# Patient Record
Sex: FEMALE | Race: BLACK OR AFRICAN AMERICAN | NOT HISPANIC OR LATINO | Employment: UNEMPLOYED | URBAN - METROPOLITAN AREA
[De-identification: names, ages, dates, MRNs, and addresses within clinical notes are randomized per-mention and may not be internally consistent; named-entity substitution may affect disease eponyms.]

---

## 2017-03-08 ENCOUNTER — CLINICAL SUPPORT (OUTPATIENT)
Dept: OBSTETRICS AND GYNECOLOGY | Facility: CLINIC | Age: 42
End: 2017-03-08
Payer: MEDICAID

## 2017-03-08 ENCOUNTER — OFFICE VISIT (OUTPATIENT)
Dept: OBSTETRICS AND GYNECOLOGY | Facility: CLINIC | Age: 42
End: 2017-03-08
Payer: MEDICAID

## 2017-03-08 VITALS
BODY MASS INDEX: 31.57 KG/M2 | WEIGHT: 178.19 LBS | RESPIRATION RATE: 13 BRPM | HEART RATE: 76 BPM | SYSTOLIC BLOOD PRESSURE: 124 MMHG | DIASTOLIC BLOOD PRESSURE: 82 MMHG | HEIGHT: 63 IN

## 2017-03-08 DIAGNOSIS — Z30.013 ENCOUNTER FOR INITIAL PRESCRIPTION OF INJECTABLE CONTRACEPTIVE: Primary | ICD-10-CM

## 2017-03-08 DIAGNOSIS — Z01.419 WELL WOMAN EXAM WITH ROUTINE GYNECOLOGICAL EXAM: Primary | ICD-10-CM

## 2017-03-08 DIAGNOSIS — Z12.31 ENCOUNTER FOR SCREENING MAMMOGRAM FOR BREAST CANCER: ICD-10-CM

## 2017-03-08 DIAGNOSIS — N92.1 MENORRHAGIA WITH IRREGULAR CYCLE: ICD-10-CM

## 2017-03-08 DIAGNOSIS — Z12.4 CERVICAL CANCER SCREENING: ICD-10-CM

## 2017-03-08 LAB
B-HCG UR QL: NEGATIVE
CTP QC/QA: YES

## 2017-03-08 PROCEDURE — 99386 PREV VISIT NEW AGE 40-64: CPT | Mod: S$PBB,,, | Performed by: OBSTETRICS & GYNECOLOGY

## 2017-03-08 PROCEDURE — 88175 CYTOPATH C/V AUTO FLUID REDO: CPT

## 2017-03-08 PROCEDURE — 87624 HPV HI-RISK TYP POOLED RSLT: CPT

## 2017-03-08 PROCEDURE — 99999 PR PBB SHADOW E&M-NEW PATIENT-LVL III: CPT | Mod: PBBFAC,,, | Performed by: OBSTETRICS & GYNECOLOGY

## 2017-03-08 PROCEDURE — 99203 OFFICE O/P NEW LOW 30 MIN: CPT | Mod: PBBFAC | Performed by: OBSTETRICS & GYNECOLOGY

## 2017-03-08 RX ORDER — MEDROXYPROGESTERONE ACETATE 150 MG/ML
150 INJECTION, SUSPENSION INTRAMUSCULAR
Status: COMPLETED | OUTPATIENT
Start: 2017-03-08 | End: 2017-03-08

## 2017-03-08 RX ORDER — MEDROXYPROGESTERONE ACETATE 150 MG/ML
150 INJECTION, SUSPENSION INTRAMUSCULAR
Qty: 1 ML | Refills: 3 | Status: SHIPPED | OUTPATIENT
Start: 2017-03-08 | End: 2017-07-12

## 2017-03-08 RX ADMIN — MEDROXYPROGESTERONE ACETATE 150 MG: 150 INJECTION, SUSPENSION INTRAMUSCULAR at 10:03

## 2017-03-08 NOTE — LETTER
March 8, 2017        Jorge A Stack MD  [id number] [address] [address]             [facility]  [address] [place] [id number]  Phone: [phone]   Patient: Kristin Dowell   MR Number: [id number]   YOB: 1975   Date of Visit: 3/8/2017       Dear Dr. Stack:    Thank you for referring Kristin Dowell to me for evaluation. Attached you will find relevant portions of my assessment and plan of care.    If you have questions, please do not hesitate to call me. I look forward to following Kristin Dowell along with you.    Sincerely,      Charity Robertson MD            CC  No Recipients    Enclosure

## 2017-03-08 NOTE — PROGRESS NOTES
Subjective:    Patient ID: Kristin Dowell is a 41 y.o. y.o. female.     Chief Complaint: Annual Well Woman Exam     History of Present Illness:  Kristin presents today for Annual Well Woman exam. She describes her menses as irregular for about 2 years since the death of her son; her most recent period was in January and lasted most of the month.She denies pelvic pain.  She denies breast tenderness, masses, nipple discharge. She denies difficulty with urination or bowel movements. She denies menopausal symptoms such as hotflashes, vaginal dryness, and night sweats. She denies bloating, early satiety, or weight changes. She is sexually active. Contraception is by bilateral tubal ligation.      Menstrual History:   Patient's last menstrual period was 2017..     OB History      Para Term  AB TAB SAB Ectopic Multiple Living    5 5 3 2                The following portions of the patient's history were reviewed and updated as appropriate: allergies, current medications, past family history, past medical history, past social history, past surgical history and problem list.    ROS:   CONSTITUTIONAL: Negative for fever, chills, diaphoresis, weakness, fatigue, weight loss, weight gain  ENT: negative for sore throat, nasal congestion, nasal discharge, epistaxis, tinnitus, hearing loss  EYES: negative for blurry vision, decreased vision, loss of vision, eye pain, diplopia, photophobia, discharge  SKIN: Negative for rash, itching, hives  RESPIRATORY: negative for cough, hemoptysis, shortness of breath, pleuritic chest pain, wheezing  CARDIOVASCULAR: negative for chest pain, dyspnea on exertion, orthopnea, paroxysmal nocturnal dyspnea, edema, palpitations  BREAST: negative for breast  tenderness, breast mass, nipple discharge, or skin changes  GASTROINTESTINAL: negative for abdominal pain, flank pain, nausea, vomiting, diarrhea, constipation, black stool, blood in stool  GENITOURINARY: irregular menses, Denies:  dysuria, frequency/urgency, hematuria, genital discharge, vaginal bleeding, heavy menses, pelvic pain  HEMATOLOGIC/LYMPHATIC: negative for swollen lymph nodes, bleeding, bruising  MUSCULOSKELETAL: negative for back pain, joint pain, joint stiffness, joint swelling, muscle pain, muscle weakness  NEUROLOGICAL: negative for dizzy/vertigo, headache, focal weakness, numbness/tingling, speech problems, loss of consciousness, confusion, memory loss  BEHAVORIAL/PSYCH: negative for anxiety, depression, psychosis  ENDOCRINE: negative for polydipsia/polyuria, palpitations, skin changes, temperature intolerance, unexpected weight changes  ALLERGIC/IMMUNOLOGIC: negative for urticaria, hay fever, angioedema      Objective:    Vital Signs:  Vitals:    03/08/17 1012   BP: 124/82   Pulse: 76   Resp: 13       Physical Exam:  General:  alert, cooperative, appears stated age, no distress   Skin:  Skin color, texture, turgor normal. No rashes or lesions   HEENT:  extra ocular movement intact, sclera clear, anicteric   Neck: supple, trachea midline, no adenopathy or thyromegally   Respiratory:  Normal effort   Breasts:  no discharge, erythema, or tenderness, symmetric fibrous changes in both upper outer quadrants, no skin dimpling or peau d'orange   Abdomen:  soft, nontender, no palpable masses   Pelvis: External genitalia: normal general appearance  Urinary system: urethral meatus normal, bladder nontender  Vaginal: normal mucosa without prolapse or lesions  Cervix: normal appearance  Uterus: normal size, shape, position  Adnexa: normal size, nontender bilaterally   Extremities: Normal ROM; no edema, no cyanosis   Neurologial: Normal strength and tone. No focal numbness or weakness   Psychiatric: normal mood, speech, dress, and thought processes         Assessment:       Healthy female exam.     1. Well woman exam with routine gynecological exam    2. Cervical cancer screening    3. Menorrhagia with irregular cycle    4. Encounter for  screening mammogram for breast cancer          Plan:      1. Well woman exam with routine gynecological exam    2. Cervical cancer screening  - Liquid-based pap smear, screening  - HPV DNA probe, amplified    3. Menorrhagia with irregular cycle  - medroxyPROGESTERone (DEPO-PROVERA) 150 mg/mL injection; Inject 1 mL (150 mg total) into the muscle every 3 (three) months.  Dispense: 1 mL; Refill: 3  Patient is occasional smoker.  Discussed progesterone only medicine options.  Patient would like trial of medical management prior to proceeding with surgery.    4. Encounter for screening mammogram for breast cancer  - Mammo Digital Screening Bilat with CAD; Future      COUNSELING:  Kristin was counseled on  A.C.O.G. Pap guidelines and recommendations for yearly pelvic exams in addition to recommendations for monthly self breast exams; to see her PCP for other health maintenance.

## 2017-03-08 NOTE — PROGRESS NOTES
Initial Depo Provera, UPT negative. Depo Provera 150 mg administered IM to RUOG as ordered. Patient waited x 20 minutes with no reaction noted. Patient supplied medication from local pharmacy. Patient instructed to  prescription the day of injection administration. Depo Provera medication must be stored at room temperature between 68-77 Degrees Fahrenheit in upright position. Keep away from moisture and heat. Do not tamper with vial. Do not self administer medication. Pt verbalized understanding.

## 2017-03-17 LAB — HUMAN PAPILLOMAVIRUS (HPV): NOT DETECTED

## 2017-03-24 ENCOUNTER — PATIENT MESSAGE (OUTPATIENT)
Dept: OBSTETRICS AND GYNECOLOGY | Facility: CLINIC | Age: 42
End: 2017-03-24

## 2017-05-24 ENCOUNTER — OFFICE VISIT (OUTPATIENT)
Dept: OBSTETRICS AND GYNECOLOGY | Facility: CLINIC | Age: 42
End: 2017-05-24
Payer: MEDICAID

## 2017-05-24 VITALS
RESPIRATION RATE: 12 BRPM | BODY MASS INDEX: 31.04 KG/M2 | HEIGHT: 63 IN | WEIGHT: 175.19 LBS | HEART RATE: 72 BPM | DIASTOLIC BLOOD PRESSURE: 86 MMHG | SYSTOLIC BLOOD PRESSURE: 138 MMHG

## 2017-05-24 DIAGNOSIS — N92.1 BREAKTHROUGH BLEEDING ON DEPO PROVERA: Primary | ICD-10-CM

## 2017-05-24 DIAGNOSIS — R10.31 RLQ ABDOMINAL PAIN: ICD-10-CM

## 2017-05-24 DIAGNOSIS — N93.8 DUB (DYSFUNCTIONAL UTERINE BLEEDING): ICD-10-CM

## 2017-05-24 PROCEDURE — 99213 OFFICE O/P EST LOW 20 MIN: CPT | Mod: 25,S$PBB,, | Performed by: OBSTETRICS & GYNECOLOGY

## 2017-05-24 PROCEDURE — 58100 BIOPSY OF UTERUS LINING: CPT | Mod: PBBFAC | Performed by: OBSTETRICS & GYNECOLOGY

## 2017-05-24 PROCEDURE — 58100 BIOPSY OF UTERUS LINING: CPT | Mod: S$PBB,,, | Performed by: OBSTETRICS & GYNECOLOGY

## 2017-05-24 PROCEDURE — 99214 OFFICE O/P EST MOD 30 MIN: CPT | Mod: PBBFAC | Performed by: OBSTETRICS & GYNECOLOGY

## 2017-05-24 PROCEDURE — 99999 PR PBB SHADOW E&M-EST. PATIENT-LVL IV: CPT | Mod: PBBFAC,,, | Performed by: OBSTETRICS & GYNECOLOGY

## 2017-05-24 PROCEDURE — 88305 TISSUE EXAM BY PATHOLOGIST: CPT | Performed by: PATHOLOGY

## 2017-05-24 RX ORDER — NORGESTIMATE AND ETHINYL ESTRADIOL 0.25-0.035
1 KIT ORAL DAILY
Qty: 28 TABLET | Refills: 1 | Status: ON HOLD | OUTPATIENT
Start: 2017-05-24 | End: 2017-07-19 | Stop reason: HOSPADM

## 2017-05-24 NOTE — PROGRESS NOTES
Subjective:    Patient ID: Kristin Dowell is a 41 y.o. y.o. female.     Chief Complaint:   Chief Complaint   Patient presents with    Pelvic Pain     right side, x 1mth    Vaginal Bleeding     x1mth       History of Present Illness   Kristin presents today complaining of abnormal vaginal bleeding. She states that her irregular bleeding began about 2 years ago. She was seen in March and started on DepoProvera; this medication worked for 1 month, but she began bleeding again in May and has been bleeding almost daily for 3 weeks.   She states that the amount of bleeding has been varied, ranging from light to heavy with passage of clots. She reports pain with bleeding. When occurring, pain is felt to be mostly RLQ.  She has a history of a BTL for contraception.    ROS:   Review of Systems       Objective:    Vital Signs:  Vitals:    05/24/17 1041   BP: 138/86   Pulse: 72   Resp: 12       Physical Exam:  General:  alert, cooperative, no distress   Skin:  Skin color, texture, turgor normal. No rashes or lesions   Abdomen:  soft, nontender   Pelvis: External genitalia: normal general appearance  Urinary system: urethral meatus normal, bladder nontender  Vaginal: normal mucosa without prolapse or lesions  Cervix: normal appearance  Uterus: normal size, shape, position  Adnexa: normal size, nontender bilaterally     Endometrial Biopsy:      The risks of uterine hyperplasia, neoplasia, and cancer were explained to the patient, as well as the likelihood of abnormal or anovulatory bleeding. She does not have a medical condition, history of anticoagulation, or other evident reason for bleeding at this time.  The rationale for evaluation with endometrial biopsy and pelvic ultrasound was discussed, and she verbalized understanding. She was informed of the risk of bleeding, infection, uterine perforation and pain and that the test will rule out endometrial cancer with accuracy greater than 95%.  She was counseled on the alternatives  to endometrial biopsy and agrees to proceed.    TIMEOUT PERFORMED.  The cervix was visualized with a speculum.  The cervix was prepped with Betadine.    The cervix was not stenotic. A single-tooth tenaculum was not used to stabilize the cervix. Cervical dilation was not required. A Pipelle was used to obtain the endometrial biopsy. The uterus was sounded to 6 cm and then firm resistance noted (possible fibroid at fundus?). Endocervical curettage was not performed. Multiple passes were taken with the Pipelle with retrieval of Fair tissue sample and dark blood clot.    The specimen was placed in formalin and sent to pathology for histology evaluation.      She tolerated the procedure well      Assessment:      1. Breakthrough bleeding on depo provera    2. DUB (dysfunctional uterine bleeding)    3. RLQ abdominal pain          Plan:      Breakthrough bleeding on depo provera  -     Endometrial biopsy; Future  -     Tissue Specimen To Pathology, Obstetrics/Gynecology  -     US OB/GYN Procedure ([facility]) - Extended List; Future  -     Case Request Operating Room: HYSTERECTOMY-TOTAL LAPAROSCOPIC (TLH)  BILATERAL SALPINGECTOMY  -     norgestimate-ethinyl estradiol (ORTHO-CYCLEN) 0.25-35 mg-mcg per tablet; Take 1 tablet by mouth once daily.  Dispense: 28 tablet; Refill: 1    DUB (dysfunctional uterine bleeding)  -     Endometrial biopsy; Future  -     Tissue Specimen To Pathology, Obstetrics/Gynecology  -     US OB/GYN Procedure ([facility]) - Extended List; Future  -     Case Request Operating Room: HYSTERECTOMY-TOTAL LAPAROSCOPIC (TLH)  BILATERAL SALPINGECTOMY  -     norgestimate-ethinyl estradiol (ORTHO-CYCLEN) 0.25-35 mg-mcg per tablet; Take 1 tablet by mouth once daily.  Dispense: 28 tablet; Refill: 1    RLQ abdominal pain  -     US OB/GYN Procedure ([facility]) - Extended List; Future  -     Case Request Operating Room: HYSTERECTOMY-TOTAL LAPAROSCOPIC (TLH)  BILATERAL SALPINGECTOMY

## 2017-05-24 NOTE — PATIENT INSTRUCTIONS
TLH with bilateral salpingectomy scheduled for 7/19/17.  Pt notified and verbalizes understanding.

## 2017-06-01 ENCOUNTER — PATIENT MESSAGE (OUTPATIENT)
Dept: OBSTETRICS AND GYNECOLOGY | Facility: CLINIC | Age: 42
End: 2017-06-01

## 2017-07-12 ENCOUNTER — HOSPITAL ENCOUNTER (OUTPATIENT)
Dept: PREADMISSION TESTING | Facility: HOSPITAL | Age: 42
Discharge: HOME | End: 2017-07-12
Attending: OBSTETRICS & GYNECOLOGY
Payer: MEDICAID

## 2017-07-12 ENCOUNTER — OFFICE VISIT (OUTPATIENT)
Dept: OBSTETRICS AND GYNECOLOGY | Facility: CLINIC | Age: 42
End: 2017-07-12
Payer: MEDICAID

## 2017-07-12 ENCOUNTER — ANESTHESIA EVENT (OUTPATIENT)
Dept: SURGERY | Facility: HOSPITAL | Age: 42
End: 2017-07-12
Payer: MEDICAID

## 2017-07-12 ENCOUNTER — PROCEDURE VISIT (OUTPATIENT)
Dept: OBSTETRICS AND GYNECOLOGY | Facility: CLINIC | Age: 42
End: 2017-07-12
Payer: MEDICAID

## 2017-07-12 ENCOUNTER — HOSPITAL ENCOUNTER (OUTPATIENT)
Dept: PULMONOLOGY | Facility: HOSPITAL | Age: 42
Discharge: HOME | End: 2017-07-12
Attending: OBSTETRICS & GYNECOLOGY
Payer: MEDICAID

## 2017-07-12 VITALS
RESPIRATION RATE: 13 BRPM | BODY MASS INDEX: 30.77 KG/M2 | DIASTOLIC BLOOD PRESSURE: 84 MMHG | HEART RATE: 73 BPM | WEIGHT: 173.63 LBS | SYSTOLIC BLOOD PRESSURE: 138 MMHG | HEIGHT: 63 IN

## 2017-07-12 VITALS — WEIGHT: 174 LBS | HEIGHT: 68 IN | BODY MASS INDEX: 26.37 KG/M2

## 2017-07-12 DIAGNOSIS — R10.2 CHRONIC PELVIC PAIN IN FEMALE: ICD-10-CM

## 2017-07-12 DIAGNOSIS — G89.29 CHRONIC PELVIC PAIN IN FEMALE: ICD-10-CM

## 2017-07-12 DIAGNOSIS — R10.31 RLQ ABDOMINAL PAIN: ICD-10-CM

## 2017-07-12 DIAGNOSIS — N93.8 DUB (DYSFUNCTIONAL UTERINE BLEEDING): Primary | ICD-10-CM

## 2017-07-12 DIAGNOSIS — N93.8 DUB (DYSFUNCTIONAL UTERINE BLEEDING): ICD-10-CM

## 2017-07-12 DIAGNOSIS — Z01.818 PRE-OP TESTING: ICD-10-CM

## 2017-07-12 DIAGNOSIS — N92.1 BREAKTHROUGH BLEEDING ON DEPO PROVERA: ICD-10-CM

## 2017-07-12 PROCEDURE — 76830 TRANSVAGINAL US NON-OB: CPT | Mod: PBBFAC | Performed by: OBSTETRICS & GYNECOLOGY

## 2017-07-12 PROCEDURE — 76830 TRANSVAGINAL US NON-OB: CPT | Mod: 26,S$PBB,, | Performed by: OBSTETRICS & GYNECOLOGY

## 2017-07-12 PROCEDURE — 99499 UNLISTED E&M SERVICE: CPT | Mod: S$PBB,,, | Performed by: OBSTETRICS & GYNECOLOGY

## 2017-07-12 PROCEDURE — 99999 PR PBB SHADOW E&M-EST. PATIENT-LVL III: CPT | Mod: PBBFAC,,, | Performed by: OBSTETRICS & GYNECOLOGY

## 2017-07-12 PROCEDURE — 93010 ELECTROCARDIOGRAM REPORT: CPT | Mod: ,,, | Performed by: INTERNAL MEDICINE

## 2017-07-12 RX ORDER — SODIUM CHLORIDE, SODIUM LACTATE, POTASSIUM CHLORIDE, CALCIUM CHLORIDE 600; 310; 30; 20 MG/100ML; MG/100ML; MG/100ML; MG/100ML
INJECTION, SOLUTION INTRAVENOUS CONTINUOUS
Status: CANCELLED | OUTPATIENT
Start: 2017-07-12

## 2017-07-12 RX ORDER — ONDANSETRON 4 MG/1
8 TABLET, ORALLY DISINTEGRATING ORAL EVERY 8 HOURS PRN
Status: CANCELLED | OUTPATIENT
Start: 2017-07-12

## 2017-07-12 RX ORDER — IBUPROFEN 200 MG
400-600 TABLET ORAL DAILY PRN
Status: ON HOLD | COMMUNITY
End: 2017-07-19 | Stop reason: HOSPADM

## 2017-07-12 RX ORDER — UBIDECARENONE 75 MG
500 CAPSULE ORAL DAILY
Status: ON HOLD | COMMUNITY
End: 2021-11-19 | Stop reason: HOSPADM

## 2017-07-12 NOTE — H&P
Pre-Operative History and Physical   Obstetrics and Gynecology    Kristin Dowell is a 41 y.o. female  for preop examination.  She is scheduled for a TLH/BS on 17.  She desires definitive surgical management of her AUB.    She states that her irregular bleeding began about 2 years ago. She was seen in March and started on DepoProvera; this medication worked for 1 month, but she began bleeding again in May and had a cycle that lasted almost daily for 3 weeks. Her bleeding is varied, ranging from light to heavy with passage of clots. She reports pain with bleeding. When occurring, pain is felt to be mostly RLQ.  She has a history of a BTL for contraception.    ROS:  GENERAL: Denies weight gain or weight loss. Feeling well overall.   SKIN: Denies rash or lesions.   HEAD: Denies head injury or headache.   NODES: Denies enlarged lymph nodes.   CHEST: Denies chest pain or shortness of breath.   CARDIOVASCULAR: Denies palpitations or left sided chest pain.   ABDOMEN: No abdominal pain, constipation, diarrhea, nausea, vomiting or rectal bleeding.   URINARY: No frequency, dysuria, hematuria, or burning on urination.  REPRODUCTIVE: See HPI.   BREASTS: The patient performs breast self-examination and denies pain, lumps, or nipple discharge.   HEMATOLOGIC: No easy bruisability or excessive bleeding with the exception of menstrual cycles.  MUSCULOSKELETAL: Denies joint pain or swelling.   NEUROLOGIC: Denies syncope or weakness.   PSYCHIATRIC: Denies depression, anxiety or mood swings.    No past medical history on file.  Past Surgical History:   Procedure Laterality Date    APPENDECTOMY       SECTION      TUBAL LIGATION       Family History   Problem Relation Age of Onset    Cancer Father     Breast cancer Neg Hx     Colon cancer Neg Hx     Ovarian cancer Neg Hx      Review of patient's allergies indicates:  No Known Allergies    Current Outpatient Prescriptions:     medroxyPROGESTERone (DEPO-PROVERA) 150  mg/mL injection, Inject 1 mL (150 mg total) into the muscle every 3 (three) months., Disp: 1 mL, Rfl: 3    norgestimate-ethinyl estradiol (ORTHO-CYCLEN) 0.25-35 mg-mcg per tablet, Take 1 tablet by mouth once daily., Disp: 28 tablet, Rfl: 1  No outpatient prescriptions have been marked as taking for the 7/12/17 encounter (Appointment) with Charity Robertson MD.     Social History   Substance Use Topics    Smoking status: Light Tobacco Smoker     Types: Cigars    Smokeless tobacco: Never Used      Comment: social smoker    Alcohol use Yes      Comment: socially         Last pap 3/2017 NEM, HPV negative  Last pathology 5/2017 proliferative  Last ultrasound fibroid uterus, normal adnexa    There were no vitals filed for this visit.  General Appearance: Alert, appropriate appearance for age. No acute distress, Chest/Respiratory Exam: Normal.   Cardiovascular Exam: regular rate and rhythm   Gastrointestinal Exam: soft, non-tender; bowel sounds normal; no masses,  no organomegaly  Pelvic Exam Female: Exam deferred.   Psychiatric Exam: Alert and oriented, appropriate affect.    Assessment: DUB, CPP    Plan: TLH/BS    I have discussed the risks, benefits, indications, and alternatives of the procedure in detail.  The patient verbalizes her understanding.  All questions answered.  Consents signed.  The patient agrees to proceed to proceed as planned.

## 2017-07-12 NOTE — ANESTHESIA PREPROCEDURE EVALUATION
07/12/2017  Kristin Dowell is a 41 y.o., female.    Anesthesia Evaluation    I have reviewed the Patient Summary Reports.    I have reviewed the Nursing Notes.   I have reviewed the Medications.     Review of Systems  Anesthesia Hx:  No problems with previous Anesthesia Denies Hx of Anesthetic complications  History of prior surgery of interest to airway management or planning: Previous anesthesia: Spinal Denies Family Hx of Anesthesia complications.   Denies Personal Hx of Anesthesia complications.   Social:  Social Alcohol Use, Non-Smoker    Hematology/Oncology:  Hematology Normal   Oncology Normal     EENT/Dental:EENT/Dental Normal   Cardiovascular:  Cardiovascular Normal Exercise tolerance: good     Pulmonary:  Pulmonary Normal    Renal/:  Renal/ Normal     Hepatic/GI:  Hepatic/GI Normal    Musculoskeletal:  Musculoskeletal Normal    Neurological:  Neurology Normal    Endocrine:  Endocrine Normal    Dermatological:  Skin Normal    Psych:  Psychiatric Normal           Physical Exam  General:  Well nourished    Airway/Jaw/Neck:  Airway Findings: Mouth Opening: Normal Tongue: Normal  General Airway Assessment: Adult  Mallampati: III  TM Distance: Normal, at least 6 cm  Jaw/Neck Findings:     Neck ROM: Normal ROM      Dental:  Dental Findings: In tact        Mental Status:  Mental Status Findings:  Cooperative, Alert and Oriented         Anesthesia Plan  Type of Anesthesia, risks & benefits discussed:  Anesthesia Type:  general  Patient's Preference:   Intra-op Monitoring Plan: standard ASA monitors  Intra-op Monitoring Plan Comments:   Post Op Pain Control Plan: multimodal analgesia  Post Op Pain Control Plan Comments:   Induction:   IV  Beta Blocker:  Patient is not currently on a Beta-Blocker (No further documentation required).       Informed Consent: Patient understands risks and agrees with Anesthesia  plan.  Questions answered. Anesthesia consent signed with patient.  ASA Score: 1     Day of Surgery Review of History & Physical: I have interviewed and examined the patient. I have reviewed the patient's H&P dated: 7/19/17. There are no significant changes.  H&P update referred to the surgeon.         Ready For Surgery From Anesthesia Perspective.

## 2017-07-12 NOTE — DISCHARGE INSTRUCTIONS
[facility]  Pre Admit Instructions    Day and Date of Procedure: Wednesday 7-      · Call your doctor if you become ill before your surgery  · Someone will call you between 1 p.m. And 5 p.m.the workday before the procedure to give you an arrival time       - Before 7 a.m. Enter through Emergency Room       - 7 a.m. To 5 p.m. Enter through Patient Registration Main Lobby  · You must have a responsible  to bring you home    Do NOT eat or drink anything   past midnight before your procedure day    Please    · Do not wear makeup, jewelry, nail polish or body piercings  · Bring containers/solution for contacts, dentures, bridges - these and hearing aids will be removed before your procedure  · Do not bring cash, jewelry or valuables the day of your procedure   · No smoking at least 24 hours before your procedure  · Wear clothing that is comfortable and easy to take off and put on  · Do NOT shave for at least 5 days before your surgery    Review skin preparation handout before using.                 Information about your stay (Please Review)    Before Surgery  1. Cafeteria Meals: 7am to 10am; 11am to 1:30 pm; Dinner/Supper must may be ordered between 11:00 am and 4 pm from the [facility] [employer] Menu. Food will be available to  between 5 pm and 6 pm. The kitchen phone extension is [phone].  2. Your doctor may order and review labs, x-rays, ECG or other tests as a pre-surgery workup and will call you if there is need for follow up.  3. No smoking inside or outside the hospital on hospital grounds.  4. Wear clothing that is easy to take off and put on.  The hospital will provide you with a gown.  5. You may bring robe, slippers, nightwear, and toiletries (toothbrush, toothpaste, makeup).  6. If your doctor orders a Fleets Enema or other prep, follow package and/or doctors orders.  7. Brush your teeth and rinse your mouth the morning of surgery, but dont swallow the water.  8. The nurse  will ask questions and check your condition.  The doctor may indy your surgical site.  9. Compression boots may be put on your calves to reduce the risk of blood clots.  10. The doctor may order medicine to help you relax before surgery.  After Surgery  1. The nurse will check your temperature, breathing, blood pressure, heart rate, IV site, and surgery site.  2. A diet will be ordered-most start with ice chips and then advance slowly to other foods.  3. If you have IV fluids the IV pump will beep to let the nurse know that she needs to check it.  4. You may have a urinary catheter and staff may measure your oral intake and urine output.  5. Pain medication may be ordered by the doctor after surgery.  If you have a pain management device tell your caretakers not to press the button because of OVERDOSE RISK.  6. When the nurse or doctor tells you it is okay to get out of bed, ask for help until you are stable.  7. The nurse may ask you to turn, cough, and deep breathe to prevent lung problems.  You can use a pillow to hold your incision when you deep breathe or cough to reduce pain.  8. The nurse will give you discharge instructions--incision care, symptoms to report to your doctor, and your follow-up appointment when you are discharged.  You cannot drive yourself home.  Goal for Discharge from One Day Surgery  · Control pain with an oral medication  · Walk without feeling dizzy or weak  · Tolerate liquids well  · Urinate without difficulty    Things you can do to  Reduce the Risk of Infections or Complications  Wash Hands and use Waterless Hand Sanitizers  · Wash hands frequently with soap and warm water for at least 15 seconds.   · Use hand sanitizers (alcohol based) often at home and in public if hands are not visibly soiled  Take Antibiotic Exactly as Prescribed  · Do not stop antibiotics too soon; you risk developing infection resistant to antibiotics  · Take your antibiotic even if you are feeling better and  even if they upset your stomach  · Call the doctor if you cant tolerate the antibiotic or you have an allergic reaction  Stay Healthy  · Take medicines as prescribed by your doctor  · Keep your diabetes under control - diet and medication  · Get enough rest, exercise and eat a healthy diet  Keep the Wound Clean and Dry  · Wash hands before and after taking care of the incision (cut)  · Wash hands when you remove a dressing, before you touch/apply a new dressing  · Shower and clean incision with antibacterial soap and rinse well if the doctor approves  · Allow the cut to dry completely before putting on a clean dressing  · Do not touch the part of the bandage that will cover the incision  · Do not use ointments unless your doctor tells you to-can promote bacterial growth  · If ordered, put ointment directly on the dressing-do not touch the end of the tube  · Do not scrub, remove scabs, or leave a damp dressing on the incision  · Do not use peroxide or alcohol to clean the incision unless the doctor tells you to   · Do not let children, pets or anyone else contaminate the incision  Stop Smoking To Prevent Infection  · Stop smoking-Centers for Disease Control recommends 30 days before surgery  · Smokers get more infections after surgery-studies have shown 6 times the risk  · Smokers have more scarring and heal slower-open wounds get infected easier  Prevent Respiratory complications  · Stop smoking  · Turn, cough, and deep breathe even if you have some pain when you do so.  · Splint your incision with a pillow when you cough/deep breath, to help control pain.  · Do not lie in one position for long periods of time.   Prevent Blood Clots  · When you wake move your legs, flex your feet, rotate your ankles, wiggle your toes  · Get up when the doctor says its ok.  Dangle your feet from the side of the bed  · Report symptoms-leg pain, redness/swelling, warm to touch; fever; shortness of breath, chest pain, severe upper  back pain.

## 2017-07-14 DIAGNOSIS — N93.8 DUB (DYSFUNCTIONAL UTERINE BLEEDING): ICD-10-CM

## 2017-07-14 DIAGNOSIS — N92.1 BREAKTHROUGH BLEEDING ON DEPO PROVERA: ICD-10-CM

## 2017-07-14 RX ORDER — NORGESTIMATE AND ETHINYL ESTRADIOL 0.25-0.035
KIT ORAL
Qty: 28 TABLET | Refills: 1 | OUTPATIENT
Start: 2017-07-14

## 2017-07-19 ENCOUNTER — ANESTHESIA (OUTPATIENT)
Dept: SURGERY | Facility: HOSPITAL | Age: 42
End: 2017-07-19
Payer: MEDICAID

## 2017-07-19 ENCOUNTER — HOSPITAL ENCOUNTER (OUTPATIENT)
Facility: HOSPITAL | Age: 42
Discharge: HOME | End: 2017-07-19
Attending: OBSTETRICS & GYNECOLOGY | Admitting: OBSTETRICS & GYNECOLOGY
Payer: MEDICAID

## 2017-07-19 ENCOUNTER — SURGERY (OUTPATIENT)
Age: 42
End: 2017-07-19

## 2017-07-19 VITALS
BODY MASS INDEX: 27.3 KG/M2 | DIASTOLIC BLOOD PRESSURE: 95 MMHG | RESPIRATION RATE: 18 BRPM | SYSTOLIC BLOOD PRESSURE: 160 MMHG | TEMPERATURE: 98 F | WEIGHT: 173.94 LBS | HEIGHT: 67 IN | HEART RATE: 70 BPM | OXYGEN SATURATION: 98 %

## 2017-07-19 DIAGNOSIS — R10.2 CHRONIC PELVIC PAIN IN FEMALE: ICD-10-CM

## 2017-07-19 DIAGNOSIS — G89.29 CHRONIC PELVIC PAIN IN FEMALE: ICD-10-CM

## 2017-07-19 DIAGNOSIS — N93.8 DUB (DYSFUNCTIONAL UTERINE BLEEDING): ICD-10-CM

## 2017-07-19 DIAGNOSIS — N92.1 BREAKTHROUGH BLEEDING ON DEPO PROVERA: ICD-10-CM

## 2017-07-19 DIAGNOSIS — R10.31 RLQ ABDOMINAL PAIN: Primary | ICD-10-CM

## 2017-07-19 LAB
B-HCG UR QL: NEGATIVE
POCT GLUCOSE: 98 MG/DL (ref 70–110)

## 2017-07-19 PROCEDURE — 27000496 *HC LARYNGEAL BLADE (STAH ONLY): Performed by: NURSE ANESTHETIST, CERTIFIED REGISTERED

## 2017-07-19 PROCEDURE — 37000008 HC ANESTHESIA 1ST 15 MINUTES: Performed by: OBSTETRICS & GYNECOLOGY

## 2017-07-19 PROCEDURE — 27200120 HC KIT IV START (RUSH ONLY): Performed by: NURSE ANESTHETIST, CERTIFIED REGISTERED

## 2017-07-19 PROCEDURE — 99900035 HC TECH TIME PER 15 MIN (STAT)

## 2017-07-19 PROCEDURE — 27000221 HC OXYGEN, UP TO 24 HOURS

## 2017-07-19 PROCEDURE — 63600175 PHARM REV CODE 636 W HCPCS: Performed by: NURSE ANESTHETIST, CERTIFIED REGISTERED

## 2017-07-19 PROCEDURE — 37000009 HC ANESTHESIA EA ADD 15 MINS: Performed by: OBSTETRICS & GYNECOLOGY

## 2017-07-19 PROCEDURE — 27000494 *HC OXYGEN SET UP (STAH ONLY): Performed by: NURSE ANESTHETIST, CERTIFIED REGISTERED

## 2017-07-19 PROCEDURE — 63600175 PHARM REV CODE 636 W HCPCS: Performed by: OBSTETRICS & GYNECOLOGY

## 2017-07-19 PROCEDURE — 00840 ANES IPER PX LOWER ABD NOS: CPT | Mod: P1,QZ | Performed by: NURSE ANESTHETIST, CERTIFIED REGISTERED

## 2017-07-19 PROCEDURE — 71000033 HC RECOVERY, INTIAL HOUR: Performed by: OBSTETRICS & GYNECOLOGY

## 2017-07-19 PROCEDURE — 25000003 PHARM REV CODE 250: Performed by: OBSTETRICS & GYNECOLOGY

## 2017-07-19 PROCEDURE — 27000495 *HC ANESTHESIA START UP SUPPLY KIT (STAH ONLY): Performed by: NURSE ANESTHETIST, CERTIFIED REGISTERED

## 2017-07-19 PROCEDURE — 88307 TISSUE EXAM BY PATHOLOGIST: CPT | Performed by: PATHOLOGY

## 2017-07-19 PROCEDURE — 58571 TLH W/T/O 250 G OR LESS: CPT | Mod: ,,, | Performed by: OBSTETRICS & GYNECOLOGY

## 2017-07-19 PROCEDURE — 81025 URINE PREGNANCY TEST: CPT

## 2017-07-19 PROCEDURE — 36000710: Performed by: OBSTETRICS & GYNECOLOGY

## 2017-07-19 PROCEDURE — 36000711: Performed by: OBSTETRICS & GYNECOLOGY

## 2017-07-19 PROCEDURE — 58571 TLH W/T/O 250 G OR LESS: CPT | Mod: 80,,, | Performed by: OBSTETRICS & GYNECOLOGY

## 2017-07-19 PROCEDURE — 25000003 PHARM REV CODE 250: Performed by: NURSE ANESTHETIST, CERTIFIED REGISTERED

## 2017-07-19 PROCEDURE — 27201423 OPTIME MED/SURG SUP & DEVICES STERILE SUPPLY: Performed by: OBSTETRICS & GYNECOLOGY

## 2017-07-19 RX ORDER — IBUPROFEN 800 MG/1
800 TABLET ORAL EVERY 8 HOURS
Qty: 30 TABLET | Refills: 0 | Status: SHIPPED | OUTPATIENT
Start: 2017-07-20 | End: 2018-04-03 | Stop reason: ALTCHOICE

## 2017-07-19 RX ORDER — SODIUM CHLORIDE, SODIUM LACTATE, POTASSIUM CHLORIDE, CALCIUM CHLORIDE 600; 310; 30; 20 MG/100ML; MG/100ML; MG/100ML; MG/100ML
INJECTION, SOLUTION INTRAVENOUS CONTINUOUS PRN
Status: DISCONTINUED | OUTPATIENT
Start: 2017-07-19 | End: 2017-07-19

## 2017-07-19 RX ORDER — LIDOCAINE HYDROCHLORIDE 20 MG/ML
INJECTION, SOLUTION EPIDURAL; INFILTRATION; INTRACAUDAL; PERINEURAL
Status: DISCONTINUED | OUTPATIENT
Start: 2017-07-19 | End: 2017-07-19

## 2017-07-19 RX ORDER — HYDROCODONE BITARTRATE AND ACETAMINOPHEN 5; 325 MG/1; MG/1
1 TABLET ORAL EVERY 4 HOURS PRN
Status: DISCONTINUED | OUTPATIENT
Start: 2017-07-19 | End: 2017-07-20 | Stop reason: HOSPADM

## 2017-07-19 RX ORDER — HYDROCODONE BITARTRATE AND ACETAMINOPHEN 10; 325 MG/1; MG/1
1 TABLET ORAL EVERY 4 HOURS PRN
Status: DISCONTINUED | OUTPATIENT
Start: 2017-07-19 | End: 2017-07-20 | Stop reason: HOSPADM

## 2017-07-19 RX ORDER — CEFAZOLIN SODIUM 2 G/50ML
2 SOLUTION INTRAVENOUS
Status: COMPLETED | OUTPATIENT
Start: 2017-07-19 | End: 2017-07-19

## 2017-07-19 RX ORDER — SIMETHICONE 80 MG
80 TABLET,CHEWABLE ORAL EVERY 4 HOURS PRN
Status: DISCONTINUED | OUTPATIENT
Start: 2017-07-19 | End: 2017-07-20 | Stop reason: HOSPADM

## 2017-07-19 RX ORDER — ONDANSETRON HYDROCHLORIDE 2 MG/ML
INJECTION, SOLUTION INTRAMUSCULAR; INTRAVENOUS
Status: DISCONTINUED | OUTPATIENT
Start: 2017-07-19 | End: 2017-07-19

## 2017-07-19 RX ORDER — FENTANYL CITRATE 50 UG/ML
INJECTION, SOLUTION INTRAMUSCULAR; INTRAVENOUS
Status: DISCONTINUED | OUTPATIENT
Start: 2017-07-19 | End: 2017-07-19

## 2017-07-19 RX ORDER — ACETAMINOPHEN 325 MG/1
650 TABLET ORAL EVERY 4 HOURS PRN
Status: DISCONTINUED | OUTPATIENT
Start: 2017-07-19 | End: 2017-07-20 | Stop reason: HOSPADM

## 2017-07-19 RX ORDER — MIDAZOLAM HYDROCHLORIDE 1 MG/ML
INJECTION INTRAMUSCULAR; INTRAVENOUS
Status: DISCONTINUED | OUTPATIENT
Start: 2017-07-19 | End: 2017-07-19

## 2017-07-19 RX ORDER — ONDANSETRON 2 MG/ML
4 INJECTION INTRAMUSCULAR; INTRAVENOUS EVERY 6 HOURS PRN
Status: DISCONTINUED | OUTPATIENT
Start: 2017-07-19 | End: 2017-07-20 | Stop reason: HOSPADM

## 2017-07-19 RX ORDER — PROMETHAZINE HYDROCHLORIDE 25 MG/1
25 TABLET ORAL EVERY 6 HOURS PRN
Status: DISCONTINUED | OUTPATIENT
Start: 2017-07-19 | End: 2017-07-20 | Stop reason: HOSPADM

## 2017-07-19 RX ORDER — NEOSTIGMINE METHYLSULFATE 1 MG/ML
INJECTION, SOLUTION INTRAVENOUS
Status: DISCONTINUED | OUTPATIENT
Start: 2017-07-19 | End: 2017-07-19

## 2017-07-19 RX ORDER — IBUPROFEN 800 MG/1
800 TABLET ORAL EVERY 8 HOURS
Status: DISCONTINUED | OUTPATIENT
Start: 2017-07-20 | End: 2017-07-20 | Stop reason: HOSPADM

## 2017-07-19 RX ORDER — HYDROCODONE BITARTRATE AND ACETAMINOPHEN 5; 325 MG/1; MG/1
1 TABLET ORAL EVERY 6 HOURS PRN
Qty: 25 TABLET | Refills: 0 | Status: SHIPPED | OUTPATIENT
Start: 2017-07-19 | End: 2017-08-28

## 2017-07-19 RX ORDER — ROCURONIUM BROMIDE 10 MG/ML
INJECTION, SOLUTION INTRAVENOUS
Status: DISCONTINUED | OUTPATIENT
Start: 2017-07-19 | End: 2017-07-19

## 2017-07-19 RX ORDER — ONDANSETRON 8 MG/1
8 TABLET, ORALLY DISINTEGRATING ORAL EVERY 8 HOURS PRN
Status: DISCONTINUED | OUTPATIENT
Start: 2017-07-19 | End: 2017-07-19

## 2017-07-19 RX ORDER — SODIUM CHLORIDE, SODIUM LACTATE, POTASSIUM CHLORIDE, CALCIUM CHLORIDE 600; 310; 30; 20 MG/100ML; MG/100ML; MG/100ML; MG/100ML
INJECTION, SOLUTION INTRAVENOUS CONTINUOUS
Status: ACTIVE | OUTPATIENT
Start: 2017-07-19 | End: 2017-07-19

## 2017-07-19 RX ORDER — ACETAMINOPHEN 10 MG/ML
INJECTION, SOLUTION INTRAVENOUS
Status: DISCONTINUED | OUTPATIENT
Start: 2017-07-19 | End: 2017-07-19

## 2017-07-19 RX ORDER — GLYCOPYRROLATE 0.2 MG/ML
INJECTION INTRAMUSCULAR; INTRAVENOUS
Status: DISCONTINUED | OUTPATIENT
Start: 2017-07-19 | End: 2017-07-19

## 2017-07-19 RX ORDER — HYDROMORPHONE HYDROCHLORIDE 2 MG/ML
INJECTION, SOLUTION INTRAMUSCULAR; INTRAVENOUS; SUBCUTANEOUS
Status: DISCONTINUED | OUTPATIENT
Start: 2017-07-19 | End: 2017-07-19

## 2017-07-19 RX ORDER — SODIUM CHLORIDE, SODIUM LACTATE, POTASSIUM CHLORIDE, CALCIUM CHLORIDE 600; 310; 30; 20 MG/100ML; MG/100ML; MG/100ML; MG/100ML
INJECTION, SOLUTION INTRAVENOUS CONTINUOUS
Status: DISCONTINUED | OUTPATIENT
Start: 2017-07-19 | End: 2017-07-19

## 2017-07-19 RX ORDER — HYDROMORPHONE HYDROCHLORIDE 1 MG/ML
1 INJECTION, SOLUTION INTRAMUSCULAR; INTRAVENOUS; SUBCUTANEOUS ONCE
Status: COMPLETED | OUTPATIENT
Start: 2017-07-19 | End: 2017-07-19

## 2017-07-19 RX ORDER — DEXAMETHASONE SODIUM PHOSPHATE 4 MG/ML
INJECTION, SOLUTION INTRA-ARTICULAR; INTRALESIONAL; INTRAMUSCULAR; INTRAVENOUS; SOFT TISSUE
Status: DISCONTINUED | OUTPATIENT
Start: 2017-07-19 | End: 2017-07-19

## 2017-07-19 RX ORDER — DIPHENHYDRAMINE HCL 25 MG
25 CAPSULE ORAL EVERY 4 HOURS PRN
Status: DISCONTINUED | OUTPATIENT
Start: 2017-07-19 | End: 2017-07-20 | Stop reason: HOSPADM

## 2017-07-19 RX ORDER — KETOROLAC TROMETHAMINE 30 MG/ML
30 INJECTION, SOLUTION INTRAMUSCULAR; INTRAVENOUS EVERY 6 HOURS
Status: DISCONTINUED | OUTPATIENT
Start: 2017-07-19 | End: 2017-07-20 | Stop reason: HOSPADM

## 2017-07-19 RX ORDER — PROPOFOL 10 MG/ML
VIAL (ML) INTRAVENOUS
Status: DISCONTINUED | OUTPATIENT
Start: 2017-07-19 | End: 2017-07-19

## 2017-07-19 RX ORDER — KETOROLAC TROMETHAMINE 30 MG/ML
INJECTION, SOLUTION INTRAMUSCULAR; INTRAVENOUS
Status: DISCONTINUED | OUTPATIENT
Start: 2017-07-19 | End: 2017-07-19

## 2017-07-19 RX ADMIN — ACETAMINOPHEN 1000 MG: 10 INJECTION, SOLUTION INTRAVENOUS at 08:07

## 2017-07-19 RX ADMIN — KETOROLAC TROMETHAMINE 30 MG: 30 INJECTION, SOLUTION INTRAMUSCULAR; INTRAVENOUS at 08:07

## 2017-07-19 RX ADMIN — LIDOCAINE HYDROCHLORIDE 60 MG: 20 INJECTION, SOLUTION EPIDURAL; INFILTRATION; INTRACAUDAL; PERINEURAL at 07:07

## 2017-07-19 RX ADMIN — SODIUM CHLORIDE, SODIUM LACTATE, POTASSIUM CHLORIDE, AND CALCIUM CHLORIDE: .6; .31; .03; .02 INJECTION, SOLUTION INTRAVENOUS at 08:07

## 2017-07-19 RX ADMIN — ROCURONIUM BROMIDE 10 MG: 10 INJECTION, SOLUTION INTRAVENOUS at 08:07

## 2017-07-19 RX ADMIN — KETOROLAC TROMETHAMINE 30 MG: 30 INJECTION, SOLUTION INTRAMUSCULAR at 02:07

## 2017-07-19 RX ADMIN — FENTANYL CITRATE 50 MCG: 50 INJECTION, SOLUTION INTRAMUSCULAR; INTRAVENOUS at 07:07

## 2017-07-19 RX ADMIN — FENTANYL CITRATE 100 MCG: 50 INJECTION, SOLUTION INTRAMUSCULAR; INTRAVENOUS at 07:07

## 2017-07-19 RX ADMIN — ROCURONIUM BROMIDE 40 MG: 10 INJECTION, SOLUTION INTRAVENOUS at 07:07

## 2017-07-19 RX ADMIN — GLYCOPYRROLATE 0.4 MG: 0.2 INJECTION INTRAMUSCULAR; INTRAVENOUS at 09:07

## 2017-07-19 RX ADMIN — NEOSTIGMINE METHYLSULFATE 3 MG: 1 INJECTION INTRAVENOUS at 09:07

## 2017-07-19 RX ADMIN — DEXAMETHASONE SODIUM PHOSPHATE 8 MG: 4 INJECTION, SOLUTION INTRAMUSCULAR; INTRAVENOUS at 07:07

## 2017-07-19 RX ADMIN — HYDROMORPHONE HYDROCHLORIDE 1 MG: 2 INJECTION, SOLUTION INTRAMUSCULAR; INTRAVENOUS; SUBCUTANEOUS at 08:07

## 2017-07-19 RX ADMIN — MIDAZOLAM HYDROCHLORIDE 2 MG: 1 INJECTION, SOLUTION INTRAMUSCULAR; INTRAVENOUS at 07:07

## 2017-07-19 RX ADMIN — HYDROCODONE BITARTRATE AND ACETAMINOPHEN 1 TABLET: 5; 325 TABLET ORAL at 03:07

## 2017-07-19 RX ADMIN — HYDROMORPHONE HYDROCHLORIDE 1 MG: 1 INJECTION, SOLUTION INTRAMUSCULAR; INTRAVENOUS; SUBCUTANEOUS at 04:07

## 2017-07-19 RX ADMIN — PROPOFOL 150 MG: 10 INJECTION, EMULSION INTRAVENOUS at 07:07

## 2017-07-19 RX ADMIN — HYDROMORPHONE HYDROCHLORIDE 1 MG: 2 INJECTION, SOLUTION INTRAMUSCULAR; INTRAVENOUS; SUBCUTANEOUS at 09:07

## 2017-07-19 RX ADMIN — SODIUM CHLORIDE, SODIUM LACTATE, POTASSIUM CHLORIDE, AND CALCIUM CHLORIDE: .6; .31; .03; .02 INJECTION, SOLUTION INTRAVENOUS at 07:07

## 2017-07-19 RX ADMIN — FENTANYL CITRATE 50 MCG: 50 INJECTION, SOLUTION INTRAMUSCULAR; INTRAVENOUS at 08:07

## 2017-07-19 RX ADMIN — ONDANSETRON 4 MG: 2 INJECTION, SOLUTION INTRAMUSCULAR; INTRAVENOUS at 08:07

## 2017-07-19 RX ADMIN — PROMETHAZINE HYDROCHLORIDE 25 MG: 25 TABLET ORAL at 11:07

## 2017-07-19 RX ADMIN — HYDROMORPHONE HYDROCHLORIDE 2 MG: 2 INJECTION, SOLUTION INTRAMUSCULAR; INTRAVENOUS; SUBCUTANEOUS at 09:07

## 2017-07-19 RX ADMIN — FENTANYL CITRATE 100 MCG: 50 INJECTION, SOLUTION INTRAMUSCULAR; INTRAVENOUS at 08:07

## 2017-07-19 RX ADMIN — CEFAZOLIN SODIUM 2 G: 2 SOLUTION INTRAVENOUS at 07:07

## 2017-07-19 NOTE — ANESTHESIA POSTPROCEDURE EVALUATION
"Anesthesia Post Evaluation    Patient: Kristin Dowell    Procedure(s) Performed: Procedure(s) (LRB):  HYSTERECTOMY-TOTAL LAPAROSCOPIC (TLH) (N/A)  SALPINGECTOMY-LAPAROSCOPIC (Bilateral)  OOPHORECTOMY-LAPAROSCOPIC (Right)    Final Anesthesia Type: general  Patient location during evaluation: PACU  Patient participation: Yes- Able to Participate  Level of consciousness: awake and alert, oriented and awake  Post-procedure vital signs: reviewed and stable  Pain management: adequate  Airway patency: patent  PONV status at discharge: No PONV  Anesthetic complications: no      Cardiovascular status: blood pressure returned to baseline  Respiratory status: unassisted, spontaneous ventilation and room air  Hydration status: euvolemic  Follow-up not needed.  Comments: [facility]        Visit Vitals  BP (!) 161/92 (BP Location: Right arm, Patient Position: Sitting, BP Method: Automatic)   Pulse 77   Temp 35.7 °C (96.3 °F) (Oral)   Resp 17   Ht 5' 7" (1.702 m)   Wt 78.9 kg (173 lb 15.1 oz)   LMP 06/19/2017 (Approximate)   SpO2 99%   Breastfeeding? No   BMI 27.24 kg/m²       Pain/Meagan Score: Pain Assessment Performed: Yes (7/19/2017 10:12 AM)  Presence of Pain: complains of pain/discomfort (7/19/2017 10:12 AM)  Meagan Score: 10 (7/19/2017 10:00 AM)      "

## 2017-07-19 NOTE — PROGRESS NOTES
07/19/17 0547   Vital Signs   Temp 98.5 °F (36.9 °C)   Temp src Oral   Pulse 75   Heart Rate Source Monitor   Resp 16   SpO2 97 %   O2 Device (Oxygen Therapy) room air   BP (!) 170/104   BP Location Right arm   BP Method Manual   Patient Position Sitting   Assessments (Pre/Post)   Level of Consciousness (AVPU) alert       Notified Dr. Robertson of pt BP. She states to let the pt get settled and calm down and then retake, then call her back with the results. Will continue to monitor.

## 2017-07-19 NOTE — OP NOTE
Surgery Date: 2017     Surgeon(s) and Role:     * Charity Robertson MD - Primary     * Hetal King MD - Assisting    Pre-op Diagnosis:    DUB (dysfunctional uterine bleeding) [N93.8]  RLQ abdominal pain [R10.31]  Breakthrough bleeding on depo provera [N92.1]    Post-op Diagnosis:    same    Procedure(s):  Procedure(s):  HYSTERECTOMY-TOTAL LAPAROSCOPIC (TLH)  RIGHT SALPINGECTOMY-LAPAROSCOPIC  LEFT SALPINGO OPHORECTOMY-LAPAROSCOPIC    Anesthesia: General    Specimen: Uterus, cervix, bilateral fallopian tubes, left ovary    EBL: 50 mL    Complications: None    Procedure in Detail:  The patient was placed on the operating table in the dorsal supine position and given satisfactory general endotracheal anesthesia.  She was then placed in the lithotomy position. The abdomen was prepped with Chloraprep and the vagina was prepped with Hibiclens surgical prep. A Hankins catheter was placed into the bladder for drainage.  She was then draped in the usual manner for laparoscopic procedure.     After assuring adequate anesthesia, a 1 cm infraumbilical skin incision was then made. The abdomen was elevated with an towel clamps, and a Veress needle was introduced into the abdomen without difficulty. The abdomen was inflated with CO2 to a pressure of 15mm Hg.  The Veress needle was removed and a 10 mm Optiview port was then placed in the umbilicus without difficulty using the laparoscope as guidance for entering the abdomen. Following this a 10mm port was also placed in the right and left lower quadrants under direct visualization.    The pelvis was inspected. The uterus appeared irregular contour: with fundal as well as anterior fibroid noted. The previous  section bladder flap scar was evident. The ovaries appeared normal right, abnormal left with significant amount of scaring and mild torsion present. The pelvic peritoneum appeared Abnormal - omental adhesion in the RLQ.     At this time, with the assistant  elevating the uterus using an atraumatic grasper, the distal end of the right fallopian tube was excised along the mesosalpinx with the Sonicision.  The round ligament was then grasped with the Sonicision scalpel and transected without difficulty.  Next, the utero-ovarian ligament was transected with the Sonicision scalpel with good hemostasis.  The right side of the bladder flap was then created using the  Sonicision scalpel to incise the anterior peritoneum of the broad ligament in Mile-Johnson fashion.  The uterine vessels were skeletonized on the right side, coagulated with bipolar current using a Gyrus Prince forceps,  and then transected using the Sonicision scalpel without difficulty. At this time the assistant performed a similar procedure on the patient's left side with decicision made to remove the left ovary secondary to significant scaring with mild torsion present.  My assistant coagulated the IP ligament with the Gyrus Prince and then excised the left ovary and fallopian tube.  She then divided the round ligament and utero-ovarian ligament and completed the bladder flap. The uterine vessels were then skeletonized, coagulated with bipolar current using the Gyrus Prince forceps and transected with the  Sonicision scalpel by my assistant.     At this time with both uterine vessels secured and hemostatsis being adequate the procedure was continued. A sponge stick was placed in the vagina to aid in identifying the cervico-vaginal juncion of the anterior vaginal wall.  The bladder was identified and was then mobilized free of the lower uterine segment by blunt and sharp dissection using the  scalpel while the assistant provided traction on the uterus. Once the bladder was dissected free of the vagina and lower uterine segment, the vagina was then incised with the Sonicision scalpel in a transverse fashion overlying the sponge stick in the anterior fornix.  This was done transversely for approximately a 2 cm  length.  Following this, the uterus was anteflexed by my assistant and the posterior cul-de-sac was identified using  a sponge stick within the vagina.  Again, the  Sonicision scalpel was used to incise the posterior vagina transversely between the utero-sacral ligaments for approximately a 2 cm distance.     With both incisions made in the vagina, a small amount of air began to leak out.  The vagina was packed with a wet towel to secure pneumoperitoneum and the procedure was continued.  The  Sonicision scalpel was used to transect the cardinal ligaments and then the utero-sacral ligaments, first on the right side by me and then the left side by my assistant  The remaining portions of the vaginal attachments were then cut with the  Sonicision scalpel and the cervix was removed from the vagina using a single tooth tenaculum to grasp the cervix after being positioned at the vaginal opening by the assistant.    The Vaginal pack was replaced to secure pneumoperitoneum and the procedure was continued laparoscopically.    The vaginal cuff was the sutured using a interrupted 2-0 Vicryl suture placed across the anterior and posterior vaginal walls using the AutoSuture Endo-Stitch device. This was accomplished with the assistant elevating the vaginal cuff with an endoscopic 5mm forceps. The sutures were tied extracorporeally and secured with a knot pusher.     At this time, with all pedicles hemostatic, the abdomen was deflated of CO2  and again inspected for bleeding. With no bleeding noted the abdomen was reinflated with CO2. At this point, the pelvis was irrigated with saline.  Bilateral ureters were identified with peristalsis noted.  The abdomen was again deflated of CO2. The ports were removed and the skin incisions were closed with 3-0 Dexon subcuticular stitch.       The patient tolerated the procedure well and left the operating room awake, alert, and with vital signs stable.      Estimated blood loss was  approximately 50 cc.

## 2017-07-19 NOTE — PROGRESS NOTES
"   07/19/17 0612   Vital Signs   BP (!) 150/90   BP Location Right arm   BP Method Manual   Patient Position Sitting   Height and Weight   Height 5' 7" (1.702 m)   Weight 78.9 kg (173 lb 15.1 oz)   Weight Method Bed Scale   BSA (Calculated - sq m) 1.93 sq meters   BMI (Calculated) 27.3   Weight in (lb) to have BMI = 25 159.3   Assessments (Pre/Post)   Level of Consciousness (AVPU) alert       Notified Dr. Robertson of  who states "I am happier with that one, she is ok to go to surgery."   "

## 2017-07-19 NOTE — HOSPITAL COURSE
Pt presented for scheduled surgery, see op note.  She recovered in PACU then was transferred to the floor. She was evaluated in the afternoon and was requesting discharge home. OK to discharge home once meeting all discharge criteria.

## 2017-07-19 NOTE — TRANSFER OF CARE
"Anesthesia Transfer of Care Note    Patient: Kristin Dowell    Procedure(s) Performed: Procedure(s) (LRB):  HYSTERECTOMY-TOTAL LAPAROSCOPIC (TLH) (N/A)  SALPINGECTOMY-LAPAROSCOPIC (Bilateral)  OOPHORECTOMY-LAPAROSCOPIC (Right)    Patient location: PACU    Anesthesia Type: general    Transport from OR: Transported from OR on room air with adequate spontaneous ventilation    Post pain: adequate analgesia    Post assessment: no apparent anesthetic complications and tolerated procedure well    Post vital signs: stable    Level of consciousness: sedated    Nausea/Vomiting: no nausea/vomiting    Complications: none    Transfer of care protocol was followed      Last vitals:   Visit Vitals  BP (!) 150/90 (BP Location: Right arm, Patient Position: Sitting, BP Method: Manual)   Pulse 75   Temp 36.9 °C (98.5 °F) (Oral)   Resp 16   Ht 5' 7" (1.702 m)   Wt 78.9 kg (173 lb 15.1 oz)   LMP 06/19/2017 (Approximate)   SpO2 97%   Breastfeeding? No   BMI 27.24 kg/m²     "

## 2017-07-19 NOTE — NURSING
Pt transferred from OR via bed. Bedside report received from Isaac VELEZ. Abdominal incisions x3; intact. Hankins catheter in place. SCDs to BLE. Pt placed on dinamap for surgery vitals. Call bell within reach.

## 2017-07-19 NOTE — DISCHARGE SUMMARY
[facility]  Obstetrics & Gynecology  Discharge Summary    Patient Name: Kristin Dowell  MRN: [id number]  Admission Date: 2017  Hospital Length of Stay: 0 days  Discharge Date and Time:  2017 6:05 PM  Attending Physician: Charity Robertson MD   Discharging Provider: Charity Robertson MD  Primary Care Provider: Jorge A Stack MD    HPI:  Kristin Dowell is a 41 y.o. female  for preop examination.  She is scheduled for a TLH/BS on 17.  She desires definitive surgical management of her AUB.    She states that her irregular bleeding began about 2 years ago. She was seen in March and started on DepoProvera; this medication worked for 1 month, but she began bleeding again in May and had a cycle that lasted almost daily for 3 weeks. Her bleeding is varied, ranging from light to heavy with passage of clots. She reports pain with bleeding. When occurring, pain is felt to be mostly RLQ.  She has a history of a BTL for contraception.    Hospital Course:  Pt presented for scheduled surgery, see op note.  She recovered in PACU then was transferred to the floor. She was evaluated in the afternoon and was requesting discharge home. OK to discharge home once meeting all discharge criteria.     Procedure(s) (LRB):  HYSTERECTOMY-TOTAL LAPAROSCOPIC (TLH) (N/A)  SALPINGECTOMY-LAPAROSCOPIC (Bilateral)  OOPHORECTOMY-LAPAROSCOPIC (Right)         Pending Diagnostic Studies:     None        Final Active Diagnoses:    Diagnosis Date Noted POA    PRINCIPAL PROBLEM:  Breakthrough bleeding on depo provera [N92.1] 2017 Yes    RLQ abdominal pain [R10.31] 2017 Yes    DUB (dysfunctional uterine bleeding) [N93.8]  Yes      Problems Resolved During this Admission:    Diagnosis Date Noted Date Resolved POA        Discharged Condition: good    Disposition: Home or Self Care    Follow Up:  Follow-up Information     Charity Robertson MD In 2 weeks.    Specialty:  Obstetrics and Gynecology  Why:  post  op  Contact information:  [address] [place] [id number]  [phone]                 Patient Instructions:     Diet general     Weight bearing restrictions (specify)     Other restrictions (specify):   Order Comments: Pelvic rest     Call MD for:  temperature >100.4     Call MD for:  persistent nausea and vomiting or diarrhea     Call MD for:  severe uncontrolled pain     Call MD for:  redness, tenderness, or signs of infection (pain, swelling, redness, odor or green/yellow discharge around incision site)     Call MD for:  difficulty breathing or increased cough     Call MD for:  severe persistent headache     Call MD for:  worsening rash     Call MD for:  persistent dizziness, light-headedness, or visual disturbances     Call MD for:  increased confusion or weakness     Call MD for:   Order Comments: Obstetric patients: Call for decreased fetal movement, regular uterine contractions, leakage of fluid, vaginal bleeding or any other concerns  Gynecology patients: Call for incisional drainage, redness, or tenderness, abnormal vaginal bleeding or discharge or any other concerns      No dressing needed       Medications:  Reconciled Home Medications:   Current Discharge Medication List      START taking these medications    Details   hydrocodone-acetaminophen 5-325mg (NORCO) 5-325 mg per tablet Take 1 tablet by mouth every 6 (six) hours as needed.  Qty: 25 tablet, Refills: 0         CONTINUE these medications which have CHANGED    Details   ibuprofen (ADVIL,MOTRIN) 800 MG tablet Take 1 tablet (800 mg total) by mouth every 8 (eight) hours.  Qty: 30 tablet, Refills: 0         CONTINUE these medications which have NOT CHANGED    Details   cyanocobalamin (VITAMIN B-12) 500 MCG tablet Take 500 mcg by mouth once daily.         STOP taking these medications       norgestimate-ethinyl estradiol (ORTHO-CYCLEN) 0.25-35 mg-mcg per tablet Comments:   Reason for Stopping:               Charity Robertson MD  Obstetrics &  Gynecology  [facility]

## 2017-07-19 NOTE — HPI
Kristin Dowell is a 41 y.o. female  for preop examination.  She is scheduled for a TLH/BS on 17.  She desires definitive surgical management of her AUB.    She states that her irregular bleeding began about 2 years ago. She was seen in March and started on DepoProvera; this medication worked for 1 month, but she began bleeding again in May and had a cycle that lasted almost daily for 3 weeks. Her bleeding is varied, ranging from light to heavy with passage of clots. She reports pain with bleeding. When occurring, pain is felt to be mostly RLQ.  She has a history of a BTL for contraception.

## 2017-07-20 NOTE — DISCHARGE INSTRUCTIONS
[facility]    Discharge Instructions for Laparoscopic Hysterectomy  You had a procedure called laparoscopic hysterectomy. A surgeon removed your uterus using instruments inserted through small incisions in your abdomen. These incisions may be tender or sore. You may also have pain in your upper back or shoulders. This is from the gas used to enlarge your abdomen to allow your doctor to see inside your pelvis and perform the procedure. This pain usually goes away in a day or two. It usually takes from 1 to 4 weeks to recover from laparoscopic hysterectomy. Remember, though, that recovery time varies from woman to woman. Here's what you can do to speed your recovery following surgery.  Home care   · Continue the coughing and deep breathing exercises that you learned in the hospital.  · Take your medications exactly as directed by your doctor.  · Avoid constipation.  ¨ Eat fruits, vegetables, and whole grains.  ¨ Drink 6 to 8 glasses of water a day, unless told to do otherwise.  ¨ Use a laxative or a mild stool softener if your doctor says it's OK.  · Shower as usual. Wash your incisions with mild soap and water. Pat dry.  · Don't use oils, powders, or lotions on your incisions.  · Don't put anything in your vagina until your doctor says it's safe to do so. Don't use tampons or douches. Don't have sex.  · If you had both ovaries removed, report hot flashes, mood swings, and irritability to your doctor. There may be medications that can help you.  Activity  · Ask your doctor when you can start driving again. It's usually okay to drive as soon as you are free of pain and able to move comfortably from side to side. Don't drive while you are still taking opioid pain medications.  · Ask others to help with chores and errands while you recover.  · Dont lift anything heavier than 10 pounds for 6 weeks.  · Dont vacuum or do other strenuous activities until the doctor says it's OK.  · Walk as often as you  feel able.  · Don't drive for a few days after the surgery. You may drive as soon as you are able to move comfortably from side to side and when you are no longer taking narcotics.  · Climb stairs slowly and pause after every few steps.  Follow-up care  Make a follow-up appointment as directed by our staff.     When to call your doctor  Call your doctor right away if you have any of the following:  · Fever above 100.4°F (38°C) or chills  · Bright red vaginal bleeding or vaginal bleeding that soaks more than one sanitary pad per hour  · A foul smelling discharge from the vagina  · Trouble urinating or burning when you urinate  · Severe pain or bloating in your abdomen  · Redness, swelling, or drainage at your incision sites  · Shortness of breath or chest pain  · Nausea and vomiting   Date Last Reviewed: 5/19/2015  © 7052-3614 [email or web address]. [address] [place] [address], [place] [address]. All rights reserved. This information is not intended as a substitute for professional medical care. Always follow your healthcare professional's instructions.

## 2017-07-20 NOTE — PLAN OF CARE
Problem: Patient Care Overview  Goal: Plan of Care Review  Outcome: Ongoing (interventions implemented as appropriate)  Patient given discharge instructions verbalizes understanding. Patient will follow up with Dr. Robertson.

## 2017-07-27 ENCOUNTER — OFFICE VISIT (OUTPATIENT)
Dept: OBSTETRICS AND GYNECOLOGY | Facility: CLINIC | Age: 42
End: 2017-07-27
Payer: MEDICAID

## 2017-07-27 VITALS
SYSTOLIC BLOOD PRESSURE: 122 MMHG | HEIGHT: 67 IN | DIASTOLIC BLOOD PRESSURE: 80 MMHG | WEIGHT: 170.63 LBS | BODY MASS INDEX: 26.78 KG/M2 | RESPIRATION RATE: 12 BRPM | HEART RATE: 73 BPM

## 2017-07-27 DIAGNOSIS — Z09 POSTOPERATIVE EXAMINATION: Primary | ICD-10-CM

## 2017-07-27 DIAGNOSIS — Z90.710 S/P LAPAROSCOPIC HYSTERECTOMY: ICD-10-CM

## 2017-07-27 PROCEDURE — 99213 OFFICE O/P EST LOW 20 MIN: CPT | Mod: PBBFAC | Performed by: OBSTETRICS & GYNECOLOGY

## 2017-07-27 PROCEDURE — 99999 PR PBB SHADOW E&M-EST. PATIENT-LVL III: CPT | Mod: PBBFAC,,, | Performed by: OBSTETRICS & GYNECOLOGY

## 2017-07-27 PROCEDURE — 99024 POSTOP FOLLOW-UP VISIT: CPT | Mod: ,,, | Performed by: OBSTETRICS & GYNECOLOGY

## 2017-07-27 NOTE — PROGRESS NOTES
"Obstetrics and Gynecology  Post-operative Progress Note    Chief Complaint   Patient presents with    Post-op Evaluation     TLH, pt states she is having abdominal pain       Kristin Dowell is a 41 y.o. female  post-op from a TLH/LSO/RS on 17.  Patient is Doing well postoperatively.  Voiding and having normal BMs.  She is reporting still some moderate abdominal pain (R>L).     The pathology revealed:  (Uterus & cervix, 113 g, bilateral fallopian tubes & left ovary, TLH-LSO):  -Leiomyomas ×3. (largest 1.5 cm).  -Superficial adenomyosis.  -Serosal adhesions.  -Basal endometrium.  -Left ovary, no histopathologic abnormality.  -Bilateral fallopian tubes, prior ligation status.  -Healed  site.    History reviewed. No pertinent past medical history.  Past Surgical History:   Procedure Laterality Date    APPENDECTOMY       SECTION      Transverse cut    HYSTERECTOMY  2017    TLH- bleeding    TUBAL LIGATION      VAGINAL DELIVERY  ; ; ;      Family History   Problem Relation Age of Onset    Cancer Father     Breast cancer Neg Hx     Colon cancer Neg Hx     Ovarian cancer Neg Hx      Social History   Substance Use Topics    Smoking status: Former Smoker     Years: 5.00     Types: Cigars, Cigarettes     Start date: 2009     Quit date: 2014    Smokeless tobacco: Never Used      Comment: social smoker-light    Alcohol use Yes      Comment: socially- 2 or 3 times a week-2 glasses of wine     OB History    Para Term  AB Living   5 5 3 2       SAB TAB Ectopic Multiple Live Births                  # Outcome Date GA Lbr Robert/2nd Weight Sex Delivery Anes PTL Lv   5             4             3 Term            2 Term            1 Term                   /80   Pulse 73   Resp 12   Ht 5' 7" (1.702 m)   Wt 77.4 kg (170 lb 9.6 oz)   LMP 2017 (Approximate)   BMI 26.72 kg/m²     ROS:  GENERAL: No fever, chills, " fatigability or weight loss.  VULVAR: No pain, no lesions and no itching.  VAGINAL: No relaxation, no itching, no discharge, no abnormal bleeding and no lesions.  ABDOMEN: No abdominal pain. Denies nausea. Denies vomiting. No diarrhea. No constipation  BREAST: Denies pain. No lumps. No discharge.  URINARY: No incontinence, no nocturia, no frequency and no dysuria.  CARDIOVASCULAR: No chest pain. No shortness of breath. No leg cramps.  NEUROLOGICAL: No headaches. No vision changes.    PE:   General appearance: alert, appears stated age, cooperative and no distress  Abdomen: soft, appropriately TTP right side, no rebound or guarding  Pelvic: deferred  Incisions: healing well x 3, RLQ incision with surrounding bruising that is TTP      ASSESSMENT:    1. Postoperative examination     2. S/P laparoscopic hysterectomy          PLAN:  Pathology reviewed.  Wound care discussed.  Precautions discussed.   Follow up in 5 weeks or prn.

## 2017-08-02 ENCOUNTER — TELEPHONE (OUTPATIENT)
Dept: OBSTETRICS AND GYNECOLOGY | Facility: CLINIC | Age: 42
End: 2017-08-02

## 2017-08-02 NOTE — TELEPHONE ENCOUNTER
Mamie with pre-certification called stating cpt codes [id number] (for the TLH- prior to surgery Julissa Jd performed pre certification an was instructed no pre cert was required on 7/5/17).has been denied and require a peer to peer.  REF: [id number].  Contacted Nadine HEATH at [phone] to peer scheduled for today at 2:15p.  Op note, path report from 7/19/17, u/s report from 5/24/17 and clinic notes in reference to DUB faxed to [phone].  Confirmation received.

## 2017-08-28 ENCOUNTER — OFFICE VISIT (OUTPATIENT)
Dept: OBSTETRICS AND GYNECOLOGY | Facility: CLINIC | Age: 42
End: 2017-08-28
Payer: MEDICAID

## 2017-08-28 VITALS
BODY MASS INDEX: 27.78 KG/M2 | WEIGHT: 177 LBS | HEART RATE: 73 BPM | HEIGHT: 67 IN | SYSTOLIC BLOOD PRESSURE: 134 MMHG | RESPIRATION RATE: 12 BRPM | DIASTOLIC BLOOD PRESSURE: 86 MMHG

## 2017-08-28 DIAGNOSIS — Z09 POSTOPERATIVE EXAMINATION: ICD-10-CM

## 2017-08-28 DIAGNOSIS — Z90.710 S/P LAPAROSCOPIC HYSTERECTOMY: Primary | ICD-10-CM

## 2017-08-28 PROCEDURE — 99213 OFFICE O/P EST LOW 20 MIN: CPT | Mod: PBBFAC | Performed by: OBSTETRICS & GYNECOLOGY

## 2017-08-28 PROCEDURE — 99024 POSTOP FOLLOW-UP VISIT: CPT | Mod: ,,, | Performed by: OBSTETRICS & GYNECOLOGY

## 2017-08-28 PROCEDURE — 99999 PR PBB SHADOW E&M-EST. PATIENT-LVL III: CPT | Mod: PBBFAC,,, | Performed by: OBSTETRICS & GYNECOLOGY

## 2017-08-28 NOTE — PROGRESS NOTES
"Obstetrics and Gynecology  Post-operative Progress Note    Chief Complaint   Patient presents with    Post-op Evaluation     [facility] 17       Kristin Dowell is a 41 y.o. female  post-op from a TLH/LSO/R salpingectomy on 17.  Patient is Doing well postoperatively.      The pathology revealed:  (Uterus & cervix, 113 g, bilateral fallopian tubes & left ovary, TLH-LSO):  -Leiomyomas ×3. (largest 1.5 cm).  -Superficial adenomyosis.  -Serosal adhesions.  -Basal endometrium.  -Left ovary, no histopathologic abnormality.  -Bilateral fallopian tubes, prior ligation status.  -Healed  site.    History reviewed. No pertinent past medical history.  Past Surgical History:   Procedure Laterality Date    APPENDECTOMY       SECTION      Transverse cut    HYSTERECTOMY  2017    TL- bleeding    TUBAL LIGATION      VAGINAL DELIVERY  ; ; ;      Family History   Problem Relation Age of Onset    Cancer Father     Breast cancer Neg Hx     Colon cancer Neg Hx     Ovarian cancer Neg Hx      Social History   Substance Use Topics    Smoking status: Former Smoker     Years: 5.00     Types: Cigars, Cigarettes     Start date: 2009     Quit date: 2014    Smokeless tobacco: Never Used      Comment: social smoker-light    Alcohol use Yes      Comment: socially- 2 or 3 times a week-2 glasses of wine     OB History    Para Term  AB Living   5 5 3 2       SAB TAB Ectopic Multiple Live Births                  # Outcome Date GA Lbr Robert/2nd Weight Sex Delivery Anes PTL Lv   5             4             3 Term            2 Term            1 Term                   /86   Pulse 73   Resp 12   Ht 5' 7" (1.702 m)   Wt 80.3 kg (177 lb)   LMP 2017 (Approximate)   BMI 27.72 kg/m²     ROS:  GENERAL: No fever, chills, fatigability or weight loss.  VULVAR: No pain, no lesions and no itching.  VAGINAL: No relaxation, no itching, no discharge, " no abnormal bleeding and no lesions.  ABDOMEN: No abdominal pain. Denies nausea. Denies vomiting. No diarrhea. No constipation  BREAST: Denies pain. No lumps. No discharge.  URINARY: No incontinence, no nocturia, no frequency and no dysuria.  CARDIOVASCULAR: No chest pain. No shortness of breath. No leg cramps.  NEUROLOGICAL: No headaches. No vision changes.    PE:   General appearance: alert, appears stated age, cooperative and no distress  Head: Normocephalic, without obvious abnormality, atraumatic  Abdomen: soft, nontender, incisions well healed x3  Pelvic: external genitalia normal, no adnexal masses or tenderness, rectovaginal septum normal, uterus surgically absent, vagina normal without discharge and cuff well healed, NT  Extremities: extremities normal, atraumatic, no cyanosis or edema  Skin: Skin color, texture, turgor normal. No rashes or lesions  Neurologic: Grossly normal      ASSESSMENT:    1. S/P laparoscopic hysterectomy     2. Postoperative examination          PLAN:  Pathology reviewed.   No restrictions excpet pelvic rest until 6 weeks post op  Follow up for yearly health maintenance exam or prn.

## 2019-04-12 ENCOUNTER — OFFICE VISIT (OUTPATIENT)
Dept: OBSTETRICS AND GYNECOLOGY | Facility: CLINIC | Age: 44
End: 2019-04-12
Payer: MEDICAID

## 2019-04-12 VITALS
RESPIRATION RATE: 13 BRPM | SYSTOLIC BLOOD PRESSURE: 122 MMHG | BODY MASS INDEX: 31.77 KG/M2 | HEIGHT: 62 IN | DIASTOLIC BLOOD PRESSURE: 84 MMHG | WEIGHT: 172.63 LBS | HEART RATE: 76 BPM

## 2019-04-12 DIAGNOSIS — R35.0 URINARY FREQUENCY: ICD-10-CM

## 2019-04-12 DIAGNOSIS — N89.8 VAGINAL DISCHARGE: ICD-10-CM

## 2019-04-12 DIAGNOSIS — Z12.31 SCREENING MAMMOGRAM, ENCOUNTER FOR: ICD-10-CM

## 2019-04-12 DIAGNOSIS — Z01.419 ENCOUNTER FOR GYNECOLOGICAL EXAMINATION (GENERAL) (ROUTINE) WITHOUT ABNORMAL FINDINGS: Primary | ICD-10-CM

## 2019-04-12 DIAGNOSIS — Z12.11 COLON CANCER SCREENING: ICD-10-CM

## 2019-04-12 DIAGNOSIS — Z90.710 HISTORY OF HYSTERECTOMY FOR BENIGN DISEASE: ICD-10-CM

## 2019-04-12 LAB
BILIRUB SERPL-MCNC: ABNORMAL MG/DL
BLOOD URINE, POC: ABNORMAL
COLOR, POC UA: YELLOW
GLUCOSE UR QL STRIP: ABNORMAL
KETONES UR QL STRIP: ABNORMAL
LEUKOCYTE ESTERASE URINE, POC: ABNORMAL
NITRITE, POC UA: ABNORMAL
PH, POC UA: 5
PROTEIN, POC: ABNORMAL
SPECIFIC GRAVITY, POC UA: 1.01
UROBILINOGEN, POC UA: ABNORMAL

## 2019-04-12 PROCEDURE — 81002 URINALYSIS NONAUTO W/O SCOPE: CPT | Mod: PBBFAC | Performed by: OBSTETRICS & GYNECOLOGY

## 2019-04-12 PROCEDURE — 99999 PR PBB SHADOW E&M-EST. PATIENT-LVL III: CPT | Mod: PBBFAC,,, | Performed by: OBSTETRICS & GYNECOLOGY

## 2019-04-12 PROCEDURE — 99396 PREV VISIT EST AGE 40-64: CPT | Mod: S$PBB,,, | Performed by: OBSTETRICS & GYNECOLOGY

## 2019-04-12 PROCEDURE — 99999 PR PBB SHADOW E&M-EST. PATIENT-LVL III: ICD-10-PCS | Mod: PBBFAC,,, | Performed by: OBSTETRICS & GYNECOLOGY

## 2019-04-12 PROCEDURE — 87480 CANDIDA DNA DIR PROBE: CPT

## 2019-04-12 PROCEDURE — 99396 PR PREVENTIVE VISIT,EST,40-64: ICD-10-PCS | Mod: S$PBB,,, | Performed by: OBSTETRICS & GYNECOLOGY

## 2019-04-12 PROCEDURE — 99213 OFFICE O/P EST LOW 20 MIN: CPT | Mod: PBBFAC | Performed by: OBSTETRICS & GYNECOLOGY

## 2019-04-12 PROCEDURE — 87510 GARDNER VAG DNA DIR PROBE: CPT

## 2019-04-12 RX ORDER — METRONIDAZOLE 500 MG/1
500 TABLET ORAL EVERY 12 HOURS
Qty: 14 TABLET | Refills: 0 | Status: SHIPPED | OUTPATIENT
Start: 2019-04-12 | End: 2019-04-19

## 2019-04-12 NOTE — PROGRESS NOTES
Subjective:    Patient ID: Kristin Maier is a 43 y.o. y.o. female.     Chief Complaint: Annual Well Woman Exam     History of Present Illness:  Kristin presents today for Annual Well Woman exam. She describes her menses as absent, h/o hysterectomy for AUB.She denies pelvic pain.  She denies breast tenderness, masses, nipple discharge. She reports increased frequency with urination.  She denies difficulty with bowel movements. She reports about 1 week of vaginal discharge.  She reports some menopausal symptoms such as night sweats. She denies bloating, early satiety, or weight changes. She is sexually active.     Menstrual History:   Patient's last menstrual period was 2017 (approximate)..     OB History    :  5  Para:  5  Term:  3  :  2            The following portions of the patient's history were reviewed and updated as appropriate: allergies, current medications, past family history, past medical history, past social history, past surgical history and problem list.    ROS:   CONSTITUTIONAL: Negative for fever, chills, diaphoresis, weakness, fatigue, weight loss, weight gain  ENT: negative for sore throat, nasal congestion, nasal discharge, epistaxis, tinnitus, hearing loss  EYES: negative for blurry vision, decreased vision, loss of vision, eye pain, diplopia, photophobia, discharge  SKIN: Negative for rash, itching, hives  RESPIRATORY: negative for cough, hemoptysis, shortness of breath, pleuritic chest pain, wheezing  CARDIOVASCULAR: negative for chest pain, dyspnea on exertion, orthopnea, paroxysmal nocturnal dyspnea, edema, palpitations  BREAST: negative for breast  tenderness, breast mass, nipple discharge, or skin changes  GASTROINTESTINAL: negative for abdominal pain, flank pain, nausea, vomiting, diarrhea, constipation, black stool, blood in stool  GENITOURINARY: urinary frequency, vaginal dischargenegative for abnormal vaginal bleeding, amenorrhea, decreased libido, dysuria,  genital sores, hematuria, incontinence, menorrhagia, pelvic pain  HEMATOLOGIC/LYMPHATIC: negative for swollen lymph nodes, bleeding, bruising  MUSCULOSKELETAL: negative for back pain, joint pain, joint stiffness, joint swelling, muscle pain, muscle weakness  NEUROLOGICAL: negative for dizzy/vertigo, headache, focal weakness, numbness/tingling, speech problems, loss of consciousness, confusion, memory loss  BEHAVORIAL/PSYCH: negative for anxiety, depression, psychosis  ENDOCRINE: negative for polydipsia/polyuria, palpitations, skin changes, temperature intolerance, unexpected weight changes  ALLERGIC/IMMUNOLOGIC: negative for urticaria, hay fever, angioedema      Objective:    Vital Signs:  Vitals:    04/12/19 1041   BP: 122/84   Pulse: 76   Resp: 13       Physical Exam:  General:  alert, cooperative, no distress   Skin:  Skin color, texture, turgor normal. No rashes or lesions   HEENT:  extra ocular movement intact, sclera clear, anicteric   Neck: supple, trachea midline, no adenopathy or thyromegally   Respiratory:  Normal effort   Breasts:  no discharge, erythema, tenderness, or palpable masses; no axillary lymphadenopathy   Abdomen:  soft, nontender, no palpable masses   Pelvis: External genitalia: normal general appearance  Urinary system: urethral meatus normal, bladder nontender  Vaginal: normal mucosa without prolapse or lesions; white vaginal discharge present  Cervix: removed surgically  Uterus: removed surgically  Adnexa: normal size, nontender bilaterally   Extremities: Normal ROM; no edema, no cyanosis   Neurologial: Normal strength and tone. No focal numbness or weakness.   Psychiatric: normal mood, speech, dress, and thought processes         Assessment:       Healthy female exam.     1. Encounter for gynecological examination (general) (routine) without abnormal findings    2. Screening mammogram, encounter for    3. History of hysterectomy for benign disease    4. Colon cancer screening    5. Vaginal  discharge    6. Urinary frequency          Plan:      Encounter for gynecological examination (general) (routine) without abnormal findings    Screening mammogram, encounter for  -     Mammo Digital Screening Bil[facility] w/ Saurabh; Future; Expected date: 04/12/2019    History of hysterectomy for benign disease    Colon cancer screening  -     Case request GI: COLONOSCOPY    Vaginal discharge  -     Vaginosis Screen by DNA Probe  -     metroNIDAZOLE (FLAGYL) 500 MG tablet; Take 1 tablet (500 mg total) by mouth every 12 (twelve) hours. for 7 days  Dispense: 14 tablet; Refill: 0    Urinary frequency  -     POCT URINE DIPSTICK WITHOUT MICROSCOPE          COUNSELING:  Kristin was counseled on A.C.O.G. Pap guidelines and recommendations for yearly pelvic exams in addition to recommendations for monthly self breast exams; to see her PCP for other health maintenance.

## 2019-04-18 LAB
BACTERIAL VAGINOSIS DNA: POSITIVE
CANDIDA GLABRATA DNA: NEGATIVE
CANDIDA KRUSEI DNA: NEGATIVE
CANDIDA RRNA VAG QL PROBE: NEGATIVE
T VAGINALIS RRNA GENITAL QL PROBE: NEGATIVE

## 2019-04-22 RX ORDER — METRONIDAZOLE 500 MG/1
500 TABLET ORAL EVERY 12 HOURS
Qty: 14 TABLET | Refills: 0 | Status: SHIPPED | OUTPATIENT
Start: 2019-04-22 | End: 2019-04-29

## 2019-09-17 PROBLEM — H65.01 RIGHT ACUTE SEROUS OTITIS MEDIA: Status: ACTIVE | Noted: 2019-09-17

## 2021-05-04 ENCOUNTER — PATIENT MESSAGE (OUTPATIENT)
Dept: RESEARCH | Facility: HOSPITAL | Age: 46
End: 2021-05-04

## 2021-07-01 ENCOUNTER — HOSPITAL ENCOUNTER (OUTPATIENT)
Dept: RADIOLOGY | Facility: HOSPITAL | Age: 46
Discharge: HOME | End: 2021-07-01
Attending: NURSE PRACTITIONER
Payer: MEDICAID

## 2021-07-01 VITALS — HEIGHT: 62 IN | BODY MASS INDEX: 32.02 KG/M2 | WEIGHT: 174 LBS

## 2021-07-01 DIAGNOSIS — Z12.31 ENCOUNTER FOR SCREENING MAMMOGRAM FOR MALIGNANT NEOPLASM OF BREAST: ICD-10-CM

## 2021-07-01 PROCEDURE — 77067 MAMMO DIGITAL SCREENING BILAT WITH TOMO: ICD-10-PCS | Mod: 26,,, | Performed by: RADIOLOGY

## 2021-07-01 PROCEDURE — 77063 MAMMO DIGITAL SCREENING BILAT WITH TOMO: ICD-10-PCS | Mod: 26,,, | Performed by: RADIOLOGY

## 2021-07-01 PROCEDURE — 77067 SCR MAMMO BI INCL CAD: CPT | Mod: 26,,, | Performed by: RADIOLOGY

## 2021-07-01 PROCEDURE — 77063 BREAST TOMOSYNTHESIS BI: CPT | Mod: 26,,, | Performed by: RADIOLOGY

## 2021-07-01 PROCEDURE — 77067 SCR MAMMO BI INCL CAD: CPT | Mod: TC

## 2021-07-30 DIAGNOSIS — U07.1 COVID-19 VIRUS DETECTED: ICD-10-CM

## 2021-11-18 PROBLEM — E44.0 MODERATE PROTEIN-CALORIE MALNUTRITION: Status: ACTIVE | Noted: 2021-11-18

## 2021-11-23 ENCOUNTER — TELEPHONE (OUTPATIENT)
Dept: TRANSPLANT | Facility: CLINIC | Age: 46
End: 2021-11-23
Payer: MEDICAID

## 2021-11-23 DIAGNOSIS — I27.9 CHRONIC PULMONARY HEART DISEASE: ICD-10-CM

## 2021-11-23 DIAGNOSIS — Z79.899 POLYPHARMACY: Primary | ICD-10-CM

## 2021-11-23 DIAGNOSIS — R06.82 TACHYPNEA: ICD-10-CM

## 2021-11-29 ENCOUNTER — OFFICE VISIT (OUTPATIENT)
Dept: TRANSPLANT | Facility: CLINIC | Age: 46
End: 2021-11-29
Payer: MEDICAID

## 2021-11-29 ENCOUNTER — PATIENT MESSAGE (OUTPATIENT)
Dept: MEDSURG UNIT | Facility: HOSPITAL | Age: 46
End: 2021-11-29
Payer: MEDICAID

## 2021-11-29 ENCOUNTER — HOSPITAL ENCOUNTER (OUTPATIENT)
Dept: PULMONOLOGY | Facility: CLINIC | Age: 46
Discharge: HOME | End: 2021-11-29
Payer: MEDICAID

## 2021-11-29 VITALS
DIASTOLIC BLOOD PRESSURE: 98 MMHG | HEIGHT: 62 IN | BODY MASS INDEX: 24.38 KG/M2 | WEIGHT: 132.5 LBS | SYSTOLIC BLOOD PRESSURE: 130 MMHG | HEIGHT: 62 IN | BODY MASS INDEX: 23.37 KG/M2 | HEART RATE: 101 BPM | WEIGHT: 127 LBS

## 2021-11-29 DIAGNOSIS — E87.6 HYPOKALEMIA: ICD-10-CM

## 2021-11-29 DIAGNOSIS — I27.20 PULMONARY HTN: Primary | ICD-10-CM

## 2021-11-29 DIAGNOSIS — I27.9 CHRONIC PULMONARY HEART DISEASE: ICD-10-CM

## 2021-11-29 DIAGNOSIS — R07.9 CHEST PAIN, UNSPECIFIED TYPE: ICD-10-CM

## 2021-11-29 PROCEDURE — 94618 PULMONARY STRESS TESTING: CPT | Mod: 53,PBBFAC

## 2021-11-29 PROCEDURE — 99205 OFFICE O/P NEW HI 60 MIN: CPT | Mod: S$PBB,,, | Performed by: INTERNAL MEDICINE

## 2021-11-29 PROCEDURE — 99999 PR PBB SHADOW E&M-EST. PATIENT-LVL III: CPT | Mod: PBBFAC,,, | Performed by: INTERNAL MEDICINE

## 2021-11-29 PROCEDURE — 99205 PR OFFICE/OUTPT VISIT, NEW, LEVL V, 60-74 MIN: ICD-10-PCS | Mod: S$PBB,,, | Performed by: INTERNAL MEDICINE

## 2021-11-29 PROCEDURE — 99999 PR PBB SHADOW E&M-EST. PATIENT-LVL III: ICD-10-PCS | Mod: PBBFAC,,, | Performed by: INTERNAL MEDICINE

## 2021-11-29 PROCEDURE — 99213 OFFICE O/P EST LOW 20 MIN: CPT | Mod: PBBFAC | Performed by: INTERNAL MEDICINE

## 2021-11-30 PROBLEM — N39.0 UTI (URINARY TRACT INFECTION): Status: ACTIVE | Noted: 2021-11-30

## 2021-11-30 PROBLEM — N17.9 AKI (ACUTE KIDNEY INJURY): Status: ACTIVE | Noted: 2021-11-30

## 2021-11-30 PROBLEM — R11.2 NAUSEA & VOMITING: Status: ACTIVE | Noted: 2021-11-30

## 2021-11-30 PROBLEM — I27.20 SEVERE PULMONARY HYPERTENSION: Status: ACTIVE | Noted: 2021-11-30

## 2021-11-30 PROBLEM — R19.7 DIARRHEA: Status: ACTIVE | Noted: 2021-11-30

## 2021-12-02 ENCOUNTER — HOSPITAL ENCOUNTER (INPATIENT)
Facility: HOSPITAL | Age: 46
LOS: 21 days | Discharge: HOME | DRG: 286 | End: 2021-12-23
Attending: INTERNAL MEDICINE | Admitting: INTERNAL MEDICINE
Payer: MEDICAID

## 2021-12-02 DIAGNOSIS — I27.20 SEVERE PULMONARY HYPERTENSION: Primary | ICD-10-CM

## 2021-12-02 DIAGNOSIS — I10 ESSENTIAL HYPERTENSION: ICD-10-CM

## 2021-12-02 DIAGNOSIS — R07.9 CHEST PAIN, UNSPECIFIED TYPE: ICD-10-CM

## 2021-12-02 DIAGNOSIS — I50.9 HEART FAILURE: ICD-10-CM

## 2021-12-02 DIAGNOSIS — I27.20 PULMONARY HTN: ICD-10-CM

## 2021-12-02 DIAGNOSIS — I27.20 PULMONARY HYPERTENSION: ICD-10-CM

## 2021-12-02 LAB
ALBUMIN SERPL BCP-MCNC: 2.8 G/DL (ref 3.5–5.2)
ALP SERPL-CCNC: 75 U/L (ref 55–135)
ALT SERPL W/O P-5'-P-CCNC: 124 U/L (ref 10–44)
ANION GAP SERPL CALC-SCNC: 11 MMOL/L (ref 8–16)
ANION GAP SERPL CALC-SCNC: 7 MMOL/L (ref 8–16)
ANISOCYTOSIS BLD QL SMEAR: SLIGHT
ASCENDING AORTA: 3.63 CM
AST SERPL-CCNC: 111 U/L (ref 10–40)
AV INDEX (PROSTH): 1.32
AV MEAN GRADIENT: 1 MMHG
AV PEAK GRADIENT: 1 MMHG
AV VALVE AREA: 4.37 CM2
AV VELOCITY RATIO: 1.23
BASOPHILS # BLD AUTO: 0.02 K/UL (ref 0–0.2)
BASOPHILS NFR BLD: 0.4 % (ref 0–1.9)
BILIRUB SERPL-MCNC: 1.7 MG/DL (ref 0.1–1)
BILIRUB UR QL STRIP: NEGATIVE
BNP SERPL-MCNC: 962 PG/ML (ref 0–99)
BSA FOR ECHO PROCEDURE: 1.65 M2
BUN SERPL-MCNC: 18 MG/DL (ref 6–20)
BUN SERPL-MCNC: 20 MG/DL (ref 6–20)
CALCIUM SERPL-MCNC: 8.6 MG/DL (ref 8.7–10.5)
CALCIUM SERPL-MCNC: 9.2 MG/DL (ref 8.7–10.5)
CCP AB SER IA-ACNC: <0.5 U/ML
CHLORIDE SERPL-SCNC: 103 MMOL/L (ref 95–110)
CHLORIDE SERPL-SCNC: 108 MMOL/L (ref 95–110)
CLARITY UR REFRACT.AUTO: CLEAR
CO2 SERPL-SCNC: 21 MMOL/L (ref 23–29)
CO2 SERPL-SCNC: 22 MMOL/L (ref 23–29)
COLOR UR AUTO: NORMAL
CREAT SERPL-MCNC: 1 MG/DL (ref 0.5–1.4)
CREAT SERPL-MCNC: 1 MG/DL (ref 0.5–1.4)
CRP SERPL-MCNC: 22.8 MG/L (ref 0–8.2)
CV ECHO LV RWT: 0.58 CM
DIFFERENTIAL METHOD: ABNORMAL
DOP CALC AO PEAK VEL: 0.6 M/S
DOP CALC AO VTI: 7.98 CM
DOP CALC LVOT AREA: 3.3 CM2
DOP CALC LVOT DIAMETER: 2.05 CM
DOP CALC LVOT PEAK VEL: 0.74 M/S
DOP CALC LVOT STROKE VOLUME: 34.87 CM3
DOP CALCLVOT PEAK VEL VTI: 10.57 CM
E/E' RATIO: 7.29 M/S
ECHO LV POSTERIOR WALL: 0.85 CM (ref 0.6–1.1)
EJECTION FRACTION: 20 %
EOSINOPHIL # BLD AUTO: 0 K/UL (ref 0–0.5)
EOSINOPHIL NFR BLD: 0.8 % (ref 0–8)
ERYTHROCYTE [DISTWIDTH] IN BLOOD BY AUTOMATED COUNT: 14.8 % (ref 11.5–14.5)
ERYTHROCYTE [SEDIMENTATION RATE] IN BLOOD BY WESTERGREN METHOD: 7 MM/HR (ref 0–36)
EST. GFR  (AFRICAN AMERICAN): >60 ML/MIN/1.73 M^2
EST. GFR  (AFRICAN AMERICAN): >60 ML/MIN/1.73 M^2
EST. GFR  (NON AFRICAN AMERICAN): >60 ML/MIN/1.73 M^2
EST. GFR  (NON AFRICAN AMERICAN): >60 ML/MIN/1.73 M^2
FRACTIONAL SHORTENING: 25 % (ref 28–44)
GLUCOSE SERPL-MCNC: 116 MG/DL (ref 70–110)
GLUCOSE SERPL-MCNC: 159 MG/DL (ref 70–110)
GLUCOSE UR QL STRIP: NEGATIVE
HCT VFR BLD AUTO: 40.4 % (ref 37–48.5)
HGB BLD-MCNC: 13.8 G/DL (ref 12–16)
HGB UR QL STRIP: NEGATIVE
IMM GRANULOCYTES # BLD AUTO: 0.02 K/UL (ref 0–0.04)
IMM GRANULOCYTES NFR BLD AUTO: 0.4 % (ref 0–0.5)
INTERVENTRICULAR SEPTUM: 0.83 CM (ref 0.6–1.1)
KETONES UR QL STRIP: NEGATIVE
LA MAJOR: 4.52 CM
LA MINOR: 3.76 CM
LA WIDTH: 2.86 CM
LACTATE SERPL-SCNC: 1.2 MMOL/L (ref 0.5–2.2)
LEFT ATRIUM SIZE: 2.67 CM
LEFT ATRIUM VOLUME INDEX MOD: 14.3 ML/M2
LEFT ATRIUM VOLUME INDEX: 16.3 ML/M2
LEFT ATRIUM VOLUME MOD: 23.31 CM3
LEFT ATRIUM VOLUME: 26.65 CM3
LEFT INTERNAL DIMENSION IN SYSTOLE: 2.22 CM (ref 2.1–4)
LEFT VENTRICLE DIASTOLIC VOLUME INDEX: 20.6 ML/M2
LEFT VENTRICLE DIASTOLIC VOLUME: 33.58 ML
LEFT VENTRICLE MASS INDEX: 38 G/M2
LEFT VENTRICLE SYSTOLIC VOLUME INDEX: 10.1 ML/M2
LEFT VENTRICLE SYSTOLIC VOLUME: 16.52 ML
LEFT VENTRICULAR INTERNAL DIMENSION IN DIASTOLE: 2.95 CM (ref 3.5–6)
LEFT VENTRICULAR MASS: 61.82 G
LEUKOCYTE ESTERASE UR QL STRIP: NEGATIVE
LV LATERAL E/E' RATIO: 6.38 M/S
LV SEPTAL E/E' RATIO: 8.5 M/S
LYMPHOCYTES # BLD AUTO: 2.3 K/UL (ref 1–4.8)
LYMPHOCYTES NFR BLD: 48.2 % (ref 18–48)
MAGNESIUM SERPL-MCNC: 1.6 MG/DL (ref 1.6–2.6)
MAGNESIUM SERPL-MCNC: 1.7 MG/DL (ref 1.6–2.6)
MCH RBC QN AUTO: 32.2 PG (ref 27–31)
MCHC RBC AUTO-ENTMCNC: 34.2 G/DL (ref 32–36)
MCV RBC AUTO: 94 FL (ref 82–98)
MONOCYTES # BLD AUTO: 0.4 K/UL (ref 0.3–1)
MONOCYTES NFR BLD: 8.3 % (ref 4–15)
MV PEAK E VEL: 0.51 M/S
NEUTROPHILS # BLD AUTO: 2 K/UL (ref 1.8–7.7)
NEUTROPHILS NFR BLD: 41.9 % (ref 38–73)
NITRITE UR QL STRIP: NEGATIVE
NRBC BLD-RTO: 0 /100 WBC
PH UR STRIP: 5 [PH] (ref 5–8)
PISA TR MAX VEL: 4.7 M/S
PLATELET # BLD AUTO: 121 K/UL (ref 150–450)
PLATELET BLD QL SMEAR: ABNORMAL
PMV BLD AUTO: 11.3 FL (ref 9.2–12.9)
POTASSIUM SERPL-SCNC: 4 MMOL/L (ref 3.5–5.1)
POTASSIUM SERPL-SCNC: 4.2 MMOL/L (ref 3.5–5.1)
PROT SERPL-MCNC: 4.8 G/DL (ref 6–8.4)
PROT UR QL STRIP: NEGATIVE
QEF: 31 %
RA MAJOR: 5.76 CM
RA PRESSURE: 15 MMHG
RA WIDTH: 5.54 CM
RBC # BLD AUTO: 4.28 M/UL (ref 4–5.4)
RHEUMATOID FACT SERPL-ACNC: <13 IU/ML (ref 0–15)
RIGHT VENTRICLE STRAIN: 8
RIGHT VENTRICULAR END-DIASTOLIC DIMENSION: 5.05 CM
RV TISSUE DOPPLER FREE WALL SYSTOLIC VELOCITY 1 (APICAL 4 CHAMBER VIEW): 10.57 CM/S
SINUS: 4 CM
SODIUM SERPL-SCNC: 136 MMOL/L (ref 136–145)
SODIUM SERPL-SCNC: 136 MMOL/L (ref 136–145)
SP GR UR STRIP: 1.01 (ref 1–1.03)
STJ: 3.24 CM
TDI LATERAL: 0.08 M/S
TDI SEPTAL: 0.06 M/S
TDI: 0.07 M/S
TR MAX PG: 88 MMHG
TRICUSPID ANNULAR PLANE SYSTOLIC EXCURSION: 0.81 CM
TV REST PULMONARY ARTERY PRESSURE: 103 MMHG
URN SPEC COLLECT METH UR: NORMAL
WBC # BLD AUTO: 4.81 K/UL (ref 3.9–12.7)

## 2021-12-02 PROCEDURE — 86225 DNA ANTIBODY NATIVE: CPT | Performed by: INTERNAL MEDICINE

## 2021-12-02 PROCEDURE — 83735 ASSAY OF MAGNESIUM: CPT | Mod: 91 | Performed by: INTERNAL MEDICINE

## 2021-12-02 PROCEDURE — 83735 ASSAY OF MAGNESIUM: CPT | Performed by: STUDENT IN AN ORGANIZED HEALTH CARE EDUCATION/TRAINING PROGRAM

## 2021-12-02 PROCEDURE — 93451 RIGHT HEART CATH: CPT | Mod: 26,,, | Performed by: INTERNAL MEDICINE

## 2021-12-02 PROCEDURE — 80053 COMPREHEN METABOLIC PANEL: CPT | Performed by: STUDENT IN AN ORGANIZED HEALTH CARE EDUCATION/TRAINING PROGRAM

## 2021-12-02 PROCEDURE — 93451 RIGHT HEART CATH: CPT | Performed by: INTERNAL MEDICINE

## 2021-12-02 PROCEDURE — 93451 PR RIGHT HEART CATH O2 SATURATION & CARDIAC OUTPUT: ICD-10-PCS | Mod: 26,,, | Performed by: INTERNAL MEDICINE

## 2021-12-02 PROCEDURE — 63600175 PHARM REV CODE 636 W HCPCS: Performed by: INTERNAL MEDICINE

## 2021-12-02 PROCEDURE — 86235 NUCLEAR ANTIGEN ANTIBODY: CPT | Mod: 59 | Performed by: INTERNAL MEDICINE

## 2021-12-02 PROCEDURE — 85025 COMPLETE CBC W/AUTO DIFF WBC: CPT | Performed by: STUDENT IN AN ORGANIZED HEALTH CARE EDUCATION/TRAINING PROGRAM

## 2021-12-02 PROCEDURE — 25000003 PHARM REV CODE 250: Performed by: INTERNAL MEDICINE

## 2021-12-02 PROCEDURE — 63600175 PHARM REV CODE 636 W HCPCS: Performed by: STUDENT IN AN ORGANIZED HEALTH CARE EDUCATION/TRAINING PROGRAM

## 2021-12-02 PROCEDURE — 81003 URINALYSIS AUTO W/O SCOPE: CPT | Performed by: INTERNAL MEDICINE

## 2021-12-02 PROCEDURE — C1894 INTRO/SHEATH, NON-LASER: HCPCS | Performed by: INTERNAL MEDICINE

## 2021-12-02 PROCEDURE — 25000003 PHARM REV CODE 250: Performed by: STUDENT IN AN ORGANIZED HEALTH CARE EDUCATION/TRAINING PROGRAM

## 2021-12-02 PROCEDURE — 86200 CCP ANTIBODY: CPT | Performed by: INTERNAL MEDICINE

## 2021-12-02 PROCEDURE — 86431 RHEUMATOID FACTOR QUANT: CPT | Performed by: INTERNAL MEDICINE

## 2021-12-02 PROCEDURE — C1751 CATH, INF, PER/CENT/MIDLINE: HCPCS | Performed by: INTERNAL MEDICINE

## 2021-12-02 PROCEDURE — 99233 PR SUBSEQUENT HOSPITAL CARE,LEVL III: ICD-10-PCS | Mod: ,,, | Performed by: INTERNAL MEDICINE

## 2021-12-02 PROCEDURE — 20600001 HC STEP DOWN PRIVATE ROOM

## 2021-12-02 PROCEDURE — 93010 EKG 12-LEAD: ICD-10-PCS | Mod: ,,, | Performed by: INTERNAL MEDICINE

## 2021-12-02 PROCEDURE — 86038 ANTINUCLEAR ANTIBODIES: CPT | Performed by: INTERNAL MEDICINE

## 2021-12-02 PROCEDURE — 86235 NUCLEAR ANTIGEN ANTIBODY: CPT | Performed by: INTERNAL MEDICINE

## 2021-12-02 PROCEDURE — 80048 BASIC METABOLIC PNL TOTAL CA: CPT | Performed by: INTERNAL MEDICINE

## 2021-12-02 PROCEDURE — 36415 COLL VENOUS BLD VENIPUNCTURE: CPT | Performed by: STUDENT IN AN ORGANIZED HEALTH CARE EDUCATION/TRAINING PROGRAM

## 2021-12-02 PROCEDURE — 85652 RBC SED RATE AUTOMATED: CPT | Performed by: INTERNAL MEDICINE

## 2021-12-02 PROCEDURE — 93005 ELECTROCARDIOGRAM TRACING: CPT

## 2021-12-02 PROCEDURE — 87389 HIV-1 AG W/HIV-1&-2 AB AG IA: CPT | Performed by: INTERNAL MEDICINE

## 2021-12-02 PROCEDURE — 83880 ASSAY OF NATRIURETIC PEPTIDE: CPT | Performed by: STUDENT IN AN ORGANIZED HEALTH CARE EDUCATION/TRAINING PROGRAM

## 2021-12-02 PROCEDURE — 83605 ASSAY OF LACTIC ACID: CPT | Performed by: INTERNAL MEDICINE

## 2021-12-02 PROCEDURE — 25500020 PHARM REV CODE 255: Performed by: INTERNAL MEDICINE

## 2021-12-02 PROCEDURE — 93010 ELECTROCARDIOGRAM REPORT: CPT | Mod: ,,, | Performed by: INTERNAL MEDICINE

## 2021-12-02 PROCEDURE — 86140 C-REACTIVE PROTEIN: CPT | Performed by: INTERNAL MEDICINE

## 2021-12-02 PROCEDURE — 99233 SBSQ HOSP IP/OBS HIGH 50: CPT | Mod: ,,, | Performed by: INTERNAL MEDICINE

## 2021-12-02 DEVICE — CATH BLUE FLEXTIP TL 7FR: Type: IMPLANTABLE DEVICE | Site: NECK | Status: FUNCTIONAL

## 2021-12-02 RX ORDER — ONDANSETRON 4 MG/1
4 TABLET, ORALLY DISINTEGRATING ORAL EVERY 6 HOURS PRN
Status: DISCONTINUED | OUTPATIENT
Start: 2021-12-02 | End: 2021-12-06

## 2021-12-02 RX ORDER — LANOLIN ALCOHOL/MO/W.PET/CERES
400 CREAM (GRAM) TOPICAL 2 TIMES DAILY
Status: DISCONTINUED | OUTPATIENT
Start: 2021-12-02 | End: 2021-12-22

## 2021-12-02 RX ORDER — ACETAMINOPHEN 325 MG/1
650 TABLET ORAL EVERY 6 HOURS PRN
Status: DISCONTINUED | OUTPATIENT
Start: 2021-12-02 | End: 2021-12-06

## 2021-12-02 RX ORDER — LIDOCAINE HYDROCHLORIDE 20 MG/ML
INJECTION, SOLUTION INFILTRATION; PERINEURAL
Status: DISCONTINUED | OUTPATIENT
Start: 2021-12-02 | End: 2021-12-02 | Stop reason: HOSPADM

## 2021-12-02 RX ORDER — OXYCODONE AND ACETAMINOPHEN 7.5; 325 MG/1; MG/1
1 TABLET ORAL EVERY 6 HOURS PRN
Status: DISCONTINUED | OUTPATIENT
Start: 2021-12-02 | End: 2021-12-23 | Stop reason: HOSPADM

## 2021-12-02 RX ORDER — MAGNESIUM SULFATE HEPTAHYDRATE 40 MG/ML
2 INJECTION, SOLUTION INTRAVENOUS ONCE
Status: COMPLETED | OUTPATIENT
Start: 2021-12-02 | End: 2021-12-02

## 2021-12-02 RX ORDER — SODIUM CHLORIDE 0.9 G/100ML
IRRIGANT IRRIGATION
Status: DISCONTINUED | OUTPATIENT
Start: 2021-12-02 | End: 2021-12-02 | Stop reason: HOSPADM

## 2021-12-02 RX ORDER — SODIUM CHLORIDE 0.9 % (FLUSH) 0.9 %
10 SYRINGE (ML) INJECTION
Status: DISCONTINUED | OUTPATIENT
Start: 2021-12-02 | End: 2021-12-23 | Stop reason: HOSPADM

## 2021-12-02 RX ORDER — HYDROCHLOROTHIAZIDE 12.5 MG/1
12.5 TABLET ORAL DAILY
Refills: 11 | Status: DISCONTINUED | OUTPATIENT
Start: 2021-12-02 | End: 2021-12-03

## 2021-12-02 RX ORDER — MILRINONE LACTATE 0.2 MG/ML
0.25 INJECTION, SOLUTION INTRAVENOUS CONTINUOUS
Status: DISCONTINUED | OUTPATIENT
Start: 2021-12-02 | End: 2021-12-03

## 2021-12-02 RX ORDER — MUPIROCIN 20 MG/G
OINTMENT TOPICAL 2 TIMES DAILY
Status: COMPLETED | OUTPATIENT
Start: 2021-12-02 | End: 2021-12-06

## 2021-12-02 RX ORDER — AMLODIPINE BESYLATE 5 MG/1
5 TABLET ORAL DAILY
Status: DISCONTINUED | OUTPATIENT
Start: 2021-12-02 | End: 2021-12-03

## 2021-12-02 RX ORDER — CEPHALEXIN 500 MG/1
500 CAPSULE ORAL EVERY 6 HOURS
Status: DISCONTINUED | OUTPATIENT
Start: 2021-12-02 | End: 2021-12-05

## 2021-12-02 RX ORDER — LANOLIN ALCOHOL/MO/W.PET/CERES
400 CREAM (GRAM) TOPICAL ONCE
Status: DISCONTINUED | OUTPATIENT
Start: 2021-12-02 | End: 2021-12-02

## 2021-12-02 RX ORDER — FUROSEMIDE 10 MG/ML
60 INJECTION INTRAMUSCULAR; INTRAVENOUS ONCE
Status: COMPLETED | OUTPATIENT
Start: 2021-12-02 | End: 2021-12-02

## 2021-12-02 RX ORDER — CEPHALEXIN 500 MG/1
500 CAPSULE ORAL EVERY 12 HOURS
Status: DISCONTINUED | OUTPATIENT
Start: 2021-12-02 | End: 2021-12-02

## 2021-12-02 RX ADMIN — ACETAMINOPHEN 650 MG: 325 TABLET ORAL at 05:12

## 2021-12-02 RX ADMIN — FUROSEMIDE 60 MG: 10 INJECTION, SOLUTION INTRAVENOUS at 02:12

## 2021-12-02 RX ADMIN — CEPHALEXIN 500 MG: 500 CAPSULE ORAL at 08:12

## 2021-12-02 RX ADMIN — CEPHALEXIN 500 MG: 500 CAPSULE ORAL at 05:12

## 2021-12-02 RX ADMIN — ACETAMINOPHEN 650 MG: 325 TABLET ORAL at 02:12

## 2021-12-02 RX ADMIN — CEPHALEXIN 500 MG: 500 CAPSULE ORAL at 01:12

## 2021-12-02 RX ADMIN — MUPIROCIN: 20 OINTMENT TOPICAL at 08:12

## 2021-12-02 RX ADMIN — ONDANSETRON 4 MG: 4 TABLET, ORALLY DISINTEGRATING ORAL at 09:12

## 2021-12-02 RX ADMIN — IOHEXOL 100 ML: 350 INJECTION, SOLUTION INTRAVENOUS at 12:12

## 2021-12-02 RX ADMIN — FUROSEMIDE 5 MG/HR: 10 INJECTION, SOLUTION INTRAMUSCULAR; INTRAVENOUS at 01:12

## 2021-12-02 RX ADMIN — MILRINONE LACTATE IN DEXTROSE 0.25 MCG/KG/MIN: 200 INJECTION, SOLUTION INTRAVENOUS at 02:12

## 2021-12-02 RX ADMIN — AMLODIPINE BESYLATE 5 MG: 5 TABLET ORAL at 01:12

## 2021-12-02 RX ADMIN — MAGNESIUM SULFATE 2 G: 2 INJECTION INTRAVENOUS at 08:12

## 2021-12-02 RX ADMIN — OXYCODONE AND ACETAMINOPHEN 1 TABLET: 7.5; 325 TABLET ORAL at 08:12

## 2021-12-02 RX ADMIN — MAGNESIUM SULFATE 2 G: 2 INJECTION INTRAVENOUS at 02:12

## 2021-12-02 RX ADMIN — ONDANSETRON 4 MG: 4 TABLET, ORALLY DISINTEGRATING ORAL at 08:12

## 2021-12-02 RX ADMIN — HYDROCHLOROTHIAZIDE 12.5 MG: 12.5 TABLET ORAL at 01:12

## 2021-12-02 RX ADMIN — Medication 400 MG: at 02:12

## 2021-12-02 RX ADMIN — Medication 400 MG: at 08:12

## 2021-12-02 RX ADMIN — MUPIROCIN: 20 OINTMENT TOPICAL at 02:12

## 2021-12-03 LAB
ALBUMIN SERPL BCP-MCNC: 2.9 G/DL (ref 3.5–5.2)
ALLENS TEST: ABNORMAL
ALP SERPL-CCNC: 89 U/L (ref 55–135)
ALT SERPL W/O P-5'-P-CCNC: 120 U/L (ref 10–44)
ANA SER QL IF: NORMAL
ANION GAP SERPL CALC-SCNC: 8 MMOL/L (ref 8–16)
AST SERPL-CCNC: 97 U/L (ref 10–40)
BASOPHILS # BLD AUTO: 0.02 K/UL (ref 0–0.2)
BASOPHILS NFR BLD: 0.3 % (ref 0–1.9)
BILIRUB DIRECT SERPL-MCNC: 0.7 MG/DL (ref 0.1–0.3)
BILIRUB SERPL-MCNC: 1.5 MG/DL (ref 0.1–1)
BUN SERPL-MCNC: 15 MG/DL (ref 6–20)
CALCIUM SERPL-MCNC: 8.9 MG/DL (ref 8.7–10.5)
CHLORIDE SERPL-SCNC: 103 MMOL/L (ref 95–110)
CO2 SERPL-SCNC: 25 MMOL/L (ref 23–29)
CREAT SERPL-MCNC: 1 MG/DL (ref 0.5–1.4)
DIFFERENTIAL METHOD: ABNORMAL
DSDNA AB SER-ACNC: NORMAL [IU]/ML
EOSINOPHIL # BLD AUTO: 0 K/UL (ref 0–0.5)
EOSINOPHIL NFR BLD: 0.6 % (ref 0–8)
ERYTHROCYTE [DISTWIDTH] IN BLOOD BY AUTOMATED COUNT: 15.5 % (ref 11.5–14.5)
EST. GFR  (AFRICAN AMERICAN): >60 ML/MIN/1.73 M^2
EST. GFR  (NON AFRICAN AMERICAN): >60 ML/MIN/1.73 M^2
GLUCOSE SERPL-MCNC: 93 MG/DL (ref 70–110)
HCO3 UR-SCNC: 27.1 MMOL/L (ref 24–28)
HCT VFR BLD AUTO: 42 % (ref 37–48.5)
HCT VFR BLD CALC: 44 %PCV (ref 36–54)
HGB BLD-MCNC: 14.9 G/DL (ref 12–16)
HIV 1+2 AB+HIV1 P24 AG SERPL QL IA: NEGATIVE
IMM GRANULOCYTES # BLD AUTO: 0.03 K/UL (ref 0–0.04)
IMM GRANULOCYTES NFR BLD AUTO: 0.5 % (ref 0–0.5)
LYMPHOCYTES # BLD AUTO: 3.4 K/UL (ref 1–4.8)
LYMPHOCYTES NFR BLD: 54.7 % (ref 18–48)
MAGNESIUM SERPL-MCNC: 2.1 MG/DL (ref 1.6–2.6)
MCH RBC QN AUTO: 32.9 PG (ref 27–31)
MCHC RBC AUTO-ENTMCNC: 35.5 G/DL (ref 32–36)
MCV RBC AUTO: 93 FL (ref 82–98)
MONOCYTES # BLD AUTO: 0.4 K/UL (ref 0.3–1)
MONOCYTES NFR BLD: 7 % (ref 4–15)
NEUTROPHILS # BLD AUTO: 2.3 K/UL (ref 1.8–7.7)
NEUTROPHILS NFR BLD: 36.9 % (ref 38–73)
NRBC BLD-RTO: 0 /100 WBC
PCO2 BLDA: 45.6 MMHG (ref 35–45)
PH SMN: 7.38 [PH] (ref 7.35–7.45)
PLATELET # BLD AUTO: 141 K/UL (ref 150–450)
PMV BLD AUTO: 10.7 FL (ref 9.2–12.9)
PO2 BLDA: 28 MMHG (ref 40–60)
POC BE: 2 MMOL/L
POC IONIZED CALCIUM: 1.23 MMOL/L (ref 1.06–1.42)
POC SATURATED O2: 51 % (ref 95–100)
POC TCO2: 28 MMOL/L (ref 24–29)
POTASSIUM BLD-SCNC: 3.7 MMOL/L (ref 3.5–5.1)
POTASSIUM SERPL-SCNC: 4 MMOL/L (ref 3.5–5.1)
PROT SERPL-MCNC: 5.7 G/DL (ref 6–8.4)
RBC # BLD AUTO: 4.53 M/UL (ref 4–5.4)
SAMPLE: ABNORMAL
SITE: ABNORMAL
SODIUM BLD-SCNC: 138 MMOL/L (ref 136–145)
SODIUM SERPL-SCNC: 136 MMOL/L (ref 136–145)
WBC # BLD AUTO: 6.18 K/UL (ref 3.9–12.7)

## 2021-12-03 PROCEDURE — 83735 ASSAY OF MAGNESIUM: CPT | Performed by: INTERNAL MEDICINE

## 2021-12-03 PROCEDURE — 80048 BASIC METABOLIC PNL TOTAL CA: CPT | Mod: 91 | Performed by: STUDENT IN AN ORGANIZED HEALTH CARE EDUCATION/TRAINING PROGRAM

## 2021-12-03 PROCEDURE — 83735 ASSAY OF MAGNESIUM: CPT | Mod: 91 | Performed by: STUDENT IN AN ORGANIZED HEALTH CARE EDUCATION/TRAINING PROGRAM

## 2021-12-03 PROCEDURE — 82803 BLOOD GASES ANY COMBINATION: CPT

## 2021-12-03 PROCEDURE — 99233 PR SUBSEQUENT HOSPITAL CARE,LEVL III: ICD-10-PCS | Mod: ,,, | Performed by: INTERNAL MEDICINE

## 2021-12-03 PROCEDURE — 63600175 PHARM REV CODE 636 W HCPCS: Performed by: INTERNAL MEDICINE

## 2021-12-03 PROCEDURE — 25000003 PHARM REV CODE 250: Performed by: INTERNAL MEDICINE

## 2021-12-03 PROCEDURE — 99233 SBSQ HOSP IP/OBS HIGH 50: CPT | Mod: ,,, | Performed by: INTERNAL MEDICINE

## 2021-12-03 PROCEDURE — 85025 COMPLETE CBC W/AUTO DIFF WBC: CPT | Performed by: INTERNAL MEDICINE

## 2021-12-03 PROCEDURE — 25000003 PHARM REV CODE 250: Performed by: STUDENT IN AN ORGANIZED HEALTH CARE EDUCATION/TRAINING PROGRAM

## 2021-12-03 PROCEDURE — 20600001 HC STEP DOWN PRIVATE ROOM

## 2021-12-03 PROCEDURE — 80076 HEPATIC FUNCTION PANEL: CPT | Performed by: INTERNAL MEDICINE

## 2021-12-03 RX ORDER — FUROSEMIDE 10 MG/ML
60 INJECTION INTRAMUSCULAR; INTRAVENOUS ONCE
Status: COMPLETED | OUTPATIENT
Start: 2021-12-03 | End: 2021-12-03

## 2021-12-03 RX ORDER — LOSARTAN POTASSIUM 25 MG/1
25 TABLET ORAL DAILY
Status: DISCONTINUED | OUTPATIENT
Start: 2021-12-03 | End: 2021-12-08

## 2021-12-03 RX ORDER — BUSPIRONE HYDROCHLORIDE 5 MG/1
5 TABLET ORAL 2 TIMES DAILY
Status: DISCONTINUED | OUTPATIENT
Start: 2021-12-03 | End: 2021-12-23 | Stop reason: HOSPADM

## 2021-12-03 RX ORDER — POTASSIUM CHLORIDE 20 MEQ/1
20 TABLET, EXTENDED RELEASE ORAL DAILY
Status: DISCONTINUED | OUTPATIENT
Start: 2021-12-03 | End: 2021-12-03

## 2021-12-03 RX ORDER — SPIRONOLACTONE 25 MG/1
25 TABLET ORAL DAILY
Status: DISCONTINUED | OUTPATIENT
Start: 2021-12-03 | End: 2021-12-23 | Stop reason: HOSPADM

## 2021-12-03 RX ORDER — DOBUTAMINE HYDROCHLORIDE 400 MG/100ML
5 INJECTION INTRAVENOUS CONTINUOUS
Status: DISCONTINUED | OUTPATIENT
Start: 2021-12-03 | End: 2021-12-13

## 2021-12-03 RX ADMIN — Medication 400 MG: at 08:12

## 2021-12-03 RX ADMIN — OXYCODONE AND ACETAMINOPHEN 1 TABLET: 7.5; 325 TABLET ORAL at 03:12

## 2021-12-03 RX ADMIN — BUSPIRONE HYDROCHLORIDE 5 MG: 5 TABLET ORAL at 06:12

## 2021-12-03 RX ADMIN — LOSARTAN POTASSIUM 25 MG: 25 TABLET, FILM COATED ORAL at 02:12

## 2021-12-03 RX ADMIN — MUPIROCIN: 20 OINTMENT TOPICAL at 08:12

## 2021-12-03 RX ADMIN — DOBUTAMINE HYDROCHLORIDE 5 MCG/KG/MIN: 400 INJECTION INTRAVENOUS at 05:12

## 2021-12-03 RX ADMIN — FUROSEMIDE 60 MG: 10 INJECTION, SOLUTION INTRAVENOUS at 02:12

## 2021-12-03 RX ADMIN — MUPIROCIN: 20 OINTMENT TOPICAL at 09:12

## 2021-12-03 RX ADMIN — ONDANSETRON 4 MG: 4 TABLET, ORALLY DISINTEGRATING ORAL at 08:12

## 2021-12-03 RX ADMIN — CEPHALEXIN 500 MG: 500 CAPSULE ORAL at 08:12

## 2021-12-03 RX ADMIN — CEPHALEXIN 500 MG: 500 CAPSULE ORAL at 02:12

## 2021-12-03 RX ADMIN — OXYCODONE AND ACETAMINOPHEN 1 TABLET: 7.5; 325 TABLET ORAL at 08:12

## 2021-12-03 RX ADMIN — SPIRONOLACTONE 25 MG: 25 TABLET ORAL at 02:12

## 2021-12-03 RX ADMIN — AMLODIPINE BESYLATE 5 MG: 5 TABLET ORAL at 09:12

## 2021-12-03 RX ADMIN — CEPHALEXIN 500 MG: 500 CAPSULE ORAL at 09:12

## 2021-12-03 RX ADMIN — Medication 400 MG: at 09:12

## 2021-12-03 RX ADMIN — FUROSEMIDE 10 MG/HR: 10 INJECTION, SOLUTION INTRAMUSCULAR; INTRAVENOUS at 05:12

## 2021-12-03 RX ADMIN — OXYCODONE AND ACETAMINOPHEN 1 TABLET: 7.5; 325 TABLET ORAL at 02:12

## 2021-12-03 RX ADMIN — ACETAMINOPHEN 650 MG: 325 TABLET ORAL at 11:12

## 2021-12-03 RX ADMIN — MILRINONE LACTATE IN DEXTROSE 0.25 MCG/KG/MIN: 200 INJECTION, SOLUTION INTRAVENOUS at 08:12

## 2021-12-03 RX ADMIN — HYDROCHLOROTHIAZIDE 12.5 MG: 12.5 TABLET ORAL at 09:12

## 2021-12-04 LAB
ALBUMIN SERPL BCP-MCNC: 2.9 G/DL (ref 3.5–5.2)
ALLENS TEST: ABNORMAL
ALP SERPL-CCNC: 81 U/L (ref 55–135)
ALT SERPL W/O P-5'-P-CCNC: 97 U/L (ref 10–44)
ANION GAP SERPL CALC-SCNC: 11 MMOL/L (ref 8–16)
ANION GAP SERPL CALC-SCNC: 12 MMOL/L (ref 8–16)
AST SERPL-CCNC: 62 U/L (ref 10–40)
BASOPHILS # BLD AUTO: 0.01 K/UL (ref 0–0.2)
BASOPHILS NFR BLD: 0.2 % (ref 0–1.9)
BILIRUB DIRECT SERPL-MCNC: 0.7 MG/DL (ref 0.1–0.3)
BILIRUB SERPL-MCNC: 1.5 MG/DL (ref 0.1–1)
BUN SERPL-MCNC: 15 MG/DL (ref 6–20)
BUN SERPL-MCNC: 16 MG/DL (ref 6–20)
CALCIUM SERPL-MCNC: 8.6 MG/DL (ref 8.7–10.5)
CALCIUM SERPL-MCNC: 8.7 MG/DL (ref 8.7–10.5)
CHLORIDE SERPL-SCNC: 100 MMOL/L (ref 95–110)
CHLORIDE SERPL-SCNC: 101 MMOL/L (ref 95–110)
CO2 SERPL-SCNC: 25 MMOL/L (ref 23–29)
CO2 SERPL-SCNC: 26 MMOL/L (ref 23–29)
CREAT SERPL-MCNC: 0.8 MG/DL (ref 0.5–1.4)
CREAT SERPL-MCNC: 1 MG/DL (ref 0.5–1.4)
DIFFERENTIAL METHOD: ABNORMAL
EOSINOPHIL # BLD AUTO: 0 K/UL (ref 0–0.5)
EOSINOPHIL NFR BLD: 0.6 % (ref 0–8)
ERYTHROCYTE [DISTWIDTH] IN BLOOD BY AUTOMATED COUNT: 15.3 % (ref 11.5–14.5)
EST. GFR  (AFRICAN AMERICAN): >60 ML/MIN/1.73 M^2
EST. GFR  (AFRICAN AMERICAN): >60 ML/MIN/1.73 M^2
EST. GFR  (NON AFRICAN AMERICAN): >60 ML/MIN/1.73 M^2
EST. GFR  (NON AFRICAN AMERICAN): >60 ML/MIN/1.73 M^2
GLUCOSE SERPL-MCNC: 122 MG/DL (ref 70–110)
GLUCOSE SERPL-MCNC: 134 MG/DL (ref 70–110)
HCO3 UR-SCNC: 28.4 MMOL/L (ref 24–28)
HCT VFR BLD AUTO: 40.9 % (ref 37–48.5)
HCT VFR BLD CALC: 42 %PCV (ref 36–54)
HGB BLD-MCNC: 14 G/DL (ref 12–16)
IMM GRANULOCYTES # BLD AUTO: 0.01 K/UL (ref 0–0.04)
IMM GRANULOCYTES NFR BLD AUTO: 0.2 % (ref 0–0.5)
LACTATE SERPL-SCNC: 1.7 MMOL/L (ref 0.5–2.2)
LYMPHOCYTES # BLD AUTO: 2.3 K/UL (ref 1–4.8)
LYMPHOCYTES NFR BLD: 44.8 % (ref 18–48)
MAGNESIUM SERPL-MCNC: 1.6 MG/DL (ref 1.6–2.6)
MAGNESIUM SERPL-MCNC: 2.3 MG/DL (ref 1.6–2.6)
MCH RBC QN AUTO: 32.6 PG (ref 27–31)
MCHC RBC AUTO-ENTMCNC: 34.2 G/DL (ref 32–36)
MCV RBC AUTO: 95 FL (ref 82–98)
MONOCYTES # BLD AUTO: 0.4 K/UL (ref 0.3–1)
MONOCYTES NFR BLD: 6.9 % (ref 4–15)
NEUTROPHILS # BLD AUTO: 2.5 K/UL (ref 1.8–7.7)
NEUTROPHILS NFR BLD: 47.3 % (ref 38–73)
NRBC BLD-RTO: 0 /100 WBC
PCO2 BLDA: 50.5 MMHG (ref 35–45)
PH SMN: 7.36 [PH] (ref 7.35–7.45)
PLATELET # BLD AUTO: 131 K/UL (ref 150–450)
PMV BLD AUTO: 11 FL (ref 9.2–12.9)
PO2 BLDA: 25 MMHG (ref 40–60)
POC BE: 3 MMOL/L
POC IONIZED CALCIUM: 1.25 MMOL/L (ref 1.06–1.42)
POC SATURATED O2: 41 % (ref 95–100)
POC TCO2: 30 MMOL/L (ref 24–29)
POTASSIUM BLD-SCNC: 4.6 MMOL/L (ref 3.5–5.1)
POTASSIUM SERPL-SCNC: 3.2 MMOL/L (ref 3.5–5.1)
POTASSIUM SERPL-SCNC: 3.9 MMOL/L (ref 3.5–5.1)
PROT SERPL-MCNC: 5.2 G/DL (ref 6–8.4)
RBC # BLD AUTO: 4.29 M/UL (ref 4–5.4)
SAMPLE: ABNORMAL
SITE: ABNORMAL
SODIUM BLD-SCNC: 136 MMOL/L (ref 136–145)
SODIUM SERPL-SCNC: 137 MMOL/L (ref 136–145)
SODIUM SERPL-SCNC: 138 MMOL/L (ref 136–145)
WBC # BLD AUTO: 5.18 K/UL (ref 3.9–12.7)

## 2021-12-04 PROCEDURE — 85025 COMPLETE CBC W/AUTO DIFF WBC: CPT | Performed by: INTERNAL MEDICINE

## 2021-12-04 PROCEDURE — 25000003 PHARM REV CODE 250: Performed by: INTERNAL MEDICINE

## 2021-12-04 PROCEDURE — 93010 EKG 12-LEAD: ICD-10-PCS | Mod: ,,, | Performed by: INTERNAL MEDICINE

## 2021-12-04 PROCEDURE — 99233 PR SUBSEQUENT HOSPITAL CARE,LEVL III: ICD-10-PCS | Mod: ,,, | Performed by: INTERNAL MEDICINE

## 2021-12-04 PROCEDURE — 80076 HEPATIC FUNCTION PANEL: CPT | Performed by: INTERNAL MEDICINE

## 2021-12-04 PROCEDURE — 63600175 PHARM REV CODE 636 W HCPCS: Performed by: STUDENT IN AN ORGANIZED HEALTH CARE EDUCATION/TRAINING PROGRAM

## 2021-12-04 PROCEDURE — 93010 ELECTROCARDIOGRAM REPORT: CPT | Mod: ,,, | Performed by: INTERNAL MEDICINE

## 2021-12-04 PROCEDURE — 99233 SBSQ HOSP IP/OBS HIGH 50: CPT | Mod: ,,, | Performed by: INTERNAL MEDICINE

## 2021-12-04 PROCEDURE — 83735 ASSAY OF MAGNESIUM: CPT | Performed by: INTERNAL MEDICINE

## 2021-12-04 PROCEDURE — 63600175 PHARM REV CODE 636 W HCPCS: Performed by: INTERNAL MEDICINE

## 2021-12-04 PROCEDURE — 80048 BASIC METABOLIC PNL TOTAL CA: CPT | Performed by: STUDENT IN AN ORGANIZED HEALTH CARE EDUCATION/TRAINING PROGRAM

## 2021-12-04 PROCEDURE — 93005 ELECTROCARDIOGRAM TRACING: CPT

## 2021-12-04 PROCEDURE — 20600001 HC STEP DOWN PRIVATE ROOM

## 2021-12-04 PROCEDURE — 83605 ASSAY OF LACTIC ACID: CPT | Performed by: INTERNAL MEDICINE

## 2021-12-04 PROCEDURE — 25000003 PHARM REV CODE 250: Performed by: STUDENT IN AN ORGANIZED HEALTH CARE EDUCATION/TRAINING PROGRAM

## 2021-12-04 RX ORDER — HYDROXYZINE HYDROCHLORIDE 25 MG/1
25 TABLET, FILM COATED ORAL 3 TIMES DAILY PRN
Status: DISCONTINUED | OUTPATIENT
Start: 2021-12-04 | End: 2021-12-04

## 2021-12-04 RX ORDER — MAGNESIUM SULFATE HEPTAHYDRATE 40 MG/ML
2 INJECTION, SOLUTION INTRAVENOUS ONCE
Status: COMPLETED | OUTPATIENT
Start: 2021-12-04 | End: 2021-12-04

## 2021-12-04 RX ORDER — POTASSIUM CHLORIDE 750 MG/1
30 CAPSULE, EXTENDED RELEASE ORAL
Status: COMPLETED | OUTPATIENT
Start: 2021-12-04 | End: 2021-12-04

## 2021-12-04 RX ORDER — POTASSIUM CHLORIDE 20 MEQ/1
60 TABLET, EXTENDED RELEASE ORAL ONCE
Status: COMPLETED | OUTPATIENT
Start: 2021-12-04 | End: 2021-12-04

## 2021-12-04 RX ORDER — FUROSEMIDE 10 MG/ML
60 INJECTION INTRAMUSCULAR; INTRAVENOUS 2 TIMES DAILY
Status: DISCONTINUED | OUTPATIENT
Start: 2021-12-04 | End: 2021-12-05

## 2021-12-04 RX ORDER — HYDROXYZINE HYDROCHLORIDE 25 MG/1
25 TABLET, FILM COATED ORAL 3 TIMES DAILY PRN
Status: COMPLETED | OUTPATIENT
Start: 2021-12-04 | End: 2021-12-07

## 2021-12-04 RX ADMIN — Medication 400 MG: at 08:12

## 2021-12-04 RX ADMIN — LOSARTAN POTASSIUM 25 MG: 25 TABLET, FILM COATED ORAL at 08:12

## 2021-12-04 RX ADMIN — OXYCODONE AND ACETAMINOPHEN 1 TABLET: 7.5; 325 TABLET ORAL at 08:12

## 2021-12-04 RX ADMIN — POTASSIUM CHLORIDE 30 MEQ: 10 CAPSULE, COATED, EXTENDED RELEASE ORAL at 01:12

## 2021-12-04 RX ADMIN — ONDANSETRON 4 MG: 4 TABLET, ORALLY DISINTEGRATING ORAL at 02:12

## 2021-12-04 RX ADMIN — POTASSIUM CHLORIDE 60 MEQ: 1500 TABLET, EXTENDED RELEASE ORAL at 02:12

## 2021-12-04 RX ADMIN — CEPHALEXIN 500 MG: 500 CAPSULE ORAL at 08:12

## 2021-12-04 RX ADMIN — POTASSIUM CHLORIDE 30 MEQ: 10 CAPSULE, COATED, EXTENDED RELEASE ORAL at 09:12

## 2021-12-04 RX ADMIN — FUROSEMIDE 60 MG: 10 INJECTION, SOLUTION INTRAVENOUS at 01:12

## 2021-12-04 RX ADMIN — MAGNESIUM SULFATE 2 G: 2 INJECTION INTRAVENOUS at 02:12

## 2021-12-04 RX ADMIN — BUSPIRONE HYDROCHLORIDE 5 MG: 5 TABLET ORAL at 08:12

## 2021-12-04 RX ADMIN — FUROSEMIDE 60 MG: 10 INJECTION, SOLUTION INTRAVENOUS at 08:12

## 2021-12-04 RX ADMIN — CEPHALEXIN 500 MG: 500 CAPSULE ORAL at 01:12

## 2021-12-04 RX ADMIN — POTASSIUM CHLORIDE 30 MEQ: 10 CAPSULE, COATED, EXTENDED RELEASE ORAL at 10:12

## 2021-12-04 RX ADMIN — SPIRONOLACTONE 25 MG: 25 TABLET ORAL at 08:12

## 2021-12-04 RX ADMIN — DOBUTAMINE HYDROCHLORIDE 5 MCG/KG/MIN: 400 INJECTION INTRAVENOUS at 05:12

## 2021-12-04 RX ADMIN — MUPIROCIN: 20 OINTMENT TOPICAL at 08:12

## 2021-12-04 RX ADMIN — CEPHALEXIN 500 MG: 500 CAPSULE ORAL at 02:12

## 2021-12-05 LAB
ALBUMIN SERPL BCP-MCNC: 3 G/DL (ref 3.5–5.2)
ALLENS TEST: ABNORMAL
ALLENS TEST: ABNORMAL
ALP SERPL-CCNC: 73 U/L (ref 55–135)
ALT SERPL W/O P-5'-P-CCNC: 81 U/L (ref 10–44)
ANION GAP SERPL CALC-SCNC: 7 MMOL/L (ref 8–16)
ANISOCYTOSIS BLD QL SMEAR: SLIGHT
AST SERPL-CCNC: 44 U/L (ref 10–40)
BASOPHILS # BLD AUTO: 0.02 K/UL (ref 0–0.2)
BASOPHILS NFR BLD: 0.4 % (ref 0–1.9)
BILIRUB DIRECT SERPL-MCNC: 0.6 MG/DL (ref 0.1–0.3)
BILIRUB SERPL-MCNC: 1.4 MG/DL (ref 0.1–1)
BUN SERPL-MCNC: 13 MG/DL (ref 6–20)
CALCIUM SERPL-MCNC: 9.1 MG/DL (ref 8.7–10.5)
CHLORIDE SERPL-SCNC: 101 MMOL/L (ref 95–110)
CO2 SERPL-SCNC: 27 MMOL/L (ref 23–29)
CREAT SERPL-MCNC: 1 MG/DL (ref 0.5–1.4)
DIFFERENTIAL METHOD: ABNORMAL
EOSINOPHIL # BLD AUTO: 0.1 K/UL (ref 0–0.5)
EOSINOPHIL NFR BLD: 1.6 % (ref 0–8)
ERYTHROCYTE [DISTWIDTH] IN BLOOD BY AUTOMATED COUNT: 15.4 % (ref 11.5–14.5)
EST. GFR  (AFRICAN AMERICAN): >60 ML/MIN/1.73 M^2
EST. GFR  (NON AFRICAN AMERICAN): >60 ML/MIN/1.73 M^2
GLUCOSE SERPL-MCNC: 93 MG/DL (ref 70–110)
HCO3 UR-SCNC: 28.8 MMOL/L (ref 24–28)
HCO3 UR-SCNC: 30.9 MMOL/L (ref 24–28)
HCT VFR BLD AUTO: 39.5 % (ref 37–48.5)
HCT VFR BLD CALC: 42 %PCV (ref 36–54)
HCT VFR BLD CALC: 46 %PCV (ref 36–54)
HGB BLD-MCNC: 13.6 G/DL (ref 12–16)
HYPOCHROMIA BLD QL SMEAR: ABNORMAL
IMM GRANULOCYTES # BLD AUTO: 0.01 K/UL (ref 0–0.04)
IMM GRANULOCYTES NFR BLD AUTO: 0.2 % (ref 0–0.5)
LACTATE SERPL-SCNC: 1.3 MMOL/L (ref 0.5–2.2)
LYMPHOCYTES # BLD AUTO: 2.9 K/UL (ref 1–4.8)
LYMPHOCYTES NFR BLD: 50.1 % (ref 18–48)
MAGNESIUM SERPL-MCNC: 1.8 MG/DL (ref 1.6–2.6)
MCH RBC QN AUTO: 32.5 PG (ref 27–31)
MCHC RBC AUTO-ENTMCNC: 34.4 G/DL (ref 32–36)
MCV RBC AUTO: 94 FL (ref 82–98)
MONOCYTES # BLD AUTO: 0.4 K/UL (ref 0.3–1)
MONOCYTES NFR BLD: 7.2 % (ref 4–15)
NEUTROPHILS # BLD AUTO: 2.3 K/UL (ref 1.8–7.7)
NEUTROPHILS NFR BLD: 40.5 % (ref 38–73)
NRBC BLD-RTO: 0 /100 WBC
OVALOCYTES BLD QL SMEAR: ABNORMAL
PCO2 BLDA: 44.4 MMHG (ref 35–45)
PCO2 BLDA: 47.9 MMHG (ref 35–45)
PH SMN: 7.42 [PH] (ref 7.35–7.45)
PH SMN: 7.42 [PH] (ref 7.35–7.45)
PLATELET # BLD AUTO: 118 K/UL (ref 150–450)
PLATELET BLD QL SMEAR: ABNORMAL
PMV BLD AUTO: 10.2 FL (ref 9.2–12.9)
PO2 BLDA: 21 MMHG (ref 40–60)
PO2 BLDA: 24 MMHG (ref 40–60)
POC BE: 4 MMOL/L
POC BE: 6 MMOL/L
POC IONIZED CALCIUM: 1.23 MMOL/L (ref 1.06–1.42)
POC IONIZED CALCIUM: 1.27 MMOL/L (ref 1.06–1.42)
POC SATURATED O2: 36 % (ref 95–100)
POC SATURATED O2: 43 % (ref 95–100)
POC TCO2: 30 MMOL/L (ref 24–29)
POC TCO2: 32 MMOL/L (ref 24–29)
POIKILOCYTOSIS BLD QL SMEAR: SLIGHT
POLYCHROMASIA BLD QL SMEAR: ABNORMAL
POTASSIUM BLD-SCNC: 4.4 MMOL/L (ref 3.5–5.1)
POTASSIUM BLD-SCNC: 5 MMOL/L (ref 3.5–5.1)
POTASSIUM SERPL-SCNC: 4.4 MMOL/L (ref 3.5–5.1)
PROT SERPL-MCNC: 5.3 G/DL (ref 6–8.4)
PROVIDER CREDENTIALS: ABNORMAL
RBC # BLD AUTO: 4.19 M/UL (ref 4–5.4)
SAMPLE: ABNORMAL
SAMPLE: ABNORMAL
SITE: ABNORMAL
SITE: ABNORMAL
SODIUM BLD-SCNC: 136 MMOL/L (ref 136–145)
SODIUM BLD-SCNC: 139 MMOL/L (ref 136–145)
SODIUM SERPL-SCNC: 135 MMOL/L (ref 136–145)
SPHEROCYTES BLD QL SMEAR: ABNORMAL
TARGETS BLD QL SMEAR: ABNORMAL
TSH SERPL DL<=0.005 MIU/L-ACNC: 0.9 UIU/ML (ref 0.4–4)
WBC # BLD AUTO: 5.69 K/UL (ref 3.9–12.7)

## 2021-12-05 PROCEDURE — 80076 HEPATIC FUNCTION PANEL: CPT | Performed by: INTERNAL MEDICINE

## 2021-12-05 PROCEDURE — 83605 ASSAY OF LACTIC ACID: CPT | Performed by: INTERNAL MEDICINE

## 2021-12-05 PROCEDURE — 63600175 PHARM REV CODE 636 W HCPCS: Performed by: INTERNAL MEDICINE

## 2021-12-05 PROCEDURE — 80048 BASIC METABOLIC PNL TOTAL CA: CPT | Performed by: STUDENT IN AN ORGANIZED HEALTH CARE EDUCATION/TRAINING PROGRAM

## 2021-12-05 PROCEDURE — 99233 SBSQ HOSP IP/OBS HIGH 50: CPT | Mod: ,,, | Performed by: INTERNAL MEDICINE

## 2021-12-05 PROCEDURE — 85025 COMPLETE CBC W/AUTO DIFF WBC: CPT | Performed by: INTERNAL MEDICINE

## 2021-12-05 PROCEDURE — 99233 PR SUBSEQUENT HOSPITAL CARE,LEVL III: ICD-10-PCS | Mod: ,,, | Performed by: INTERNAL MEDICINE

## 2021-12-05 PROCEDURE — 84443 ASSAY THYROID STIM HORMONE: CPT | Performed by: INTERNAL MEDICINE

## 2021-12-05 PROCEDURE — 25000003 PHARM REV CODE 250: Performed by: INTERNAL MEDICINE

## 2021-12-05 PROCEDURE — 83735 ASSAY OF MAGNESIUM: CPT | Performed by: INTERNAL MEDICINE

## 2021-12-05 PROCEDURE — 20600001 HC STEP DOWN PRIVATE ROOM

## 2021-12-05 PROCEDURE — 25000003 PHARM REV CODE 250: Performed by: STUDENT IN AN ORGANIZED HEALTH CARE EDUCATION/TRAINING PROGRAM

## 2021-12-05 RX ORDER — SIMETHICONE 80 MG
1 TABLET,CHEWABLE ORAL 3 TIMES DAILY PRN
Status: DISCONTINUED | OUTPATIENT
Start: 2021-12-05 | End: 2021-12-12

## 2021-12-05 RX ADMIN — LOSARTAN POTASSIUM 25 MG: 25 TABLET, FILM COATED ORAL at 09:12

## 2021-12-05 RX ADMIN — CEPHALEXIN 500 MG: 500 CAPSULE ORAL at 02:12

## 2021-12-05 RX ADMIN — DOBUTAMINE HYDROCHLORIDE 5 MCG/KG/MIN: 400 INJECTION INTRAVENOUS at 05:12

## 2021-12-05 RX ADMIN — CEPHALEXIN 500 MG: 500 CAPSULE ORAL at 09:12

## 2021-12-05 RX ADMIN — HYDROXYZINE HYDROCHLORIDE 25 MG: 25 TABLET ORAL at 09:12

## 2021-12-05 RX ADMIN — MUPIROCIN: 20 OINTMENT TOPICAL at 09:12

## 2021-12-05 RX ADMIN — ACETAMINOPHEN 650 MG: 325 TABLET ORAL at 11:12

## 2021-12-05 RX ADMIN — SPIRONOLACTONE 25 MG: 25 TABLET ORAL at 09:12

## 2021-12-05 RX ADMIN — ONDANSETRON 4 MG: 4 TABLET, ORALLY DISINTEGRATING ORAL at 09:12

## 2021-12-05 RX ADMIN — OXYCODONE AND ACETAMINOPHEN 1 TABLET: 7.5; 325 TABLET ORAL at 05:12

## 2021-12-05 RX ADMIN — Medication 400 MG: at 09:12

## 2021-12-05 RX ADMIN — ACETAMINOPHEN 650 MG: 325 TABLET ORAL at 03:12

## 2021-12-05 RX ADMIN — OXYCODONE AND ACETAMINOPHEN 1 TABLET: 7.5; 325 TABLET ORAL at 11:12

## 2021-12-05 RX ADMIN — BUSPIRONE HYDROCHLORIDE 5 MG: 5 TABLET ORAL at 09:12

## 2021-12-05 RX ADMIN — FUROSEMIDE 60 MG: 40 TABLET ORAL at 05:12

## 2021-12-05 RX ADMIN — ACETAMINOPHEN 650 MG: 325 TABLET ORAL at 09:12

## 2021-12-05 RX ADMIN — FUROSEMIDE 60 MG: 40 TABLET ORAL at 10:12

## 2021-12-05 RX ADMIN — CEPHALEXIN 500 MG: 500 CAPSULE ORAL at 01:12

## 2021-12-06 LAB
ALBUMIN SERPL BCP-MCNC: 3.3 G/DL (ref 3.5–5.2)
ALLENS TEST: ABNORMAL
ALP SERPL-CCNC: 72 U/L (ref 55–135)
ALT SERPL W/O P-5'-P-CCNC: 72 U/L (ref 10–44)
ANION GAP SERPL CALC-SCNC: 13 MMOL/L (ref 8–16)
ANTI SM ANTIBODY: 0.06 RATIO (ref 0–0.99)
ANTI-SM INTERPRETATION: NEGATIVE
AST SERPL-CCNC: 44 U/L (ref 10–40)
BASOPHILS # BLD AUTO: 0.03 K/UL (ref 0–0.2)
BASOPHILS NFR BLD: 0.5 % (ref 0–1.9)
BILIRUB DIRECT SERPL-MCNC: 0.8 MG/DL (ref 0.1–0.3)
BILIRUB SERPL-MCNC: 2.1 MG/DL (ref 0.1–1)
BUN SERPL-MCNC: 16 MG/DL (ref 6–20)
CALCIUM SERPL-MCNC: 9.6 MG/DL (ref 8.7–10.5)
CHLORIDE SERPL-SCNC: 100 MMOL/L (ref 95–110)
CHOLEST SERPL-MCNC: 157 MG/DL (ref 120–199)
CHOLEST/HDLC SERPL: 4.6 {RATIO} (ref 2–5)
CO2 SERPL-SCNC: 24 MMOL/L (ref 23–29)
CREAT SERPL-MCNC: 1.1 MG/DL (ref 0.5–1.4)
DELSYS: ABNORMAL
DIFFERENTIAL METHOD: ABNORMAL
ENA SCL70 AB SER-ACNC: 4 UNITS
EOSINOPHIL # BLD AUTO: 0.1 K/UL (ref 0–0.5)
EOSINOPHIL NFR BLD: 0.8 % (ref 0–8)
ERYTHROCYTE [DISTWIDTH] IN BLOOD BY AUTOMATED COUNT: 15.5 % (ref 11.5–14.5)
EST. GFR  (AFRICAN AMERICAN): >60 ML/MIN/1.73 M^2
EST. GFR  (NON AFRICAN AMERICAN): 59.9 ML/MIN/1.73 M^2
ESTIMATED AVG GLUCOSE: 137 MG/DL (ref 68–131)
GLUCOSE SERPL-MCNC: 86 MG/DL (ref 70–110)
HBA1C MFR BLD: 6.4 % (ref 4–5.6)
HCO3 UR-SCNC: 29.2 MMOL/L (ref 24–28)
HCO3 UR-SCNC: 29.8 MMOL/L (ref 24–28)
HCO3 UR-SCNC: 29.9 MMOL/L (ref 24–28)
HCT VFR BLD AUTO: 42.2 % (ref 37–48.5)
HCT VFR BLD CALC: 43 %PCV (ref 36–54)
HCT VFR BLD CALC: 44 %PCV (ref 36–54)
HDLC SERPL-MCNC: 34 MG/DL (ref 40–75)
HDLC SERPL: 21.7 % (ref 20–50)
HGB BLD-MCNC: 14.8 G/DL (ref 12–16)
IMM GRANULOCYTES # BLD AUTO: 0.03 K/UL (ref 0–0.04)
IMM GRANULOCYTES NFR BLD AUTO: 0.5 % (ref 0–0.5)
LDLC SERPL CALC-MCNC: 103.2 MG/DL (ref 63–159)
LYMPHOCYTES # BLD AUTO: 3.5 K/UL (ref 1–4.8)
LYMPHOCYTES NFR BLD: 53.5 % (ref 18–48)
MAGNESIUM SERPL-MCNC: 1.9 MG/DL (ref 1.6–2.6)
MCH RBC QN AUTO: 32.9 PG (ref 27–31)
MCHC RBC AUTO-ENTMCNC: 35.1 G/DL (ref 32–36)
MCV RBC AUTO: 94 FL (ref 82–98)
MODE: ABNORMAL
MONOCYTES # BLD AUTO: 0.4 K/UL (ref 0.3–1)
MONOCYTES NFR BLD: 6.4 % (ref 4–15)
NEUTROPHILS # BLD AUTO: 2.5 K/UL (ref 1.8–7.7)
NEUTROPHILS NFR BLD: 38.3 % (ref 38–73)
NONHDLC SERPL-MCNC: 123 MG/DL
NRBC BLD-RTO: 0 /100 WBC
PCO2 BLDA: 46.9 MMHG (ref 35–45)
PCO2 BLDA: 48.6 MMHG (ref 35–45)
PCO2 BLDA: 53.2 MMHG (ref 35–45)
PH SMN: 7.36 [PH] (ref 7.35–7.45)
PH SMN: 7.39 [PH] (ref 7.35–7.45)
PH SMN: 7.41 [PH] (ref 7.35–7.45)
PLATELET # BLD AUTO: 158 K/UL (ref 150–450)
PMV BLD AUTO: 10.7 FL (ref 9.2–12.9)
PO2 BLDA: 17 MMHG (ref 40–60)
PO2 BLDA: 22 MMHG (ref 40–60)
PO2 BLDA: 23 MMHG (ref 40–60)
POC BE: 4 MMOL/L
POC BE: 4 MMOL/L
POC BE: 5 MMOL/L
POC IONIZED CALCIUM: 1.26 MMOL/L (ref 1.06–1.42)
POC IONIZED CALCIUM: 1.26 MMOL/L (ref 1.06–1.42)
POC SATURATED O2: 21 % (ref 95–100)
POC SATURATED O2: 37 % (ref 95–100)
POC SATURATED O2: 39 % (ref 95–100)
POC TCO2: 31 MMOL/L (ref 24–29)
POC TCO2: 31 MMOL/L (ref 24–29)
POC TCO2: 32 MMOL/L (ref 24–29)
POTASSIUM BLD-SCNC: 3.8 MMOL/L (ref 3.5–5.1)
POTASSIUM BLD-SCNC: 4.2 MMOL/L (ref 3.5–5.1)
POTASSIUM SERPL-SCNC: 4.9 MMOL/L (ref 3.5–5.1)
PROT SERPL-MCNC: 6.1 G/DL (ref 6–8.4)
RBC # BLD AUTO: 4.5 M/UL (ref 4–5.4)
SAMPLE: ABNORMAL
SITE: ABNORMAL
SODIUM BLD-SCNC: 138 MMOL/L (ref 136–145)
SODIUM BLD-SCNC: 139 MMOL/L (ref 136–145)
SODIUM SERPL-SCNC: 137 MMOL/L (ref 136–145)
TRIGL SERPL-MCNC: 99 MG/DL (ref 30–150)
WBC # BLD AUTO: 6.58 K/UL (ref 3.9–12.7)

## 2021-12-06 PROCEDURE — 82803 BLOOD GASES ANY COMBINATION: CPT

## 2021-12-06 PROCEDURE — 80076 HEPATIC FUNCTION PANEL: CPT | Performed by: INTERNAL MEDICINE

## 2021-12-06 PROCEDURE — 25000003 PHARM REV CODE 250: Performed by: INTERNAL MEDICINE

## 2021-12-06 PROCEDURE — 83735 ASSAY OF MAGNESIUM: CPT | Performed by: INTERNAL MEDICINE

## 2021-12-06 PROCEDURE — 99233 SBSQ HOSP IP/OBS HIGH 50: CPT | Mod: ,,, | Performed by: INTERNAL MEDICINE

## 2021-12-06 PROCEDURE — 80048 BASIC METABOLIC PNL TOTAL CA: CPT | Performed by: STUDENT IN AN ORGANIZED HEALTH CARE EDUCATION/TRAINING PROGRAM

## 2021-12-06 PROCEDURE — 80061 LIPID PANEL: CPT | Performed by: INTERNAL MEDICINE

## 2021-12-06 PROCEDURE — 36410 VNPNXR 3YR/> PHY/QHP DX/THER: CPT

## 2021-12-06 PROCEDURE — 99233 PR SUBSEQUENT HOSPITAL CARE,LEVL III: ICD-10-PCS | Mod: ,,, | Performed by: INTERNAL MEDICINE

## 2021-12-06 PROCEDURE — 76937 US GUIDE VASCULAR ACCESS: CPT

## 2021-12-06 PROCEDURE — 99900035 HC TECH TIME PER 15 MIN (STAT)

## 2021-12-06 PROCEDURE — 20600001 HC STEP DOWN PRIVATE ROOM

## 2021-12-06 PROCEDURE — 63600175 PHARM REV CODE 636 W HCPCS: Performed by: INTERNAL MEDICINE

## 2021-12-06 PROCEDURE — 83036 HEMOGLOBIN GLYCOSYLATED A1C: CPT | Performed by: INTERNAL MEDICINE

## 2021-12-06 PROCEDURE — C1751 CATH, INF, PER/CENT/MIDLINE: HCPCS

## 2021-12-06 PROCEDURE — 85025 COMPLETE CBC W/AUTO DIFF WBC: CPT | Performed by: INTERNAL MEDICINE

## 2021-12-06 RX ORDER — LOPERAMIDE HYDROCHLORIDE 2 MG/1
2 CAPSULE ORAL EVERY 4 HOURS PRN
Status: DISCONTINUED | OUTPATIENT
Start: 2021-12-06 | End: 2021-12-23 | Stop reason: HOSPADM

## 2021-12-06 RX ORDER — OXYCODONE AND ACETAMINOPHEN 5; 325 MG/1; MG/1
1 TABLET ORAL EVERY 4 HOURS PRN
Status: DISCONTINUED | OUTPATIENT
Start: 2021-12-06 | End: 2021-12-23 | Stop reason: HOSPADM

## 2021-12-06 RX ORDER — ONDANSETRON 8 MG/1
8 TABLET, ORALLY DISINTEGRATING ORAL EVERY 8 HOURS PRN
Status: DISCONTINUED | OUTPATIENT
Start: 2021-12-06 | End: 2021-12-12

## 2021-12-06 RX ORDER — ONDANSETRON 2 MG/ML
4 INJECTION INTRAMUSCULAR; INTRAVENOUS EVERY 8 HOURS PRN
Status: DISCONTINUED | OUTPATIENT
Start: 2021-12-06 | End: 2021-12-23 | Stop reason: HOSPADM

## 2021-12-06 RX ORDER — ACETAMINOPHEN 325 MG/1
650 TABLET ORAL EVERY 6 HOURS PRN
Status: DISCONTINUED | OUTPATIENT
Start: 2021-12-06 | End: 2021-12-23 | Stop reason: HOSPADM

## 2021-12-06 RX ADMIN — EPOPROSTENOL 2 NG/KG/MIN: 0.5 INJECTION, POWDER, LYOPHILIZED, FOR SOLUTION INTRAVENOUS at 03:12

## 2021-12-06 RX ADMIN — FUROSEMIDE 60 MG: 40 TABLET ORAL at 08:12

## 2021-12-06 RX ADMIN — BUSPIRONE HYDROCHLORIDE 5 MG: 5 TABLET ORAL at 08:12

## 2021-12-06 RX ADMIN — Medication: at 01:12

## 2021-12-06 RX ADMIN — Medication 400 MG: at 08:12

## 2021-12-06 RX ADMIN — LOSARTAN POTASSIUM 25 MG: 25 TABLET, FILM COATED ORAL at 08:12

## 2021-12-06 RX ADMIN — ONDANSETRON 8 MG: 8 TABLET, ORALLY DISINTEGRATING ORAL at 08:12

## 2021-12-06 RX ADMIN — HYDROXYZINE HYDROCHLORIDE 25 MG: 25 TABLET ORAL at 06:12

## 2021-12-06 RX ADMIN — SPIRONOLACTONE 25 MG: 25 TABLET ORAL at 08:12

## 2021-12-06 RX ADMIN — MUPIROCIN: 20 OINTMENT TOPICAL at 08:12

## 2021-12-06 RX ADMIN — DOBUTAMINE HYDROCHLORIDE 5 MCG/KG/MIN: 400 INJECTION INTRAVENOUS at 09:12

## 2021-12-06 RX ADMIN — HYDROXYZINE HYDROCHLORIDE 25 MG: 25 TABLET ORAL at 08:12

## 2021-12-06 RX ADMIN — ACETAMINOPHEN 650 MG: 325 TABLET ORAL at 10:12

## 2021-12-06 RX ADMIN — OXYCODONE AND ACETAMINOPHEN 1 TABLET: 7.5; 325 TABLET ORAL at 08:12

## 2021-12-07 LAB
ALBUMIN SERPL BCP-MCNC: 3.2 G/DL (ref 3.5–5.2)
ALLENS TEST: ABNORMAL
ALP SERPL-CCNC: 73 U/L (ref 55–135)
ALT SERPL W/O P-5'-P-CCNC: 59 U/L (ref 10–44)
ANION GAP SERPL CALC-SCNC: 10 MMOL/L (ref 8–16)
AST SERPL-CCNC: 36 U/L (ref 10–40)
BASOPHILS # BLD AUTO: 0.03 K/UL (ref 0–0.2)
BASOPHILS NFR BLD: 0.5 % (ref 0–1.9)
BILIRUB DIRECT SERPL-MCNC: 0.6 MG/DL (ref 0.1–0.3)
BILIRUB SERPL-MCNC: 1.5 MG/DL (ref 0.1–1)
BUN SERPL-MCNC: 19 MG/DL (ref 6–20)
CALCIUM SERPL-MCNC: 9.2 MG/DL (ref 8.7–10.5)
CHLORIDE SERPL-SCNC: 101 MMOL/L (ref 95–110)
CO2 SERPL-SCNC: 25 MMOL/L (ref 23–29)
CREAT SERPL-MCNC: 1 MG/DL (ref 0.5–1.4)
DELSYS: ABNORMAL
DIFFERENTIAL METHOD: ABNORMAL
EOSINOPHIL # BLD AUTO: 0.1 K/UL (ref 0–0.5)
EOSINOPHIL NFR BLD: 1.1 % (ref 0–8)
ERYTHROCYTE [DISTWIDTH] IN BLOOD BY AUTOMATED COUNT: 15.3 % (ref 11.5–14.5)
EST. GFR  (AFRICAN AMERICAN): >60 ML/MIN/1.73 M^2
EST. GFR  (NON AFRICAN AMERICAN): >60 ML/MIN/1.73 M^2
GLUCOSE SERPL-MCNC: 96 MG/DL (ref 70–110)
HCO3 UR-SCNC: 30.2 MMOL/L (ref 24–28)
HCT VFR BLD AUTO: 42.6 % (ref 37–48.5)
HGB BLD-MCNC: 14.7 G/DL (ref 12–16)
IMM GRANULOCYTES # BLD AUTO: 0.01 K/UL (ref 0–0.04)
IMM GRANULOCYTES NFR BLD AUTO: 0.2 % (ref 0–0.5)
LYMPHOCYTES # BLD AUTO: 3.5 K/UL (ref 1–4.8)
LYMPHOCYTES NFR BLD: 55.4 % (ref 18–48)
MAGNESIUM SERPL-MCNC: 2 MG/DL (ref 1.6–2.6)
MCH RBC QN AUTO: 32.7 PG (ref 27–31)
MCHC RBC AUTO-ENTMCNC: 34.5 G/DL (ref 32–36)
MCV RBC AUTO: 95 FL (ref 82–98)
MONOCYTES # BLD AUTO: 0.4 K/UL (ref 0.3–1)
MONOCYTES NFR BLD: 6.3 % (ref 4–15)
NEUTROPHILS # BLD AUTO: 2.3 K/UL (ref 1.8–7.7)
NEUTROPHILS NFR BLD: 36.5 % (ref 38–73)
NRBC BLD-RTO: 0 /100 WBC
PCO2 BLDA: 51.7 MMHG (ref 35–45)
PH SMN: 7.38 [PH] (ref 7.35–7.45)
PLATELET # BLD AUTO: 151 K/UL (ref 150–450)
PMV BLD AUTO: 10.8 FL (ref 9.2–12.9)
PO2 BLDA: 23 MMHG (ref 40–60)
POC BE: 5 MMOL/L
POC SATURATED O2: 37 % (ref 95–100)
POC TCO2: 32 MMOL/L (ref 24–29)
POTASSIUM SERPL-SCNC: 4.8 MMOL/L (ref 3.5–5.1)
PROT SERPL-MCNC: 5.5 G/DL (ref 6–8.4)
RBC # BLD AUTO: 4.5 M/UL (ref 4–5.4)
SAMPLE: ABNORMAL
SITE: ABNORMAL
SODIUM SERPL-SCNC: 136 MMOL/L (ref 136–145)
WBC # BLD AUTO: 6.24 K/UL (ref 3.9–12.7)

## 2021-12-07 PROCEDURE — 80076 HEPATIC FUNCTION PANEL: CPT | Performed by: INTERNAL MEDICINE

## 2021-12-07 PROCEDURE — 63600175 PHARM REV CODE 636 W HCPCS: Performed by: INTERNAL MEDICINE

## 2021-12-07 PROCEDURE — 82803 BLOOD GASES ANY COMBINATION: CPT

## 2021-12-07 PROCEDURE — 99900035 HC TECH TIME PER 15 MIN (STAT)

## 2021-12-07 PROCEDURE — 25000003 PHARM REV CODE 250: Performed by: INTERNAL MEDICINE

## 2021-12-07 PROCEDURE — 85025 COMPLETE CBC W/AUTO DIFF WBC: CPT | Performed by: INTERNAL MEDICINE

## 2021-12-07 PROCEDURE — 25000003 PHARM REV CODE 250: Performed by: STUDENT IN AN ORGANIZED HEALTH CARE EDUCATION/TRAINING PROGRAM

## 2021-12-07 PROCEDURE — 83735 ASSAY OF MAGNESIUM: CPT | Performed by: INTERNAL MEDICINE

## 2021-12-07 PROCEDURE — 20600001 HC STEP DOWN PRIVATE ROOM

## 2021-12-07 PROCEDURE — 80048 BASIC METABOLIC PNL TOTAL CA: CPT | Performed by: STUDENT IN AN ORGANIZED HEALTH CARE EDUCATION/TRAINING PROGRAM

## 2021-12-07 PROCEDURE — 99233 SBSQ HOSP IP/OBS HIGH 50: CPT | Mod: ,,, | Performed by: INTERNAL MEDICINE

## 2021-12-07 PROCEDURE — 99233 PR SUBSEQUENT HOSPITAL CARE,LEVL III: ICD-10-PCS | Mod: ,,, | Performed by: INTERNAL MEDICINE

## 2021-12-07 RX ORDER — BUSPIRONE HYDROCHLORIDE 5 MG/1
5 TABLET ORAL ONCE
Status: COMPLETED | OUTPATIENT
Start: 2021-12-07 | End: 2021-12-07

## 2021-12-07 RX ADMIN — HYDROXYZINE HYDROCHLORIDE 25 MG: 25 TABLET ORAL at 06:12

## 2021-12-07 RX ADMIN — EPOPROSTENOL 7 NG/KG/MIN: 0.5 INJECTION, POWDER, LYOPHILIZED, FOR SOLUTION INTRAVENOUS at 11:12

## 2021-12-07 RX ADMIN — BUSPIRONE HYDROCHLORIDE 5 MG: 5 TABLET ORAL at 08:12

## 2021-12-07 RX ADMIN — EPOPROSTENOL 6 NG/KG/MIN: 0.5 INJECTION, POWDER, LYOPHILIZED, FOR SOLUTION INTRAVENOUS at 07:12

## 2021-12-07 RX ADMIN — Medication 400 MG: at 08:12

## 2021-12-07 RX ADMIN — Medication: at 07:12

## 2021-12-07 RX ADMIN — SPIRONOLACTONE 25 MG: 25 TABLET ORAL at 09:12

## 2021-12-07 RX ADMIN — ACETAMINOPHEN 650 MG: 325 TABLET ORAL at 05:12

## 2021-12-07 RX ADMIN — OXYCODONE AND ACETAMINOPHEN 1 TABLET: 7.5; 325 TABLET ORAL at 06:12

## 2021-12-07 RX ADMIN — BUSPIRONE HYDROCHLORIDE 5 MG: 5 TABLET ORAL at 09:12

## 2021-12-07 RX ADMIN — OXYCODONE AND ACETAMINOPHEN 1 TABLET: 7.5; 325 TABLET ORAL at 12:12

## 2021-12-07 RX ADMIN — Medication 400 MG: at 09:12

## 2021-12-07 RX ADMIN — LOSARTAN POTASSIUM 25 MG: 25 TABLET, FILM COATED ORAL at 09:12

## 2021-12-07 RX ADMIN — BUSPIRONE HYDROCHLORIDE 5 MG: 5 TABLET ORAL at 11:12

## 2021-12-07 RX ADMIN — ONDANSETRON 4 MG: 2 INJECTION INTRAMUSCULAR; INTRAVENOUS at 09:12

## 2021-12-08 LAB
ALBUMIN SERPL BCP-MCNC: 3.3 G/DL (ref 3.5–5.2)
ALLENS TEST: ABNORMAL
ALP SERPL-CCNC: 69 U/L (ref 55–135)
ALT SERPL W/O P-5'-P-CCNC: 51 U/L (ref 10–44)
ANION GAP SERPL CALC-SCNC: 7 MMOL/L (ref 8–16)
AST SERPL-CCNC: 31 U/L (ref 10–40)
BASOPHILS # BLD AUTO: 0.02 K/UL (ref 0–0.2)
BASOPHILS NFR BLD: 0.3 % (ref 0–1.9)
BILIRUB DIRECT SERPL-MCNC: 0.6 MG/DL (ref 0.1–0.3)
BILIRUB SERPL-MCNC: 1.5 MG/DL (ref 0.1–1)
BUN SERPL-MCNC: 13 MG/DL (ref 6–20)
CALCIUM SERPL-MCNC: 9 MG/DL (ref 8.7–10.5)
CHLORIDE SERPL-SCNC: 103 MMOL/L (ref 95–110)
CO2 SERPL-SCNC: 24 MMOL/L (ref 23–29)
CREAT SERPL-MCNC: 1 MG/DL (ref 0.5–1.4)
DELSYS: ABNORMAL
DIFFERENTIAL METHOD: ABNORMAL
EOSINOPHIL # BLD AUTO: 0.1 K/UL (ref 0–0.5)
EOSINOPHIL NFR BLD: 0.8 % (ref 0–8)
ERYTHROCYTE [DISTWIDTH] IN BLOOD BY AUTOMATED COUNT: 15.4 % (ref 11.5–14.5)
EST. GFR  (AFRICAN AMERICAN): >60 ML/MIN/1.73 M^2
EST. GFR  (NON AFRICAN AMERICAN): >60 ML/MIN/1.73 M^2
GLUCOSE SERPL-MCNC: 96 MG/DL (ref 70–110)
HCO3 UR-SCNC: 28 MMOL/L (ref 24–28)
HCT VFR BLD AUTO: 42.8 % (ref 37–48.5)
HGB BLD-MCNC: 14.9 G/DL (ref 12–16)
IMM GRANULOCYTES # BLD AUTO: 0.02 K/UL (ref 0–0.04)
IMM GRANULOCYTES NFR BLD AUTO: 0.3 % (ref 0–0.5)
LYMPHOCYTES # BLD AUTO: 2.6 K/UL (ref 1–4.8)
LYMPHOCYTES NFR BLD: 41.1 % (ref 18–48)
MAGNESIUM SERPL-MCNC: 2.1 MG/DL (ref 1.6–2.6)
MCH RBC QN AUTO: 32.5 PG (ref 27–31)
MCHC RBC AUTO-ENTMCNC: 34.8 G/DL (ref 32–36)
MCV RBC AUTO: 93 FL (ref 82–98)
MONOCYTES # BLD AUTO: 0.5 K/UL (ref 0.3–1)
MONOCYTES NFR BLD: 8.6 % (ref 4–15)
NEUTROPHILS # BLD AUTO: 3.1 K/UL (ref 1.8–7.7)
NEUTROPHILS NFR BLD: 48.9 % (ref 38–73)
NRBC BLD-RTO: 0 /100 WBC
PCO2 BLDA: 49.3 MMHG (ref 35–45)
PH SMN: 7.36 [PH] (ref 7.35–7.45)
PLATELET # BLD AUTO: 130 K/UL (ref 150–450)
PMV BLD AUTO: 10.5 FL (ref 9.2–12.9)
PO2 BLDA: 21 MMHG (ref 40–60)
POC BE: 3 MMOL/L
POC SATURATED O2: 32 % (ref 95–100)
POC TCO2: 30 MMOL/L (ref 24–29)
POTASSIUM SERPL-SCNC: 4.7 MMOL/L (ref 3.5–5.1)
PROT SERPL-MCNC: 5.7 G/DL (ref 6–8.4)
RBC # BLD AUTO: 4.58 M/UL (ref 4–5.4)
SAMPLE: ABNORMAL
SITE: ABNORMAL
SODIUM SERPL-SCNC: 134 MMOL/L (ref 136–145)
WBC # BLD AUTO: 6.27 K/UL (ref 3.9–12.7)

## 2021-12-08 PROCEDURE — 99233 PR SUBSEQUENT HOSPITAL CARE,LEVL III: ICD-10-PCS | Mod: ,,, | Performed by: INTERNAL MEDICINE

## 2021-12-08 PROCEDURE — 63600175 PHARM REV CODE 636 W HCPCS: Performed by: INTERNAL MEDICINE

## 2021-12-08 PROCEDURE — 85025 COMPLETE CBC W/AUTO DIFF WBC: CPT | Performed by: INTERNAL MEDICINE

## 2021-12-08 PROCEDURE — 25000003 PHARM REV CODE 250: Performed by: INTERNAL MEDICINE

## 2021-12-08 PROCEDURE — 76937 US GUIDE VASCULAR ACCESS: CPT

## 2021-12-08 PROCEDURE — C1751 CATH, INF, PER/CENT/MIDLINE: HCPCS

## 2021-12-08 PROCEDURE — 83735 ASSAY OF MAGNESIUM: CPT | Performed by: INTERNAL MEDICINE

## 2021-12-08 PROCEDURE — 99233 SBSQ HOSP IP/OBS HIGH 50: CPT | Mod: ,,, | Performed by: INTERNAL MEDICINE

## 2021-12-08 PROCEDURE — 20600001 HC STEP DOWN PRIVATE ROOM

## 2021-12-08 PROCEDURE — 99900035 HC TECH TIME PER 15 MIN (STAT)

## 2021-12-08 PROCEDURE — 36573 INSJ PICC RS&I 5 YR+: CPT

## 2021-12-08 PROCEDURE — 80048 BASIC METABOLIC PNL TOTAL CA: CPT | Performed by: STUDENT IN AN ORGANIZED HEALTH CARE EDUCATION/TRAINING PROGRAM

## 2021-12-08 PROCEDURE — 80076 HEPATIC FUNCTION PANEL: CPT | Performed by: INTERNAL MEDICINE

## 2021-12-08 PROCEDURE — 25000003 PHARM REV CODE 250: Performed by: STUDENT IN AN ORGANIZED HEALTH CARE EDUCATION/TRAINING PROGRAM

## 2021-12-08 PROCEDURE — 82803 BLOOD GASES ANY COMBINATION: CPT

## 2021-12-08 PROCEDURE — 94761 N-INVAS EAR/PLS OXIMETRY MLT: CPT

## 2021-12-08 RX ORDER — SODIUM CHLORIDE 0.9 % (FLUSH) 0.9 %
10 SYRINGE (ML) INJECTION EVERY 6 HOURS
Status: DISCONTINUED | OUTPATIENT
Start: 2021-12-08 | End: 2021-12-23 | Stop reason: HOSPADM

## 2021-12-08 RX ORDER — SODIUM CHLORIDE 0.9 % (FLUSH) 0.9 %
10 SYRINGE (ML) INJECTION
Status: DISCONTINUED | OUTPATIENT
Start: 2021-12-08 | End: 2021-12-23 | Stop reason: HOSPADM

## 2021-12-08 RX ADMIN — DIPHENHYDRAMINE HYDROCHLORIDE 10 ML: 25 SOLUTION ORAL at 11:12

## 2021-12-08 RX ADMIN — BUSPIRONE HYDROCHLORIDE 5 MG: 5 TABLET ORAL at 08:12

## 2021-12-08 RX ADMIN — DOBUTAMINE HYDROCHLORIDE 5 MCG/KG/MIN: 400 INJECTION INTRAVENOUS at 10:12

## 2021-12-08 RX ADMIN — ONDANSETRON 8 MG: 8 TABLET, ORALLY DISINTEGRATING ORAL at 05:12

## 2021-12-08 RX ADMIN — Medication 400 MG: at 08:12

## 2021-12-08 RX ADMIN — ONDANSETRON 4 MG: 2 INJECTION INTRAMUSCULAR; INTRAVENOUS at 10:12

## 2021-12-08 RX ADMIN — OXYCODONE AND ACETAMINOPHEN 1 TABLET: 7.5; 325 TABLET ORAL at 11:12

## 2021-12-08 RX ADMIN — EPOPROSTENOL 8 NG/KG/MIN: 0.5 INJECTION, POWDER, LYOPHILIZED, FOR SOLUTION INTRAVENOUS at 06:12

## 2021-12-08 RX ADMIN — EPOPROSTENOL 10 NG/KG/MIN: 0.5 INJECTION, POWDER, LYOPHILIZED, FOR SOLUTION INTRAVENOUS at 07:12

## 2021-12-08 RX ADMIN — DIPHENHYDRAMINE HYDROCHLORIDE 10 ML: 25 SOLUTION ORAL at 06:12

## 2021-12-08 RX ADMIN — OXYCODONE AND ACETAMINOPHEN 1 TABLET: 7.5; 325 TABLET ORAL at 05:12

## 2021-12-08 RX ADMIN — ONDANSETRON 8 MG: 8 TABLET, ORALLY DISINTEGRATING ORAL at 06:12

## 2021-12-09 LAB
ALBUMIN SERPL BCP-MCNC: 3.2 G/DL (ref 3.5–5.2)
ALLENS TEST: ABNORMAL
ALP SERPL-CCNC: 64 U/L (ref 55–135)
ALT SERPL W/O P-5'-P-CCNC: 41 U/L (ref 10–44)
ANION GAP SERPL CALC-SCNC: 8 MMOL/L (ref 8–16)
AST SERPL-CCNC: 28 U/L (ref 10–40)
BASOPHILS # BLD AUTO: 0.02 K/UL (ref 0–0.2)
BASOPHILS NFR BLD: 0.3 % (ref 0–1.9)
BILIRUB DIRECT SERPL-MCNC: 0.9 MG/DL (ref 0.1–0.3)
BILIRUB SERPL-MCNC: 2.4 MG/DL (ref 0.1–1)
BUN SERPL-MCNC: 14 MG/DL (ref 6–20)
CALCIUM SERPL-MCNC: 9.2 MG/DL (ref 8.7–10.5)
CHLORIDE SERPL-SCNC: 107 MMOL/L (ref 95–110)
CO2 SERPL-SCNC: 22 MMOL/L (ref 23–29)
CREAT SERPL-MCNC: 0.9 MG/DL (ref 0.5–1.4)
DELSYS: ABNORMAL
DIFFERENTIAL METHOD: ABNORMAL
EOSINOPHIL # BLD AUTO: 0 K/UL (ref 0–0.5)
EOSINOPHIL NFR BLD: 0.4 % (ref 0–8)
ERYTHROCYTE [DISTWIDTH] IN BLOOD BY AUTOMATED COUNT: 15.4 % (ref 11.5–14.5)
EST. GFR  (AFRICAN AMERICAN): >60 ML/MIN/1.73 M^2
EST. GFR  (NON AFRICAN AMERICAN): >60 ML/MIN/1.73 M^2
GLUCOSE SERPL-MCNC: 94 MG/DL (ref 70–110)
HCO3 UR-SCNC: 24.9 MMOL/L (ref 24–28)
HCT VFR BLD AUTO: 40.8 % (ref 37–48.5)
HGB BLD-MCNC: 14 G/DL (ref 12–16)
IMM GRANULOCYTES # BLD AUTO: 0.03 K/UL (ref 0–0.04)
IMM GRANULOCYTES NFR BLD AUTO: 0.4 % (ref 0–0.5)
LYMPHOCYTES # BLD AUTO: 2.4 K/UL (ref 1–4.8)
LYMPHOCYTES NFR BLD: 35.2 % (ref 18–48)
MAGNESIUM SERPL-MCNC: 2.1 MG/DL (ref 1.6–2.6)
MCH RBC QN AUTO: 32.1 PG (ref 27–31)
MCHC RBC AUTO-ENTMCNC: 34.3 G/DL (ref 32–36)
MCV RBC AUTO: 94 FL (ref 82–98)
MONOCYTES # BLD AUTO: 0.5 K/UL (ref 0.3–1)
MONOCYTES NFR BLD: 7.8 % (ref 4–15)
NEUTROPHILS # BLD AUTO: 3.9 K/UL (ref 1.8–7.7)
NEUTROPHILS NFR BLD: 55.9 % (ref 38–73)
NRBC BLD-RTO: 0 /100 WBC
PCO2 BLDA: 41.9 MMHG (ref 35–45)
PH SMN: 7.38 [PH] (ref 7.35–7.45)
PLATELET # BLD AUTO: 122 K/UL (ref 150–450)
PMV BLD AUTO: 10.8 FL (ref 9.2–12.9)
PO2 BLDA: 22 MMHG (ref 40–60)
POC BE: 0 MMOL/L
POC SATURATED O2: 37 % (ref 95–100)
POC TCO2: 26 MMOL/L (ref 24–29)
POTASSIUM SERPL-SCNC: 4.9 MMOL/L (ref 3.5–5.1)
PROT SERPL-MCNC: 5.5 G/DL (ref 6–8.4)
RBC # BLD AUTO: 4.36 M/UL (ref 4–5.4)
SAMPLE: ABNORMAL
SITE: ABNORMAL
SODIUM SERPL-SCNC: 137 MMOL/L (ref 136–145)
WBC # BLD AUTO: 6.93 K/UL (ref 3.9–12.7)

## 2021-12-09 PROCEDURE — 83735 ASSAY OF MAGNESIUM: CPT | Performed by: INTERNAL MEDICINE

## 2021-12-09 PROCEDURE — 80048 BASIC METABOLIC PNL TOTAL CA: CPT | Performed by: STUDENT IN AN ORGANIZED HEALTH CARE EDUCATION/TRAINING PROGRAM

## 2021-12-09 PROCEDURE — 63600175 PHARM REV CODE 636 W HCPCS: Performed by: INTERNAL MEDICINE

## 2021-12-09 PROCEDURE — 25000003 PHARM REV CODE 250: Performed by: STUDENT IN AN ORGANIZED HEALTH CARE EDUCATION/TRAINING PROGRAM

## 2021-12-09 PROCEDURE — 20600001 HC STEP DOWN PRIVATE ROOM

## 2021-12-09 PROCEDURE — 25000003 PHARM REV CODE 250: Performed by: INTERNAL MEDICINE

## 2021-12-09 PROCEDURE — 80076 HEPATIC FUNCTION PANEL: CPT | Performed by: INTERNAL MEDICINE

## 2021-12-09 PROCEDURE — 99233 PR SUBSEQUENT HOSPITAL CARE,LEVL III: ICD-10-PCS | Mod: ,,, | Performed by: INTERNAL MEDICINE

## 2021-12-09 PROCEDURE — 85025 COMPLETE CBC W/AUTO DIFF WBC: CPT | Performed by: INTERNAL MEDICINE

## 2021-12-09 PROCEDURE — 82803 BLOOD GASES ANY COMBINATION: CPT

## 2021-12-09 PROCEDURE — 99900035 HC TECH TIME PER 15 MIN (STAT)

## 2021-12-09 PROCEDURE — 99233 SBSQ HOSP IP/OBS HIGH 50: CPT | Mod: ,,, | Performed by: INTERNAL MEDICINE

## 2021-12-09 RX ORDER — ALUMINUM HYDROXIDE, MAGNESIUM HYDROXIDE, AND SIMETHICONE 2400; 240; 2400 MG/30ML; MG/30ML; MG/30ML
30 SUSPENSION ORAL EVERY 6 HOURS PRN
Status: DISCONTINUED | OUTPATIENT
Start: 2021-12-09 | End: 2021-12-09

## 2021-12-09 RX ADMIN — SPIRONOLACTONE 25 MG: 25 TABLET ORAL at 08:12

## 2021-12-09 RX ADMIN — BUSPIRONE HYDROCHLORIDE 5 MG: 5 TABLET ORAL at 09:12

## 2021-12-09 RX ADMIN — DOBUTAMINE HYDROCHLORIDE 5 MCG/KG/MIN: 400 INJECTION INTRAVENOUS at 09:12

## 2021-12-09 RX ADMIN — OXYCODONE HYDROCHLORIDE AND ACETAMINOPHEN 1 TABLET: 5; 325 TABLET ORAL at 01:12

## 2021-12-09 RX ADMIN — Medication 400 MG: at 08:12

## 2021-12-09 RX ADMIN — OXYCODONE HYDROCHLORIDE AND ACETAMINOPHEN 1 TABLET: 5; 325 TABLET ORAL at 09:12

## 2021-12-09 RX ADMIN — BUSPIRONE HYDROCHLORIDE 5 MG: 5 TABLET ORAL at 08:12

## 2021-12-09 RX ADMIN — EPOPROSTENOL 13 NG/KG/MIN: 0.5 INJECTION, POWDER, LYOPHILIZED, FOR SOLUTION INTRAVENOUS at 08:12

## 2021-12-09 RX ADMIN — SIMETHICONE 80 MG: 80 TABLET, CHEWABLE ORAL at 09:12

## 2021-12-09 RX ADMIN — ONDANSETRON 8 MG: 8 TABLET, ORALLY DISINTEGRATING ORAL at 12:12

## 2021-12-09 RX ADMIN — Medication 400 MG: at 09:12

## 2021-12-09 RX ADMIN — LOPERAMIDE HYDROCHLORIDE 2 MG: 2 CAPSULE ORAL at 09:12

## 2021-12-10 LAB
ALBUMIN SERPL BCP-MCNC: 3.2 G/DL (ref 3.5–5.2)
ALLENS TEST: ABNORMAL
ALLENS TEST: ABNORMAL
ALP SERPL-CCNC: 63 U/L (ref 55–135)
ALT SERPL W/O P-5'-P-CCNC: 36 U/L (ref 10–44)
ANION GAP SERPL CALC-SCNC: 9 MMOL/L (ref 8–16)
AST SERPL-CCNC: 28 U/L (ref 10–40)
BASOPHILS # BLD AUTO: 0.01 K/UL (ref 0–0.2)
BASOPHILS # BLD AUTO: 0.02 K/UL (ref 0–0.2)
BASOPHILS NFR BLD: 0.2 % (ref 0–1.9)
BASOPHILS NFR BLD: 0.3 % (ref 0–1.9)
BILIRUB DIRECT SERPL-MCNC: 1 MG/DL (ref 0.1–0.3)
BILIRUB SERPL-MCNC: 2.8 MG/DL (ref 0.1–1)
BUN SERPL-MCNC: 14 MG/DL (ref 6–20)
CALCIUM SERPL-MCNC: 9.1 MG/DL (ref 8.7–10.5)
CHLORIDE SERPL-SCNC: 106 MMOL/L (ref 95–110)
CO2 SERPL-SCNC: 22 MMOL/L (ref 23–29)
CREAT SERPL-MCNC: 0.8 MG/DL (ref 0.5–1.4)
DELSYS: ABNORMAL
DIFFERENTIAL METHOD: ABNORMAL
DIFFERENTIAL METHOD: ABNORMAL
EOSINOPHIL # BLD AUTO: 0 K/UL (ref 0–0.5)
EOSINOPHIL # BLD AUTO: 0 K/UL (ref 0–0.5)
EOSINOPHIL NFR BLD: 0.6 % (ref 0–8)
EOSINOPHIL NFR BLD: 0.7 % (ref 0–8)
ERYTHROCYTE [DISTWIDTH] IN BLOOD BY AUTOMATED COUNT: 15.2 % (ref 11.5–14.5)
ERYTHROCYTE [DISTWIDTH] IN BLOOD BY AUTOMATED COUNT: 15.3 % (ref 11.5–14.5)
EST. GFR  (AFRICAN AMERICAN): >60 ML/MIN/1.73 M^2
EST. GFR  (NON AFRICAN AMERICAN): >60 ML/MIN/1.73 M^2
GLUCOSE SERPL-MCNC: 92 MG/DL (ref 70–110)
HCO3 UR-SCNC: 24.1 MMOL/L (ref 24–28)
HCO3 UR-SCNC: 24.7 MMOL/L (ref 24–28)
HCT VFR BLD AUTO: 37.6 % (ref 37–48.5)
HCT VFR BLD AUTO: 38.8 % (ref 37–48.5)
HCT VFR BLD CALC: 44 %PCV (ref 36–54)
HGB BLD-MCNC: 13.3 G/DL (ref 12–16)
HGB BLD-MCNC: 13.5 G/DL (ref 12–16)
IMM GRANULOCYTES # BLD AUTO: 0.03 K/UL (ref 0–0.04)
IMM GRANULOCYTES # BLD AUTO: 0.04 K/UL (ref 0–0.04)
IMM GRANULOCYTES NFR BLD AUTO: 0.5 % (ref 0–0.5)
IMM GRANULOCYTES NFR BLD AUTO: 0.7 % (ref 0–0.5)
LYMPHOCYTES # BLD AUTO: 2.1 K/UL (ref 1–4.8)
LYMPHOCYTES # BLD AUTO: 2.5 K/UL (ref 1–4.8)
LYMPHOCYTES NFR BLD: 33.7 % (ref 18–48)
LYMPHOCYTES NFR BLD: 39.8 % (ref 18–48)
MAGNESIUM SERPL-MCNC: 1.9 MG/DL (ref 1.6–2.6)
MCH RBC QN AUTO: 32.5 PG (ref 27–31)
MCH RBC QN AUTO: 33.3 PG (ref 27–31)
MCHC RBC AUTO-ENTMCNC: 34.8 G/DL (ref 32–36)
MCHC RBC AUTO-ENTMCNC: 35.4 G/DL (ref 32–36)
MCV RBC AUTO: 94 FL (ref 82–98)
MCV RBC AUTO: 94 FL (ref 82–98)
MONOCYTES # BLD AUTO: 0.4 K/UL (ref 0.3–1)
MONOCYTES # BLD AUTO: 0.5 K/UL (ref 0.3–1)
MONOCYTES NFR BLD: 6.7 % (ref 4–15)
MONOCYTES NFR BLD: 7.5 % (ref 4–15)
NEUTROPHILS # BLD AUTO: 3.2 K/UL (ref 1.8–7.7)
NEUTROPHILS # BLD AUTO: 3.6 K/UL (ref 1.8–7.7)
NEUTROPHILS NFR BLD: 51.1 % (ref 38–73)
NEUTROPHILS NFR BLD: 58.2 % (ref 38–73)
NRBC BLD-RTO: 0 /100 WBC
NRBC BLD-RTO: 0 /100 WBC
PCO2 BLDA: 40.7 MMHG (ref 35–45)
PCO2 BLDA: 41.1 MMHG (ref 35–45)
PH SMN: 7.38 [PH] (ref 7.35–7.45)
PH SMN: 7.39 [PH] (ref 7.35–7.45)
PLATELET # BLD AUTO: 110 K/UL (ref 150–450)
PLATELET # BLD AUTO: 110 K/UL (ref 150–450)
PMV BLD AUTO: 10.2 FL (ref 9.2–12.9)
PMV BLD AUTO: 10.6 FL (ref 9.2–12.9)
PO2 BLDA: 29 MMHG (ref 40–60)
PO2 BLDA: 32 MMHG (ref 40–60)
POC BE: -1 MMOL/L
POC BE: 0 MMOL/L
POC IONIZED CALCIUM: 1.3 MMOL/L (ref 1.06–1.42)
POC SATURATED O2: 54 % (ref 95–100)
POC SATURATED O2: 61 % (ref 95–100)
POC TCO2: 25 MMOL/L (ref 24–29)
POC TCO2: 26 MMOL/L (ref 24–29)
POTASSIUM BLD-SCNC: 4.4 MMOL/L (ref 3.5–5.1)
POTASSIUM SERPL-SCNC: 4.6 MMOL/L (ref 3.5–5.1)
PROT SERPL-MCNC: 5.5 G/DL (ref 6–8.4)
RBC # BLD AUTO: 3.99 M/UL (ref 4–5.4)
RBC # BLD AUTO: 4.15 M/UL (ref 4–5.4)
SAMPLE: ABNORMAL
SAMPLE: ABNORMAL
SITE: ABNORMAL
SITE: ABNORMAL
SODIUM BLD-SCNC: 144 MMOL/L (ref 136–145)
SODIUM SERPL-SCNC: 137 MMOL/L (ref 136–145)
WBC # BLD AUTO: 6.15 K/UL (ref 3.9–12.7)
WBC # BLD AUTO: 6.26 K/UL (ref 3.9–12.7)

## 2021-12-10 PROCEDURE — 63600175 PHARM REV CODE 636 W HCPCS: Performed by: INTERNAL MEDICINE

## 2021-12-10 PROCEDURE — 25000003 PHARM REV CODE 250: Performed by: INTERNAL MEDICINE

## 2021-12-10 PROCEDURE — 25000003 PHARM REV CODE 250: Performed by: STUDENT IN AN ORGANIZED HEALTH CARE EDUCATION/TRAINING PROGRAM

## 2021-12-10 PROCEDURE — 99900035 HC TECH TIME PER 15 MIN (STAT)

## 2021-12-10 PROCEDURE — 85025 COMPLETE CBC W/AUTO DIFF WBC: CPT | Performed by: INTERNAL MEDICINE

## 2021-12-10 PROCEDURE — 80048 BASIC METABOLIC PNL TOTAL CA: CPT | Performed by: STUDENT IN AN ORGANIZED HEALTH CARE EDUCATION/TRAINING PROGRAM

## 2021-12-10 PROCEDURE — 80076 HEPATIC FUNCTION PANEL: CPT | Performed by: INTERNAL MEDICINE

## 2021-12-10 PROCEDURE — 85025 COMPLETE CBC W/AUTO DIFF WBC: CPT | Mod: 91 | Performed by: STUDENT IN AN ORGANIZED HEALTH CARE EDUCATION/TRAINING PROGRAM

## 2021-12-10 PROCEDURE — 20600001 HC STEP DOWN PRIVATE ROOM

## 2021-12-10 PROCEDURE — 83735 ASSAY OF MAGNESIUM: CPT | Performed by: INTERNAL MEDICINE

## 2021-12-10 PROCEDURE — 99233 SBSQ HOSP IP/OBS HIGH 50: CPT | Mod: ,,, | Performed by: INTERNAL MEDICINE

## 2021-12-10 PROCEDURE — A4216 STERILE WATER/SALINE, 10 ML: HCPCS | Performed by: INTERNAL MEDICINE

## 2021-12-10 PROCEDURE — 99233 PR SUBSEQUENT HOSPITAL CARE,LEVL III: ICD-10-PCS | Mod: ,,, | Performed by: INTERNAL MEDICINE

## 2021-12-10 RX ADMIN — Medication 400 MG: at 09:12

## 2021-12-10 RX ADMIN — ACETAMINOPHEN 650 MG: 325 TABLET ORAL at 08:12

## 2021-12-10 RX ADMIN — DOBUTAMINE HYDROCHLORIDE 5 MCG/KG/MIN: 400 INJECTION INTRAVENOUS at 08:12

## 2021-12-10 RX ADMIN — EPOPROSTENOL 16 NG/KG/MIN: 0.5 INJECTION, POWDER, LYOPHILIZED, FOR SOLUTION INTRAVENOUS at 08:12

## 2021-12-10 RX ADMIN — Medication 10 ML: at 09:12

## 2021-12-10 RX ADMIN — BUSPIRONE HYDROCHLORIDE 5 MG: 5 TABLET ORAL at 08:12

## 2021-12-10 RX ADMIN — Medication 400 MG: at 08:12

## 2021-12-10 RX ADMIN — EPOPROSTENOL 16 NG/KG/MIN: 0.5 INJECTION, POWDER, LYOPHILIZED, FOR SOLUTION INTRAVENOUS at 05:12

## 2021-12-10 RX ADMIN — BUSPIRONE HYDROCHLORIDE 5 MG: 5 TABLET ORAL at 09:12

## 2021-12-10 RX ADMIN — OXYCODONE AND ACETAMINOPHEN 1 TABLET: 7.5; 325 TABLET ORAL at 10:12

## 2021-12-10 RX ADMIN — ONDANSETRON 8 MG: 8 TABLET, ORALLY DISINTEGRATING ORAL at 03:12

## 2021-12-10 RX ADMIN — OXYCODONE AND ACETAMINOPHEN 1 TABLET: 7.5; 325 TABLET ORAL at 08:12

## 2021-12-10 RX ADMIN — SPIRONOLACTONE 25 MG: 25 TABLET ORAL at 09:12

## 2021-12-11 LAB
ALBUMIN SERPL BCP-MCNC: 3.2 G/DL (ref 3.5–5.2)
ALLENS TEST: ABNORMAL
ALP SERPL-CCNC: 61 U/L (ref 55–135)
ALT SERPL W/O P-5'-P-CCNC: 32 U/L (ref 10–44)
ANION GAP SERPL CALC-SCNC: 9 MMOL/L (ref 8–16)
AST SERPL-CCNC: 27 U/L (ref 10–40)
BASOPHILS # BLD AUTO: 0.01 K/UL (ref 0–0.2)
BASOPHILS NFR BLD: 0.2 % (ref 0–1.9)
BILIRUB DIRECT SERPL-MCNC: 1.2 MG/DL (ref 0.1–0.3)
BILIRUB SERPL-MCNC: 3.5 MG/DL (ref 0.1–1)
BUN SERPL-MCNC: 12 MG/DL (ref 6–20)
CALCIUM SERPL-MCNC: 9.3 MG/DL (ref 8.7–10.5)
CHLORIDE SERPL-SCNC: 107 MMOL/L (ref 95–110)
CO2 SERPL-SCNC: 22 MMOL/L (ref 23–29)
CREAT SERPL-MCNC: 0.7 MG/DL (ref 0.5–1.4)
DELSYS: ABNORMAL
DIFFERENTIAL METHOD: ABNORMAL
EOSINOPHIL # BLD AUTO: 0 K/UL (ref 0–0.5)
EOSINOPHIL NFR BLD: 0.7 % (ref 0–8)
ERYTHROCYTE [DISTWIDTH] IN BLOOD BY AUTOMATED COUNT: 14.7 % (ref 11.5–14.5)
EST. GFR  (AFRICAN AMERICAN): >60 ML/MIN/1.73 M^2
EST. GFR  (NON AFRICAN AMERICAN): >60 ML/MIN/1.73 M^2
GLUCOSE SERPL-MCNC: 81 MG/DL (ref 70–110)
HCO3 UR-SCNC: 25.5 MMOL/L (ref 24–28)
HCT VFR BLD AUTO: 37.5 % (ref 37–48.5)
HGB BLD-MCNC: 13 G/DL (ref 12–16)
IMM GRANULOCYTES # BLD AUTO: 0.02 K/UL (ref 0–0.04)
IMM GRANULOCYTES NFR BLD AUTO: 0.4 % (ref 0–0.5)
LYMPHOCYTES # BLD AUTO: 1.5 K/UL (ref 1–4.8)
LYMPHOCYTES NFR BLD: 27.3 % (ref 18–48)
MAGNESIUM SERPL-MCNC: 1.8 MG/DL (ref 1.6–2.6)
MCH RBC QN AUTO: 32.8 PG (ref 27–31)
MCHC RBC AUTO-ENTMCNC: 34.7 G/DL (ref 32–36)
MCV RBC AUTO: 95 FL (ref 82–98)
MONOCYTES # BLD AUTO: 0.5 K/UL (ref 0.3–1)
MONOCYTES NFR BLD: 8.3 % (ref 4–15)
NEUTROPHILS # BLD AUTO: 3.5 K/UL (ref 1.8–7.7)
NEUTROPHILS NFR BLD: 63.1 % (ref 38–73)
NRBC BLD-RTO: 0 /100 WBC
PCO2 BLDA: 40.5 MMHG (ref 35–45)
PH SMN: 7.41 [PH] (ref 7.35–7.45)
PLATELET # BLD AUTO: 112 K/UL (ref 150–450)
PMV BLD AUTO: 10.3 FL (ref 9.2–12.9)
PO2 BLDA: 31 MMHG (ref 40–60)
POC BE: 1 MMOL/L
POC SATURATED O2: 60 % (ref 95–100)
POC TCO2: 27 MMOL/L (ref 24–29)
POTASSIUM SERPL-SCNC: 4 MMOL/L (ref 3.5–5.1)
PROT SERPL-MCNC: 5.6 G/DL (ref 6–8.4)
RBC # BLD AUTO: 3.96 M/UL (ref 4–5.4)
SAMPLE: ABNORMAL
SITE: ABNORMAL
SODIUM SERPL-SCNC: 138 MMOL/L (ref 136–145)
WBC # BLD AUTO: 5.53 K/UL (ref 3.9–12.7)

## 2021-12-11 PROCEDURE — 25000003 PHARM REV CODE 250: Performed by: INTERNAL MEDICINE

## 2021-12-11 PROCEDURE — 25000003 PHARM REV CODE 250: Performed by: STUDENT IN AN ORGANIZED HEALTH CARE EDUCATION/TRAINING PROGRAM

## 2021-12-11 PROCEDURE — 80048 BASIC METABOLIC PNL TOTAL CA: CPT | Performed by: STUDENT IN AN ORGANIZED HEALTH CARE EDUCATION/TRAINING PROGRAM

## 2021-12-11 PROCEDURE — 85025 COMPLETE CBC W/AUTO DIFF WBC: CPT | Performed by: INTERNAL MEDICINE

## 2021-12-11 PROCEDURE — 82803 BLOOD GASES ANY COMBINATION: CPT

## 2021-12-11 PROCEDURE — 63600175 PHARM REV CODE 636 W HCPCS: Performed by: STUDENT IN AN ORGANIZED HEALTH CARE EDUCATION/TRAINING PROGRAM

## 2021-12-11 PROCEDURE — 80076 HEPATIC FUNCTION PANEL: CPT | Performed by: INTERNAL MEDICINE

## 2021-12-11 PROCEDURE — 99233 PR SUBSEQUENT HOSPITAL CARE,LEVL III: ICD-10-PCS | Mod: ,,, | Performed by: INTERNAL MEDICINE

## 2021-12-11 PROCEDURE — 99233 SBSQ HOSP IP/OBS HIGH 50: CPT | Mod: ,,, | Performed by: INTERNAL MEDICINE

## 2021-12-11 PROCEDURE — 99900035 HC TECH TIME PER 15 MIN (STAT)

## 2021-12-11 PROCEDURE — 63600175 PHARM REV CODE 636 W HCPCS: Performed by: INTERNAL MEDICINE

## 2021-12-11 PROCEDURE — 83735 ASSAY OF MAGNESIUM: CPT | Performed by: INTERNAL MEDICINE

## 2021-12-11 PROCEDURE — 20600001 HC STEP DOWN PRIVATE ROOM

## 2021-12-11 RX ADMIN — ONDANSETRON 8 MG: 8 TABLET, ORALLY DISINTEGRATING ORAL at 06:12

## 2021-12-11 RX ADMIN — ACETAMINOPHEN 650 MG: 325 TABLET ORAL at 09:12

## 2021-12-11 RX ADMIN — OXYCODONE AND ACETAMINOPHEN 1 TABLET: 7.5; 325 TABLET ORAL at 05:12

## 2021-12-11 RX ADMIN — SPIRONOLACTONE 25 MG: 25 TABLET ORAL at 08:12

## 2021-12-11 RX ADMIN — EPOPROSTENOL 18 NG/KG/MIN: 1.5 INJECTION, POWDER, LYOPHILIZED, FOR SOLUTION INTRAVENOUS at 12:12

## 2021-12-11 RX ADMIN — Medication 400 MG: at 08:12

## 2021-12-11 RX ADMIN — ONDANSETRON 4 MG: 2 INJECTION INTRAMUSCULAR; INTRAVENOUS at 01:12

## 2021-12-11 RX ADMIN — SIMETHICONE 80 MG: 80 TABLET, CHEWABLE ORAL at 09:12

## 2021-12-11 RX ADMIN — BUSPIRONE HYDROCHLORIDE 5 MG: 5 TABLET ORAL at 08:12

## 2021-12-11 RX ADMIN — OXYCODONE AND ACETAMINOPHEN 1 TABLET: 7.5; 325 TABLET ORAL at 11:12

## 2021-12-11 RX ADMIN — OXYCODONE AND ACETAMINOPHEN 1 TABLET: 7.5; 325 TABLET ORAL at 12:12

## 2021-12-11 RX ADMIN — ONDANSETRON 8 MG: 8 TABLET, ORALLY DISINTEGRATING ORAL at 05:12

## 2021-12-11 RX ADMIN — DOBUTAMINE HYDROCHLORIDE 5 MCG/KG/MIN: 400 INJECTION INTRAVENOUS at 08:12

## 2021-12-12 LAB
ALBUMIN SERPL BCP-MCNC: 3.1 G/DL (ref 3.5–5.2)
ALLENS TEST: ABNORMAL
ALP SERPL-CCNC: 57 U/L (ref 55–135)
ALT SERPL W/O P-5'-P-CCNC: 28 U/L (ref 10–44)
ANION GAP SERPL CALC-SCNC: 10 MMOL/L (ref 8–16)
AST SERPL-CCNC: 24 U/L (ref 10–40)
BILIRUB DIRECT SERPL-MCNC: 1.1 MG/DL (ref 0.1–0.3)
BILIRUB SERPL-MCNC: 2.7 MG/DL (ref 0.1–1)
BUN SERPL-MCNC: 12 MG/DL (ref 6–20)
CALCIUM SERPL-MCNC: 8.8 MG/DL (ref 8.7–10.5)
CHLORIDE SERPL-SCNC: 108 MMOL/L (ref 95–110)
CO2 SERPL-SCNC: 20 MMOL/L (ref 23–29)
CREAT SERPL-MCNC: 0.8 MG/DL (ref 0.5–1.4)
DELSYS: ABNORMAL
EST. GFR  (AFRICAN AMERICAN): >60 ML/MIN/1.73 M^2
EST. GFR  (NON AFRICAN AMERICAN): >60 ML/MIN/1.73 M^2
GLUCOSE SERPL-MCNC: 106 MG/DL (ref 70–110)
HCO3 UR-SCNC: 23.3 MMOL/L (ref 24–28)
HCT VFR BLD CALC: 37 %PCV (ref 36–54)
MAGNESIUM SERPL-MCNC: 1.8 MG/DL (ref 1.6–2.6)
PCO2 BLDA: 37 MMHG (ref 35–45)
PH SMN: 7.41 [PH] (ref 7.35–7.45)
PO2 BLDA: 28 MMHG (ref 40–60)
POC BE: -1 MMOL/L
POC IONIZED CALCIUM: 1.31 MMOL/L (ref 1.06–1.42)
POC SATURATED O2: 55 % (ref 95–100)
POC TCO2: 24 MMOL/L (ref 24–29)
POTASSIUM BLD-SCNC: 3.9 MMOL/L (ref 3.5–5.1)
POTASSIUM SERPL-SCNC: 4.1 MMOL/L (ref 3.5–5.1)
PROT SERPL-MCNC: 5.2 G/DL (ref 6–8.4)
SAMPLE: ABNORMAL
SITE: ABNORMAL
SODIUM BLD-SCNC: 139 MMOL/L (ref 136–145)
SODIUM SERPL-SCNC: 138 MMOL/L (ref 136–145)
VERBAL RESULT READBACK PERFORMED: YES

## 2021-12-12 PROCEDURE — 25000003 PHARM REV CODE 250: Performed by: INTERNAL MEDICINE

## 2021-12-12 PROCEDURE — 63600175 PHARM REV CODE 636 W HCPCS: Performed by: INTERNAL MEDICINE

## 2021-12-12 PROCEDURE — 80076 HEPATIC FUNCTION PANEL: CPT | Performed by: INTERNAL MEDICINE

## 2021-12-12 PROCEDURE — 99233 SBSQ HOSP IP/OBS HIGH 50: CPT | Mod: ,,, | Performed by: INTERNAL MEDICINE

## 2021-12-12 PROCEDURE — 25000003 PHARM REV CODE 250: Performed by: STUDENT IN AN ORGANIZED HEALTH CARE EDUCATION/TRAINING PROGRAM

## 2021-12-12 PROCEDURE — 80048 BASIC METABOLIC PNL TOTAL CA: CPT | Performed by: STUDENT IN AN ORGANIZED HEALTH CARE EDUCATION/TRAINING PROGRAM

## 2021-12-12 PROCEDURE — 99233 PR SUBSEQUENT HOSPITAL CARE,LEVL III: ICD-10-PCS | Mod: ,,, | Performed by: INTERNAL MEDICINE

## 2021-12-12 PROCEDURE — A4216 STERILE WATER/SALINE, 10 ML: HCPCS | Performed by: INTERNAL MEDICINE

## 2021-12-12 PROCEDURE — 99900035 HC TECH TIME PER 15 MIN (STAT)

## 2021-12-12 PROCEDURE — 20600001 HC STEP DOWN PRIVATE ROOM

## 2021-12-12 PROCEDURE — 83735 ASSAY OF MAGNESIUM: CPT | Performed by: INTERNAL MEDICINE

## 2021-12-12 PROCEDURE — 82803 BLOOD GASES ANY COMBINATION: CPT

## 2021-12-12 PROCEDURE — 63600175 PHARM REV CODE 636 W HCPCS: Performed by: STUDENT IN AN ORGANIZED HEALTH CARE EDUCATION/TRAINING PROGRAM

## 2021-12-12 RX ORDER — FAMOTIDINE 10 MG/ML
20 INJECTION INTRAVENOUS ONCE
Status: COMPLETED | OUTPATIENT
Start: 2021-12-12 | End: 2021-12-12

## 2021-12-12 RX ORDER — SIMETHICONE 80 MG
1 TABLET,CHEWABLE ORAL
Status: DISCONTINUED | OUTPATIENT
Start: 2021-12-12 | End: 2021-12-23 | Stop reason: HOSPADM

## 2021-12-12 RX ORDER — FAMOTIDINE 20 MG/1
20 TABLET, FILM COATED ORAL 2 TIMES DAILY
Status: DISCONTINUED | OUTPATIENT
Start: 2021-12-12 | End: 2021-12-23 | Stop reason: HOSPADM

## 2021-12-12 RX ORDER — METOCLOPRAMIDE HYDROCHLORIDE 5 MG/ML
10 INJECTION INTRAMUSCULAR; INTRAVENOUS ONCE
Status: COMPLETED | OUTPATIENT
Start: 2021-12-12 | End: 2021-12-12

## 2021-12-12 RX ORDER — METOCLOPRAMIDE HYDROCHLORIDE 5 MG/ML
10 INJECTION INTRAMUSCULAR; INTRAVENOUS EVERY 6 HOURS PRN
Status: DISCONTINUED | OUTPATIENT
Start: 2021-12-12 | End: 2021-12-16

## 2021-12-12 RX ORDER — METOCLOPRAMIDE HYDROCHLORIDE 5 MG/ML
10 INJECTION INTRAMUSCULAR; INTRAVENOUS EVERY 8 HOURS PRN
Status: DISCONTINUED | OUTPATIENT
Start: 2021-12-12 | End: 2021-12-12

## 2021-12-12 RX ORDER — TALC
6 POWDER (GRAM) TOPICAL NIGHTLY PRN
Status: DISCONTINUED | OUTPATIENT
Start: 2021-12-12 | End: 2021-12-23 | Stop reason: HOSPADM

## 2021-12-12 RX ORDER — ALPRAZOLAM 0.25 MG/1
0.25 TABLET ORAL ONCE
Status: COMPLETED | OUTPATIENT
Start: 2021-12-13 | End: 2021-12-12

## 2021-12-12 RX ADMIN — Medication 10 ML: at 12:12

## 2021-12-12 RX ADMIN — DOBUTAMINE HYDROCHLORIDE 5 MCG/KG/MIN: 400 INJECTION INTRAVENOUS at 10:12

## 2021-12-12 RX ADMIN — EPOPROSTENOL 22 NG/KG/MIN: 1.5 INJECTION, POWDER, LYOPHILIZED, FOR SOLUTION INTRAVENOUS at 01:12

## 2021-12-12 RX ADMIN — Medication 400 MG: at 07:12

## 2021-12-12 RX ADMIN — Medication 400 MG: at 08:12

## 2021-12-12 RX ADMIN — SPIRONOLACTONE 25 MG: 25 TABLET ORAL at 07:12

## 2021-12-12 RX ADMIN — FAMOTIDINE 20 MG: 20 TABLET ORAL at 08:12

## 2021-12-12 RX ADMIN — OXYCODONE AND ACETAMINOPHEN 1 TABLET: 7.5; 325 TABLET ORAL at 06:12

## 2021-12-12 RX ADMIN — ALPRAZOLAM 0.25 MG: 0.25 TABLET ORAL at 11:12

## 2021-12-12 RX ADMIN — Medication 6 MG: at 08:12

## 2021-12-12 RX ADMIN — METOCLOPRAMIDE 10 MG: 5 INJECTION, SOLUTION INTRAMUSCULAR; INTRAVENOUS at 06:12

## 2021-12-12 RX ADMIN — SIMETHICONE 80 MG: 80 TABLET, CHEWABLE ORAL at 01:12

## 2021-12-12 RX ADMIN — BUSPIRONE HYDROCHLORIDE 5 MG: 5 TABLET ORAL at 07:12

## 2021-12-12 RX ADMIN — ONDANSETRON 8 MG: 8 TABLET, ORALLY DISINTEGRATING ORAL at 06:12

## 2021-12-12 RX ADMIN — EPOPROSTENOL 21 NG/KG/MIN: 1.5 INJECTION, POWDER, LYOPHILIZED, FOR SOLUTION INTRAVENOUS at 11:12

## 2021-12-12 RX ADMIN — ONDANSETRON 4 MG: 2 INJECTION INTRAMUSCULAR; INTRAVENOUS at 12:12

## 2021-12-12 RX ADMIN — METOCLOPRAMIDE 10 MG: 5 INJECTION, SOLUTION INTRAMUSCULAR; INTRAVENOUS at 09:12

## 2021-12-12 RX ADMIN — OXYCODONE AND ACETAMINOPHEN 1 TABLET: 7.5; 325 TABLET ORAL at 11:12

## 2021-12-12 RX ADMIN — BUSPIRONE HYDROCHLORIDE 5 MG: 5 TABLET ORAL at 08:12

## 2021-12-12 RX ADMIN — SIMETHICONE 80 MG: 80 TABLET, CHEWABLE ORAL at 05:12

## 2021-12-12 RX ADMIN — FAMOTIDINE 20 MG: 10 INJECTION INTRAVENOUS at 09:12

## 2021-12-13 LAB
ALBUMIN SERPL BCP-MCNC: 3.1 G/DL (ref 3.5–5.2)
ALLENS TEST: ABNORMAL
ALP SERPL-CCNC: 55 U/L (ref 55–135)
ALT SERPL W/O P-5'-P-CCNC: 28 U/L (ref 10–44)
ANION GAP SERPL CALC-SCNC: 10 MMOL/L (ref 8–16)
ASCENDING AORTA: 3.35 CM
AST SERPL-CCNC: 24 U/L (ref 10–40)
AV INDEX (PROSTH): 0.96
AV MEAN GRADIENT: 2 MMHG
AV PEAK GRADIENT: 4 MMHG
AV VALVE AREA: 3.25 CM2
AV VELOCITY RATIO: 0.8
BASOPHILS # BLD AUTO: 0.02 K/UL (ref 0–0.2)
BASOPHILS NFR BLD: 0.4 % (ref 0–1.9)
BILIRUB DIRECT SERPL-MCNC: 0.9 MG/DL (ref 0.1–0.3)
BILIRUB SERPL-MCNC: 2.7 MG/DL (ref 0.1–1)
BSA FOR ECHO PROCEDURE: 1.53 M2
BUN SERPL-MCNC: 11 MG/DL (ref 6–20)
CALCIUM SERPL-MCNC: 9.1 MG/DL (ref 8.7–10.5)
CHLORIDE SERPL-SCNC: 108 MMOL/L (ref 95–110)
CO2 SERPL-SCNC: 19 MMOL/L (ref 23–29)
CREAT SERPL-MCNC: 0.7 MG/DL (ref 0.5–1.4)
CV ECHO LV RWT: 0.7 CM
DIFFERENTIAL METHOD: ABNORMAL
DOP CALC AO PEAK VEL: 0.97 M/S
DOP CALC AO VTI: 11.42 CM
DOP CALC LVOT AREA: 3.4 CM2
DOP CALC LVOT DIAMETER: 2.08 CM
DOP CALC LVOT PEAK VEL: 0.78 M/S
DOP CALC LVOT STROKE VOLUME: 37.15 CM3
DOP CALCLVOT PEAK VEL VTI: 10.94 CM
E WAVE DECELERATION TIME: 85.57 MSEC
E/A RATIO: 0.64
E/E' RATIO: 5.68 M/S
ECHO LV POSTERIOR WALL: 1.15 CM (ref 0.6–1.1)
EJECTION FRACTION: 63 %
EOSINOPHIL # BLD AUTO: 0 K/UL (ref 0–0.5)
EOSINOPHIL NFR BLD: 0.6 % (ref 0–8)
ERYTHROCYTE [DISTWIDTH] IN BLOOD BY AUTOMATED COUNT: 15.1 % (ref 11.5–14.5)
EST. GFR  (AFRICAN AMERICAN): >60 ML/MIN/1.73 M^2
EST. GFR  (NON AFRICAN AMERICAN): >60 ML/MIN/1.73 M^2
FRACTIONAL SHORTENING: 25 % (ref 28–44)
GLUCOSE SERPL-MCNC: 126 MG/DL (ref 70–110)
HCO3 UR-SCNC: 22.1 MMOL/L (ref 24–28)
HCT VFR BLD AUTO: 34.8 % (ref 37–48.5)
HCT VFR BLD CALC: 37 %PCV (ref 36–54)
HGB BLD-MCNC: 12.1 G/DL (ref 12–16)
IMM GRANULOCYTES # BLD AUTO: 0.03 K/UL (ref 0–0.04)
IMM GRANULOCYTES NFR BLD AUTO: 0.6 % (ref 0–0.5)
INR PPP: 1.2 (ref 0.8–1.2)
INTERVENTRICULAR SEPTUM: 0.76 CM (ref 0.6–1.1)
IVRT: 146.53 MSEC
LA MAJOR: 4.94 CM
LA MINOR: 5.01 CM
LA WIDTH: 3.29 CM
LEFT ATRIUM SIZE: 2.59 CM
LEFT ATRIUM VOLUME INDEX MOD: 21.7 ML/M2
LEFT ATRIUM VOLUME INDEX: 23.6 ML/M2
LEFT ATRIUM VOLUME MOD: 33.23 CM3
LEFT ATRIUM VOLUME: 36.03 CM3
LEFT INTERNAL DIMENSION IN SYSTOLE: 2.46 CM (ref 2.1–4)
LEFT VENTRICLE DIASTOLIC VOLUME INDEX: 28.63 ML/M2
LEFT VENTRICLE DIASTOLIC VOLUME: 43.8 ML
LEFT VENTRICLE MASS INDEX: 57 G/M2
LEFT VENTRICLE SYSTOLIC VOLUME INDEX: 14 ML/M2
LEFT VENTRICLE SYSTOLIC VOLUME: 21.38 ML
LEFT VENTRICULAR INTERNAL DIMENSION IN DIASTOLE: 3.29 CM (ref 3.5–6)
LEFT VENTRICULAR MASS: 87.96 G
LV LATERAL E/E' RATIO: 4.91 M/S
LV SEPTAL E/E' RATIO: 6.75 M/S
LYMPHOCYTES # BLD AUTO: 1.7 K/UL (ref 1–4.8)
LYMPHOCYTES NFR BLD: 34.3 % (ref 18–48)
MAGNESIUM SERPL-MCNC: 1.7 MG/DL (ref 1.6–2.6)
MCH RBC QN AUTO: 33.1 PG (ref 27–31)
MCHC RBC AUTO-ENTMCNC: 34.8 G/DL (ref 32–36)
MCV RBC AUTO: 95 FL (ref 82–98)
MONOCYTES # BLD AUTO: 0.4 K/UL (ref 0.3–1)
MONOCYTES NFR BLD: 8.7 % (ref 4–15)
MV A" WAVE DURATION": 8.56 MSEC
MV PEAK A VEL: 0.85 M/S
MV PEAK E VEL: 0.54 M/S
MV STENOSIS PRESSURE HALF TIME: 24.82 MS
MV VALVE AREA P 1/2 METHOD: 8.86 CM2
NEUTROPHILS # BLD AUTO: 2.8 K/UL (ref 1.8–7.7)
NEUTROPHILS NFR BLD: 55.4 % (ref 38–73)
NRBC BLD-RTO: 0 /100 WBC
PCO2 BLDA: 35 MMHG (ref 35–45)
PH SMN: 7.41 [PH] (ref 7.35–7.45)
PISA TR MAX VEL: 5.18 M/S
PLATELET # BLD AUTO: 94 K/UL (ref 150–450)
PMV BLD AUTO: 11.2 FL (ref 9.2–12.9)
PO2 BLDA: 35 MMHG (ref 40–60)
POC BE: -3 MMOL/L
POC IONIZED CALCIUM: 1.32 MMOL/L (ref 1.06–1.42)
POC SATURATED O2: 69 % (ref 95–100)
POC TCO2: 23 MMOL/L (ref 24–29)
POTASSIUM BLD-SCNC: 3.8 MMOL/L (ref 3.5–5.1)
POTASSIUM SERPL-SCNC: 3.7 MMOL/L (ref 3.5–5.1)
PROT SERPL-MCNC: 5.6 G/DL (ref 6–8.4)
PROTHROMBIN TIME: 12.8 SEC (ref 9–12.5)
PROVIDER CREDENTIALS: ABNORMAL
PULM VEIN S/D RATIO: 2.06
PV PEAK D VEL: 0.31 M/S
PV PEAK S VEL: 0.64 M/S
RA MAJOR: 5.85 CM
RA PRESSURE: 15 MMHG
RA WIDTH: 5.63 CM
RBC # BLD AUTO: 3.66 M/UL (ref 4–5.4)
RIGHT VENTRICULAR END-DIASTOLIC DIMENSION: 5.2 CM
RV TISSUE DOPPLER FREE WALL SYSTOLIC VELOCITY 1 (APICAL 4 CHAMBER VIEW): 11.9 CM/S
SAMPLE: ABNORMAL
SINUS: 3.29 CM
SITE: ABNORMAL
SODIUM BLD-SCNC: 140 MMOL/L (ref 136–145)
SODIUM SERPL-SCNC: 137 MMOL/L (ref 136–145)
STJ: 2.99 CM
TDI LATERAL: 0.11 M/S
TDI SEPTAL: 0.08 M/S
TDI: 0.1 M/S
TR MAX PG: 107 MMHG
TRICUSPID ANNULAR PLANE SYSTOLIC EXCURSION: 1.19 CM
TV REST PULMONARY ARTERY PRESSURE: 122 MMHG
WBC # BLD AUTO: 4.96 K/UL (ref 3.9–12.7)

## 2021-12-13 PROCEDURE — 63600175 PHARM REV CODE 636 W HCPCS: Performed by: STUDENT IN AN ORGANIZED HEALTH CARE EDUCATION/TRAINING PROGRAM

## 2021-12-13 PROCEDURE — 20600001 HC STEP DOWN PRIVATE ROOM

## 2021-12-13 PROCEDURE — 25000003 PHARM REV CODE 250: Performed by: PHYSICIAN ASSISTANT

## 2021-12-13 PROCEDURE — 63600175 PHARM REV CODE 636 W HCPCS: Performed by: INTERNAL MEDICINE

## 2021-12-13 PROCEDURE — 99233 SBSQ HOSP IP/OBS HIGH 50: CPT | Mod: ,,, | Performed by: INTERNAL MEDICINE

## 2021-12-13 PROCEDURE — 25000003 PHARM REV CODE 250: Performed by: STUDENT IN AN ORGANIZED HEALTH CARE EDUCATION/TRAINING PROGRAM

## 2021-12-13 PROCEDURE — 25000003 PHARM REV CODE 250: Performed by: INTERNAL MEDICINE

## 2021-12-13 PROCEDURE — 80076 HEPATIC FUNCTION PANEL: CPT | Performed by: INTERNAL MEDICINE

## 2021-12-13 PROCEDURE — 83735 ASSAY OF MAGNESIUM: CPT | Performed by: STUDENT IN AN ORGANIZED HEALTH CARE EDUCATION/TRAINING PROGRAM

## 2021-12-13 PROCEDURE — 85610 PROTHROMBIN TIME: CPT | Performed by: STUDENT IN AN ORGANIZED HEALTH CARE EDUCATION/TRAINING PROGRAM

## 2021-12-13 PROCEDURE — 80048 BASIC METABOLIC PNL TOTAL CA: CPT | Performed by: STUDENT IN AN ORGANIZED HEALTH CARE EDUCATION/TRAINING PROGRAM

## 2021-12-13 PROCEDURE — 85025 COMPLETE CBC W/AUTO DIFF WBC: CPT | Performed by: STUDENT IN AN ORGANIZED HEALTH CARE EDUCATION/TRAINING PROGRAM

## 2021-12-13 PROCEDURE — 99233 PR SUBSEQUENT HOSPITAL CARE,LEVL III: ICD-10-PCS | Mod: ,,, | Performed by: INTERNAL MEDICINE

## 2021-12-13 RX ORDER — DOBUTAMINE HYDROCHLORIDE 400 MG/100ML
2.5 INJECTION INTRAVENOUS CONTINUOUS
Status: DISCONTINUED | OUTPATIENT
Start: 2021-12-13 | End: 2021-12-14

## 2021-12-13 RX ORDER — TALC
9 POWDER (GRAM) TOPICAL ONCE
Status: COMPLETED | OUTPATIENT
Start: 2021-12-13 | End: 2021-12-13

## 2021-12-13 RX ADMIN — ACETAMINOPHEN 650 MG: 325 TABLET ORAL at 08:12

## 2021-12-13 RX ADMIN — ONDANSETRON 4 MG: 2 INJECTION INTRAMUSCULAR; INTRAVENOUS at 09:12

## 2021-12-13 RX ADMIN — FAMOTIDINE 20 MG: 20 TABLET ORAL at 08:12

## 2021-12-13 RX ADMIN — OXYCODONE AND ACETAMINOPHEN 1 TABLET: 7.5; 325 TABLET ORAL at 09:12

## 2021-12-13 RX ADMIN — SPIRONOLACTONE 25 MG: 25 TABLET ORAL at 08:12

## 2021-12-13 RX ADMIN — ONDANSETRON 4 MG: 2 INJECTION INTRAMUSCULAR; INTRAVENOUS at 02:12

## 2021-12-13 RX ADMIN — DOBUTAMINE HYDROCHLORIDE 2.5 MCG/KG/MIN: 400 INJECTION INTRAVENOUS at 11:12

## 2021-12-13 RX ADMIN — LOPERAMIDE HYDROCHLORIDE 2 MG: 2 CAPSULE ORAL at 08:12

## 2021-12-13 RX ADMIN — Medication 9 MG: at 10:12

## 2021-12-13 RX ADMIN — OXYCODONE HYDROCHLORIDE AND ACETAMINOPHEN 1 TABLET: 5; 325 TABLET ORAL at 08:12

## 2021-12-13 RX ADMIN — SIMETHICONE 80 MG: 80 TABLET, CHEWABLE ORAL at 02:12

## 2021-12-13 RX ADMIN — Medication 400 MG: at 08:12

## 2021-12-13 RX ADMIN — LOPERAMIDE HYDROCHLORIDE 2 MG: 2 CAPSULE ORAL at 02:12

## 2021-12-13 RX ADMIN — SIMETHICONE 80 MG: 80 TABLET, CHEWABLE ORAL at 09:12

## 2021-12-13 RX ADMIN — OXYCODONE AND ACETAMINOPHEN 1 TABLET: 7.5; 325 TABLET ORAL at 02:12

## 2021-12-13 RX ADMIN — EPOPROSTENOL 26 NG/KG/MIN: 1.5 INJECTION, POWDER, LYOPHILIZED, FOR SOLUTION INTRAVENOUS at 02:12

## 2021-12-13 RX ADMIN — ONDANSETRON 4 MG: 2 INJECTION INTRAMUSCULAR; INTRAVENOUS at 05:12

## 2021-12-13 RX ADMIN — SIMETHICONE 80 MG: 80 TABLET, CHEWABLE ORAL at 08:12

## 2021-12-13 RX ADMIN — BUSPIRONE HYDROCHLORIDE 5 MG: 5 TABLET ORAL at 08:12

## 2021-12-13 RX ADMIN — EPOPROSTENOL 25 NG/KG/MIN: 1.5 INJECTION, POWDER, LYOPHILIZED, FOR SOLUTION INTRAVENOUS at 09:12

## 2021-12-14 LAB
ALBUMIN SERPL BCP-MCNC: 3.3 G/DL (ref 3.5–5.2)
ALLENS TEST: ABNORMAL
ALP SERPL-CCNC: 53 U/L (ref 55–135)
ALT SERPL W/O P-5'-P-CCNC: 28 U/L (ref 10–44)
ANION GAP SERPL CALC-SCNC: 9 MMOL/L (ref 8–16)
AST SERPL-CCNC: 28 U/L (ref 10–40)
BILIRUB DIRECT SERPL-MCNC: 1.1 MG/DL (ref 0.1–0.3)
BILIRUB SERPL-MCNC: 3.2 MG/DL (ref 0.1–1)
BUN SERPL-MCNC: 12 MG/DL (ref 6–20)
CALCIUM SERPL-MCNC: 9.4 MG/DL (ref 8.7–10.5)
CHLORIDE SERPL-SCNC: 108 MMOL/L (ref 95–110)
CO2 SERPL-SCNC: 20 MMOL/L (ref 23–29)
CREAT SERPL-MCNC: 0.7 MG/DL (ref 0.5–1.4)
DELSYS: ABNORMAL
EST. GFR  (AFRICAN AMERICAN): >60 ML/MIN/1.73 M^2
EST. GFR  (NON AFRICAN AMERICAN): >60 ML/MIN/1.73 M^2
GLUCOSE SERPL-MCNC: 85 MG/DL (ref 70–110)
HCO3 UR-SCNC: 23.2 MMOL/L (ref 24–28)
INR PPP: 1.2 (ref 0.8–1.2)
MAGNESIUM SERPL-MCNC: 1.8 MG/DL (ref 1.6–2.6)
PCO2 BLDA: 35.4 MMHG (ref 35–45)
PH SMN: 7.42 [PH] (ref 7.35–7.45)
PO2 BLDA: 35 MMHG (ref 40–60)
POC BE: -1 MMOL/L
POC SATURATED O2: 68 % (ref 95–100)
POC TCO2: 24 MMOL/L (ref 24–29)
POTASSIUM SERPL-SCNC: 3.9 MMOL/L (ref 3.5–5.1)
PROT SERPL-MCNC: 5.6 G/DL (ref 6–8.4)
PROTHROMBIN TIME: 13.1 SEC (ref 9–12.5)
SAMPLE: ABNORMAL
SARS-COV-2 RDRP RESP QL NAA+PROBE: NEGATIVE
SITE: ABNORMAL
SODIUM SERPL-SCNC: 137 MMOL/L (ref 136–145)

## 2021-12-14 PROCEDURE — 80076 HEPATIC FUNCTION PANEL: CPT | Performed by: INTERNAL MEDICINE

## 2021-12-14 PROCEDURE — 25000003 PHARM REV CODE 250: Performed by: INTERNAL MEDICINE

## 2021-12-14 PROCEDURE — 85610 PROTHROMBIN TIME: CPT | Performed by: STUDENT IN AN ORGANIZED HEALTH CARE EDUCATION/TRAINING PROGRAM

## 2021-12-14 PROCEDURE — 63600175 PHARM REV CODE 636 W HCPCS: Performed by: STUDENT IN AN ORGANIZED HEALTH CARE EDUCATION/TRAINING PROGRAM

## 2021-12-14 PROCEDURE — 63600175 PHARM REV CODE 636 W HCPCS: Performed by: INTERNAL MEDICINE

## 2021-12-14 PROCEDURE — 94761 N-INVAS EAR/PLS OXIMETRY MLT: CPT

## 2021-12-14 PROCEDURE — 80048 BASIC METABOLIC PNL TOTAL CA: CPT | Performed by: STUDENT IN AN ORGANIZED HEALTH CARE EDUCATION/TRAINING PROGRAM

## 2021-12-14 PROCEDURE — 83735 ASSAY OF MAGNESIUM: CPT | Performed by: STUDENT IN AN ORGANIZED HEALTH CARE EDUCATION/TRAINING PROGRAM

## 2021-12-14 PROCEDURE — 99233 PR SUBSEQUENT HOSPITAL CARE,LEVL III: ICD-10-PCS | Mod: ,,, | Performed by: INTERNAL MEDICINE

## 2021-12-14 PROCEDURE — 82803 BLOOD GASES ANY COMBINATION: CPT

## 2021-12-14 PROCEDURE — U0002 COVID-19 LAB TEST NON-CDC: HCPCS | Performed by: INTERNAL MEDICINE

## 2021-12-14 PROCEDURE — 25000003 PHARM REV CODE 250: Performed by: STUDENT IN AN ORGANIZED HEALTH CARE EDUCATION/TRAINING PROGRAM

## 2021-12-14 PROCEDURE — 99900035 HC TECH TIME PER 15 MIN (STAT)

## 2021-12-14 PROCEDURE — 20600001 HC STEP DOWN PRIVATE ROOM

## 2021-12-14 PROCEDURE — 99233 SBSQ HOSP IP/OBS HIGH 50: CPT | Mod: ,,, | Performed by: INTERNAL MEDICINE

## 2021-12-14 PROCEDURE — 63600175 PHARM REV CODE 636 W HCPCS: Performed by: RADIOLOGY

## 2021-12-14 RX ORDER — MIDAZOLAM HYDROCHLORIDE 1 MG/ML
INJECTION INTRAMUSCULAR; INTRAVENOUS CODE/TRAUMA/SEDATION MEDICATION
Status: DISCONTINUED | OUTPATIENT
Start: 2021-12-14 | End: 2021-12-15

## 2021-12-14 RX ORDER — CEFAZOLIN SODIUM 1 G/50ML
SOLUTION INTRAVENOUS
Status: DISCONTINUED | OUTPATIENT
Start: 2021-12-14 | End: 2021-12-15

## 2021-12-14 RX ORDER — FENTANYL CITRATE 50 UG/ML
INJECTION, SOLUTION INTRAMUSCULAR; INTRAVENOUS CODE/TRAUMA/SEDATION MEDICATION
Status: DISCONTINUED | OUTPATIENT
Start: 2021-12-14 | End: 2021-12-18

## 2021-12-14 RX ADMIN — CEFAZOLIN SODIUM 1 G: 1 SOLUTION INTRAVENOUS at 09:12

## 2021-12-14 RX ADMIN — EPOPROSTENOL 30 NG/KG/MIN: 1.5 INJECTION, POWDER, LYOPHILIZED, FOR SOLUTION INTRAVENOUS at 05:12

## 2021-12-14 RX ADMIN — ONDANSETRON 4 MG: 2 INJECTION INTRAMUSCULAR; INTRAVENOUS at 03:12

## 2021-12-14 RX ADMIN — ONDANSETRON 4 MG: 2 INJECTION INTRAMUSCULAR; INTRAVENOUS at 08:12

## 2021-12-14 RX ADMIN — BUSPIRONE HYDROCHLORIDE 5 MG: 5 TABLET ORAL at 08:12

## 2021-12-14 RX ADMIN — FAMOTIDINE 20 MG: 20 TABLET ORAL at 08:12

## 2021-12-14 RX ADMIN — Medication 400 MG: at 08:12

## 2021-12-14 RX ADMIN — SPIRONOLACTONE 25 MG: 25 TABLET ORAL at 08:12

## 2021-12-14 RX ADMIN — FENTANYL CITRATE 25 MCG: 50 INJECTION, SOLUTION INTRAMUSCULAR; INTRAVENOUS at 09:12

## 2021-12-14 RX ADMIN — MIDAZOLAM HYDROCHLORIDE 1 MG: 1 INJECTION, SOLUTION INTRAMUSCULAR; INTRAVENOUS at 09:12

## 2021-12-14 RX ADMIN — SIMETHICONE 80 MG: 80 TABLET, CHEWABLE ORAL at 08:12

## 2021-12-14 RX ADMIN — EPOPROSTENOL 29 NG/KG/MIN: 1.5 INJECTION, POWDER, LYOPHILIZED, FOR SOLUTION INTRAVENOUS at 03:12

## 2021-12-14 RX ADMIN — OXYCODONE HYDROCHLORIDE AND ACETAMINOPHEN 1 TABLET: 5; 325 TABLET ORAL at 10:12

## 2021-12-14 RX ADMIN — EPOPROSTENOL 29 NG/KG/MIN: 1.5 INJECTION, POWDER, LYOPHILIZED, FOR SOLUTION INTRAVENOUS at 11:12

## 2021-12-14 RX ADMIN — EPOPROSTENOL 28 NG/KG/MIN: 1.5 INJECTION, POWDER, LYOPHILIZED, FOR SOLUTION INTRAVENOUS at 02:12

## 2021-12-14 RX ADMIN — Medication 6 MG: at 10:12

## 2021-12-15 LAB
ALBUMIN SERPL BCP-MCNC: 3.2 G/DL (ref 3.5–5.2)
ALP SERPL-CCNC: 51 U/L (ref 55–135)
ALT SERPL W/O P-5'-P-CCNC: 24 U/L (ref 10–44)
ANION GAP SERPL CALC-SCNC: 10 MMOL/L (ref 8–16)
AST SERPL-CCNC: 23 U/L (ref 10–40)
BILIRUB DIRECT SERPL-MCNC: 0.9 MG/DL (ref 0.1–0.3)
BILIRUB SERPL-MCNC: 2.4 MG/DL (ref 0.1–1)
BUN SERPL-MCNC: 13 MG/DL (ref 6–20)
CALCIUM SERPL-MCNC: 9.1 MG/DL (ref 8.7–10.5)
CHLORIDE SERPL-SCNC: 106 MMOL/L (ref 95–110)
CO2 SERPL-SCNC: 21 MMOL/L (ref 23–29)
CREAT SERPL-MCNC: 0.7 MG/DL (ref 0.5–1.4)
EST. GFR  (AFRICAN AMERICAN): >60 ML/MIN/1.73 M^2
EST. GFR  (NON AFRICAN AMERICAN): >60 ML/MIN/1.73 M^2
GLUCOSE SERPL-MCNC: 90 MG/DL (ref 70–110)
INR PPP: 1.2 (ref 0.8–1.2)
MAGNESIUM SERPL-MCNC: 1.8 MG/DL (ref 1.6–2.6)
POTASSIUM SERPL-SCNC: 3.8 MMOL/L (ref 3.5–5.1)
PROT SERPL-MCNC: 5.5 G/DL (ref 6–8.4)
PROTHROMBIN TIME: 13.5 SEC (ref 9–12.5)
SODIUM SERPL-SCNC: 137 MMOL/L (ref 136–145)

## 2021-12-15 PROCEDURE — 63600175 PHARM REV CODE 636 W HCPCS: Performed by: INTERNAL MEDICINE

## 2021-12-15 PROCEDURE — 94761 N-INVAS EAR/PLS OXIMETRY MLT: CPT

## 2021-12-15 PROCEDURE — 63600175 PHARM REV CODE 636 W HCPCS: Mod: JG | Performed by: INTERNAL MEDICINE

## 2021-12-15 PROCEDURE — 99233 SBSQ HOSP IP/OBS HIGH 50: CPT | Mod: ,,, | Performed by: INTERNAL MEDICINE

## 2021-12-15 PROCEDURE — 20600001 HC STEP DOWN PRIVATE ROOM

## 2021-12-15 PROCEDURE — A4216 STERILE WATER/SALINE, 10 ML: HCPCS | Performed by: INTERNAL MEDICINE

## 2021-12-15 PROCEDURE — 85610 PROTHROMBIN TIME: CPT | Performed by: STUDENT IN AN ORGANIZED HEALTH CARE EDUCATION/TRAINING PROGRAM

## 2021-12-15 PROCEDURE — 25000003 PHARM REV CODE 250: Performed by: STUDENT IN AN ORGANIZED HEALTH CARE EDUCATION/TRAINING PROGRAM

## 2021-12-15 PROCEDURE — 80048 BASIC METABOLIC PNL TOTAL CA: CPT | Performed by: STUDENT IN AN ORGANIZED HEALTH CARE EDUCATION/TRAINING PROGRAM

## 2021-12-15 PROCEDURE — 83735 ASSAY OF MAGNESIUM: CPT | Performed by: STUDENT IN AN ORGANIZED HEALTH CARE EDUCATION/TRAINING PROGRAM

## 2021-12-15 PROCEDURE — 99233 PR SUBSEQUENT HOSPITAL CARE,LEVL III: ICD-10-PCS | Mod: ,,, | Performed by: INTERNAL MEDICINE

## 2021-12-15 PROCEDURE — 25000003 PHARM REV CODE 250: Performed by: INTERNAL MEDICINE

## 2021-12-15 PROCEDURE — 80076 HEPATIC FUNCTION PANEL: CPT | Performed by: INTERNAL MEDICINE

## 2021-12-15 RX ADMIN — BUSPIRONE HYDROCHLORIDE 5 MG: 5 TABLET ORAL at 08:12

## 2021-12-15 RX ADMIN — Medication 400 MG: at 08:12

## 2021-12-15 RX ADMIN — Medication 10 ML: at 01:12

## 2021-12-15 RX ADMIN — FAMOTIDINE 20 MG: 20 TABLET ORAL at 08:12

## 2021-12-15 RX ADMIN — FAMOTIDINE 20 MG: 20 TABLET ORAL at 09:12

## 2021-12-15 RX ADMIN — Medication 6 MG: at 10:12

## 2021-12-15 RX ADMIN — OXYCODONE HYDROCHLORIDE AND ACETAMINOPHEN 1 TABLET: 5; 325 TABLET ORAL at 10:12

## 2021-12-15 RX ADMIN — SIMETHICONE 80 MG: 80 TABLET, CHEWABLE ORAL at 09:12

## 2021-12-15 RX ADMIN — SPIRONOLACTONE 25 MG: 25 TABLET ORAL at 09:12

## 2021-12-15 RX ADMIN — Medication 10 ML: at 05:12

## 2021-12-15 RX ADMIN — ONDANSETRON 4 MG: 2 INJECTION INTRAMUSCULAR; INTRAVENOUS at 10:12

## 2021-12-15 RX ADMIN — BUSPIRONE HYDROCHLORIDE 5 MG: 5 TABLET ORAL at 09:12

## 2021-12-15 RX ADMIN — SIMETHICONE 80 MG: 80 TABLET, CHEWABLE ORAL at 01:12

## 2021-12-15 RX ADMIN — SIMETHICONE 80 MG: 80 TABLET, CHEWABLE ORAL at 08:12

## 2021-12-15 RX ADMIN — TREPROSTINIL 39 NG/KG/MIN: 100 INJECTION, SOLUTION INTRAVENOUS; SUBCUTANEOUS at 05:12

## 2021-12-15 RX ADMIN — Medication 400 MG: at 09:12

## 2021-12-16 LAB
ALBUMIN SERPL BCP-MCNC: 3.3 G/DL (ref 3.5–5.2)
ALP SERPL-CCNC: 53 U/L (ref 55–135)
ALT SERPL W/O P-5'-P-CCNC: 22 U/L (ref 10–44)
ANION GAP SERPL CALC-SCNC: 9 MMOL/L (ref 8–16)
AST SERPL-CCNC: 21 U/L (ref 10–40)
BILIRUB DIRECT SERPL-MCNC: 0.9 MG/DL (ref 0.1–0.3)
BILIRUB SERPL-MCNC: 2.3 MG/DL (ref 0.1–1)
BUN SERPL-MCNC: 15 MG/DL (ref 6–20)
CALCIUM SERPL-MCNC: 8.8 MG/DL (ref 8.7–10.5)
CHLORIDE SERPL-SCNC: 109 MMOL/L (ref 95–110)
CO2 SERPL-SCNC: 21 MMOL/L (ref 23–29)
CREAT SERPL-MCNC: 0.7 MG/DL (ref 0.5–1.4)
EST. GFR  (AFRICAN AMERICAN): >60 ML/MIN/1.73 M^2
EST. GFR  (NON AFRICAN AMERICAN): >60 ML/MIN/1.73 M^2
GLUCOSE SERPL-MCNC: 100 MG/DL (ref 70–110)
INR PPP: 1.3 (ref 0.8–1.2)
MAGNESIUM SERPL-MCNC: 1.7 MG/DL (ref 1.6–2.6)
POTASSIUM SERPL-SCNC: 3.7 MMOL/L (ref 3.5–5.1)
PROT SERPL-MCNC: 5.6 G/DL (ref 6–8.4)
PROTHROMBIN TIME: 14 SEC (ref 9–12.5)
SODIUM SERPL-SCNC: 139 MMOL/L (ref 136–145)

## 2021-12-16 PROCEDURE — 99900035 HC TECH TIME PER 15 MIN (STAT)

## 2021-12-16 PROCEDURE — 99233 SBSQ HOSP IP/OBS HIGH 50: CPT | Mod: ,,, | Performed by: INTERNAL MEDICINE

## 2021-12-16 PROCEDURE — A4216 STERILE WATER/SALINE, 10 ML: HCPCS | Performed by: INTERNAL MEDICINE

## 2021-12-16 PROCEDURE — 94761 N-INVAS EAR/PLS OXIMETRY MLT: CPT

## 2021-12-16 PROCEDURE — 25000003 PHARM REV CODE 250: Performed by: STUDENT IN AN ORGANIZED HEALTH CARE EDUCATION/TRAINING PROGRAM

## 2021-12-16 PROCEDURE — 80076 HEPATIC FUNCTION PANEL: CPT | Performed by: INTERNAL MEDICINE

## 2021-12-16 PROCEDURE — 83735 ASSAY OF MAGNESIUM: CPT | Performed by: STUDENT IN AN ORGANIZED HEALTH CARE EDUCATION/TRAINING PROGRAM

## 2021-12-16 PROCEDURE — 25000003 PHARM REV CODE 250: Performed by: INTERNAL MEDICINE

## 2021-12-16 PROCEDURE — 80048 BASIC METABOLIC PNL TOTAL CA: CPT | Performed by: STUDENT IN AN ORGANIZED HEALTH CARE EDUCATION/TRAINING PROGRAM

## 2021-12-16 PROCEDURE — 20600001 HC STEP DOWN PRIVATE ROOM

## 2021-12-16 PROCEDURE — 85610 PROTHROMBIN TIME: CPT | Performed by: STUDENT IN AN ORGANIZED HEALTH CARE EDUCATION/TRAINING PROGRAM

## 2021-12-16 PROCEDURE — 63600175 PHARM REV CODE 636 W HCPCS: Performed by: INTERNAL MEDICINE

## 2021-12-16 PROCEDURE — 63600175 PHARM REV CODE 636 W HCPCS: Performed by: STUDENT IN AN ORGANIZED HEALTH CARE EDUCATION/TRAINING PROGRAM

## 2021-12-16 PROCEDURE — 99233 PR SUBSEQUENT HOSPITAL CARE,LEVL III: ICD-10-PCS | Mod: ,,, | Performed by: INTERNAL MEDICINE

## 2021-12-16 RX ORDER — DIPHENHYDRAMINE HYDROCHLORIDE 50 MG/ML
25 INJECTION INTRAMUSCULAR; INTRAVENOUS ONCE
Status: COMPLETED | OUTPATIENT
Start: 2021-12-16 | End: 2021-12-16

## 2021-12-16 RX ADMIN — SIMETHICONE 80 MG: 80 TABLET, CHEWABLE ORAL at 08:12

## 2021-12-16 RX ADMIN — Medication 400 MG: at 08:12

## 2021-12-16 RX ADMIN — Medication 6 MG: at 08:12

## 2021-12-16 RX ADMIN — Medication 10 ML: at 06:12

## 2021-12-16 RX ADMIN — Medication 10 ML: at 12:12

## 2021-12-16 RX ADMIN — ACETAMINOPHEN 650 MG: 325 TABLET ORAL at 08:12

## 2021-12-16 RX ADMIN — Medication 10 ML: at 05:12

## 2021-12-16 RX ADMIN — TREPROSTINIL 39 NG/KG/MIN: 100 INJECTION, SOLUTION INTRAVENOUS; SUBCUTANEOUS at 05:12

## 2021-12-16 RX ADMIN — SIMETHICONE 80 MG: 80 TABLET, CHEWABLE ORAL at 02:12

## 2021-12-16 RX ADMIN — ACETAMINOPHEN 650 MG: 325 TABLET ORAL at 02:12

## 2021-12-16 RX ADMIN — BUSPIRONE HYDROCHLORIDE 5 MG: 5 TABLET ORAL at 08:12

## 2021-12-16 RX ADMIN — ONDANSETRON 4 MG: 2 INJECTION INTRAMUSCULAR; INTRAVENOUS at 08:12

## 2021-12-16 RX ADMIN — FAMOTIDINE 20 MG: 20 TABLET ORAL at 08:12

## 2021-12-16 RX ADMIN — DIPHENHYDRAMINE HYDROCHLORIDE 25 MG: 50 INJECTION INTRAMUSCULAR; INTRAVENOUS at 03:12

## 2021-12-16 RX ADMIN — OXYCODONE AND ACETAMINOPHEN 1 TABLET: 7.5; 325 TABLET ORAL at 08:12

## 2021-12-16 RX ADMIN — METOCLOPRAMIDE 10 MG: 5 INJECTION, SOLUTION INTRAMUSCULAR; INTRAVENOUS at 02:12

## 2021-12-16 RX ADMIN — SPIRONOLACTONE 25 MG: 25 TABLET ORAL at 08:12

## 2021-12-17 LAB
ALBUMIN SERPL BCP-MCNC: 3.3 G/DL (ref 3.5–5.2)
ALP SERPL-CCNC: 53 U/L (ref 55–135)
ALT SERPL W/O P-5'-P-CCNC: 23 U/L (ref 10–44)
ANION GAP SERPL CALC-SCNC: 10 MMOL/L (ref 8–16)
AST SERPL-CCNC: 24 U/L (ref 10–40)
BILIRUB DIRECT SERPL-MCNC: 0.8 MG/DL (ref 0.1–0.3)
BILIRUB SERPL-MCNC: 2.2 MG/DL (ref 0.1–1)
BUN SERPL-MCNC: 12 MG/DL (ref 6–20)
CALCIUM SERPL-MCNC: 8.8 MG/DL (ref 8.7–10.5)
CHLORIDE SERPL-SCNC: 105 MMOL/L (ref 95–110)
CO2 SERPL-SCNC: 20 MMOL/L (ref 23–29)
CREAT SERPL-MCNC: 0.8 MG/DL (ref 0.5–1.4)
EST. GFR  (AFRICAN AMERICAN): >60 ML/MIN/1.73 M^2
EST. GFR  (NON AFRICAN AMERICAN): >60 ML/MIN/1.73 M^2
GLUCOSE SERPL-MCNC: 102 MG/DL (ref 70–110)
INR PPP: 1.3 (ref 0.8–1.2)
MAGNESIUM SERPL-MCNC: 1.7 MG/DL (ref 1.6–2.6)
POTASSIUM SERPL-SCNC: 3.6 MMOL/L (ref 3.5–5.1)
PROT SERPL-MCNC: 5.6 G/DL (ref 6–8.4)
PROTHROMBIN TIME: 13.8 SEC (ref 9–12.5)
SODIUM SERPL-SCNC: 135 MMOL/L (ref 136–145)

## 2021-12-17 PROCEDURE — 63600175 PHARM REV CODE 636 W HCPCS: Performed by: INTERNAL MEDICINE

## 2021-12-17 PROCEDURE — 80076 HEPATIC FUNCTION PANEL: CPT | Performed by: INTERNAL MEDICINE

## 2021-12-17 PROCEDURE — 80048 BASIC METABOLIC PNL TOTAL CA: CPT | Performed by: STUDENT IN AN ORGANIZED HEALTH CARE EDUCATION/TRAINING PROGRAM

## 2021-12-17 PROCEDURE — 25000003 PHARM REV CODE 250: Performed by: STUDENT IN AN ORGANIZED HEALTH CARE EDUCATION/TRAINING PROGRAM

## 2021-12-17 PROCEDURE — A4216 STERILE WATER/SALINE, 10 ML: HCPCS | Performed by: INTERNAL MEDICINE

## 2021-12-17 PROCEDURE — 63600175 PHARM REV CODE 636 W HCPCS: Mod: JG | Performed by: INTERNAL MEDICINE

## 2021-12-17 PROCEDURE — 25000003 PHARM REV CODE 250: Performed by: INTERNAL MEDICINE

## 2021-12-17 PROCEDURE — 99233 PR SUBSEQUENT HOSPITAL CARE,LEVL III: ICD-10-PCS | Mod: ,,, | Performed by: INTERNAL MEDICINE

## 2021-12-17 PROCEDURE — 83735 ASSAY OF MAGNESIUM: CPT | Performed by: STUDENT IN AN ORGANIZED HEALTH CARE EDUCATION/TRAINING PROGRAM

## 2021-12-17 PROCEDURE — 20600001 HC STEP DOWN PRIVATE ROOM

## 2021-12-17 PROCEDURE — 85610 PROTHROMBIN TIME: CPT | Performed by: STUDENT IN AN ORGANIZED HEALTH CARE EDUCATION/TRAINING PROGRAM

## 2021-12-17 PROCEDURE — 99233 SBSQ HOSP IP/OBS HIGH 50: CPT | Mod: ,,, | Performed by: INTERNAL MEDICINE

## 2021-12-17 PROCEDURE — 94761 N-INVAS EAR/PLS OXIMETRY MLT: CPT

## 2021-12-17 PROCEDURE — 99900035 HC TECH TIME PER 15 MIN (STAT)

## 2021-12-17 RX ORDER — HYDROXYZINE HYDROCHLORIDE 25 MG/1
25 TABLET, FILM COATED ORAL 3 TIMES DAILY PRN
Status: COMPLETED | OUTPATIENT
Start: 2021-12-17 | End: 2021-12-18

## 2021-12-17 RX ADMIN — Medication 6 MG: at 09:12

## 2021-12-17 RX ADMIN — ONDANSETRON 4 MG: 2 INJECTION INTRAMUSCULAR; INTRAVENOUS at 10:12

## 2021-12-17 RX ADMIN — Medication 10 ML: at 12:12

## 2021-12-17 RX ADMIN — Medication 10 ML: at 06:12

## 2021-12-17 RX ADMIN — OXYCODONE AND ACETAMINOPHEN 1 TABLET: 7.5; 325 TABLET ORAL at 02:12

## 2021-12-17 RX ADMIN — HYDROXYZINE HYDROCHLORIDE 25 MG: 25 TABLET ORAL at 03:12

## 2021-12-17 RX ADMIN — FAMOTIDINE 20 MG: 20 TABLET ORAL at 09:12

## 2021-12-17 RX ADMIN — SIMETHICONE 80 MG: 80 TABLET, CHEWABLE ORAL at 08:12

## 2021-12-17 RX ADMIN — HYDROXYZINE HYDROCHLORIDE 25 MG: 25 TABLET ORAL at 09:12

## 2021-12-17 RX ADMIN — OXYCODONE AND ACETAMINOPHEN 1 TABLET: 7.5; 325 TABLET ORAL at 09:12

## 2021-12-17 RX ADMIN — FAMOTIDINE 20 MG: 20 TABLET ORAL at 08:12

## 2021-12-17 RX ADMIN — BUSPIRONE HYDROCHLORIDE 5 MG: 5 TABLET ORAL at 09:12

## 2021-12-17 RX ADMIN — Medication 400 MG: at 08:12

## 2021-12-17 RX ADMIN — TREPROSTINIL 40 NG/KG/MIN: 100 INJECTION, SOLUTION INTRAVENOUS; SUBCUTANEOUS at 05:12

## 2021-12-17 RX ADMIN — Medication 400 MG: at 09:12

## 2021-12-17 RX ADMIN — SPIRONOLACTONE 25 MG: 25 TABLET ORAL at 08:12

## 2021-12-17 RX ADMIN — BUSPIRONE HYDROCHLORIDE 5 MG: 5 TABLET ORAL at 08:12

## 2021-12-17 RX ADMIN — SIMETHICONE 80 MG: 80 TABLET, CHEWABLE ORAL at 05:12

## 2021-12-18 LAB
ALBUMIN SERPL BCP-MCNC: 3.3 G/DL (ref 3.5–5.2)
ALP SERPL-CCNC: 53 U/L (ref 55–135)
ALT SERPL W/O P-5'-P-CCNC: 25 U/L (ref 10–44)
ANION GAP SERPL CALC-SCNC: 5 MMOL/L (ref 8–16)
AST SERPL-CCNC: 28 U/L (ref 10–40)
BACTERIA #/AREA URNS AUTO: NORMAL /HPF
BILIRUB DIRECT SERPL-MCNC: 0.8 MG/DL (ref 0.1–0.3)
BILIRUB SERPL-MCNC: 1.9 MG/DL (ref 0.1–1)
BILIRUB UR QL STRIP: NEGATIVE
BUN SERPL-MCNC: 14 MG/DL (ref 6–20)
CALCIUM SERPL-MCNC: 9 MG/DL (ref 8.7–10.5)
CHLORIDE SERPL-SCNC: 106 MMOL/L (ref 95–110)
CLARITY UR REFRACT.AUTO: ABNORMAL
CO2 SERPL-SCNC: 26 MMOL/L (ref 23–29)
COLOR UR AUTO: ABNORMAL
CREAT SERPL-MCNC: 0.8 MG/DL (ref 0.5–1.4)
EST. GFR  (AFRICAN AMERICAN): >60 ML/MIN/1.73 M^2
EST. GFR  (NON AFRICAN AMERICAN): >60 ML/MIN/1.73 M^2
GLUCOSE SERPL-MCNC: 108 MG/DL (ref 70–110)
GLUCOSE UR QL STRIP: NEGATIVE
HGB UR QL STRIP: NEGATIVE
HYALINE CASTS UR QL AUTO: 0 /LPF
INR PPP: 1.2 (ref 0.8–1.2)
KETONES UR QL STRIP: NEGATIVE
LEUKOCYTE ESTERASE UR QL STRIP: NEGATIVE
MAGNESIUM SERPL-MCNC: 1.8 MG/DL (ref 1.6–2.6)
MICROSCOPIC COMMENT: NORMAL
NITRITE UR QL STRIP: NEGATIVE
PH UR STRIP: 5 [PH] (ref 5–8)
POTASSIUM SERPL-SCNC: 3.8 MMOL/L (ref 3.5–5.1)
PROT SERPL-MCNC: 5.5 G/DL (ref 6–8.4)
PROT UR QL STRIP: ABNORMAL
PROTHROMBIN TIME: 13.1 SEC (ref 9–12.5)
RBC #/AREA URNS AUTO: 1 /HPF (ref 0–4)
SODIUM SERPL-SCNC: 137 MMOL/L (ref 136–145)
SP GR UR STRIP: >1.03 (ref 1–1.03)
SQUAMOUS #/AREA URNS AUTO: 2 /HPF
URN SPEC COLLECT METH UR: ABNORMAL
WBC #/AREA URNS AUTO: 3 /HPF (ref 0–5)

## 2021-12-18 PROCEDURE — 63600175 PHARM REV CODE 636 W HCPCS: Mod: JG | Performed by: INTERNAL MEDICINE

## 2021-12-18 PROCEDURE — 85610 PROTHROMBIN TIME: CPT | Performed by: STUDENT IN AN ORGANIZED HEALTH CARE EDUCATION/TRAINING PROGRAM

## 2021-12-18 PROCEDURE — 80048 BASIC METABOLIC PNL TOTAL CA: CPT | Performed by: STUDENT IN AN ORGANIZED HEALTH CARE EDUCATION/TRAINING PROGRAM

## 2021-12-18 PROCEDURE — 25000003 PHARM REV CODE 250: Performed by: STUDENT IN AN ORGANIZED HEALTH CARE EDUCATION/TRAINING PROGRAM

## 2021-12-18 PROCEDURE — 63600175 PHARM REV CODE 636 W HCPCS: Performed by: INTERNAL MEDICINE

## 2021-12-18 PROCEDURE — 25000003 PHARM REV CODE 250: Performed by: INTERNAL MEDICINE

## 2021-12-18 PROCEDURE — 80076 HEPATIC FUNCTION PANEL: CPT | Performed by: INTERNAL MEDICINE

## 2021-12-18 PROCEDURE — 99233 PR SUBSEQUENT HOSPITAL CARE,LEVL III: ICD-10-PCS | Mod: ,,, | Performed by: INTERNAL MEDICINE

## 2021-12-18 PROCEDURE — 99233 SBSQ HOSP IP/OBS HIGH 50: CPT | Mod: ,,, | Performed by: INTERNAL MEDICINE

## 2021-12-18 PROCEDURE — 81001 URINALYSIS AUTO W/SCOPE: CPT | Performed by: STUDENT IN AN ORGANIZED HEALTH CARE EDUCATION/TRAINING PROGRAM

## 2021-12-18 PROCEDURE — 20600001 HC STEP DOWN PRIVATE ROOM

## 2021-12-18 PROCEDURE — 83735 ASSAY OF MAGNESIUM: CPT | Performed by: STUDENT IN AN ORGANIZED HEALTH CARE EDUCATION/TRAINING PROGRAM

## 2021-12-18 PROCEDURE — A4216 STERILE WATER/SALINE, 10 ML: HCPCS | Performed by: INTERNAL MEDICINE

## 2021-12-18 RX ORDER — HYDROXYZINE HYDROCHLORIDE 25 MG/1
25 TABLET, FILM COATED ORAL 3 TIMES DAILY PRN
Status: DISCONTINUED | OUTPATIENT
Start: 2021-12-18 | End: 2021-12-23 | Stop reason: HOSPADM

## 2021-12-18 RX ADMIN — OXYCODONE AND ACETAMINOPHEN 1 TABLET: 7.5; 325 TABLET ORAL at 10:12

## 2021-12-18 RX ADMIN — SIMETHICONE 80 MG: 80 TABLET, CHEWABLE ORAL at 08:12

## 2021-12-18 RX ADMIN — FAMOTIDINE 20 MG: 20 TABLET ORAL at 08:12

## 2021-12-18 RX ADMIN — HYDROXYZINE HYDROCHLORIDE 25 MG: 25 TABLET ORAL at 02:12

## 2021-12-18 RX ADMIN — BUSPIRONE HYDROCHLORIDE 5 MG: 5 TABLET ORAL at 08:12

## 2021-12-18 RX ADMIN — Medication 400 MG: at 08:12

## 2021-12-18 RX ADMIN — Medication 10 ML: at 06:12

## 2021-12-18 RX ADMIN — OXYCODONE AND ACETAMINOPHEN 1 TABLET: 7.5; 325 TABLET ORAL at 06:12

## 2021-12-18 RX ADMIN — SPIRONOLACTONE 25 MG: 25 TABLET ORAL at 08:12

## 2021-12-18 RX ADMIN — TREPROSTINIL 41 NG/KG/MIN: 100 INJECTION, SOLUTION INTRAVENOUS; SUBCUTANEOUS at 04:12

## 2021-12-18 RX ADMIN — ONDANSETRON 4 MG: 2 INJECTION INTRAMUSCULAR; INTRAVENOUS at 08:12

## 2021-12-18 RX ADMIN — Medication: at 04:12

## 2021-12-18 RX ADMIN — SIMETHICONE 80 MG: 80 TABLET, CHEWABLE ORAL at 04:12

## 2021-12-18 RX ADMIN — ACETAMINOPHEN 650 MG: 325 TABLET ORAL at 04:12

## 2021-12-18 RX ADMIN — HYDROXYZINE HYDROCHLORIDE 25 MG: 25 TABLET ORAL at 08:12

## 2021-12-18 RX ADMIN — Medication 6 MG: at 08:12

## 2021-12-18 RX ADMIN — Medication 10 ML: at 12:12

## 2021-12-18 RX ADMIN — LOPERAMIDE HYDROCHLORIDE 2 MG: 2 CAPSULE ORAL at 10:12

## 2021-12-18 RX ADMIN — ACETAMINOPHEN 650 MG: 325 TABLET ORAL at 08:12

## 2021-12-18 RX ADMIN — SIMETHICONE 80 MG: 80 TABLET, CHEWABLE ORAL at 02:12

## 2021-12-19 LAB
ALBUMIN SERPL BCP-MCNC: 3.4 G/DL (ref 3.5–5.2)
ALP SERPL-CCNC: 54 U/L (ref 55–135)
ALT SERPL W/O P-5'-P-CCNC: 24 U/L (ref 10–44)
ANION GAP SERPL CALC-SCNC: 9 MMOL/L (ref 8–16)
AST SERPL-CCNC: 24 U/L (ref 10–40)
BILIRUB DIRECT SERPL-MCNC: 0.7 MG/DL (ref 0.1–0.3)
BILIRUB SERPL-MCNC: 1.7 MG/DL (ref 0.1–1)
BUN SERPL-MCNC: 14 MG/DL (ref 6–20)
CALCIUM SERPL-MCNC: 9.1 MG/DL (ref 8.7–10.5)
CHLORIDE SERPL-SCNC: 105 MMOL/L (ref 95–110)
CO2 SERPL-SCNC: 23 MMOL/L (ref 23–29)
CREAT SERPL-MCNC: 0.8 MG/DL (ref 0.5–1.4)
EST. GFR  (AFRICAN AMERICAN): >60 ML/MIN/1.73 M^2
EST. GFR  (NON AFRICAN AMERICAN): >60 ML/MIN/1.73 M^2
GLUCOSE SERPL-MCNC: 108 MG/DL (ref 70–110)
INR PPP: 1.2 (ref 0.8–1.2)
MAGNESIUM SERPL-MCNC: 1.8 MG/DL (ref 1.6–2.6)
POTASSIUM SERPL-SCNC: 3.7 MMOL/L (ref 3.5–5.1)
PROT SERPL-MCNC: 5.8 G/DL (ref 6–8.4)
PROTHROMBIN TIME: 12.7 SEC (ref 9–12.5)
SODIUM SERPL-SCNC: 137 MMOL/L (ref 136–145)

## 2021-12-19 PROCEDURE — 63600175 PHARM REV CODE 636 W HCPCS: Mod: JG | Performed by: INTERNAL MEDICINE

## 2021-12-19 PROCEDURE — 20600001 HC STEP DOWN PRIVATE ROOM

## 2021-12-19 PROCEDURE — 25000003 PHARM REV CODE 250: Performed by: INTERNAL MEDICINE

## 2021-12-19 PROCEDURE — 80048 BASIC METABOLIC PNL TOTAL CA: CPT | Performed by: STUDENT IN AN ORGANIZED HEALTH CARE EDUCATION/TRAINING PROGRAM

## 2021-12-19 PROCEDURE — 83735 ASSAY OF MAGNESIUM: CPT | Performed by: STUDENT IN AN ORGANIZED HEALTH CARE EDUCATION/TRAINING PROGRAM

## 2021-12-19 PROCEDURE — 99233 PR SUBSEQUENT HOSPITAL CARE,LEVL III: ICD-10-PCS | Mod: ,,, | Performed by: INTERNAL MEDICINE

## 2021-12-19 PROCEDURE — 85610 PROTHROMBIN TIME: CPT | Performed by: STUDENT IN AN ORGANIZED HEALTH CARE EDUCATION/TRAINING PROGRAM

## 2021-12-19 PROCEDURE — 63600175 PHARM REV CODE 636 W HCPCS: Performed by: INTERNAL MEDICINE

## 2021-12-19 PROCEDURE — 25000003 PHARM REV CODE 250: Performed by: STUDENT IN AN ORGANIZED HEALTH CARE EDUCATION/TRAINING PROGRAM

## 2021-12-19 PROCEDURE — 99233 SBSQ HOSP IP/OBS HIGH 50: CPT | Mod: ,,, | Performed by: INTERNAL MEDICINE

## 2021-12-19 PROCEDURE — 80076 HEPATIC FUNCTION PANEL: CPT | Performed by: INTERNAL MEDICINE

## 2021-12-19 RX ADMIN — Medication 400 MG: at 08:12

## 2021-12-19 RX ADMIN — HYDROXYZINE HYDROCHLORIDE 25 MG: 25 TABLET ORAL at 01:12

## 2021-12-19 RX ADMIN — FAMOTIDINE 20 MG: 20 TABLET ORAL at 08:12

## 2021-12-19 RX ADMIN — SPIRONOLACTONE 25 MG: 25 TABLET ORAL at 08:12

## 2021-12-19 RX ADMIN — SIMETHICONE 80 MG: 80 TABLET, CHEWABLE ORAL at 08:12

## 2021-12-19 RX ADMIN — BUSPIRONE HYDROCHLORIDE 5 MG: 5 TABLET ORAL at 08:12

## 2021-12-19 RX ADMIN — HYDROXYZINE HYDROCHLORIDE 25 MG: 25 TABLET ORAL at 10:12

## 2021-12-19 RX ADMIN — OXYCODONE HYDROCHLORIDE AND ACETAMINOPHEN 1 TABLET: 5; 325 TABLET ORAL at 10:12

## 2021-12-19 RX ADMIN — OXYCODONE HYDROCHLORIDE AND ACETAMINOPHEN 1 TABLET: 5; 325 TABLET ORAL at 01:12

## 2021-12-19 RX ADMIN — SIMETHICONE 80 MG: 80 TABLET, CHEWABLE ORAL at 01:12

## 2021-12-19 RX ADMIN — ONDANSETRON 4 MG: 2 INJECTION INTRAMUSCULAR; INTRAVENOUS at 01:12

## 2021-12-19 RX ADMIN — HYDROXYZINE HYDROCHLORIDE 25 MG: 25 TABLET ORAL at 05:12

## 2021-12-19 RX ADMIN — Medication 6 MG: at 10:12

## 2021-12-19 RX ADMIN — TREPROSTINIL 43 NG/KG/MIN: 100 INJECTION, SOLUTION INTRAVENOUS; SUBCUTANEOUS at 05:12

## 2021-12-19 RX ADMIN — OXYCODONE AND ACETAMINOPHEN 1 TABLET: 7.5; 325 TABLET ORAL at 04:12

## 2021-12-20 LAB
ALBUMIN SERPL BCP-MCNC: 3.3 G/DL (ref 3.5–5.2)
ALP SERPL-CCNC: 52 U/L (ref 55–135)
ALT SERPL W/O P-5'-P-CCNC: 22 U/L (ref 10–44)
ANION GAP SERPL CALC-SCNC: 6 MMOL/L (ref 8–16)
AST SERPL-CCNC: 23 U/L (ref 10–40)
BILIRUB DIRECT SERPL-MCNC: 0.5 MG/DL (ref 0.1–0.3)
BILIRUB SERPL-MCNC: 1.4 MG/DL (ref 0.1–1)
BUN SERPL-MCNC: 12 MG/DL (ref 6–20)
CALCIUM SERPL-MCNC: 9.1 MG/DL (ref 8.7–10.5)
CHLORIDE SERPL-SCNC: 107 MMOL/L (ref 95–110)
CO2 SERPL-SCNC: 25 MMOL/L (ref 23–29)
CREAT SERPL-MCNC: 0.8 MG/DL (ref 0.5–1.4)
EST. GFR  (AFRICAN AMERICAN): >60 ML/MIN/1.73 M^2
EST. GFR  (NON AFRICAN AMERICAN): >60 ML/MIN/1.73 M^2
GLUCOSE SERPL-MCNC: 116 MG/DL (ref 70–110)
INR PPP: 1.2 (ref 0.8–1.2)
MAGNESIUM SERPL-MCNC: 1.7 MG/DL (ref 1.6–2.6)
POTASSIUM SERPL-SCNC: 3.8 MMOL/L (ref 3.5–5.1)
PROT SERPL-MCNC: 5.7 G/DL (ref 6–8.4)
PROTHROMBIN TIME: 12.8 SEC (ref 9–12.5)
SODIUM SERPL-SCNC: 138 MMOL/L (ref 136–145)

## 2021-12-20 PROCEDURE — 99233 PR SUBSEQUENT HOSPITAL CARE,LEVL III: ICD-10-PCS | Mod: ,,, | Performed by: INTERNAL MEDICINE

## 2021-12-20 PROCEDURE — 25000003 PHARM REV CODE 250: Performed by: INTERNAL MEDICINE

## 2021-12-20 PROCEDURE — 99233 SBSQ HOSP IP/OBS HIGH 50: CPT | Mod: ,,, | Performed by: INTERNAL MEDICINE

## 2021-12-20 PROCEDURE — A4216 STERILE WATER/SALINE, 10 ML: HCPCS | Performed by: INTERNAL MEDICINE

## 2021-12-20 PROCEDURE — 83735 ASSAY OF MAGNESIUM: CPT | Performed by: STUDENT IN AN ORGANIZED HEALTH CARE EDUCATION/TRAINING PROGRAM

## 2021-12-20 PROCEDURE — 20600001 HC STEP DOWN PRIVATE ROOM

## 2021-12-20 PROCEDURE — 25000003 PHARM REV CODE 250: Performed by: STUDENT IN AN ORGANIZED HEALTH CARE EDUCATION/TRAINING PROGRAM

## 2021-12-20 PROCEDURE — 80048 BASIC METABOLIC PNL TOTAL CA: CPT | Performed by: STUDENT IN AN ORGANIZED HEALTH CARE EDUCATION/TRAINING PROGRAM

## 2021-12-20 PROCEDURE — 80076 HEPATIC FUNCTION PANEL: CPT | Performed by: INTERNAL MEDICINE

## 2021-12-20 PROCEDURE — 99233 SBSQ HOSP IP/OBS HIGH 50: CPT | Mod: ,,, | Performed by: NURSE PRACTITIONER

## 2021-12-20 PROCEDURE — 63600175 PHARM REV CODE 636 W HCPCS: Mod: JG | Performed by: INTERNAL MEDICINE

## 2021-12-20 PROCEDURE — 99233 PR SUBSEQUENT HOSPITAL CARE,LEVL III: ICD-10-PCS | Mod: ,,, | Performed by: NURSE PRACTITIONER

## 2021-12-20 PROCEDURE — 85610 PROTHROMBIN TIME: CPT | Performed by: STUDENT IN AN ORGANIZED HEALTH CARE EDUCATION/TRAINING PROGRAM

## 2021-12-20 RX ADMIN — Medication 10 ML: at 12:12

## 2021-12-20 RX ADMIN — FAMOTIDINE 20 MG: 20 TABLET ORAL at 08:12

## 2021-12-20 RX ADMIN — SIMETHICONE 80 MG: 80 TABLET, CHEWABLE ORAL at 04:12

## 2021-12-20 RX ADMIN — BUSPIRONE HYDROCHLORIDE 5 MG: 5 TABLET ORAL at 08:12

## 2021-12-20 RX ADMIN — Medication 400 MG: at 08:12

## 2021-12-20 RX ADMIN — SPIRONOLACTONE 25 MG: 25 TABLET ORAL at 08:12

## 2021-12-20 RX ADMIN — HYDROXYZINE HYDROCHLORIDE 25 MG: 25 TABLET ORAL at 11:12

## 2021-12-20 RX ADMIN — TREPROSTINIL 39 NG/KG/MIN: 100 INJECTION, SOLUTION INTRAVENOUS; SUBCUTANEOUS at 04:12

## 2021-12-20 RX ADMIN — SIMETHICONE 80 MG: 80 TABLET, CHEWABLE ORAL at 02:12

## 2021-12-20 RX ADMIN — OXYCODONE AND ACETAMINOPHEN 1 TABLET: 7.5; 325 TABLET ORAL at 08:12

## 2021-12-20 RX ADMIN — Medication 10 ML: at 06:12

## 2021-12-20 RX ADMIN — Medication 6 MG: at 11:12

## 2021-12-20 RX ADMIN — SIMETHICONE 80 MG: 80 TABLET, CHEWABLE ORAL at 08:12

## 2021-12-20 RX ADMIN — ACETAMINOPHEN 650 MG: 325 TABLET ORAL at 10:12

## 2021-12-20 RX ADMIN — OXYCODONE AND ACETAMINOPHEN 1 TABLET: 7.5; 325 TABLET ORAL at 12:12

## 2021-12-21 LAB
ALBUMIN SERPL BCP-MCNC: 3.1 G/DL (ref 3.5–5.2)
ALP SERPL-CCNC: 51 U/L (ref 55–135)
ALT SERPL W/O P-5'-P-CCNC: 20 U/L (ref 10–44)
ANION GAP SERPL CALC-SCNC: 5 MMOL/L (ref 8–16)
AST SERPL-CCNC: 30 U/L (ref 10–40)
BILIRUB DIRECT SERPL-MCNC: 0.4 MG/DL (ref 0.1–0.3)
BILIRUB SERPL-MCNC: 1 MG/DL (ref 0.1–1)
BUN SERPL-MCNC: 12 MG/DL (ref 6–20)
CALCIUM SERPL-MCNC: 8.9 MG/DL (ref 8.7–10.5)
CHLORIDE SERPL-SCNC: 107 MMOL/L (ref 95–110)
CO2 SERPL-SCNC: 25 MMOL/L (ref 23–29)
CREAT SERPL-MCNC: 0.7 MG/DL (ref 0.5–1.4)
EST. GFR  (AFRICAN AMERICAN): >60 ML/MIN/1.73 M^2
EST. GFR  (NON AFRICAN AMERICAN): >60 ML/MIN/1.73 M^2
GLUCOSE SERPL-MCNC: 103 MG/DL (ref 70–110)
INR PPP: 1.2 (ref 0.8–1.2)
MAGNESIUM SERPL-MCNC: 1.7 MG/DL (ref 1.6–2.6)
POTASSIUM SERPL-SCNC: 3.9 MMOL/L (ref 3.5–5.1)
PROT SERPL-MCNC: 5.4 G/DL (ref 6–8.4)
PROTHROMBIN TIME: 12.5 SEC (ref 9–12.5)
SODIUM SERPL-SCNC: 137 MMOL/L (ref 136–145)

## 2021-12-21 PROCEDURE — 20600001 HC STEP DOWN PRIVATE ROOM

## 2021-12-21 PROCEDURE — A4216 STERILE WATER/SALINE, 10 ML: HCPCS | Performed by: INTERNAL MEDICINE

## 2021-12-21 PROCEDURE — 63600175 PHARM REV CODE 636 W HCPCS: Mod: JG | Performed by: INTERNAL MEDICINE

## 2021-12-21 PROCEDURE — 99233 PR SUBSEQUENT HOSPITAL CARE,LEVL III: ICD-10-PCS | Mod: ,,, | Performed by: NURSE PRACTITIONER

## 2021-12-21 PROCEDURE — 25000003 PHARM REV CODE 250: Performed by: INTERNAL MEDICINE

## 2021-12-21 PROCEDURE — 80048 BASIC METABOLIC PNL TOTAL CA: CPT | Performed by: STUDENT IN AN ORGANIZED HEALTH CARE EDUCATION/TRAINING PROGRAM

## 2021-12-21 PROCEDURE — 25000003 PHARM REV CODE 250: Performed by: STUDENT IN AN ORGANIZED HEALTH CARE EDUCATION/TRAINING PROGRAM

## 2021-12-21 PROCEDURE — 99233 SBSQ HOSP IP/OBS HIGH 50: CPT | Mod: ,,, | Performed by: NURSE PRACTITIONER

## 2021-12-21 PROCEDURE — 83735 ASSAY OF MAGNESIUM: CPT | Performed by: STUDENT IN AN ORGANIZED HEALTH CARE EDUCATION/TRAINING PROGRAM

## 2021-12-21 PROCEDURE — 80076 HEPATIC FUNCTION PANEL: CPT | Performed by: INTERNAL MEDICINE

## 2021-12-21 PROCEDURE — 85610 PROTHROMBIN TIME: CPT | Performed by: STUDENT IN AN ORGANIZED HEALTH CARE EDUCATION/TRAINING PROGRAM

## 2021-12-21 PROCEDURE — 63600175 PHARM REV CODE 636 W HCPCS: Performed by: NURSE PRACTITIONER

## 2021-12-21 RX ORDER — MAGNESIUM SULFATE HEPTAHYDRATE 40 MG/ML
2 INJECTION, SOLUTION INTRAVENOUS ONCE
Status: COMPLETED | OUTPATIENT
Start: 2021-12-21 | End: 2021-12-21

## 2021-12-21 RX ADMIN — HYDROXYZINE HYDROCHLORIDE 25 MG: 25 TABLET ORAL at 11:12

## 2021-12-21 RX ADMIN — SIMETHICONE 80 MG: 80 TABLET, CHEWABLE ORAL at 08:12

## 2021-12-21 RX ADMIN — BUSPIRONE HYDROCHLORIDE 5 MG: 5 TABLET ORAL at 08:12

## 2021-12-21 RX ADMIN — SPIRONOLACTONE 25 MG: 25 TABLET ORAL at 08:12

## 2021-12-21 RX ADMIN — FAMOTIDINE 20 MG: 20 TABLET ORAL at 09:12

## 2021-12-21 RX ADMIN — Medication 10 ML: at 06:12

## 2021-12-21 RX ADMIN — OXYCODONE AND ACETAMINOPHEN 1 TABLET: 7.5; 325 TABLET ORAL at 04:12

## 2021-12-21 RX ADMIN — OXYCODONE AND ACETAMINOPHEN 1 TABLET: 7.5; 325 TABLET ORAL at 09:12

## 2021-12-21 RX ADMIN — TREPROSTINIL 43 NG/KG/MIN: 100 INJECTION, SOLUTION INTRAVENOUS; SUBCUTANEOUS at 03:12

## 2021-12-21 RX ADMIN — Medication 10 ML: at 12:12

## 2021-12-21 RX ADMIN — FAMOTIDINE 20 MG: 20 TABLET ORAL at 08:12

## 2021-12-21 RX ADMIN — SIMETHICONE 80 MG: 80 TABLET, CHEWABLE ORAL at 04:12

## 2021-12-21 RX ADMIN — BUSPIRONE HYDROCHLORIDE 5 MG: 5 TABLET ORAL at 09:12

## 2021-12-21 RX ADMIN — MAGNESIUM SULFATE 2 G: 2 INJECTION INTRAVENOUS at 08:12

## 2021-12-21 RX ADMIN — Medication 6 MG: at 11:12

## 2021-12-21 RX ADMIN — Medication 400 MG: at 08:12

## 2021-12-21 RX ADMIN — OXYCODONE AND ACETAMINOPHEN 1 TABLET: 7.5; 325 TABLET ORAL at 03:12

## 2021-12-21 RX ADMIN — Medication: at 03:12

## 2021-12-21 RX ADMIN — Medication 400 MG: at 09:12

## 2021-12-22 LAB
ALBUMIN SERPL BCP-MCNC: 3.2 G/DL (ref 3.5–5.2)
ALP SERPL-CCNC: 61 U/L (ref 55–135)
ALT SERPL W/O P-5'-P-CCNC: 20 U/L (ref 10–44)
ANION GAP SERPL CALC-SCNC: 4 MMOL/L (ref 8–16)
AST SERPL-CCNC: 43 U/L (ref 10–40)
BILIRUB DIRECT SERPL-MCNC: 0.4 MG/DL (ref 0.1–0.3)
BILIRUB SERPL-MCNC: 1 MG/DL (ref 0.1–1)
BUN SERPL-MCNC: 13 MG/DL (ref 6–20)
CALCIUM SERPL-MCNC: 8.8 MG/DL (ref 8.7–10.5)
CHLORIDE SERPL-SCNC: 108 MMOL/L (ref 95–110)
CO2 SERPL-SCNC: 26 MMOL/L (ref 23–29)
CREAT SERPL-MCNC: 0.8 MG/DL (ref 0.5–1.4)
EST. GFR  (AFRICAN AMERICAN): >60 ML/MIN/1.73 M^2
EST. GFR  (NON AFRICAN AMERICAN): >60 ML/MIN/1.73 M^2
GLUCOSE SERPL-MCNC: 110 MG/DL (ref 70–110)
INR PPP: 1.1 (ref 0.8–1.2)
MAGNESIUM SERPL-MCNC: 1.9 MG/DL (ref 1.6–2.6)
POTASSIUM SERPL-SCNC: 3.8 MMOL/L (ref 3.5–5.1)
PROT SERPL-MCNC: 5.5 G/DL (ref 6–8.4)
PROTHROMBIN TIME: 12.1 SEC (ref 9–12.5)
SODIUM SERPL-SCNC: 138 MMOL/L (ref 136–145)

## 2021-12-22 PROCEDURE — 25000003 PHARM REV CODE 250: Performed by: STUDENT IN AN ORGANIZED HEALTH CARE EDUCATION/TRAINING PROGRAM

## 2021-12-22 PROCEDURE — 20600001 HC STEP DOWN PRIVATE ROOM

## 2021-12-22 PROCEDURE — 85610 PROTHROMBIN TIME: CPT | Performed by: STUDENT IN AN ORGANIZED HEALTH CARE EDUCATION/TRAINING PROGRAM

## 2021-12-22 PROCEDURE — 63600175 PHARM REV CODE 636 W HCPCS: Mod: JG | Performed by: INTERNAL MEDICINE

## 2021-12-22 PROCEDURE — 80076 HEPATIC FUNCTION PANEL: CPT | Performed by: INTERNAL MEDICINE

## 2021-12-22 PROCEDURE — 80048 BASIC METABOLIC PNL TOTAL CA: CPT | Performed by: STUDENT IN AN ORGANIZED HEALTH CARE EDUCATION/TRAINING PROGRAM

## 2021-12-22 PROCEDURE — 99233 SBSQ HOSP IP/OBS HIGH 50: CPT | Mod: ,,, | Performed by: NURSE PRACTITIONER

## 2021-12-22 PROCEDURE — 83735 ASSAY OF MAGNESIUM: CPT | Performed by: STUDENT IN AN ORGANIZED HEALTH CARE EDUCATION/TRAINING PROGRAM

## 2021-12-22 PROCEDURE — A4216 STERILE WATER/SALINE, 10 ML: HCPCS | Performed by: INTERNAL MEDICINE

## 2021-12-22 PROCEDURE — 25000003 PHARM REV CODE 250: Performed by: INTERNAL MEDICINE

## 2021-12-22 PROCEDURE — 63600175 PHARM REV CODE 636 W HCPCS: Performed by: NURSE PRACTITIONER

## 2021-12-22 PROCEDURE — 25000003 PHARM REV CODE 250: Performed by: NURSE PRACTITIONER

## 2021-12-22 PROCEDURE — 99233 PR SUBSEQUENT HOSPITAL CARE,LEVL III: ICD-10-PCS | Mod: ,,, | Performed by: NURSE PRACTITIONER

## 2021-12-22 RX ORDER — MAGNESIUM SULFATE HEPTAHYDRATE 40 MG/ML
2 INJECTION, SOLUTION INTRAVENOUS ONCE
Status: COMPLETED | OUTPATIENT
Start: 2021-12-22 | End: 2021-12-22

## 2021-12-22 RX ORDER — LANOLIN ALCOHOL/MO/W.PET/CERES
400 CREAM (GRAM) TOPICAL 3 TIMES DAILY
Status: DISCONTINUED | OUTPATIENT
Start: 2021-12-22 | End: 2021-12-23 | Stop reason: HOSPADM

## 2021-12-22 RX ADMIN — Medication 400 MG: at 09:12

## 2021-12-22 RX ADMIN — BUSPIRONE HYDROCHLORIDE 5 MG: 5 TABLET ORAL at 08:12

## 2021-12-22 RX ADMIN — SIMETHICONE 80 MG: 80 TABLET, CHEWABLE ORAL at 08:12

## 2021-12-22 RX ADMIN — SIMETHICONE 80 MG: 80 TABLET, CHEWABLE ORAL at 03:12

## 2021-12-22 RX ADMIN — MAGNESIUM SULFATE 2 G: 2 INJECTION INTRAVENOUS at 09:12

## 2021-12-22 RX ADMIN — FAMOTIDINE 20 MG: 20 TABLET ORAL at 08:12

## 2021-12-22 RX ADMIN — Medication 400 MG: at 03:12

## 2021-12-22 RX ADMIN — Medication 10 ML: at 06:12

## 2021-12-22 RX ADMIN — HYDROXYZINE HYDROCHLORIDE 25 MG: 25 TABLET ORAL at 09:12

## 2021-12-22 RX ADMIN — SPIRONOLACTONE 25 MG: 25 TABLET ORAL at 08:12

## 2021-12-22 RX ADMIN — Medication 400 MG: at 08:12

## 2021-12-22 RX ADMIN — Medication 10 ML: at 12:12

## 2021-12-22 RX ADMIN — OXYCODONE AND ACETAMINOPHEN 1 TABLET: 7.5; 325 TABLET ORAL at 11:12

## 2021-12-22 RX ADMIN — TREPROSTINIL 43 NG/KG/MIN: 100 INJECTION, SOLUTION INTRAVENOUS; SUBCUTANEOUS at 05:12

## 2021-12-22 RX ADMIN — FAMOTIDINE 20 MG: 20 TABLET ORAL at 09:12

## 2021-12-22 RX ADMIN — BUSPIRONE HYDROCHLORIDE 5 MG: 5 TABLET ORAL at 09:12

## 2021-12-22 RX ADMIN — OXYCODONE AND ACETAMINOPHEN 1 TABLET: 7.5; 325 TABLET ORAL at 07:12

## 2021-12-22 RX ADMIN — Medication 6 MG: at 09:12

## 2021-12-23 VITALS
HEART RATE: 103 BPM | BODY MASS INDEX: 21.91 KG/M2 | TEMPERATURE: 98 F | HEIGHT: 62 IN | WEIGHT: 119.06 LBS | DIASTOLIC BLOOD PRESSURE: 85 MMHG | RESPIRATION RATE: 16 BRPM | OXYGEN SATURATION: 98 % | SYSTOLIC BLOOD PRESSURE: 117 MMHG

## 2021-12-23 LAB
ALBUMIN SERPL BCP-MCNC: 3.1 G/DL (ref 3.5–5.2)
ALP SERPL-CCNC: 52 U/L (ref 55–135)
ALT SERPL W/O P-5'-P-CCNC: 24 U/L (ref 10–44)
ANION GAP SERPL CALC-SCNC: 4 MMOL/L (ref 8–16)
AST SERPL-CCNC: 48 U/L (ref 10–40)
BILIRUB DIRECT SERPL-MCNC: 0.5 MG/DL (ref 0.1–0.3)
BILIRUB SERPL-MCNC: 1 MG/DL (ref 0.1–1)
BUN SERPL-MCNC: 12 MG/DL (ref 6–20)
CALCIUM SERPL-MCNC: 8.9 MG/DL (ref 8.7–10.5)
CHLORIDE SERPL-SCNC: 107 MMOL/L (ref 95–110)
CO2 SERPL-SCNC: 25 MMOL/L (ref 23–29)
CREAT SERPL-MCNC: 0.7 MG/DL (ref 0.5–1.4)
EST. GFR  (AFRICAN AMERICAN): >60 ML/MIN/1.73 M^2
EST. GFR  (NON AFRICAN AMERICAN): >60 ML/MIN/1.73 M^2
GLUCOSE SERPL-MCNC: 85 MG/DL (ref 70–110)
INR PPP: 1.1 (ref 0.8–1.2)
MAGNESIUM SERPL-MCNC: 1.9 MG/DL (ref 1.6–2.6)
POTASSIUM SERPL-SCNC: 4.3 MMOL/L (ref 3.5–5.1)
PROT SERPL-MCNC: 5.8 G/DL (ref 6–8.4)
PROTHROMBIN TIME: 12.3 SEC (ref 9–12.5)
SODIUM SERPL-SCNC: 136 MMOL/L (ref 136–145)

## 2021-12-23 PROCEDURE — 25000003 PHARM REV CODE 250: Performed by: STUDENT IN AN ORGANIZED HEALTH CARE EDUCATION/TRAINING PROGRAM

## 2021-12-23 PROCEDURE — 99233 SBSQ HOSP IP/OBS HIGH 50: CPT | Mod: ,,, | Performed by: PHYSICIAN ASSISTANT

## 2021-12-23 PROCEDURE — 83735 ASSAY OF MAGNESIUM: CPT | Performed by: STUDENT IN AN ORGANIZED HEALTH CARE EDUCATION/TRAINING PROGRAM

## 2021-12-23 PROCEDURE — 80048 BASIC METABOLIC PNL TOTAL CA: CPT | Performed by: STUDENT IN AN ORGANIZED HEALTH CARE EDUCATION/TRAINING PROGRAM

## 2021-12-23 PROCEDURE — 25000003 PHARM REV CODE 250: Performed by: NURSE PRACTITIONER

## 2021-12-23 PROCEDURE — 85610 PROTHROMBIN TIME: CPT | Performed by: STUDENT IN AN ORGANIZED HEALTH CARE EDUCATION/TRAINING PROGRAM

## 2021-12-23 PROCEDURE — 25000003 PHARM REV CODE 250: Performed by: INTERNAL MEDICINE

## 2021-12-23 PROCEDURE — 99233 PR SUBSEQUENT HOSPITAL CARE,LEVL III: ICD-10-PCS | Mod: ,,, | Performed by: PHYSICIAN ASSISTANT

## 2021-12-23 PROCEDURE — 80076 HEPATIC FUNCTION PANEL: CPT | Performed by: INTERNAL MEDICINE

## 2021-12-23 RX ORDER — HYDROXYZINE HYDROCHLORIDE 25 MG/1
25 TABLET, FILM COATED ORAL 3 TIMES DAILY PRN
Qty: 30 TABLET | Refills: 1 | Status: SHIPPED | OUTPATIENT
Start: 2021-12-23 | End: 2022-02-11

## 2021-12-23 RX ORDER — SPIRONOLACTONE 25 MG/1
25 TABLET ORAL DAILY
Qty: 30 TABLET | Refills: 0 | Status: SHIPPED | OUTPATIENT
Start: 2021-12-24 | End: 2022-02-11

## 2021-12-23 RX ORDER — FAMOTIDINE 20 MG/1
20 TABLET, FILM COATED ORAL 2 TIMES DAILY
Qty: 60 TABLET | Refills: 0 | Status: SHIPPED | OUTPATIENT
Start: 2021-12-23 | End: 2022-02-11

## 2021-12-23 RX ORDER — LOPERAMIDE HYDROCHLORIDE 2 MG/1
2 CAPSULE ORAL EVERY 4 HOURS PRN
Qty: 30 CAPSULE | Refills: 0 | Status: SHIPPED | OUTPATIENT
Start: 2021-12-23 | End: 2022-01-02

## 2021-12-23 RX ORDER — BUSPIRONE HYDROCHLORIDE 5 MG/1
5 TABLET ORAL 2 TIMES DAILY
Qty: 60 TABLET | Refills: 0 | Status: SHIPPED | OUTPATIENT
Start: 2021-12-23 | End: 2022-02-11

## 2021-12-23 RX ORDER — ONDANSETRON 2 MG/ML
4 INJECTION INTRAMUSCULAR; INTRAVENOUS EVERY 8 HOURS PRN
Qty: 30 EACH | Refills: 0 | Status: SHIPPED | OUTPATIENT
Start: 2021-12-23 | End: 2021-12-23 | Stop reason: HOSPADM

## 2021-12-23 RX ORDER — LANOLIN ALCOHOL/MO/W.PET/CERES
400 CREAM (GRAM) TOPICAL 3 TIMES DAILY
Qty: 90 TABLET | Refills: 0 | Status: SHIPPED | OUTPATIENT
Start: 2021-12-23 | End: 2022-02-11

## 2021-12-23 RX ORDER — OXYCODONE AND ACETAMINOPHEN 5; 325 MG/1; MG/1
1 TABLET ORAL EVERY 4 HOURS PRN
Qty: 12 EACH | Refills: 0 | Status: SHIPPED | OUTPATIENT
Start: 2021-12-23 | End: 2021-12-26

## 2021-12-23 RX ORDER — ONDANSETRON 4 MG/1
4 TABLET, FILM COATED ORAL EVERY 8 HOURS PRN
Qty: 30 TABLET | Refills: 0 | Status: SHIPPED | OUTPATIENT
Start: 2021-12-23 | End: 2022-02-11 | Stop reason: SDUPTHER

## 2021-12-23 RX ORDER — TALC
6 POWDER (GRAM) TOPICAL NIGHTLY PRN
Qty: 30 TABLET | Refills: 0 | Status: SHIPPED | OUTPATIENT
Start: 2021-12-23 | End: 2022-02-11

## 2021-12-23 RX ADMIN — FAMOTIDINE 20 MG: 20 TABLET ORAL at 08:12

## 2021-12-23 RX ADMIN — OXYCODONE HYDROCHLORIDE AND ACETAMINOPHEN 1 TABLET: 5; 325 TABLET ORAL at 08:12

## 2021-12-23 RX ADMIN — BUSPIRONE HYDROCHLORIDE 5 MG: 5 TABLET ORAL at 08:12

## 2021-12-23 RX ADMIN — SPIRONOLACTONE 25 MG: 25 TABLET ORAL at 08:12

## 2021-12-23 RX ADMIN — Medication 400 MG: at 08:12

## 2021-12-23 RX ADMIN — SIMETHICONE 80 MG: 80 TABLET, CHEWABLE ORAL at 08:12

## 2021-12-28 ENCOUNTER — PATIENT MESSAGE (OUTPATIENT)
Dept: TRANSPLANT | Facility: CLINIC | Age: 46
End: 2021-12-28
Payer: MEDICAID

## 2021-12-28 DIAGNOSIS — M79.2 NEUROPATHIC PAIN: ICD-10-CM

## 2021-12-28 DIAGNOSIS — M79.2 NEUROPATHIC PAIN: Primary | ICD-10-CM

## 2021-12-28 RX ORDER — GABAPENTIN 300 MG/1
300 CAPSULE ORAL NIGHTLY
Qty: 30 CAPSULE | Refills: 11 | Status: SHIPPED | OUTPATIENT
Start: 2021-12-28 | End: 2022-02-11 | Stop reason: DRUGHIGH

## 2021-12-28 RX ORDER — GABAPENTIN 300 MG/1
300 CAPSULE ORAL NIGHTLY
Qty: 30 CAPSULE | Refills: 11 | Status: SHIPPED | OUTPATIENT
Start: 2021-12-28 | End: 2021-12-28 | Stop reason: SDUPTHER

## 2022-01-03 ENCOUNTER — TELEPHONE (OUTPATIENT)
Dept: TRANSPLANT | Facility: CLINIC | Age: 47
End: 2022-01-03
Payer: MEDICAID

## 2022-01-03 NOTE — TELEPHONE ENCOUNTER
José Luis from [facility] over weekend that patient was complaining of neuropathy pain and 300 mg nighty was not doing anything for the pain. Patient confirmed that she is currently on 47ng and she felt more comfortable when she was at 43 ng (right before hospital discharge). She was recommended by [facility] (José Luis, RN) to hold at 47 ng until options for pain was discussed with Dr. Gregory.    Message sent to Dr. Gregory.

## 2022-01-04 ENCOUNTER — PATIENT MESSAGE (OUTPATIENT)
Dept: TRANSPLANT | Facility: CLINIC | Age: 47
End: 2022-01-04
Payer: MEDICAID

## 2022-01-07 RX ORDER — GABAPENTIN 100 MG/1
100 CAPSULE ORAL 2 TIMES DAILY
Qty: 60 CAPSULE | Refills: 11 | Status: SHIPPED | OUTPATIENT
Start: 2022-01-07 | End: 2022-02-11 | Stop reason: DRUGHIGH

## 2022-01-13 ENCOUNTER — TELEPHONE (OUTPATIENT)
Dept: TRANSPLANT | Facility: CLINIC | Age: 47
End: 2022-01-13
Payer: MEDICAID

## 2022-01-19 ENCOUNTER — TELEPHONE (OUTPATIENT)
Dept: TRANSPLANT | Facility: CLINIC | Age: 47
End: 2022-01-19
Payer: MEDICAID

## 2022-01-26 ENCOUNTER — TELEPHONE (OUTPATIENT)
Dept: TRANSPLANT | Facility: CLINIC | Age: 47
End: 2022-01-26
Payer: MEDICAID

## 2022-01-26 ENCOUNTER — PATIENT MESSAGE (OUTPATIENT)
Dept: TRANSPLANT | Facility: CLINIC | Age: 47
End: 2022-01-26
Payer: MEDICAID

## 2022-01-26 NOTE — TELEPHONE ENCOUNTER
Patient had questions about her prescriptions. She did not have all of the medications she was sent home with. I explained to patient that most of the medications she was sent home with did not have refills but since she is coming in on 2/11 than we can address and send in refills if she feels she needs them. Per patient she is still a little anxious but she sees her PCP on Monday and will discuss anxiety then.

## 2022-02-11 ENCOUNTER — OFFICE VISIT (OUTPATIENT)
Dept: TRANSPLANT | Facility: CLINIC | Age: 47
End: 2022-02-11
Payer: MEDICAID

## 2022-02-11 ENCOUNTER — HOSPITAL ENCOUNTER (OUTPATIENT)
Dept: PULMONOLOGY | Facility: CLINIC | Age: 47
Discharge: HOME | End: 2022-02-11
Payer: MEDICAID

## 2022-02-11 VITALS — HEIGHT: 62 IN | BODY MASS INDEX: 25.11 KG/M2 | HEART RATE: 83 BPM | WEIGHT: 136.44 LBS

## 2022-02-11 VITALS — HEIGHT: 62 IN | WEIGHT: 130 LBS | BODY MASS INDEX: 23.92 KG/M2

## 2022-02-11 DIAGNOSIS — R06.02 SHORTNESS OF BREATH: Primary | ICD-10-CM

## 2022-02-11 DIAGNOSIS — I27.9 CHRONIC PULMONARY HEART DISEASE: ICD-10-CM

## 2022-02-11 DIAGNOSIS — M79.2 NEUROPATHIC PAIN: ICD-10-CM

## 2022-02-11 DIAGNOSIS — I27.0 PRIMARY PULMONARY HYPERTENSION: ICD-10-CM

## 2022-02-11 PROCEDURE — 99214 PR OFFICE/OUTPT VISIT, EST, LEVL IV, 30-39 MIN: ICD-10-PCS | Mod: S$PBB,,,

## 2022-02-11 PROCEDURE — 99999 PR PBB SHADOW E&M-EST. PATIENT-LVL IV: ICD-10-PCS | Mod: PBBFAC,,,

## 2022-02-11 PROCEDURE — 3008F PR BODY MASS INDEX (BMI) DOCUMENTED: ICD-10-PCS | Mod: CPTII,,,

## 2022-02-11 PROCEDURE — 94618 PULMONARY STRESS TESTING: ICD-10-PCS | Mod: 26,S$PBB,, | Performed by: INTERNAL MEDICINE

## 2022-02-11 PROCEDURE — 94618 PULMONARY STRESS TESTING: CPT | Mod: 26,S$PBB,, | Performed by: INTERNAL MEDICINE

## 2022-02-11 PROCEDURE — 3008F BODY MASS INDEX DOCD: CPT | Mod: CPTII,,,

## 2022-02-11 PROCEDURE — 1159F MED LIST DOCD IN RCRD: CPT | Mod: CPTII,,,

## 2022-02-11 PROCEDURE — 99999 PR PBB SHADOW E&M-EST. PATIENT-LVL IV: CPT | Mod: PBBFAC,,,

## 2022-02-11 PROCEDURE — 1160F PR REVIEW ALL MEDS BY PRESCRIBER/CLIN PHARMACIST DOCUMENTED: ICD-10-PCS | Mod: CPTII,,,

## 2022-02-11 PROCEDURE — 94618 PULMONARY STRESS TESTING: CPT | Mod: PBBFAC | Performed by: INTERNAL MEDICINE

## 2022-02-11 PROCEDURE — 1159F PR MEDICATION LIST DOCUMENTED IN MEDICAL RECORD: ICD-10-PCS | Mod: CPTII,,,

## 2022-02-11 PROCEDURE — 1160F RVW MEDS BY RX/DR IN RCRD: CPT | Mod: CPTII,,,

## 2022-02-11 PROCEDURE — 99214 OFFICE O/P EST MOD 30 MIN: CPT | Mod: S$PBB,,,

## 2022-02-11 PROCEDURE — 99214 OFFICE O/P EST MOD 30 MIN: CPT | Mod: 25,PBBFAC

## 2022-02-11 RX ORDER — TREPROSTINIL 100 MG/20ML
INJECTION, SOLUTION INTRAVENOUS; SUBCUTANEOUS
COMMUNITY
Start: 2022-02-07

## 2022-02-11 RX ORDER — MACITENTAN 10 MG/1
10 TABLET, FILM COATED ORAL DAILY
Status: ON HOLD | COMMUNITY
End: 2022-12-16

## 2022-02-11 RX ORDER — ONDANSETRON 4 MG/1
4 TABLET, FILM COATED ORAL EVERY 8 HOURS PRN
Qty: 30 TABLET | Refills: 2 | Status: SHIPPED | OUTPATIENT
Start: 2022-02-11 | End: 2022-04-12 | Stop reason: SDUPTHER

## 2022-02-11 RX ORDER — GABAPENTIN 300 MG/1
300 CAPSULE ORAL 2 TIMES DAILY
Qty: 60 CAPSULE | Refills: 11 | Status: SHIPPED | OUTPATIENT
Start: 2022-02-11 | End: 2022-06-15

## 2022-02-11 NOTE — PROCEDURES
Kristin Maier is a 46 y.o.  female patient, who presents for a 6 minute walk test ordered by MD Tra.  The diagnosis is Qualify for Oxygen,Pulmonary Hypertension.  The patient's BMI is 23.8 kg/m2.  Predicted distance (lower limit of normal) is 449.65 meters.      Test Results:    The test was completed without stopping.  The total time walked was 360 seconds.  During walking, the patient reported:  No complaints.  The patient used no assistive devices during testing.     02/11/2022---------Distance: 365.76 meters (1200 feet)     O2 Sat % Supplemental Oxygen Heart Rate Blood Pressure Jessica Scale   Pre-exercise  (Resting) 99 % Room Air 89 bpm 138/95 mmHg 0   During Exercise 100 % Room Air 76 bpm 136/93 mmHg 0   Post-exercise  (Recovery) 100 % Room Air  77 bpm       Recovery Time: 60 seconds    Performing nurse/tech: Estopinal RRT      PREVIOUS STUDY:   The patient has not had a previous study.      CLINICAL INTERPRETATION:  Six minute walk distance is 365.76 meters (1200 feet) with no dyspnea.  During exercise, there was no desaturation while breathing room air.  Both blood pressure and heart rate remained stable with walking.  Hypertension was present prior to exercise.  The patient did not report non-pulmonary symptoms during exercise.  No previous study performed.  Based upon age and body mass index, exercise capacity is less than predicted.

## 2022-02-11 NOTE — PROGRESS NOTES
.  Subjective:    Patient ID:  Kristin Maier is a 46 y.o. female who presents for follow-up of Pulmonary Hypertension.    HPI   Mrs. Maier is a 45 y/o AA female diagnosed with WHO Group 1 PAH, referred by Dr. MAINE Stack. She was initially seen in clinic by Dr. Dutta on 11/29/2021, presenting with symptoms of CHU and severe PAH by ECHO. Scheduled for RHC the next day. Same day of her appt she went to an OSH for a UTI.  Given her pulmonary hypertension history a decision was made to transfer her to [facility] for further inpatient workup.     Hospital Course: Patient was admitted for evaluation of Pulmonary Hypertension. Work up revealed: HENNA, ESR, RF, Anti CCP, ds DNA Ab, HIV, TSH unremarkable. V/Q and CTA chest showed no evidence of PE. 12/2/21 TTE showed  EF 20, LV diastolic dysfunction, moderate pericardial effusion, moderate RV enlargement with severely reduced RV systolic function. Free wall strain 6-9%, FAC 16%, severe TR, RV pressure volume overload with systolic flattening of IV septum, CVP 15, PASP 103. 12.2.21 RHC revealed: RA: 25/ 26/ 22 RV: 90/ 1/ 21 PA: 86/ 42/ 57 PWP: 9/ 8/ 7. TPG 50, PVR 25 (severely elevated R sided filling pressure. CI 1.2, CO 1.97. Patient was classified as WHO group I PH and started on Veletri while inpatient, patient required  at rate of 5mg/kg/min. Patient had PICC line placed on 12/8/21 for long term administerationof vasodilator therapy. Switched to Remodulin at discharge.    Clinic F/U:   Mrs. Maier is feels okay today. She is at 69 ng/kg/min of Remodulin and Opsumit 10 mg, daily. No demonstration of oxygen desaturation on walk. She notes an improvement in her breathing and exercise capabilities. Walks 20 minutes everyday. Is mainly limited by foot pain. Has been taking gabapentin 300 mg at qHS and 100 mg, BID. Yesterday and today she actually tried 300 mg in the morning and her feet feel better. Said she did not feel sleepy as long as she stays active.  Experiences some nausea and diarrhea about once a week, but controlled by zofran and lomotil.    She says that someone mentioned she should apply for disability, but feels well enough to work. Noted that she can work as long as she is able.    6MWT:  6MW 2/11/2022   6MWT Status completed without stopping   Patient Reported No complaints   Was O2 used? No   6MW Distance walked (feet) 1200   Distance walked (meters) 365.76   Did patient stop? No   Oxygen Saturation 99   Supplemental Oxygen Room Air   Heart Rate 89   Blood Pressure 138/95   Jessica Dyspnea Rating  nothing at all   Oxygen Saturation 100   Supplemental Oxygen Room Air   Heart Rate 76   Blood Pressure 136/93   Jessica Dyspnea Rating  nothing at all   Recovery Time (seconds) 60   Oxygen Saturation 100   Supplemental Oxygen Room Air   Heart Rate 77     ECHO: 12/13/2021 (prior to initiation of remodulin)  · The left ventricle is normal in size with concentric remodeling and normal systolic function.  · The estimated ejection fraction is 63%.  · There are segmental left ventricular wall motion abnormalities.  · There is abnormal septal wall motion.  · Normal left ventricular diastolic function.  · Moderate right ventricular enlargement with mildly reduced right ventricular systolic function.  · Moderate right atrial enlargement.  · Severe tricuspid regurgitation.  · Mild pulmonic regurgitation.  · Elevated central venous pressure (15 mmHg).  · The estimated PA systolic pressure is 122 mmHg.  · There is pulmonary hypertension.  · Small pericardial effusion. Apically and trivial under the RA.    RHC:   RA: 25/ 26/ 22 RV: 90/ 1/ 21 PA: 86/ 42/ 57 PWP: 9/ 8/ 7 .   Cardiac output was 1.97  by Kristel. Cardiac index is 1.2 L/min/m2.   O2 Sat: PA 38%.     Severely reduced CO/CI off inotrope and pulmonary vasodilator therapy.  Severely elevated right and normal left sided filling pressure.  Severe pulmonary hypertension in setting of normal left sided filling pressure.  TPG    "50    PVR   25    IV/SC:  Pulmonary Hypertension Review Flowsheet 2/11/2022   Pump Type CADD   Medication type Remodulin   Vial size 5.0mg/ml 20ml   Dose (ng/kg/min) 69ng/kg/min   DW (kg) 55.0kg   Amt of Med used 3ml   Amt of Diluent Used NS 97 ml   Final Concentration (ng/ml) 0.15 mg/ml   Pump Rate ml/24 hr 36ml/24hr     Review of Systems   Constitutional: Negative.   HENT: Negative.    Eyes: Negative.    Cardiovascular: Positive for dyspnea on exertion and leg swelling. Negative for chest pain, irregular heartbeat, near-syncope, orthopnea, paroxysmal nocturnal dyspnea and syncope.        Occasional leg swelling, resolves w/o diuretics   Respiratory: Negative.    Endocrine: Negative.    Hematologic/Lymphatic: Negative.    Skin: Negative.    Musculoskeletal: Positive for myalgias. Negative for falls and joint swelling.        Foot pain related to medication SE   Gastrointestinal: Positive for diarrhea and nausea. Negative for abdominal pain, dysphagia and heartburn.        Occasional nausea and diarrhea related to medication   Genitourinary: Negative.    Neurological: Negative for excessive daytime sleepiness, dizziness, headaches and loss of balance.   Psychiatric/Behavioral: Negative.         Objective: Pulse 83   Ht 5' 2" (1.575 m)   Wt 61.9 kg (136 lb 7.4 oz)   LMP 06/19/2017 (Approximate)   BMI 24.96 kg/m²  - /107      Physical Exam  Constitutional:       General: She is not in acute distress.     Appearance: Normal appearance. She is normal weight. She is not ill-appearing.   HENT:      Head: Normocephalic and atraumatic.      Nose: Nose normal.   Eyes:      Extraocular Movements: Extraocular movements intact.      Pupils: Pupils are equal, round, and reactive to light.   Neck:      Vascular: No JVD.   Cardiovascular:      Rate and Rhythm: Normal rate and regular rhythm.   Chest:      Comments: R chest wall Pedro Catheter infusing Remodulin. Dressing is C/D/I  Musculoskeletal:      Cervical back: " Normal range of motion and neck supple.   Neurological:      Mental Status: She is alert.           Lab Results   Component Value Date     (H) 02/11/2022     02/11/2022    K 3.3 (L) 02/11/2022    MG 1.7 02/11/2022     02/11/2022    CO2 23 02/11/2022    BUN 15 02/11/2022    CREATININE 0.7 02/11/2022     02/11/2022    HGBA1C 6.4 (H) 12/06/2021    AST 26 02/11/2022    ALT 28 02/11/2022    ALBUMIN 3.9 02/11/2022    PROT 7.1 02/11/2022    BILITOT 1.9 (H) 02/11/2022    CHOL 157 12/06/2021    HDL 34 (L) 12/06/2021    LDLCALC 103.2 12/06/2021    TRIG 99 12/06/2021       Magnesium   Date Value Ref Range Status   02/11/2022 1.7 1.6 - 2.6 mg/dL Final       Lab Results   Component Value Date    WBC 4.51 02/11/2022    HGB 12.6 02/11/2022    HCT 38.8 02/11/2022    MCV 97 02/11/2022     (L) 02/11/2022       Lab Results   Component Value Date    INR 1.1 12/23/2021    INR 1.1 12/22/2021    INR 1.2 12/21/2021       BNP   Date Value Ref Range Status   02/11/2022 183 (H) 0 - 99 pg/mL Final     Comment:     Values of less than 100 pg/ml are consistent with non-CHF populations.   12/02/2021 962 (H) 0 - 99 pg/mL Final     Comment:     Values of less than 100 pg/ml are consistent with non-CHF populations.   11/29/2021 1,029 (H) 0 - 99 pg/mL Final     Comment:     Values of less than 100 pg/ml are consistent with non-CHF populations.       No results found for: LDH    ..  WHO Group:  1 Functional Class: II   REVEAL Score:    Assessment:       1. Primary pulmonary hypertension         Plan:       Increase gabapentin to 300mg at night and 300 mg in the AM.    Schedule a RHC to assess PH after initiation of IV tx, with labs and PFTS on the same day.    Recommend 2 gram sodium restriction and 1500cc fluid restriction.  Encourage physical activity with graded exercise program.  Requested patient to weigh themselves daily, and to notify us if their weight increases by more than 3 lbs in 1 day or 5 lbs in 1  week.

## 2022-02-11 NOTE — PATIENT INSTRUCTIONS
Increase gabapentin to 300mg at night and 300 mg in the AM.    Schedule a RHC, with labs and PFTS on the same day.    Recommend 2 gram sodium restriction and 1500cc fluid restriction.  Encourage physical activity with graded exercise program.  Requested patient to weigh themselves daily, and to notify us if their weight increases by more than 3 lbs in 1 day or 5 lbs in 1 week.

## 2022-02-25 ENCOUNTER — PATIENT MESSAGE (OUTPATIENT)
Dept: TRANSPLANT | Facility: CLINIC | Age: 47
End: 2022-02-25
Payer: MEDICAID

## 2022-02-25 RX ORDER — LOPERAMIDE HYDROCHLORIDE 2 MG/1
2 CAPSULE ORAL 4 TIMES DAILY PRN
Qty: 30 CAPSULE | Refills: 2 | Status: SHIPPED | OUTPATIENT
Start: 2022-02-25 | End: 2022-06-15 | Stop reason: SDUPTHER

## 2022-03-14 ENCOUNTER — LAB VISIT (OUTPATIENT)
Dept: FAMILY MEDICINE | Facility: CLINIC | Age: 47
End: 2022-03-14
Payer: MEDICAID

## 2022-03-14 DIAGNOSIS — R06.02 SHORTNESS OF BREATH: ICD-10-CM

## 2022-03-14 PROCEDURE — U0003 INFECTIOUS AGENT DETECTION BY NUCLEIC ACID (DNA OR RNA); SEVERE ACUTE RESPIRATORY SYNDROME CORONAVIRUS 2 (SARS-COV-2) (CORONAVIRUS DISEASE [COVID-19]), AMPLIFIED PROBE TECHNIQUE, MAKING USE OF HIGH THROUGHPUT TECHNOLOGIES AS DESCRIBED BY CMS-2020-01-R: HCPCS

## 2022-03-14 PROCEDURE — U0005 INFEC AGEN DETEC AMPLI PROBE: HCPCS

## 2022-03-15 LAB
SARS-COV-2 RNA RESP QL NAA+PROBE: NOT DETECTED
SARS-COV-2- CYCLE NUMBER: NORMAL

## 2022-03-16 ENCOUNTER — HOSPITAL ENCOUNTER (OUTPATIENT)
Facility: HOSPITAL | Age: 47
Discharge: HOME | End: 2022-03-16
Attending: INTERNAL MEDICINE | Admitting: INTERNAL MEDICINE
Payer: MEDICAID

## 2022-03-16 VITALS
HEART RATE: 92 BPM | OXYGEN SATURATION: 96 % | BODY MASS INDEX: 22.49 KG/M2 | TEMPERATURE: 97 F | HEIGHT: 64 IN | RESPIRATION RATE: 20 BRPM | SYSTOLIC BLOOD PRESSURE: 113 MMHG | DIASTOLIC BLOOD PRESSURE: 79 MMHG | WEIGHT: 131.75 LBS

## 2022-03-16 DIAGNOSIS — I27.20 PULMONARY HYPERTENSION: ICD-10-CM

## 2022-03-16 PROCEDURE — 93451 PR RIGHT HEART CATH O2 SATURATION & CARDIAC OUTPUT: ICD-10-PCS | Mod: 26,,, | Performed by: INTERNAL MEDICINE

## 2022-03-16 PROCEDURE — C1751 CATH, INF, PER/CENT/MIDLINE: HCPCS | Performed by: INTERNAL MEDICINE

## 2022-03-16 PROCEDURE — C1894 INTRO/SHEATH, NON-LASER: HCPCS | Performed by: INTERNAL MEDICINE

## 2022-03-16 PROCEDURE — 25000003 PHARM REV CODE 250: Performed by: INTERNAL MEDICINE

## 2022-03-16 PROCEDURE — 93451 RIGHT HEART CATH: CPT | Performed by: INTERNAL MEDICINE

## 2022-03-16 PROCEDURE — 93451 RIGHT HEART CATH: CPT | Mod: 26,,, | Performed by: INTERNAL MEDICINE

## 2022-03-16 RX ORDER — LIDOCAINE HYDROCHLORIDE AND EPINEPHRINE 10; 10 MG/ML; UG/ML
INJECTION, SOLUTION INFILTRATION; PERINEURAL
Status: DISCONTINUED | OUTPATIENT
Start: 2022-03-16 | End: 2022-03-16 | Stop reason: HOSPADM

## 2022-03-16 NOTE — Clinical Note
A percutaneous stick to the left internal jugular vein was performed. Ultrasound guidance was used to obtain access.

## 2022-03-16 NOTE — Clinical Note
The PA catheter was repositioned to the main pulmonary artery. Hemodynamics were performed. Cardiac output was obtained at 3 L/min. O2 saturation was measured at 61%. C.O = 2.99  C.I = 1.83

## 2022-03-16 NOTE — Clinical Note
The left neck was prepped. The site was prepped with ChloraPrep. The patient was draped. The patient was positioned supine. The patient was secured with a wedge under the patient.

## 2022-03-16 NOTE — PLAN OF CARE
Pt discharged ambulatory to ride waiting downstairs.Dressing CD&I on Left neck.Dressing post care instructions given and pt verbalized understanding..

## 2022-03-16 NOTE — H&P
[facility] - Short Stay Cardiac Unit  Interventional Cardiology  H&P    Patient Name: Kristin Maier  MRN: [id number]  Admission Date: 3/16/2022  Code Status: Prior   Attending Provider: Hu Silverman MD   Primary Care Physician: Jorge A Stack MD  Principal Problem:<principal problem not specified>    Patient information was obtained from ER records.     Subjective:     Chief Complaint:  dyspnea     HPI:   Patient with PAH here for RHC after initiation of targeted therapy.    Past Medical History:   Diagnosis Date    Essential (primary) hypertension     Essential hypertension       Past Surgical History:   Procedure Laterality Date    APPENDECTOMY       SECTION      Transverse cut    HYSTERECTOMY  2017    TLH- bleeding    OOPHORECTOMY      RIGHT HEART CATHETERIZATION Right 2021    Procedure: INSERTION, CATHETER, RIGHT HEART;  Surgeon: Chloe Gregory MD;  Location: [facility] CATH LAB;  Service: Cardiology;  Laterality: Right;    TUBAL LIGATION      VAGINAL DELIVERY  ; ; ;        Review of patient's allergies indicates:  No Known Allergies    PTA Medications   Medication Sig    gabapentin (NEURONTIN) 300 MG capsule Take 1 capsule (300 mg total) by mouth 2 (two) times daily.    loperamide (IMODIUM) 2 mg capsule Take 1 capsule (2 mg total) by mouth 4 (four) times daily as needed for Diarrhea.    macitentan (OPSUMIT) 10 mg Tab Take 10 mg by mouth once daily. Take one tablet (10 mg) by mouth daily    ondansetron (ZOFRAN) 4 MG tablet Take 1 tablet (4 mg total) by mouth every 8 (eight) hours as needed for Nausea.    REMODULIN 5 mg/mL Soln     sodium chloride 0.9% SolP 100 mL with treprostinil 1 mg/mL Soln 6,000,000 ng Inject 2,145 ng/min into the vein continuous.     Family History     Problem Relation (Age of Onset)    Cancer Father        Tobacco Use    Smoking status: Former Smoker     Years: 5.00     Types: Cigars, Cigarettes     Start date:  2009     Quit date: 2014     Years since quittin.6    Smokeless tobacco: Never Used    Tobacco comment: social smoker-light   Substance and Sexual Activity    Alcohol use: Yes     Comment: socially- 2 or 3 times a week-2 glasses of wine    Drug use: No    Sexual activity: Yes     Partners: Male     Birth control/protection: See Surgical Hx     Comment:      ROS  Objective:     Vital Signs (Most Recent):    Vital Signs (24h Range):           There is no height or weight on file to calculate BMI.            No intake or output data in the 24 hours ending 22 0946    Lines/Drains/Airways     None                 Physical Exam    Significant Labs: ABG: No results for input(s): PH, PCO2, HCO3, POCSATURATED, BE in the last 48 hours.    Significant Imaging: Echocardiogram: 2D echo with color flow doppler: Echo results to be reported separately.   Assessment and Plan:     There are no hospital problems to display for this patient.      Patient agrees with procedure    VTE Risk Mitigation (From admission, onward)    None          Hu Silverman MD  Interventional Cardiology   [facility] - Short Stay Cardiac Unit

## 2022-03-16 NOTE — DISCHARGE SUMMARY
[facility] - Cath Lab  Discharge Note  Short Stay    Procedure(s) (LRB):  INSERTION, CATHETER, RIGHT HEART (Right)    OUTCOME: Patient tolerated treatment/procedure well without complication and is now ready for discharge.    DISPOSITION: Home or Self Care    FINAL DIAGNOSIS:  <principal problem not specified>    FOLLOWUP: In clinic    DISCHARGE INSTRUCTIONS:  No discharge procedures on file.     TIME SPENT ON DISCHARGE: 20 minutes

## 2022-03-16 NOTE — DISCHARGE INSTRUCTIONS

## 2022-03-16 NOTE — PLAN OF CARE
Pt arrived to unit without distress.  remoulin infusing per home pump to r cw central line.  Vss.  Pre op orders and assessment initiated.  Pt in  No acute distress and verbalizes no complaints.  Will continue to monitor.  Call bell within reach.

## 2022-03-16 NOTE — PLAN OF CARE
Pt transferred from cath lab via stretcher.  vss  Post op orders and assessment initiated.  Pt aao, tolerating po.  l neck incision remains cdi.  Discharge orders received and initiated.  Pt in no acute distress and verbalizes no complaints.  Will continue to monitor.  Call bell within reach.

## 2022-03-18 ENCOUNTER — TELEPHONE (OUTPATIENT)
Dept: TRANSPLANT | Facility: CLINIC | Age: 47
End: 2022-03-18
Payer: MEDICAID

## 2022-03-18 ENCOUNTER — PATIENT MESSAGE (OUTPATIENT)
Dept: TRANSPLANT | Facility: CLINIC | Age: 47
End: 2022-03-18
Payer: MEDICAID

## 2022-03-18 NOTE — TELEPHONE ENCOUNTER
Called patient to discuss ANA M Quick recommendations: Kristin Maier - call to say her cath looks improved but could be better, see how she is doing, and if she is agreeable to start Adempas.      Asked per Ana M Quick to speak w/pt regarding initiation of Adempas.  The following items were discussed:       1. The Adempas will be supplied to you by a specialty pharmacy. I will send the referral for your medication to the  of the medication ([facility]) and they will contact you and eventually send the medication referral to the pharmacy. They will tell you about the copay for the med, if there is one at all. If it is too high for you, please let them know this. They can triage you to patient assistance programs, but they can't do so unless you say something. Because the medication also has specific guidelines regarding women and reproduction, they will  you on this as well, even if you are not of childbearing potential (they must  all women). They will arrange to send you the application for your signature, and return to them. You will need a monthly pregnancy test while taking Adempas, and one month after stopping it. Please follow the birth control options as outlined in the medication guide, if you are sexually active. The medication is very harmful to unborn children, so it is important you don't get pregnant while taking Adempas. Patient stated that she has had a complete hysterectomy.    2. After the medication is shipped to you, please do NOT start it right away. Please wait to hear from a specialty pharmacy nurse, who will set up an appointment with you to come to your home to do teaching and assess you for readiness to start the medication (blood pressure, etc). Every few weeks, the nurse will make a visit with you to see how you are doing, and to determine if we can increase the dose. The tablet strengths are 0.5mg, 1.0 mg, 1.5 mg, 2.0 mg and 2.5 mg. You will start at 1 mg three times daily.  Once you've reached the goal dose, you won't likely need additional visits from the nurse.     3. Possible side effects might include low blood pressure, headache, indigestion, dizziness, nosebleeds. Some people don't experience any side effects at all. Please call us with anything that is concerning for you. If you need to take an antacid, please take it one hour before or one hour after the Adempas, as they can lessen the effect of Adempas.     4. If you are ever hospitalized at a facility other than [facility] while taking Adempas, you will need to bring the medication with you, in case it is not on their formulary. Missing more than 3 days of the medication means having to restart at the lowest dose and titrate up again. It is also a good idea to bring your medication with you if you are hospitalized at [facility]. Make sure you let the nurse know you have the medication. We will likely be able to provide you with Adempas while you are in the hospital here.    5. The specialty pharmacy will need to make contact with you monthly to arrange shipment of the medication.   -----------------------  All questions/concerns addressed to patient satisfaction. Will work on Adempas referral and request triage to [facility].    Patient will contact nurse navigator next week to let us know how she would like to proceed forward.

## 2022-03-23 ENCOUNTER — PATIENT MESSAGE (OUTPATIENT)
Dept: TRANSPLANT | Facility: CLINIC | Age: 47
End: 2022-03-23
Payer: MEDICAID

## 2022-03-23 ENCOUNTER — TELEPHONE (OUTPATIENT)
Dept: TRANSPLANT | Facility: CLINIC | Age: 47
End: 2022-03-23
Payer: MEDICAID

## 2022-03-23 NOTE — TELEPHONE ENCOUNTER
Spoke with patient who had additional questions on Adempas. Patient inquired about BP, route of medication and process. Nurse navigator explained that patient is to wait until she has medication and [facility] is able to meet with her before starting medication. [facility] when let her know when to start. She will also need to monitor BP, 90/60 is low normal and we would want to know if she is symptomatic (she can call nurse navigator and/or [facility]). Adempas is an oral medication that she will take 3 times a day, we start her at 1mg and titrate up by 0.5 mg every 2 weeks (as tolerated) until goal of 2.5 mg is reached. Process can take a couple of weeks, referral will be submitted but needs to go to SP for benefits verification, PA may be needed, etc.  Patient verbalized understanding.

## 2022-04-12 ENCOUNTER — PATIENT MESSAGE (OUTPATIENT)
Dept: TRANSPLANT | Facility: CLINIC | Age: 47
End: 2022-04-12
Payer: MEDICAID

## 2022-04-12 RX ORDER — ONDANSETRON 4 MG/1
4 TABLET, FILM COATED ORAL EVERY 8 HOURS PRN
Qty: 30 TABLET | Refills: 3 | Status: SHIPPED | OUTPATIENT
Start: 2022-04-12 | End: 2022-12-07 | Stop reason: SDUPTHER

## 2022-04-13 ENCOUNTER — DOCUMENTATION ONLY (OUTPATIENT)
Dept: TRANSPLANT | Facility: CLINIC | Age: 47
End: 2022-04-13
Payer: MEDICAID

## 2022-04-20 ENCOUNTER — TELEPHONE (OUTPATIENT)
Dept: TRANSPLANT | Facility: CLINIC | Age: 47
End: 2022-04-20
Payer: MEDICAID

## 2022-05-02 ENCOUNTER — PATIENT MESSAGE (OUTPATIENT)
Dept: TRANSPLANT | Facility: CLINIC | Age: 47
End: 2022-05-02
Payer: MEDICAID

## 2022-05-03 DIAGNOSIS — R06.82 TACHYPNEA: ICD-10-CM

## 2022-05-03 DIAGNOSIS — I27.9 CHRONIC PULMONARY HEART DISEASE: ICD-10-CM

## 2022-05-03 DIAGNOSIS — Z79.899 POLYPHARMACY: Primary | ICD-10-CM

## 2022-05-03 RX ORDER — FUROSEMIDE 20 MG/1
20 TABLET ORAL DAILY
Qty: 30 TABLET | Refills: 11 | Status: SHIPPED | OUTPATIENT
Start: 2022-05-03 | End: 2023-05-08

## 2022-05-05 ENCOUNTER — PATIENT MESSAGE (OUTPATIENT)
Dept: TRANSPLANT | Facility: CLINIC | Age: 47
End: 2022-05-05
Payer: MEDICAID

## 2022-05-05 RX ORDER — POTASSIUM CHLORIDE 750 MG/1
20 TABLET, EXTENDED RELEASE ORAL DAILY
Qty: 60 TABLET | Refills: 11 | Status: SHIPPED | OUTPATIENT
Start: 2022-05-05 | End: 2023-05-08

## 2022-05-05 RX ORDER — POTASSIUM CHLORIDE 750 MG/1
10 TABLET, EXTENDED RELEASE ORAL DAILY
COMMUNITY
End: 2022-05-05 | Stop reason: SDUPTHER

## 2022-05-18 ENCOUNTER — PATIENT MESSAGE (OUTPATIENT)
Dept: TRANSPLANT | Facility: CLINIC | Age: 47
End: 2022-05-18
Payer: MEDICAID

## 2022-05-26 ENCOUNTER — TELEPHONE (OUTPATIENT)
Dept: TRANSPLANT | Facility: CLINIC | Age: 47
End: 2022-05-26
Payer: MEDICAID

## 2022-05-26 NOTE — TELEPHONE ENCOUNTER
"Spoke with patient who states that she has not received her Adempas yet but has received her BP cuff and she is going to go see her PCP today to address her "low energy." She will call nurse navigator with update.   "

## 2022-06-06 ENCOUNTER — PATIENT MESSAGE (OUTPATIENT)
Dept: TRANSPLANT | Facility: CLINIC | Age: 47
End: 2022-06-06
Payer: MEDICAID

## 2022-06-06 ENCOUNTER — TELEPHONE (OUTPATIENT)
Dept: TRANSPLANT | Facility: CLINIC | Age: 47
End: 2022-06-06
Payer: MEDICAID

## 2022-06-06 NOTE — TELEPHONE ENCOUNTER
Patient sent message via portal requesting phone call. After speaking with patient, patient states that she received her Adempas over the weekend and was not sure if she should start. NUrse navigator informed her not to start medication without [facility] nursing. Patient will reach out to [facility], José Luis Flowers. Nurse navigator will also reach out to [facility].

## 2022-06-15 ENCOUNTER — OFFICE VISIT (OUTPATIENT)
Dept: TRANSPLANT | Facility: CLINIC | Age: 47
End: 2022-06-15
Payer: MEDICAID

## 2022-06-15 ENCOUNTER — LAB VISIT (OUTPATIENT)
Dept: LAB | Facility: HOSPITAL | Age: 47
End: 2022-06-15
Payer: MEDICAID

## 2022-06-15 ENCOUNTER — HOSPITAL ENCOUNTER (OUTPATIENT)
Dept: PULMONOLOGY | Facility: CLINIC | Age: 47
Discharge: HOME | End: 2022-06-15
Payer: MEDICAID

## 2022-06-15 VITALS
HEIGHT: 67 IN | WEIGHT: 143.06 LBS | HEIGHT: 67 IN | OXYGEN SATURATION: 100 % | HEART RATE: 92 BPM | BODY MASS INDEX: 22.13 KG/M2 | DIASTOLIC BLOOD PRESSURE: 73 MMHG | WEIGHT: 141 LBS | BODY MASS INDEX: 22.45 KG/M2 | SYSTOLIC BLOOD PRESSURE: 105 MMHG

## 2022-06-15 DIAGNOSIS — R09.89 DECREASED CARDIAC OUTPUT: ICD-10-CM

## 2022-06-15 DIAGNOSIS — I27.9 CHRONIC PULMONARY HEART DISEASE: ICD-10-CM

## 2022-06-15 DIAGNOSIS — M79.2 NEUROPATHIC PAIN: ICD-10-CM

## 2022-06-15 DIAGNOSIS — Z79.899 POLYPHARMACY: ICD-10-CM

## 2022-06-15 DIAGNOSIS — Z79.899 HIGH RISK MEDICATION USE: ICD-10-CM

## 2022-06-15 DIAGNOSIS — I27.0 PRIMARY PULMONARY HYPERTENSION: Primary | ICD-10-CM

## 2022-06-15 DIAGNOSIS — R06.82 TACHYPNEA: ICD-10-CM

## 2022-06-15 LAB
ALBUMIN SERPL BCP-MCNC: 3.8 G/DL (ref 3.5–5.2)
ALP SERPL-CCNC: 61 U/L (ref 55–135)
ALT SERPL W/O P-5'-P-CCNC: 40 U/L (ref 10–44)
ANION GAP SERPL CALC-SCNC: 9 MMOL/L (ref 8–16)
AST SERPL-CCNC: 23 U/L (ref 10–40)
BASOPHILS # BLD AUTO: 0.02 K/UL (ref 0–0.2)
BASOPHILS NFR BLD: 0.4 % (ref 0–1.9)
BILIRUB SERPL-MCNC: 2.6 MG/DL (ref 0.1–1)
BNP SERPL-MCNC: 116 PG/ML (ref 0–99)
BUN SERPL-MCNC: 20 MG/DL (ref 6–20)
CALCIUM SERPL-MCNC: 9.2 MG/DL (ref 8.7–10.5)
CHLORIDE SERPL-SCNC: 113 MMOL/L (ref 95–110)
CO2 SERPL-SCNC: 17 MMOL/L (ref 23–29)
CREAT SERPL-MCNC: 0.9 MG/DL (ref 0.5–1.4)
DIFFERENTIAL METHOD: ABNORMAL
EOSINOPHIL # BLD AUTO: 0.1 K/UL (ref 0–0.5)
EOSINOPHIL NFR BLD: 1.6 % (ref 0–8)
ERYTHROCYTE [DISTWIDTH] IN BLOOD BY AUTOMATED COUNT: 13.3 % (ref 11.5–14.5)
EST. GFR  (AFRICAN AMERICAN): >60 ML/MIN/1.73 M^2
EST. GFR  (NON AFRICAN AMERICAN): >60 ML/MIN/1.73 M^2
GLUCOSE SERPL-MCNC: 78 MG/DL (ref 70–110)
HCT VFR BLD AUTO: 40.6 % (ref 37–48.5)
HGB BLD-MCNC: 13.9 G/DL (ref 12–16)
IMM GRANULOCYTES # BLD AUTO: 0.02 K/UL (ref 0–0.04)
IMM GRANULOCYTES NFR BLD AUTO: 0.4 % (ref 0–0.5)
LYMPHOCYTES # BLD AUTO: 1.8 K/UL (ref 1–4.8)
LYMPHOCYTES NFR BLD: 36 % (ref 18–48)
MAGNESIUM SERPL-MCNC: 1.9 MG/DL (ref 1.6–2.6)
MCH RBC QN AUTO: 31.7 PG (ref 27–31)
MCHC RBC AUTO-ENTMCNC: 34.2 G/DL (ref 32–36)
MCV RBC AUTO: 93 FL (ref 82–98)
MONOCYTES # BLD AUTO: 0.4 K/UL (ref 0.3–1)
MONOCYTES NFR BLD: 6.9 % (ref 4–15)
NEUTROPHILS # BLD AUTO: 2.8 K/UL (ref 1.8–7.7)
NEUTROPHILS NFR BLD: 54.7 % (ref 38–73)
NRBC BLD-RTO: 0 /100 WBC
PLATELET # BLD AUTO: 161 K/UL (ref 150–450)
PMV BLD AUTO: 10.6 FL (ref 9.2–12.9)
POTASSIUM SERPL-SCNC: 4.1 MMOL/L (ref 3.5–5.1)
PROT SERPL-MCNC: 6.7 G/DL (ref 6–8.4)
RBC # BLD AUTO: 4.38 M/UL (ref 4–5.4)
SODIUM SERPL-SCNC: 139 MMOL/L (ref 136–145)
WBC # BLD AUTO: 5.06 K/UL (ref 3.9–12.7)

## 2022-06-15 PROCEDURE — 1160F PR REVIEW ALL MEDS BY PRESCRIBER/CLIN PHARMACIST DOCUMENTED: ICD-10-PCS | Mod: CPTII,,,

## 2022-06-15 PROCEDURE — 85025 COMPLETE CBC W/AUTO DIFF WBC: CPT

## 2022-06-15 PROCEDURE — 94618 PULMONARY STRESS TESTING: CPT | Mod: 26,S$PBB,, | Performed by: INTERNAL MEDICINE

## 2022-06-15 PROCEDURE — 99215 OFFICE O/P EST HI 40 MIN: CPT | Mod: PBBFAC,25

## 2022-06-15 PROCEDURE — 94618 PULMONARY STRESS TESTING: ICD-10-PCS | Mod: 26,S$PBB,, | Performed by: INTERNAL MEDICINE

## 2022-06-15 PROCEDURE — 1159F MED LIST DOCD IN RCRD: CPT | Mod: CPTII,,,

## 2022-06-15 PROCEDURE — 83880 ASSAY OF NATRIURETIC PEPTIDE: CPT

## 2022-06-15 PROCEDURE — 3008F BODY MASS INDEX DOCD: CPT | Mod: CPTII,,,

## 2022-06-15 PROCEDURE — 3078F DIAST BP <80 MM HG: CPT | Mod: CPTII,,,

## 2022-06-15 PROCEDURE — 3074F SYST BP LT 130 MM HG: CPT | Mod: CPTII,,,

## 2022-06-15 PROCEDURE — 99999 PR PBB SHADOW E&M-EST. PATIENT-LVL V: CPT | Mod: PBBFAC,,,

## 2022-06-15 PROCEDURE — 3008F PR BODY MASS INDEX (BMI) DOCUMENTED: ICD-10-PCS | Mod: CPTII,,,

## 2022-06-15 PROCEDURE — 36415 COLL VENOUS BLD VENIPUNCTURE: CPT

## 2022-06-15 PROCEDURE — 99214 PR OFFICE/OUTPT VISIT, EST, LEVL IV, 30-39 MIN: ICD-10-PCS | Mod: S$PBB,,,

## 2022-06-15 PROCEDURE — 1159F PR MEDICATION LIST DOCUMENTED IN MEDICAL RECORD: ICD-10-PCS | Mod: CPTII,,,

## 2022-06-15 PROCEDURE — 1160F RVW MEDS BY RX/DR IN RCRD: CPT | Mod: CPTII,,,

## 2022-06-15 PROCEDURE — 83735 ASSAY OF MAGNESIUM: CPT

## 2022-06-15 PROCEDURE — 94618 PULMONARY STRESS TESTING: CPT | Mod: PBBFAC | Performed by: INTERNAL MEDICINE

## 2022-06-15 PROCEDURE — 99999 PR PBB SHADOW E&M-EST. PATIENT-LVL V: ICD-10-PCS | Mod: PBBFAC,,,

## 2022-06-15 PROCEDURE — 99214 OFFICE O/P EST MOD 30 MIN: CPT | Mod: S$PBB,,,

## 2022-06-15 PROCEDURE — 80053 COMPREHEN METABOLIC PANEL: CPT

## 2022-06-15 PROCEDURE — 3074F PR MOST RECENT SYSTOLIC BLOOD PRESSURE < 130 MM HG: ICD-10-PCS | Mod: CPTII,,,

## 2022-06-15 PROCEDURE — 3078F PR MOST RECENT DIASTOLIC BLOOD PRESSURE < 80 MM HG: ICD-10-PCS | Mod: CPTII,,,

## 2022-06-15 RX ORDER — MEGESTROL ACETATE 40 MG/ML
400 SUSPENSION ORAL 2 TIMES DAILY
COMMUNITY
Start: 2022-03-29 | End: 2023-02-06

## 2022-06-15 RX ORDER — LOPERAMIDE HYDROCHLORIDE 2 MG/1
2 CAPSULE ORAL 4 TIMES DAILY PRN
Qty: 30 CAPSULE | Refills: 3 | Status: SHIPPED | OUTPATIENT
Start: 2022-06-15 | End: 2023-07-20 | Stop reason: SDUPTHER

## 2022-06-15 RX ORDER — GABAPENTIN 100 MG/1
100 CAPSULE ORAL 2 TIMES DAILY
Qty: 60 CAPSULE | Refills: 11 | Status: SHIPPED | OUTPATIENT
Start: 2022-06-15 | End: 2022-09-12 | Stop reason: DRUGHIGH

## 2022-06-15 RX ORDER — GABAPENTIN 300 MG/1
300 CAPSULE ORAL NIGHTLY
Qty: 30 CAPSULE | Refills: 11 | Status: SHIPPED | OUTPATIENT
Start: 2022-06-15 | End: 2022-11-14 | Stop reason: ALTCHOICE

## 2022-06-15 NOTE — TELEPHONE ENCOUNTER
Patient presented in clinic today for follow up appointment with ANA M Quick. Patient confirmed that she is currently on Adempas 1mg (started Monday), opsumit 10 mg, and remodulin 80 ng/kg/min. Patient states that she is tolerating the Adempas well so far but that she is extremely fatigued (recently went to PCP and is starting one a day multivitamin). She also has diarrhea about twice a week but imodium helps, nausea about 1-2 week but zofran is working, if she has to stand for more than 30 min than her legs start to hurt and gabapentin does not always help. Patient has also noticed that about once a week her HR goes up as high as 160 and it takes about 20 minutes for it to regulate; she does not have to be doing anything and it just goes up. ANA M Quick notified.

## 2022-06-15 NOTE — PATIENT INSTRUCTIONS
Continue Remodulin at current dose, opsumit, and increasing adempas as tolerate with specialty pharmacy nurse support.    Schedule RHC with STAT CBC, CMP, BNP, MG in 2 months     Recommend 2 gram sodium restriction and 1500cc fluid restriction.  Encourage physical activity with graded exercise program.  Requested patient to weigh themselves daily, and to notify us if their weight increases by more than 3 lbs in 1 day or 5 lbs in 1 week.

## 2022-06-15 NOTE — PROGRESS NOTES
Subjective:    Patient ID:  Kristin Maier is a 46 y.o. female who presents for follow-up of Pulmonary Hypertension.    HPI:   Mrs. Maier is a 45 y/o AA female diagnosed with WHO Group 1 PAH, referred by Dr. MAINE Stack. She was initially seen in clinic by Dr. Dutta on 11/29/2021, presenting with symptoms of CHU and severe PAH by ECHO. Scheduled for RHC the next day. Same day of her appt she went to an OSH for a UTI.  Given her pulmonary hypertension history a decision was made to transfer her to [facility] for further inpatient workup.     Hospital Course: Patient was admitted for evaluation of Pulmonary Hypertension. Work up revealed: HENNA, ESR, RF, Anti CCP, ds DNA Ab, HIV, TSH unremarkable. V/Q and CTA chest showed no evidence of PE. 12/2/21 TTE showed  EF 20, LV diastolic dysfunction, moderate pericardial effusion, moderate RV enlargement with severely reduced RV systolic function. Free wall strain 6-9%, FAC 16%, severe TR, RV pressure volume overload with systolic flattening of IV septum, CVP 15, PASP 103. 12.2.21 RHC revealed: RA: 25/ 26/ 22 RV: 90/ 1/ 21 PA: 86/ 42/ 57 PWP: 9/ 8/ 7. TPG 50, PVR 25 (severely elevated R sided filling pressure. CI 1.2, CO 1.97. Patient was classified as WHO group I PH and started on Veletri while inpatient, patient required  at rate of 5mg/kg/min. Patient had PICC line placed on 12/8/21 for long term administerationof vasodilator therapy. Switched to Remodulin at discharge.    Clinic F/U:   Mrs. Maier is now on Remodulin 80ng/kg/min, Opsumit 10 mg daily, and adempas 1 mg, TID. She is currently titrating adempas to max dose of 2.5 mg, TID. Most recent RHC (March), on 69 ng/kg/min show RA: 8/ 7/ 7 RV: 70/ 7/ 7 PA: 72/ 31/ 46 PWP: 10/ 12/ 8 . Cardiac output was 3.0  by Kristel. Cardiac index is 1.8 L/min/m2.     She feels like she is tolerating her medication okay. Manges side effects with immodium, zofran, and gabapentin. Takes megace to increase her appetite. Doesn't  "get SOB too often, but will if she is walking and talking at the same time. Walked a little bit further than last time, with no desaturation.   Her biggest issue is fatigue. Takes lasix 20 mg, daily, and denies swelling. Has seen her PCP and started on a multivitamin. Also, noticed her HR increasing to as high as "160 about once a week." It's not necessarily associated with activity and can last about 20 minutes.     6MWT:  6MW 6/15/2022   6MWT Status completed without stopping   Patient Reported Dyspnea   Was O2 used? No   6MW Distance walked (feet) 1250   Distance walked (meters) 381   Did patient stop? No   Oxygen Saturation 99   Supplemental Oxygen Room Air   Heart Rate 94   Blood Pressure 97/64   Jessica Dyspnea Rating  very light   Oxygen Saturation 96   Supplemental Oxygen Room Air   Heart Rate 95   Blood Pressure 109/72   Jessica Dyspnea Rating  light   Recovery Time (seconds) 48   Oxygen Saturation 99   Supplemental Oxygen Room Air   Heart Rate 93     ECHO: 12/13/2021 (prior to initiation of remodulin)  · The left ventricle is normal in size with concentric remodeling and normal systolic function.  · The estimated ejection fraction is 63%.  · There are segmental left ventricular wall motion abnormalities.  · There is abnormal septal wall motion.  · Normal left ventricular diastolic function.  · Moderate right ventricular enlargement with mildly reduced right ventricular systolic function.  · Moderate right atrial enlargement.  · Severe tricuspid regurgitation.  · Mild pulmonic regurgitation.  · Elevated central venous pressure (15 mmHg).  · The estimated PA systolic pressure is 122 mmHg.  · There is pulmonary hypertension.  · Small pericardial effusion. Apically and trivial under the RA.    RHC: 3/16/2022  RA: 25/ 26/ 22 RV: 90/ 1/ 21 PA: 86/ 42/ 57 PWP: 9/ 8/ 7 .   Cardiac output was 1.97  by Kristel. Cardiac index is 1.2 L/min/m2.   O2 Sat: PA 38%.     TPG   50    PVR   25    IV/SC:  Pulmonary Hypertension Review " "Flowsheet 6/15/2022   Pump Type CADD   Medication type Remodulin   Vial size 5.0mg/ml 20ml   Dose (ng/kg/min) 80 ng/kg/min   DW (kg) 55.0kg   Amt of Med used 3ml   Amt of Diluent Used NS 97ml   Final Concentration (ng/ml) 0.15mg/ml   Pump Rate ml/24 hr 42ml/24hr     Review of Systems   Constitutional: Negative.   HENT: Negative.    Eyes: Negative.    Cardiovascular: Positive for dyspnea on exertion and leg swelling. Negative for chest pain, irregular heartbeat, near-syncope, orthopnea, paroxysmal nocturnal dyspnea and syncope.        Occasional leg swelling, resolves w/o diuretics   Respiratory: Negative.    Endocrine: Negative.    Hematologic/Lymphatic: Negative.    Skin: Negative.    Musculoskeletal: Positive for myalgias. Negative for falls and joint swelling.        Foot pain related to medication SE   Gastrointestinal: Positive for diarrhea and nausea. Negative for abdominal pain, dysphagia and heartburn.        Occasional nausea and diarrhea related to medication   Genitourinary: Negative.    Neurological: Positive for dizziness. Negative for excessive daytime sleepiness, headaches and loss of balance.   Psychiatric/Behavioral: Negative.         Objective: /73 (BP Location: Left arm, Patient Position: Sitting, BP Method: Medium (Automatic))   Pulse 92   Ht 5' 7" (1.702 m)   Wt 64.9 kg (143 lb 1.3 oz)   LMP 06/19/2017 (Approximate)   SpO2 100%   BMI 22.41 kg/m²  - /107      Physical Exam  Constitutional:       General: She is not in acute distress.     Appearance: Normal appearance. She is normal weight. She is not ill-appearing.   HENT:      Head: Normocephalic and atraumatic.      Nose: Nose normal.   Eyes:      Extraocular Movements: Extraocular movements intact.      Pupils: Pupils are equal, round, and reactive to light.   Neck:      Vascular: No JVD.   Cardiovascular:      Rate and Rhythm: Normal rate and regular rhythm.   Chest:      Comments: R chest wall Pedro Catheter infusing " Remodulin. Dressing is C/D/I  Musculoskeletal:      Cervical back: Normal range of motion and neck supple.   Neurological:      Mental Status: She is alert.          WHO Group:  1 Functional Class: II   REVEAL Score:    Assessment:       1. Primary pulmonary hypertension    2. High risk medication use    3. Decreased cardiac output    4. Neuropathic pain         Plan:       Continue Remodulin at current dose, opsumit, and increasing adempas as tolerate with specialty pharmacy nurse support.    Mrs. Maier has not had PFTs ordered in February so will schedule those.    Schedule repeat ECHO now.    Schedule RHC with STAT CBC, CMP, BNP, MG in 2 months     Recommend 2 gram sodium restriction and 1500cc fluid restriction.  Encourage physical activity with graded exercise program.  Requested patient to weigh themselves daily, and to notify us if their weight increases by more than 3 lbs in 1 day or 5 lbs in 1 week.

## 2022-06-16 NOTE — PROCEDURES
Kristin Maier is a 46 y.o.  female patient, who presents for a 6 minute walk test ordered by ANA M Guy.  The diagnosis is Pulmonary Hypertension.  The patient's BMI is 22.1 kg/m2.  Predicted distance (lower limit of normal) is 460.26 meters.      Test Results:    The test was completed without stopping.  The total time walked was 360 seconds.  During walking, the patient reported:  Dyspnea.  The patient used no assistive devices during testing.     06/15/2022---------Distance: 381 meters (1250 feet)     O2 Sat % Supplemental Oxygen Heart Rate Blood Pressure Jessica Scale   Pre-exercise  (Resting) 99 % Room Air 94 bpm 97/64 mmHg 1   During Exercise 96 % Room Air 95 bpm 109/72 mmHg 2   Post-exercise  (Recovery) 99 % Room Air  93 bpm       Recovery Time: 48 seconds    Performing nurse/tech: SERA Ring      PREVIOUS STUDY:   02/11/2022---------Distance: 365.76 meters (1200 feet)       O2 Sat % Supplemental Oxygen Heart Rate Blood Pressure Jessica Scale   Pre-exercise  (Resting) 99 % Room Air 89 bpm 138/95 mmHg 0   During Exercise 100 % Room Air 76 bpm 136/93 mmHg 0   Post-exercise  (Recovery) 100 % Room Air  77 bpm           CLINICAL INTERPRETATION:  Six minute walk distance is 381 meters (1250 feet) with light dyspnea.  During exercise, there was desaturation while breathing room air.  Both blood pressure and heart rate remained stable with walking.  The patient did not report non-pulmonary symptoms during exercise.  Since the previous study in February 2022, exercise capacity is unchanged.  Based upon age and body mass index, exercise capacity is less than predicted.

## 2022-06-21 ENCOUNTER — PATIENT MESSAGE (OUTPATIENT)
Dept: TRANSPLANT | Facility: CLINIC | Age: 47
End: 2022-06-21
Payer: MEDICAID

## 2022-06-21 PROBLEM — Z79.899 HIGH RISK MEDICATION USE: Status: ACTIVE | Noted: 2022-06-21

## 2022-06-21 PROBLEM — R09.89 DECREASED CARDIAC OUTPUT: Status: ACTIVE | Noted: 2022-06-21

## 2022-06-27 ENCOUNTER — TELEPHONE (OUTPATIENT)
Dept: PULMONOLOGY | Facility: CLINIC | Age: 47
End: 2022-06-27
Payer: MEDICAID

## 2022-06-27 ENCOUNTER — TELEPHONE (OUTPATIENT)
Dept: TRANSPLANT | Facility: CLINIC | Age: 47
End: 2022-06-27
Payer: MEDICAID

## 2022-06-27 NOTE — TELEPHONE ENCOUNTER
I called Mrs Maier and left a message that I can assist her and I will call her back before I leave today. She may be calling to schedule PFTS.I will continue to try to reach her. Sienna Dias LPN

## 2022-06-27 NOTE — TELEPHONE ENCOUNTER
----- Message from Leanne Arce sent at 6/27/2022  2:31 PM CDT -----  Regarding: tonja  The pt is calling to speak with you. Please call her back @ [phone]. Thanks, Leanne

## 2022-07-05 ENCOUNTER — TELEPHONE (OUTPATIENT)
Dept: TRANSPLANT | Facility: CLINIC | Age: 47
End: 2022-07-05
Payer: MEDICAID

## 2022-07-05 ENCOUNTER — PATIENT MESSAGE (OUTPATIENT)
Dept: TRANSPLANT | Facility: CLINIC | Age: 47
End: 2022-07-05
Payer: MEDICAID

## 2022-07-11 ENCOUNTER — HOSPITAL ENCOUNTER (OUTPATIENT)
Dept: RADIOLOGY | Facility: HOSPITAL | Age: 47
Discharge: HOME | End: 2022-07-11
Payer: MEDICAID

## 2022-07-11 DIAGNOSIS — R19.00 ABDOMINAL MASS, UNSPECIFIED ABDOMINAL LOCATION: ICD-10-CM

## 2022-07-11 PROCEDURE — 76705 ECHO EXAM OF ABDOMEN: CPT | Mod: 26,,, | Performed by: RADIOLOGY

## 2022-07-11 PROCEDURE — 76705 US SOFT TISSUE, ABDOMEN: ICD-10-PCS | Mod: 26,,, | Performed by: RADIOLOGY

## 2022-07-11 PROCEDURE — 76705 ECHO EXAM OF ABDOMEN: CPT | Mod: TC

## 2022-07-14 ENCOUNTER — PATIENT MESSAGE (OUTPATIENT)
Dept: TRANSPLANT | Facility: CLINIC | Age: 47
End: 2022-07-14
Payer: MEDICAID

## 2022-07-14 DIAGNOSIS — Z13.9 SCREENING PROCEDURE: Primary | ICD-10-CM

## 2022-07-17 ENCOUNTER — LAB VISIT (OUTPATIENT)
Dept: URGENT CARE | Facility: CLINIC | Age: 47
End: 2022-07-17
Payer: MEDICAID

## 2022-07-17 DIAGNOSIS — Z13.9 SCREENING PROCEDURE: ICD-10-CM

## 2022-07-17 LAB — SARS-COV-2 RNA RESP QL NAA+PROBE: NOT DETECTED

## 2022-07-17 PROCEDURE — U0003 INFECTIOUS AGENT DETECTION BY NUCLEIC ACID (DNA OR RNA); SEVERE ACUTE RESPIRATORY SYNDROME CORONAVIRUS 2 (SARS-COV-2) (CORONAVIRUS DISEASE [COVID-19]), AMPLIFIED PROBE TECHNIQUE, MAKING USE OF HIGH THROUGHPUT TECHNOLOGIES AS DESCRIBED BY CMS-2020-01-R: HCPCS

## 2022-07-17 PROCEDURE — U0005 INFEC AGEN DETEC AMPLI PROBE: HCPCS

## 2022-07-19 ENCOUNTER — HOSPITAL ENCOUNTER (OUTPATIENT)
Facility: HOSPITAL | Age: 47
Discharge: HOME | End: 2022-07-19
Attending: INTERNAL MEDICINE | Admitting: INTERNAL MEDICINE
Payer: MEDICAID

## 2022-07-19 VITALS
HEART RATE: 85 BPM | DIASTOLIC BLOOD PRESSURE: 68 MMHG | SYSTOLIC BLOOD PRESSURE: 101 MMHG | WEIGHT: 140.19 LBS | RESPIRATION RATE: 18 BRPM | HEIGHT: 62 IN | TEMPERATURE: 98 F | BODY MASS INDEX: 25.8 KG/M2 | OXYGEN SATURATION: 98 %

## 2022-07-19 DIAGNOSIS — I27.20 PULMONARY HYPERTENSION: ICD-10-CM

## 2022-07-19 PROCEDURE — C1751 CATH, INF, PER/CENT/MIDLINE: HCPCS | Performed by: INTERNAL MEDICINE

## 2022-07-19 PROCEDURE — 93451 PR RIGHT HEART CATH O2 SATURATION & CARDIAC OUTPUT: ICD-10-PCS | Mod: 26,,, | Performed by: INTERNAL MEDICINE

## 2022-07-19 PROCEDURE — C1894 INTRO/SHEATH, NON-LASER: HCPCS | Performed by: INTERNAL MEDICINE

## 2022-07-19 PROCEDURE — 93451 RIGHT HEART CATH: CPT | Mod: 26,,, | Performed by: INTERNAL MEDICINE

## 2022-07-19 PROCEDURE — 93451 RIGHT HEART CATH: CPT | Performed by: INTERNAL MEDICINE

## 2022-07-19 PROCEDURE — 25000003 PHARM REV CODE 250: Performed by: INTERNAL MEDICINE

## 2022-07-19 RX ORDER — LIDOCAINE HYDROCHLORIDE AND EPINEPHRINE 10; 10 MG/ML; UG/ML
INJECTION, SOLUTION INFILTRATION; PERINEURAL
Status: DISCONTINUED | OUTPATIENT
Start: 2022-07-19 | End: 2022-07-19 | Stop reason: HOSPADM

## 2022-07-19 RX ORDER — SODIUM CHLORIDE 0.9 G/100ML
IRRIGANT IRRIGATION
Status: DISCONTINUED | OUTPATIENT
Start: 2022-07-19 | End: 2022-07-19 | Stop reason: HOSPADM

## 2022-07-19 NOTE — Clinical Note
The catheter was repositioned into the pulmonary artery. Hemodynamics were performed.  The patient's O2 saturation measured 69%.

## 2022-07-19 NOTE — H&P
Subjective:    Patient ID:  Kristin Maier is a 46 y.o. female who presents for follow-up of Pulmonary Hypertension.    HPI:   Mrs. Maier is a 47 y/o AA female diagnosed with WHO Group 1 PAH, referred by Dr. MAINE Stack. She was initially seen in clinic by Dr. Dutta on 11/29/2021, presenting with symptoms of CHU and severe PAH by ECHO. Scheduled for RHC the next day. Same day of her appt she went to an OSH for a UTI.  Given her pulmonary hypertension history a decision was made [place] [facility] [place]     Hospital Course: Patient was admitted for evaluation of Pulmonary Hypertension. Work up revealed: HENNA, ESR, RF, Anti CCP, ds DNA Ab, HIV, TSH unremarkable. V/Q and CTA chest showed no evidence of PE. 12/2/21 TTE showed  EF 20, LV diastolic dysfunction, moderate pericardial effusion, moderate RV enlargement with severely reduced RV systolic function. Free wall strain 6-9%, FAC 16%, severe TR, RV pressure volume overload with systolic flattening of IV septum, CVP 15, PASP 103. 12.2.21 RHC revealed: RA: 25/ 26/ 22 RV: 90/ 1/ 21 PA: 86/ 42/ 57 PWP: 9/ 8/ 7. TPG 50, PVR 25 (severely elevated R sided filling pressure. CI 1.2, CO 1.97. Patient was classified as WHO group I PH and started on Veletri while inpatient, patient required  at rate of 5mg/kg/min. Patient had PICC line placed on 12/8/21 for long term administerationof vasodilator therapy. Switched to Remodulin at discharge.    Clinic F/U:   Mrs. Maier is now on Remodulin 80ng/kg/min, Opsumit 10 mg daily, and adempas 1 mg, TID. She is currently titrating adempas to max dose of 2.5 mg, TID. Most recent RHC (March), on 69 ng/kg/min show RA: 8/ 7/ 7 RV: 70/ 7/ 7 PA: 72/ 31/ 46 PWP: 10/ 12/ 8 . Cardiac output was 3.0  by Kristel. Cardiac index is 1.8 L/min/m2.     Patient presents for RHC to evaluate filling pressures and cardiac flows. Stable for now, with some heart rate issues and CHU with exertion. Otherwise no major  changes from visit last month with ANA M Guy.     6MWT:  6MW 6/15/2022   6MWT Status completed without stopping   Patient Reported Dyspnea   Was O2 used? No   6MW Distance walked (feet) 1250   Distance walked (meters) 381   Did patient stop? No   Oxygen Saturation 99   Supplemental Oxygen Room Air   Heart Rate 94   Blood Pressure 97/64   Jessica Dyspnea Rating  very light   Oxygen Saturation 96   Supplemental Oxygen Room Air   Heart Rate 95   Blood Pressure 109/72   Jessica Dyspnea Rating  light   Recovery Time (seconds) 48   Oxygen Saturation 99   Supplemental Oxygen Room Air   Heart Rate 93     ECHO: 12/13/2021 (prior to initiation of remodulin)  · The left ventricle is normal in size with concentric remodeling and normal systolic function.  · The estimated ejection fraction is 63%.  · There are segmental left ventricular wall motion abnormalities.  · There is abnormal septal wall motion.  · Normal left ventricular diastolic function.  · Moderate right ventricular enlargement with mildly reduced right ventricular systolic function.  · Moderate right atrial enlargement.  · Severe tricuspid regurgitation.  · Mild pulmonic regurgitation.  · Elevated central venous pressure (15 mmHg).  · The estimated PA systolic pressure is 122 mmHg.  · There is pulmonary hypertension.  · Small pericardial effusion. Apically and trivial under the RA.    RHC: 3/16/2022  RA: 25/ 26/ 22 RV: 90/ 1/ 21 PA: 86/ 42/ 57 PWP: 9/ 8/ 7 .   Cardiac output was 1.97  by Kristel. Cardiac index is 1.2 L/min/m2.   O2 Sat: PA 38%.     TPG   50    PVR   25    IV/SC:  Pulmonary Hypertension Review Flowsheet 6/15/2022   Pump Type CADD   Medication type Remodulin   Vial size 5.0mg/ml 20ml   Dose (ng/kg/min) 80 ng/kg/min   DW (kg) 55.0kg   Amt of Med used 3ml   Amt of Diluent Used NS 97ml   Final Concentration (ng/ml) 0.15mg/ml   Pump Rate ml/24 hr 42ml/24hr     Review of Systems   Constitutional: Negative for chills, decreased appetite, diaphoresis, fever,  malaise/fatigue, weight gain and weight loss.   Eyes: Negative for visual disturbance.   Cardiovascular: Negative for chest pain, claudication, cyanosis, dyspnea on exertion, irregular heartbeat, leg swelling, near-syncope, orthopnea, palpitations, paroxysmal nocturnal dyspnea and syncope.   Respiratory: Negative for cough, hemoptysis, shortness of breath, sleep disturbances due to breathing, snoring, sputum production and wheezing.    Hematologic/Lymphatic: Negative for adenopathy and bleeding problem. Does not bruise/bleed easily.   Skin: Negative for color change, poor wound healing, rash, skin cancer and suspicious lesions.   Musculoskeletal: Negative for back pain, falls, gout, joint pain and muscle weakness.   Gastrointestinal: Negative for bloating, abdominal pain, anorexia, constipation, diarrhea, heartburn, hematemesis, hematochezia, hemorrhoids, melena, nausea and vomiting.   Genitourinary: Negative for nocturia and urgency.   Neurological: Negative for excessive daytime sleepiness, dizziness, focal weakness, headaches, light-headedness, paresthesias, tremors and weakness.   Psychiatric/Behavioral: Negative for depression and memory loss. The patient does not have insomnia and is not nervous/anxious.         Objective: LMP 06/19/2017 (Approximate)  - /107      Physical Exam     WHO Group:  1 Functional Class: II   REVEAL Score:    Assessment:       1. Pulmonary hypertension         Plan:       Patient presents for RHC, will proceed with procedure.  I have explained the risks, benefits, and alternatives of the procedure in detail.  The patient voices understanding and all questions have been answered.  The patient agrees to proceed as planned.

## 2022-07-19 NOTE — PATIENT INSTRUCTIONS

## 2022-07-19 NOTE — DISCHARGE INSTRUCTIONS

## 2022-07-19 NOTE — DISCHARGE SUMMARY
[facility] - Cath Lab  Discharge Note  Short Stay    Procedure(s) (LRB):  INSERTION, CATHETER, RIGHT HEART (Right)    OUTCOME: Patient tolerated treatment/procedure well without complication and is now ready for discharge.    DISPOSITION: Home or Self Care    FINAL DIAGNOSIS:  <principal problem not specified>    FOLLOWUP: In clinic    DISCHARGE INSTRUCTIONS:  No discharge procedures on file.     TIME SPENT ON DISCHARGE: 25 minutes

## 2022-07-19 NOTE — BRIEF OP NOTE
Patient tolerated the procedure well. Please see complete RHC procedure note for full details and results. Stable to return from cath lab to their hospital room.  Procedure: Right heart catheterization   Procedure date: 07/19/22    Discharge date: 07/19/22  Estimated blood loss: less than 10 cc  Complications: none  Condition at discharge: stable  Discharge instructions: no heavy lifting greater than 5 lbs and no bearing down/coughing without holding pressure over incision site.  Activity: as tolerated  Diet: as tolerated  Dispo: home

## 2022-07-19 NOTE — NURSING
Received patient from cath lab via stretcher, left IJ with dressing clean, dry and intact, no bleeding, no hematoma, reoriented to environment, will continue to monitor.

## 2022-07-19 NOTE — NURSING
Patient discharged to home via wheelchair with family member at side, d/c instructions given-verbalized understanding, d/c complete.

## 2022-09-13 ENCOUNTER — PATIENT MESSAGE (OUTPATIENT)
Dept: TRANSPLANT | Facility: CLINIC | Age: 47
End: 2022-09-13
Payer: MEDICAID

## 2022-09-13 PROBLEM — R53.1 ACUTE LEFT-SIDED WEAKNESS: Status: ACTIVE | Noted: 2022-09-13

## 2022-09-13 PROBLEM — J96.01 ACUTE RESPIRATORY FAILURE WITH HYPOXIA: Status: ACTIVE | Noted: 2022-09-13

## 2022-09-14 ENCOUNTER — HOSPITAL ENCOUNTER (OUTPATIENT)
Facility: HOSPITAL | Age: 47
Discharge: HOME | End: 2022-09-15
Attending: INTERNAL MEDICINE | Admitting: INTERNAL MEDICINE
Payer: MEDICAID

## 2022-09-14 DIAGNOSIS — R53.1 LEFT-SIDED WEAKNESS: ICD-10-CM

## 2022-09-14 DIAGNOSIS — I49.9 ARRHYTHMIA: ICD-10-CM

## 2022-09-14 DIAGNOSIS — R07.9 CHEST PAIN: ICD-10-CM

## 2022-09-14 LAB
ALBUMIN SERPL BCP-MCNC: 4 G/DL (ref 3.5–5.2)
ALP SERPL-CCNC: 67 U/L (ref 55–135)
ALT SERPL W/O P-5'-P-CCNC: 10 U/L (ref 10–44)
ANION GAP SERPL CALC-SCNC: 8 MMOL/L (ref 8–16)
AST SERPL-CCNC: 15 U/L (ref 10–40)
BASOPHILS # BLD AUTO: 0.01 K/UL (ref 0–0.2)
BASOPHILS NFR BLD: 0.1 % (ref 0–1.9)
BILIRUB SERPL-MCNC: 0.9 MG/DL (ref 0.1–1)
BUN SERPL-MCNC: 20 MG/DL (ref 6–20)
CALCIUM SERPL-MCNC: 9.6 MG/DL (ref 8.7–10.5)
CHLORIDE SERPL-SCNC: 111 MMOL/L (ref 95–110)
CO2 SERPL-SCNC: 16 MMOL/L (ref 23–29)
CREAT SERPL-MCNC: 0.9 MG/DL (ref 0.5–1.4)
DIFFERENTIAL METHOD: ABNORMAL
EOSINOPHIL # BLD AUTO: 0.1 K/UL (ref 0–0.5)
EOSINOPHIL NFR BLD: 0.7 % (ref 0–8)
ERYTHROCYTE [DISTWIDTH] IN BLOOD BY AUTOMATED COUNT: 13.4 % (ref 11.5–14.5)
EST. GFR  (NO RACE VARIABLE): >60 ML/MIN/1.73 M^2
GLUCOSE SERPL-MCNC: 107 MG/DL (ref 70–110)
HCT VFR BLD AUTO: 33.4 % (ref 37–48.5)
HGB BLD-MCNC: 11.2 G/DL (ref 12–16)
IMM GRANULOCYTES # BLD AUTO: 0.02 K/UL (ref 0–0.04)
IMM GRANULOCYTES NFR BLD AUTO: 0.3 % (ref 0–0.5)
LYMPHOCYTES # BLD AUTO: 1.8 K/UL (ref 1–4.8)
LYMPHOCYTES NFR BLD: 24.6 % (ref 18–48)
MAGNESIUM SERPL-MCNC: 1.8 MG/DL (ref 1.6–2.6)
MCH RBC QN AUTO: 31.6 PG (ref 27–31)
MCHC RBC AUTO-ENTMCNC: 33.5 G/DL (ref 32–36)
MCV RBC AUTO: 94 FL (ref 82–98)
MONOCYTES # BLD AUTO: 0.4 K/UL (ref 0.3–1)
MONOCYTES NFR BLD: 5.2 % (ref 4–15)
NEUTROPHILS # BLD AUTO: 5.2 K/UL (ref 1.8–7.7)
NEUTROPHILS NFR BLD: 69.1 % (ref 38–73)
NRBC BLD-RTO: 0 /100 WBC
PLATELET # BLD AUTO: 151 K/UL (ref 150–450)
PMV BLD AUTO: 10.6 FL (ref 9.2–12.9)
POTASSIUM SERPL-SCNC: 3.9 MMOL/L (ref 3.5–5.1)
PROT SERPL-MCNC: 7.3 G/DL (ref 6–8.4)
RBC # BLD AUTO: 3.54 M/UL (ref 4–5.4)
SODIUM SERPL-SCNC: 135 MMOL/L (ref 136–145)
WBC # BLD AUTO: 7.45 K/UL (ref 3.9–12.7)

## 2022-09-14 PROCEDURE — 85025 COMPLETE CBC W/AUTO DIFF WBC: CPT | Mod: 91 | Performed by: STUDENT IN AN ORGANIZED HEALTH CARE EDUCATION/TRAINING PROGRAM

## 2022-09-14 PROCEDURE — 36415 COLL VENOUS BLD VENIPUNCTURE: CPT | Performed by: STUDENT IN AN ORGANIZED HEALTH CARE EDUCATION/TRAINING PROGRAM

## 2022-09-14 PROCEDURE — 93010 ELECTROCARDIOGRAM REPORT: CPT | Mod: ,,, | Performed by: INTERNAL MEDICINE

## 2022-09-14 PROCEDURE — G0378 HOSPITAL OBSERVATION PER HR: HCPCS

## 2022-09-14 PROCEDURE — 93005 ELECTROCARDIOGRAM TRACING: CPT

## 2022-09-14 PROCEDURE — G0379 DIRECT REFER HOSPITAL OBSERV: HCPCS

## 2022-09-14 PROCEDURE — 83735 ASSAY OF MAGNESIUM: CPT | Performed by: STUDENT IN AN ORGANIZED HEALTH CARE EDUCATION/TRAINING PROGRAM

## 2022-09-14 PROCEDURE — 80053 COMPREHEN METABOLIC PANEL: CPT | Mod: 91 | Performed by: STUDENT IN AN ORGANIZED HEALTH CARE EDUCATION/TRAINING PROGRAM

## 2022-09-14 PROCEDURE — 93010 EKG 12-LEAD: ICD-10-PCS | Mod: ,,, | Performed by: INTERNAL MEDICINE

## 2022-09-14 PROCEDURE — 25000003 PHARM REV CODE 250: Performed by: STUDENT IN AN ORGANIZED HEALTH CARE EDUCATION/TRAINING PROGRAM

## 2022-09-14 RX ORDER — NALOXONE HCL 0.4 MG/ML
0.02 VIAL (ML) INJECTION
Status: DISCONTINUED | OUTPATIENT
Start: 2022-09-14 | End: 2022-09-15 | Stop reason: HOSPADM

## 2022-09-14 RX ORDER — ONDANSETRON 4 MG/1
4 TABLET, FILM COATED ORAL EVERY 8 HOURS PRN
Status: DISCONTINUED | OUTPATIENT
Start: 2022-09-14 | End: 2022-09-15 | Stop reason: HOSPADM

## 2022-09-14 RX ORDER — GABAPENTIN 300 MG/1
300 CAPSULE ORAL NIGHTLY
Status: DISCONTINUED | OUTPATIENT
Start: 2022-09-14 | End: 2022-09-15 | Stop reason: HOSPADM

## 2022-09-14 RX ORDER — ACETAMINOPHEN 325 MG/1
650 TABLET ORAL EVERY 8 HOURS PRN
Status: DISCONTINUED | OUTPATIENT
Start: 2022-09-14 | End: 2022-09-15 | Stop reason: HOSPADM

## 2022-09-14 RX ORDER — SODIUM CHLORIDE 0.9 % (FLUSH) 0.9 %
10 SYRINGE (ML) INJECTION EVERY 12 HOURS PRN
Status: DISCONTINUED | OUTPATIENT
Start: 2022-09-14 | End: 2022-09-15 | Stop reason: HOSPADM

## 2022-09-14 RX ORDER — GABAPENTIN 300 MG/1
300 CAPSULE ORAL NIGHTLY
Status: DISCONTINUED | OUTPATIENT
Start: 2022-09-15 | End: 2022-09-14

## 2022-09-14 RX ORDER — HEPARIN SODIUM 5000 [USP'U]/ML
5000 INJECTION, SOLUTION INTRAVENOUS; SUBCUTANEOUS EVERY 8 HOURS
Status: DISCONTINUED | OUTPATIENT
Start: 2022-09-15 | End: 2022-09-15 | Stop reason: HOSPADM

## 2022-09-14 RX ORDER — TALC
6 POWDER (GRAM) TOPICAL NIGHTLY PRN
Status: DISCONTINUED | OUTPATIENT
Start: 2022-09-14 | End: 2022-09-15 | Stop reason: HOSPADM

## 2022-09-14 RX ORDER — FUROSEMIDE 20 MG/1
20 TABLET ORAL DAILY
Status: DISCONTINUED | OUTPATIENT
Start: 2022-09-15 | End: 2022-09-15 | Stop reason: HOSPADM

## 2022-09-14 RX ORDER — LOPERAMIDE HYDROCHLORIDE 2 MG/1
2 CAPSULE ORAL 4 TIMES DAILY PRN
Status: DISCONTINUED | OUTPATIENT
Start: 2022-09-14 | End: 2022-09-15 | Stop reason: HOSPADM

## 2022-09-14 RX ADMIN — GABAPENTIN 300 MG: 300 CAPSULE ORAL at 11:09

## 2022-09-14 RX ADMIN — ACETAMINOPHEN 650 MG: 325 TABLET ORAL at 11:09

## 2022-09-15 VITALS
TEMPERATURE: 98 F | WEIGHT: 147.81 LBS | SYSTOLIC BLOOD PRESSURE: 96 MMHG | HEART RATE: 78 BPM | DIASTOLIC BLOOD PRESSURE: 65 MMHG | BODY MASS INDEX: 27.2 KG/M2 | HEIGHT: 62 IN | OXYGEN SATURATION: 99 % | RESPIRATION RATE: 24 BRPM

## 2022-09-15 PROCEDURE — 25000242 PHARM REV CODE 250 ALT 637 W/ HCPCS: Performed by: STUDENT IN AN ORGANIZED HEALTH CARE EDUCATION/TRAINING PROGRAM

## 2022-09-15 PROCEDURE — G0378 HOSPITAL OBSERVATION PER HR: HCPCS

## 2022-09-15 PROCEDURE — 25000003 PHARM REV CODE 250: Performed by: INTERNAL MEDICINE

## 2022-09-15 PROCEDURE — 63600175 PHARM REV CODE 636 W HCPCS: Performed by: STUDENT IN AN ORGANIZED HEALTH CARE EDUCATION/TRAINING PROGRAM

## 2022-09-15 PROCEDURE — 96372 THER/PROPH/DIAG INJ SC/IM: CPT | Performed by: STUDENT IN AN ORGANIZED HEALTH CARE EDUCATION/TRAINING PROGRAM

## 2022-09-15 PROCEDURE — 99226 PR SUBSEQUENT OBSERVATION CARE,LEVEL III: CPT | Mod: ,,, | Performed by: INTERNAL MEDICINE

## 2022-09-15 PROCEDURE — 25000003 PHARM REV CODE 250: Performed by: STUDENT IN AN ORGANIZED HEALTH CARE EDUCATION/TRAINING PROGRAM

## 2022-09-15 PROCEDURE — 99226 PR SUBSEQUENT OBSERVATION CARE,LEVEL III: ICD-10-PCS | Mod: ,,, | Performed by: INTERNAL MEDICINE

## 2022-09-15 RX ORDER — FLUTICASONE PROPIONATE 50 MCG
1 SPRAY, SUSPENSION (ML) NASAL DAILY
Status: DISCONTINUED | OUTPATIENT
Start: 2022-09-15 | End: 2022-09-15 | Stop reason: HOSPADM

## 2022-09-15 RX ORDER — ACETAMINOPHEN 325 MG/1
325 TABLET ORAL ONCE
Status: COMPLETED | OUTPATIENT
Start: 2022-09-15 | End: 2022-09-15

## 2022-09-15 RX ADMIN — RIOCIGUAT 2.5 MG: 2.5 TABLET, FILM COATED ORAL at 02:09

## 2022-09-15 RX ADMIN — FUROSEMIDE 20 MG: 20 TABLET ORAL at 08:09

## 2022-09-15 RX ADMIN — Medication 6 MG: at 01:09

## 2022-09-15 RX ADMIN — ACETAMINOPHEN 650 MG: 325 TABLET ORAL at 02:09

## 2022-09-15 RX ADMIN — ACETAMINOPHEN 325 MG: 325 TABLET ORAL at 04:09

## 2022-09-15 RX ADMIN — HEPARIN SODIUM 5000 UNITS: 5000 INJECTION INTRAVENOUS; SUBCUTANEOUS at 05:09

## 2022-09-15 RX ADMIN — FLUTICASONE PROPIONATE 50 MCG: 50 SPRAY, METERED NASAL at 12:09

## 2022-09-15 RX ADMIN — RIOCIGUAT 2.5 MG: 2.5 TABLET, FILM COATED ORAL at 01:09

## 2022-09-15 RX ADMIN — MACITENTAN 10 MG: 10 TABLET, FILM COATED ORAL at 11:09

## 2022-09-15 RX ADMIN — RIOCIGUAT 2.5 MG: 2.5 TABLET, FILM COATED ORAL at 08:09

## 2022-09-15 NOTE — PROGRESS NOTES
Pt given dc paperwork.  Verbalized understanding and had no questions.  Prn tylenol given for headache.  Piv and tele removed.  RSC permcath with home remod infusing. Awaiting daughters arrival to hospital and then this rn will wheel her down to front of hospital

## 2022-09-15 NOTE — SUBJECTIVE & OBJECTIVE
Past Medical History:   Diagnosis Date    Essential (primary) hypertension     Essential hypertension       Past Surgical History:   Procedure Laterality Date    APPENDECTOMY       SECTION      Transverse cut    HYSTERECTOMY  2017    TLH- bleeding    OOPHORECTOMY      RIGHT HEART CATHETERIZATION Right 2021    Procedure: INSERTION, CATHETER, RIGHT HEART;  Surgeon: Chloe Gregory MD;  Location: [facility] CATH LAB;  Service: Cardiology;  Laterality: Right;    RIGHT HEART CATHETERIZATION Right 3/16/2022    Procedure: INSERTION, CATHETER, RIGHT HEART;  Surgeon: Hu Silverman MD;  Location: [facility] CATH LAB;  Service: Cardiology;  Laterality: Right;    RIGHT HEART CATHETERIZATION Right 2022    Procedure: INSERTION, CATHETER, RIGHT HEART;  Surgeon: Chloe Gregory MD;  Location: [facility] CATH LAB;  Service: Cardiology;  Laterality: Right;    TUBAL LIGATION      VAGINAL DELIVERY  ; ; ;        Review of patient's allergies indicates:  No Known Allergies    Current Facility-Administered Medications   Medication    acetaminophen tablet 650 mg    [START ON 9/15/2022] furosemide tablet 20 mg    [START ON 9/15/2022] gabapentin capsule 300 mg    [START ON 9/15/2022] heparin (porcine) injection 5,000 Units    loperamide capsule 2 mg    [START ON 9/15/2022] macitentan tablet 10 mg    naloxone 0.4 mg/mL injection 0.02 mg    ondansetron tablet 4 mg    riociguat (ADEMPAS) tablet 2.5 mg    sodium chloride 0.9% flush 10 mL    [START ON 9/15/2022] treprostinil (REMODULIN) 6,000,000 ng in sodium chloride 0.9% 100 mL infusion     Family History       Problem Relation (Age of Onset)    Cancer Father          Tobacco Use    Smoking status: Former     Years: 5.00     Types: Cigars, Cigarettes     Start date: 2009     Quit date: 2014     Years since quittin.1    Smokeless tobacco: Never    Tobacco comments:     social smoker-light   Substance and Sexual Activity    Alcohol use: Yes      Comment: socially- 2 or 3 times a week-2 glasses of wine    Drug use: No    Sexual activity: Yes     Partners: Male     Birth control/protection: See Surgical Hx     Comment:      Review of Systems  12 point ROS negative except for HPI.   Objective:     Vital Signs (Most Recent):  Temp: 98.1 °F (36.7 °C) (09/14/22 2129)  Pulse: 91 (09/14/22 2130)  Resp: 17 (09/14/22 2130)  BP: 115/83 (09/14/22 2130)  SpO2: 99 % (09/14/22 2130) Vital Signs (24h Range):  Temp:  [97.7 °F (36.5 °C)-99.7 °F (37.6 °C)] 98.1 °F (36.7 °C)  Pulse:  [] 91  Resp:  [16-18] 17  SpO2:  [96 %-100 %] 99 %  BP: ()/(62-83) 115/83     Patient Vitals for the past 72 hrs (Last 3 readings):   Weight   09/14/22 2129 79.4 kg (175 lb)     Body mass index is 32.01 kg/m².    No intake or output data in the 24 hours ending 09/14/22 2321    Physical Exam  Constitutional:       General: She is awake. She is not in acute distress.  HENT:      Head: Normocephalic and atraumatic.   Eyes:      Comments: EOMI, PERRLA   Cardiovascular:      Rate and Rhythm: Normal rate and regular rhythm.   Pulmonary:      Effort: Pulmonary effort is normal.      Breath sounds: Normal breath sounds.   Abdominal:      Palpations: Abdomen is soft.      Tenderness: There is no abdominal tenderness.   Musculoskeletal:      Cervical back: Full passive range of motion without pain.   Skin:     General: Skin is warm and dry.   Neurological:      Mental Status: She is alert and oriented to person, place, and time.      Cranial Nerves: Cranial nerves 2-12 are intact.      Sensory: Sensation is intact.      Motor: Motor function is intact.      Coordination: Finger-Nose-Finger Test normal.      Gait: Gait is intact.      Comments: Motor strength 5/5 in UE and LE however left sided is noted to be slightly weaker than right. Negative Babinski.    Psychiatric:         Attention and Perception: Attention normal.         Mood and Affect: Mood normal.         Speech: Speech  normal.       Significant Labs:  CBC:  Recent Labs   Lab 09/13/22  0920 09/14/22  0555 09/14/22  2154   WBC 7.10 6.20 7.45   RBC 3.43* 3.30* 3.54*   HGB 10.6* 10.2* 11.2*   HCT 31.5* 30.3* 33.4*   * 141* 151   MCV 92 92 94   MCH 31.0 30.9 31.6*   MCHC 33.8 33.5 33.5     BNP:  No results for input(s): BNP in the last 168 hours.    Invalid input(s): BNPTRIAGELBLO  CMP:  Recent Labs   Lab 09/13/22 0920 09/14/22  0555 09/14/22  2154   GLU 97 133* 107   CALCIUM 9.1 9.3 9.6   ALBUMIN 3.7 3.7 4.0   PROT 6.4 6.5 7.3    137 135*   K 3.7 3.8 3.9   CO2 22* 22* 16*   * 112* 111*   BUN 10 16 20   CREATININE 0.71 0.69* 0.9   ALKPHOS 65 64 67   ALT 15 14 10   AST 22 21 15   BILITOT 1.9* 1.1 0.9      Coagulation:   No results for input(s): PT, INR, APTT in the last 168 hours.  LDH:  No results for input(s): LDH in the last 72 hours.  Microbiology:  Microbiology Results (last 7 days)       ** No results found for the last 168 hours. **            I have reviewed all pertinent labs within the past 24 hours.    Diagnostic Results:  CT: No results found in the last 24 hours.  I have reviewed all pertinent imaging results/findings within the past 24 hours.

## 2022-09-15 NOTE — PROGRESS NOTES
[facility]  Heart Transplant  Progress Note    Patient Name: Kristin Maier  MRN: [id number]  Admission Date: 9/14/2022  Hospital Length of Stay: 0 days  Attending Physician: Micheal Perez Jr.,*  Primary Care Provider: Jorge A Stack MD  Principal Problem:Left-sided weakness    Subjective:     Interval History:   Patient was seen and examined today. No acute overnight events since admission. Patient remains stable. Symptoms appear to have resolved. Per patient she has been having chronic headaches almost 2-3 times a week that would resolve with Tylenol.     Continuous Infusions:   treprostinil (REMODULIN) infusion (NON-TITRATING)       Scheduled Meds:   fluticasone propionate  1 spray Each Nostril Daily    furosemide  20 mg Oral Daily    gabapentin  300 mg Oral QHS    heparin (porcine)  5,000 Units Subcutaneous Q8H    macitentan  10 mg Oral Daily    riociguat  2.5 mg Oral TID     PRN Meds:acetaminophen, loperamide, melatonin, naloxone, ondansetron, sodium chloride 0.9%    Review of patient's allergies indicates:  No Known Allergies  Objective:     Vital Signs (Most Recent):  Temp: 98.3 °F (36.8 °C) (09/15/22 0743)  Pulse: 78 (09/15/22 1127)  Resp: (!) 24 (09/15/22 1127)  BP: 96/65 (09/15/22 1127)  SpO2: 99 % (09/15/22 1127)   Vital Signs (24h Range):  Temp:  [98.1 °F (36.7 °C)-99.7 °F (37.6 °C)] 98.3 °F (36.8 °C)  Pulse:  [] 78  Resp:  [16-24] 24  SpO2:  [96 %-100 %] 99 %  BP: ()/(65-83) 96/65     Patient Vitals for the past 72 hrs (Last 3 readings):   Weight   09/15/22 0429 67 kg (147 lb 13.1 oz)   09/15/22 0003 67 kg (147 lb 13.1 oz)   09/14/22 2129 79.4 kg (175 lb)     Body mass index is 27.04 kg/m².      Intake/Output Summary (Last 24 hours) at 9/15/2022 1407  Last data filed at 9/15/2022 0429  Gross per 24 hour   Intake 640 ml   Output 300 ml   Net 340 ml       Physical Exam  Constitutional:       Appearance: Normal appearance.   HENT:      Head: Atraumatic.   Eyes:       General: No scleral icterus.  Cardiovascular:      Rate and Rhythm: Normal rate and regular rhythm.      Heart sounds: No murmur heard.  Pulmonary:      Effort: Pulmonary effort is normal.      Breath sounds: Normal breath sounds. No wheezing or rales.   Abdominal:      General: There is no distension.      Palpations: Abdomen is soft.      Tenderness: There is no abdominal tenderness.   Musculoskeletal:      Right lower leg: No edema.      Left lower leg: No edema.   Skin:     General: Skin is warm.   Neurological:      General: No focal deficit present.      Mental Status: She is alert.       Significant Labs:  CBC:  Recent Labs   Lab 09/13/22 0920 09/14/22  0555 09/14/22  2154   WBC 7.10 6.20 7.45   RBC 3.43* 3.30* 3.54*   HGB 10.6* 10.2* 11.2*   HCT 31.5* 30.3* 33.4*   * 141* 151   MCV 92 92 94   MCH 31.0 30.9 31.6*   MCHC 33.8 33.5 33.5     BNP:  No results for input(s): BNP in the last 168 hours.    Invalid input(s): BNPTRIAGELBLO  CMP:  Recent Labs   Lab 09/13/22 0920 09/14/22  0555 09/14/22  2154   GLU 97 133* 107   CALCIUM 9.1 9.3 9.6   ALBUMIN 3.7 3.7 4.0   PROT 6.4 6.5 7.3    137 135*   K 3.7 3.8 3.9   CO2 22* 22* 16*   * 112* 111*   BUN 10 16 20   CREATININE 0.71 0.69* 0.9   ALKPHOS 65 64 67   ALT 15 14 10   AST 22 21 15   BILITOT 1.9* 1.1 0.9      Estimated Creatinine Clearance: 68.7 mL/min (based on SCr of 0.9 mg/dL).      Assessment and Plan:   Mrs. Maier is a 45 y/o AA female with PMH of WHO Group 1 PAH on remodulin, opsumit, and adempas intially presented to the ED in [facility] [place] complaining of sudden onset left sided, non-radiating headache associated with photophobia and generalized weakness that woke her up Monday morning. States she checked her BP at the time and her SBP was 89. Endorses chronic headaches with remodulin, however, this time says it was different because it did not resolve with tylenol or gabapentin. Denies any nausea, vomiting, hx of stroke, or ever experiencing  this before. [place] [facility] noted to have left sided 4/5 weakness in the UE and LE. CT brain w/o contrast was unremarkable. MRI brain was ordered but not done to due to her remodulin infusion. Patient was transferred to [facility] to undergo MRI.     * Left-sided weakness  - Monitor mental status. Frequent neuro checks  - Telemetry monitoring  - MRI (9/15): No acute intracranial findings, no evidence of hydrocephalus or acute infarction  - Discussed case with Neurology/Stroke who reviewed the imaging and MRI and recommended no further imaging at this time. Symptoms have completely resolved.     Pulmonary hypertension  - Remodulin 39 ng/kg/min  - Opsumit 10 mg, daily  - Adempas 2.5 mg TID  - Continue with Lasix 20 mg, daily      Jeri Castillo MD  Heart Transplant  [facility]

## 2022-09-15 NOTE — DISCHARGE SUMMARY
[facility]plant [facility]  Heart Transplant  Discharge Summary      Patient Name: Kristin Maier  MRN: [id number]  Admission Date: 9/14/2022  Hospital Length of Stay: 0 days  Discharge Date and Time: 09/15/2022 2:30 PM  Attending Physician: Micheal Perez Jr.,*   Discharging Provider: Jeri Castillo MD  Primary Care Provider: Jorge A Stack MD     HPI: Mrs. Maier is a 47 y/o AA female with PMH of WHO Group 1 PAH on remodulin, opsumit, and adempas intially presented to the ED in [facility] complaining of sudden onset left sided, non-radiating headache associated with photophobia and generalized weakness that woke her up Monday morning. States she checked her BP at the time and her SBP was 89. Endorses chronic headaches with remodulin, however, this time says it was different because it did not resolve with tylenol or gabapentin. Denies any nausea, vomiting, hx of stroke, or ever experiencing this before. At [facility] she was noted to have left sided 4/5 weakness in the UE and LE. CT brain w/o contrast was unremarkable. MRI brain was ordered but not done to due to her remodulin infusion. [place] [facility] [place].     Patient currently states she is back to baseline and no longer endorses headache or left sided weakness.     Hospital Course: Patient was admitted under the impression of acute onset of headache rule out TIA/Stroke. Patient was monitored closely since admission with frequent neuro checks. Home medication was resumed. Headaches resolved and Neuro exam is intact. Case was discussed with Neurology/Stroke and imaging were reviewed. MRI was obtained and was negative for any acute findings, no evidence of ischemic stroke. Patient remained hemodynamically stable since admission. Patient at this time is stable to be discharged with close follow up outpatient.     Goals of Care Treatment Preferences:  Code Status: Full Code    Significant Diagnostic Studies: Labs: All labs within the  past 24 hours have been reviewed    Pending Diagnostic Studies:     None        Final Active Diagnoses:    Diagnosis Date Noted POA    PRINCIPAL PROBLEM:  Left-sided weakness [R53.1] 09/14/2022 Unknown    Pulmonary hypertension [I27.20] 11/30/2021 Yes      Problems Resolved During this Admission:      Discharged Condition: good    Disposition: Home or Self Care    Follow Up: Please follow up within 2 weeks after your discharge    Patient Instructions:   No discharge procedures on file.  Medications:  Reconciled Home Medications:      Medication List      CONTINUE taking these medications    furosemide 20 MG tablet  Commonly known as: LASIX  Take 1 tablet (20 mg total) by mouth once daily.     gabapentin 300 MG capsule  Commonly known as: NEURONTIN  Take 1 capsule (300 mg total) by mouth every evening.     loperamide 2 mg capsule  Commonly known as: IMODIUM  Take 1 capsule (2 mg total) by mouth 4 (four) times daily as needed for Diarrhea.     megestroL 400 mg/10 mL (40 mg/mL) Susp  Commonly known as: MEGACE  Take 400 mg by mouth 2 (two) times daily.     ondansetron 4 MG tablet  Commonly known as: ZOFRAN  Take 1 tablet (4 mg total) by mouth every 8 (eight) hours as needed for Nausea.     OPSUMIT 10 mg Tab  Generic drug: macitentan  Take 10 mg by mouth once daily. Take one tablet (10 mg) by mouth daily     potassium chloride SA 10 MEQ tablet  Commonly known as: K-DUR,KLOR-CON M  Take 2 tablets (20 mEq total) by mouth once daily.     REMODULIN 5 mg/mL Soln  Generic drug: treprostinil sodium     riociguat 2.5 mg tablet  Commonly known as: ADEMPAS  Take 1 mg by mouth 3 (three) times daily. Initial dose is 1mg TID then titrate by 0.5 mg 3 times per day at intervals no sooner than 2 weeks to the highest tolerated dosage up to a maximum of 2.5 mg TID.     sodium chloride 0.9% SolP 100 mL with treprostinil 1 mg/mL Soln 6,000,000 ng  Inject 2,145 ng/min into the vein continuous.            Jeri Castillo MD  Heart  Transplant  [facility]

## 2022-09-15 NOTE — HPI
Mrs. Maier is a 45 y/o AA female with PMH of WHO Group 1 PAH on remodulin, opsumit, and adempas intially presented to the ED in [facility] [place] [place] complaining of sudden onset left sided, non-radiating headache associated with photophobia and generalized weakness that woke her up Monday morning. States she checked her BP at the time and her SBP was 89. Endorses chronic headaches with remodulin, however, this time says it was different because it did not resolve with tylenol or gabapentin. Denies any nausea, vomiting, hx of stroke, or ever experiencing this before. At [facility] [place] [place] she was noted to have left sided 4/5 weakness in the UE and LE. CT brain w/o contrast was unremarkable. MRI brain was ordered but not done to due to her remodulin infusion. Patient was transferred to [facility] [place].     Patient currently states she is back to baseline and no longer endorses headache or left sided weakness.

## 2022-09-15 NOTE — ASSESSMENT & PLAN NOTE
Mrs. Miaer is a 45 y/o AA female with PMH of WHO Group 1 PAH on remodulin, opsumit, and adempas who presented complaining of left sided headache and generalized weakness possibly secondary to remodulin SE vs. Ischemic stroke.     - Will order MRI brain w/o contrast in the morning under close supervision to r/o ischemic stroke. Remodulin can only be turned off right before the MRI and must be turned back on immediately after the imaging is done.

## 2022-09-15 NOTE — ASSESSMENT & PLAN NOTE
- Monitor mental status. Frequent neuro checks  - Telemetry monitoring  - MRI (9/15): No acute intracranial findings, no evidence of hydrocephalus or acute infarction  - Discussed case with Neurology/Stroke who reviewed the imaging and MRI and recommended no further imaging at this time. Symptoms have completely resolved.

## 2022-09-15 NOTE — ASSESSMENT & PLAN NOTE
- restart patients home medications remodulin, adempas, and opsumit.   - restart lasix 20 mg daily.

## 2022-09-15 NOTE — PROGRESS NOTES
Confirmed with family that they mix 3mls of the 5.0mg/ml vial along with 97mls of diluent and a pump rate of 42mls/24hrs (confirmed with bedside)  Remodulin = 80ngs/kg/min   DW 55kgs

## 2022-09-15 NOTE — SUBJECTIVE & OBJECTIVE
Interval History:   Patient was seen and examined today. No acute overnight events since admission. Patient remains stable. Symptoms appear to have resolved. Per patient she has been having chronic headaches almost 2-3 times a week that would resolve with Tylenol.     Continuous Infusions:   treprostinil (REMODULIN) infusion (NON-TITRATING)       Scheduled Meds:   fluticasone propionate  1 spray Each Nostril Daily    furosemide  20 mg Oral Daily    gabapentin  300 mg Oral QHS    heparin (porcine)  5,000 Units Subcutaneous Q8H    macitentan  10 mg Oral Daily    riociguat  2.5 mg Oral TID     PRN Meds:acetaminophen, loperamide, melatonin, naloxone, ondansetron, sodium chloride 0.9%    Review of patient's allergies indicates:  No Known Allergies  Objective:     Vital Signs (Most Recent):  Temp: 98.3 °F (36.8 °C) (09/15/22 0743)  Pulse: 78 (09/15/22 1127)  Resp: (!) 24 (09/15/22 1127)  BP: 96/65 (09/15/22 1127)  SpO2: 99 % (09/15/22 1127)   Vital Signs (24h Range):  Temp:  [98.1 °F (36.7 °C)-99.7 °F (37.6 °C)] 98.3 °F (36.8 °C)  Pulse:  [] 78  Resp:  [16-24] 24  SpO2:  [96 %-100 %] 99 %  BP: ()/(65-83) 96/65     Patient Vitals for the past 72 hrs (Last 3 readings):   Weight   09/15/22 0429 67 kg (147 lb 13.1 oz)   09/15/22 0003 67 kg (147 lb 13.1 oz)   09/14/22 2129 79.4 kg (175 lb)     Body mass index is 27.04 kg/m².      Intake/Output Summary (Last 24 hours) at 9/15/2022 1407  Last data filed at 9/15/2022 0429  Gross per 24 hour   Intake 640 ml   Output 300 ml   Net 340 ml       Physical Exam  Constitutional:       Appearance: Normal appearance.   HENT:      Head: Atraumatic.   Eyes:      General: No scleral icterus.  Cardiovascular:      Rate and Rhythm: Normal rate and regular rhythm.      Heart sounds: No murmur heard.  Pulmonary:      Effort: Pulmonary effort is normal.      Breath sounds: Normal breath sounds. No wheezing or rales.   Abdominal:      General: There is no distension.      Palpations:  Abdomen is soft.      Tenderness: There is no abdominal tenderness.   Musculoskeletal:      Right lower leg: No edema.      Left lower leg: No edema.   Skin:     General: Skin is warm.   Neurological:      General: No focal deficit present.      Mental Status: She is alert.       Significant Labs:  CBC:  Recent Labs   Lab 09/13/22  0920 09/14/22  0555 09/14/22  2154   WBC 7.10 6.20 7.45   RBC 3.43* 3.30* 3.54*   HGB 10.6* 10.2* 11.2*   HCT 31.5* 30.3* 33.4*   * 141* 151   MCV 92 92 94   MCH 31.0 30.9 31.6*   MCHC 33.8 33.5 33.5     BNP:  No results for input(s): BNP in the last 168 hours.    Invalid input(s): BNPTRIAGELBLO  CMP:  Recent Labs   Lab 09/13/22 0920 09/14/22  0555 09/14/22  2154   GLU 97 133* 107   CALCIUM 9.1 9.3 9.6   ALBUMIN 3.7 3.7 4.0   PROT 6.4 6.5 7.3    137 135*   K 3.7 3.8 3.9   CO2 22* 22* 16*   * 112* 111*   BUN 10 16 20   CREATININE 0.71 0.69* 0.9   ALKPHOS 65 64 67   ALT 15 14 10   AST 22 21 15   BILITOT 1.9* 1.1 0.9      Estimated Creatinine Clearance: 68.7 mL/min (based on SCr of 0.9 mg/dL).

## 2022-09-15 NOTE — H&P
[facility]  Heart Transplant  H&P    Patient Name: Kristin Maier  MRN: [id number]  Admission Date: 2022  Attending Physician: Micheal Perez Jr.,*  Primary Care Provider: Jorge A Stack MD  Principal Problem:Left-sided weakness    Subjective:     History of Present Illness:  Mrs. Maier is a 47 y/o AA female with PMH of WHO Group 1 PAH on remodulin, opsumit, and adempas intially presented to the ED in [facility] complaining of sudden onset left sided, non-radiating headache associated with photophobia and generalized weakness that woke her up Monday morning. States she checked her BP at the time and her SBP was 89. Endorses chronic headaches with remodulin, however, this time says it was different because it did not resolve with tylenol or gabapentin. Denies any nausea, vomiting, hx of stroke, or ever experiencing this before. At [facility] she was noted to have left sided 4/5 weakness in the UE and LE. CT brain w/o contrast was unremarkable. MRI brain was ordered but not done to due to her remodulin infusion. Patient was transferred to [facility] to undergo MRI.     Patient currently states she is back to baseline and no longer endorses headache or left sided weakness.       Past Medical History:   Diagnosis Date    Essential (primary) hypertension     Essential hypertension       Past Surgical History:   Procedure Laterality Date    APPENDECTOMY       SECTION      Transverse cut    HYSTERECTOMY  2017    TLH- bleeding    OOPHORECTOMY      RIGHT HEART CATHETERIZATION Right 2021    Procedure: INSERTION, CATHETER, RIGHT HEART;  Surgeon: Chloe Gregory MD;  Location: [facility] CATH LAB;  Service: Cardiology;  Laterality: Right;    RIGHT HEART CATHETERIZATION Right 3/16/2022    Procedure: INSERTION, CATHETER, RIGHT HEART;  Surgeon: Hu Silverman MD;  Location: [facility] CATH LAB;  Service: Cardiology;  Laterality: Right;    RIGHT HEART CATHETERIZATION Right 2022     Procedure: INSERTION, CATHETER, RIGHT HEART;  Surgeon: Chloe Gregory MD;  Location: [facility] CATH LAB;  Service: Cardiology;  Laterality: Right;    TUBAL LIGATION      VAGINAL DELIVERY  ; ; ;        Review of patient's allergies indicates:  No Known Allergies    Current Facility-Administered Medications   Medication    acetaminophen tablet 650 mg    [START ON 9/15/2022] furosemide tablet 20 mg    [START ON 9/15/2022] gabapentin capsule 300 mg    [START ON 9/15/2022] heparin (porcine) injection 5,000 Units    loperamide capsule 2 mg    [START ON 9/15/2022] macitentan tablet 10 mg    naloxone 0.4 mg/mL injection 0.02 mg    ondansetron tablet 4 mg    riociguat (ADEMPAS) tablet 2.5 mg    sodium chloride 0.9% flush 10 mL    [START ON 9/15/2022] treprostinil (REMODULIN) 6,000,000 ng in sodium chloride 0.9% 100 mL infusion     Family History       Problem Relation (Age of Onset)    Cancer Father          Tobacco Use    Smoking status: Former     Years: 5.00     Types: Cigars, Cigarettes     Start date: 2009     Quit date: 2014     Years since quittin.1    Smokeless tobacco: Never    Tobacco comments:     social smoker-light   Substance and Sexual Activity    Alcohol use: Yes     Comment: socially- 2 or 3 times a week-2 glasses of wine    Drug use: No    Sexual activity: Yes     Partners: Male     Birth control/protection: See Surgical Hx     Comment:      Review of Systems  12 point ROS negative except for HPI.   Objective:     Vital Signs (Most Recent):  Temp: 98.1 °F (36.7 °C) (22)  Pulse: 91 (22)  Resp: 17 (22)  BP: 115/83 (22)  SpO2: 99 % (22) Vital Signs (24h Range):  Temp:  [97.7 °F (36.5 °C)-99.7 °F (37.6 °C)] 98.1 °F (36.7 °C)  Pulse:  [] 91  Resp:  [16-18] 17  SpO2:  [96 %-100 %] 99 %  BP: ()/(62-83) 115/83     Patient Vitals for the past 72 hrs (Last 3 readings):   Weight   22 6278  79.4 kg (175 lb)     Body mass index is 32.01 kg/m².    No intake or output data in the 24 hours ending 09/14/22 2321    Physical Exam  Constitutional:       General: She is awake. She is not in acute distress.  HENT:      Head: Normocephalic and atraumatic.   Eyes:      Comments: EOMI, PERRLA   Cardiovascular:      Rate and Rhythm: Normal rate and regular rhythm.   Pulmonary:      Effort: Pulmonary effort is normal.      Breath sounds: Normal breath sounds.   Abdominal:      Palpations: Abdomen is soft.      Tenderness: There is no abdominal tenderness.   Musculoskeletal:      Cervical back: Full passive range of motion without pain.   Skin:     General: Skin is warm and dry.   Neurological:      Mental Status: She is alert and oriented to person, place, and time.      Cranial Nerves: Cranial nerves 2-12 are intact.      Sensory: Sensation is intact.      Motor: Motor function is intact.      Coordination: Finger-Nose-Finger Test normal.      Gait: Gait is intact.      Comments: Motor strength 5/5 in UE and LE however left sided is noted to be slightly weaker than right. Negative Babinski.    Psychiatric:         Attention and Perception: Attention normal.         Mood and Affect: Mood normal.         Speech: Speech normal.       Significant Labs:  CBC:  Recent Labs   Lab 09/13/22  0920 09/14/22  0555 09/14/22  2154   WBC 7.10 6.20 7.45   RBC 3.43* 3.30* 3.54*   HGB 10.6* 10.2* 11.2*   HCT 31.5* 30.3* 33.4*   * 141* 151   MCV 92 92 94   MCH 31.0 30.9 31.6*   MCHC 33.8 33.5 33.5     BNP:  No results for input(s): BNP in the last 168 hours.    Invalid input(s): BNPTRIAGELBLO  CMP:  Recent Labs   Lab 09/13/22  0920 09/14/22  0555 09/14/22  2154   GLU 97 133* 107   CALCIUM 9.1 9.3 9.6   ALBUMIN 3.7 3.7 4.0   PROT 6.4 6.5 7.3    137 135*   K 3.7 3.8 3.9   CO2 22* 22* 16*   * 112* 111*   BUN 10 16 20   CREATININE 0.71 0.69* 0.9   ALKPHOS 65 64 67   ALT 15 14 10   AST 22 21 15   BILITOT 1.9* 1.1 0.9       Coagulation:   No results for input(s): PT, INR, APTT in the last 168 hours.  LDH:  No results for input(s): LDH in the last 72 hours.  Microbiology:  Microbiology Results (last 7 days)       ** No results found for the last 168 hours. **            I have reviewed all pertinent labs within the past 24 hours.    Diagnostic Results:  CT: No results found in the last 24 hours.  I have reviewed all pertinent imaging results/findings within the past 24 hours.    Assessment/Plan:     * Left-sided weakness  Mrs. Maier is a 47 y/o AA female with PMH of WHO Group 1 PAH on remodulin, opsumit, and adempas who presented complaining of left sided headache and generalized weakness possibly secondary to remodulin SE vs. Ischemic stroke.     - Will order MRI brain w/o contrast in the morning under close supervision to r/o ischemic stroke. Remodulin can only be turned off right before the MRI and must be turned back on immediately after the imaging is done.       Pulmonary hypertension  - restart patients home medications remodulin, adempas, and opsumit.   - restart lasix 20 mg daily.         Shyanne Valencia MD  Heart Transplant  [facility]

## 2022-09-15 NOTE — HOSPITAL COURSE
Patient was admitted under the impression of acute onset of headache rule out TIA/Stroke. Patient was monitored closely since admission with frequent neuro checks. Home medication was resumed. Headaches resolved and Neuro exam is intact. Case was discussed with Neurology/Stroke and imaging were reviewed. MRI was obtained and was negative for any acute findings, no evidence of ischemic stroke. Patient remained hemodynamically stable since admission. Patient at this time is stable to be discharged with close follow up outpatient.

## 2022-09-15 NOTE — PROGRESS NOTES
PH Admit Assessment for Continuation of Treprostinil (Remodulin)    Drug Infusing: Treprostinil (Remodulin)  Route: Intravenous  Pump Type: CADD  Current Pump Rate = 42 ml / 24 hours  Current Reservoir Volume = 70 (mls remaining to be infused)    Patient reports utilizing: Patient mixed cassettes    Home mixing instructions:   Patient reports mixing 3mls of concentrated drug in 97 ml of diluent    Confirmed with family that they mix 3mls of the 5.0mg/ml vial along with 97mls of diluent and a pump rate of 42mls/24hrs   Remodulin = 80ngs/kg/min   DW 55kgs

## 2022-09-15 NOTE — ASSESSMENT & PLAN NOTE
- Remodulin 39 ng/kg/min  - Opsumit 10 mg, daily  - Adempas 2.5 mg TID  - Continue with Lasix 20 mg, daily

## 2022-09-15 NOTE — PLAN OF CARE
Pt admitted this shift from OSH where she was for 2 days with HA and left sided weakness, Head CT at OSH WNL, MRI ordered but unable to do due to patient being on IV Remodulin for Pulm HTN - HTS team aware of patients transfer and arrival  Pt remains AAO x 4 with VSS on Visi monitor, afebrile, sats upper 90s on RA, HR 80-90s, -120/80s throughout shift  Chief complaint of bilateral leg pain relieved with PRN Tylenol and scheduled Gabapentin  Pt on PO Adempa, Opsumit, and IV remodulin for pulm HTN treatment.  Pt unsure of exact concentration and dosing of home remodulin, pt remains on home cassette infusing at 42 cc/24 hours to R subclavian perm cath (dedicated remodulin line). Provider and PharmD aware and will switch to hospital concentration in the AM.   L FA 20g - CDI, saline locked  Skin intact, no recent falls or wounds noted. Pt denies any SOB or N/V  Pt up independent and ambulatory, voids in hat, LBM 9/14  Regular diet   Pt remains free from falls and injuries, call light in reach, bed in lowest position, nonskid socks on when OOB, side rails up x2  Will continue to monitor

## 2022-10-12 ENCOUNTER — PATIENT MESSAGE (OUTPATIENT)
Dept: TRANSPLANT | Facility: CLINIC | Age: 47
End: 2022-10-12
Payer: MEDICAID

## 2022-11-09 ENCOUNTER — PATIENT MESSAGE (OUTPATIENT)
Dept: TRANSPLANT | Facility: CLINIC | Age: 47
End: 2022-11-09
Payer: MEDICAID

## 2022-11-09 ENCOUNTER — TELEPHONE (OUTPATIENT)
Dept: TRANSPLANT | Facility: CLINIC | Age: 47
End: 2022-11-09
Payer: MEDICAID

## 2022-11-09 DIAGNOSIS — R06.02 SHORTNESS OF BREATH: Primary | ICD-10-CM

## 2022-11-09 NOTE — TELEPHONE ENCOUNTER
Nurse navigator spoke with patient and patient denies being around anyone who is sick, and she does not feel sick. She has been having increased neuropathy since September but she would just take extra gabapentin to help the pain. Patient states that she has taken up to 900 mg of gabapentin at night and had to stop working in September due to pain but has never reached out to  clinic about pain or issues. ANA M Quick notified and will call patient.

## 2022-11-10 ENCOUNTER — TELEPHONE (OUTPATIENT)
Dept: TRANSPLANT | Facility: CLINIC | Age: 47
End: 2022-11-10
Payer: MEDICAID

## 2022-11-10 DIAGNOSIS — I27.0 PRIMARY PULMONARY HYPERTENSION: Primary | ICD-10-CM

## 2022-11-10 RX ORDER — TRAMADOL HYDROCHLORIDE 50 MG/1
50 TABLET ORAL EVERY 6 HOURS PRN
Qty: 28 TABLET | Refills: 0 | Status: SHIPPED | OUTPATIENT
Start: 2022-11-10 | End: 2022-11-17

## 2022-11-10 RX ORDER — GABAPENTIN 100 MG/1
200 CAPSULE ORAL 2 TIMES DAILY
Qty: 120 CAPSULE | Refills: 11 | Status: SHIPPED | OUTPATIENT
Start: 2022-11-10 | End: 2022-11-14 | Stop reason: ALTCHOICE

## 2022-11-10 NOTE — TELEPHONE ENCOUNTER
Spoke with Mrs. Maier who reports increased SOB, and persistent leg pain. She denies swelling. Weight is 142 lbs. Leg pain has become too intense to work. Takes 2-4 gabapentin 300 mg tablets and it does not help. Reports her  has to carry her at times.     Will have her get labs today. She is scheduled for an appt, with walk, labs, ECHO, PFTs on Monday.    Will send in prescription for Gabapentin 200 mg, twice daily, and 300 mg at night. She make take 600 mg at night, if needed.    Also prescribed Tramadol 50 mg, PRN, pain.

## 2022-11-14 ENCOUNTER — TELEPHONE (OUTPATIENT)
Dept: TRANSPLANT | Facility: CLINIC | Age: 47
End: 2022-11-14
Payer: MEDICAID

## 2022-11-14 ENCOUNTER — HOSPITAL ENCOUNTER (OUTPATIENT)
Dept: PULMONOLOGY | Facility: CLINIC | Age: 47
Discharge: HOME | End: 2022-11-14
Payer: MEDICAID

## 2022-11-14 ENCOUNTER — TELEPHONE (OUTPATIENT)
Dept: PEDIATRIC ENDOCRINOLOGY | Facility: CLINIC | Age: 47
End: 2022-11-14
Payer: MEDICAID

## 2022-11-14 ENCOUNTER — HOSPITAL ENCOUNTER (OUTPATIENT)
Dept: CARDIOLOGY | Facility: HOSPITAL | Age: 47
Discharge: HOME | End: 2022-11-14
Payer: MEDICAID

## 2022-11-14 ENCOUNTER — OFFICE VISIT (OUTPATIENT)
Dept: TRANSPLANT | Facility: CLINIC | Age: 47
End: 2022-11-14
Payer: MEDICAID

## 2022-11-14 VITALS
HEART RATE: 83 BPM | HEIGHT: 62 IN | SYSTOLIC BLOOD PRESSURE: 100 MMHG | WEIGHT: 147 LBS | BODY MASS INDEX: 27.05 KG/M2 | DIASTOLIC BLOOD PRESSURE: 78 MMHG

## 2022-11-14 VITALS
SYSTOLIC BLOOD PRESSURE: 101 MMHG | WEIGHT: 152.75 LBS | OXYGEN SATURATION: 100 % | DIASTOLIC BLOOD PRESSURE: 67 MMHG | HEIGHT: 66 IN | BODY MASS INDEX: 24.55 KG/M2 | HEART RATE: 91 BPM

## 2022-11-14 VITALS — BODY MASS INDEX: 23.98 KG/M2 | WEIGHT: 152.75 LBS | HEIGHT: 67 IN

## 2022-11-14 DIAGNOSIS — Z79.899 HIGH RISK MEDICATION USE: ICD-10-CM

## 2022-11-14 DIAGNOSIS — R06.02 SHORTNESS OF BREATH: ICD-10-CM

## 2022-11-14 DIAGNOSIS — I27.9 CHRONIC PULMONARY HEART DISEASE: ICD-10-CM

## 2022-11-14 DIAGNOSIS — M79.2 NEUROPATHIC PAIN: ICD-10-CM

## 2022-11-14 DIAGNOSIS — I27.21 WHO GROUP 1 PULMONARY ARTERIAL HYPERTENSION: Primary | ICD-10-CM

## 2022-11-14 PROBLEM — I27.20 PULMONARY HYPERTENSION: Status: RESOLVED | Noted: 2021-11-30 | Resolved: 2022-11-14

## 2022-11-14 PROBLEM — M79.606 LEG PAIN: Status: ACTIVE | Noted: 2022-11-14

## 2022-11-14 LAB
ASCENDING AORTA: 3.15 CM
AV INDEX (PROSTH): 0.78
AV MEAN GRADIENT: 3 MMHG
AV PEAK GRADIENT: 6 MMHG
AV VALVE AREA: 2.64 CM2
AV VELOCITY RATIO: 0.81
BSA FOR ECHO PROCEDURE: 1.71 M2
CV ECHO LV RWT: 0.34 CM
DLCO ADJ PRE: 15.93 ML/(MIN*MMHG) (ref 20.63–32.1)
DLCO SINGLE BREATH LLN: 20.63
DLCO SINGLE BREATH PRE REF: 52 %
DLCO SINGLE BREATH REF: 26.36
DLCOC SBVA LLN: 3.47
DLCOC SBVA PRE REF: 84.4 %
DLCOC SBVA REF: 4.84
DLCOC SINGLE BREATH LLN: 20.63
DLCOC SINGLE BREATH PRE REF: 60.4 %
DLCOC SINGLE BREATH REF: 26.36
DLCOCSBVAULN: 6.22
DLCOCSINGLEBREATHULN: 32.1
DLCOSINGLEBREATHULN: 32.1
DLCOVA LLN: 3.47
DLCOVA PRE REF: 72.5 %
DLCOVA PRE: 3.51 ML/(MIN*MMHG*L) (ref 3.47–6.22)
DLCOVA REF: 4.84
DLCOVAULN: 6.22
DLVAADJ PRE: 4.09 ML/(MIN*MMHG*L) (ref 3.47–6.22)
DOP CALC AO PEAK VEL: 1.21 M/S
DOP CALC AO VTI: 21.85 CM
DOP CALC LVOT AREA: 3.4 CM2
DOP CALC LVOT DIAMETER: 2.07 CM
DOP CALC LVOT PEAK VEL: 0.98 M/S
DOP CALC LVOT STROKE VOLUME: 57.69 CM3
DOP CALC RVOT AREA: 23.75 CM2
DOP CALC RVOT DIAMETER: 5.5 CM
DOP CALCLVOT PEAK VEL VTI: 17.15 CM
E WAVE DECELERATION TIME: 233.87 MSEC
E/A RATIO: 0.69
E/E' RATIO: 6.94 M/S
ECHO LV POSTERIOR WALL: 0.66 CM (ref 0.6–1.1)
EJECTION FRACTION: 60 %
ERV LLN: -16448.99
ERV PRE REF: 74.2 %
ERV REF: 1.01
ERVULN: ABNORMAL
FEF 25 75 LLN: 1.29
FEF 25 75 PRE REF: 93.1 %
FEF 25 75 REF: 2.62
FET100 CHG: -25.3 %
FEV05 LLN: 1.3
FEV05 REF: 2.16
FEV1 CHG: 4.1 %
FEV1 FVC LLN: 70
FEV1 FVC PRE REF: 100.4 %
FEV1 FVC REF: 81
FEV1 LLN: 2.01
FEV1 PRE REF: 89.9 %
FEV1 REF: 2.65
FEV1 VOL CHG: 0.1
FRACTIONAL SHORTENING: 30 % (ref 28–44)
FRCPLETH LLN: 2.04
FRCPLETH PREREF: 90.4 %
FRCPLETH REF: 2.86
FRCPLETHULN: 3.68
FVC CHG: -0.7 %
FVC LLN: 2.53
FVC PRE REF: 89.1 %
FVC REF: 3.31
FVC VOL CHG: -0.02
INTERVENTRICULAR SEPTUM: 0.79 CM (ref 0.6–1.1)
IVC PRE: 3 L (ref 2.53–4.11)
IVC SINGLE BREATH LLN: 2.53
IVC SINGLE BREATH PRE REF: 90.9 %
IVC SINGLE BREATH REF: 3.31
IVCSINGLEBREATHULN: 4.11
IVRT: 82.78 MSEC
LA MAJOR: 5.3 CM
LA MINOR: 5.17 CM
LA WIDTH: 3.09 CM
LEFT ATRIUM SIZE: 3.06 CM
LEFT ATRIUM VOLUME INDEX MOD: 17.7 ML/M2
LEFT ATRIUM VOLUME INDEX: 25 ML/M2
LEFT ATRIUM VOLUME MOD: 29.71 CM3
LEFT ATRIUM VOLUME: 42.07 CM3
LEFT INTERNAL DIMENSION IN SYSTOLE: 2.74 CM (ref 2.1–4)
LEFT VENTRICLE DIASTOLIC VOLUME INDEX: 39.33 ML/M2
LEFT VENTRICLE DIASTOLIC VOLUME: 66.08 ML
LEFT VENTRICLE MASS INDEX: 47 G/M2
LEFT VENTRICLE SYSTOLIC VOLUME INDEX: 16.6 ML/M2
LEFT VENTRICLE SYSTOLIC VOLUME: 27.89 ML
LEFT VENTRICULAR INTERNAL DIMENSION IN DIASTOLE: 3.9 CM (ref 3.5–6)
LEFT VENTRICULAR MASS: 78.65 G
LLN IC: -16447.51
LV LATERAL E/E' RATIO: 4.92 M/S
LV SEPTAL E/E' RATIO: 11.8 M/S
MV A" WAVE DURATION": 6.85 MSEC
MV PEAK A VEL: 0.86 M/S
MV PEAK E VEL: 0.59 M/S
MV STENOSIS PRESSURE HALF TIME: 67.82 MS
MV VALVE AREA P 1/2 METHOD: 3.24 CM2
PEF LLN: 4.59
PEF PRE REF: 76.1 %
PEF REF: 6.79
PHYSICIAN COMMENT: ABNORMAL
PISA TR MAX VEL: 4.49 M/S
POST FEF 25 75: 3.07 L/S (ref 1.29–4.38)
POST FET 100: 5.11 SEC
POST FEV1 FVC: 84.9 % (ref 70.01–89.79)
POST FEV1: 2.48 L (ref 2.01–3.27)
POST FEV5: 2.06 L (ref 1.3–3.01)
POST FVC: 2.92 L (ref 2.53–4.11)
POST PEF: 5.98 L/S (ref 4.59–8.98)
PRE DLCO: 13.7 ML/(MIN*MMHG) (ref 20.63–32.1)
PRE ERV: 0.75 L (ref -16448.99–16451.01)
PRE FEF 25 75: 2.44 L/S (ref 1.29–4.38)
PRE FET 100: 6.84 SEC
PRE FEV05 REF: 88.9 %
PRE FEV1 FVC: 81.03 % (ref 70.01–89.79)
PRE FEV1: 2.39 L (ref 2.01–3.27)
PRE FEV5: 1.92 L (ref 1.3–3.01)
PRE FRC PL: 2.58 L (ref 2.04–3.68)
PRE FVC: 2.94 L (ref 2.53–4.11)
PRE IC: 2.25 L (ref -16447.51–16452.49)
PRE PEF: 5.17 L/S (ref 4.59–8.98)
PRE REF IC: 90.6 %
PRE RV: 1.83 L (ref 1.27–2.42)
PRE TLC: 4.84 L (ref 4.45–6.43)
PRE VTG: 2.69 L
PULM VEIN S/D RATIO: 0.69
PV PEAK D VEL: 0.42 M/S
PV PEAK S VEL: 0.29 M/S
RA MAJOR: 5.91 CM
RA PRESSURE: 3 MMHG
RA WIDTH: 4.6 CM
RAW PRE REF: 92.1 %
RAW PRE: 2.82 CMH2O*S/L (ref 3.06–3.06)
RAW REF: 3.06
REF IC: 2.49
RIGHT ATRIAL AREA: 25 CM2
RIGHT VENTRICULAR END-DIASTOLIC DIMENSION: 4.79 CM
RV LLN: 1.27
RV PRE REF: 99.2 %
RV REF: 1.85
RV TISSUE DOPPLER FREE WALL SYSTOLIC VELOCITY 1 (APICAL 4 CHAMBER VIEW): 12.56 CM/S
RVTLC LLN: 26
RVTLC PRE REF: 107.4 %
RVTLC PRE: 37.91 % (ref 25.69–44.87)
RVTLC REF: 35
RVTLCULN: 45
RVULN: 2.42
SGAW PRE REF: 119.6 %
SGAW PRE: 0.12 1/(CMH2O*S) (ref 0.1–0.1)
SGAW REF: 0.1
SINUS: 3.3 CM
STJ: 2.85 CM
TDI LATERAL: 0.12 M/S
TDI SEPTAL: 0.05 M/S
TDI: 0.09 M/S
TLC LLN: 4.45
TLC PRE REF: 88.9 %
TLC REF: 5.44
TLC ULN: 6.43
TR MAX PG: 81 MMHG
TRICUSPID ANNULAR PLANE SYSTOLIC EXCURSION: 1.65 CM
TV REST PULMONARY ARTERY PRESSURE: 84 MMHG
ULN IC: ABNORMAL
VA PRE: 3.9 L (ref 5.29–5.29)
VA SINGLE BREATH LLN: 5.29
VA SINGLE BREATH PRE REF: 73.7 %
VA SINGLE BREATH REF: 5.29
VASINGLEBREATHULN: 5.29
VC LLN: 2.53
VC PRE REF: 90.9 %
VC PRE: 3 L (ref 2.53–4.11)
VC REF: 3.31
VC ULN: 4.11

## 2022-11-14 PROCEDURE — 93306 TTE W/DOPPLER COMPLETE: CPT | Mod: NTX

## 2022-11-14 PROCEDURE — 94060 PR EVAL OF BRONCHOSPASM: ICD-10-PCS | Mod: 26,S$PBB,NTX, | Performed by: INTERNAL MEDICINE

## 2022-11-14 PROCEDURE — 3078F PR MOST RECENT DIASTOLIC BLOOD PRESSURE < 80 MM HG: ICD-10-PCS | Mod: CPTII,NTX,,

## 2022-11-14 PROCEDURE — 3078F DIAST BP <80 MM HG: CPT | Mod: CPTII,NTX,,

## 2022-11-14 PROCEDURE — 94729 DIFFUSING CAPACITY: CPT | Mod: PBBFAC,NTX | Performed by: INTERNAL MEDICINE

## 2022-11-14 PROCEDURE — 99999 PR PBB SHADOW E&M-EST. PATIENT-LVL V: ICD-10-PCS | Mod: PBBFAC,TXP,,

## 2022-11-14 PROCEDURE — 99999 PR PBB SHADOW E&M-EST. PATIENT-LVL V: CPT | Mod: PBBFAC,TXP,,

## 2022-11-14 PROCEDURE — 94726 PLETHYSMOGRAPHY LUNG VOLUMES: CPT | Mod: 26,S$PBB,NTX, | Performed by: INTERNAL MEDICINE

## 2022-11-14 PROCEDURE — 3008F PR BODY MASS INDEX (BMI) DOCUMENTED: ICD-10-PCS | Mod: CPTII,NTX,,

## 2022-11-14 PROCEDURE — 94729 DIFFUSING CAPACITY: CPT | Mod: 26,S$PBB,NTX, | Performed by: INTERNAL MEDICINE

## 2022-11-14 PROCEDURE — 1160F RVW MEDS BY RX/DR IN RCRD: CPT | Mod: CPTII,NTX,,

## 2022-11-14 PROCEDURE — 94060 EVALUATION OF WHEEZING: CPT | Mod: 26,S$PBB,NTX, | Performed by: INTERNAL MEDICINE

## 2022-11-14 PROCEDURE — 93306 ECHO (CUPID ONLY): ICD-10-PCS | Mod: 26,NTX,, | Performed by: INTERNAL MEDICINE

## 2022-11-14 PROCEDURE — 3008F BODY MASS INDEX DOCD: CPT | Mod: CPTII,NTX,,

## 2022-11-14 PROCEDURE — 3074F SYST BP LT 130 MM HG: CPT | Mod: CPTII,NTX,,

## 2022-11-14 PROCEDURE — 93306 TTE W/DOPPLER COMPLETE: CPT | Mod: 26,NTX,, | Performed by: INTERNAL MEDICINE

## 2022-11-14 PROCEDURE — 94618 PULMONARY STRESS TESTING: CPT | Mod: 26,S$PBB,NTX, | Performed by: INTERNAL MEDICINE

## 2022-11-14 PROCEDURE — 99214 OFFICE O/P EST MOD 30 MIN: CPT | Mod: S$PBB,NTX,,

## 2022-11-14 PROCEDURE — 99214 PR OFFICE/OUTPT VISIT, EST, LEVL IV, 30-39 MIN: ICD-10-PCS | Mod: S$PBB,NTX,,

## 2022-11-14 PROCEDURE — 1159F MED LIST DOCD IN RCRD: CPT | Mod: CPTII,NTX,,

## 2022-11-14 PROCEDURE — 94726 PULM FUNCT TST PLETHYSMOGRAP: ICD-10-PCS | Mod: 26,S$PBB,NTX, | Performed by: INTERNAL MEDICINE

## 2022-11-14 PROCEDURE — 94618 PULMONARY STRESS TESTING: ICD-10-PCS | Mod: 26,S$PBB,NTX, | Performed by: INTERNAL MEDICINE

## 2022-11-14 PROCEDURE — 99215 OFFICE O/P EST HI 40 MIN: CPT | Mod: PBBFAC,25,TXP

## 2022-11-14 PROCEDURE — 1159F PR MEDICATION LIST DOCUMENTED IN MEDICAL RECORD: ICD-10-PCS | Mod: CPTII,NTX,,

## 2022-11-14 PROCEDURE — 94729 PR C02/MEMBANE DIFFUSE CAPACITY: ICD-10-PCS | Mod: 26,S$PBB,NTX, | Performed by: INTERNAL MEDICINE

## 2022-11-14 PROCEDURE — 1160F PR REVIEW ALL MEDS BY PRESCRIBER/CLIN PHARMACIST DOCUMENTED: ICD-10-PCS | Mod: CPTII,NTX,,

## 2022-11-14 PROCEDURE — 3074F PR MOST RECENT SYSTOLIC BLOOD PRESSURE < 130 MM HG: ICD-10-PCS | Mod: CPTII,NTX,,

## 2022-11-14 PROCEDURE — 94618 PULMONARY STRESS TESTING: CPT | Mod: PBBFAC,NTX | Performed by: INTERNAL MEDICINE

## 2022-11-14 PROCEDURE — 94060 EVALUATION OF WHEEZING: CPT | Mod: PBBFAC,NTX | Performed by: INTERNAL MEDICINE

## 2022-11-14 PROCEDURE — 94726 PLETHYSMOGRAPHY LUNG VOLUMES: CPT | Mod: PBBFAC,NTX | Performed by: INTERNAL MEDICINE

## 2022-11-14 RX ORDER — PREGABALIN 75 MG/1
75 CAPSULE ORAL 2 TIMES DAILY
Qty: 60 CAPSULE | Refills: 6 | Status: SHIPPED | OUTPATIENT
Start: 2022-11-14 | End: 2022-12-14 | Stop reason: DRUGHIGH

## 2022-11-14 NOTE — PROGRESS NOTES
"   Subjective:    Patient ID:  Kristin Maier is a 46 y.o. female who presents for follow-up of Pulmonary Hypertension.    HPI:   Mrs. Maier is a 47 y/o AA female diagnosed with WHO Group 1 PAH, referred by Dr. MAINE Stack. She was initially seen in clinic by Dr. Dutta on 11/29/2021, presenting with symptoms of CHU and severe PAH by ECHO. Scheduled for RHC the next day. Same day of her appt she went to an OSH for a UTI.  Given her pulmonary hypertension history a decision was made to transfer her to [facility] for further inpatient workup.     Hospital Course: Patient was admitted for evaluation of Pulmonary Hypertension. Work up revealed: HENNA, ESR, RF, Anti CCP, ds DNA Ab, HIV, TSH unremarkable. V/Q and CTA chest showed no evidence of PE. 12/2/21 TTE showed  EF 20, LV diastolic dysfunction, moderate pericardial effusion, moderate RV enlargement with severely reduced RV systolic function. Free wall strain 6-9%, FAC 16%, severe TR, RV pressure volume overload with systolic flattening of IV septum, CVP 15, PASP 103. 12.2.21 RHC revealed: RA: 25/ 26/ 22 RV: 90/ 1/ 21 PA: 86/ 42/ 57 PWP: 9/ 8/ 7. TPG 50, PVR 25 (severely elevated R sided filling pressure. CI 1.2, CO 1.97. Patient was classified as WHO group I PH and started on Veletri while inpatient, patient required  at rate of 5mg/kg/min. Patient had PICC line placed on 12/8/21 for long term administerationof vasodilator therapy. Switched to Remodulin at discharge.    Clinic F/U:   Mrs. Maier is now on Remodulin 80ng/kg/min, Opsumit 10 mg daily, and adempas 2.5 mg, TID. She is limited more by leg and joint pain than breathing difficulties. The pain started when remodulin was initiated, but has gotten worse. Has tried increased doses of gabapentin to no effect. Mrs. Maier does say that her breathing is "worse at night." Sometimes she has to sit up to catch her breath. She has not had a sleep study. Takes 20 mg lasix daily that works for fluid " retention.    6MWT:  6MW 11/14/2022 6/15/2022   6MWT Status completed without stopping completed without stopping   Patient Reported Leg pain Dyspnea   Was O2 used? No No   6MW Distance walked (feet) 1200 1250   Distance walked (meters) 365.76 381   Did patient stop? No No   Oxygen Saturation 97 99   Supplemental Oxygen Room Air Room Air   Heart Rate 94 94   Blood Pressure 96/68 97/64   Jessica Dyspnea Rating  somewhat heavy very light   Oxygen Saturation 99 96   Supplemental Oxygen Room Air Room Air   Heart Rate 112 95   Blood Pressure 110/69 109/72   Jessica Dyspnea Rating  somewhat heavy light   Recovery Time (seconds) 75 48   Oxygen Saturation 99 99   Supplemental Oxygen Room Air Room Air   Heart Rate 99 93     ECHO: 11/14/2022  The estimated ejection fraction is 60%.  The left ventricle is normal in size with normal systolic function.  There is abnormal septal wall motion. There is systolic and diastolic flattening of the interventricular septum consistent with right ventricle pressure and volume overload.  Indeterminate left ventricular diastolic function.  Moderate right ventricular enlargement with moderately reduced right ventricular systolic function.  There is right ventricular hypertrophy.  Severe tricuspid regurgitation.  Moderate right atrial enlargement.  There is pulmonary hypertension.  The estimated PA systolic pressure is 84 mmHg.  Normal central venous pressure (3 mmHg).  Trivial circumferential pericardial effusion.    ECHO: 12/13/2021 (prior to initiation of remodulin)  The left ventricle is normal in size with concentric remodeling and normal systolic function.  The estimated ejection fraction is 63%.  There are segmental left ventricular wall motion abnormalities.  There is abnormal septal wall motion.  Normal left ventricular diastolic function.  Moderate right ventricular enlargement with mildly reduced right ventricular systolic function.  Moderate right atrial enlargement.  Severe tricuspid  regurgitation.  Mild pulmonic regurgitation.  Elevated central venous pressure (15 mmHg).  The estimated PA systolic pressure is 122 mmHg.  There is pulmonary hypertension.  Small pericardial effusion. Apically and trivial under the RA.    RHC: 3/16/2022  RA: 25/ 26/ 22 RV: 90/ 1/ 21 PA: 86/ 42/ 57 PWP: 9/ 8/ 7 .   Cardiac output was 1.97  by Kristel. Cardiac index is 1.2 L/min/m2.   O2 Sat: PA 38%.     TPG   50    PVR   25    V/Q: 12/3/2021  FINDINGS:  Perfusion: No observable filling defects or perfusion defects on perfusion imaging.     Ventilation: No observable defects on ventilation imaging.     Small amount of ingested radiotracer is visualized within the aerodigestive tract.     Impression:     This represents a low probability of pulmonary embolism.    CT Chest: 9/12/2022  Impression:     1. Constellation of findings suggestive of pulmonary arterial hypertension.  No significant pulmonary edema or pleural effusion.  No evidence of pulmonary thromboembolism.  2. Cardiomegaly with markedly enlarged right atrium and the right ventricle.  3. Multiple small pulmonary nodules, with the largest measuring 0.4 cm.  For multiple solid nodules all <6 mm, Fleischner Society 2017 guidelines recommend no routine follow up for a low risk patient, or follow up with non-contrast chest CT at 12 months after discovery in a high risk patient.  4. 3.9 cm borderline aneurysmal ascending aorta.    IV/SC:  Pulmonary Hypertension Review Flowsheet 11/14/2022   Pump Type CADD   Medication type Remodulin   Vial size 5.0mg/ml 20ml   Dose (ng/kg/min) 80 ng/kg/min   DW (kg) 55.0kg   Amt of Med used 3ml   Amt of Diluent Used NS 97ml   Final Concentration (ng/ml) 0.15mg/ml   Pump Rate ml/24 hr 42ml/24hr     Review of Systems   Constitutional: Negative.   HENT: Negative.     Eyes: Negative.    Cardiovascular:  Positive for dyspnea on exertion and leg swelling. Negative for chest pain, irregular heartbeat, near-syncope, orthopnea, paroxysmal  "nocturnal dyspnea and syncope.        Occasional leg swelling, resolves w/o diuretics   Respiratory: Negative.     Endocrine: Negative.    Hematologic/Lymphatic: Negative.    Skin: Negative.    Musculoskeletal:  Positive for myalgias. Negative for falls and joint swelling.        Foot and leg pain related to medication SE   Gastrointestinal:  Positive for diarrhea and nausea. Negative for abdominal pain, dysphagia and heartburn.        Occasional nausea and diarrhea related to medication   Genitourinary: Negative.    Neurological:  Positive for excessive daytime sleepiness. Negative for dizziness, headaches and loss of balance.   Psychiatric/Behavioral: Negative.        Objective: /67   Pulse 91   Ht 5' 6" (1.676 m)   Wt 69.3 kg (152 lb 12.5 oz)   LMP 06/19/2017 (Approximate)   SpO2 100%   BMI 24.66 kg/m²       Physical Exam  Constitutional:       General: She is not in acute distress.     Appearance: Normal appearance. She is normal weight. She is not ill-appearing.   HENT:      Head: Normocephalic and atraumatic.      Nose: Nose normal.   Eyes:      Extraocular Movements: Extraocular movements intact.      Pupils: Pupils are equal, round, and reactive to light.   Neck:      Vascular: No JVD.   Cardiovascular:      Rate and Rhythm: Normal rate and regular rhythm.   Chest:      Comments: R chest wall Pedro Catheter infusing Remodulin. Dressing is C/D/I  Musculoskeletal:      Cervical back: Normal range of motion and neck supple.   Neurological:      Mental Status: She is alert.     Lab Results   Component Value Date    BNP 47 11/14/2022     11/14/2022    K 4.2 11/14/2022    MG 2.2 11/14/2022     11/14/2022    CO2 20 (L) 11/14/2022    BUN 19 11/14/2022    CREATININE 0.9 11/14/2022    GLU 99 11/14/2022    HGBA1C 6.4 (H) 12/06/2021    AST 14 11/14/2022    ALT 25 11/14/2022    ALBUMIN 4.3 11/14/2022    PROT 8.0 11/14/2022    BILITOT 1.6 (H) 11/14/2022    CHOL 157 12/06/2021    HDL 34 (L) " 12/06/2021    LDLCALC 103.2 12/06/2021    TRIG 99 12/06/2021       Magnesium   Date Value Ref Range Status   11/14/2022 2.2 1.6 - 2.6 mg/dL Final       Lab Results   Component Value Date    WBC 5.78 11/14/2022    HGB 11.4 (L) 11/14/2022    HCT 35.3 (L) 11/14/2022    MCV 90 11/14/2022     11/14/2022       Lab Results   Component Value Date    INR 1.1 07/19/2022    INR 1.1 12/23/2021    INR 1.1 12/22/2021       BNP   Date Value Ref Range Status   11/14/2022 47 0 - 99 pg/mL Final     Comment:     Values of less than 100 pg/ml are consistent with non-CHF populations.   11/10/2022 127 (H) 0 - 99 pg/mL Final     Comment:     Values of less than 100 pg/ml are consistent with non-CHF populations.   07/19/2022 27 0 - 99 pg/mL Final     Comment:     Values of less than 100 pg/ml are consistent with non-CHF populations.       No results found for: LDH        WHO Group:  1 Functional Class: II   REVEAL Score: 7 (Intermediate Risk)    Assessment:       1. WHO group 1 pulmonary arterial hypertension    2. Neuropathic pain    3. High risk medication use         Plan:       On triple therapy, ECHO improved but still showing severe RV enlargement and dysfunction. Would like to increase remodulin, but limited by leg pain. Will switch gabapentin to lyrica to see if this helps and then plan to titrate.    Placed referral for sleep consult. Explained the importantance of treating sleep apnea, if present.    Referral has been placed to our lung transplant team. Mrs. Maier is a little overwhelmed by her PH diagnosis. She had otherwise been healthy up until hospitalization last year.     Discussed the possible role of genetics in PH. Since we have no clear etiology for her PH and she has 4 children, age 24-31, recommend testing.    Return to clinic 2 months with labs, walk.    Recommend 2 gram sodium restriction and 1500cc fluid restriction.  Encourage physical activity with graded exercise program.  Requested patient to weigh  themselves daily, and to notify us if their weight increases by more than 3 lbs in 1 day or 5 lbs in 1 week.

## 2022-11-14 NOTE — TELEPHONE ENCOUNTER
----- Message from Adrienne Maradiaga sent at 11/14/2022  3:02 PM CST -----  Contact: patient Kristin [phone]  Patient called to schedule an appt next available with Dr. Pinon 07/10/23, please call patient to schedule in sooner if possible

## 2022-11-14 NOTE — LETTER
November 14, 2022    Kristin Darrion  [address] [address]          Dear Kristin Maier:  MRN: [id number]    The [facility] team reviewed your transplant candidacy. The lung team has denied the option of transplantation due to the need for bilateral lung transplant consideration due to your pulmonary hypertension.    Although the [facility] team believes you are not a suitable transplant candidate, you have the option to be evaluated at other transplant centers.  You may request your [facility] records be sent to any center of your choice by contacting our Medical Records Department at [phone].    Attached is a letter from the [employer] (UN[employer]).  It describes the services and information offered to patients by UNOS and the Organ Procurement and Transplant Network.                                                                                                                                      The [facility] {Organ:[id number]} Transplant team sincerely wishes you the best and remains available to answer any questions.  Please do not hesitate to contact our pre-transplant office if we can assist you in any other way.  You should follow-up with your primary care physician for routine medical care.                                                                             Sincerely,        {Signature:[id number]}    Cc: ***               The Organ Procurement and Transplantation Network   Toll-free patient services line: Your resource for organ transplant information     If you have a question regarding your own medical care, you always should call your transplant hospital first. However, for general organ transplant-related information, you can call the Organ Procurement and Transplantation Network (OPTN) toll-free patient services line at [phone].     Anyone, including potential transplant candidates, candidates, recipients, family members, friends, living donors, and  donor family members, can call this number to:     Talk about organ donation, living donation, the transplant process, the donation process, and transplant policies.   Get a free patient information kit with helpful booklets, waiting list and transplant information, and a list of all transplant hospitals.   Ask questions about the OPTN website (https://optn.transplant.hrsa.gov/), the [employer]s (UNOS) website (https://unos.org/), or the [employer]OS website for living donors and transplant recipients. (https://www.transplantliving.org/).   Learn how the OPTN can help you.   Talk about any concerns that you may have with a transplant hospital.     The nations transplant system, the OPTN, is managed under federal contract by the [employer]aring (UNOS), which is a non-profit charitable organization. The OPTN helps create and define organ sharing policies that make the best use of donated organs. This process continuously evaluating new advances and discoveries so policies can be adapted to best serve patients waiting for transplants. To do so, the OPTN works closely with transplant professionals, transplant patients, transplant candidates, donor families, living donors, and the public. All transplant programs and organ procurement organizations throughout the country are OPTN members and are obligated to follow the policies the OPTN creates for allocating organs.     The OPTN also is responsible for:   Providing educational material for patients, the public, and professionals.   Raising awareness of the need for donated organs and tissue.   Coordinating organ procurement, matching, and placement.   Collecting information about every organ transplant and donation that occurs in the [place].     Remember, you should contact your transplant hospital directly if you have questions or concerns about your own medical care including medical records, work-up progress, and test results.      We are not your transplant hospital, and our staff will not be able to answer questions about your case, so please keep your transplant hospitals phone number handy.   However, while you research your transplant needs and learn as much as you can about transplantation and donation, we welcome your call to our toll-free patient services line at [phone].

## 2022-11-14 NOTE — TELEPHONE ENCOUNTER
"Received financial authorization from . Patient called back about lung transplant referral, scheduling. Patient is interested and requests next available appointment, anytime. Offered . Patient agreeable to this date, at 10 am. All testing is up to date (PFTs and walk completed on 22. Patient is aware she will need to wear a mask at all times in our clinic. All questions answered at this time.   ____  Received financial clearance from . Attempted to reach patient. Left voicemail requesting return call.  ----- Message from America Rachel DO sent at 2022 10:01 AM CST -----  Regarding: RE: LUT referral  Sure that's fine.  Needs PFTs with visit.  Thanks    ----- Message -----  From: Ramila Garcia  Sent: 2022   9:17 AM CST  To: America Rachel DO  Subject: RE: LUT referral                                 The PH NP (SOFIE Guy) is asking if the patient can establish care with our lung program and be referred out if needed. She believes this is an early referral.  ----- Message -----  From: America Rachel DO  Sent: 2022   5:45 PM CST  To: Ramila Garcia  Subject: RE: LUT referral                                 Not a candidate right now as we are not transplanting PH.  Thanks    ----- Message -----  From: Ramila Garcia  Sent: 2022   4:50 PM CST  To: America Rachel DO  Subject: LUT referral                                     Lung Transplant Referral Note     Referral from: Sho Guy NP    Lung diagnosis: Primary PH     Age: 46 y.o. female    Height/Weight/BMI:  5' 7" / 67 kg  / 22.4 kg/m²     Smoking history: Social History     Tobacco Use       Smoking status: Former Smoker        Packs/day: less than 1         Years: 5         Pack years: -          Types: Cigarettes, cigars        Quit date: 2014 ------noted 'light social smoker'        Years since quittin       Smokeless tobacco: " "never user     Other Drug use: social "2 or 3 times a week, 2 or 3 glasses of wine"    PFT date: not found     6 MWT date: 6/15/22   Distance 381 meters (1250 feet). De-satted to 95% on room air with activity. Does not have portable Oxygen.       CXR date: 9/12/22   Impression:     1. Constellation of findings suggestive of pulmonary arterial hypertension.  No significant pulmonary edema or pleural effusion.  No evidence of pulmonary thromboembolism.  2. Cardiomegaly with markedly enlarged right atrium and the right ventricle.  3. Multiple small pulmonary nodules, with the largest measuring 0.4 cm.  For multiple solid nodules all <6 mm, Fleischner Society 2017 guidelines recommend no routine follow up for a low risk patient, or follow up with non-contrast chest CT at 12 months after discovery in a high risk patient.  4. 3.9 cm borderline aneurysmal ascending aorta.     Echo date: 6/27/22     Impression:   · The left ventricle is normal in size with concentric remodeling and normal systolic function.  · The estimated ejection fraction is 60%.  · Indeterminate left ventricular diastolic function.  · There is abnormal septal wall motion. There is systolic flattening of the interventricular septum consistent with right ventricle pressure overload.  · Severe right ventricular enlargement with low normal right ventricular systolic function.  · Severe right atrial enlargement.  · Moderate to severe tricuspid regurgitation.  · Moderate pulmonic regurgitation.  · Normal central venous pressure (3 mmHg).  · The estimated PA systolic pressure is 102 mmHg.  · There is pulmonary hypertension.      RHC 7/19/22:    · The estimated blood loss was <50 mL.  · Severe pulmonary hypertension however CO/CI have finally come up to normal range. Continue uptitration of riociguat and consider uptitration of remodulin after.  · Recommend sleep clinic evaluation.    RA: 15/ 11/ 10 RV: 70/ 9/ 10 PA: 72/ 29/ 43 PWP: 12/ 12/ 11 .   Cardiac " output was 4.29  by Kristel. Cardiac index is 2.61 L/min/m2.   O2 Sat: PA 69%.       Normal CO/CI on remodulin 80 ng/kg/min, riociguat 2mg po TID, macitentan 10mg.  Mildly increased right and normal left sided filling pressures.  Severe pulmonary hypertension.   TPG  32   PVR  7.45  MAP 75  SVR 1212      Other pertinent medical history: recent episode of L sided weakness, CT r/o stroke, unable to obtain MRI due to trepostinil.    Recommendations?

## 2022-11-14 NOTE — TELEPHONE ENCOUNTER
"----- Message from America Rachel DO sent at 2022  5:45 PM CST -----  Regarding: RE: LUT referral  Not a candidate right now as we are not transplanting PH.  Thanks    ----- Message -----  From: Ramila Garcia  Sent: 2022   4:50 PM CST  To: America Rachel DO  Subject: LUT referral                                     Lung Transplant Referral Note     Referral from: Sho Guy NP    Lung diagnosis: Primary PH     Age: 46 y.o. female    Height/Weight/BMI:  5' 7" / 67 kg  / 22.4 kg/m²     Smoking history: Social History     Tobacco Use       Smoking status: Former Smoker        Packs/day: less than 1         Years: 5         Pack years: -          Types: Cigarettes, cigars        Quit date: 2014 ------noted 'light social smoker'        Years since quittin       Smokeless tobacco: never user     Other Drug use: social "2 or 3 times a week, 2 or 3 glasses of wine"    PFT date: not found     6 MWT date: 6/15/22   Distance 381 meters (1250 feet). De-satted to 95% on room air with activity. Does not have portable Oxygen.       CXR date: 22   Impression:     1. Constellation of findings suggestive of pulmonary arterial hypertension.  No significant pulmonary edema or pleural effusion.  No evidence of pulmonary thromboembolism.  2. Cardiomegaly with markedly enlarged right atrium and the right ventricle.  3. Multiple small pulmonary nodules, with the largest measuring 0.4 cm.  For multiple solid nodules all <6 mm, Fleischner Society 2017 guidelines recommend no routine follow up for a low risk patient, or follow up with non-contrast chest CT at 12 months after discovery in a high risk patient.  4. 3.9 cm borderline aneurysmal ascending aorta.     Echo date: 22     Impression:   · The left ventricle is normal in size with concentric remodeling and normal systolic function.  · The estimated ejection fraction is 60%.  · Indeterminate left ventricular diastolic " function.  · There is abnormal septal wall motion. There is systolic flattening of the interventricular septum consistent with right ventricle pressure overload.  · Severe right ventricular enlargement with low normal right ventricular systolic function.  · Severe right atrial enlargement.  · Moderate to severe tricuspid regurgitation.  · Moderate pulmonic regurgitation.  · Normal central venous pressure (3 mmHg).  · The estimated PA systolic pressure is 102 mmHg.  · There is pulmonary hypertension.      RHC 7/19/22:    · The estimated blood loss was <50 mL.  · Severe pulmonary hypertension however CO/CI have finally come up to normal range. Continue uptitration of riociguat and consider uptitration of remodulin after.  · Recommend sleep clinic evaluation.    RA: 15/ 11/ 10 RV: 70/ 9/ 10 PA: 72/ 29/ 43 PWP: 12/ 12/ 11 .   Cardiac output was 4.29  by Kristel. Cardiac index is 2.61 L/min/m2.   O2 Sat: PA 69%.       Normal CO/CI on remodulin 80 ng/kg/min, riociguat 2mg po TID, macitentan 10mg.  Mildly increased right and normal left sided filling pressures.  Severe pulmonary hypertension.   TPG  32   PVR  7.45  MAP 75  SVR 1212      Other pertinent medical history: recent episode of L sided weakness, CT r/o stroke, unable to obtain MRI due to trepostinil.    Recommendations?

## 2022-11-14 NOTE — PATIENT INSTRUCTIONS
Start Lyrica. Discontinue gabapentin.    Schedule sleep consult.    Schedule genetic counseling.    Return to clinic 2 months with labs, walk.    Recommend 2 gram sodium restriction and 1500cc fluid restriction.  Encourage physical activity with graded exercise program.  Requested patient to weigh themselves daily, and to notify us if their weight increases by more than 3 lbs in 1 day or 5 lbs in 1 week.

## 2022-11-14 NOTE — TELEPHONE ENCOUNTER
Spoke to pt, scheduled first available appt for July, pt accepted and confirmed understanding, pt also added to wait list for sooner visit

## 2022-11-14 NOTE — PROCEDURES
Kristin Maier is a 46 y.o.  female patient, who presents for a 6 minute walk test ordered by ANA M Guy.  The diagnosis is Pulmonary Hypertension.  The patient's BMI is 23.9 kg/m2.  Predicted distance (lower limit of normal) is 449.02 meters.      Test Results:    The test was completed without stopping.  The total time walked was 360 seconds.  During walking, the patient reported:  Leg pain.  The patient used no assistive devices during testing.     11/14/2022---------Distance: 365.76 meters (1200 feet)     O2 Sat % Supplemental Oxygen Heart Rate Blood Pressure Jessica Scale   Pre-exercise  (Resting) 97 % Room Air 94 bpm 96/68 mmHg 4   During Exercise 99 % Room Air 112 bpm 110/69 mmHg 4   Post-exercise  (Recovery) 99 % Room Air  99 bpm       Recovery Time: 75 seconds    Performing nurse/tech: Estopinal SERA      PREVIOUS STUDY:   06/15/2022---------Distance: 381 meters (1250 feet)       O2 Sat % Supplemental Oxygen Heart Rate Blood Pressure Jessica Scale   Pre-exercise  (Resting) 99 % Room Air 94 bpm 97/64 mmHg 1   During Exercise 96 % Room Air 95 bpm 109/72 mmHg 2   Post-exercise  (Recovery) 99 % Room Air  93 bpm           CLINICAL INTERPRETATION:  Six minute walk distance is 365.76 meters (1200 feet) with somewhat heavy dyspnea.  During exercise, there was no desaturation while breathing room air.  Both blood pressure and heart rate remained stable with walking.  The patient reported non-pulmonary symptoms during exercise.  Since the previous study in June 2022, exercise capacity is unchanged.  Based upon age and body mass index, exercise capacity is less than predicted.

## 2022-12-02 ENCOUNTER — PATIENT MESSAGE (OUTPATIENT)
Dept: TRANSPLANT | Facility: CLINIC | Age: 47
End: 2022-12-02
Payer: MEDICAID

## 2022-12-05 ENCOUNTER — TELEPHONE (OUTPATIENT)
Dept: TRANSPLANT | Facility: CLINIC | Age: 47
End: 2022-12-05
Payer: MEDICAID

## 2022-12-06 ENCOUNTER — OFFICE VISIT (OUTPATIENT)
Dept: TRANSPLANT | Facility: CLINIC | Age: 47
End: 2022-12-06
Payer: MEDICAID

## 2022-12-06 ENCOUNTER — PATIENT MESSAGE (OUTPATIENT)
Dept: TRANSPLANT | Facility: CLINIC | Age: 47
End: 2022-12-06
Payer: MEDICAID

## 2022-12-06 VITALS
OXYGEN SATURATION: 100 % | HEART RATE: 83 BPM | HEIGHT: 63 IN | WEIGHT: 146 LBS | DIASTOLIC BLOOD PRESSURE: 74 MMHG | TEMPERATURE: 99 F | SYSTOLIC BLOOD PRESSURE: 101 MMHG | BODY MASS INDEX: 25.87 KG/M2

## 2022-12-06 DIAGNOSIS — Z76.82 LUNG TRANSPLANT CANDIDATE: Primary | ICD-10-CM

## 2022-12-06 DIAGNOSIS — I27.0 PRIMARY PULMONARY HYPERTENSION: ICD-10-CM

## 2022-12-06 PROCEDURE — 3078F PR MOST RECENT DIASTOLIC BLOOD PRESSURE < 80 MM HG: ICD-10-PCS | Mod: CPTII,TXP,, | Performed by: INTERNAL MEDICINE

## 2022-12-06 PROCEDURE — 3008F PR BODY MASS INDEX (BMI) DOCUMENTED: ICD-10-PCS | Mod: CPTII,TXP,, | Performed by: INTERNAL MEDICINE

## 2022-12-06 PROCEDURE — 3074F PR MOST RECENT SYSTOLIC BLOOD PRESSURE < 130 MM HG: ICD-10-PCS | Mod: CPTII,TXP,, | Performed by: INTERNAL MEDICINE

## 2022-12-06 PROCEDURE — 99999 PR PBB SHADOW E&M-EST. PATIENT-LVL IV: ICD-10-PCS | Mod: PBBFAC,TXP,, | Performed by: INTERNAL MEDICINE

## 2022-12-06 PROCEDURE — 1159F PR MEDICATION LIST DOCUMENTED IN MEDICAL RECORD: ICD-10-PCS | Mod: CPTII,TXP,, | Performed by: INTERNAL MEDICINE

## 2022-12-06 PROCEDURE — 3008F BODY MASS INDEX DOCD: CPT | Mod: CPTII,TXP,, | Performed by: INTERNAL MEDICINE

## 2022-12-06 PROCEDURE — 3078F DIAST BP <80 MM HG: CPT | Mod: CPTII,TXP,, | Performed by: INTERNAL MEDICINE

## 2022-12-06 PROCEDURE — 3074F SYST BP LT 130 MM HG: CPT | Mod: CPTII,TXP,, | Performed by: INTERNAL MEDICINE

## 2022-12-06 PROCEDURE — 99214 OFFICE O/P EST MOD 30 MIN: CPT | Mod: PBBFAC,TXP | Performed by: INTERNAL MEDICINE

## 2022-12-06 PROCEDURE — 1160F PR REVIEW ALL MEDS BY PRESCRIBER/CLIN PHARMACIST DOCUMENTED: ICD-10-PCS | Mod: CPTII,TXP,, | Performed by: INTERNAL MEDICINE

## 2022-12-06 PROCEDURE — 99204 PR OFFICE/OUTPT VISIT, NEW, LEVL IV, 45-59 MIN: ICD-10-PCS | Mod: S$PBB,TXP,, | Performed by: INTERNAL MEDICINE

## 2022-12-06 PROCEDURE — 1160F RVW MEDS BY RX/DR IN RCRD: CPT | Mod: CPTII,TXP,, | Performed by: INTERNAL MEDICINE

## 2022-12-06 PROCEDURE — 99999 PR PBB SHADOW E&M-EST. PATIENT-LVL IV: CPT | Mod: PBBFAC,TXP,, | Performed by: INTERNAL MEDICINE

## 2022-12-06 PROCEDURE — 1159F MED LIST DOCD IN RCRD: CPT | Mod: CPTII,TXP,, | Performed by: INTERNAL MEDICINE

## 2022-12-06 PROCEDURE — 99204 OFFICE O/P NEW MOD 45 MIN: CPT | Mod: S$PBB,TXP,, | Performed by: INTERNAL MEDICINE

## 2022-12-06 NOTE — PROGRESS NOTES
LUNG TRANSPLANT INITIAL EVALUATION                                                                                                                                           Reason for Visit:  Evaluation for lung transplant    Referring Physician: Sho Guy, *    History of Present Illness: Kristin Maier is a 46 y.o. female who is on 0L of oxygen.  She is on no assisted ventilation.  Her New York Heart Association Class is I and a Karnofsky score of 80% - Normal activity with effort: some symptoms of disease. She is not diabetic.    Ms. Maier presents for lung transplant evaluation with a diagnosis of PH, currently on Adempas, Opsumit, and remodulin.  She is being followed by Sho Guy NP in the [facility].  She was diagnosed in 2021 when she began feeling short of breath with normal activities.  She currently continues to have fatigue, dyspnea with exertion, and generalized aches and pains.  She remains as active as possible.  Denies edema.      Past Medical History:   Diagnosis Date    Essential (primary) hypertension     Essential hypertension       Past Surgical History:   Procedure Laterality Date    APPENDECTOMY       SECTION      Transverse cut    HYSTERECTOMY  2017    TLH- bleeding    OOPHORECTOMY      RIGHT HEART CATHETERIZATION Right 2021    Procedure: INSERTION, CATHETER, RIGHT HEART;  Surgeon: Chloe Gregory MD;  Location: [facility] CATH LAB;  Service: Cardiology;  Laterality: Right;    RIGHT HEART CATHETERIZATION Right 3/16/2022    Procedure: INSERTION, CATHETER, RIGHT HEART;  Surgeon: Hu Silverman MD;  Location: [facility] CATH LAB;  Service: Cardiology;  Laterality: Right;    RIGHT HEART CATHETERIZATION Right 2022    Procedure: INSERTION, CATHETER, RIGHT HEART;  Surgeon: Chloe Gregory MD;  Location: [facility] CATH LAB;  Service: Cardiology;  Laterality: Right;    TUBAL LIGATION      VAGINAL DELIVERY  ; ;  1994; 1996       Allergies: Patient has no known allergies.    Current Outpatient Medications   Medication Sig    furosemide (LASIX) 20 MG tablet Take 1 tablet (20 mg total) by mouth once daily.    loperamide (IMODIUM) 2 mg capsule Take 1 capsule (2 mg total) by mouth 4 (four) times daily as needed for Diarrhea.    macitentan (OPSUMIT) 10 mg Tab Take 10 mg by mouth once daily. Take one tablet (10 mg) by mouth daily    megestroL (MEGACE) 400 mg/10 mL (40 mg/mL) Susp Take 400 mg by mouth 2 (two) times daily.    ondansetron (ZOFRAN) 4 MG tablet Take 1 tablet (4 mg total) by mouth every 8 (eight) hours as needed for Nausea.    potassium chloride SA (K-DUR,KLOR-CON) 10 MEQ tablet Take 2 tablets (20 mEq total) by mouth once daily.    pregabalin (LYRICA) 75 MG capsule Take 1 capsule (75 mg total) by mouth 2 (two) times daily.    REMODULIN 5 mg/mL Soln     riociguat (ADEMPAS) 2.5 mg tablet Take 1 mg by mouth 3 (three) times daily. Initial dose is 1mg TID then titrate by 0.5 mg 3 times per day at intervals no sooner than 2 weeks to the highest tolerated dosage up to a maximum of 2.5 mg TID.    sodium chloride 0.9% SolP 100 mL with treprostinil 1 mg/mL Soln 6,000,000 ng Inject 2,145 ng/min into the vein continuous.     No current facility-administered medications for this visit.       Immunization History   Administered Date(s) Administered    Tdap 10/24/2020     Family History:    Family History   Problem Relation Age of Onset    Cancer Father     Breast cancer Neg Hx     Colon cancer Neg Hx     Ovarian cancer Neg Hx      Social History     Substance and Sexual Activity   Alcohol Use Yes    Alcohol/week: 2.0 standard drinks    Types: 2 Glasses of wine per week    Comment: socially- 2 or 3 times a week-2 glasses of wine      Social History     Substance and Sexual Activity   Drug Use Not Currently    Types: Marijuana    Comment: 2-6 MONTHS AGO      Social History     Socioeconomic History    Marital status: Single   Tobacco  Use    Smoking status: Former     Types: Cigars     Quit date: 2020     Years since quittin.0     Passive exposure: Past    Smokeless tobacco: Never    Tobacco comments:     social smoker-light   Substance and Sexual Activity    Alcohol use: Yes     Alcohol/week: 2.0 standard drinks     Types: 2 Glasses of wine per week     Comment: socially- 2 or 3 times a week-2 glasses of wine    Drug use: Not Currently     Types: Marijuana     Comment: 2-6 MONTHS AGO    Sexual activity: Yes     Partners: Male     Birth control/protection: See Surgical Hx     Comment:      Social Determinants of Health     Financial Resource Strain: Low Risk     Difficulty of Paying Living Expenses: Not very hard   Food Insecurity: No Food Insecurity    Worried About Running Out of Food in the Last Year: Never true    Ran Out of Food in the Last Year: Never true   Transportation Needs: No Transportation Needs    Lack of Transportation (Medical): No    Lack of Transportation (Non-Medical): No   Physical Activity: Insufficiently Active    Days of Exercise per Week: 7 days    Minutes of Exercise per Session: 20 min   Stress: Stress Concern Present    Feeling of Stress : To some extent   Social Connections: Unknown    Frequency of Communication with Friends and Family: More than three times a week    Frequency of Social Gatherings with Friends and Family: More than three times a week    Attends Mandaeism Services: More than 4 times per year    Active Member of Clubs or Organizations: No    Attends Club or Organization Meetings: Never   Housing Stability: Low Risk     Unable to Pay for Housing in the Last Year: No    Number of Places Lived in the Last Year: 2    Unstable Housing in the Last Year: No     Review of Systems   Constitutional:  Positive for malaise/fatigue.   Respiratory:  Positive for cough and shortness of breath. Negative for hemoptysis, sputum production and wheezing.    Cardiovascular:  Positive for palpitations,  "orthopnea and leg swelling (occasional).   Musculoskeletal:  Positive for joint pain and myalgias.   Vitals  /74 (BP Location: Left arm, Patient Position: Sitting, BP Method: Small (Automatic))   Pulse 83   Temp 99.1 °F (37.3 °C) (Oral)   Ht 5' 3" (1.6 m)   Wt 66.2 kg (146 lb)   LMP 06/19/2017 (Approximate)   SpO2 100% Comment: ROOM AIR  BMI 25.86 kg/m²   Physical Exam  Constitutional:       Appearance: Normal appearance. She is well-developed.   HENT:      Head: Normocephalic and atraumatic.   Eyes:      Extraocular Movements: Extraocular movements intact.      Conjunctiva/sclera: Conjunctivae normal.   Cardiovascular:      Pulses: Normal pulses.      Heart sounds: Normal heart sounds.   Pulmonary:      Effort: Pulmonary effort is normal. No respiratory distress.      Breath sounds: Normal breath sounds. No stridor. No wheezing, rhonchi or rales.   Chest:      Chest wall: No tenderness.   Musculoskeletal:         General: Normal range of motion.      Cervical back: Normal range of motion.      Right lower leg: No edema.      Left lower leg: No edema.   Skin:     General: Skin is warm and dry.   Neurological:      Mental Status: She is alert and oriented to person, place, and time.       Labs:  No visits with results within 7 Day(s) from this visit.   Latest known visit with results is:   Lab Visit on 11/14/2022   Component Date Value    Sodium 11/14/2022 137     Potassium 11/14/2022 4.2     Chloride 11/14/2022 109     CO2 11/14/2022 20 (L)     Glucose 11/14/2022 99     BUN 11/14/2022 19     Creatinine 11/14/2022 0.9     Calcium 11/14/2022 9.9     Total Protein 11/14/2022 8.0     Albumin 11/14/2022 4.3     Total Bilirubin 11/14/2022 1.6 (H)     Alkaline Phosphatase 11/14/2022 98     AST 11/14/2022 14     ALT 11/14/2022 25     Anion Gap 11/14/2022 8     eGFR 11/14/2022 >60.0     WBC 11/14/2022 5.78     RBC 11/14/2022 3.92 (L)     Hemoglobin 11/14/2022 11.4 (L)     Hematocrit 11/14/2022 35.3 (L)     MCV " 11/14/2022 90     MCH 11/14/2022 29.1     MCHC 11/14/2022 32.3     RDW 11/14/2022 13.5     Platelets 11/14/2022 217     MPV 11/14/2022 10.7     Immature Granulocytes 11/14/2022 0.3     Gran # (ANC) 11/14/2022 3.6     Immature Grans (Abs) 11/14/2022 0.02     Lymph # 11/14/2022 1.7     Mono # 11/14/2022 0.3     Eos # 11/14/2022 0.1     Baso # 11/14/2022 0.01     nRBC 11/14/2022 0     Gran % 11/14/2022 62.8     Lymph % 11/14/2022 29.8     Mono % 11/14/2022 5.9     Eosinophil % 11/14/2022 1.0     Basophil % 11/14/2022 0.2     Differential Method 11/14/2022 Automated     BNP 11/14/2022 47     Magnesium 11/14/2022 2.2        No flowsheet data found.  6MW 11/14/2022 6/15/2022 2/11/2022 11/29/2021   6MWT Status completed without stopping completed without stopping completed without stopping not completed   Patient Reported Leg pain Dyspnea No complaints Other (Comment);Chest pain;Dyspnea   Was O2 used? No No No -   6MW Distance walked (feet) 1200 1250 1200 -   Distance walked (meters) 365.76 381 365.76 -   Did patient stop? No No No -   Oxygen Saturation 97 99 99 97   Supplemental Oxygen Room Air Room Air Room Air Room Air   Heart Rate 94 94 89 97   Blood Pressure 96/68 97/64 138/95 126/97   Jessica Dyspnea Rating  somewhat heavy very light nothing at all somewhat heavy   Oxygen Saturation 99 96 100 -   Supplemental Oxygen Room Air Room Air Room Air -   Heart Rate 112 95 76 -   Blood Pressure 110/69 109/72 136/93 -   Jessica Dyspnea Rating  somewhat heavy light nothing at all -   Recovery Time (seconds) 75 48 60 -   Oxygen Saturation 99 99 100 -   Supplemental Oxygen Room Air Room Air Room Air -   Heart Rate 99 93 77 -       Imaging:  CT Chest: 9/12/2022  Impression:     1. Constellation of findings suggestive of pulmonary arterial hypertension.  No significant pulmonary edema or pleural effusion.  No evidence of pulmonary thromboembolism.  2. Cardiomegaly with markedly enlarged right atrium and the right ventricle.  3. Multiple  small pulmonary nodules, with the largest measuring 0.4 cm.  For multiple solid nodules all <6 mm, Fleischner Society 2017 guidelines recommend no routine follow up for a low risk patient, or follow up with non-contrast chest CT at 12 months after discovery in a high risk patient.  4. 3.9 cm borderline aneurysmal ascending aorta.           Results for orders placed during the hospital encounter of 12/02/21    CT Abdomen Pelvis With Contrast    Narrative  EXAMINATION:  CT ABDOMEN PELVIS WITH CONTRAST    CLINICAL HISTORY:  Flank pain with UTI despite IV antibiotics x 4 days;    TECHNIQUE:  Low-dose, axial CT images of the abdomen and pelvis were obtained after the administration of 100 cc of Omnipaque 350 intravenous contrast material. No oral contrast was administered.  Coronal and sagittal reformations were provided for review.    COMPARISON:  CT abdomen pelvis 11/17/2021, 11/05/2016.    FINDINGS:  Heart: Cardiomegaly noting severe enlargement of the right atrium and right ventricle, similar to prior.  Reflux of contrast into the IVC and hepatic veins suggest elevated right heart pressure    Lung Bases: 3 mm micronodule abutting pleura in the lateral basal segment of the right lower lobe (axial series 2, image 11), stable compared to 11/05/2016. 3 mm left lower lobe nodule image 3 also similar.  Bandlike subsegmental atelectasis in the left lung base.  No pleural effusions.    Liver: Normal in size.  Diffuse parenchymal hypoattenuation suggestive of hepatic steatosis.  Vague enhancing foci in the left and right hepatic lobes, the largest measures 0.6 cm in hepatic segment Olivia (axial series 2, image 24), similar to prior exam.  The portal vein is not well opacified on exam.    Gallbladder: Cholelithiasis.    Bile Ducts: No intra or extrahepatic biliary ductal dilation.    Pancreas: No pancreatic mass lesion or peripancreatic inflammatory change.    Spleen: Small accessory splenule noted.  Normal size.  No focal  lesions.    Adrenals: 1.7 cm hypoattenuating right adrenal lesion with subtle punctate enhancement/calcification appears stable in size compared to 11/05/2016 and previously characterized as an adenoma.  Left adrenal gland is unremarkable.    Genitourinary: Normal in size and location.  Subcentimeter hypodensity in the left kidney similar to prior that is too small to definitively characterize.  No nephrolithiasis or hydroureteronephrosis.  Bladder demonstrates smooth contours without bladder wall thickening.    Reproductive organs: Uterus is surgically absent.  Small volume pelvic free fluid similar.    GI tract/Mesentery: Stomach and distal esophagus are normal.  Visualized loops of small and large bowel are normal in caliber without evidence for inflammation or obstruction.  There is diffuse mesenteric edema similar.    Peritoneal Space: No abdominopelvic ascites or intraperitoneal free air.    Retroperitoneum: No significant adenopathy.    Abdominal wall: Diffuse body wall edema mildly increased compared to prior.  Few foci of air in the right lower quadrant subcutaneous soft tissues, may be sequela of prior injections.    Vasculature: Abdominal aorta is normal in caliber with mild calcific and noncalcific atherosclerosis extending into the iliac arteries.    Bones: Normal appearance without acute fracture or bony destructive process.    Impression  Cardiomegaly noting severe enlargement of the right atrium and right ventricle, similar to prior exam. Reflux of contrast into the IVC and hepatic veins may reflect component of heart failure.    Hepatic steatosis.    Vague subcentimeter enhancing foci throughout the liver, the largest measures 0.6 cm and appears similar to the 11/17/2021.  This was not definitively seen on 11/05/2016 noting that exam was without intravenous contrast.  Etiology is nonspecific.  Attention on follow-up.    Diffuse mesenteric edema, similar to prior.    Diffuse body wall edema mildly  increased compared to prior.    Cholelithiasis.    3 mm bilateral lower lobe micronodules stable dating back to 11/05/2016 and favored to reflect benign etiology.    Probable right adrenal adenoma.    Small volume pelvic free fluid similar.    Additional findings as above.    Electronically signed by resident: Raymond Chao  Date:    12/02/2021  Time:    13:02    Electronically signed by: Edwin Davalos MD  Date:    12/02/2021  Time:    13:52      Cardiodiagnostics:  Results for orders placed during the hospital encounter of 11/14/22    Echo    Interpretation Summary  · The estimated ejection fraction is 60%.  · The left ventricle is normal in size with normal systolic function.  · There is abnormal septal wall motion. There is systolic and diastolic flattening of the interventricular septum consistent with right ventricle pressure and volume overload.  · Indeterminate left ventricular diastolic function.  · Moderate right ventricular enlargement with moderately reduced right ventricular systolic function.  · There is right ventricular hypertrophy.  · Severe tricuspid regurgitation.  · Moderate right atrial enlargement.  · There is pulmonary hypertension.  · The estimated PA systolic pressure is 84 mmHg.  · Normal central venous pressure (3 mmHg).  · Trivial circumferential pericardial effusion.    Results for orders placed during the hospital encounter of 07/19/22    Cardiac catheterization    Conclusion  · The estimated blood loss was <50 mL.  · Severe pulmonary hypertension however CO/CI have finally come up to normal range. Continue uptitration of riociguat and consider uptitration of remodulin after.  · Recommend sleep clinic evaluation.    The procedure log was documented by Documenter: Sallie Kurtz and verified by Chloe Gregory MD.    Date: 7/19/2022  Time: 1:47 PM        Assessment:  1. Lung transplant candidate    2. Primary pulmonary hypertension      Plan: Patient with PAH, WHO group 1 currently  on triple therapy of remodulin, Opsumit, and adempas.  Echo shows moderate RV enlargement and moderately reduced systolic function.  CO/CI are in normal range per RHC from 07/19/22.   Spirometry and lung volumes are normal with moderately decreased DLCO.  6MWT of 1200 feet with no noted desaturations.  Patient remains active, but does have dyspnea with exertion.       As far as lung transplant candidacy, unfortunately we are not transplanting patients with pulmonary hypertension at this center at this time.  Patient has requested that we submit her insurance to other transplant centers for possible evaluation.      Continue current treatment regimens for PH and follow up with Advanced Heart Failure team as scheduled.    RTC in 6 months.    Chon Akhtar NP  Lung Transplant    Attending Note:     I have seen and evaluated the patient with Chon Akhtar NP. Their note reflects the content of our discussion and my plan of care.  Pt sent for LUT evaluation for WHO group 1 PAH.  Stable on current therapy of remodulin, opsumit, and adempas.  Does not require supplemental oxygen.  CI preserved on RHC.  Tolerating medications.  Unfortunately, not a candidate for LUT at our center given severe PAH.  However, she is likely does not need LUT at this time and we will continue to monitor.  Will refer to another center if needed.        America Rachel D.O.  Pulmonary/Critical Care and Lung Transplantation  [facility]

## 2022-12-06 NOTE — LETTER
December 7, 2022        Sho Guy  [address]  [facility] [address]  Phone: [phone]  Fax: [phone]             [facility] 1st Fl  [address]  [facility] [address]  Phone: [phone]   Patient: Kristin Maier   MR Number: [id number]   YOB: 1975   Date of Visit: 12/6/2022       Dear Dr. Sho Guy    Thank you for referring Kristin Maier to me for evaluation. Attached you will find relevant portions of my assessment and plan of care.    If you have questions, please do not hesitate to call me. I look forward to following Kristin Maier along with you.    Sincerely,    America Rachel, DO    Enclosure    If you would like to receive this communication electronically, please contact [email or web address] or [phone] to request ttwick Link access.    [facility]wick Link is a tool which provides read-only access to select patient information with whom you have a relationship. Its easy to use and provides real time access to review your patients record including encounter summaries, notes, results, and demographic information.    If you feel you have received this communication in error or would no longer like to receive these types of communications, please e-mail [email or web address]

## 2022-12-07 ENCOUNTER — TELEPHONE (OUTPATIENT)
Dept: TRANSPLANT | Facility: CLINIC | Age: 47
End: 2022-12-07
Payer: MEDICAID

## 2022-12-07 RX ORDER — ONDANSETRON 4 MG/1
4 TABLET, FILM COATED ORAL EVERY 8 HOURS PRN
Qty: 30 TABLET | Refills: 6 | Status: SHIPPED | OUTPATIENT
Start: 2022-12-07 | End: 2023-02-06 | Stop reason: SDUPTHER

## 2022-12-07 NOTE — TELEPHONE ENCOUNTER
Returned call from portal message.  Discussed reports of leg pain and nausea.  Advised provider, SOFIE Guy DNP APRN of patient statements.

## 2022-12-07 NOTE — TELEPHONE ENCOUNTER
Contacted patient to advise of dose change/titration of medication due to report of new symptoms.  Patient explicity advised that a [facility] nurse would be contacting her to help with the titration dose down from 80 ng to 70 ng.  Patient acknowledged.   Patient stated that she needed a refill for ondansetron 4 mg. Refill request sent to provider.

## 2022-12-12 ENCOUNTER — TELEPHONE (OUTPATIENT)
Dept: TRANSPLANT | Facility: CLINIC | Age: 47
End: 2022-12-12
Payer: MEDICAID

## 2022-12-12 ENCOUNTER — PATIENT MESSAGE (OUTPATIENT)
Dept: TRANSPLANT | Facility: CLINIC | Age: 47
End: 2022-12-12
Payer: MEDICAID

## 2022-12-12 NOTE — TELEPHONE ENCOUNTER
"Nurse navigator called patient after last portal message. Patient states that her leg pain and feet pain has gotten worse since titrating down. She is currently on 74 ng/kg. She does not feel short of breath. Patient states that Lyrica works better than gabapentin but that it does not get rid of the pain completely and it comes back; she has always had the pain but never complained about it because she though she just had to "live with it to breath better." Patient took her two doses of Lyrica plus 2 ibuprofens and it did nothing for her to control pain. Patient describes pain as constant and ranges from a burning sensation to feeling like her legs are getting hit. Ynes<NP notified to get recommendations.  "

## 2022-12-12 NOTE — TELEPHONE ENCOUNTER
Left message on patient's VM to call back.    Per ANA M Quick she will send in tramadol for patient to take instead of ibuprofen but patient is to continue on lyrica and can stay at 74 ng for now. ANA M Quick to discuss with Dr. Gregory to address patient's neuropathy if tramadol does not help.

## 2022-12-14 ENCOUNTER — PATIENT MESSAGE (OUTPATIENT)
Dept: TRANSPLANT | Facility: CLINIC | Age: 47
End: 2022-12-14
Payer: MEDICAID

## 2022-12-14 DIAGNOSIS — M79.2 NEUROPATHIC PAIN: ICD-10-CM

## 2022-12-14 RX ORDER — TRAMADOL HYDROCHLORIDE 50 MG/1
50 TABLET ORAL EVERY 6 HOURS PRN
Qty: 90 TABLET | Refills: 2 | Status: ON HOLD | OUTPATIENT
Start: 2022-12-14 | End: 2022-12-16 | Stop reason: HOSPADM

## 2022-12-14 RX ORDER — PREGABALIN 75 MG/1
150 CAPSULE ORAL 2 TIMES DAILY
Qty: 120 CAPSULE | Refills: 6 | Status: SHIPPED | OUTPATIENT
Start: 2022-12-14 | End: 2023-05-24

## 2022-12-14 NOTE — TELEPHONE ENCOUNTER
Medication/Remodulin Side Effect management - Leg pain not controlled by gabapentin, Switched to Lyrica, tried 75 mg, twice daily for greater than 1 week with no effect. Will increase dose.    Add Tramadol, PRN for pain

## 2022-12-15 ENCOUNTER — HOSPITAL ENCOUNTER (INPATIENT)
Facility: HOSPITAL | Age: 47
LOS: 1 days | Discharge: HOME | DRG: 921 | End: 2022-12-16
Attending: INTERNAL MEDICINE | Admitting: INTERNAL MEDICINE
Payer: MEDICAID

## 2022-12-15 DIAGNOSIS — I27.21 WHO GROUP 1 PULMONARY ARTERIAL HYPERTENSION: Primary | ICD-10-CM

## 2022-12-15 DIAGNOSIS — M79.604 PAIN IN BOTH LOWER EXTREMITIES: ICD-10-CM

## 2022-12-15 DIAGNOSIS — I27.20 PULMONARY HYPERTENSION: ICD-10-CM

## 2022-12-15 DIAGNOSIS — R06.02 SHORTNESS OF BREATH: ICD-10-CM

## 2022-12-15 DIAGNOSIS — I27.21 WHO GROUP 1 PULMONARY ARTERIAL HYPERTENSION: ICD-10-CM

## 2022-12-15 DIAGNOSIS — M79.605 PAIN IN BOTH LOWER EXTREMITIES: ICD-10-CM

## 2022-12-15 DIAGNOSIS — I27.9 CHRONIC PULMONARY HEART DISEASE: ICD-10-CM

## 2022-12-15 PROBLEM — N92.1 BREAKTHROUGH BLEEDING ON DEPO PROVERA: Status: RESOLVED | Noted: 2017-07-19 | Resolved: 2022-12-15

## 2022-12-15 PROBLEM — R10.31 RLQ ABDOMINAL PAIN: Status: RESOLVED | Noted: 2017-07-19 | Resolved: 2022-12-15

## 2022-12-15 LAB
ALBUMIN SERPL BCP-MCNC: 3.5 G/DL (ref 3.5–5.2)
ALP SERPL-CCNC: 71 U/L (ref 55–135)
ALT SERPL W/O P-5'-P-CCNC: 12 U/L (ref 10–44)
ANION GAP SERPL CALC-SCNC: 7 MMOL/L (ref 8–16)
ASCENDING AORTA: 3.54 CM
AST SERPL-CCNC: 18 U/L (ref 10–40)
AV INDEX (PROSTH): 0.73
AV MEAN GRADIENT: 4 MMHG
AV PEAK GRADIENT: 6 MMHG
AV VALVE AREA: 2.83 CM2
AV VELOCITY RATIO: 0.79
BASOPHILS # BLD AUTO: 0.02 K/UL (ref 0–0.2)
BASOPHILS NFR BLD: 0.4 % (ref 0–1.9)
BILIRUB SERPL-MCNC: 1 MG/DL (ref 0.1–1)
BNP SERPL-MCNC: 42 PG/ML (ref 0–99)
BSA FOR ECHO PROCEDURE: 1.78 M2
BUN SERPL-MCNC: 12 MG/DL (ref 6–20)
CALCIUM SERPL-MCNC: 9.4 MG/DL (ref 8.7–10.5)
CHLORIDE SERPL-SCNC: 110 MMOL/L (ref 95–110)
CO2 SERPL-SCNC: 20 MMOL/L (ref 23–29)
CREAT SERPL-MCNC: 0.7 MG/DL (ref 0.5–1.4)
CV ECHO LV RWT: 0.39 CM
DIFFERENTIAL METHOD: ABNORMAL
DOP CALC AO PEAK VEL: 1.2 M/S
DOP CALC AO VTI: 21.72 CM
DOP CALC LVOT AREA: 3.9 CM2
DOP CALC LVOT DIAMETER: 2.22 CM
DOP CALC LVOT PEAK VEL: 0.95 M/S
DOP CALC LVOT STROKE VOLUME: 61.4 CM3
DOP CALCLVOT PEAK VEL VTI: 15.87 CM
E WAVE DECELERATION TIME: 233.86 MSEC
E/A RATIO: 0.79
E/E' RATIO: 7.8 M/S
ECHO LV POSTERIOR WALL: 0.82 CM (ref 0.6–1.1)
EJECTION FRACTION: 65 %
EOSINOPHIL # BLD AUTO: 0.1 K/UL (ref 0–0.5)
EOSINOPHIL NFR BLD: 1.9 % (ref 0–8)
ERYTHROCYTE [DISTWIDTH] IN BLOOD BY AUTOMATED COUNT: 13.8 % (ref 11.5–14.5)
EST. GFR  (NO RACE VARIABLE): >60 ML/MIN/1.73 M^2
FRACTIONAL SHORTENING: 34 % (ref 28–44)
GLUCOSE SERPL-MCNC: 102 MG/DL (ref 70–110)
HCT VFR BLD AUTO: 29.6 % (ref 37–48.5)
HGB BLD-MCNC: 9.3 G/DL (ref 12–16)
IMM GRANULOCYTES # BLD AUTO: 0.02 K/UL (ref 0–0.04)
IMM GRANULOCYTES NFR BLD AUTO: 0.4 % (ref 0–0.5)
INTERVENTRICULAR SEPTUM: 0.8 CM (ref 0.6–1.1)
LA MAJOR: 5.08 CM
LA MINOR: 5.24 CM
LA WIDTH: 3.33 CM
LEFT ATRIUM SIZE: 2.84 CM
LEFT ATRIUM VOLUME INDEX MOD: 23.3 ML/M2
LEFT ATRIUM VOLUME INDEX: 24 ML/M2
LEFT ATRIUM VOLUME MOD: 40.26 CM3
LEFT ATRIUM VOLUME: 41.47 CM3
LEFT INTERNAL DIMENSION IN SYSTOLE: 2.8 CM (ref 2.1–4)
LEFT VENTRICLE DIASTOLIC VOLUME INDEX: 46.55 ML/M2
LEFT VENTRICLE DIASTOLIC VOLUME: 80.53 ML
LEFT VENTRICLE MASS INDEX: 60 G/M2
LEFT VENTRICLE SYSTOLIC VOLUME INDEX: 17.1 ML/M2
LEFT VENTRICLE SYSTOLIC VOLUME: 29.59 ML
LEFT VENTRICULAR INTERNAL DIMENSION IN DIASTOLE: 4.24 CM (ref 3.5–6)
LEFT VENTRICULAR MASS: 104.6 G
LV LATERAL E/E' RATIO: 6 M/S
LV SEPTAL E/E' RATIO: 11.14 M/S
LYMPHOCYTES # BLD AUTO: 1.8 K/UL (ref 1–4.8)
LYMPHOCYTES NFR BLD: 38.8 % (ref 18–48)
MAGNESIUM SERPL-MCNC: 1.7 MG/DL (ref 1.6–2.6)
MCH RBC QN AUTO: 27.7 PG (ref 27–31)
MCHC RBC AUTO-ENTMCNC: 31.4 G/DL (ref 32–36)
MCV RBC AUTO: 88 FL (ref 82–98)
MONOCYTES # BLD AUTO: 0.4 K/UL (ref 0.3–1)
MONOCYTES NFR BLD: 9.1 % (ref 4–15)
MV PEAK A VEL: 0.99 M/S
MV PEAK E VEL: 0.78 M/S
MV STENOSIS PRESSURE HALF TIME: 67.82 MS
MV VALVE AREA P 1/2 METHOD: 3.24 CM2
NEUTROPHILS # BLD AUTO: 2.3 K/UL (ref 1.8–7.7)
NEUTROPHILS NFR BLD: 49.4 % (ref 38–73)
NRBC BLD-RTO: 0 /100 WBC
PHOSPHATE SERPL-MCNC: 4 MG/DL (ref 2.7–4.5)
PISA TR MAX VEL: 4.35 M/S
PLATELET # BLD AUTO: 166 K/UL (ref 150–450)
PMV BLD AUTO: 10.8 FL (ref 9.2–12.9)
POTASSIUM SERPL-SCNC: 3.5 MMOL/L (ref 3.5–5.1)
PROT SERPL-MCNC: 6.6 G/DL (ref 6–8.4)
RA MAJOR: 6.11 CM
RA PRESSURE: 3 MMHG
RA WIDTH: 4.77 CM
RBC # BLD AUTO: 3.36 M/UL (ref 4–5.4)
RIGHT VENTRICULAR END-DIASTOLIC DIMENSION: 5.3 CM
SINUS: 3.54 CM
SODIUM SERPL-SCNC: 137 MMOL/L (ref 136–145)
STJ: 3.19 CM
TDI LATERAL: 0.13 M/S
TDI SEPTAL: 0.07 M/S
TDI: 0.1 M/S
TR MAX PG: 76 MMHG
TRICUSPID ANNULAR PLANE SYSTOLIC EXCURSION: 1.75 CM
TV REST PULMONARY ARTERY PRESSURE: 79 MMHG
WBC # BLD AUTO: 4.72 K/UL (ref 3.9–12.7)

## 2022-12-15 PROCEDURE — 63600175 PHARM REV CODE 636 W HCPCS: Mod: JG,NTX | Performed by: INTERNAL MEDICINE

## 2022-12-15 PROCEDURE — 36415 COLL VENOUS BLD VENIPUNCTURE: CPT | Mod: NTX | Performed by: STUDENT IN AN ORGANIZED HEALTH CARE EDUCATION/TRAINING PROGRAM

## 2022-12-15 PROCEDURE — 83880 ASSAY OF NATRIURETIC PEPTIDE: CPT | Mod: NTX | Performed by: STUDENT IN AN ORGANIZED HEALTH CARE EDUCATION/TRAINING PROGRAM

## 2022-12-15 PROCEDURE — 80053 COMPREHEN METABOLIC PANEL: CPT | Mod: NTX | Performed by: STUDENT IN AN ORGANIZED HEALTH CARE EDUCATION/TRAINING PROGRAM

## 2022-12-15 PROCEDURE — 99223 PR INITIAL HOSPITAL CARE,LEVL III: ICD-10-PCS | Mod: NTX,,, | Performed by: INTERNAL MEDICINE

## 2022-12-15 PROCEDURE — 83735 ASSAY OF MAGNESIUM: CPT | Mod: NTX | Performed by: STUDENT IN AN ORGANIZED HEALTH CARE EDUCATION/TRAINING PROGRAM

## 2022-12-15 PROCEDURE — 99223 1ST HOSP IP/OBS HIGH 75: CPT | Mod: NTX,,, | Performed by: INTERNAL MEDICINE

## 2022-12-15 PROCEDURE — 25000003 PHARM REV CODE 250: Mod: NTX | Performed by: INTERNAL MEDICINE

## 2022-12-15 PROCEDURE — 85025 COMPLETE CBC W/AUTO DIFF WBC: CPT | Mod: NTX | Performed by: STUDENT IN AN ORGANIZED HEALTH CARE EDUCATION/TRAINING PROGRAM

## 2022-12-15 PROCEDURE — 63600175 PHARM REV CODE 636 W HCPCS: Mod: NTX | Performed by: STUDENT IN AN ORGANIZED HEALTH CARE EDUCATION/TRAINING PROGRAM

## 2022-12-15 PROCEDURE — 25000003 PHARM REV CODE 250: Mod: NTX | Performed by: STUDENT IN AN ORGANIZED HEALTH CARE EDUCATION/TRAINING PROGRAM

## 2022-12-15 PROCEDURE — 84100 ASSAY OF PHOSPHORUS: CPT | Mod: NTX | Performed by: STUDENT IN AN ORGANIZED HEALTH CARE EDUCATION/TRAINING PROGRAM

## 2022-12-15 PROCEDURE — 20600001 HC STEP DOWN PRIVATE ROOM: Mod: NTX

## 2022-12-15 RX ORDER — ACETAMINOPHEN 325 MG/1
650 TABLET ORAL EVERY 4 HOURS PRN
Status: DISCONTINUED | OUTPATIENT
Start: 2022-12-15 | End: 2022-12-16 | Stop reason: HOSPADM

## 2022-12-15 RX ORDER — FUROSEMIDE 10 MG/ML
60 INJECTION INTRAMUSCULAR; INTRAVENOUS ONCE
Status: COMPLETED | OUTPATIENT
Start: 2022-12-15 | End: 2022-12-15

## 2022-12-15 RX ORDER — PREGABALIN 75 MG/1
150 CAPSULE ORAL 2 TIMES DAILY
Status: DISCONTINUED | OUTPATIENT
Start: 2022-12-15 | End: 2022-12-16 | Stop reason: HOSPADM

## 2022-12-15 RX ORDER — TREPROSTINIL 5 MG/ML
80 INJECTION, SOLUTION INTRAVENOUS; SUBCUTANEOUS CONTINUOUS
Status: DISCONTINUED | OUTPATIENT
Start: 2022-12-15 | End: 2022-12-15

## 2022-12-15 RX ORDER — POTASSIUM CHLORIDE 20 MEQ/1
60 TABLET, EXTENDED RELEASE ORAL ONCE
Status: COMPLETED | OUTPATIENT
Start: 2022-12-15 | End: 2022-12-15

## 2022-12-15 RX ORDER — MAGNESIUM SULFATE HEPTAHYDRATE 40 MG/ML
2 INJECTION, SOLUTION INTRAVENOUS ONCE
Status: COMPLETED | OUTPATIENT
Start: 2022-12-15 | End: 2022-12-15

## 2022-12-15 RX ORDER — SODIUM CHLORIDE 0.9 % (FLUSH) 0.9 %
10 SYRINGE (ML) INJECTION
Status: DISCONTINUED | OUTPATIENT
Start: 2022-12-15 | End: 2022-12-16 | Stop reason: HOSPADM

## 2022-12-15 RX ORDER — POLYETHYLENE GLYCOL 3350 17 G/17G
17 POWDER, FOR SOLUTION ORAL DAILY
Status: DISCONTINUED | OUTPATIENT
Start: 2022-12-15 | End: 2022-12-16 | Stop reason: HOSPADM

## 2022-12-15 RX ORDER — ONDANSETRON 2 MG/ML
4 INJECTION INTRAMUSCULAR; INTRAVENOUS EVERY 6 HOURS PRN
Status: DISCONTINUED | OUTPATIENT
Start: 2022-12-15 | End: 2022-12-16 | Stop reason: HOSPADM

## 2022-12-15 RX ADMIN — RIOCIGUAT 2.5 MG: 2.5 TABLET, FILM COATED ORAL at 04:12

## 2022-12-15 RX ADMIN — MAGNESIUM SULFATE 2 G: 2 INJECTION INTRAVENOUS at 06:12

## 2022-12-15 RX ADMIN — PREGABALIN 150 MG: 75 CAPSULE ORAL at 08:12

## 2022-12-15 RX ADMIN — ACETAMINOPHEN 650 MG: 325 TABLET ORAL at 08:12

## 2022-12-15 RX ADMIN — RIOCIGUAT 2.5 MG: 2.5 TABLET, FILM COATED ORAL at 08:12

## 2022-12-15 RX ADMIN — FUROSEMIDE 60 MG: 10 INJECTION, SOLUTION INTRAMUSCULAR; INTRAVENOUS at 08:12

## 2022-12-15 RX ADMIN — POTASSIUM CHLORIDE 60 MEQ: 1500 TABLET, EXTENDED RELEASE ORAL at 06:12

## 2022-12-15 RX ADMIN — ACETAMINOPHEN 650 MG: 325 TABLET ORAL at 10:12

## 2022-12-15 RX ADMIN — RIOCIGUAT 2.5 MG: 2.5 TABLET, FILM COATED ORAL at 12:12

## 2022-12-15 RX ADMIN — TREPROSTINIL 76 NG/KG/MIN: 200 INJECTION, SOLUTION INTRAVENOUS; SUBCUTANEOUS at 12:12

## 2022-12-15 RX ADMIN — MACITENTAN 10 MG: 10 TABLET, FILM COATED ORAL at 12:12

## 2022-12-15 NOTE — HPI
Kristin Aguayo is a 47 year old female with a PMHx of WHO group 1 PH who presents as a transfer from [facility] for management of PH.  She is currently on triple therapy with remodulin, adempas, and macitentan.  She is not currently on supplemental home O2.  She was last seen in PH clinic on 11/15/22.  She had received a call a couple days ago stating that her medication pump was not functioning properly and she was not receiving her full dose of remodulin.  She presented to the [facility] ED yesterday with complaints of generalized weakness, progressive dyspnea, dizziness and headache over this period of time.  [facility] was contacted and recommended transfer.  Transfer was initiated to [facility] for management of PH in the setting of insufficient dosing of remodulin.    Last RHC: 7/19/22  RA: 15/ 11/ 10 RV: 70/ 9/ 10 PA: 72/ 29/ 43 PWP: 12/ 12/ 11 .   Cardiac output was 4.29  by Kristel. Cardiac index is 2.61 L/min/m2.   O2 Sat: PA 69%.     Normal CO/CI on remodulin 80 ng/kg/min, riociguat 2mg po TID, macitentan 10mg.  Mildly increased right and normal left sided filling pressures.  Severe pulmonary hypertension.   TPG  32   PVR  7.45  MAP 75  SVR 1212    Last ECHO: 11/14/2022  The estimated ejection fraction is 60%.  The left ventricle is normal in size with normal systolic function.  There is abnormal septal wall motion. There is systolic and diastolic flattening of the interventricular septum consistent with right ventricle pressure and volume overload.  Indeterminate left ventricular diastolic function.  Moderate right ventricular enlargement with moderately reduced right ventricular systolic function.  There is right ventricular hypertrophy.  Severe tricuspid regurgitation.  Moderate right atrial enlargement.  There is pulmonary hypertension.  The estimated PA systolic pressure is 84 mmHg.  Normal central venous pressure (3 mmHg).  Trivial circumferential pericardial effusion.

## 2022-12-15 NOTE — PROGRESS NOTES
Home Parenteral Prostacyclin Continuation: Pharmacist Verification Note  Home medication variables  Specialty Pharmacy Contacted: [facility]: [phone]  Date of latest prescription (mm/dd/yyyy): 12/8  Type of ambulatory infusion pump: CADD  Ambulatory pump rate (mL/day): 38mL/24h  Drug: Treprostinil (Remodulin)  Route: Intravenous  Dosing weight (kg): 55 kg   Home vial concentration:  5 mg/ml  Patient reports mixing 3ml of concentrated drug in 97 ml of diluent  Final concentration:  0.15 mg/ml  Verified current dose: 76 ng/kg/min (confirmed by nurse at bedside)  Patient is titrating therapy (yes or no) yes  Volume of drug solution remaining in the patient's pump (mL): Irrelevant- switch now due to possible defective home cassette  Hospital medication variables  Drug : Treprostinil (Remodulin)  Route: Intravenous  Dosing weight (kg): 55 kg         Hospital concentration (mg/mL or ng/mL): 9,000,000 ng/ 100mL         Dose to be administered in hospital (ng/kg/min): 76ng/kg/min         Hospital infusion initiation time (stat vs standard administration time): stat    Contacted [facility] attending: (yes/no): yes  Physician contacted: Tracey Dutta   Discussion: Discussed possibility of overdosing with the proposed inpatient plan, given issues with her current home cassette involving infusion of less drug than programmed (recall issue). Plan is to continue the same dose while in house and watch carefully for symptoms of vasodilation. Will contact administration to ensure defective cassette lot numbers aren't being dispensed by inpatient pharmacy.     Pharmacist: Keven Schmidt  Extension: [phone]

## 2022-12-15 NOTE — PROGRESS NOTES
PH Admit Assessment for Continuation of Treprostinil (Remodulin)    Drug Infusing: Treprostinil (Remodulin)  Route: Intravenous  Pump Type: CADD  Current Pump Rate = 40 ml / 24 hours  Current Reservoir Volume = 90 (mls remaining to be infused)    Patient reports utilizing: Patient mixed cassettes    Home mixing instructions:   Patient reports mixing 5 ml of concentrated drug in 100 ml of diluent

## 2022-12-15 NOTE — PLAN OF CARE
Patient is arouse about voice and oriented X 4. Slightly delayed response.Denies pain, N/V.   Telemonitor in place NS. Patient came in from [facility] for management of PH, remodulin cassette pump wasn't properly functioning. Shes going at 40cc/24hr to right perm cath. Echo planned for today. Up ambulatory with assist, bed locked at lowest setting, yellow socks on, call bell in reach. Remains free from falls.

## 2022-12-15 NOTE — H&P
[facility] - Transplant Stepdown  Heart Transplant  H&P    Patient Name: Kristin Maier  MRN: [id number]  Admission Date: 12/15/2022  Attending Physician: Tracey Dutta MD  Primary Care Provider: Jorge A Stack MD  Principal Problem:<principal problem not specified>    Subjective:     History of Present Illness:  Kristin Maier is a 47 year old female with a PMHx of WHO group 1 PH who presents as a transfer from [facility] for management of PH.  She is currently on triple therapy with remodulin, adempas, and macitentan.  She is not currently on supplemental home O2.  She was last seen in PH clinic on 11/15/22.  She had received a call a couple days ago stating that her medication pump was not functioning properly and she was not receiving her full dose of remodulin.  She presented to the [facility] ED yesterday with complaints of generalized weakness, progressive dyspnea, dizziness and headache over this period of time.  [facility] was contacted and recommended transfer.  Transfer was initiated to [facility] for management of PH in the setting of insufficient dosing of remodulin.    Last RHC: 7/19/22  RA: 15/ 11/ 10 RV: 70/ 9/ 10 PA: 72/ 29/ 43 PWP: 12/ 12/ 11 .   Cardiac output was 4.29  by Kristel. Cardiac index is 2.61 L/min/m2.   O2 Sat: PA 69%.     Normal CO/CI on remodulin 80 ng/kg/min, riociguat 2mg po TID, macitentan 10mg.  Mildly increased right and normal left sided filling pressures.  Severe pulmonary hypertension.   TPG  32   PVR  7.45  MAP 75  SVR 1212    Last ECHO: 11/14/2022   The estimated ejection fraction is 60%.   The left ventricle is normal in size with normal systolic function.   There is abnormal septal wall motion. There is systolic and diastolic flattening of the interventricular septum consistent with right ventricle pressure and volume overload.   Indeterminate left ventricular diastolic function.   Moderate right ventricular enlargement with moderately reduced right ventricular systolic  function.   There is right ventricular hypertrophy.   Severe tricuspid regurgitation.   Moderate right atrial enlargement.   There is pulmonary hypertension.   The estimated PA systolic pressure is 84 mmHg.   Normal central venous pressure (3 mmHg).   Trivial circumferential pericardial effusion.      Past Medical History:   Diagnosis Date    Elevated liver enzymes     Essential (primary) hypertension     Heart failure, unspecified     Hypokalemia     Mixed hyperlipidemia     Neuropathic pain     Tx w/ tramadol & Lyrica    Pulmonary hypertension     Receiving Remodulin continuously through tunneled central line       Past Surgical History:   Procedure Laterality Date    APPENDECTOMY       SECTION      Transverse cut    HYSTERECTOMY  2017    TLH- bleeding    OOPHORECTOMY      RIGHT HEART CATHETERIZATION Right 2021    Procedure: INSERTION, CATHETER, RIGHT HEART;  Surgeon: Chloe Gregory MD;  Location: [facility] CATH LAB;  Service: Cardiology;  Laterality: Right;    RIGHT HEART CATHETERIZATION Right 3/16/2022    Procedure: INSERTION, CATHETER, RIGHT HEART;  Surgeon: Hu Silverman MD;  Location: [facility] CATH LAB;  Service: Cardiology;  Laterality: Right;    RIGHT HEART CATHETERIZATION Right 2022    Procedure: INSERTION, CATHETER, RIGHT HEART;  Surgeon: Chloe Gregory MD;  Location: [facility] CATH LAB;  Service: Cardiology;  Laterality: Right;    TUBAL LIGATION      VAGINAL DELIVERY  ; ; ;        Review of patient's allergies indicates:  No Known Allergies    Current Facility-Administered Medications   Medication    acetaminophen tablet 650 mg    macitentan tablet 10 mg    magnesium sulfate 2g in water 50mL IVPB (premix)    ondansetron injection 4 mg    PHARMACY TO CONFIRM TREPROSTINIL (REMODULIN) DOSING infusion    polyethylene glycol packet 17 g    potassium chloride SA CR tablet 60 mEq    pregabalin capsule 150 mg    riociguat (ADEMPAS)  tablet 2.5 mg    sodium chloride 0.9% flush 10 mL    VELETRI/REMODULIN CASSETTE    VELETRI/REMODULIN TUBING     Family History       Problem Relation (Age of Onset)    Cancer Father          Tobacco Use    Smoking status: Former     Types: Cigars     Quit date: 2020     Years since quittin.0     Passive exposure: Past    Smokeless tobacco: Never    Tobacco comments:     social smoker-light   Substance and Sexual Activity    Alcohol use: Yes     Alcohol/week: 2.0 standard drinks     Types: 2 Glasses of wine per week     Comment: socially- 2 or 3 times a week-2 glasses of wine    Drug use: Not Currently     Types: Marijuana     Comment: 2-6 MONTHS AGO    Sexual activity: Yes     Partners: Male     Birth control/protection: See Surgical Hx     Comment:      Review of Systems   Constitutional:  Positive for activity change and fatigue. Negative for appetite change, fever and unexpected weight change.   Respiratory:  Positive for shortness of breath. Negative for cough and wheezing.    Cardiovascular:  Negative for chest pain, palpitations and leg swelling.   Gastrointestinal:  Negative for abdominal distention, abdominal pain, diarrhea and nausea.   Neurological:  Positive for weakness, light-headedness and headaches.   Objective:     Vital Signs (Most Recent):  Temp: 98.5 °F (36.9 °C) (12/15/22 0415)  Pulse: 93 (12/15/22 0415)  Resp: 20 (12/15/22 0415)  BP: (!) 82/50 (12/15/22 0415)  SpO2: (!) 94 % (12/15/22 0415)   Vital Signs (24h Range):  Temp:  [98.2 °F (36.8 °C)-98.5 °F (36.9 °C)] 98.5 °F (36.9 °C)  Pulse:  [] 93  Resp:  [16-30] 20  SpO2:  [93 %-98 %] 94 %  BP: (82-95)/(50-62) 82/50     Patient Vitals for the past 72 hrs (Last 3 readings):   Weight   12/15/22 0415 72.5 kg (159 lb 13.3 oz)     Body mass index is 29.23 kg/m².    No intake or output data in the 24 hours ending 12/15/22 0558    Physical Exam  Constitutional:       Appearance: Normal appearance.   HENT:      Head:  Atraumatic.   Neck:      Comments: JVP of 8cm with positive hepatojugular reflux  Cardiovascular:      Rate and Rhythm: Normal rate and regular rhythm.      Pulses: Normal pulses.      Heart sounds: Normal heart sounds.   Pulmonary:      Effort: Pulmonary effort is normal.      Breath sounds: Normal breath sounds.   Abdominal:      General: Abdomen is flat. Bowel sounds are normal. There is no distension.   Musculoskeletal:         General: No swelling.      Cervical back: Normal range of motion and neck supple.   Skin:     Capillary Refill: Capillary refill takes 2 to 3 seconds.      Comments: R chest wall Pedro catheter infusing remodulin   Neurological:      Mental Status: She is alert.   Psychiatric:         Mood and Affect: Mood normal.       Significant Labs:  CBC:  Recent Labs   Lab 12/14/22  2333   WBC 4.40   RBC 3.48*   HGB 9.8*   HCT 29.3*      MCV 84   MCH 28.1   MCHC 33.4     BNP:  No results for input(s): BNP in the last 168 hours.    Invalid input(s): BNPTRIAGELBLO  CMP:  Recent Labs   Lab 12/14/22  2333   GLU 98   CALCIUM 9.1   ALBUMIN 4.1   PROT 7.0      K 3.6   CO2 23      BUN 14   CREATININE 0.74   ALKPHOS 70   ALT 19   AST 30   BILITOT 1.0      Coagulation:   No results for input(s): PT, INR, APTT in the last 168 hours.  LDH:  No results for input(s): LDH in the last 72 hours.  Microbiology:  Microbiology Results (last 7 days)       ** No results found for the last 168 hours. **            I have reviewed all pertinent labs within the past 24 hours.    Diagnostic Results:  I have reviewed all pertinent imaging results/findings within the past 24 hours.    Assessment/Plan:     WHO group 1 pulmonary arterial hypertension  Progressive symptoms likely secondary to running out of remodulin  Last RHC: Last RHC: 7/19/22, RA: 15/ 11/ 10 RV: 70/ 9/ 10 PA: 72/ 29/ 43 PWP: 12/ 12/ 11 on remodulin 80ng/kg/min, riociguat 2mg TID, macitentan 10mg daily  Cardiac output was 4.29  by Kristel.  Cardiac index is 2.61 L/min/m2. O2 Sat: PA 69%.   Current regimen: Remodulin 80ng/kg/min, adempas 2.5mg TID, opsumit 10mg daily  Will resume remodulin, when clinically stable can be discharged on home dose of remodulin once available  Currently appears volume overloaded, patient takes furosemide 20mg po daily  Will give 1x dose of lasix 60mg IV and monitor response  Fluid and sodium restriction (1500ccs, 2g), maintain K>4.0, Mg > 2.0    Leg pain  Currently being treated for neuropathic pain  Will continue lyrica at home dose       José Arango MD  Heart Transplant  [facility] - Transplant Stepdown

## 2022-12-15 NOTE — PLAN OF CARE
Patient currently on home cassette and pump rate of 40mls/24hrs, which reflects a Dose of 76ngs/kg/min

## 2022-12-15 NOTE — PLAN OF CARE
"VSS  No signs of evident distress  Pt. Complained of headache.  Tylenol admin as per MAR  Left AC PIV removed and dressed  Left FA 22g PIV placed.  Dressing CDI  Switched to hospital concentration.  Line aspirated as per protocol and primed.  Remodulin infusing at 67mL/24hours.    Right subclavian permcath dressing CDI.    Family at bedside.  Attentive to patient needs  Pt. States "I feel much better."    Bed in lowest locked position.  Call light within reach.  Instructed to call for assistance.  Pt. Expressed understanding.  Educated on plan of care  "

## 2022-12-15 NOTE — PROGRESS NOTES
Heart Transplant  attempted to see pt in order to assess needs, however pt was sleeping.   Heart Transplant social workers will continue to follow.

## 2022-12-15 NOTE — ASSESSMENT & PLAN NOTE
Progressive symptoms likely secondary to running out of remodulin  Last RHC: Last RHC: 7/19/22, RA: 15/ 11/ 10 RV: 70/ 9/ 10 PA: 72/ 29/ 43 PWP: 12/ 12/ 11 on remodulin 80ng/kg/min, riociguat 2mg TID, macitentan 10mg daily  Cardiac output was 4.29  by Kristel. Cardiac index is 2.61 L/min/m2. O2 Sat: PA 69%.   Current regimen: Remodulin 80ng/kg/min, adempas 2.5mg TID, opsumit 10mg daily  Will resume remodulin, when clinically stable can be discharged on home dose of remodulin once available  Currently appears volume overloaded, patient takes furosemide 20mg po daily  Will give 1x dose of lasix 60mg IV and monitor response  Fluid and sodium restriction (1500ccs, 2g), maintain K>4.0, Mg > 2.0

## 2022-12-15 NOTE — SUBJECTIVE & OBJECTIVE
Past Medical History:   Diagnosis Date    Elevated liver enzymes     Essential (primary) hypertension     Heart failure, unspecified     Hypokalemia     Mixed hyperlipidemia     Neuropathic pain     Tx w/ tramadol & Lyrica    Pulmonary hypertension     Receiving Remodulin continuously through tunneled central line       Past Surgical History:   Procedure Laterality Date    APPENDECTOMY       SECTION      Transverse cut    HYSTERECTOMY  2017    TLH- bleeding    OOPHORECTOMY      RIGHT HEART CATHETERIZATION Right 2021    Procedure: INSERTION, CATHETER, RIGHT HEART;  Surgeon: Chloe Gregory MD;  Location: [facility] CATH LAB;  Service: Cardiology;  Laterality: Right;    RIGHT HEART CATHETERIZATION Right 3/16/2022    Procedure: INSERTION, CATHETER, RIGHT HEART;  Surgeon: Hu Silverman MD;  Location: [facility] CATH LAB;  Service: Cardiology;  Laterality: Right;    RIGHT HEART CATHETERIZATION Right 2022    Procedure: INSERTION, CATHETER, RIGHT HEART;  Surgeon: Chloe Gregory MD;  Location: [facility] CATH LAB;  Service: Cardiology;  Laterality: Right;    TUBAL LIGATION      VAGINAL DELIVERY  ; ; ;        Review of patient's allergies indicates:  No Known Allergies    Current Facility-Administered Medications   Medication    acetaminophen tablet 650 mg    macitentan tablet 10 mg    magnesium sulfate 2g in water 50mL IVPB (premix)    ondansetron injection 4 mg    PHARMACY TO CONFIRM TREPROSTINIL (REMODULIN) DOSING infusion    polyethylene glycol packet 17 g    potassium chloride SA CR tablet 60 mEq    pregabalin capsule 150 mg    riociguat (ADEMPAS) tablet 2.5 mg    sodium chloride 0.9% flush 10 mL    VELETRI/REMODULIN CASSETTE    VELETRI/REMODULIN TUBING     Family History       Problem Relation (Age of Onset)    Cancer Father          Tobacco Use    Smoking status: Former     Types: Cigars     Quit date: 2020     Years since quittin.0     Passive exposure: Past     Smokeless tobacco: Never    Tobacco comments:     social smoker-light   Substance and Sexual Activity    Alcohol use: Yes     Alcohol/week: 2.0 standard drinks     Types: 2 Glasses of wine per week     Comment: socially- 2 or 3 times a week-2 glasses of wine    Drug use: Not Currently     Types: Marijuana     Comment: 2-6 MONTHS AGO    Sexual activity: Yes     Partners: Male     Birth control/protection: See Surgical Hx     Comment:      Review of Systems   Constitutional:  Positive for activity change and fatigue. Negative for appetite change, fever and unexpected weight change.   Respiratory:  Positive for shortness of breath. Negative for cough and wheezing.    Cardiovascular:  Negative for chest pain, palpitations and leg swelling.   Gastrointestinal:  Negative for abdominal distention, abdominal pain, diarrhea and nausea.   Neurological:  Positive for weakness, light-headedness and headaches.   Objective:     Vital Signs (Most Recent):  Temp: 98.5 °F (36.9 °C) (12/15/22 0415)  Pulse: 93 (12/15/22 0415)  Resp: 20 (12/15/22 0415)  BP: (!) 82/50 (12/15/22 0415)  SpO2: (!) 94 % (12/15/22 0415)   Vital Signs (24h Range):  Temp:  [98.2 °F (36.8 °C)-98.5 °F (36.9 °C)] 98.5 °F (36.9 °C)  Pulse:  [] 93  Resp:  [16-30] 20  SpO2:  [93 %-98 %] 94 %  BP: (82-95)/(50-62) 82/50     Patient Vitals for the past 72 hrs (Last 3 readings):   Weight   12/15/22 0415 72.5 kg (159 lb 13.3 oz)     Body mass index is 29.23 kg/m².    No intake or output data in the 24 hours ending 12/15/22 0558    Physical Exam  Constitutional:       Appearance: Normal appearance.   HENT:      Head: Atraumatic.   Neck:      Comments: JVP of 8cm with positive hepatojugular reflux  Cardiovascular:      Rate and Rhythm: Normal rate and regular rhythm.      Pulses: Normal pulses.      Heart sounds: Normal heart sounds.   Pulmonary:      Effort: Pulmonary effort is normal.      Breath sounds: Normal breath sounds.   Abdominal:      General:  Abdomen is flat. Bowel sounds are normal. There is no distension.   Musculoskeletal:         General: No swelling.      Cervical back: Normal range of motion and neck supple.   Skin:     Capillary Refill: Capillary refill takes 2 to 3 seconds.      Comments: R chest wall Pedro catheter infusing remodulin   Neurological:      Mental Status: She is alert.   Psychiatric:         Mood and Affect: Mood normal.       Significant Labs:  CBC:  Recent Labs   Lab 12/14/22  2333   WBC 4.40   RBC 3.48*   HGB 9.8*   HCT 29.3*      MCV 84   MCH 28.1   MCHC 33.4     BNP:  No results for input(s): BNP in the last 168 hours.    Invalid input(s): BNPTRIAGELBLO  CMP:  Recent Labs   Lab 12/14/22  2333   GLU 98   CALCIUM 9.1   ALBUMIN 4.1   PROT 7.0      K 3.6   CO2 23      BUN 14   CREATININE 0.74   ALKPHOS 70   ALT 19   AST 30   BILITOT 1.0      Coagulation:   No results for input(s): PT, INR, APTT in the last 168 hours.  LDH:  No results for input(s): LDH in the last 72 hours.  Microbiology:  Microbiology Results (last 7 days)       ** No results found for the last 168 hours. **            I have reviewed all pertinent labs within the past 24 hours.    Diagnostic Results:  I have reviewed all pertinent imaging results/findings within the past 24 hours.

## 2022-12-15 NOTE — PLAN OF CARE
Spoke with [facility] Ravi--patient has been on defective cassettes which can cause under or over dosing  Patient symptomatic and needs to switch to PHARMACY MIXED CONCENTRATION ASAP  Patient is having 7 new cassettes not effected shipped to her house today and someone will bring a cassette for discharge mixing     PH Admit Assessment for Continuation of Treprostinil (Remodulin)    Drug Infusing: Treprostinil (Remodulin)  Route: Intravenous  Pump Type: CADD  Current Pump Rate = 40 ml / 24 hours  Current Reservoir Volume = NEEDS TO SWITCH STAT  (mls remaining to be infused)    Patient reports utilizing: Patient mixed cassettes    Home mixing instructions:   Patient reports mixing 3mls of concentrated drug in 97 ml of diluent   Vial  = 5mg/ml      Alerted Pharmacy to the defected cassette issue

## 2022-12-16 VITALS
TEMPERATURE: 98 F | DIASTOLIC BLOOD PRESSURE: 63 MMHG | RESPIRATION RATE: 18 BRPM | HEIGHT: 62 IN | BODY MASS INDEX: 29.26 KG/M2 | OXYGEN SATURATION: 95 % | WEIGHT: 159 LBS | SYSTOLIC BLOOD PRESSURE: 93 MMHG | HEART RATE: 96 BPM

## 2022-12-16 LAB
ANION GAP SERPL CALC-SCNC: 7 MMOL/L (ref 8–16)
BUN SERPL-MCNC: 13 MG/DL (ref 6–20)
CALCIUM SERPL-MCNC: 9.3 MG/DL (ref 8.7–10.5)
CHLORIDE SERPL-SCNC: 110 MMOL/L (ref 95–110)
CO2 SERPL-SCNC: 18 MMOL/L (ref 23–29)
CREAT SERPL-MCNC: 0.8 MG/DL (ref 0.5–1.4)
EST. GFR  (NO RACE VARIABLE): >60 ML/MIN/1.73 M^2
GLUCOSE SERPL-MCNC: 107 MG/DL (ref 70–110)
POTASSIUM SERPL-SCNC: 3.5 MMOL/L (ref 3.5–5.1)
SODIUM SERPL-SCNC: 135 MMOL/L (ref 136–145)

## 2022-12-16 PROCEDURE — 99233 SBSQ HOSP IP/OBS HIGH 50: CPT | Mod: NTX,,, | Performed by: PHYSICIAN ASSISTANT

## 2022-12-16 PROCEDURE — 25000003 PHARM REV CODE 250: Mod: NTX | Performed by: PHYSICIAN ASSISTANT

## 2022-12-16 PROCEDURE — 99233 PR SUBSEQUENT HOSPITAL CARE,LEVL III: ICD-10-PCS | Mod: NTX,,, | Performed by: PHYSICIAN ASSISTANT

## 2022-12-16 PROCEDURE — 36415 COLL VENOUS BLD VENIPUNCTURE: CPT | Mod: NTX | Performed by: STUDENT IN AN ORGANIZED HEALTH CARE EDUCATION/TRAINING PROGRAM

## 2022-12-16 PROCEDURE — 25000003 PHARM REV CODE 250: Mod: NTX | Performed by: STUDENT IN AN ORGANIZED HEALTH CARE EDUCATION/TRAINING PROGRAM

## 2022-12-16 PROCEDURE — 80048 BASIC METABOLIC PNL TOTAL CA: CPT | Mod: NTX | Performed by: STUDENT IN AN ORGANIZED HEALTH CARE EDUCATION/TRAINING PROGRAM

## 2022-12-16 RX ORDER — TRAMADOL HYDROCHLORIDE 100 MG/1
100 TABLET, COATED ORAL EVERY 6 HOURS PRN
Qty: 20 TABLET | Refills: 0 | Status: SHIPPED | OUTPATIENT
Start: 2022-12-16 | End: 2022-12-16 | Stop reason: SDUPTHER

## 2022-12-16 RX ORDER — TRAMADOL HYDROCHLORIDE 50 MG/1
50 TABLET ORAL EVERY 6 HOURS PRN
Status: DISCONTINUED | OUTPATIENT
Start: 2022-12-16 | End: 2022-12-16

## 2022-12-16 RX ORDER — TRAMADOL HYDROCHLORIDE 100 MG/1
100 TABLET, COATED ORAL EVERY 6 HOURS PRN
Qty: 20 TABLET | Refills: 0 | Status: SHIPPED | OUTPATIENT
Start: 2022-12-16 | End: 2023-01-06 | Stop reason: SDUPTHER

## 2022-12-16 RX ORDER — LOPERAMIDE HYDROCHLORIDE 2 MG/1
2 CAPSULE ORAL 4 TIMES DAILY PRN
Status: DISCONTINUED | OUTPATIENT
Start: 2022-12-16 | End: 2022-12-16 | Stop reason: HOSPADM

## 2022-12-16 RX ORDER — TRAMADOL HYDROCHLORIDE 50 MG/1
100 TABLET ORAL EVERY 6 HOURS PRN
Status: DISCONTINUED | OUTPATIENT
Start: 2022-12-16 | End: 2022-12-16 | Stop reason: HOSPADM

## 2022-12-16 RX ORDER — FUROSEMIDE 20 MG/1
20 TABLET ORAL DAILY
Status: DISCONTINUED | OUTPATIENT
Start: 2022-12-16 | End: 2022-12-16 | Stop reason: HOSPADM

## 2022-12-16 RX ORDER — POTASSIUM CHLORIDE 20 MEQ/1
40 TABLET, EXTENDED RELEASE ORAL ONCE
Status: COMPLETED | OUTPATIENT
Start: 2022-12-16 | End: 2022-12-16

## 2022-12-16 RX ORDER — FUROSEMIDE 20 MG/1
20 TABLET ORAL DAILY
Status: DISCONTINUED | OUTPATIENT
Start: 2022-12-17 | End: 2022-12-16

## 2022-12-16 RX ADMIN — TRAMADOL HYDROCHLORIDE 50 MG: 50 TABLET, COATED ORAL at 02:12

## 2022-12-16 RX ADMIN — RIOCIGUAT 2.5 MG: 2.5 TABLET, FILM COATED ORAL at 09:12

## 2022-12-16 RX ADMIN — POTASSIUM CHLORIDE 40 MEQ: 1500 TABLET, EXTENDED RELEASE ORAL at 10:12

## 2022-12-16 RX ADMIN — LOPERAMIDE HYDROCHLORIDE 2 MG: 2 CAPSULE ORAL at 09:12

## 2022-12-16 RX ADMIN — MACITENTAN 10 MG: 10 TABLET, FILM COATED ORAL at 09:12

## 2022-12-16 RX ADMIN — LOPERAMIDE HYDROCHLORIDE 2 MG: 2 CAPSULE ORAL at 02:12

## 2022-12-16 RX ADMIN — RIOCIGUAT 2.5 MG: 2.5 TABLET, FILM COATED ORAL at 02:12

## 2022-12-16 RX ADMIN — PREGABALIN 150 MG: 75 CAPSULE ORAL at 09:12

## 2022-12-16 RX ADMIN — TRAMADOL HYDROCHLORIDE 100 MG: 50 TABLET, COATED ORAL at 09:12

## 2022-12-16 RX ADMIN — TRAMADOL HYDROCHLORIDE 100 MG: 50 TABLET, COATED ORAL at 03:12

## 2022-12-16 NOTE — DISCHARGE SUMMARY
[facility]  Heart Transplant  Discharge Summary      Patient Name: Kristin Maier  MRN: [id number]  Admission Date: 12/15/2022  Hospital Length of Stay: 1 days  Discharge Date and Time: 12/16/2022 3:54 PM  Attending Physician: Tracey Dutta MD   Discharging Provider: LITZY ErvinC  Primary Care Provider: Jorge A Stack MD     HPI: Kristin Maier is a 47 year old female with a PMHx of WHO group 1 PH who presents as a transfer from [facility] for management of PH.  She is currently on triple therapy with remodulin, adempas, and macitentan.  She is not currently on supplemental home O2.  She was last seen in PH clinic on 11/15/22.  She had received a call a couple days ago stating that her medication pump was not functioning properly and she was not receiving her full dose of remodulin.  She presented to the [facility] ED yesterday with complaints of generalized weakness, progressive dyspnea, dizziness and headache over this period of time.  [facility] was contacted and recommended transfer.  [place] [facility] [place] in the setting of insufficient dosing of remodulin.    Last RHC: 7/19/22  RA: 15/ 11/ 10 RV: 70/ 9/ 10 PA: 72/ 29/ 43 PWP: 12/ 12/ 11 .   Cardiac output was 4.29  by Kristel. Cardiac index is 2.61 L/min/m2.   O2 Sat: PA 69%.     Normal CO/CI on remodulin 80 ng/kg/min, riociguat 2mg po TID, macitentan 10mg.  Mildly increased right and normal left sided filling pressures.  Severe pulmonary hypertension.   TPG  32   PVR  7.45  MAP 75  SVR 1212    Last ECHO: 11/14/2022   The estimated ejection fraction is 60%.   The left ventricle is normal in size with normal systolic function.   There is abnormal septal wall motion. There is systolic and diastolic flattening of the interventricular septum consistent with right ventricle pressure and volume overload.   Indeterminate left ventricular diastolic function.   Moderate right ventricular enlargement with  "moderately reduced right ventricular systolic function.   There is right ventricular hypertrophy.   Severe tricuspid regurgitation.   Moderate right atrial enlargement.   There is pulmonary hypertension.   The estimated PA systolic pressure is 84 mmHg.   Normal central venous pressure (3 mmHg).   Trivial circumferential pericardial effusion.      * No surgery found *     Hospital Course: Admitted for insufficient Remodulin dosing from improperly mixed batch of medication. Placed of hospital supply at rate of 76 ng/kg/min. New cassettes and medication sent to pt's house, which was delivered to bedside prior to discharge. Given 60 mg IVP Lasix during admission and then resumed home diuretic of PO Lasix 20 mg QD. Pt also reported persistent leg cramps, which PH clinic is following and treating with tramadol and Lyrica. Increased Tramadol to 100 mg PRN. Follow up in PH clinic in 1-3 weeks.       Goals of Care Treatment Preferences:  Code Status: Full Code          Significant Diagnostic Studies: Labs:   BMP:   Recent Labs   Lab 12/14/22  2333 12/15/22  0638 12/16/22  0651   GLU 98 102 107    137 135*   K 3.6 3.5 3.5    110 110   CO2 23 20* 18*   BUN 14 12 13   CREATININE 0.74 0.7 0.8   CALCIUM 9.1 9.4 9.3   MG 1.7 1.7  --        Pending Diagnostic Studies:     None        Final Active Diagnoses:    Diagnosis Date Noted POA    PRINCIPAL PROBLEM:  WHO group 1 pulmonary arterial hypertension [I27.21] 02/11/2022 Yes    Leg pain [M79.606] 11/14/2022 Yes    Pulmonary hypertension [I27.20] 11/30/2021 Yes      Problems Resolved During this Admission:      Discharged Condition: stable    Disposition:     Follow Up:    Patient Instructions:      COMMODE FOR HOME USE     Order Specific Question Answer Comments   Type: Standard    Height: 5' 2" (1.575 m)    Weight: 72.1 kg (159 lb)    Length of need (1-99 months): 99      Medications:  Reconciled Home Medications:      Medication List      CHANGE how you take " these medications    OPSUMIT 10 mg Tab  Generic drug: macitentan  TAKE 1 TABLET BY MOUTH DAILY. DO NOT HANDLE IF PREGNANT. DO NOT SPLIT, CRUSH, OR CHEW. REVIEW MEDICATION GUIDE.  What changed: See the new instructions.     traMADoL 100 mg Tab  Take 100 mg by mouth every 6 (six) hours as needed for Pain.  What changed:   · medication strength  · how much to take  · reasons to take this        CONTINUE taking these medications    furosemide 20 MG tablet  Commonly known as: LASIX  Take 1 tablet (20 mg total) by mouth once daily.     loperamide 2 mg capsule  Commonly known as: IMODIUM  Take 1 capsule (2 mg total) by mouth 4 (four) times daily as needed for Diarrhea.     megestroL 400 mg/10 mL (40 mg/mL) Susp  Commonly known as: MEGACE  Take 400 mg by mouth 2 (two) times daily.     ondansetron 4 MG tablet  Commonly known as: ZOFRAN  Take 1 tablet (4 mg total) by mouth every 8 (eight) hours as needed for Nausea.     potassium chloride SA 10 MEQ tablet  Commonly known as: K-DUR,KLOR-CON M  Take 2 tablets (20 mEq total) by mouth once daily.     pregabalin 75 MG capsule  Commonly known as: LYRICA  Take 2 capsules (150 mg total) by mouth 2 (two) times daily.     REMODULIN 5 mg/mL Soln  Generic drug: treprostinil sodium     riociguat 2.5 mg tablet  Commonly known as: ADEMPAS  Take 1 mg by mouth 3 (three) times daily. Initial dose is 1mg TID then titrate by 0.5 mg 3 times per day at intervals no sooner than 2 weeks to the highest tolerated dosage up to a maximum of 2.5 mg TID.     sodium chloride 0.9% SolP 100 mL with treprostinil 1 mg/mL Soln 6,000,000 ng  Inject 2,145 ng/min into the vein continuous.            Haven Lopez PA-C  Heart Transplant  [facility]

## 2022-12-16 NOTE — PLAN OF CARE
Patient is oriented X 4. Administered PRN for body aches. Patient also received imodium for diarrhea.   Telemonitor in place NS. Patient has remodulin going at 76ng/kg/min, dosing weight 55kg. Echo planned for today. Up ambulatory with assist, bed locked at lowest setting, yellow socks on, call bell in reach. Remains free from falls.

## 2022-12-16 NOTE — PROGRESS NOTES
[facility]down  Heart Transplant  Progress Note    Patient Name: Kristin Maier  MRN: [id number]  Admission Date: 12/15/2022  Hospital Length of Stay: 1 days  Attending Physician: Tracey Dutta MD  Primary Care Provider: Jorge A Stack MD  Principal Problem:WHO group 1 pulmonary arterial hypertension    Subjective:     Interval History: Admitted yesterday for insufficient Remodulin dosing due to improperly prepared cassettes. New cassettes sent to her house, which will be delivered to the hospital prior to discharge. Pt's symptoms improved but reports persistent leg cramping. Will increase tramadol to 100 mg PRN.    Continuous Infusions:   treprostinil (REMODULIN) infusion (NON-TITRATING) 76 ng/kg/min (12/15/22 1255)    veletri/remodulin cassette      veletri/remodulin tubing       Scheduled Meds:   macitentan  10 mg Oral Daily    polyethylene glycol  17 g Oral Daily    pregabalin  150 mg Oral BID    riociguat  2.5 mg Oral TID     PRN Meds:acetaminophen, loperamide, ondansetron, sodium chloride 0.9%, traMADoL    Review of patient's allergies indicates:  No Known Allergies  Objective:     Vital Signs (Most Recent):  Temp: 98.1 °F (36.7 °C) (12/16/22 0915)  Pulse: 101 (12/16/22 0915)  Resp: 18 (12/16/22 0915)  BP: 101/67 (12/16/22 0915)  SpO2: 98 % (12/16/22 0915) Vital Signs (24h Range):  Temp:  [98 °F (36.7 °C)-98.8 °F (37.1 °C)] 98.1 °F (36.7 °C)  Pulse:  [] 101  Resp:  [16-18] 18  SpO2:  [95 %-98 %] 98 %  BP: ()/(51-67) 101/67     Patient Vitals for the past 72 hrs (Last 3 readings):   Weight   12/15/22 0628 72.1 kg (159 lb)   12/15/22 0415 72.5 kg (159 lb 13.3 oz)     Body mass index is 29.08 kg/m².    No intake or output data in the 24 hours ending 12/16/22 1112    Hemodynamic Parameters:           Physical Exam  Vitals reviewed.   HENT:      Head: Normocephalic and atraumatic.      Nose: Nose normal.      Mouth/Throat:      Mouth: Mucous membranes are moist.   Eyes:       Conjunctiva/sclera: Conjunctivae normal.   Cardiovascular:      Rate and Rhythm: Normal rate and regular rhythm.   Pulmonary:      Effort: Pulmonary effort is normal.      Breath sounds: Normal breath sounds.   Abdominal:      General: Abdomen is flat.   Musculoskeletal:         General: Normal range of motion.      Cervical back: Normal range of motion.   Skin:     General: Skin is warm.      Capillary Refill: Capillary refill takes less than 2 seconds.   Neurological:      General: No focal deficit present.      Mental Status: She is alert.   Psychiatric:         Mood and Affect: Mood normal.         Behavior: Behavior normal.         Thought Content: Thought content normal.         Judgment: Judgment normal.       Significant Labs:  CBC:  Recent Labs   Lab 12/14/22 2333 12/15/22  0638   WBC 4.40 4.72   RBC 3.48* 3.36*   HGB 9.8* 9.3*   HCT 29.3* 29.6*    166   MCV 84 88   MCH 28.1 27.7   MCHC 33.4 31.4*     BNP:  Recent Labs   Lab 12/15/22  0638   BNP 42     CMP:  Recent Labs   Lab 12/14/22 2333 12/15/22  0638 12/16/22  0651   GLU 98 102 107   CALCIUM 9.1 9.4 9.3   ALBUMIN 4.1 3.5  --    PROT 7.0 6.6  --     137 135*   K 3.6 3.5 3.5   CO2 23 20* 18*    110 110   BUN 14 12 13   CREATININE 0.74 0.7 0.8   ALKPHOS 70 71  --    ALT 19 12  --    AST 30 18  --    BILITOT 1.0 1.0  --       Coagulation:   No results for input(s): PT, INR, APTT in the last 168 hours.  LDH:  No results for input(s): LDH in the last 72 hours.  Microbiology:  Microbiology Results (last 7 days)       ** No results found for the last 168 hours. **            I have reviewed all pertinent labs within the past 24 hours.    Estimated Creatinine Clearance: 79.1 mL/min (based on SCr of 0.8 mg/dL).    Diagnostic Results:  I have reviewed all pertinent imaging results/findings within the past 24 hours.    Assessment and Plan:     Kristin Aguayo is a 47 year old female with a PMHx of WHO group 1 PH who presents as a transfer from  [facility] for management of PH.  She is currently on triple therapy with remodulin, adempas, and macitentan.  She is not currently on supplemental home O2.  She was last seen in PH clinic on 11/15/22.  She had received a call a couple days ago stating that her medication pump was not functioning properly and she was not receiving her full dose of remodulin.  She presented to the [facility] ED yesterday with complaints of generalized weakness, progressive dyspnea, dizziness and headache over this period of time.  [facility] was contacted and recommended transfer.  [place] [facility] [place] in the setting of insufficient dosing of remodulin.    Last RHC: 7/19/22  RA: 15/ 11/ 10 RV: 70/ 9/ 10 PA: 72/ 29/ 43 PWP: 12/ 12/ 11 .   Cardiac output was 4.29  by Kristel. Cardiac index is 2.61 L/min/m2.   O2 Sat: PA 69%.     Normal CO/CI on remodulin 80 ng/kg/min, riociguat 2mg po TID, macitentan 10mg.  Mildly increased right and normal left sided filling pressures.  Severe pulmonary hypertension.   TPG  32   PVR  7.45  MAP 75  SVR 1212    Last ECHO: 11/14/2022  The estimated ejection fraction is 60%.  The left ventricle is normal in size with normal systolic function.  There is abnormal septal wall motion. There is systolic and diastolic flattening of the interventricular septum consistent with right ventricle pressure and volume overload.  Indeterminate left ventricular diastolic function.  Moderate right ventricular enlargement with moderately reduced right ventricular systolic function.  There is right ventricular hypertrophy.  Severe tricuspid regurgitation.  Moderate right atrial enlargement.  There is pulmonary hypertension.  The estimated PA systolic pressure is 84 mmHg.  Normal central venous pressure (3 mmHg).  Trivial circumferential pericardial effusion.      * WHO group 1 pulmonary arterial hypertension  Progressive symptoms likely secondary to running out of remodulin  Last RHC: Last RHC:  7/19/22, RA: 15/ 11/ 10 RV: 70/ 9/ 10 PA: 72/ 29/ 43 PWP: 12/ 12/ 11 on remodulin 80ng/kg/min, riociguat 2mg TID, macitentan 10mg daily  Cardiac output was 4.29  by Kristel. Cardiac index is 2.61 L/min/m2. O2 Sat: PA 69%.   Current regimen: Remodulin 76 ng/kg/min, adempas 2.5mg TID, opsumit 10mg daily  Resumed IV remodulin 76 ng/kg/min, new cassettes to be delivered this morning   Currently appears volume overloaded, patient takes furosemide 20mg po daily, will resume   Fluid and sodium restriction (1500ccs, 2g), maintain K>4.0, Mg > 2.0    Leg pain  Currently being treated for neuropathic pain  Will continue lyrica at home dose   Tramadol 100 mg PRN   BSC as confined to room with no toilet     Haven Lopez PA-C  Heart Transplant  [facility] - Transplant Stepdown

## 2022-12-16 NOTE — ASSESSMENT & PLAN NOTE
Currently being treated for neuropathic pain  Will continue lyrica at home dose   Tramadol 100 mg PRN

## 2022-12-16 NOTE — PROGRESS NOTES
Admit Note/Discharge Note     Met with patient to assess needs. Patient is a 47 y.o.  female, admitted for Who group 1 pulmonary arterial hypertension and leg pain.  Pt receives home IV medication-Remodulin.   Family is traveling to [facility] today with new Remodulin cassette.    Pt reports once home cassette is connected she will be discharged.     Patient transferred from [place] to [facility] on 12/15/2022 .  At this time, patient presents as alert and oriented x 4, good eye contact, calm, and communicative.  At this time, patients caregiver is not currently in attendance.      Household/Family Systems     Patient resides with patient's spouse and 25 yr old daughter , at     [address] [place] [address].        Support system includes spouse, adult children and extended family.    Patient does not have dependents that are need of being cared for.     Patients primary caregiver is self with support from her spouse when needed.     Pt's cell: [phone]    Emergency contacts:   Juju Maier (spouse, drives, works) [phone]  Navya Dowell (daughter, lives in [place] and drives) [phone]    .    During admission, patient's caregiver plans to stay at home.  Confirmed patient and patients caregivers do have access to reliable transportation. Pt only drives a little.  Family assists with transportation.     Cognitive Status/Learning     Patient reports reading ability as 11th grade and states patient does not have difficulty with reading, writing, seeing, hearing, comprehension, and learning.Pt reported difficulty in the past with eyesight and reading, however pt reports this difficulty has improved.    Pt reports memory issues from time to time, however memory is better from Worker's last assessment a year ago.    Patient reports patient learns best by one on one.     Needed: No.   Highest education level: High School (9-12) or GED    Vocation/Disability   .  Working for Income:  No  If no, reason not working: Disability  Patient has an appointment on  for disability.   Pt's spouse is employed.   Pt did not report to worker any financial challenges at this time.     Adherence     Patient reports a high level of adherence to patients health care regimen.  Adherence counseling and education provided. Patient verbalizes understanding.    Substance Use    Patient reports the following substance usage.    Tobacco: no current use.  Pt reports no tobacco use x one year.  Pt used to smoke cigars.   Alcohol: pt reports occasional use.  A glass of wine from time to time or a shot of [place].  Illicit Drugs/Non-prescribed Medications: no current use.  Pt used to take an marijuana edible  once a day  Patient states clear understanding of the potential impact of substance use.  Substance abstinence/cessation counseling, education and resources provided and reviewed.     Services Utilizing/ADLS    Infusion Service: Prior to admission, patient utilizing? yes Remodulin.Pt goes to an out pt lab for blood work.   Home Health: Prior to admission, patient utilizing? no  DME: Prior to admission, no.  Pt states she does not have any DME at home including .  Pt would like a BSC, due to leg pain. Pt reports she often needs to be carried to the restroom. Pt reports DME can be delivered to her home.   Pulmonary/Cardiac Rehab: Prior to admission, no  Dialysis:  Prior to admission, no  Transplant Specialty Pharmacy:  Prior to admission, no.    Prior to admission, patient reports patient was somewhat  independent with ADLS and was driving (a little).  Patient reports patient is able to care for self at this time..  Patient indicates a willingness to care for self once medically cleared to do so.    Insurance/Medications    Insured by   Payer/Plan Subscr  Sex Relation Sub. Ins. ID Effective Group Num   1. HIRA NARAYAN* ELVIS HUTTON* 1975 Female Self  18                                     [address] [address]   2. MEDICAID - [place]* ELVIS HUTTON* 1975 Female Self [id number]* [birth year]/19                                    [address]      Primary Insurance (for UNOS reporting): Public Insurance - Medicaid  Secondary Insurance (for UNOS reporting): None    Patient reports patient is able to obtain and afford medications at this time and at time of discharge.    Living Will/Healthcare Power of     Patient states patient does not have a LW and/or HCPA.   provided education regarding LW and HCPA and the completion of forms.    Coping/Mental Health    Patient is coping adequately with the aid of  family members.   Patient indicates mental health difficulties.   Pt reports continued difficulty with anxiety due to her medical needs/difficulty with pain control. Pt reports family is supportive and she does not require out pt counseling resources a this time. Pt reports she's not on any medication for anxiety at this time.   Pt reports she's happy to return home.  General support provided given hospital admission.     Discharge Planning    At time of discharge, patient plans to return to patient's home under the care of self and spouse.  Patients  spouse and family  will transport patient.  Per rounds today, expected discharge date is today . Patient and patients caregiver  verbalize understanding and are involved in treatment planning and discharge process.    Additional Concerns    Patient is being followed for needs, education, resources, information, emotional support, supportive counseling, and for supportive and skilled discharge plan of care.  providing ongoing psychosocial support, education, resources and d/c planning as needed.  SW remains available. Patient denies additional needs and/or concerns at this time. Patient verbalizes understanding and agreement with information reviewed, social work availability, and how to access available resources as  needed.

## 2022-12-16 NOTE — ASSESSMENT & PLAN NOTE
Progressive symptoms likely secondary to running out of remodulin  Last RHC: Last RHC: 7/19/22, RA: 15/ 11/ 10 RV: 70/ 9/ 10 PA: 72/ 29/ 43 PWP: 12/ 12/ 11 on remodulin 80ng/kg/min, riociguat 2mg TID, macitentan 10mg daily  Cardiac output was 4.29  by Kristel. Cardiac index is 2.61 L/min/m2. O2 Sat: PA 69%.   Current regimen: Remodulin 76 ng/kg/min, adempas 2.5mg TID, opsumit 10mg daily  Resumed IV remodulin 76 ng/kg/min, new cassettes to be delivered this morning   Currently appears volume overloaded, patient takes furosemide 20mg po daily, will resume   Fluid and sodium restriction (1500ccs, 2g), maintain K>4.0, Mg > 2.0

## 2022-12-16 NOTE — SUBJECTIVE & OBJECTIVE
Interval History: Admitted yesterday for insufficient Remodulin dosing due to improperly prepared cassettes. New cassettes sent to her house, which will be delivered to the hospital prior to discharge. Pt's symptoms improved but reports persistent leg cramping. Will increase tramadol to 100 mg PRN.    Continuous Infusions:   treprostinil (REMODULIN) infusion (NON-TITRATING) 76 ng/kg/min (12/15/22 1255)    veletri/remodulin cassette      veletri/remodulin tubing       Scheduled Meds:   macitentan  10 mg Oral Daily    polyethylene glycol  17 g Oral Daily    pregabalin  150 mg Oral BID    riociguat  2.5 mg Oral TID     PRN Meds:acetaminophen, loperamide, ondansetron, sodium chloride 0.9%, traMADoL    Review of patient's allergies indicates:  No Known Allergies  Objective:     Vital Signs (Most Recent):  Temp: 98.1 °F (36.7 °C) (12/16/22 0915)  Pulse: 101 (12/16/22 0915)  Resp: 18 (12/16/22 0915)  BP: 101/67 (12/16/22 0915)  SpO2: 98 % (12/16/22 0915) Vital Signs (24h Range):  Temp:  [98 °F (36.7 °C)-98.8 °F (37.1 °C)] 98.1 °F (36.7 °C)  Pulse:  [] 101  Resp:  [16-18] 18  SpO2:  [95 %-98 %] 98 %  BP: ()/(51-67) 101/67     Patient Vitals for the past 72 hrs (Last 3 readings):   Weight   12/15/22 0628 72.1 kg (159 lb)   12/15/22 0415 72.5 kg (159 lb 13.3 oz)     Body mass index is 29.08 kg/m².    No intake or output data in the 24 hours ending 12/16/22 1112    Hemodynamic Parameters:           Physical Exam  Vitals reviewed.   HENT:      Head: Normocephalic and atraumatic.      Nose: Nose normal.      Mouth/Throat:      Mouth: Mucous membranes are moist.   Eyes:      Conjunctiva/sclera: Conjunctivae normal.   Cardiovascular:      Rate and Rhythm: Normal rate and regular rhythm.   Pulmonary:      Effort: Pulmonary effort is normal.      Breath sounds: Normal breath sounds.   Abdominal:      General: Abdomen is flat.   Musculoskeletal:         General: Normal range of motion.      Cervical back: Normal range of  motion.   Skin:     General: Skin is warm.      Capillary Refill: Capillary refill takes less than 2 seconds.   Neurological:      General: No focal deficit present.      Mental Status: She is alert.   Psychiatric:         Mood and Affect: Mood normal.         Behavior: Behavior normal.         Thought Content: Thought content normal.         Judgment: Judgment normal.       Significant Labs:  CBC:  Recent Labs   Lab 12/14/22  2333 12/15/22  0638   WBC 4.40 4.72   RBC 3.48* 3.36*   HGB 9.8* 9.3*   HCT 29.3* 29.6*    166   MCV 84 88   MCH 28.1 27.7   MCHC 33.4 31.4*     BNP:  Recent Labs   Lab 12/15/22  0638   BNP 42     CMP:  Recent Labs   Lab 12/14/22  2333 12/15/22  0638 12/16/22  0651   GLU 98 102 107   CALCIUM 9.1 9.4 9.3   ALBUMIN 4.1 3.5  --    PROT 7.0 6.6  --     137 135*   K 3.6 3.5 3.5   CO2 23 20* 18*    110 110   BUN 14 12 13   CREATININE 0.74 0.7 0.8   ALKPHOS 70 71  --    ALT 19 12  --    AST 30 18  --    BILITOT 1.0 1.0  --       Coagulation:   No results for input(s): PT, INR, APTT in the last 168 hours.  LDH:  No results for input(s): LDH in the last 72 hours.  Microbiology:  Microbiology Results (last 7 days)       ** No results found for the last 168 hours. **            I have reviewed all pertinent labs within the past 24 hours.    Estimated Creatinine Clearance: 79.1 mL/min (based on SCr of 0.8 mg/dL).    Diagnostic Results:  I have reviewed all pertinent imaging results/findings within the past 24 hours.

## 2022-12-16 NOTE — HOSPITAL COURSE
Admitted for insufficient Remodulin dosing from improperly mixed batch of medication. Placed of hospital supply at rate of 76 ng/kg/min. New cassettes and medications sent to house, which was delivered to bedside prior to discharge. Given 60 mg IVP Lasix during admission and then resumed PO Lasix 20 mg QD. Pt also reports persistent leg cramps, which have been followed by the PH clinic. Increased Tramadol to 100 mg PRN. Follow up in PH clinic in 2-3 weeks.

## 2022-12-16 NOTE — PLAN OF CARE
VSS  No signs of evident distress  Pain medication admin as per MAR  Left FA PIV removed and dressed.  Right subclavian perm cath dressing CDI.  Pt. Switched to home concentration of remodulin prior to discharge.  Discharge paperwork given to patient.  Pt. Expressed understanding of discharge instructions.  Bed in lowest locked position.  Call light within reach.  Instructed to call for assistance.  Pt. Expressed understanding.  Discharged with patient escort.

## 2022-12-19 PROBLEM — J96.01 ACUTE RESPIRATORY FAILURE WITH HYPOXIA: Status: RESOLVED | Noted: 2022-09-13 | Resolved: 2022-12-19

## 2022-12-29 ENCOUNTER — PATIENT MESSAGE (OUTPATIENT)
Dept: TRANSPLANT | Facility: CLINIC | Age: 47
End: 2022-12-29
Payer: MEDICAID

## 2022-12-30 ENCOUNTER — TELEPHONE (OUTPATIENT)
Dept: TRANSPLANT | Facility: CLINIC | Age: 47
End: 2022-12-30
Payer: MEDICAID

## 2022-12-30 ENCOUNTER — PATIENT MESSAGE (OUTPATIENT)
Dept: TRANSPLANT | Facility: CLINIC | Age: 47
End: 2022-12-30
Payer: MEDICAID

## 2022-12-30 NOTE — TELEPHONE ENCOUNTER
Nurse navigator contacted patient. Patient states that she has been having leg pain since 12/23/22 and she is b timi able to get out of bed. She also has had increased SOB on exertion. Patient states she has been taking her BP and it was 89/63 this morning. She reports having dizziness and weakness but denies any swelling. Patient states that she is eating and drinking fine and staying hydrated. Patient confirms that she is taking all of her medication as prescribed except the tramadol. Patient seemed to change response to question. Patient originally stated that she takes 3-4 tramadols at a time and then stated that she takes 200 mg three times a day when the prescription written for her was to take 100 mg every 6 hours as needed. Dr. Gregory was notified and stated that patient taking too much tramadol can be affecting her blood pressure. Remodulin cannot be decreased due to the shortness of breath on exertion. Dr. Gregory recommended emergency room if patient feels that symptoms get worse and if BP does not improve but patient should not be taking additional tramadol. Patient verbalized understanding.

## 2022-12-30 NOTE — TELEPHONE ENCOUNTER
Pulmonary Hypertension Clinic phone follow up / triage call initiation.    Left voicemail message for patient to return call to PH NN.

## 2023-01-02 DIAGNOSIS — M79.604 PAIN IN BOTH LOWER EXTREMITIES: Primary | ICD-10-CM

## 2023-01-02 DIAGNOSIS — M79.605 PAIN IN BOTH LOWER EXTREMITIES: Primary | ICD-10-CM

## 2023-01-03 ENCOUNTER — TELEPHONE (OUTPATIENT)
Dept: TRANSPLANT | Facility: CLINIC | Age: 48
End: 2023-01-03
Payer: MEDICAID

## 2023-01-03 DIAGNOSIS — I27.20 PULMONARY HYPERTENSION: Primary | ICD-10-CM

## 2023-01-03 NOTE — TELEPHONE ENCOUNTER
Pulmonary Hypertension Clinic phone follow up / triage call completed.     Pulmonary Hypertension RN Navigator spoke with Kristin Maier's son who states that she is in the RR.    Gave son number to call back [phone]

## 2023-01-03 NOTE — TELEPHONE ENCOUNTER
"Spoke to patient.  Patient states that she is "having a good day." And that she is a little better. NN did not hear wheezing or evidence of SOB over the phone.  NN discussed with patient the test scheduled for 1/9/2023 and what it is for, taking blood pressures regularly, monitoring symptoms and referral to palliative care clinic.    Discussed that the test on Monday is to check for injury to her legs - for whatever could cause the pain. Discussed signs and symptoms of what she should look out for, swelling, heat in a different part of her leg, etc.  Patient reports that she has not checked her blood pressure since "the night before last," and that that reading was "90/64" but did state that she does not take her blood pressure every day.  She was feeling bad when she took her blood pressure.  Advised patient that she should take blood pressure when she is feeling good, also.  Patient states that she is taking the Tramadol 100 mg - now only two per day.  Patient asked why she was not sent home with the same medicine that she got in the hospital. Advised patient that medication in the hospital is different than pain medication that a patient takes at home. Patient states that she could not remember which medication she was given in the hospital. Advised patient that a referral has been placed to Palliative care to possibly manage her pain medication now that she is out of the hospital.  Patient acknowledged multiple times the information given in conversation. NN gave patient opportunity to ask questions and provided answers.  "

## 2023-01-06 ENCOUNTER — TELEPHONE (OUTPATIENT)
Dept: TRANSPLANT | Facility: CLINIC | Age: 48
End: 2023-01-06
Payer: MEDICAID

## 2023-01-06 RX ORDER — TRAMADOL HYDROCHLORIDE 50 MG/1
50 TABLET ORAL EVERY 6 HOURS
Qty: 60 TABLET | Refills: 0 | Status: SHIPPED | OUTPATIENT
Start: 2023-01-06 | End: 2023-02-06 | Stop reason: SDUPTHER

## 2023-01-06 NOTE — TELEPHONE ENCOUNTER
Called patient   Asked for a refill of tramadol. Advised patient that it may not be refilled today.  Patient states that she is not having any other symptoms other than leg pain today.  Will discuss with provider.

## 2023-01-25 ENCOUNTER — TELEPHONE (OUTPATIENT)
Dept: TRANSPLANT | Facility: CLINIC | Age: 48
End: 2023-01-25
Payer: MEDICAID

## 2023-01-25 NOTE — TELEPHONE ENCOUNTER
Called patient to see if she was doing okay.  Messages in chart and internal message state that patient has not been scheduled for follow up after hospital stay -not answering.  Advised patient that schedulers will call again to try to schedule. Patient states that it is closer for her to go to [place] than to [place]. Sent follow up message to schedulers with this information.

## 2023-02-02 ENCOUNTER — TELEPHONE (OUTPATIENT)
Dept: TRANSPLANT | Facility: CLINIC | Age: 48
End: 2023-02-02
Payer: MEDICAID

## 2023-02-02 DIAGNOSIS — I27.21 WHO GROUP 1 PULMONARY ARTERIAL HYPERTENSION: Primary | ICD-10-CM

## 2023-02-02 DIAGNOSIS — M79.2 NEUROPATHIC PAIN: ICD-10-CM

## 2023-02-02 NOTE — TELEPHONE ENCOUNTER
NN added orders for patient to have Ambulatory Referral/Consult to Pain Management and Lung Transplant.

## 2023-02-06 ENCOUNTER — HOSPITAL ENCOUNTER (OUTPATIENT)
Dept: PULMONOLOGY | Facility: CLINIC | Age: 48
Discharge: HOME | End: 2023-02-06
Payer: MEDICAID

## 2023-02-06 ENCOUNTER — DOCUMENTATION ONLY (OUTPATIENT)
Dept: TRANSPLANT | Facility: CLINIC | Age: 48
End: 2023-02-06
Payer: MEDICAID

## 2023-02-06 ENCOUNTER — OFFICE VISIT (OUTPATIENT)
Dept: TRANSPLANT | Facility: CLINIC | Age: 48
End: 2023-02-06
Payer: MEDICAID

## 2023-02-06 ENCOUNTER — TELEPHONE (OUTPATIENT)
Dept: PALLIATIVE MEDICINE | Facility: CLINIC | Age: 48
End: 2023-02-06
Payer: MEDICAID

## 2023-02-06 VITALS
SYSTOLIC BLOOD PRESSURE: 99 MMHG | OXYGEN SATURATION: 97 % | DIASTOLIC BLOOD PRESSURE: 65 MMHG | HEIGHT: 66 IN | BODY MASS INDEX: 23.35 KG/M2 | HEART RATE: 85 BPM | WEIGHT: 145.31 LBS

## 2023-02-06 VITALS — BODY MASS INDEX: 22.29 KG/M2 | HEIGHT: 67 IN | WEIGHT: 142 LBS

## 2023-02-06 DIAGNOSIS — M79.2 NEUROPATHIC PAIN: ICD-10-CM

## 2023-02-06 DIAGNOSIS — I27.9 CHRONIC PULMONARY HEART DISEASE: ICD-10-CM

## 2023-02-06 DIAGNOSIS — R06.02 SHORTNESS OF BREATH: ICD-10-CM

## 2023-02-06 DIAGNOSIS — M79.604 PAIN IN BOTH LOWER EXTREMITIES: ICD-10-CM

## 2023-02-06 DIAGNOSIS — M79.605 PAIN IN BOTH LOWER EXTREMITIES: ICD-10-CM

## 2023-02-06 DIAGNOSIS — I27.21 WHO GROUP 1 PULMONARY ARTERIAL HYPERTENSION: Primary | ICD-10-CM

## 2023-02-06 PROCEDURE — 3008F PR BODY MASS INDEX (BMI) DOCUMENTED: ICD-10-PCS | Mod: CPTII,TXP,,

## 2023-02-06 PROCEDURE — 3074F SYST BP LT 130 MM HG: CPT | Mod: CPTII,TXP,,

## 2023-02-06 PROCEDURE — 99999 PR PBB SHADOW E&M-EST. PATIENT-LVL IV: ICD-10-PCS | Mod: PBBFAC,TXP,,

## 2023-02-06 PROCEDURE — 3078F PR MOST RECENT DIASTOLIC BLOOD PRESSURE < 80 MM HG: ICD-10-PCS | Mod: CPTII,TXP,,

## 2023-02-06 PROCEDURE — 1160F RVW MEDS BY RX/DR IN RCRD: CPT | Mod: CPTII,TXP,,

## 2023-02-06 PROCEDURE — 99214 OFFICE O/P EST MOD 30 MIN: CPT | Mod: S$PBB,TXP,,

## 2023-02-06 PROCEDURE — 99999 PR PBB SHADOW E&M-EST. PATIENT-LVL IV: CPT | Mod: PBBFAC,TXP,,

## 2023-02-06 PROCEDURE — 1160F PR REVIEW ALL MEDS BY PRESCRIBER/CLIN PHARMACIST DOCUMENTED: ICD-10-PCS | Mod: CPTII,TXP,,

## 2023-02-06 PROCEDURE — 1159F PR MEDICATION LIST DOCUMENTED IN MEDICAL RECORD: ICD-10-PCS | Mod: CPTII,TXP,,

## 2023-02-06 PROCEDURE — 94618 PULMONARY STRESS TESTING: CPT | Mod: 26,S$PBB,NTX, | Performed by: INTERNAL MEDICINE

## 2023-02-06 PROCEDURE — 3008F BODY MASS INDEX DOCD: CPT | Mod: CPTII,TXP,,

## 2023-02-06 PROCEDURE — 3074F PR MOST RECENT SYSTOLIC BLOOD PRESSURE < 130 MM HG: ICD-10-PCS | Mod: CPTII,TXP,,

## 2023-02-06 PROCEDURE — 94618 PULMONARY STRESS TESTING: ICD-10-PCS | Mod: 26,S$PBB,NTX, | Performed by: INTERNAL MEDICINE

## 2023-02-06 PROCEDURE — 1159F MED LIST DOCD IN RCRD: CPT | Mod: CPTII,TXP,,

## 2023-02-06 PROCEDURE — 3078F DIAST BP <80 MM HG: CPT | Mod: CPTII,TXP,,

## 2023-02-06 PROCEDURE — 99214 PR OFFICE/OUTPT VISIT, EST, LEVL IV, 30-39 MIN: ICD-10-PCS | Mod: S$PBB,TXP,,

## 2023-02-06 PROCEDURE — 94618 PULMONARY STRESS TESTING: CPT | Mod: PBBFAC,NTX | Performed by: INTERNAL MEDICINE

## 2023-02-06 PROCEDURE — 99214 OFFICE O/P EST MOD 30 MIN: CPT | Mod: PBBFAC,TXP

## 2023-02-06 RX ORDER — ONDANSETRON 4 MG/1
4 TABLET, FILM COATED ORAL EVERY 8 HOURS PRN
Qty: 30 TABLET | Refills: 6 | Status: SHIPPED | OUTPATIENT
Start: 2023-02-06 | End: 2023-07-20 | Stop reason: SDUPTHER

## 2023-02-06 RX ORDER — TRAMADOL HYDROCHLORIDE 50 MG/1
50 TABLET ORAL EVERY 6 HOURS
Qty: 60 TABLET | Refills: 0 | Status: SHIPPED | OUTPATIENT
Start: 2023-02-06 | End: 2023-02-27

## 2023-02-06 NOTE — PROCEDURES
Kristin Maier is a 47 y.o.  female patient, who presents for a 6 minute walk test ordered by ANA M Guy.  The diagnosis is Pulmonary Hypertension.  The patient's BMI is 22.2 kg/m2.  Predicted distance (lower limit of normal) is 453.8 meters.      Test Results:    The test was completed without stopping.  The total time walked was 360 seconds.  During walking, the patient reported:  No complaints.  The patient used no assistive devices during testing.     02/06/2023---------Distance: 365.76 meters (1200 feet)     O2 Sat % Supplemental Oxygen Heart Rate Blood Pressure Jessica Scale   Pre-exercise  (Resting) 98 % Room Air 86 bpm 102/66 mmHg 0   During Exercise 98 % Room Air 100 bpm 102/68 mmHg 1   Post-exercise  (Recovery) 98 % Room Air  90 bpm       Recovery Time: 76 seconds    Performing nurse/tech: Renetta ZAMORA      PREVIOUS STUDY:   11/14/2022---------Distance: 365.76 meters (1200 feet)       O2 Sat % Supplemental Oxygen Heart Rate Blood Pressure Jessica Scale   Pre-exercise  (Resting) 97 % Room Air 94 bpm 96/68 mmHg 4   During Exercise 99 % Room Air 112 bpm 110/69 mmHg 4   Post-exercise  (Recovery) 99 % Room Air  99 bpm           CLINICAL INTERPRETATION:  Six minute walk distance is 365.76 meters (1200 feet) with very light dyspnea.  During exercise, there was no desaturation while breathing room air.  Both blood pressure and heart rate remained stable with walking.  The patient did not report non-pulmonary symptoms during exercise.  Since the previous study in November 2022, exercise capacity is unchanged.  Based upon age and body mass index, exercise capacity is less than predicted.

## 2023-02-06 NOTE — PROGRESS NOTES
02/06/2023   Kristinpio Dowell Maier  [address] [facility]        OUTPATIENT RIGHT HEART CATHETERIZATION INSTRUCTIONS     You have been scheduled for a procedure in the catheterization lab on 5/1/2023.      Please report to the Cardiology Waiting Area on the Third floor of the hospital and check in at 11:30 am  .    You will then be taken to the SSCU (Short Stay Cardiac Unit) and prepared for your procedure. Please be aware that this is not the time of your procedure but the time you are to arrive. The procedures are scheduled on an hourly basis; however, emergency cases take precedence over all other cases.      You may eat a light meal before your procedure.    You may take your regular morning medications with water. If there are any medications that you should not take you will be instructed to hold them that morning.   If you are on Pradaxa, Eliquis or Coumadin , you can hold them 3 days prior to your procedure.  CALL US if you are on blood thinners for instructions. [phone]  Do not stop your Aspirin, Plavix, Effient, or Brilinta.    The procedure will take 30 minutes to perform. After the procedure, you will return to SSCU on the third floor of the hospital. Your family may remain in the room with you during this time.     You will be monitored for bleeding from the puncture site.  Your dressing may be removed the next day and dressed with a Band-Aid.  You may be able to be discharged home that same afternoon if there is someone to drive you home and there were no complications.     You may need to be admitted to the hospital after your procedure, so pack an overnight bag. Your doctor will determine, based on your progress, the date and time of your discharge. The results of your procedure will be discussed with you before you are discharged. Any further testing or procedures will be scheduled for you either before you leave or you will be called with these appointments.      If you should have any  questions, concerns, or need to change the date of your procedure, please call  Heart Transplant office and ask for your nurse. [phone]    Special Instructions:     THE ABOVE INSTRUCTIONS WERE GIVEN TO THE PATIENT VERBALLY AND THEY VERBALIZED UNDERSTANDING.  THEY DO NOT REQUIRE ANY SPECIAL NEEDS AND DO NOT HAVE ANY LEARNING BARRIERS.     Directions for Reporting to Cardiology Waiting Area in the Hospital  If you park in the Parking Garage:  Take elevators to the1st floor of the parking garage.  Continue past the gift shop, coffee shop, and piano.  Take a right and go to the gold elevators. (Elevator B)  Take the elevator to the 3rd floor.  Follow the arrow on the sign on the wall that says Cath Lab Registration/EP Lab Registration.  Follow the long hallway all the way around until you come to a big open area.  This is the registration area.  Check in at Reception Desk.     OR     If family is dropping you off:  Have them drop you off at the front of the Hospital under the green overhang.  Enter through the doors and take a right.  Take the E elevators to the 3rd floor Cardiology Waiting Area.  Check in at the Reception Desk in the waiting room.

## 2023-02-06 NOTE — PROGRESS NOTES
Discussed with patient at office visit:     Rate of Remodulin pump rate is set at 40 mL/24 hrs - confirmed with patient and pump.    Patient states that things are going good after her hospitalization in December.  Patient does still have leg pain that is worse with walking.  Patient asked about referral to Lung Transplant in [place] or [place] and about tramadol refill request.  Advised patient that every provider would need to evaluate patient's for pain medication.    Nurse Navigator asked about patient's missed appointment with palliative care. Patient states that they never called her for the virtual visit. Advised patient that NN would send message to Dr. Orourke's staff to see if they could re schedule visit, and give directions about virtual visits before the visit.  Patient stated agreement.    Discussed with SOFIE Guy DNP about referral to lung transplant, tramadol prescription, and visit with palliative care re scheduling.  Flowsheet for Remodulin was updated.

## 2023-02-06 NOTE — TELEPHONE ENCOUNTER
Unable to reach pt regarding rescheduling appointment with Palliative Medicine. I left her a voicemail

## 2023-02-06 NOTE — PATIENT INSTRUCTIONS
Increase remodulin as tolerated, per dosing sheet.    Schedule RHC with all labs, walk, appointment.      Recommend 2 gram sodium restriction and 1500cc fluid restriction.  Encourage physical activity with graded exercise program.  Requested patient to weigh themselves daily, and to notify us if their weight increases by more than 3 lbs in 1 day or 5 lbs in 1 week.

## 2023-02-14 NOTE — PROGRESS NOTES
Subjective:    Patient ID:  Kristin Maier is a 47 y.o. female who presents for follow-up of Pulmonary Hypertension.    HPI:   Mrs. Maier is a 45 y/o AA female diagnosed with WHO Group 1 PAH, referred by Dr. MAINE Stack. She was initially seen in clinic by Dr. Dutta on 11/29/2021, presenting with symptoms of CHU and severe PAH by ECHO. Scheduled for RHC the next day. Same day of her appt she went to an OSH for a UTI.  Given her pulmonary hypertension history a decision was made to transfer her to [facility] for further inpatient workup.     Hospital Course: Patient was admitted for evaluation of Pulmonary Hypertension. Work up revealed: HENNA, ESR, RF, Anti CCP, ds DNA Ab, HIV, TSH unremarkable. V/Q and CTA chest showed no evidence of PE. 12/2/21 TTE showed  EF 20, LV diastolic dysfunction, moderate pericardial effusion, moderate RV enlargement with severely reduced RV systolic function. Free wall strain 6-9%, FAC 16%, severe TR, RV pressure volume overload with systolic flattening of IV septum, CVP 15, PASP 103. 12.2.21 RHC revealed: RA: 25/ 26/ 22 RV: 90/ 1/ 21 PA: 86/ 42/ 57 PWP: 9/ 8/ 7. TPG 50, PVR 25 (severely elevated R sided filling pressure. CI 1.2, CO 1.97. Patient was classified as WHO group I PH and started on Veletri while inpatient, patient required  at rate of 5mg/kg/min. Patient had PICC line placed on 12/8/21 for long term administerationof vasodilator therapy. Switched to Remodulin at discharge.    Clinic F/U:   Mrs. Maier reports doing okay. Walk is stable on room air. No desaturation. Is currently on Remodulin 76 ng/kg/min, Opsumit 10 mg daily, and adempas 2.5 mg, TID. Her leg pain seems controlled with Lyrica and Tramadol. She does note a little more SOB and would like to try increasing Remodulin again. Takes 20 mg lasix daily that works for fluid retention.  Saw our lung transplant team. Not a candidate for [facility] transplant, but is interested in referral to transplant  [place].    6MWT:  6MW 2/6/2023 11/14/2022   6MWT Status completed without stopping completed without stopping   Patient Reported No complaints Leg pain   Was O2 used? No No   6MW Distance walked (feet) 1200 1200   Distance walked (meters) 365.76 365.76   Did patient stop? No No   Oxygen Saturation 98 97   Supplemental Oxygen Room Air Room Air   Heart Rate 86 94   Blood Pressure 102/66 96/68   Jessica Dyspnea Rating  nothing at all somewhat heavy   Oxygen Saturation 98 99   Supplemental Oxygen Room Air Room Air   Heart Rate 100 112   Blood Pressure 102/68 110/69   Jessica Dyspnea Rating  very light somewhat heavy   Recovery Time (seconds) 76 75   Oxygen Saturation 98 99   Supplemental Oxygen Room Air Room Air   Heart Rate 90 99     ECHO: 12/15/2022  The left ventricle is normal in size with normal systolic function.  The estimated ejection fraction is 65%.  Normal left ventricular diastolic function.  There is abnormal septal wall motion. There is systolic flattening of the interventricular septum consistent with right ventricle pressure overload.  Severe right ventricular enlargement with moderately to severely reduced right ventricular systolic function.  Severe tricuspid regurgitation.  The estimated PA systolic pressure is 79 mmHg.  Normal central venous pressure (3 mmHg).  There is pulmonary hypertension.  Right atrial enlargement.  Small circumferential pericardial effusion.    ECHO: 11/14/2022  The estimated ejection fraction is 60%.  The left ventricle is normal in size with normal systolic function.  There is abnormal septal wall motion. There is systolic and diastolic flattening of the interventricular septum consistent with right ventricle pressure and volume overload.  Indeterminate left ventricular diastolic function.  Moderate right ventricular enlargement with moderately reduced right ventricular systolic function.  There is right ventricular hypertrophy.  Severe tricuspid regurgitation.  Moderate right  atrial enlargement.  There is pulmonary hypertension.  The estimated PA systolic pressure is 84 mmHg.  Normal central venous pressure (3 mmHg).  Trivial circumferential pericardial effusion.    ECHO: 12/13/2021 (prior to initiation of remodulin)  The left ventricle is normal in size with concentric remodeling and normal systolic function.  The estimated ejection fraction is 63%.  There are segmental left ventricular wall motion abnormalities.  There is abnormal septal wall motion.  Normal left ventricular diastolic function.  Moderate right ventricular enlargement with mildly reduced right ventricular systolic function.  Moderate right atrial enlargement.  Severe tricuspid regurgitation.  Mild pulmonic regurgitation.  Elevated central venous pressure (15 mmHg).  The estimated PA systolic pressure is 122 mmHg.  There is pulmonary hypertension.  Small pericardial effusion. Apically and trivial under the RA.    RHC: 7/19/2022  RA: 15/ 11/ 10 RV: 70/ 9/ 10 PA: 72/ 29/ 43 PWP: 12/ 12/ 11 .   Cardiac output was 4.29  by Kristel. Cardiac index is 2.61 L/min/m2.   O2 Sat: PA 69%.   Normal CO/CI on remodulin 80 ng/kg/min, riociguat 2mg po TID, macitentan 10mg.  Mildly increased right and normal left sided filling pressures.  Severe pulmonary hypertension.   TPG  32   PVR  7.45  MAP 75  SVR 1212    RHC: 3/16/2022  RA: 25/ 26/ 22 RV: 90/ 1/ 21 PA: 86/ 42/ 57 PWP: 9/ 8/ 7 .   Cardiac output was 1.97  by Kristel. Cardiac index is 1.2 L/min/m2.   O2 Sat: PA 38%.     TPG   50    PVR   25    V/Q: 12/3/2021  FINDINGS:  Perfusion: No observable filling defects or perfusion defects on perfusion imaging.     Ventilation: No observable defects on ventilation imaging.     Small amount of ingested radiotracer is visualized within the aerodigestive tract.     Impression:     This represents a low probability of pulmonary embolism.    CT Chest: 9/12/2022  Impression:     1. Constellation of findings suggestive of pulmonary arterial hypertension.   "No significant pulmonary edema or pleural effusion.  No evidence of pulmonary thromboembolism.  2. Cardiomegaly with markedly enlarged right atrium and the right ventricle.  3. Multiple small pulmonary nodules, with the largest measuring 0.4 cm.  For multiple solid nodules all <6 mm, Fleischner Society 2017 guidelines recommend no routine follow up for a low risk patient, or follow up with non-contrast chest CT at 12 months after discovery in a high risk patient.  4. 3.9 cm borderline aneurysmal ascending aorta.    IV/SC:  Pulmonary Hypertension Review Flowsheet 2/7/2023   Pump Type CADD   Medication type Remodulin   Vial size 5.0 mg/mL   Dose (ng/kg/min) 76 ng/kg/min   DW (kg) 55 kg   Amt of Med used 3 mL   Amt of Diluent Used NS 97 mL   Final Concentration (ng/ml) 0.015 mg/mL   Pump Rate ml/24 hr 40 mL/24 hr     Review of Systems   Constitutional: Negative.   HENT: Negative.     Eyes: Negative.    Cardiovascular:  Positive for dyspnea on exertion. Negative for chest pain, irregular heartbeat, leg swelling, near-syncope, orthopnea, paroxysmal nocturnal dyspnea and syncope.        Occasional leg swelling, resolves w/o diuretics   Respiratory: Negative.     Endocrine: Negative.    Hematologic/Lymphatic: Negative.    Skin: Negative.    Musculoskeletal:  Positive for myalgias. Negative for falls and joint swelling.        Foot and leg pain related to medication SE   Gastrointestinal:  Positive for diarrhea. Negative for abdominal pain, dysphagia, heartburn, nausea and vomiting.        Occasional nausea and diarrhea related to medication   Genitourinary: Negative.    Neurological:  Positive for excessive daytime sleepiness. Negative for dizziness, headaches and loss of balance.   Psychiatric/Behavioral: Negative.        Objective: BP 99/65 (BP Location: Right arm, Patient Position: Sitting, BP Method: Medium (Automatic))   Pulse 85   Ht 5' 6" (1.676 m)   Wt 65.9 kg (145 lb 4.8 oz)   LMP 06/19/2017 (Approximate)   " SpO2 97%   BMI 23.45 kg/m²       Physical Exam  Constitutional:       General: She is not in acute distress.     Appearance: Normal appearance. She is normal weight. She is not ill-appearing.   HENT:      Head: Normocephalic and atraumatic.      Nose: Nose normal.   Eyes:      Extraocular Movements: Extraocular movements intact.      Pupils: Pupils are equal, round, and reactive to light.   Neck:      Vascular: No JVD.   Cardiovascular:      Rate and Rhythm: Normal rate and regular rhythm.   Chest:      Comments: R chest wall Pedro Catheter infusing Remodulin. Dressing is C/D/I  Musculoskeletal:      Cervical back: Normal range of motion and neck supple.   Neurological:      Mental Status: She is alert.     Lab Results   Component Value Date    BNP 50 02/06/2023     02/06/2023    K 3.3 (L) 02/06/2023    MG 1.7 02/06/2023     02/06/2023    CO2 19 (L) 02/06/2023    BUN 9 02/06/2023    CREATININE 0.8 02/06/2023    GLU 92 02/06/2023    HGBA1C 6.4 (H) 12/06/2021    AST 15 02/06/2023    ALT 12 02/06/2023    ALBUMIN 4.0 02/06/2023    PROT 7.2 02/06/2023    BILITOT 1.3 (H) 02/06/2023    CHOL 157 12/06/2021    HDL 34 (L) 12/06/2021    LDLCALC 103.2 12/06/2021    TRIG 99 12/06/2021       Magnesium   Date Value Ref Range Status   02/06/2023 1.7 1.6 - 2.6 mg/dL Final       Lab Results   Component Value Date    WBC 3.82 (L) 02/06/2023    HGB 10.9 (L) 02/06/2023    HCT 34.4 (L) 02/06/2023    MCV 82 02/06/2023     02/06/2023       Lab Results   Component Value Date    INR 1.1 07/19/2022    INR 1.1 12/23/2021    INR 1.1 12/22/2021       BNP   Date Value Ref Range Status   02/06/2023 50 0 - 99 pg/mL Final     Comment:     Values of less than 100 pg/ml are consistent with non-CHF populations.   12/15/2022 42 0 - 99 pg/mL Final     Comment:     Values of less than 100 pg/ml are consistent with non-CHF populations.   11/14/2022 47 0 - 99 pg/mL Final     Comment:     Values of less than 100 pg/ml are consistent  with non-CHF populations.       No results found for: LDH        WHO Group: 1 Functional Class: II   REVEAL Score: 7 (Intermediate Risk)    Assessment:       1. WHO group 1 pulmonary arterial hypertension    2. Shortness of breath    3. Neuropathic pain    4. Pain in both lower extremities         Plan:       Appears stable on triple therapy but notes increased SOB. Her leg pain has improved and she would like to try to increase remodulin again.   Has genetics testing appointment scheduled for July.    Increase remodulin as tolerated, per dosing sheet.    Follow up on sleep consult. Explained the importantance of treating sleep apnea, if present.     Schedule RHC with all labs, walk, appointment.     Discussed the possible role of genetics in PH. Since we have no clear etiology for her PH and she has 4 children, age 24-31, recommend testing.     Recommend 2 gram sodium restriction and 1500cc fluid restriction.  Encourage physical activity with graded exercise program.  Requested patient to weigh themselves daily, and to notify us if their weight increases by more than 3 lbs in 1 day or 5 lbs in 1 week.  Placed referral for sleep consult. Explained the importantance of treating sleep apnea, if present.      D

## 2023-02-15 ENCOUNTER — TELEPHONE (OUTPATIENT)
Dept: TRANSPLANT | Facility: CLINIC | Age: 48
End: 2023-02-15
Payer: MEDICAID

## 2023-02-15 NOTE — TELEPHONE ENCOUNTER
NN spoke to patient concerning her remodulin dose/rate.  Patient states that her pump is set to 43 mL/24 hrs or 82 ng/kg/min.  Also discussed with the patient that a message was sent again to Palliative Care staff to try to get patient in to see them or virtual appointment could be scheduled.

## 2023-02-20 ENCOUNTER — TELEPHONE (OUTPATIENT)
Dept: TRANSPLANT | Facility: CLINIC | Age: 48
End: 2023-02-20
Payer: MEDICAID

## 2023-02-20 RX ORDER — RIOCIGUAT 2.5 MG/1
TABLET, FILM COATED ORAL
Qty: 90 TABLET | Refills: 11 | Status: ON HOLD | OUTPATIENT
Start: 2023-02-20 | End: 2024-01-02 | Stop reason: SDUPTHER

## 2023-03-14 ENCOUNTER — TELEPHONE (OUTPATIENT)
Dept: PALLIATIVE MEDICINE | Facility: CLINIC | Age: 48
End: 2023-03-14
Payer: MEDICAID

## 2023-03-14 RX ORDER — TRAMADOL HYDROCHLORIDE 50 MG/1
50 TABLET ORAL EVERY 6 HOURS PRN
Qty: 60 TABLET | Refills: 1 | Status: SHIPPED | OUTPATIENT
Start: 2023-03-14 | End: 2023-04-07 | Stop reason: SDUPTHER

## 2023-03-19 ENCOUNTER — HOSPITAL ENCOUNTER (OUTPATIENT)
Facility: HOSPITAL | Age: 48
Discharge: HOME | End: 2023-03-21
Attending: EMERGENCY MEDICINE | Admitting: INTERNAL MEDICINE
Payer: MEDICAID

## 2023-03-19 DIAGNOSIS — I27.20 PULMONARY HYPERTENSION: ICD-10-CM

## 2023-03-19 DIAGNOSIS — R06.02 SHORTNESS OF BREATH: ICD-10-CM

## 2023-03-19 DIAGNOSIS — R42 DIZZINESS: ICD-10-CM

## 2023-03-19 DIAGNOSIS — J32.0 MAXILLARY SINUSITIS, UNSPECIFIED CHRONICITY: Primary | ICD-10-CM

## 2023-03-19 LAB
ALBUMIN SERPL BCP-MCNC: 3.6 G/DL (ref 3.5–5.2)
ALP SERPL-CCNC: 109 U/L (ref 55–135)
ALT SERPL W/O P-5'-P-CCNC: 19 U/L (ref 10–44)
AMPHET+METHAMPHET UR QL: NEGATIVE
ANION GAP SERPL CALC-SCNC: 10 MMOL/L (ref 8–16)
AST SERPL-CCNC: 25 U/L (ref 10–40)
BARBITURATES UR QL SCN>200 NG/ML: NEGATIVE
BASOPHILS # BLD AUTO: 0.02 K/UL (ref 0–0.2)
BASOPHILS NFR BLD: 0.4 % (ref 0–1.9)
BENZODIAZ UR QL SCN>200 NG/ML: NEGATIVE
BILIRUB SERPL-MCNC: 2 MG/DL (ref 0.1–1)
BILIRUB UR QL STRIP: NEGATIVE
BNP SERPL-MCNC: 67 PG/ML (ref 0–99)
BUN SERPL-MCNC: 14 MG/DL (ref 6–20)
BZE UR QL SCN: NEGATIVE
CALCIUM SERPL-MCNC: 9.5 MG/DL (ref 8.7–10.5)
CANNABINOIDS UR QL SCN: NEGATIVE
CHLORIDE SERPL-SCNC: 112 MMOL/L (ref 95–110)
CLARITY UR REFRACT.AUTO: CLEAR
CO2 SERPL-SCNC: 18 MMOL/L (ref 23–29)
COLOR UR AUTO: YELLOW
CREAT SERPL-MCNC: 0.8 MG/DL (ref 0.5–1.4)
CREAT UR-MCNC: 187 MG/DL (ref 15–325)
DIFFERENTIAL METHOD: ABNORMAL
EOSINOPHIL # BLD AUTO: 0.1 K/UL (ref 0–0.5)
EOSINOPHIL NFR BLD: 1.8 % (ref 0–8)
ERYTHROCYTE [DISTWIDTH] IN BLOOD BY AUTOMATED COUNT: 18.7 % (ref 11.5–14.5)
EST. GFR  (NO RACE VARIABLE): >60 ML/MIN/1.73 M^2
ETHANOL SERPL-MCNC: <10 MG/DL
ETHANOL UR-MCNC: <10 MG/DL
GLUCOSE SERPL-MCNC: 112 MG/DL (ref 70–110)
GLUCOSE UR QL STRIP: NEGATIVE
HCT VFR BLD AUTO: 35.3 % (ref 37–48.5)
HCV AB SERPL QL IA: NORMAL
HGB BLD-MCNC: 11.2 G/DL (ref 12–16)
HGB UR QL STRIP: NEGATIVE
HIV 1+2 AB+HIV1 P24 AG SERPL QL IA: NORMAL
IMM GRANULOCYTES # BLD AUTO: 0.01 K/UL (ref 0–0.04)
IMM GRANULOCYTES NFR BLD AUTO: 0.2 % (ref 0–0.5)
INFLUENZA A, MOLECULAR: NOT DETECTED
INFLUENZA B, MOLECULAR: NOT DETECTED
KETONES UR QL STRIP: NEGATIVE
LEUKOCYTE ESTERASE UR QL STRIP: NEGATIVE
LYMPHOCYTES # BLD AUTO: 1.7 K/UL (ref 1–4.8)
LYMPHOCYTES NFR BLD: 35.6 % (ref 18–48)
MCH RBC QN AUTO: 26.1 PG (ref 27–31)
MCHC RBC AUTO-ENTMCNC: 31.7 G/DL (ref 32–36)
MCV RBC AUTO: 82 FL (ref 82–98)
METHADONE UR QL SCN>300 NG/ML: NEGATIVE
MONOCYTES # BLD AUTO: 0.4 K/UL (ref 0.3–1)
MONOCYTES NFR BLD: 7.6 % (ref 4–15)
NEUTROPHILS # BLD AUTO: 2.7 K/UL (ref 1.8–7.7)
NEUTROPHILS NFR BLD: 54.4 % (ref 38–73)
NITRITE UR QL STRIP: NEGATIVE
NRBC BLD-RTO: 0 /100 WBC
OPIATES UR QL SCN: NEGATIVE
PCP UR QL SCN>25 NG/ML: NEGATIVE
PH UR STRIP: 6 [PH] (ref 5–8)
PLATELET # BLD AUTO: 188 K/UL (ref 150–450)
PMV BLD AUTO: 10.6 FL (ref 9.2–12.9)
POTASSIUM SERPL-SCNC: 4.7 MMOL/L (ref 3.5–5.1)
PROT SERPL-MCNC: 7.4 G/DL (ref 6–8.4)
PROT UR QL STRIP: NEGATIVE
RBC # BLD AUTO: 4.29 M/UL (ref 4–5.4)
RSV AG BY MOLECULAR METHOD: NOT DETECTED
SARS-COV-2 RNA RESP QL NAA+PROBE: NOT DETECTED
SODIUM SERPL-SCNC: 140 MMOL/L (ref 136–145)
SP GR UR STRIP: 1.02 (ref 1–1.03)
TOXICOLOGY INFORMATION: NORMAL
TROPONIN I SERPL DL<=0.01 NG/ML-MCNC: 0.01 NG/ML (ref 0–0.03)
URN SPEC COLLECT METH UR: NORMAL
WBC # BLD AUTO: 4.89 K/UL (ref 3.9–12.7)

## 2023-03-19 PROCEDURE — 80307 DRUG TEST PRSMV CHEM ANLYZR: CPT | Mod: NTX | Performed by: EMERGENCY MEDICINE

## 2023-03-19 PROCEDURE — 86803 HEPATITIS C AB TEST: CPT | Mod: NTX | Performed by: PHYSICIAN ASSISTANT

## 2023-03-19 PROCEDURE — 93010 EKG 12-LEAD: ICD-10-PCS | Mod: NTX,,, | Performed by: INTERNAL MEDICINE

## 2023-03-19 PROCEDURE — 80053 COMPREHEN METABOLIC PANEL: CPT | Mod: NTX | Performed by: EMERGENCY MEDICINE

## 2023-03-19 PROCEDURE — G0378 HOSPITAL OBSERVATION PER HR: HCPCS | Mod: NTX

## 2023-03-19 PROCEDURE — 93005 ELECTROCARDIOGRAM TRACING: CPT | Mod: NTX

## 2023-03-19 PROCEDURE — 83880 ASSAY OF NATRIURETIC PEPTIDE: CPT | Mod: NTX | Performed by: EMERGENCY MEDICINE

## 2023-03-19 PROCEDURE — 99223 PR INITIAL HOSPITAL CARE,LEVL III: ICD-10-PCS | Mod: NTX,,, | Performed by: INTERNAL MEDICINE

## 2023-03-19 PROCEDURE — 0241U SARS-COV2 (COVID) WITH FLU/RSV BY PCR: CPT | Mod: NTX | Performed by: EMERGENCY MEDICINE

## 2023-03-19 PROCEDURE — 82077 ASSAY SPEC XCP UR&BREATH IA: CPT | Mod: NTX | Performed by: EMERGENCY MEDICINE

## 2023-03-19 PROCEDURE — 87389 HIV-1 AG W/HIV-1&-2 AB AG IA: CPT | Mod: NTX | Performed by: PHYSICIAN ASSISTANT

## 2023-03-19 PROCEDURE — 93010 ELECTROCARDIOGRAM REPORT: CPT | Mod: NTX,,, | Performed by: INTERNAL MEDICINE

## 2023-03-19 PROCEDURE — 81003 URINALYSIS AUTO W/O SCOPE: CPT | Mod: 59,NTX | Performed by: EMERGENCY MEDICINE

## 2023-03-19 PROCEDURE — 85025 COMPLETE CBC W/AUTO DIFF WBC: CPT | Mod: NTX | Performed by: EMERGENCY MEDICINE

## 2023-03-19 PROCEDURE — 99285 PR EMERGENCY DEPT VISIT,LEVEL V: ICD-10-PCS | Mod: NTX,CS,, | Performed by: EMERGENCY MEDICINE

## 2023-03-19 PROCEDURE — 99223 1ST HOSP IP/OBS HIGH 75: CPT | Mod: NTX,,, | Performed by: INTERNAL MEDICINE

## 2023-03-19 PROCEDURE — 99285 EMERGENCY DEPT VISIT HI MDM: CPT | Mod: NTX,CS,, | Performed by: EMERGENCY MEDICINE

## 2023-03-19 PROCEDURE — 84484 ASSAY OF TROPONIN QUANT: CPT | Mod: NTX | Performed by: EMERGENCY MEDICINE

## 2023-03-19 PROCEDURE — 99285 EMERGENCY DEPT VISIT HI MDM: CPT | Mod: 25,NTX

## 2023-03-19 RX ORDER — ACETAMINOPHEN 325 MG/1
650 TABLET ORAL EVERY 4 HOURS PRN
Status: DISCONTINUED | OUTPATIENT
Start: 2023-03-20 | End: 2023-03-21 | Stop reason: HOSPADM

## 2023-03-19 RX ORDER — POTASSIUM CHLORIDE 20 MEQ/1
20 TABLET, EXTENDED RELEASE ORAL DAILY
Status: DISCONTINUED | OUTPATIENT
Start: 2023-03-20 | End: 2023-03-21 | Stop reason: HOSPADM

## 2023-03-19 RX ORDER — PREGABALIN 75 MG/1
150 CAPSULE ORAL 2 TIMES DAILY
Status: DISCONTINUED | OUTPATIENT
Start: 2023-03-20 | End: 2023-03-20

## 2023-03-19 RX ORDER — TREPROSTINIL 5 MG/ML
0.62 INJECTION, SOLUTION INTRAVENOUS; SUBCUTANEOUS CONTINUOUS
Status: DISCONTINUED | OUTPATIENT
Start: 2023-03-20 | End: 2023-03-20

## 2023-03-19 RX ORDER — FUROSEMIDE 20 MG/1
20 TABLET ORAL DAILY
Status: DISCONTINUED | OUTPATIENT
Start: 2023-03-20 | End: 2023-03-21 | Stop reason: HOSPADM

## 2023-03-19 RX ORDER — ONDANSETRON 4 MG/1
4 TABLET, FILM COATED ORAL EVERY 8 HOURS PRN
Status: DISCONTINUED | OUTPATIENT
Start: 2023-03-20 | End: 2023-03-21 | Stop reason: HOSPADM

## 2023-03-19 RX ORDER — SODIUM CHLORIDE 0.9 % (FLUSH) 0.9 %
10 SYRINGE (ML) INJECTION
Status: DISCONTINUED | OUTPATIENT
Start: 2023-03-20 | End: 2023-03-21 | Stop reason: HOSPADM

## 2023-03-19 RX ORDER — LOPERAMIDE HYDROCHLORIDE 2 MG/1
2 CAPSULE ORAL 4 TIMES DAILY PRN
Status: DISCONTINUED | OUTPATIENT
Start: 2023-03-20 | End: 2023-03-21 | Stop reason: HOSPADM

## 2023-03-19 NOTE — Clinical Note
The PA catheter was repositioned to the right pulmonary artery. Hemodynamics were performed. O2 saturation was measured at 60%. BEREKET C.O.= 4.45 C.I.=2.48

## 2023-03-20 LAB
ALBUMIN SERPL BCP-MCNC: 3.2 G/DL (ref 3.5–5.2)
ALP SERPL-CCNC: 96 U/L (ref 55–135)
ALT SERPL W/O P-5'-P-CCNC: 15 U/L (ref 10–44)
ANION GAP SERPL CALC-SCNC: 7 MMOL/L (ref 8–16)
ASCENDING AORTA: 3.06 CM
AST SERPL-CCNC: 11 U/L (ref 10–40)
BASOPHILS # BLD AUTO: 0.02 K/UL (ref 0–0.2)
BASOPHILS NFR BLD: 0.4 % (ref 0–1.9)
BILIRUB SERPL-MCNC: 1.1 MG/DL (ref 0.1–1)
BSA FOR ECHO PROCEDURE: 1.83 M2
BUN SERPL-MCNC: 13 MG/DL (ref 6–20)
CALCIUM SERPL-MCNC: 9.2 MG/DL (ref 8.7–10.5)
CHLORIDE SERPL-SCNC: 113 MMOL/L (ref 95–110)
CO2 SERPL-SCNC: 19 MMOL/L (ref 23–29)
CREAT SERPL-MCNC: 0.8 MG/DL (ref 0.5–1.4)
CV ECHO LV RWT: 0.52 CM
DIFFERENTIAL METHOD: ABNORMAL
DOP CALC LVOT AREA: 3.1 CM2
DOP CALC LVOT DIAMETER: 1.98 CM
ECHO LV POSTERIOR WALL: 0.93 CM (ref 0.6–1.1)
EJECTION FRACTION: 60 %
EOSINOPHIL # BLD AUTO: 0.1 K/UL (ref 0–0.5)
EOSINOPHIL NFR BLD: 1.4 % (ref 0–8)
ERYTHROCYTE [DISTWIDTH] IN BLOOD BY AUTOMATED COUNT: 18.5 % (ref 11.5–14.5)
EST. GFR  (NO RACE VARIABLE): >60 ML/MIN/1.73 M^2
FRACTIONAL SHORTENING: 38 % (ref 28–44)
GLUCOSE SERPL-MCNC: 108 MG/DL (ref 70–110)
HCT VFR BLD AUTO: 29.9 % (ref 37–48.5)
HGB BLD-MCNC: 9.8 G/DL (ref 12–16)
IMM GRANULOCYTES # BLD AUTO: 0.01 K/UL (ref 0–0.04)
IMM GRANULOCYTES NFR BLD AUTO: 0.2 % (ref 0–0.5)
INTERVENTRICULAR SEPTUM: 1.03 CM (ref 0.6–1.1)
LA MAJOR: 5.9 CM
LA MINOR: 5.11 CM
LA WIDTH: 3.37 CM
LEFT ATRIUM SIZE: 2.82 CM
LEFT ATRIUM VOLUME INDEX: 24.4 ML/M2
LEFT ATRIUM VOLUME: 44.24 CM3
LEFT INTERNAL DIMENSION IN SYSTOLE: 2.22 CM (ref 2.1–4)
LEFT VENTRICLE DIASTOLIC VOLUME INDEX: 29.24 ML/M2
LEFT VENTRICLE DIASTOLIC VOLUME: 52.92 ML
LEFT VENTRICLE MASS INDEX: 57 G/M2
LEFT VENTRICLE SYSTOLIC VOLUME INDEX: 9.1 ML/M2
LEFT VENTRICLE SYSTOLIC VOLUME: 16.51 ML
LEFT VENTRICULAR INTERNAL DIMENSION IN DIASTOLE: 3.56 CM (ref 3.5–6)
LEFT VENTRICULAR MASS: 103 G
LYMPHOCYTES # BLD AUTO: 2 K/UL (ref 1–4.8)
LYMPHOCYTES NFR BLD: 39.9 % (ref 18–48)
MAGNESIUM SERPL-MCNC: 1.8 MG/DL (ref 1.6–2.6)
MCH RBC QN AUTO: 26.7 PG (ref 27–31)
MCHC RBC AUTO-ENTMCNC: 32.8 G/DL (ref 32–36)
MCV RBC AUTO: 82 FL (ref 82–98)
MONOCYTES # BLD AUTO: 0.4 K/UL (ref 0.3–1)
MONOCYTES NFR BLD: 8.8 % (ref 4–15)
NEUTROPHILS # BLD AUTO: 2.5 K/UL (ref 1.8–7.7)
NEUTROPHILS NFR BLD: 49.3 % (ref 38–73)
NRBC BLD-RTO: 0 /100 WBC
PHOSPHATE SERPL-MCNC: 4.1 MG/DL (ref 2.7–4.5)
PISA TR MAX VEL: 4.5 M/S
PLATELET # BLD AUTO: 170 K/UL (ref 150–450)
PMV BLD AUTO: 11 FL (ref 9.2–12.9)
POTASSIUM SERPL-SCNC: 3.8 MMOL/L (ref 3.5–5.1)
PROT SERPL-MCNC: 6.1 G/DL (ref 6–8.4)
RA MAJOR: 6.03 CM
RA PRESSURE: 8 MMHG
RA WIDTH: 4.87 CM
RBC # BLD AUTO: 3.67 M/UL (ref 4–5.4)
RIGHT VENTRICULAR END-DIASTOLIC DIMENSION: 5.5 CM
RV TISSUE DOPPLER FREE WALL SYSTOLIC VELOCITY 1 (APICAL 4 CHAMBER VIEW): 13 CM/S
SINUS: 3.08 CM
SODIUM SERPL-SCNC: 139 MMOL/L (ref 136–145)
STJ: 3.22 CM
TDI LATERAL: 0.12 M/S
TDI SEPTAL: 0.06 M/S
TDI: 0.09 M/S
TR MAX PG: 81 MMHG
TRICUSPID ANNULAR PLANE SYSTOLIC EXCURSION: 1.88 CM
TV REST PULMONARY ARTERY PRESSURE: 89 MMHG
WBC # BLD AUTO: 5.01 K/UL (ref 3.9–12.7)

## 2023-03-20 PROCEDURE — 80053 COMPREHEN METABOLIC PANEL: CPT | Mod: NTX | Performed by: INTERNAL MEDICINE

## 2023-03-20 PROCEDURE — 83735 ASSAY OF MAGNESIUM: CPT | Mod: NTX | Performed by: INTERNAL MEDICINE

## 2023-03-20 PROCEDURE — 99233 SBSQ HOSP IP/OBS HIGH 50: CPT | Mod: NTX,,, | Performed by: INTERNAL MEDICINE

## 2023-03-20 PROCEDURE — 99233 PR SUBSEQUENT HOSPITAL CARE,LEVL III: ICD-10-PCS | Mod: NTX,,, | Performed by: INTERNAL MEDICINE

## 2023-03-20 PROCEDURE — 25000003 PHARM REV CODE 250: Mod: NTX | Performed by: STUDENT IN AN ORGANIZED HEALTH CARE EDUCATION/TRAINING PROGRAM

## 2023-03-20 PROCEDURE — 36415 COLL VENOUS BLD VENIPUNCTURE: CPT | Mod: NTX | Performed by: INTERNAL MEDICINE

## 2023-03-20 PROCEDURE — G0378 HOSPITAL OBSERVATION PER HR: HCPCS | Mod: NTX

## 2023-03-20 PROCEDURE — 85025 COMPLETE CBC W/AUTO DIFF WBC: CPT | Mod: NTX | Performed by: INTERNAL MEDICINE

## 2023-03-20 PROCEDURE — 25000003 PHARM REV CODE 250: Mod: NTX | Performed by: INTERNAL MEDICINE

## 2023-03-20 PROCEDURE — 63600175 PHARM REV CODE 636 W HCPCS: Mod: JG,NTX | Performed by: INTERNAL MEDICINE

## 2023-03-20 PROCEDURE — 84100 ASSAY OF PHOSPHORUS: CPT | Mod: NTX | Performed by: INTERNAL MEDICINE

## 2023-03-20 RX ORDER — ONDANSETRON 8 MG/1
8 TABLET, ORALLY DISINTEGRATING ORAL EVERY 8 HOURS PRN
Status: DISCONTINUED | OUTPATIENT
Start: 2023-03-20 | End: 2023-03-20

## 2023-03-20 RX ORDER — PREGABALIN 75 MG/1
75 CAPSULE ORAL 2 TIMES DAILY
Status: DISCONTINUED | OUTPATIENT
Start: 2023-03-20 | End: 2023-03-21 | Stop reason: HOSPADM

## 2023-03-20 RX ORDER — LOPERAMIDE HYDROCHLORIDE 2 MG/1
2 CAPSULE ORAL EVERY 4 HOURS PRN
Status: DISCONTINUED | OUTPATIENT
Start: 2023-03-20 | End: 2023-03-20

## 2023-03-20 RX ORDER — TRAMADOL HYDROCHLORIDE 50 MG/1
50 TABLET ORAL EVERY 6 HOURS PRN
Status: DISCONTINUED | OUTPATIENT
Start: 2023-03-20 | End: 2023-03-20

## 2023-03-20 RX ORDER — ACETAMINOPHEN 325 MG/1
650 TABLET ORAL EVERY 6 HOURS PRN
Status: DISCONTINUED | OUTPATIENT
Start: 2023-03-20 | End: 2023-03-20

## 2023-03-20 RX ORDER — ONDANSETRON 2 MG/ML
4 INJECTION INTRAMUSCULAR; INTRAVENOUS EVERY 8 HOURS PRN
Status: DISCONTINUED | OUTPATIENT
Start: 2023-03-20 | End: 2023-03-20

## 2023-03-20 RX ADMIN — PREGABALIN 150 MG: 75 CAPSULE ORAL at 09:03

## 2023-03-20 RX ADMIN — RIOCIGUAT 0.5 MG: 0.5 TABLET, FILM COATED ORAL at 03:03

## 2023-03-20 RX ADMIN — RIOCIGUAT 2 MG: 1 TABLET, FILM COATED ORAL at 09:03

## 2023-03-20 RX ADMIN — PREGABALIN 75 MG: 75 CAPSULE ORAL at 09:03

## 2023-03-20 RX ADMIN — POTASSIUM CHLORIDE 20 MEQ: 1500 TABLET, EXTENDED RELEASE ORAL at 09:03

## 2023-03-20 RX ADMIN — MACITENTAN 10 MG: 10 TABLET, FILM COATED ORAL at 09:03

## 2023-03-20 RX ADMIN — RIOCIGUAT 2 MG: 1 TABLET, FILM COATED ORAL at 03:03

## 2023-03-20 RX ADMIN — ACETAMINOPHEN 650 MG: 325 TABLET ORAL at 03:03

## 2023-03-20 RX ADMIN — RIOCIGUAT 2.5 MG: 2.5 TABLET, FILM COATED ORAL at 10:03

## 2023-03-20 RX ADMIN — TREPROSTINIL 90 NG/KG/MIN: 200 INJECTION, SOLUTION INTRAVENOUS; SUBCUTANEOUS at 06:03

## 2023-03-20 RX ADMIN — FUROSEMIDE 20 MG: 20 TABLET ORAL at 09:03

## 2023-03-20 RX ADMIN — RIOCIGUAT 0.5 MG: 0.5 TABLET, FILM COATED ORAL at 09:03

## 2023-03-20 NOTE — ED TRIAGE NOTES
Pt reports having dizziness, SOB, weakness, and fatigue for the past two days along with R sided pain. Pt denies fever or chills. Pt reports hx of pulmonary artery HTN.Pt AAOx4. Pt placed on cardiac monitor and cont pulse ox.

## 2023-03-20 NOTE — TREATMENT PLAN
IV Remodulin dosing:    Please see below for accurate dose and dosing information     Current Dose = 90ngs/kg/min  DW 55kgs  CADD rate = 36mls/24hrs      Patient will get an ECHO and likely need diuresis due to JVP  Will assess PAP and possible titrate dose based on PAPs    Nursing will need to switch patient to Hospital Concentration /cassette at 1500, please have OC aspirate and prime prior to switching cassette and increasing pump rate

## 2023-03-20 NOTE — PROGRESS NOTES
Pt changed to hospital concentration remodulin cassette. Briones in place- 20cc of home concentration aspirated and then flushed with 10cc normal saline  primed with 0.8cc remodulin from cassette   Pt now infusing at 79cc/24hrs to Briones. Pt tolerated well

## 2023-03-20 NOTE — PLAN OF CARE
Pt admitted from ED this shift with dizziness, fatigue, and RLE pain  Pt AAO x 4 with VSS throughout shift.  Chief complaint of weakness  Admit skin assessment WDL, no bruising, wounds, or breakdown.   Tele monitor remain in place, ST/NSR   Pt up x2 assist when ambulating, BSC ordered, LBM 3/20  Cardiac diet  L FA 22g placed  Pt on home Remodulin dose, charge nurse assessed, infusing to DEDICATED Right subclavian perm cath, infusing at 36 mL/24h - 34.9 mLs - remaining in pump  BNP in ED - 67 , Troponin - WNL, CT head - normal, CXR - done    at bedside.  Pt remains free from falls and injuries, call light in reach, bed in lowest position, nonskid socks on when OOB, side rails up x2  Will continue to monitor

## 2023-03-20 NOTE — ED NOTES
Telemetry Verification   Patient placed on Telemetry Box  Verified with War Room  Box # [id number]   Rate    Rhythm

## 2023-03-20 NOTE — ASSESSMENT & PLAN NOTE
Continue current medications  Echo ordered  If estimated PA pressures are elevated will plan to uptitrate remodulin and evaluate clinical response  Continue daily lasix  lyrica may be contributing to symptoms, will decrease dose from 150 bid to 75 bid and monitor

## 2023-03-20 NOTE — HPI
47/F with a WHO group 1 PH on triple therapy (Remodulin 36 ng/kg/min, adempas 2.5mg TID, opsumit 10mg daily) who presents via ER with multiple complaints. She feels like she felt on her last admission when her Remodulin cassette stopped working. Her discharge notes mentions a dose of 76 ng/kg/min, however, her current cassette dose is of 36 ng/kg/min.     She mentions generalized weakness, dyspnea, dizziness and headache. She mentions loose BM every two days. No change in diet. No issues with her medications. No fever or GI symptoms otherwise. She had a CT head that was negative for acute findings that could explain her symptoms. She denies angina or dyspnea at rest.     Her last RHC on 7/19/22 with RA: 10; RV: 70/9/10; PA: 72/ 29/ 43; PWP: 11. CO 4.29 and CI2.61. TPG 32; PVR  7.45.

## 2023-03-20 NOTE — H&P
[facility] - Emergency Dept  Cardiology  History and Physical     Patient Name: Kristin Maier  MRN: [id number]  Admission Date: 3/19/2023  Code Status: Full Code   Attending Provider: Ce Tucker DO   Primary Care Physician: Jorge A Stack MD  Principal Problem:<principal problem not specified>    Patient information was obtained from patient and ER records.     Subjective:     Chief Complaint:  dizziness     HPI:  47/F with a WHO group 1 PH on triple therapy (Remodulin 36 ng/kg/min, adempas 2.5mg TID, opsumit 10mg daily) who presents via ER with multiple complaints. She feels like she felt on her last admission when her Remodulin cassette stopped working. Her discharge notes mentions a dose of 76 ng/kg/min, however, her current cassette dose is of 36 ng/kg/min.     She mentions generalized weakness, dyspnea, dizziness and headache. She mentions loose BM every two days. No change in diet. No issues with her medications. No fever or GI symptoms otherwise. She had a CT head that was negative for acute findings that could explain her symptoms. She denies angina or dyspnea at rest.     Her last RHC on 22 with RA: 10; RV: 70/9/10; PA: 72/ 29/ 43; PWP: 11. CO 4.29 and CI2.61. TPG 32; PVR  7.45.       Past Medical History:   Diagnosis Date    Elevated liver enzymes     Essential (primary) hypertension     Heart failure, unspecified     Hypokalemia     Mixed hyperlipidemia     Neuropathic pain     Tx w/ tramadol & Lyrica    Pulmonary hypertension     Receiving Remodulin continuously through tunneled central line       Past Surgical History:   Procedure Laterality Date    APPENDECTOMY       SECTION      Transverse cut    HYSTERECTOMY  2017    TLH- bleeding    OOPHORECTOMY      RIGHT HEART CATHETERIZATION Right 2021    Procedure: INSERTION, CATHETER, RIGHT HEART;  Surgeon: Chloe Gregory MD;  Location: [facility] CATH LAB;  Service: Cardiology;  Laterality: Right;    RIGHT  HEART CATHETERIZATION Right 3/16/2022    Procedure: INSERTION, CATHETER, RIGHT HEART;  Surgeon: Hu Silverman MD;  Location: [facility] CATH LAB;  Service: Cardiology;  Laterality: Right;    RIGHT HEART CATHETERIZATION Right 7/19/2022    Procedure: INSERTION, CATHETER, RIGHT HEART;  Surgeon: Chloe Gregory MD;  Location: [facility] CATH LAB;  Service: Cardiology;  Laterality: Right;    TUBAL LIGATION  1996    VAGINAL DELIVERY  1991; 1993; 1994; 1996       Review of patient's allergies indicates:  No Known Allergies    No current facility-administered medications on file prior to encounter.     Current Outpatient Medications on File Prior to Encounter   Medication Sig    furosemide (LASIX) 20 MG tablet Take 1 tablet (20 mg total) by mouth once daily.    loperamide (IMODIUM) 2 mg capsule Take 1 capsule (2 mg total) by mouth 4 (four) times daily as needed for Diarrhea.    ondansetron (ZOFRAN) 4 MG tablet Take 1 tablet (4 mg total) by mouth every 8 (eight) hours as needed for Nausea.    OPSUMIT 10 mg Tab TAKE 1 TABLET BY MOUTH DAILY. DO NOT HANDLE IF PREGNANT. DO NOT SPLIT, CRUSH, OR CHEW. REVIEW MEDICATION GUIDE.    potassium chloride SA (K-DUR,KLOR-CON) 10 MEQ tablet Take 2 tablets (20 mEq total) by mouth once daily.    pregabalin (LYRICA) 75 MG capsule Take 2 capsules (150 mg total) by mouth 2 (two) times daily.    REMODULIN 5 mg/mL Soln     riociguat (ADEMPAS) 2.5 mg tablet Take 1 tablet three times a day. Dose changes may occur throughout the course of therapy as directed by your prescriber.    sodium chloride 0.9% SolP 100 mL with treprostinil 1 mg/mL Soln 6,000,000 ng Inject 2,145 ng/min into the vein continuous.    traMADoL (ULTRAM) 50 mg tablet Take 1 tablet (50 mg total) by mouth every 6 (six) hours as needed for Pain.     Family History       Problem Relation (Age of Onset)    Cancer Father          Tobacco Use    Smoking status: Former     Types: Cigars     Quit date: 12/1/2020     Years since  quittin.2     Passive exposure: Past    Smokeless tobacco: Never    Tobacco comments:     social smoker-light   Substance and Sexual Activity    Alcohol use: Yes     Alcohol/week: 2.0 standard drinks     Types: 2 Glasses of wine per week     Comment: socially- 2 or 3 times a week-2 glasses of wine    Drug use: Not Currently     Types: Marijuana     Comment: 2-6 MONTHS AGO    Sexual activity: Yes     Partners: Male     Birth control/protection: See Surgical Hx     Comment:      Review of Systems   Constitutional: Positive for malaise/fatigue. Negative for chills, diaphoresis, fever and night sweats.   HENT:  Negative for congestion, odynophagia, sore throat and stridor.    Eyes:  Positive for photophobia. Negative for blurred vision and double vision.   Cardiovascular:  Negative for chest pain, claudication, irregular heartbeat, leg swelling and orthopnea.   Respiratory:  Negative for cough.    Endocrine: Negative for cold intolerance and heat intolerance.   Hematologic/Lymphatic: Negative for adenopathy.   Skin:  Negative for nail changes.   Musculoskeletal:  Negative for arthritis, back pain, falls and joint pain.   Gastrointestinal:  Positive for diarrhea. Negative for bloating, abdominal pain, change in bowel habit, dysphagia, hematemesis, hematochezia and melena.   Genitourinary:  Negative for bladder incontinence and dysuria.   Neurological:  Positive for dizziness, headaches, light-headedness and weakness. Negative for focal weakness and loss of balance.   Psychiatric/Behavioral:  Negative for altered mental status.    Objective:     Vital Signs (Most Recent):  Temp: 98.5 °F (36.9 °C) (23)  Pulse: 92 (23)  Resp: 19 (23)  BP: 100/67 (23)  SpO2: 100 % (23) Vital Signs (24h Range):  Temp:  [98.5 °F (36.9 °C)] 98.5 °F (36.9 °C)  Pulse:  [] 92  Resp:  [16-19] 19  SpO2:  [95 %-100 %] 100 %  BP: ()/(59-67) 100/67     Weight: 65.8 kg  (145 lb)  Body mass index is 23.4 kg/m².    SpO2: 100 %       No intake or output data in the 24 hours ending 03/19/23 2321    Lines/Drains/Airways       Central Venous Catheter Line  Duration             Tunneled Central Line Insertion/Assessment - Single Lumen  03/16/22 1021 right subclavian 368 days              Drain  Duration             Female External Urinary Catheter 03/19/23 2147 <1 day              Peripheral Intravenous Line  Duration                  Peripheral IV - Single Lumen 03/19/23 1906 20 G Left Antecubital <1 day                    Physical Exam  Constitutional:       General: She is not in acute distress.     Appearance: She is not ill-appearing.   Cardiovascular:      Rate and Rhythm: Normal rate and regular rhythm.      Pulses: Normal pulses.      Heart sounds: No murmur heard.  Pulmonary:      Effort: Pulmonary effort is normal. No respiratory distress.      Breath sounds: No wheezing, rhonchi or rales.   Abdominal:      General: Abdomen is flat. Bowel sounds are normal. There is no distension.      Palpations: Abdomen is soft.   Musculoskeletal:         General: No swelling.      Cervical back: Normal range of motion and neck supple.   Skin:     General: Skin is warm and dry.      Capillary Refill: Capillary refill takes less than 2 seconds.      Coloration: Skin is not jaundiced.   Neurological:      General: No focal deficit present.      Mental Status: She is alert.   Psychiatric:         Mood and Affect: Mood normal.       Assessment and Plan:     WHO group 1 pulmonary arterial hypertension  Progressive symptoms are similar to when she ran out of Remodulin in December  CT head is negative and she has no focal signs  Observation admission  Current regimen: Remodulin 36 ng/kg/min (per cassette, however discharged with 76 ng/kg/min), adempas 2.5mg TID, opsumit 10mg daily  In any case, would have coordinators/pharmacy verify both cassette function and correct dosing, respectively  Currently  appears euvolemic   Fluid and sodium restriction (1500ccs, 2g)  maintain K>4.0, Mg > 2.0    Her last RHC on 7/19/22 with RA: 10; RV: 70/9/10; PA: 72/ 29/ 43; PWP: 11. CO 4.29 and CI2.61. TPG 32; PVR  7.45.       VTE Risk Mitigation (From admission, onward)         Ordered     Place MARGARITA hose  Until discontinued         03/19/23 2313     IP VTE LOW RISK PATIENT  Once         03/19/23 2313                Ovi Wilson MD  Cardiology   [facility] - Emergency Dept

## 2023-03-20 NOTE — NURSING
Echo completed and was reviewed by [facility]. Plan for pt to have RHC tomorrow. Pt informed of plan. Verbalized understanding.

## 2023-03-20 NOTE — ASSESSMENT & PLAN NOTE
Progressive symptoms are similar to when she ran out of Remodulin in December  CT head is negative and she has no focal signs  Observation admission  Current regimen: Remodulin 36 ng/kg/min (per cassette, however discharged with 76 ng/kg/min), adempas 2.5mg TID, opsumit 10mg daily  In any case, would have coordinators/pharmacy verify both cassette function and correct dosing, respectively  Currently appears euvolemic   Fluid and sodium restriction (1500ccs, 2g)  maintain K>4.0, Mg > 2.0    Her last RHC on 7/19/22 with RA: 10; RV: 70/9/10; PA: 72/ 29/ 43; PWP: 11. CO 4.29 and CI2.61. TPG 32; PVR  7.45.

## 2023-03-20 NOTE — ED NOTES
Received report from ROSIE Niño. Pt resting in bed with  at bedside. No complaints at this time. VSS. Bed locked in lowest position. SR up x 2. Call light within reach.

## 2023-03-20 NOTE — SUBJECTIVE & OBJECTIVE
Past Medical History:   Diagnosis Date    Elevated liver enzymes     Essential (primary) hypertension     Heart failure, unspecified     Hypokalemia     Mixed hyperlipidemia     Neuropathic pain     Tx w/ tramadol & Lyrica    Pulmonary hypertension     Receiving Remodulin continuously through tunneled central line       Past Surgical History:   Procedure Laterality Date    APPENDECTOMY       SECTION      Transverse cut    HYSTERECTOMY  2017    TLH- bleeding    OOPHORECTOMY      RIGHT HEART CATHETERIZATION Right 2021    Procedure: INSERTION, CATHETER, RIGHT HEART;  Surgeon: Chloe Gregory MD;  Location: [facility] CATH LAB;  Service: Cardiology;  Laterality: Right;    RIGHT HEART CATHETERIZATION Right 3/16/2022    Procedure: INSERTION, CATHETER, RIGHT HEART;  Surgeon: Hu Silverman MD;  Location: [facility] CATH LAB;  Service: Cardiology;  Laterality: Right;    RIGHT HEART CATHETERIZATION Right 2022    Procedure: INSERTION, CATHETER, RIGHT HEART;  Surgeon: Chloe Gregory MD;  Location: [facility] CATH LAB;  Service: Cardiology;  Laterality: Right;    TUBAL LIGATION      VAGINAL DELIVERY  ; ; ;        Review of patient's allergies indicates:  No Known Allergies    No current facility-administered medications on file prior to encounter.     Current Outpatient Medications on File Prior to Encounter   Medication Sig    furosemide (LASIX) 20 MG tablet Take 1 tablet (20 mg total) by mouth once daily.    loperamide (IMODIUM) 2 mg capsule Take 1 capsule (2 mg total) by mouth 4 (four) times daily as needed for Diarrhea.    ondansetron (ZOFRAN) 4 MG tablet Take 1 tablet (4 mg total) by mouth every 8 (eight) hours as needed for Nausea.    OPSUMIT 10 mg Tab TAKE 1 TABLET BY MOUTH DAILY. DO NOT HANDLE IF PREGNANT. DO NOT SPLIT, CRUSH, OR CHEW. REVIEW MEDICATION GUIDE.    potassium chloride SA (K-DUR,KLOR-CON) 10 MEQ tablet Take 2 tablets (20 mEq total) by mouth once daily.     pregabalin (LYRICA) 75 MG capsule Take 2 capsules (150 mg total) by mouth 2 (two) times daily.    REMODULIN 5 mg/mL Soln     riociguat (ADEMPAS) 2.5 mg tablet Take 1 tablet three times a day. Dose changes may occur throughout the course of therapy as directed by your prescriber.    sodium chloride 0.9% SolP 100 mL with treprostinil 1 mg/mL Soln 6,000,000 ng Inject 2,145 ng/min into the vein continuous.    traMADoL (ULTRAM) 50 mg tablet Take 1 tablet (50 mg total) by mouth every 6 (six) hours as needed for Pain.     Family History       Problem Relation (Age of Onset)    Cancer Father          Tobacco Use    Smoking status: Former     Types: Cigars     Quit date: 2020     Years since quittin.2     Passive exposure: Past    Smokeless tobacco: Never    Tobacco comments:     social smoker-light   Substance and Sexual Activity    Alcohol use: Yes     Alcohol/week: 2.0 standard drinks     Types: 2 Glasses of wine per week     Comment: socially- 2 or 3 times a week-2 glasses of wine    Drug use: Not Currently     Types: Marijuana     Comment: 2-6 MONTHS AGO    Sexual activity: Yes     Partners: Male     Birth control/protection: See Surgical Hx     Comment:      Review of Systems   Constitutional: Positive for malaise/fatigue. Negative for chills, diaphoresis, fever and night sweats.   HENT:  Negative for congestion, odynophagia, sore throat and stridor.    Eyes:  Positive for photophobia. Negative for blurred vision and double vision.   Cardiovascular:  Negative for chest pain, claudication, irregular heartbeat, leg swelling and orthopnea.   Respiratory:  Negative for cough.    Endocrine: Negative for cold intolerance and heat intolerance.   Hematologic/Lymphatic: Negative for adenopathy.   Skin:  Negative for nail changes.   Musculoskeletal:  Negative for arthritis, back pain, falls and joint pain.   Gastrointestinal:  Positive for diarrhea. Negative for bloating, abdominal pain, change in bowel habit,  dysphagia, hematemesis, hematochezia and melena.   Genitourinary:  Negative for bladder incontinence and dysuria.   Neurological:  Positive for dizziness, headaches, light-headedness and weakness. Negative for focal weakness and loss of balance.   Psychiatric/Behavioral:  Negative for altered mental status.    Objective:     Vital Signs (Most Recent):  Temp: 98.5 °F (36.9 °C) (03/19/23 1722)  Pulse: 92 (03/19/23 2142)  Resp: 19 (03/19/23 2142)  BP: 100/67 (03/19/23 2142)  SpO2: 100 % (03/19/23 2142) Vital Signs (24h Range):  Temp:  [98.5 °F (36.9 °C)] 98.5 °F (36.9 °C)  Pulse:  [] 92  Resp:  [16-19] 19  SpO2:  [95 %-100 %] 100 %  BP: ()/(59-67) 100/67     Weight: 65.8 kg (145 lb)  Body mass index is 23.4 kg/m².    SpO2: 100 %       No intake or output data in the 24 hours ending 03/19/23 2321    Lines/Drains/Airways       Central Venous Catheter Line  Duration             Tunneled Central Line Insertion/Assessment - Single Lumen  03/16/22 1021 right subclavian 368 days              Drain  Duration             Female External Urinary Catheter 03/19/23 2147 <1 day              Peripheral Intravenous Line  Duration                  Peripheral IV - Single Lumen 03/19/23 1906 20 G Left Antecubital <1 day                    Physical Exam  Constitutional:       General: She is not in acute distress.     Appearance: She is not ill-appearing.   Cardiovascular:      Rate and Rhythm: Normal rate and regular rhythm.      Pulses: Normal pulses.      Heart sounds: No murmur heard.  Pulmonary:      Effort: Pulmonary effort is normal. No respiratory distress.      Breath sounds: No wheezing, rhonchi or rales.   Abdominal:      General: Abdomen is flat. Bowel sounds are normal. There is no distension.      Palpations: Abdomen is soft.   Musculoskeletal:         General: No swelling.      Cervical back: Normal range of motion and neck supple.   Skin:     General: Skin is warm and dry.      Capillary Refill: Capillary  refill takes less than 2 seconds.      Coloration: Skin is not jaundiced.   Neurological:      General: No focal deficit present.      Mental Status: She is alert.   Psychiatric:         Mood and Affect: Mood normal.

## 2023-03-20 NOTE — SUBJECTIVE & OBJECTIVE
Interval History: NAEON. Patient has no new complaints. She still complains of weakness and dizziness. Confirmed no dosage discrepancy between last discharge and current home dose. Repeat echo ordered.    Continuous Infusions:   pharmacy to confirm treprostinil (REMODULIN) infusion dosing      treprostinil (REMODULIN) infusion (NON-TITRATING)      veletri/remodulin cassette      veletri/remodulin tubing       Scheduled Meds:   furosemide  20 mg Oral Daily    macitentan  10 mg Oral Daily    potassium chloride SA  20 mEq Oral Daily    pregabalin  150 mg Oral BID    riociguat  2.5 mg Oral TID     PRN Meds:acetaminophen, loperamide, ondansetron, sodium chloride 0.9%    Review of patient's allergies indicates:  No Known Allergies  Objective:     Vital Signs (Most Recent):  Temp: 97.9 °F (36.6 °C) (03/20/23 1130)  Pulse: 100 (03/20/23 1146)  Resp: 18 (03/20/23 1130)  BP: 91/65 (03/20/23 1130)  SpO2: 95 % (03/20/23 1130) Vital Signs (24h Range):  Temp:  [97.9 °F (36.6 °C)-98.9 °F (37.2 °C)] 97.9 °F (36.6 °C)  Pulse:  [] 100  Resp:  [16-20] 18  SpO2:  [94 %-100 %] 95 %  BP: ()/(57-72) 91/65     Patient Vitals for the past 72 hrs (Last 3 readings):   Weight   03/20/23 1100 72 kg (158 lb 11.7 oz)   03/20/23 1059 55 kg (121 lb 4.1 oz)   03/20/23 0145 72.3 kg (159 lb 6.3 oz)     Body mass index is 25.62 kg/m².      Intake/Output Summary (Last 24 hours) at 3/20/2023 1302  Last data filed at 3/20/2023 1200  Gross per 24 hour   Intake 50 ml   Output 200 ml   Net -150 ml       Hemodynamic Parameters:         Physical Exam  Constitutional:       Appearance: Normal appearance.   HENT:      Head: Atraumatic.   Cardiovascular:      Rate and Rhythm: Normal rate and regular rhythm.      Pulses: Normal pulses.      Heart sounds: Normal heart sounds.      Comments: JVP 8 cm   Pulmonary:      Effort: Pulmonary effort is normal.      Breath sounds: Normal breath sounds.   Abdominal:      General: Abdomen is flat. Bowel sounds are  normal. There is no distension.   Musculoskeletal:         General: No swelling.      Cervical back: Normal range of motion and neck supple.   Skin:     Capillary Refill: Capillary refill takes 2 to 3 seconds.      Comments: R chest wall Pedro catheter infusing remodulin   Neurological:      Mental Status: She is alert.   Psychiatric:         Mood and Affect: Mood normal.       Significant Labs:  CBC:  Recent Labs   Lab 03/19/23 1852 03/20/23  0629   WBC 4.89 5.01   RBC 4.29 3.67*   HGB 11.2* 9.8*   HCT 35.3* 29.9*    170   MCV 82 82   MCH 26.1* 26.7*   MCHC 31.7* 32.8     BNP:  Recent Labs   Lab 03/19/23 1852   BNP 67     CMP:  Recent Labs   Lab 03/19/23 1852 03/20/23  0629   * 108   CALCIUM 9.5 9.2   ALBUMIN 3.6 3.2*   PROT 7.4 6.1    139   K 4.7 3.8   CO2 18* 19*   * 113*   BUN 14 13   CREATININE 0.8 0.8   ALKPHOS 109 96   ALT 19 15   AST 25 11   BILITOT 2.0* 1.1*      Coagulation:   No results for input(s): PT, INR, APTT in the last 168 hours.  LDH:  No results for input(s): LDH in the last 72 hours.  Microbiology:  Microbiology Results (last 7 days)       ** No results found for the last 168 hours. **            I have reviewed all pertinent labs within the past 24 hours.    Estimated Creatinine Clearance: 86.5 mL/min (based on SCr of 0.8 mg/dL).    Diagnostic Results:  I have reviewed and interpreted all pertinent imaging results/findings within the past 24 hours.

## 2023-03-20 NOTE — PLAN OF CARE
OBS escalation     Possible discharge pending ECHO results    Possible need for diuresis (in hospital vs home)and/ or titration of Remdoulin which can be completed in OP   Setting      If patient uptitrates IP , new dosing sheet will be needed for dsicharge    Called Kelly REA stated ECHO to be completed today at bedside, RN to notify team when completed    OK to keep patient on home cassette for now (patient has ~ 11.5 hours remaining to be infused in current home cassette)

## 2023-03-20 NOTE — PLAN OF CARE
Cr=0.8. K+=3.8.CO2=19. Pt sleeping most of day. Remains oriented. Afebrile. Remodulin infusing at 36ml/24hrs per home concentration to=90ng/kg/min with wt=55kg. Denies SOB. States her legs feel heavy. Ambulated to BR X2 today. Tolerated well. Plan for echo to be done today.

## 2023-03-20 NOTE — PROGRESS NOTES
[facility] - Transplant Stepdown  Heart Transplant  Progress Note    Patient Name: Kristin Maier  MRN: [id number]  Admission Date: 3/19/2023  Hospital Length of Stay: 0 days  Attending Physician: Ce Tucker DO  Primary Care Provider: Jorge A Stack MD  Principal Problem:<principal problem not specified>    Subjective:     Interval History: NAEON. Patient has no new complaints. She still complains of weakness and dizziness. Confirmed no dosage discrepancy between last discharge and current home dose. Repeat echo ordered.    Continuous Infusions:   pharmacy to confirm treprostinil (REMODULIN) infusion dosing      treprostinil (REMODULIN) infusion (NON-TITRATING)      veletri/remodulin cassette      veletri/remodulin tubing       Scheduled Meds:   furosemide  20 mg Oral Daily    macitentan  10 mg Oral Daily    potassium chloride SA  20 mEq Oral Daily    pregabalin  150 mg Oral BID    riociguat  2.5 mg Oral TID     PRN Meds:acetaminophen, loperamide, ondansetron, sodium chloride 0.9%    Review of patient's allergies indicates:  No Known Allergies  Objective:     Vital Signs (Most Recent):  Temp: 97.9 °F (36.6 °C) (03/20/23 1130)  Pulse: 100 (03/20/23 1146)  Resp: 18 (03/20/23 1130)  BP: 91/65 (03/20/23 1130)  SpO2: 95 % (03/20/23 1130) Vital Signs (24h Range):  Temp:  [97.9 °F (36.6 °C)-98.9 °F (37.2 °C)] 97.9 °F (36.6 °C)  Pulse:  [] 100  Resp:  [16-20] 18  SpO2:  [94 %-100 %] 95 %  BP: ()/(57-72) 91/65     Patient Vitals for the past 72 hrs (Last 3 readings):   Weight   03/20/23 1100 72 kg (158 lb 11.7 oz)   03/20/23 1059 55 kg (121 lb 4.1 oz)   03/20/23 0145 72.3 kg (159 lb 6.3 oz)     Body mass index is 25.62 kg/m².      Intake/Output Summary (Last 24 hours) at 3/20/2023 1302  Last data filed at 3/20/2023 1200  Gross per 24 hour   Intake 50 ml   Output 200 ml   Net -150 ml       Hemodynamic Parameters:         Physical Exam  Constitutional:       Appearance: Normal appearance.    HENT:      Head: Atraumatic.   Cardiovascular:      Rate and Rhythm: Normal rate and regular rhythm.      Pulses: Normal pulses.      Heart sounds: Normal heart sounds.      Comments: JVP 8 cm   Pulmonary:      Effort: Pulmonary effort is normal.      Breath sounds: Normal breath sounds.   Abdominal:      General: Abdomen is flat. Bowel sounds are normal. There is no distension.   Musculoskeletal:         General: No swelling.      Cervical back: Normal range of motion and neck supple.   Skin:     Capillary Refill: Capillary refill takes 2 to 3 seconds.      Comments: R chest wall Pedro catheter infusing remodulin   Neurological:      Mental Status: She is alert.   Psychiatric:         Mood and Affect: Mood normal.       Significant Labs:  CBC:  Recent Labs   Lab 03/19/23 1852 03/20/23  0629   WBC 4.89 5.01   RBC 4.29 3.67*   HGB 11.2* 9.8*   HCT 35.3* 29.9*    170   MCV 82 82   MCH 26.1* 26.7*   MCHC 31.7* 32.8     BNP:  Recent Labs   Lab 03/19/23 1852   BNP 67     CMP:  Recent Labs   Lab 03/19/23 1852 03/20/23  0629   * 108   CALCIUM 9.5 9.2   ALBUMIN 3.6 3.2*   PROT 7.4 6.1    139   K 4.7 3.8   CO2 18* 19*   * 113*   BUN 14 13   CREATININE 0.8 0.8   ALKPHOS 109 96   ALT 19 15   AST 25 11   BILITOT 2.0* 1.1*      Coagulation:   No results for input(s): PT, INR, APTT in the last 168 hours.  LDH:  No results for input(s): LDH in the last 72 hours.  Microbiology:  Microbiology Results (last 7 days)       ** No results found for the last 168 hours. **            I have reviewed all pertinent labs within the past 24 hours.    Estimated Creatinine Clearance: 86.5 mL/min (based on SCr of 0.8 mg/dL).    Diagnostic Results:  I have reviewed and interpreted all pertinent imaging results/findings within the past 24 hours.    Assessment and Plan:     No notes on file    WHO group 1 pulmonary arterial hypertension  Continue current medications  Echo ordered  If estimated PA pressures are  elevated will plan to uptitrate remodulin and evaluate clinical response  Continue daily lasix  lyrica may be contributing to symptoms, will decrease dose from 150 bid to 75 bid and monitor        Leia Guerra MD  Heart Transplant  [facility] - Transplant Stepdown

## 2023-03-20 NOTE — PROGRESS NOTES
Home Parenteral Prostacyclin Continuation: Pharmacist Verification Note  Home medication variables  Specialty Pharmacy Contacted: [facility]: [phone]  Date of latest prescription (mm/dd/yyyy): 2/23/2023 (I asked for date of last Rx, and he said 2/23/23 was date of last consult. I asked if that was last prescription and he said yes.)  Type of ambulatory infusion pump: CADD  Ambulatory pump rate (mL/day): 45 mL/24 hours  Drug: Treprostinil (Remodulin)  Route: Intravenous  Dosing weight (kg): 55 kg  How supplied: Patient mixed cassettes  Home vial concentration:  5 mg/ml  Patient reports mixing 3 of concentrated drug in 97 ml of diluent for doses up to 88 ng/kg/kmin, then mixes 4 mL concentrated drug in 96 mL of diluent for doses 89+ ng/kg/min.  Final [id number]:  0.15 mg/ml for doses up to 88 ng/kg/min, but 0.2 mg/ml for dose 89+ ng/kg/min  Verified current dose: 90 ng/kg/min (verified from dosing sheet and from PH coordinator note today ~1000)  Patient is titrating therapy (yes or no) Yes  Volume of drug solution remaining in the patient's pump (mL): 39.3 mL at 2343 on 3/19 when initially checked for hours of therapy left  Time until depletion of drug solution in the patient's pump (hours): 26 hours as of 2343 on 3/19    Hospital medication variables  Drug : Treprostinil (Remodulin)  Route: Intravenous  Dosing weight (kg): 55 kg        Hospital concentration (mg/mL or ng/mL): 90,000 ng/mL         Dose to be administered in hospital (ng/kg/min): 90 ng/kg/min         Hospital infusion initiation time (stat vs standard administration time): 1500 standard administration time    Contacted HTS attending: (yes/no): No  - Contacted admitting provider last night given concerns over dosing discrepancies, but she had enough volume in cassette to get her to AM shift. Waited for PH coordinator to investigate pump and get copy of dosing sheet to confirm dosing. No dosing discrepancies there - all matched dosing sheet  once we got a copy of that.     Pharmacist: Stephanie Rick  Extension: [phone]

## 2023-03-20 NOTE — ED PROVIDER NOTES
"Encounter Date: 3/19/2023       History     Chief Complaint   Patient presents with    Dizziness     Pt w/ hx of pulmonary artery HTN. Pt reporting onset of weakness to BLE, dizziness, and lethargy x2 days.      47-year-old woman with comorbidities of hypertension as well as pulmonary hypertension manage with a continuous infusion of Remodulin via central venous access who presents to the ED for evaluation of generalized fatigue as well as dizziness noted today.  She reports that yesterday while attending the  of her son, she experienced right-sided upper and lower extremity weakness for which she did not seek evaluation at that time, and upon awakening today endorses dizziness without vomiting as well as generalized fatigue and bilateral leg heaviness."    Review of patient's allergies indicates:  No Known Allergies  Past Medical History:   Diagnosis Date    Elevated liver enzymes     Essential (primary) hypertension     Heart failure, unspecified     Hypokalemia     Mixed hyperlipidemia     Neuropathic pain     Tx w/ tramadol & Lyrica    Pulmonary hypertension     Receiving Remodulin continuously through tunneled central line     Past Surgical History:   Procedure Laterality Date    APPENDECTOMY       SECTION      Transverse cut    HYSTERECTOMY  2017    TLH- bleeding    OOPHORECTOMY      RIGHT HEART CATHETERIZATION Right 2021    Procedure: INSERTION, CATHETER, RIGHT HEART;  Surgeon: Chloe Gregory MD;  Location: [facility] CATH LAB;  Service: Cardiology;  Laterality: Right;    RIGHT HEART CATHETERIZATION Right 3/16/2022    Procedure: INSERTION, CATHETER, RIGHT HEART;  Surgeon: Hu Silverman MD;  Location: [facility] CATH LAB;  Service: Cardiology;  Laterality: Right;    RIGHT HEART CATHETERIZATION Right 2022    Procedure: INSERTION, CATHETER, RIGHT HEART;  Surgeon: Chloe Gregory MD;  Location: [facility] CATH LAB;  Service: Cardiology;  Laterality: Right;    TUBAL LIGATION      " VAGINAL DELIVERY  ; ; ;      Family History   Problem Relation Age of Onset    Cancer Father     Breast cancer Neg Hx     Colon cancer Neg Hx     Ovarian cancer Neg Hx      Social History     Tobacco Use    Smoking status: Former     Types: Cigars     Quit date: 2020     Years since quittin.2     Passive exposure: Past    Smokeless tobacco: Never    Tobacco comments:     social smoker-light   Substance Use Topics    Alcohol use: Yes     Alcohol/week: 2.0 standard drinks     Types: 2 Glasses of wine per week     Comment: socially- 2 or 3 times a week-2 glasses of wine    Drug use: Not Currently     Types: Marijuana     Comment: 2-6 MONTHS AGO     Review of Systems    Physical Exam     Initial Vitals [23 1722]   BP Pulse Resp Temp SpO2   (!) 96/59 106 16 98.5 °F (36.9 °C) 98 %      MAP       --         Physical Exam    Vitals reviewed.  Constitutional:   47-year-old  woman, chronically ill-appearing, no acute distress noted   HENT:   Head: Normocephalic and atraumatic.   No intraoral injuries are noted   Eyes: EOM are normal. Pupils are equal, round, and reactive to light.   Neck: No tracheal deviation present.   Cardiovascular:            Mild tachycardia is noted with intact distal pulses; tunneled central venous catheter noted to right upper chest without surrounding tenderness or erythema   Pulmonary/Chest: No stridor.   Diminished bibasilar air movement without increased work of breathing   Abdominal: Abdomen is soft. She exhibits no distension. There is no abdominal tenderness.   Musculoskeletal:         General: No edema. Normal range of motion.     Neurological: She is oriented to person, place, and time.   Patient is somnolent, arousable to alert   Skin: Skin is warm and dry.   Psychiatric:   Flat affect, cooperative with exam       ED Course   Procedures  Labs Reviewed   CBC W/ AUTO DIFFERENTIAL - Abnormal; Notable for the following components:       Result  Value    Hemoglobin 11.2 (*)     Hematocrit 35.3 (*)     MCH 26.1 (*)     MCHC 31.7 (*)     RDW 18.7 (*)     All other components within normal limits    Narrative:     Add-on ALC to [id number] per Joao Matthews MD on  03/19/2023    19:38    COMPREHENSIVE METABOLIC PANEL - Abnormal; Notable for the following components:    Chloride 112 (*)     CO2 18 (*)     Glucose 112 (*)     Total Bilirubin 2.0 (*)     All other components within normal limits    Narrative:     Add-on ALC to [id number] per Joao Matthews MD on  03/19/2023    19:38    HIV 1 / 2 ANTIBODY    Narrative:     Release to patient->Immediate   HEPATITIS C ANTIBODY    Narrative:     Release to patient->Immediate   TROPONIN I    Narrative:     Add-on ALC to [id number] per Joao Matthews MD on  03/19/2023    19:38    B-TYPE NATRIURETIC PEPTIDE    Narrative:     Add-on ALC to [id number] per Joao Matthews MD on  03/19/2023    19:38    SARS-COV2 (COVID) WITH FLU/RSV BY PCR   URINALYSIS, REFLEX TO URINE CULTURE    Narrative:     Specimen Source->Urine   TOXICOLOGY SCREEN, URINE, RANDOM (COMPLIANCE)    Narrative:     Specimen Source->Urine   ALCOHOL,MEDICAL (ETHANOL)   ALCOHOL,MEDICAL (ETHANOL)    Narrative:     Add-on ALC to [id number] per Joao Matthews MD on  03/19/2023    19:38      EKG Readings: (Independently Interpreted)   Rhythm: Sinus Tachycardia. Heart Rate: 104. Ectopy: No Ectopy. Axis: Right Axis Deviation.   ST segment flattening noted to inferior and lateral leads;   ECG Results              EKG 12-lead (Final result)  Result time 03/20/23 09:16:07      Final result by Interface, Lab In [facility] (03/20/23 09:16:07)                   Narrative:    Test Reason : R06.02,    Vent. Rate : 104 BPM     Atrial Rate : 104 BPM     P-R Int : 170 ms          QRS Dur : 070 ms      QT Int : 322 ms       P-R-T Axes : 061 093 -17 degrees     QTc Int : 423 ms    Sinus tachycardia  Right atrial enlargement  Rightward axis  Low septal  forces  ST and T wave abnormality, consider inferior ischemia  ST and T wave abnormality, consider anterolateral ischemia  Abnormal ECG  When compared with ECG of 14-DEC-2022 23:16,  Septal forces are less  QT has shortened  Confirmed by Lefty LINARES MD ([id number]) on 3/20/2023 9:16:00 AM    Referred By: HENRIQUE   SELF           Confirmed By:Lefty LINARES MD                                  Imaging Results              CT Head Without Contrast (Final result)  Result time 03/19/23 21:47:19      Final result by Indiana Reyes MD (03/19/23 21:47:19)                   Impression:      1. No CT evidence of acute intracranial abnormality. Clinical correlation and further evaluation as warranted noting if there is high clinical concern for acute ischemia, further assessment with MRI is advised if there are no clinical contraindications.  2. Ethmoid and left maxillary sinus disease noting essentially complete opacification of the visualized left maxillary sinus.      Electronically signed by: Indiana Reyes MD  Date:    03/19/2023  Time:    21:47               Narrative:    EXAMINATION:  CT HEAD WITHOUT CONTRAST    CLINICAL HISTORY:  Dizziness, persistent/recurrent, cardiac or vascular cause suspected;    TECHNIQUE:  Low dose axial images were obtained through the head.  Coronal and sagittal reformations were also performed. Contrast was not administered.    COMPARISON:  Head CT 09/12/2022, MRI brain 09/15/2022    FINDINGS:  There is no acute intracranial hemorrhage, hydrocephalus, midline shift or mass effect. Gray-white matter differentiation appears maintained.  Punctate focus of hypoattenuation in the right basal ganglia similar to prior CT and MRI possibly reflecting remote lacunar type infarct or prominent perivascular space.  The basal cisterns are patent. The mastoid air cells are clear.  There is mucosal thickening of the ethmoid air cells.  There is essentially complete opacification of the left maxillary sinus.  The  visualized bones of the calvarium demonstrate no acute osseous abnormality.                                       X-Ray Chest AP Portable (Final result)  Result time 03/19/23 19:39:36      Final result by Hola Gutierrez MD (03/19/23 19:39:36)                   Impression:      1. Interstitial findings are accentuated by habitus, underlying edema not excluded.  Correlation is advised.      Electronically signed by: Hola Gutierrez MD  Date:    03/19/2023  Time:    19:39               Narrative:    EXAMINATION:  XR CHEST AP PORTABLE    CLINICAL HISTORY:  CHF;    TECHNIQUE:  Single frontal view of the chest was performed.    COMPARISON:  12/14/2022    FINDINGS:  The cardiomediastinal silhouette is prominent, magnified by technique, similar to the previous exam.  Right central venous catheter tip projects over the mid to distal SVC..  There is no pleural effusion.  The trachea is midline.  The lungs are symmetrically expanded bilaterally with coarse interstitial attenuation bilaterally..  No large focal consolidation seen.  There is no pneumothorax.  The osseous structures are remarkable for degenerative changes..                                       Medications   furosemide tablet 20 mg (20 mg Oral Given 3/20/23 0921)   loperamide capsule 2 mg (has no administration in time range)   ondansetron tablet 4 mg (has no administration in time range)   potassium chloride SA CR tablet 20 mEq (20 mEq Oral Given 3/20/23 0922)   macitentan tablet 10 mg (10 mg Oral Given 3/20/23 0921)   sodium chloride 0.9% flush 10 mL (has no administration in time range)   acetaminophen tablet 650 mg (650 mg Oral Given 3/20/23 0348)   PHARMACY TO CONFIRM TREPROSTINIL (REMODULIN) DOSING infusion (has no administration in time range)   VELETRI/REMODULIN CASSETTE (has no administration in time range)   VELETRI/REMODULIN TUBING (has no administration in time range)   treprostinil (REMODULIN) 9,000,000 ng in sodium chloride 0.9% 100 mL infusion  (has no administration in time range)   pregabalin capsule 75 mg (has no administration in time range)   riociguat (ADEMPAS) tablet 0.5 mg (has no administration in time range)     And   riociguat (ADEMPAS) tablet 2 mg (2 mg Oral Given 3/20/23 8367)     Medical Decision Making:   History:   Old Medical Records: I decided to obtain old medical records.  Differential Diagnosis:   Symptomatic anemia, lethal arrhythmia, fatigue, THEO, heart failure status  Clinical Tests:   Lab Tests: Ordered and Reviewed  Radiological Study: Ordered and Reviewed  Medical Tests: Ordered and Reviewed          Attending Attestation:             Attending ED Notes:   Laboratory evaluation does not reveal evidence of significant hematologic derangement.  Metabolic profile reveals a minimally diminished chloride and bicarbonate without additional solid organ dysfunction.  Chest x-ray does not reveal vazquez evidence of consolidation or significant interstitial process.  CT scan of the brain does not reveal evidence of acute intracranial injury, but does identify localized maxillary sinus disease in this patient presenting with dizziness and fatigue.  Patient is pending Cardiology consultation/evaluation for her presenting symptoms at the time of shift change.  Disposition of this patient has been transferred to Dr. Santiago, please see accompanying documentation for the details.                 Clinical Impression:   Final diagnoses:  [R42] Dizziness  [R06.02] Shortness of breath  [I27.20] Pulmonary hypertension  [J32.0] Maxillary sinusitis, unspecified chronicity (Primary)        ED Disposition Condition    Observation                 Joao Matthews MD  03/20/23 2722

## 2023-03-20 NOTE — PROGRESS NOTES
PH Admit Assessment for Continuation of Treprostinil (Remodulin)    Drug Infusing: Treprostinil (Remodulin)  Route: Intravenous  Pump Type: CADD  Current Pump Rate = 36 ml / 24 hours  Current Reservoir Volume = 34.9 (mls remaining to be infused)    Patient reports utilizing: Patient mixed cassettes    Home mixing instructions:   Patient reports mixing 4 ml of concentrated drug in 96 ml of diluent     Pump visualized by this RN and MAMADOU Mckenzie RN as correct rate and infusing to dedicated line - R Subclavian Pedro, site CDI, no signs and symptoms of infection or leakage.

## 2023-03-21 ENCOUNTER — TELEPHONE (OUTPATIENT)
Dept: TRANSPLANT | Facility: CLINIC | Age: 48
End: 2023-03-21
Payer: MEDICAID

## 2023-03-21 VITALS
BODY MASS INDEX: 24.8 KG/M2 | OXYGEN SATURATION: 97 % | SYSTOLIC BLOOD PRESSURE: 98 MMHG | WEIGHT: 154.31 LBS | HEIGHT: 66 IN | DIASTOLIC BLOOD PRESSURE: 68 MMHG | HEART RATE: 101 BPM | RESPIRATION RATE: 20 BRPM | TEMPERATURE: 99 F

## 2023-03-21 PROBLEM — Z51.5 PALLIATIVE CARE ENCOUNTER: Status: ACTIVE | Noted: 2023-03-21

## 2023-03-21 LAB
ALBUMIN SERPL BCP-MCNC: 3.3 G/DL (ref 3.5–5.2)
ALP SERPL-CCNC: 88 U/L (ref 55–135)
ALT SERPL W/O P-5'-P-CCNC: 11 U/L (ref 10–44)
ANION GAP SERPL CALC-SCNC: 5 MMOL/L (ref 8–16)
AST SERPL-CCNC: 10 U/L (ref 10–40)
BASOPHILS # BLD AUTO: 0.01 K/UL (ref 0–0.2)
BASOPHILS NFR BLD: 0.2 % (ref 0–1.9)
BILIRUB SERPL-MCNC: 0.9 MG/DL (ref 0.1–1)
BUN SERPL-MCNC: 10 MG/DL (ref 6–20)
CALCIUM SERPL-MCNC: 9.2 MG/DL (ref 8.7–10.5)
CHLORIDE SERPL-SCNC: 114 MMOL/L (ref 95–110)
CO2 SERPL-SCNC: 19 MMOL/L (ref 23–29)
CREAT SERPL-MCNC: 0.7 MG/DL (ref 0.5–1.4)
DIFFERENTIAL METHOD: ABNORMAL
EOSINOPHIL # BLD AUTO: 0.1 K/UL (ref 0–0.5)
EOSINOPHIL NFR BLD: 2.2 % (ref 0–8)
ERYTHROCYTE [DISTWIDTH] IN BLOOD BY AUTOMATED COUNT: 19 % (ref 11.5–14.5)
EST. GFR  (NO RACE VARIABLE): >60 ML/MIN/1.73 M^2
GLUCOSE SERPL-MCNC: 101 MG/DL (ref 70–110)
HCT VFR BLD AUTO: 31 % (ref 37–48.5)
HGB BLD-MCNC: 10.1 G/DL (ref 12–16)
IMM GRANULOCYTES # BLD AUTO: 0.01 K/UL (ref 0–0.04)
IMM GRANULOCYTES NFR BLD AUTO: 0.2 % (ref 0–0.5)
LYMPHOCYTES # BLD AUTO: 2.1 K/UL (ref 1–4.8)
LYMPHOCYTES NFR BLD: 45 % (ref 18–48)
MAGNESIUM SERPL-MCNC: 1.8 MG/DL (ref 1.6–2.6)
MCH RBC QN AUTO: 26.6 PG (ref 27–31)
MCHC RBC AUTO-ENTMCNC: 32.6 G/DL (ref 32–36)
MCV RBC AUTO: 82 FL (ref 82–98)
MONOCYTES # BLD AUTO: 0.4 K/UL (ref 0.3–1)
MONOCYTES NFR BLD: 9.1 % (ref 4–15)
NEUTROPHILS # BLD AUTO: 2 K/UL (ref 1.8–7.7)
NEUTROPHILS NFR BLD: 43.3 % (ref 38–73)
NRBC BLD-RTO: 0 /100 WBC
PHOSPHATE SERPL-MCNC: 3.6 MG/DL (ref 2.7–4.5)
PLATELET # BLD AUTO: 183 K/UL (ref 150–450)
PMV BLD AUTO: 11.4 FL (ref 9.2–12.9)
POTASSIUM SERPL-SCNC: 3.7 MMOL/L (ref 3.5–5.1)
PROT SERPL-MCNC: 6.4 G/DL (ref 6–8.4)
RBC # BLD AUTO: 3.8 M/UL (ref 4–5.4)
SODIUM SERPL-SCNC: 138 MMOL/L (ref 136–145)
WBC # BLD AUTO: 4.6 K/UL (ref 3.9–12.7)

## 2023-03-21 PROCEDURE — 93451 RIGHT HEART CATH: CPT | Mod: NTX | Performed by: INTERNAL MEDICINE

## 2023-03-21 PROCEDURE — 36415 COLL VENOUS BLD VENIPUNCTURE: CPT | Mod: NTX | Performed by: INTERNAL MEDICINE

## 2023-03-21 PROCEDURE — 99223 PR INITIAL HOSPITAL CARE,LEVL III: ICD-10-PCS | Mod: NTX,,, | Performed by: INTERNAL MEDICINE

## 2023-03-21 PROCEDURE — 93451 RIGHT HEART CATH: CPT | Mod: 26,NTX,, | Performed by: INTERNAL MEDICINE

## 2023-03-21 PROCEDURE — C1894 INTRO/SHEATH, NON-LASER: HCPCS | Mod: NTX | Performed by: INTERNAL MEDICINE

## 2023-03-21 PROCEDURE — 80053 COMPREHEN METABOLIC PANEL: CPT | Mod: NTX | Performed by: INTERNAL MEDICINE

## 2023-03-21 PROCEDURE — G0378 HOSPITAL OBSERVATION PER HR: HCPCS | Mod: NTX

## 2023-03-21 PROCEDURE — 83735 ASSAY OF MAGNESIUM: CPT | Mod: NTX | Performed by: INTERNAL MEDICINE

## 2023-03-21 PROCEDURE — 25000003 PHARM REV CODE 250: Mod: NTX | Performed by: STUDENT IN AN ORGANIZED HEALTH CARE EDUCATION/TRAINING PROGRAM

## 2023-03-21 PROCEDURE — C1751 CATH, INF, PER/CENT/MIDLINE: HCPCS | Mod: NTX | Performed by: INTERNAL MEDICINE

## 2023-03-21 PROCEDURE — 84100 ASSAY OF PHOSPHORUS: CPT | Mod: NTX | Performed by: INTERNAL MEDICINE

## 2023-03-21 PROCEDURE — 99233 SBSQ HOSP IP/OBS HIGH 50: CPT | Mod: NTX,,, | Performed by: INTERNAL MEDICINE

## 2023-03-21 PROCEDURE — 99233 PR SUBSEQUENT HOSPITAL CARE,LEVL III: ICD-10-PCS | Mod: NTX,,, | Performed by: INTERNAL MEDICINE

## 2023-03-21 PROCEDURE — 99223 PR INITIAL HOSPITAL CARE,LEVL III: ICD-10-PCS | Mod: 25,NTX,, | Performed by: INTERNAL MEDICINE

## 2023-03-21 PROCEDURE — 25000003 PHARM REV CODE 250: Mod: NTX | Performed by: INTERNAL MEDICINE

## 2023-03-21 PROCEDURE — 99223 1ST HOSP IP/OBS HIGH 75: CPT | Mod: 25,NTX,, | Performed by: INTERNAL MEDICINE

## 2023-03-21 PROCEDURE — 93451 PR RIGHT HEART CATH O2 SATURATION & CARDIAC OUTPUT: ICD-10-PCS | Mod: 26,NTX,, | Performed by: INTERNAL MEDICINE

## 2023-03-21 PROCEDURE — 99223 1ST HOSP IP/OBS HIGH 75: CPT | Mod: NTX,,, | Performed by: INTERNAL MEDICINE

## 2023-03-21 PROCEDURE — 85025 COMPLETE CBC W/AUTO DIFF WBC: CPT | Mod: NTX | Performed by: INTERNAL MEDICINE

## 2023-03-21 RX ADMIN — PREGABALIN 75 MG: 75 CAPSULE ORAL at 08:03

## 2023-03-21 RX ADMIN — FUROSEMIDE 20 MG: 20 TABLET ORAL at 08:03

## 2023-03-21 RX ADMIN — RIOCIGUAT 0.5 MG: 0.5 TABLET, FILM COATED ORAL at 09:03

## 2023-03-21 RX ADMIN — RIOCIGUAT 2 MG: 1 TABLET, FILM COATED ORAL at 03:03

## 2023-03-21 RX ADMIN — RIOCIGUAT 0.5 MG: 0.5 TABLET, FILM COATED ORAL at 03:03

## 2023-03-21 RX ADMIN — POTASSIUM CHLORIDE 20 MEQ: 1500 TABLET, EXTENDED RELEASE ORAL at 08:03

## 2023-03-21 RX ADMIN — RIOCIGUAT 2 MG: 1 TABLET, FILM COATED ORAL at 09:03

## 2023-03-21 RX ADMIN — MACITENTAN 10 MG: 10 TABLET, FILM COATED ORAL at 09:03

## 2023-03-21 NOTE — PLAN OF CARE
Pt has call bell in reach, non slip socks on, and bedrails up x2.  Pt encouraged to wash hands.  She is on a remodulin gtt to the CADD pump that is being changed daily. I have checked the settings on it.  She is to have an C tomorrow.  Pt is on tele monitoring.  I have reminded pt to call for assistance walking.

## 2023-03-21 NOTE — ASSESSMENT & PLAN NOTE
C confirmed elevated PAP  Plan to uptitrate remodulin as an outpatient  Symptoms improving, possible psychosomatic component after recently attending a .  Likely discharge today

## 2023-03-21 NOTE — NURSING
Returned from cath lab via stretcher with remote tele in use. Dressing to R neck with gauze and tegaderm dry and intact.

## 2023-03-21 NOTE — OP NOTE
Patient was brought to cath lab, draped, and prepped in the usual sterile fashion. Right IJ was accessed via modified seldinger technique with ultrasound guidance and guidewire was introduced into the IJ. Sequential dilation with micropuncture kit and sheath was performed. [place] Patricio catheter was introduced via the sheath. Measurements were obtained, and sheath was removed. Patient tolerated the procedure well and discharged in stable condition.

## 2023-03-21 NOTE — PROGRESS NOTES
"[facility] - Transplant Stepdown  Palliative Medicine  Progress Note    Patient Name: Kristin Maier  MRN: [id number]  Admission Date: 3/19/2023  Hospital Length of Stay: 0 days  Code Status: Full Code   Attending Provider: uH Silverman MD  Consulting Provider: Saritha Venegas MD  Primary Care Physician: Jorge A Stack MD  Principal Problem:WHO group 1 pulmonary arterial hypertension    Patient information was obtained from patient and primary team.      Assessment/Plan:     Palliative Care  Palliative care encounter  47 year old female with pulmonary HTN on remodulin who presents with worsening fatigue and weakness. RHC 3/21/22 indicative on worsening disease. Remodulin being increased. Palliative consulted for symptom management     GOC  -Met with patient at bedside. Introduced palliative medicine and our role in her care. Discussed continuing to follow in palliative medicine clinic which patient is open to   -Goals are life prolonging. Did not have in depth GOC discussion. Patient grieving recent death of her cousin      Shortness of breath  -patient on RA. Remodulin being increased   -patient may benefit from opioids for dyspnea at some point     Heaviness in legs   -Reports relief with tramadol         I will follow-up with patient. Please contact us if you have any additional questions.    Subjective:     Chief Complaint:   Chief Complaint   Patient presents with    Dizziness     Pt w/ hx of pulmonary artery HTN. Pt reporting onset of weakness to BLE, dizziness, and lethargy x2 days.        HPI:   Per cardiology H&P   "47 year old female with a WHO group 1 PH on triple therapy (Remodulin 36 ng/kg/min, adempas 2.5mg TID, opsumit 10mg daily) who presents via ER with multiple complaints. She feels like she felt on her last admission when her Remodulin cassette stopped working. Her discharge notes mentions a dose of 76 ng/kg/min, however, her current cassette dose is of 36 ng/kg/min.      She " "mentions generalized weakness, dyspnea, dizziness and headache. She mentions loose BM every two days. No change in diet. No issues with her medications. No fever or GI symptoms otherwise. She had a CT head that was negative for acute findings that could explain her symptoms. She denies angina or dyspnea at rest. "     Palliative consulted for symptom management. At baseline patient reports some shortness of breath that is relieved with resting. She still tries to push herself to be active since she was very active prior to getting sick and isn't used to having to be more sedentary. Her biggest complaint is a heaviness she feels in her legs when they start to swell. Says it wakes her up from sleep. Say tramadol helps.   Prior to admission patient was experiencing these baseline symptoms as well as weakness, extreme fatigue, and confusion that started while she was attending her cousin's . Her cousin  unexpectedly. He was very close with patient's son who  unexpectedly a few years ago. Patient reports feeling better since admission because she has been allowing herself to rest. At home she feels like she pushes herself too hard       Hospital Course:  No notes on file        Past Medical History:   Diagnosis Date    Elevated liver enzymes     Essential (primary) hypertension     Heart failure, unspecified     Hypokalemia     Mixed hyperlipidemia     Neuropathic pain     Tx w/ tramadol & Lyrica    Pulmonary hypertension     Receiving Remodulin continuously through tunneled central line       Past Surgical History:   Procedure Laterality Date    APPENDECTOMY       SECTION      Transverse cut    HYSTERECTOMY  2017    [facility]- bleeding    OOPHORECTOMY      RIGHT HEART CATHETERIZATION Right 2021    Procedure: INSERTION, CATHETER, RIGHT HEART;  Surgeon: Chloe Gregory MD;  Location: [facility] CATH LAB;  Service: Cardiology;  Laterality: Right;    RIGHT HEART CATHETERIZATION " Right 3/16/2022    Procedure: INSERTION, CATHETER, RIGHT HEART;  Surgeon: Hu Silverman MD;  Location: [facility] CATH LAB;  Service: Cardiology;  Laterality: Right;    RIGHT HEART CATHETERIZATION Right 2022    Procedure: INSERTION, CATHETER, RIGHT HEART;  Surgeon: Chloe Gregory MD;  Location: [facility] CATH LAB;  Service: Cardiology;  Laterality: Right;    TUBAL LIGATION      VAGINAL DELIVERY  ; ; 1996       Review of patient's allergies indicates:  No Known Allergies    Medications:  Continuous Infusions:   treprostinil (REMODULIN) infusion (NON-TITRATING) 90 ng/kg/min (23 182)    veletri/remodulin cassette      veletri/remodulin tubing       Scheduled Meds:   furosemide  20 mg Oral Daily    macitentan  10 mg Oral Daily    potassium chloride SA  20 mEq Oral Daily    pregabalin  75 mg Oral BID    riociguat  0.5 mg Oral TID    And    riociguat  2 mg Oral TID     PRN Meds:acetaminophen, loperamide, ondansetron, sodium chloride 0.9%    Family History       Problem Relation (Age of Onset)    Cancer Father          Tobacco Use    Smoking status: Former     Types: Cigars     Quit date: 2020     Years since quittin.3     Passive exposure: Past    Smokeless tobacco: Never    Tobacco comments:     social smoker-light   Substance and Sexual Activity    Alcohol use: Yes     Alcohol/week: 2.0 standard drinks     Types: 2 Glasses of wine per week     Comment: socially- 2 or 3 times a week-2 glasses of wine    Drug use: Not Currently     Types: Marijuana     Comment: 2-6 MONTHS AGO    Sexual activity: Yes     Partners: Male     Birth control/protection: See Surgical Hx     Comment:        Review of Systems   Constitutional:  Positive for fatigue.   HENT: Negative.     Respiratory:  Positive for shortness of breath.    Cardiovascular:  Negative for chest pain.   Gastrointestinal:  Positive for diarrhea.   Musculoskeletal:         Leg pain   Skin: Negative.     Neurological:  Positive for weakness.   Psychiatric/Behavioral: Negative.     Objective:     Vital Signs (Most Recent):  Temp: 98.6 °F (37 °C) (23 1606)  Pulse: 94 (23 1606)  Resp: 18 (23 1606)  BP: 109/76 (23 1606)  SpO2: 98 % (23 1606) Vital Signs (24h Range):  Temp:  [97.5 °F (36.4 °C)-98.8 °F (37.1 °C)] 98.6 °F (37 °C)  Pulse:  [] 94  Resp:  [16-20] 18  SpO2:  [93 %-100 %] 98 %  BP: ()/(52-76) 109/76     Weight: 70 kg (154 lb 5.2 oz)  Body mass index is 24.91 kg/m².    Physical Exam  Vitals and nursing note reviewed.   Constitutional:       General: She is not in acute distress.  HENT:      Head: Normocephalic.   Eyes:      Pupils: Pupils are equal, round, and reactive to light.   Cardiovascular:      Rate and Rhythm: Normal rate.   Pulmonary:      Effort: Pulmonary effort is normal. No respiratory distress.      Comments: On RA  Abdominal:      General: There is no distension.   Musculoskeletal:      Right lower leg: Edema present.      Left lower leg: Edema present.   Neurological:      Mental Status: She is alert and oriented to person, place, and time.   Psychiatric:         Mood and Affect: Mood is depressed.      Comments: Cousin just        Review of Symptoms      Symptom Assessment (ESAS 0-10 Scale)  Pain:  0  Dyspnea:  2  Anxiety:  0  Nausea:  0  Depression:  7  Anorexia:  0  Fatigue:  5  Insomnia:  0  Restlessness:  0  Agitation:  0     CAM / Delirium:  Negative  Constipation:  Negative  Diarrhea:  Positive      Bowel Management Plan (BMP):  Yes      Performance Status:  50    Living Arrangements:  Lives with spouse    Psychosocial/Cultural:   See Palliative Psychosocial Note: No  . Has 3 living daughters and a  son. Has grandchildren who she enjoys spending time with. Likes teaching them to play different sports. Enjoys gardening   **Primary  to Follow**  Palliative Care  Consult: No    Spiritual:  F - Leticia and  Belief:  Yes  I - Importance:  Yes  A - Address in Care:  Yes      Advance Care Planning   Advance Directives:   Living Will: No    LaPOST: No    Do Not Resuscitate Status: No    Medical Power of : No      Decision Making:  Patient answered questions  Goals of Care: The patient endorses that what is most important right now is to focus on remaining as independent as possible and extending life as long as possible    Accordingly, we have decided that the best plan to meet the patient's goals includes continuing with treatment       Significant Labs: All pertinent labs within the past 24 hours have been reviewed.  CBC:   Recent Labs   Lab 03/21/23  0616   WBC 4.60   HGB 10.1*   HCT 31.0*   MCV 82        BMP:  Recent Labs   Lab 03/21/23 0616         K 3.7   *   CO2 19*   BUN 10   CREATININE 0.7   CALCIUM 9.2   MG 1.8     LFT:  Lab Results   Component Value Date    AST 10 03/21/2023    ALKPHOS 88 03/21/2023    BILITOT 0.9 03/21/2023     Albumin:   Albumin   Date Value Ref Range Status   03/21/2023 3.3 (L) 3.5 - 5.2 g/dL Final     Protein:   Total Protein   Date Value Ref Range Status   03/21/2023 6.4 6.0 - 8.4 g/dL Final     Lactic acid:   Lab Results   Component Value Date    LACTATE 1.3 12/05/2021    LACTATE 1.7 12/04/2021       Significant Imaging: I have reviewed all pertinent imaging results/findings within the past 24 hours.    CT head 3/19/23  Impression:     1. No CT evidence of acute intracranial abnormality. Clinical correlation and further evaluation as warranted noting if there is high clinical concern for acute ischemia, further assessment with MRI is advised if there are no clinical contraindications.  2. Ethmoid and left maxillary sinus disease noting essentially complete opacification of the visualized left maxillary sinus.        Saritha Venegas MD  Palliative Medicine  [facility] - Transplant Stepdown      > 50% of 75  min visit spent in chart review, charting,  face  to face discussion of goals of care,  symptom assessment, coordination of care and emotional support

## 2023-03-21 NOTE — TELEPHONE ENCOUNTER
"NN attempted contact patient prior to discharge from the hospital. Left message on voicemail.  NN attempted to call patient in room. Unable to speak to patient at this time.    Patient called NN back @ 1343.   Patient states that she has not had anything in the hospital for pain. Does endorse being given Lyrica.  Patient states that she has not been having leg pain because she is resting and not moving around much.  When discussing the recommendation of increasing her dose of Remodulin, patient states that she is on board with it. "Whatever I need to do, I will do."  Patient states that she was fine when she was going up on her dose subsequent to last hospitalization.    NN encouraged patient to follow up with palliative care.  Advised patient that palliative care can provide different functions to help her in her disease process, including but not limited to pain management.  Advised patient that message will be sent to palliative care staff to schedule consult again.    "

## 2023-03-21 NOTE — HOSPITAL COURSE
Patient was admitted early morning yesterday with symptoms of worsening pulmonary hypertension. She underwent echo showing estimated PA pressure of 89 so this was followed by RHC which revealed: RA: 10 RV: 75/3, EDP 12 PA: 82/33, mean 48 PCWP: 13 Kristel CI/CO: 2.5/4.5 PVR: 7.8 Wood Units.  After discussion with PH team decision was to discharge  today and uptitrate her remodulin as an outpatient and to follow up in the PH clinic. She was advised to return to the ED if her symptoms worsened.

## 2023-03-21 NOTE — DISCHARGE SUMMARY
[facility]  Heart Transplant  Discharge Summary      Patient Name: Elvis Hutton  MRN: [id number]  Admission Date: 3/19/2023  Hospital Length of Stay: 0 days  Discharge Date and Time: 03/21/2023 1:53 PM  Attending Physician: Hu Silverman MD   Discharging Provider: Leia Weiss MD  Primary Care Provider: Jorge A Stack MD     HPI: No notes on file    Procedure(s) (LRB):  INSERTION, CATHETER, RIGHT HEART (Right)     Hospital Course: Patient was admitted early morning yesterday with symptoms of worsening pulmonary hypertension. She underwent echo showing estimated PA pressure of 89 so this was followed by RHC which revealed: RA: 10 RV: 75/3, EDP 12 PA: 82/33, mean 48 PCWP: 13 Kristel CI/CO: 2.5/4.5 PVR: 7.8 Wood Units.  After discussion with PH team decision was to discharge  today and uptitrate her remodulin as an outpatient and to follow up in the PH clinic. She was advised to return to the ED if her symptoms worsened.         Goals of Care Treatment Preferences:  Code Status: Full Code      Consults (From admission, onward)        Status Ordering Provider     Inpatient consult to Palliative Care  Once        Provider:  (Not yet assigned)    Acknowledged LEIA WEISS          Significant Diagnostic Studies: Cardiac Graphics: Echocardiogram:     2D echo with color flow doppler:   Results for orders placed or performed during the hospital encounter of 11/17/21   2D echo with color flow doppler    Narrative           Transthoracic Echocardiogram Report    Patient Name  : [place] [address]    Phone: [phone]    Interpreting Physician: WHITLEY HICKEY MD  Demographics:  Name:  ELVIS HUTTON   YOB: 1975  Age/Sex:  45, Female   Height: 5 ft 2 in  Weight:   132 pounds    BSA: 1.63 cc/m?  BMI:      24.1   Date of Study: 11/18/2021  Medical Record No:   [id number]   [facility]: [facility]  Referring Physician:    SATISH ALCANTARA   Technologist: Denise Oseguera RDCS  Study:   Trans Thoracic Echocardiogram  Study Quality:   Good  Procedure  Type: Adult TTE (Date Study Ordered : 11/18/2021)  Components: 2D,Color Flow Doppler,Doppler,M-mode,TDI(Tissue Doppler   Imaging)   Referral diagnosis  Abnormal CXR  Hemodynamics  Heart rate: 88 beats/min  Blood pressure:  130/99 mmHg  ECG:     Final Impressions  1. The study quality is good.   2. Global left ventricular systolic function is mildly reduced.  Left   ventricular ejection fraction is 40-45%.   3. Severely dilated right ventricle with severely reduced function.  4. The right atrium is severly enlarged. Right atrial diameter is 5.7   cms.    5. Mild (1+) aortic regurgitation. Severe (4+) tricuspid   regurgitation. Trace pulmonic regurgitation.    6. Flattening of the interventricular septum consistent with right   ventricular pressure overload.     7. Severe pulmonary hypertension.  The pulmonary artery systolic   pressure is 121 mmHg.    8. Stage 1 diastolic dysfunction.  9. A small pericardial effusion is noted.     Findings    Left Atrium  The left atrium is decreased in size. Left atrial diameter is 2.5 cms.     Right Atrium  The right atrium is severly enlarged. Right atrial diameter is 5.7   cms.      Left Ventricle  The left ventricle is decreased in size. Left ventricular diastolic   dimension is 3.64 cms. Left ventricular systolic dimension is 2.6 cms.   Left ventricular diastolic septal thickness is 1.3 cms. Left   ventricular diastolic postero basal free wall thickness is 1.4 cms.   Global left ventricular systolic function is mildly decreased. The   left ventricular ejection fraction is 40%. Left ventricular outflow   tract VTI is 8.8 cms. Left ventricular outflow tract mean velocity is   0.5 m/s. Left ventricular mean gradient is 1 mmHg.      Right Ventricle  The right ventricle is severely enlarged. Right ventricular systolic   function is severely decreased. Right  ventricular diastolic dimension   is 5.3 cms. Right ventricular systolic pressure is 121 mmHg.      Atrial Septum  The atrial septum is normal.     Ventricular Septum  The ventricular septum is normal.     Pulmonary Artery  The pulmonary artery systolic pressure is 121 mmHg.     Pulmonary Vein  The pulmonary vein appears normal.     IVC  The inferior vena cava is moderately increased in size.     Pulmonic Valve  Evidence of pulmonic regurgitation is noted. Trace pulmonic   regurgitation. The peak velocity is 0.4 m/s. The mean velocity is 0.3   m/s. The peak trans pulmonic gradient is 1 mmHg.      Tricuspid Valve  Evidence of tricuspid regurgitation is present. Severe (4+) tricuspid   regurgitation. The peak tricuspid regurgitant velocity is 5.3 m/s. The   peak trans tricuspid gradient is 100 mmHg. TR ERO is 0.3 cm^2.     Mitral Valve  The pressure half time is 44 ms. The deceleration time is 149 ms. The   E velocity is 0.3 m/s. The A velocity is 0.7 m/s.      Aortic Valve  The aortic valve is tricuspid. Noted evidence of aortic regurgitation.   Mild (1+) aortic regurgitation. The trans-aortic peak velocity is 0.8   m/s. The trans-aortic peak gradient is 3 mmHg. The trans-aortic mean   velocity is 0.6 m/s. The trans-aortic mean gradient is 2 mmHg. Aortic   valve VTI measures 12.8 cms.      Aorta  Aortic root diameter is 3.2 cms. Aortic arch is normal.    Pericardium  A small pericardial effusion is noted.     Measurements  Left Atrium  Diameter   2.5cm(s)    Right Atrium  Diameter   5.7cm(s)    Left Ventricle  End Diastolic Dimension   3.64cm(s)  End Systolic Dimension   2.6cm(s)  Posterior Basal Free Wall Thickness   1.4cm(s)  End Diastolic Septal Thickness   1.3cm(s)  Ejection Fraction   40%    Right Ventricle  Diastolic Diameter   5.3cm(s)    Pulmonic Valve  Peak Pulmonic Velocity   0.4m/s  Mean Pulmonic Velocity   0.3m/s  Peak Trans Pulmonic Gradient   1mmHg    Tricuspid Valve  Peak Tricuspid Regurgitant Velocity    5.3m/s  Peak Tricuspid Regurgitant Gradient   100mmHg  Pulmonary Artery Systolic Pressure  121mmHg    Mitral Valve  Pressure Half Time   44ms  Deceleration Time   149ms  A Velocity   0.7m/s  E Velocity   0.3m/s  Area By Pressure Half Time   5cm?    Aortic Valve  Trans Aortic Peak Velocity   0.8m/s  Trans Aortic Mean Velocity   0.6m/s  Trans Aortic Peak Gradient   3mmHg  Trans Aortic Mean Gradient   2mmHg  Aortic Valve VTI   12.8cm(s)        Electronically Authenticated by  WHITLEY HICKEY MD  11/18/2021 , 09:14      and Transthoracic echo (TTE) complete (Cupid Only):   Results for orders placed or performed during the hospital encounter of 03/19/23   Echo   Result Value Ref Range    BSA 1.83 m2    TDI SEPTAL 0.06 m/s    LA WIDTH 3.37 cm    TDI LATERAL 0.12 m/s    LVIDd 3.56 3.5 - 6.0 cm    IVS 1.03 0.6 - 1.1 cm    Posterior Wall 0.93 0.6 - 1.1 cm    LVIDs 2.22 2.1 - 4.0 cm    FS 38 28 - 44 %    LA volume 44.24 cm3    Sinus 3.08 cm    STJ 3.22 cm    Ascending aorta 3.06 cm    LV mass 103.00 g    LA size 2.82 cm    RVDD 5.50 cm    TAPSE 1.88 cm    Left Ventricle Relative Wall Thickness 0.52 cm    Mean e' 0.09 m/s    LVOT diameter 1.98 cm    LVOT area 3.1 cm2    TR Max Josiah 4.50 m/s    LV Systolic Volume 16.51 mL    LV Systolic Volume Index 9.1 mL/m2    LV Diastolic Volume 52.92 mL    LV Diastolic Volume Index 29.24 mL/m2    LA Volume Index 24.4 mL/m2    LV Mass Index 57 g/m2    RA Major Axis 6.03 cm    Left Atrium Minor Axis 5.11 cm    Left Atrium Major Axis 5.90 cm    Triscuspid Valve Regurgitation Peak Gradient 81 mmHg    RA Width 4.87 cm    Right Atrial Pressure (from IVC) 8 mmHg    EF 60 %    RV S' 13 cm/s    TV rest pulmonary artery pressure 89 mmHg    Narrative    · The left ventricle is normal in size with concentric remodeling and   normal systolic function. The estimated ejection fraction is 60%.  · Severe right ventricular enlargement with mildly to moderately reduced   right ventricular systolic function.  ·  Indeterminate left ventricular diastolic function.  · Severe right atrial enlargement.  · Moderate to severe tricuspid regurgitation.  · Small pericardial effusion.  · The estimated PA systolic pressure is 89 mmHg.  · Intermediate central venous pressure (8 mmHg).          Pending Diagnostic Studies:     None        Final Active Diagnoses:    Diagnosis Date Noted POA    PRINCIPAL PROBLEM:  WHO group 1 pulmonary arterial hypertension [I27.21] 02/11/2022 Yes      Problems Resolved During this Admission:      Discharged Condition: good    Disposition: Home or Self Care    Follow Up:    Patient Instructions:   No discharge procedures on file.  Medications:  Reconciled Home Medications:      Medication List      CONTINUE taking these medications    ADEMPAS 2.5 mg tablet  Generic drug: riociguat  Take 1 tablet three times a day. Dose changes may occur throughout the course of therapy as directed by your prescriber.     furosemide 20 MG tablet  Commonly known as: LASIX  Take 1 tablet (20 mg total) by mouth once daily.     loperamide 2 mg capsule  Commonly known as: IMODIUM  Take 1 capsule (2 mg total) by mouth 4 (four) times daily as needed for Diarrhea.     ondansetron 4 MG tablet  Commonly known as: ZOFRAN  Take 1 tablet (4 mg total) by mouth every 8 (eight) hours as needed for Nausea.     OPSUMIT 10 mg Tab  Generic drug: macitentan  TAKE 1 TABLET BY MOUTH DAILY. DO NOT HANDLE IF PREGNANT. DO NOT SPLIT, CRUSH, OR CHEW. REVIEW MEDICATION GUIDE.     potassium chloride SA 10 MEQ tablet  Commonly known as: K-DUR,KLOR-CON M  Take 2 tablets (20 mEq total) by mouth once daily.     pregabalin 75 MG capsule  Commonly known as: LYRICA  Take 2 capsules (150 mg total) by mouth 2 (two) times daily.     REMODULIN 5 mg/mL Soln  Generic drug: treprostinil sodium     sodium chloride 0.9% SolP 100 mL with treprostinil 1 mg/mL Soln 6,000,000 ng  Inject 2,145 ng/min into the vein continuous.     traMADoL 50 mg tablet  Commonly known as:  ULTRAM  Take 1 tablet (50 mg total) by mouth every 6 (six) hours as needed for Pain.            Leia Guerra MD  Heart Transplant  [facility] - Transplant Stepdown

## 2023-03-21 NOTE — PROGRESS NOTES
[facility] - Trans[facility] Stepdown  Heart Transplant  Progress Note    Patient Name: Kristin Maier  MRN: [id number]  Admission Date: 3/19/2023  Hospital Length of Stay: 0 days  Attending Physician: Hu Silverman MD  Primary Care Provider: Jorge A Stack MD  Principal Problem:WHO group 1 pulmonary arterial hypertension    Subjective:     Interval History: NAEON. Patient feels better and has no new complaints. She underwent RHC this morning which revealed RA 10 PAP 82/33 PWP 13. CO 4.5, CI 2.5, PVR 7.8.      Continuous Infusions:   treprostinil (REMODULIN) infusion (NON-TITRATING) 90 ng/kg/min (03/20/23 1828)    veletri/remodulin cassette      veletri/remodulin tubing       Scheduled Meds:   furosemide  20 mg Oral Daily    macitentan  10 mg Oral Daily    potassium chloride SA  20 mEq Oral Daily    pregabalin  75 mg Oral BID    riociguat  0.5 mg Oral TID    And    riociguat  2 mg Oral TID     PRN Meds:acetaminophen, loperamide, ondansetron, sodium chloride 0.9%    Review of patient's allergies indicates:  No Known Allergies  Objective:     Vital Signs (Most Recent):  Temp: 98.4 °F (36.9 °C) (03/21/23 1131)  Pulse: 94 (03/21/23 1131)  Resp: 16 (03/21/23 1131)  BP: 92/66 (03/21/23 1131)  SpO2: 99 % (03/21/23 1131) Vital Signs (24h Range):  Temp:  [97.5 °F (36.4 °C)-98.8 °F (37.1 °C)] 98.4 °F (36.9 °C)  Pulse:  [] 94  Resp:  [16-20] 16  SpO2:  [93 %-100 %] 99 %  BP: ()/(52-69) 92/66     Patient Vitals for the past 72 hrs (Last 3 readings):   Weight   03/21/23 0400 70 kg (154 lb 5.2 oz)   03/20/23 1452 71.7 kg (158 lb)   03/20/23 1100 72 kg (158 lb 11.7 oz)     Body mass index is 24.91 kg/m².      Intake/Output Summary (Last 24 hours) at 3/21/2023 1145  Last data filed at 3/21/2023 0815  Gross per 24 hour   Intake 600 ml   Output 950 ml   Net -350 ml       Hemodynamic Parameters:       Physical Exam  Constitutional:       Appearance: Normal appearance.   HENT:      Head: Atraumatic.    Cardiovascular:      Rate and Rhythm: Normal rate and regular rhythm.      Pulses: Normal pulses.      Heart sounds: Murmur heard.      Comments: JVP 8 cm   Pulmonary:      Effort: Pulmonary effort is normal.      Breath sounds: Normal breath sounds.   Abdominal:      General: Abdomen is flat. Bowel sounds are normal. There is no distension.   Musculoskeletal:         General: No swelling.      Cervical back: Normal range of motion and neck supple.   Skin:     Capillary Refill: Capillary refill takes 2 to 3 seconds.      Comments: R chest wall Pedro catheter infusing remodulin   Neurological:      Mental Status: She is alert.   Psychiatric:         Mood and Affect: Mood normal.       Significant Labs:  CBC:  Recent Labs   Lab 03/19/23  1852 03/20/23  0629 03/21/23  0616   WBC 4.89 5.01 4.60   RBC 4.29 3.67* 3.80*   HGB 11.2* 9.8* 10.1*   HCT 35.3* 29.9* 31.0*    170 183   MCV 82 82 82   MCH 26.1* 26.7* 26.6*   MCHC 31.7* 32.8 32.6     BNP:  Recent Labs   Lab 03/19/23  1852   BNP 67     CMP:  Recent Labs   Lab 03/19/23  1852 03/20/23  0629 03/21/23  0616   * 108 101   CALCIUM 9.5 9.2 9.2   ALBUMIN 3.6 3.2* 3.3*   PROT 7.4 6.1 6.4    139 138   K 4.7 3.8 3.7   CO2 18* 19* 19*   * 113* 114*   BUN 14 13 10   CREATININE 0.8 0.8 0.7   ALKPHOS 109 96 88   ALT 19 15 11   AST 25 11 10   BILITOT 2.0* 1.1* 0.9      Coagulation:   No results for input(s): PT, INR, APTT in the last 168 hours.  LDH:  No results for input(s): LDH in the last 72 hours.  Microbiology:  Microbiology Results (last 7 days)       ** No results found for the last 168 hours. **            I have reviewed all pertinent labs within the past 24 hours.    Estimated Creatinine Clearance: 91 mL/min (based on SCr of 0.7 mg/dL).    Diagnostic Results:  I have reviewed and interpreted all pertinent imaging results/findings within the past 24 hours.    Assessment and Plan:     No notes on file    * WHO group 1 pulmonary arterial  hypertension  RHC confirmed elevated PAP  Plan to uptitrate remodulin as an outpatient  Symptoms improving, possible psychosomatic component after recently attending a .  Likely discharge today        Leia Guerra MD  Heart Transplant  [facility]

## 2023-03-21 NOTE — ASSESSMENT & PLAN NOTE
47 year old female with pulmonary HTN on remodulin who presents with worsening fatigue and weakness. RHC 3/21/22 indicative on worsening disease. Remodulin being increased. Palliative consulted for symptom management     GOC  -Met with patient at bedside. Introduced palliative medicine and our role in her care. Discussed continuing to follow in palliative medicine clinic which patient is open to   -Goals are life prolonging. Did not have in depth GOC discussion. Patient grieving recent death of her cousin      Shortness of breath  -patient on RA. Remodulin being increased   -patient may benefit from opioids for dyspnea at some point     Heaviness in legs   -Reports relief with tramadol

## 2023-03-21 NOTE — HPI
"Per cardiology H&P   "47 year old female with a WHO group 1 PH on triple therapy (Remodulin 36 ng/kg/min, adempas 2.5mg TID, opsumit 10mg daily) who presents via ER with multiple complaints. She feels like she felt on her last admission when her Remodulin cassette stopped working. Her discharge notes mentions a dose of 76 ng/kg/min, however, her current cassette dose is of 36 ng/kg/min.      She mentions generalized weakness, dyspnea, dizziness and headache. She mentions loose BM every two days. No change in diet. No issues with her medications. No fever or GI symptoms otherwise. She had a CT head that was negative for acute findings that could explain her symptoms. She denies angina or dyspnea at rest. "     Palliative consulted for symptom management. At baseline patient reports some shortness of breath that is relieved with resting. She still tries to push herself to be active since she was very active prior to getting sick and isn't used to having to be more sedentary. Her biggest complaint is a heaviness she feels in her legs when they start to swell. Says it wakes her up from sleep. Say tramadol helps.   Prior to admission patient was experiencing these baseline symptoms as well as weakness, extreme fatigue, and confusion that started while she was attending her cousin's . Her cousin  unexpectedly. He was very close with patient's son who  unexpectedly a few years ago. Patient reports feeling better since admission because she has been allowing herself to rest. At home she feels like she pushes herself too hard   "

## 2023-03-21 NOTE — NURSING
Discharge instructions given and reviewed. No new meds ordered. Pt states has all needed meds at home. Pt mixed Remodulin according to home dose of 36ml/24hrs and concentration and switched out cartridge in preparation for discharge. Rate verified on pump to be 36ml/24hrs. Verbalized understanding of discharge instructions.

## 2023-03-21 NOTE — SUBJECTIVE & OBJECTIVE
Interval History: NAEON. Patient feels better and has no new complaints. She underwent RHC this morning which revealed RA 10 PAP 82/33 PWP 13. CO 4.5, CI 2.5, PVR 7.8.      Continuous Infusions:   treprostinil (REMODULIN) infusion (NON-TITRATING) 90 ng/kg/min (03/20/23 1828)    veletri/remodulin cassette      veletri/remodulin tubing       Scheduled Meds:   furosemide  20 mg Oral Daily    macitentan  10 mg Oral Daily    potassium chloride SA  20 mEq Oral Daily    pregabalin  75 mg Oral BID    riociguat  0.5 mg Oral TID    And    riociguat  2 mg Oral TID     PRN Meds:acetaminophen, loperamide, ondansetron, sodium chloride 0.9%    Review of patient's allergies indicates:  No Known Allergies  Objective:     Vital Signs (Most Recent):  Temp: 98.4 °F (36.9 °C) (03/21/23 1131)  Pulse: 94 (03/21/23 1131)  Resp: 16 (03/21/23 1131)  BP: 92/66 (03/21/23 1131)  SpO2: 99 % (03/21/23 1131) Vital Signs (24h Range):  Temp:  [97.5 °F (36.4 °C)-98.8 °F (37.1 °C)] 98.4 °F (36.9 °C)  Pulse:  [] 94  Resp:  [16-20] 16  SpO2:  [93 %-100 %] 99 %  BP: ()/(52-69) 92/66     Patient Vitals for the past 72 hrs (Last 3 readings):   Weight   03/21/23 0400 70 kg (154 lb 5.2 oz)   03/20/23 1452 71.7 kg (158 lb)   03/20/23 1100 72 kg (158 lb 11.7 oz)     Body mass index is 24.91 kg/m².      Intake/Output Summary (Last 24 hours) at 3/21/2023 1145  Last data filed at 3/21/2023 0815  Gross per 24 hour   Intake 600 ml   Output 950 ml   Net -350 ml       Hemodynamic Parameters:       Physical Exam  Constitutional:       Appearance: Normal appearance.   HENT:      Head: Atraumatic.   Cardiovascular:      Rate and Rhythm: Normal rate and regular rhythm.      Pulses: Normal pulses.      Heart sounds: Murmur heard.      Comments: JVP 8 cm   Pulmonary:      Effort: Pulmonary effort is normal.      Breath sounds: Normal breath sounds.   Abdominal:      General: Abdomen is flat. Bowel sounds are normal. There is no distension.   Musculoskeletal:          General: No swelling.      Cervical back: Normal range of motion and neck supple.   Skin:     Capillary Refill: Capillary refill takes 2 to 3 seconds.      Comments: R chest wall Pedro catheter infusing remodulin   Neurological:      Mental Status: She is alert.   Psychiatric:         Mood and Affect: Mood normal.       Significant Labs:  CBC:  Recent Labs   Lab 03/19/23 1852 03/20/23  0629 03/21/23  0616   WBC 4.89 5.01 4.60   RBC 4.29 3.67* 3.80*   HGB 11.2* 9.8* 10.1*   HCT 35.3* 29.9* 31.0*    170 183   MCV 82 82 82   MCH 26.1* 26.7* 26.6*   MCHC 31.7* 32.8 32.6     BNP:  Recent Labs   Lab 03/19/23 1852   BNP 67     CMP:  Recent Labs   Lab 03/19/23 1852 03/20/23  0629 03/21/23  0616   * 108 101   CALCIUM 9.5 9.2 9.2   ALBUMIN 3.6 3.2* 3.3*   PROT 7.4 6.1 6.4    139 138   K 4.7 3.8 3.7   CO2 18* 19* 19*   * 113* 114*   BUN 14 13 10   CREATININE 0.8 0.8 0.7   ALKPHOS 109 96 88   ALT 19 15 11   AST 25 11 10   BILITOT 2.0* 1.1* 0.9      Coagulation:   No results for input(s): PT, INR, APTT in the last 168 hours.  LDH:  No results for input(s): LDH in the last 72 hours.  Microbiology:  Microbiology Results (last 7 days)       ** No results found for the last 168 hours. **            I have reviewed all pertinent labs within the past 24 hours.    Estimated Creatinine Clearance: 91 mL/min (based on SCr of 0.7 mg/dL).    Diagnostic Results:  I have reviewed and interpreted all pertinent imaging results/findings within the past 24 hours.

## 2023-03-21 NOTE — SUBJECTIVE & OBJECTIVE
Past Medical History:   Diagnosis Date    Elevated liver enzymes     Essential (primary) hypertension     Heart failure, unspecified     Hypokalemia     Mixed hyperlipidemia     Neuropathic pain     Tx w/ tramadol & Lyrica    Pulmonary hypertension     Receiving Remodulin continuously through tunneled central line       Past Surgical History:   Procedure Laterality Date    APPENDECTOMY       SECTION      Transverse cut    HYSTERECTOMY  2017    TLH- bleeding    OOPHORECTOMY      RIGHT HEART CATHETERIZATION Right 2021    Procedure: INSERTION, CATHETER, RIGHT HEART;  Surgeon: Chloe Gregory MD;  Location: [facility] CATH [facility];  Service: Cardiology;  Laterality: Right;    RIGHT HEART CATHETERIZATION Right 3/16/2022    Procedure: INSERTION, CATHETER, RIGHT HEART;  Surgeon: Hu Silverman MD;  Location: [facility] CATH [facility];  Service: Cardiology;  Laterality: Right;    RIGHT HEART CATHETERIZATION Right 2022    Procedure: INSERTION, CATHETER, RIGHT HEART;  Surgeon: Chloe Gregory MD;  Location: [facility] CATH [facility];  Service: Cardiology;  Laterality: Right;    TUBAL LIGATION      VAGINAL DELIVERY  ; ; ;        Review of patient's allergies indicates:  No Known Allergies    Medications:  Continuous Infusions:   treprostinil (REMODULIN) infusion (NON-TITRATING) 90 ng/kg/min (23)    veletri/remodulin cassette      veletri/remodulin tubing       Scheduled Meds:   furosemide  20 mg Oral Daily    macitentan  10 mg Oral Daily    potassium chloride SA  20 mEq Oral Daily    pregabalin  75 mg Oral BID    riociguat  0.5 mg Oral TID    And    riociguat  2 mg Oral TID     PRN Meds:acetaminophen, loperamide, ondansetron, sodium chloride 0.9%    Family History       Problem Relation (Age of Onset)    Cancer Father          Tobacco Use    Smoking status: Former     Types: Cigars     Quit date: 2020     Years since quittin.3     Passive exposure: Past    Smokeless tobacco:  Never    Tobacco comments:     social smoker-light   Substance and Sexual Activity    Alcohol use: Yes     Alcohol/week: 2.0 standard drinks     Types: 2 Glasses of wine per week     Comment: socially- 2 or 3 times a week-2 glasses of wine    Drug use: Not Currently     Types: Marijuana     Comment: 2-6 MONTHS AGO    Sexual activity: Yes     Partners: Male     Birth control/protection: See Surgical Hx     Comment:        Review of Systems   Constitutional:  Positive for fatigue.   HENT: Negative.     Respiratory:  Positive for shortness of breath.    Cardiovascular:  Negative for chest pain.   Gastrointestinal:  Positive for diarrhea.   Musculoskeletal:         Leg pain   Skin: Negative.    Neurological:  Positive for weakness.   Psychiatric/Behavioral: Negative.     Objective:     Vital Signs (Most Recent):  Temp: 98.6 °F (37 °C) (23 1606)  Pulse: 94 (23 1606)  Resp: 18 (23 1606)  BP: 109/76 (23 1606)  SpO2: 98 % (23 1606) Vital Signs (24h Range):  Temp:  [97.5 °F (36.4 °C)-98.8 °F (37.1 °C)] 98.6 °F (37 °C)  Pulse:  [] 94  Resp:  [16-20] 18  SpO2:  [93 %-100 %] 98 %  BP: ()/(52-76) 109/76     Weight: 70 kg (154 lb 5.2 oz)  Body mass index is 24.91 kg/m².    Physical Exam  Vitals and nursing note reviewed.   Constitutional:       General: She is not in acute distress.  HENT:      Head: Normocephalic.   Eyes:      Pupils: Pupils are equal, round, and reactive to light.   Cardiovascular:      Rate and Rhythm: Normal rate.   Pulmonary:      Effort: Pulmonary effort is normal. No respiratory distress.      Comments: On RA  Abdominal:      General: There is no distension.   Musculoskeletal:      Right lower leg: Edema present.      Left lower leg: Edema present.   Neurological:      Mental Status: She is alert and oriented to person, place, and time.   Psychiatric:         Mood and Affect: Mood is depressed.      Comments: Cousin just        Review of  Symptoms      Symptom Assessment (ESAS 0-10 Scale)  Pain:  0  Dyspnea:  2  Anxiety:  0  Nausea:  0  Depression:  7  Anorexia:  0  Fatigue:  5  Insomnia:  0  Restlessness:  0  Agitation:  0     CAM / Delirium:  Negative  Constipation:  Negative  Diarrhea:  Positive      Bowel Management Plan (BMP):  Yes      Performance Status:  50    Living Arrangements:  Lives with spouse    Psychosocial/Cultural:   See Palliative Psychosocial Note: No  . Has 3 living daughters and a  son. Has grandchildren who she enjoys spending time with. Likes teaching them to play different sports. Enjoys gardening   **Primary  to Follow**  Palliative Care  Consult: No    Spiritual:  F - Leticia and Belief:  Yes  I - Importance:  Yes  A - Address in Care:  Yes      Advance Care Planning   Advance Directives:   Living Will: No    [place]: No    Do Not Resuscitate Status: No    Medical Power of : No      Decision Making:  Patient answered questions  Goals of Care: The patient endorses that what is most important right now is to focus on remaining as independent as possible and extending life as long as possible    Accordingly, we have decided that the best plan to meet the patient's goals includes continuing with treatment       Significant Labs: All pertinent labs within the past 24 hours have been reviewed.  CBC:   Recent Labs   Lab 23  0616   WBC 4.60   HGB 10.1*   HCT 31.0*   MCV 82        BMP:  Recent Labs   Lab 23  0616         K 3.7   *   CO2 19*   BUN 10   CREATININE 0.7   CALCIUM 9.2   MG 1.8     LFT:  Lab Results   Component Value Date    AST 10 2023    ALKPHOS 88 2023    BILITOT 0.9 2023     Albumin:   Albumin   Date Value Ref Range Status   2023 3.3 (L) 3.5 - 5.2 g/dL Final     Protein:   Total Protein   Date Value Ref Range Status   2023 6.4 6.0 - 8.4 g/dL Final     Lactic acid:   Lab Results   Component Value Date     LACTATE 1.3 12/05/2021    LACTATE 1.7 12/04/2021       Significant Imaging: I have reviewed all pertinent imaging results/findings within the past 24 hours.    CT head 3/19/23  Impression:     1. No CT evidence of acute intracranial abnormality. Clinical correlation and further evaluation as warranted noting if there is high clinical concern for acute ischemia, further assessment with MRI is advised if there are no clinical contraindications.  2. Ethmoid and left maxillary sinus disease noting essentially complete opacification of the visualized left maxillary sinus.

## 2023-03-21 NOTE — H&P
[facility]  Interventional Cardiology  H&P    Patient Name: Kristin Maier  MRN: [id number]  Admission Date: 3/19/2023  Code Status: Full Code   Attending Provider: Kahlil Cisneros MD  Primary Care Physician: Jorge A Stack MD  Principal Problem:<principal problem not specified>    Patient information was obtained from patient and past medical records.     Subjective:     Chief Complaint:  PH     HPI: 47/F with a WHO group 1 PH on triple therapy (Remodulin 36 ng/kg/min, adempas 2.5mg TID, opsumit 10mg daily) who presents via ER with multiple complaints. She feels like she felt on her last admission when her Remodulin cassette stopped working. Her discharge notes mentions a dose of 76 ng/kg/min, however, her current cassette dose is of 36 ng/kg/min.      She mentions generalized weakness, dyspnea, dizziness and headache. She mentions loose BM every two days. No change in diet. No issues with her medications. No fever or GI symptoms otherwise. She had a CT head that was negative for acute findings that could explain her symptoms. She denies angina or dyspnea at rest.      Her last RHC on 22 with RA: 10; RV: 70/9/10; PA: 72/ 29/ 43; PWP: 11. CO 4.29 and CI2.61. TPG 32; PVR  7.45.     Past Medical History:   Diagnosis Date    Elevated liver enzymes     Essential (primary) hypertension     Heart failure, unspecified     Hypokalemia     Mixed hyperlipidemia     Neuropathic pain     Tx w/ tramadol & Lyrica    Pulmonary hypertension     Receiving Remodulin continuously through tunneled central line       Past Surgical History:   Procedure Laterality Date    APPENDECTOMY       SECTION      Transverse cut    HYSTERECTOMY  2017    TLH- bleeding    OOPHORECTOMY      RIGHT HEART CATHETERIZATION Right 2021    Procedure: INSERTION, CATHETER, RIGHT HEART;  Surgeon: Chloe Gregory MD;  Location: [facility] CATH LAB;  Service: Cardiology;  Laterality: Right;    RIGHT HEART  CATHETERIZATION Right 3/16/2022    Procedure: INSERTION, CATHETER, RIGHT HEART;  Surgeon: Hu Silverman MD;  Location: [facility] CATH LAB;  Service: Cardiology;  Laterality: Right;    RIGHT HEART CATHETERIZATION Right 2022    Procedure: INSERTION, CATHETER, RIGHT HEART;  Surgeon: Chloe Gregory MD;  Location: [facility] CATH LAB;  Service: Cardiology;  Laterality: Right;    TUBAL LIGATION      VAGINAL DELIVERY  ; ; 1996       Review of patient's allergies indicates:  No Known Allergies    PTA Medications   Medication Sig    furosemide (LASIX) 20 MG tablet Take 1 tablet (20 mg total) by mouth once daily.    loperamide (IMODIUM) 2 mg capsule Take 1 capsule (2 mg total) by mouth 4 (four) times daily as needed for Diarrhea.    ondansetron (ZOFRAN) 4 MG tablet Take 1 tablet (4 mg total) by mouth every 8 (eight) hours as needed for Nausea.    OPSUMIT 10 mg Tab TAKE 1 TABLET BY MOUTH DAILY. DO NOT HANDLE IF PREGNANT. DO NOT SPLIT, CRUSH, OR CHEW. REVIEW MEDICATION GUIDE.    potassium chloride SA (K-DUR,KLOR-CON) 10 MEQ tablet Take 2 tablets (20 mEq total) by mouth once daily.    pregabalin (LYRICA) 75 MG capsule Take 2 capsules (150 mg total) by mouth 2 (two) times daily.    REMODULIN 5 mg/mL Soln     riociguat (ADEMPAS) 2.5 mg tablet Take 1 tablet three times a day. Dose changes may occur throughout the course of therapy as directed by your prescriber.    sodium chloride 0.9% SolP 100 mL with treprostinil 1 mg/mL Soln 6,000,000 ng Inject 2,145 ng/min into the vein continuous.    traMADoL (ULTRAM) 50 mg tablet Take 1 tablet (50 mg total) by mouth every 6 (six) hours as needed for Pain.     Family History       Problem Relation (Age of Onset)    Cancer Father          Tobacco Use    Smoking status: Former     Types: Cigars     Quit date: 2020     Years since quittin.3     Passive exposure: Past    Smokeless tobacco: Never    Tobacco comments:     social smoker-light   Substance and Sexual  Activity    Alcohol use: Yes     Alcohol/week: 2.0 standard drinks     Types: 2 Glasses of wine per week     Comment: socially- 2 or 3 times a week-2 glasses of wine    Drug use: Not Currently     Types: Marijuana     Comment: 2-6 MONTHS AGO    Sexual activity: Yes     Partners: Male     Birth control/protection: See Surgical Hx     Comment:      Review of Systems   Constitutional: Negative.   HENT: Negative.     Eyes: Negative.    Cardiovascular:  Positive for dyspnea on exertion. Negative for chest pain, irregular heartbeat, leg swelling, near-syncope, orthopnea, paroxysmal nocturnal dyspnea and syncope.        Occasional leg swelling, resolves w/o diuretics   Respiratory: Negative.     Endocrine: Negative.    Hematologic/Lymphatic: Negative.    Skin: Negative.    Musculoskeletal:  Positive for myalgias. Negative for falls and joint swelling.        Foot and leg pain related to medication SE   Gastrointestinal:  Positive for diarrhea. Negative for abdominal pain, dysphagia, heartburn, nausea and vomiting.        Occasional nausea and diarrhea related to medication   Genitourinary: Negative.    Neurological:  Positive for excessive daytime sleepiness. Negative for dizziness, headaches and loss of balance.   Psychiatric/Behavioral: Negative.     Objective:     Vital Signs (Most Recent):  Temp: 98.3 °F (36.8 °C) (03/21/23 0725)  Pulse: 80 (03/21/23 0725)  Resp: 20 (03/21/23 0725)  BP: 104/69 (03/21/23 0725)  SpO2: (!) 93 % (03/21/23 0725)   Vital Signs (24h Range):  Temp:  [97.5 °F (36.4 °C)-98.8 °F (37.1 °C)] 98.3 °F (36.8 °C)  Pulse:  [] 80  Resp:  [16-20] 20  SpO2:  [93 %-100 %] 93 %  BP: ()/(52-72) 104/69     Weight: 70 kg (154 lb 5.2 oz)  Body mass index is 24.91 kg/m².    SpO2: (!) 93 %         Intake/Output Summary (Last 24 hours) at 3/21/2023 0822  Last data filed at 3/21/2023 0600  Gross per 24 hour   Intake 600 ml   Output 800 ml   Net -200 ml       Lines/Drains/Airways       Central  Venous Catheter Line  Duration             Tunneled Central Line Insertion/Assessment - Single Lumen  03/16/22 1021 right subclavian 369 days              Peripheral Intravenous Line  Duration                  Peripheral IV - Single Lumen 03/20/23 0203 22 G Anterior;Left Forearm 1 day                    Physical Exam  Constitutional:       General: She is not in acute distress.     Appearance: Normal appearance. She is normal weight. She is not ill-appearing.   HENT:      Head: Normocephalic and atraumatic.      Nose: Nose normal.   Eyes:      Extraocular Movements: Extraocular movements intact.      Pupils: Pupils are equal, round, and reactive to light.   Neck:      Vascular: No JVD.   Cardiovascular:      Rate and Rhythm: Normal rate and regular rhythm.   Chest:      Comments: R chest wall Pedro Catheter infusing Remodulin. Dressing is C/D/I  Musculoskeletal:      Cervical back: Normal range of motion and neck supple.   Neurological:      Mental Status: She is alert.       Significant Labs: BMP:   Recent Labs   Lab 03/19/23 1852 03/20/23  0629 03/21/23  0616   * 108 101    139 138   K 4.7 3.8 3.7   * 113* 114*   CO2 18* 19* 19*   BUN 14 13 10   CREATININE 0.8 0.8 0.7   CALCIUM 9.5 9.2 9.2   MG  --  1.8 1.8   , CMP   Recent Labs   Lab 03/19/23 1852 03/20/23  0629 03/21/23  0616    139 138   K 4.7 3.8 3.7   * 113* 114*   CO2 18* 19* 19*   * 108 101   BUN 14 13 10   CREATININE 0.8 0.8 0.7   CALCIUM 9.5 9.2 9.2   PROT 7.4 6.1 6.4   ALBUMIN 3.6 3.2* 3.3*   BILITOT 2.0* 1.1* 0.9   ALKPHOS 109 96 88   AST 25 11 10   ALT 19 15 11   ANIONGAP 10 7* 5*   , CBC   Recent Labs   Lab 03/19/23 1852 03/20/23  0629 03/21/23  0616   WBC 4.89 5.01 4.60   HGB 11.2* 9.8* 10.1*   HCT 35.3* 29.9* 31.0*    170 183   , and INR No results for input(s): INR, PROTIME in the last 48 hours.    Significant Imaging: Echocardiogram: 2D echo with color flow doppler:   Results for orders placed or  performed during the hospital encounter of 11/17/21   2D echo with color flow doppler    Narrative           Transthoracic Echocardiogram Report    Patient Name  : ELVIS HUTTON  [facility]  [address] [place] [address]    Phone: [phone]    Interpreting Physician: WHITLEY HICKEY MD  Demographics:  Name:  ELVIS HUTTON   YOB: 1975  Age/Sex:  45, Female   Height: 5 ft 2 in  Weight:   132 pounds    BSA: 1.63 cc/m?  BMI:      24.1   Date of Study: 11/18/2021  Medical Record No:   [id number]   Location: [facility]  Referring Physician:   SATISH ALCANTARA   Technologist: Denise Oseguera RDCS  Study:   Trans Thoracic Echocardiogram  Study Quality:   Good  Procedure  Type: Adult TTE (Date Study Ordered : 11/18/2021)  Components: 2D,Color Flow Doppler,Doppler,M-mode,TDI(Tissue Doppler   Imaging)   Referral diagnosis  Abnormal CXR  Hemodynamics  Heart rate: 88 beats/min  Blood pressure:  130/99 mmHg  ECG:     Final Impressions  1. The study quality is good.   2. Global left ventricular systolic function is mildly reduced.  Left   ventricular ejection fraction is 40-45%.   3. Severely dilated right ventricle with severely reduced function.  4. The right atrium is severly enlarged. Right atrial diameter is 5.7   cms.    5. Mild (1+) aortic regurgitation. Severe (4+) tricuspid   regurgitation. Trace pulmonic regurgitation.    6. Flattening of the interventricular septum consistent with right   ventricular pressure overload.     7. Severe pulmonary hypertension.  The pulmonary artery systolic   pressure is 121 mmHg.    8. Stage 1 diastolic dysfunction.  9. A small pericardial effusion is noted.     Findings    Left Atrium  The left atrium is decreased in size. Left atrial diameter is 2.5 cms.     Right Atrium  The right atrium is severly enlarged. Right atrial diameter is 5.7   cms.      Left Ventricle  The left ventricle is decreased in size. Left ventricular diastolic   dimension is 3.64 cms.  Left ventricular systolic dimension is 2.6 cms.   Left ventricular diastolic septal thickness is 1.3 cms. Left   ventricular diastolic postero basal free wall thickness is 1.4 cms.   Global left ventricular systolic function is mildly decreased. The   left ventricular ejection fraction is 40%. Left ventricular outflow   tract VTI is 8.8 cms. Left ventricular outflow tract mean velocity is   0.5 m/s. Left ventricular mean gradient is 1 mmHg.      Right Ventricle  The right ventricle is severely enlarged. Right ventricular systolic   function is severely decreased. Right ventricular diastolic dimension   is 5.3 cms. Right ventricular systolic pressure is 121 mmHg.      Atrial Septum  The atrial septum is normal.     Ventricular Septum  The ventricular septum is normal.     Pulmonary Artery  The pulmonary artery systolic pressure is 121 mmHg.     Pulmonary Vein  The pulmonary vein appears normal.     IVC  The inferior vena cava is moderately increased in size.     Pulmonic Valve  Evidence of pulmonic regurgitation is noted. Trace pulmonic   regurgitation. The peak velocity is 0.4 m/s. The mean velocity is 0.3   m/s. The peak trans pulmonic gradient is 1 mmHg.      Tricuspid Valve  Evidence of tricuspid regurgitation is present. Severe (4+) tricuspid   regurgitation. The peak tricuspid regurgitant velocity is 5.3 m/s. The   peak trans tricuspid gradient is 100 mmHg. TR ERO is 0.3 cm^2.     Mitral Valve  The pressure half time is 44 ms. The deceleration time is 149 ms. The   E velocity is 0.3 m/s. The A velocity is 0.7 m/s.      Aortic Valve  The aortic valve is tricuspid. Noted evidence of aortic regurgitation.   Mild (1+) aortic regurgitation. The trans-aortic peak velocity is 0.8   m/s. The trans-aortic peak gradient is 3 mmHg. The trans-aortic mean   velocity is 0.6 m/s. The trans-aortic mean gradient is 2 mmHg. Aortic   valve VTI measures 12.8 cms.      Aorta  Aortic root diameter is 3.2 cms. Aortic arch is  normal.    Pericardium  A small pericardial effusion is noted.     Measurements  Left Atrium  Diameter   2.5cm(s)    Right Atrium  Diameter   5.7cm(s)    Left Ventricle  End Diastolic Dimension   3.64cm(s)  End Systolic Dimension   2.6cm(s)  Posterior Basal Free Wall Thickness   1.4cm(s)  End Diastolic Septal Thickness   1.3cm(s)  Ejection Fraction   40%    Right Ventricle  Diastolic Diameter   5.3cm(s)    Pulmonic Valve  Peak Pulmonic Velocity   0.4m/s  Mean Pulmonic Velocity   0.3m/s  Peak Trans Pulmonic Gradient   1mmHg    Tricuspid Valve  Peak Tricuspid Regurgitant Velocity   5.3m/s  Peak Tricuspid Regurgitant Gradient   100mmHg  Pulmonary Artery Systolic Pressure  121mmHg    Mitral Valve  Pressure Half Time   44ms  Deceleration Time   149ms  A Velocity   0.7m/s  E Velocity   0.3m/s  Area By Pressure Half Time   5cm?    Aortic Valve  Trans Aortic Peak Velocity   0.8m/s  Trans Aortic Mean Velocity   0.6m/s  Trans Aortic Peak Gradient   3mmHg  Trans Aortic Mean Gradient   2mmHg  Aortic Valve VTI   12.8cm(s)        Electronically Authenticated by  WHITLEY HICKEY MD  11/18/2021 , 09:14      and Transthoracic echo (TTE) complete (Cupid Only):   Results for orders placed or performed during the hospital encounter of 03/19/23   Echo   Result Value Ref Range    BSA 1.83 m2    TDI SEPTAL 0.06 m/s    LA WIDTH 3.37 cm    TDI LATERAL 0.12 m/s    LVIDd 3.56 3.5 - 6.0 cm    IVS 1.03 0.6 - 1.1 cm    Posterior Wall 0.93 0.6 - 1.1 cm    LVIDs 2.22 2.1 - 4.0 cm    FS 38 28 - 44 %    LA volume 44.24 cm3    Sinus 3.08 cm    STJ 3.22 cm    Ascending aorta 3.06 cm    LV mass 103.00 g    LA size 2.82 cm    RVDD 5.50 cm    TAPSE 1.88 cm    Left Ventricle Relative Wall Thickness 0.52 cm    Mean e' 0.09 m/s    LVOT diameter 1.98 cm    LVOT area 3.1 cm2    TR Max Josiah 4.50 m/s    LV Systolic Volume 16.51 mL    LV Systolic Volume Index 9.1 mL/m2    LV Diastolic Volume 52.92 mL    LV Diastolic Volume Index 29.24 mL/m2    LA Volume Index 24.4  mL/m2    LV Mass Index 57 g/m2    RA Major Axis 6.03 cm    Left Atrium Minor Axis 5.11 cm    Left Atrium Major Axis 5.90 cm    Triscuspid Valve Regurgitation Peak Gradient 81 mmHg    RA Width 4.87 cm    Right Atrial Pressure (from IVC) 8 mmHg    EF 60 %    RV S' 13 cm/s    TV rest pulmonary artery pressure 89 mmHg    Narrative    · The left ventricle is normal in size with concentric remodeling and   normal systolic function. The estimated ejection fraction is 60%.  · Severe right ventricular enlargement with mildly to moderately reduced   right ventricular systolic function.  · Indeterminate left ventricular diastolic function.  · Severe right atrial enlargement.  · Moderate to severe tricuspid regurgitation.  · Small pericardial effusion.  · The estimated PA systolic pressure is 89 mmHg.  · Intermediate central venous pressure (8 mmHg).        Assessment and Plan:     Active Diagnoses:    Diagnosis Date Noted POA    WHO group 1 pulmonary arterial hypertension [I27.21] 02/11/2022 Yes      Problems Resolved During this Admission:         PH    VTE Risk Mitigation (From admission, onward)           Ordered     Place MARGARITA hose  Until discontinued         03/19/23 2313     IP VTE LOW RISK PATIENT  Once         03/19/23 2313                  OK to proceed with RHC    Kahlil Cisneros MD  Interventional Cardiology   [facility] - Transplant Stepdown

## 2023-03-21 NOTE — PLAN OF CARE
K+=3.7. Cr=0.7. [facility] completed this am. Stated leg heaviness resolved. Denies SOB. Afebrile. Plan to increase Remodulin dose out-pt and discharge pt home this evening.

## 2023-03-27 ENCOUNTER — TELEPHONE (OUTPATIENT)
Dept: TRANSPLANT | Facility: CLINIC | Age: 48
End: 2023-03-27
Payer: MEDICAID

## 2023-04-06 ENCOUNTER — TELEPHONE (OUTPATIENT)
Dept: TRANSPLANT | Facility: CLINIC | Age: 48
End: 2023-04-06
Payer: MEDICAID

## 2023-04-06 NOTE — TELEPHONE ENCOUNTER
"NN contacted patient.  Patient states that she is "not feeling really good today, and I was wondering if I could get my Tramadol early." Patient states that she is not due to get Tramadol until Sunday.    NN advised patient that provider will be contacted to get recommendation.  Patient asked to be called back with the recommendation.    0826  NN called back and asked patient what her current dose of Remodulin is. Patient could not tell NN and said that she would call office back.    0906  Patient contacted NN back with dose. 90 ng/kg/min is her dose.  Patient states that she has been having bad days.  Patient asked why her last prescription was changed from 2 tablets a day and take one extra PRN to just 2 tablets a day.  NN advised patient that it is insurance that governs how much Tramadol she gets, and that the maximum they pay for is 45 tablets for 30 days.  NN also encouraged patient to keep appointment with palliative care on 4/20/2023 as they would also help with pain management.  "

## 2023-04-07 RX ORDER — TRAMADOL HYDROCHLORIDE 50 MG/1
50 TABLET ORAL EVERY 6 HOURS PRN
Qty: 60 TABLET | Refills: 1 | Status: SHIPPED | OUTPATIENT
Start: 2023-04-07 | End: 2023-05-31 | Stop reason: SDUPTHER

## 2023-04-11 ENCOUNTER — DOCUMENTATION ONLY (OUTPATIENT)
Dept: TRANSPLANT | Facility: CLINIC | Age: 48
End: 2023-04-11
Payer: MEDICAID

## 2023-04-11 NOTE — PROGRESS NOTES
04/11/2023   Kristinpio Dowell Maier  [address] [facility]        OUTPATIENT RIGHT HEART CATHETERIZATION INSTRUCTIONS     You have been scheduled for a procedure in the catheterization lab on 5/25/2023.      Please report to the Cardiology Waiting Area on the Third floor of the hospital and check in at 8:30 am.    You will then be taken to the SSCU (Short Stay Cardiac Unit) and prepared for your procedure. Please be aware that this is not the time of your procedure but the time you are to arrive. The procedures are scheduled on an hourly basis; however, emergency cases take precedence over all other cases.      You may eat a light meal before your procedure.    You may take your regular morning medications with water. If there are any medications that you should not take you will be instructed to hold them that morning.   If you are on Pradaxa, Eliquis or Coumadin , you can hold them 3 days prior to your procedure.  CALL US if you are on blood thinners for instructions. [phone]  Do not stop your Aspirin, Plavix, Effient, or Brilinta.    The procedure will take 30 minutes to perform. After the procedure, you will return to SSCU on the third floor of the hospital. Your family may remain in the room with you during this time.     You will be monitored for bleeding from the puncture site.  Your dressing may be removed the next day and dressed with a Band-Aid.  You may be able to be discharged home that same afternoon if there is someone to drive you home and there were no complications.     You may need to be admitted to the hospital after your procedure, so pack an overnight bag. Your doctor will determine, based on your progress, the date and time of your discharge. The results of your procedure will be discussed with you before you are discharged. Any further testing or procedures will be scheduled for you either before you leave or you will be called with these appointments.      If you should have any  questions, concerns, or need to change the date of your procedure, please call  Heart Transplant office and ask for your nurse. [phone]    Special Instructions:     THE ABOVE INSTRUCTIONS WERE GIVEN TO THE PATIENT VERBALLY AND THEY VERBALIZED UNDERSTANDING.  THEY DO NOT REQUIRE ANY SPECIAL NEEDS AND DO NOT HAVE ANY LEARNING BARRIERS.     Directions for Reporting to Cardiology Waiting Area in the Hospital  If you park in the Parking Garage:  Take elevators to the1st floor of the parking garage.  Continue past the gift shop, coffee shop, and piano.  Take a right and go to the gold elevators. (Elevator B)  Take the elevator to the 3rd floor.  Follow the arrow on the sign on the wall that says Cath Lab Registration/EP Lab Registration.  Follow the long hallway all the way around until you come to a big open area.  This is the registration area.  Check in at Reception Desk.     OR     If family is dropping you off:  Have them drop you off at the front of the Hospital under the green overhang.  Enter through the doors and take a right.  Take the E elevators to the 3rd floor Cardiology Waiting Area.  Check in at the Reception Desk in the waiting room.

## 2023-05-02 DIAGNOSIS — I27.20 PULMONARY HYPERTENSION: Primary | ICD-10-CM

## 2023-05-02 DIAGNOSIS — Z76.82 LUNG TRANSPLANT CANDIDATE: ICD-10-CM

## 2023-05-02 DIAGNOSIS — I27.0 PRIMARY PULMONARY HYPERTENSION: ICD-10-CM

## 2023-05-02 NOTE — PROGRESS NOTES
Patient to RTC 6 months follow up with PFTs, 6MWT per Dr. Rachel. Orders entered per provider request: 6MWT, PFTs.

## 2023-05-03 ENCOUNTER — TELEPHONE (OUTPATIENT)
Dept: TRANSPLANT | Facility: CLINIC | Age: 48
End: 2023-05-03
Payer: MEDICAID

## 2023-05-03 ENCOUNTER — PATIENT MESSAGE (OUTPATIENT)
Dept: TRANSPLANT | Facility: CLINIC | Age: 48
End: 2023-05-03
Payer: MEDICAID

## 2023-05-03 NOTE — TELEPHONE ENCOUNTER
"Patient called NN to discuss her Remodulin titration.  She asked if she could titrate slower than at cassette change.  NN advised that yes, and to let us and specialty pharmacy know what dose that she is on, frequently.  Patient acknowledged. Reminder message sent to patient portal.  NN discussed with patient the role of palliative care and her rescheduled appointment. Patient states that "they rescheduled that the day before my appointment, she wasn't going to be in the office."    "

## 2023-05-08 RX ORDER — POTASSIUM CHLORIDE 750 MG/1
20 TABLET, EXTENDED RELEASE ORAL DAILY
Qty: 60 TABLET | Refills: 11 | Status: SHIPPED | OUTPATIENT
Start: 2023-05-08

## 2023-05-08 RX ORDER — FUROSEMIDE 20 MG/1
TABLET ORAL
Qty: 30 TABLET | Refills: 11 | Status: SHIPPED | OUTPATIENT
Start: 2023-05-08 | End: 2023-07-31 | Stop reason: SDUPTHER

## 2023-05-16 ENCOUNTER — PATIENT MESSAGE (OUTPATIENT)
Dept: TRANSPLANT | Facility: CLINIC | Age: 48
End: 2023-05-16
Payer: MEDICAID

## 2023-05-16 ENCOUNTER — TELEPHONE (OUTPATIENT)
Dept: TRANSPLANT | Facility: CLINIC | Age: 48
End: 2023-05-16
Payer: MEDICAID

## 2023-05-16 NOTE — TELEPHONE ENCOUNTER
"Patient called PH office to ask advice.  Patient states that she has not been "doing things," like she wants to do. Did mention that she feels "depressed."  Patient states that she has reached "the max" dose on her dose sheet: 110 ng/kg/min.  Patient states that she has had worsening chest discomfort and shortness of breath at night, but not at the moment.  Hand and joint pain have subsided since reaching the highest dose on dosing sheet.  NN advised patient that she does not have to suffer with symptoms and if she is having chest pain and shortness of breath, then she should be evaluated. Patient states that she is going to wait a few more hours and will let NN know.  Sent message to patient's portal concerning support that she could get today with [facility] website and support number.  "

## 2023-05-18 ENCOUNTER — PATIENT MESSAGE (OUTPATIENT)
Dept: TRANSPLANT | Facility: CLINIC | Age: 48
End: 2023-05-18
Payer: MEDICAID

## 2023-05-24 ENCOUNTER — OFFICE VISIT (OUTPATIENT)
Dept: PALLIATIVE MEDICINE | Facility: CLINIC | Age: 48
End: 2023-05-24
Payer: MEDICAID

## 2023-05-24 DIAGNOSIS — M25.50 ARTHRALGIA, UNSPECIFIED JOINT: ICD-10-CM

## 2023-05-24 DIAGNOSIS — G47.09 OTHER INSOMNIA: Primary | ICD-10-CM

## 2023-05-24 DIAGNOSIS — Z51.5 PALLIATIVE CARE ENCOUNTER: ICD-10-CM

## 2023-05-24 DIAGNOSIS — F41.9 ANXIETY: ICD-10-CM

## 2023-05-24 DIAGNOSIS — I27.20 PULMONARY HYPERTENSION: ICD-10-CM

## 2023-05-24 DIAGNOSIS — R63.0 ANOREXIA: ICD-10-CM

## 2023-05-24 PROCEDURE — 1159F MED LIST DOCD IN RCRD: CPT | Mod: CPTII,95,NTX, | Performed by: STUDENT IN AN ORGANIZED HEALTH CARE EDUCATION/TRAINING PROGRAM

## 2023-05-24 PROCEDURE — 99497 ADVNCD CARE PLAN 30 MIN: CPT | Mod: 95,NTX,, | Performed by: STUDENT IN AN ORGANIZED HEALTH CARE EDUCATION/TRAINING PROGRAM

## 2023-05-24 PROCEDURE — 99497 PR ADVNCD CARE PLAN 30 MIN: ICD-10-PCS | Mod: 95,NTX,, | Performed by: STUDENT IN AN ORGANIZED HEALTH CARE EDUCATION/TRAINING PROGRAM

## 2023-05-24 PROCEDURE — 99215 OFFICE O/P EST HI 40 MIN: CPT | Mod: 95,NTX,, | Performed by: STUDENT IN AN ORGANIZED HEALTH CARE EDUCATION/TRAINING PROGRAM

## 2023-05-24 PROCEDURE — 99215 PR OFFICE/OUTPT VISIT, EST, LEVL V, 40-54 MIN: ICD-10-PCS | Mod: 95,NTX,, | Performed by: STUDENT IN AN ORGANIZED HEALTH CARE EDUCATION/TRAINING PROGRAM

## 2023-05-24 PROCEDURE — 1159F PR MEDICATION LIST DOCUMENTED IN MEDICAL RECORD: ICD-10-PCS | Mod: CPTII,95,NTX, | Performed by: STUDENT IN AN ORGANIZED HEALTH CARE EDUCATION/TRAINING PROGRAM

## 2023-05-24 RX ORDER — MIRTAZAPINE 7.5 MG/1
7.5 TABLET, FILM COATED ORAL NIGHTLY
Qty: 30 TABLET | Refills: 11 | Status: SHIPPED | OUTPATIENT
Start: 2023-05-24 | End: 2023-06-21 | Stop reason: SDUPTHER

## 2023-05-24 NOTE — PROGRESS NOTES
Palliative Medicine Clinic Note      Consult Requested By: Sho Guy    Primary Care Physician:   Jorge A Stack MD    Reason for Consult: Advance care planning and symptom management in the setting of Pulmonary Hypertension       ASSESSMENT/PLAN:     Plan/Recommendations:  Diagnoses and all orders for this visit:      Pulmonary hypertension  -     Ambulatory referral/consult to [facility] Palliative Care  -Pulmonary HTN, WHO group 1, on remodulin.   -RHC 3/21/22 indicative on worsening disease  -currently on Opsumit, Adempas and Remodulin      Anorexia  -   Trial of   mirtazapine (REMERON) 7.5 MG Tab; Take 1 tablet (7.5 mg total) by mouth every evening.      Anxiety /Other insomnia  -     mirtazapine (REMERON) 7.5 MG Tab; Take 1 tablet (7.5 mg total) by mouth every evening.  -if mirtazapine not helping with anxiety will consider lexapro  -Referred to [facility] behavioral health and  for support      Arthralgia, unspecified joint  -worse with change in medications  -currently on tramadol 50 mg sometimes she needs to take 4 at a time for it to help  -discussed the need for in-person appointment to take over her pain management      Palliative care encounter  Medicolegal: Has decision making capacity.   is surrogate decision maker.   She shared that for HCPOA she would named her  as 1st agent and her daughter Nicky as her 2nd agent.     Psychosocial:  support system consists of  and daughters     Spiritual: Adventist      Understanding of disease and Illness Trajectory: Patient  has  adequate understanding of her illness, they can benefit from continued education on what to expect in the future.      Advance Care Planning   Advance Directives:   Living Will: No    [place]POST: No    Do Not Resuscitate Status: No    Medical Power of : No    Agent's Name:  Juju Maier   Agent's Contact Number:  [phone]    Decision Making:  Patient answered questions  Goals of Care: Date:  05/29/2023    I initiated the process of advance care planning today and explained the importance of this process to the patient.  I introduced the concept of advance directives to the patient, as well. Then the patient received detailed information about the importance of designating a Health Care Power of  (HCPOA). She was also instructed to communicate with this person about their wishes for future healthcare, should she become sick and lose decision-making capacity. The patient has not previously appointed a HCPOA. After our discussion, the patient shared that she would named her  Juju Bush as her 1st agent and her daughter Navya Case as her 2nd agent.      We did specifically address the patient's likely prognosis, she shared that she knows that her disease is progressive any irreversible.  We explored the patient's values and preferences for future care.  We discussed her wishes for the very end of life  and she shared that she would not want to be resuscitated, she is agreeable with a DNR code status, however the patient endorses that what is most important right now is to focus on spending time at home, remaining as independent as possible, and improvement in condition but with limits to invasive therapies.  Most important to her is to be there for her family.  She wants to be able to regain some strength and energy by controlling her pain so that she can be there for her grand kids and family.      Accordingly, we have decided that the best plan to meet the patient's goals includes continuing with treatment                         17 min time was spent on advance care planning, goals of care discussion, emotional support, formulating and communicating prognosis and goals of care, exploring burden/benefit of various approaches of treatment.     Follow up: 1 week     Plan discussed with Sho Guy     SUBJECTIVE:     History obtained from: Patient      complaint: No chief  complaint on file.      Disease History:  Pulmonary HTN, WHO group 1, on remodulin. RHC 3/21/22 indicative on worsening disease      History of Present Illness / Interval History:  Kristin Maier is 47 y.o. year old female presenting with PAH.  Referred to Palliative Care for evaluation and management of physical symptoms, advance care planning,, and additional support.. female attended the appointment alone     Patient states that she has significant pain in her joints fingers legs elbow and ankles.  The pain is worse at night improves with tramadol 2 to 3 times a day.  And message in her hands legs.     She feels her shortness of breath has worsened.  She was an active person before was able to walk outside.  She is still able to go to the grocery store however needs a scooter.  With movement she has tachycardia heart rate 120-150 and sometimes gets chest pain.  Today was not a good day, she becomes short of breath with walking in the living room.     Nausea approximately once a week Zofran can help.    All of her symptoms are worse when she changes her medication and have worsened lately because of the medication titration.    She has anxiety at night when she sits quietly.    She hopes to be able to have more strength more energy more time and to decrease her amount of pain.  Her main worries not being here for her grand kids      [place] Symptom Assessment System  Pain Score: Ten  Nausea Score: 10  Depression Score: 8  Anxiety Score: 8  Appetite Score: 6  Dyspnea Score: 6        Review of Symptoms      Symptom Assessment (ESAS 0-10 Scale)  Pain:  10  Dyspnea:  6  Anxiety:  8  Nausea:  10  Depression:  8  Anorexia:  6  Fatigue:  0  Insomnia:  0  Restlessness:  0  Agitation:  0     CAM / Delirium:  Negative  Constipation:  Negative  Diarrhea:  Negative      Pain Assessment:    Location(s): generalized (joint)    Generalized       Location: generalized        Quality: Aching        Quantity: 10/10 in  intensity        Chronicity: Onset 0 month(s) ago, unchanged        Aggravating Factors: None        Alleviating Factors: Acetaminophen        Associated Symptoms: None    Modified Jessica Scale:  3    Performance Status:  60    Living Arrangements:  Lives with family    Psychosocial/Cultural:   See Palliative Psychosocial Note: Yes  . Has 3 living daughters and a  son. Has grandchildren who she enjoys spending time with. Likes teaching them to play different sports. Enjoys gardening   **Primary  to Follow**  Palliative Care  Consult: Yes      Previous experience or exposure to a serious illness: Yes        Medications:    Current Outpatient Medications:     furosemide (LASIX) 20 MG tablet, TAKE 1 TABLET BY MOUTH EVERY DAY, Disp: 30 tablet, Rfl: 11    loperamide (IMODIUM) 2 mg capsule, Take 1 capsule (2 mg total) by mouth 4 (four) times daily as needed for Diarrhea., Disp: 30 capsule, Rfl: 3    ondansetron (ZOFRAN) 4 MG tablet, Take 1 tablet (4 mg total) by mouth every 8 (eight) hours as needed for Nausea., Disp: 30 tablet, Rfl: 6    OPSUMIT 10 mg Tab, TAKE 1 TABLET BY MOUTH DAILY. DO NOT HANDLE IF PREGNANT. DO NOT SPLIT, CRUSH, OR CHEW. REVIEW MEDICATION GUIDE., Disp: 30 tablet, Rfl: 10    potassium chloride SA (K-DUR,KLOR-CON M) 10 MEQ tablet, TAKE 2 TABLETS (20 MEQ TOTAL) BY MOUTH ONCE DAILY., Disp: 60 tablet, Rfl: 11    REMODULIN 5 mg/mL Soln, , Disp: , Rfl:     riociguat (ADEMPAS) 2.5 mg tablet, Take 1 tablet three times a day. Dose changes may occur throughout the course of therapy as directed by your prescriber., Disp: 90 tablet, Rfl: 11    sodium chloride 0.9% SolP 100 mL with treprostinil 1 mg/mL Soln 6,000,000 ng, Inject 2,145 ng/min into the vein continuous., Disp: , Rfl:     traMADoL (ULTRAM) 50 mg tablet, Take 1 tablet (50 mg total) by mouth every 6 (six) hours as needed for Pain., Disp: 60 tablet, Rfl: 1    mirtazapine (REMERON) 7.5 MG Tab, Take 1 tablet (7.5 mg  total) by mouth every evening., Disp: 30 tablet, Rfl: 11      External  database queried on 2023  by Sonja Mcgill .   The results reviewed and considered with the clinical data in the decision whether or not to prescribe a controlled substance.   2023   3  Tramadol Hcl 50 Mg Tablet 30.00  20  [place]  [id number]   [place] ([id number])  2  7.50 MME  [place]     2023   3  Tramadol Hcl 50 Mg Tablet 45.00  30  [facility]  [id number]   [place] ([id number])  1  7.50 [place]     2023   3  Pregabalin 75 Mg Capsule 120.00  30  [facility]  [id number]   [place] ([id number])  2  2.01 LME  [place]     2023   3  Tramadol Hcl 50 Mg Tablet 45.00  30  [facility]              Past Medical History:   Diagnosis Date    Elevated liver enzymes     Essential (primary) hypertension     Heart failure, unspecified     Hypokalemia     Mixed hyperlipidemia     Neuropathic pain     Tx w/ tramadol & Lyrica    Pulmonary hypertension     Receiving Remodulin continuously through tunneled central line     Past Surgical History:   Procedure Laterality Date    APPENDECTOMY       SECTION      Transverse cut    HYSTERECTOMY  2017    [facility]- bleeding    OOPHORECTOMY      RIGHT HEART CATHETERIZATION Right 2021    Procedure: INSERTION, CATHETER, RIGHT HEART;  Surgeon: Chloe Gregory MD;  Location: [facility] CATH LAB;  Service: Cardiology;  Laterality: Right;    RIGHT HEART CATHETERIZATION Right 3/16/2022    Procedure: INSERTION, CATHETER, RIGHT HEART;  Surgeon: Hu Silverman MD;  Location: [facility] CATH LAB;  Service: Cardiology;  Laterality: Right;    RIGHT HEART CATHETERIZATION Right 2022    Procedure: INSERTION, CATHETER, RIGHT HEART;  Surgeon: Chloe Gregory MD;  Location: [facility] CATH LAB;  Service: Cardiology;  Laterality: Right;    RIGHT HEART CATHETERIZATION Right 3/21/2023    Procedure: INSERTION, CATHETER, RIGHT HEART;  Surgeon: Kahlil Cisneros MD;  Location: [facility]  CATH LAB;  Service: Cardiology;  Laterality: Right;    TUBAL LIGATION  1996    VAGINAL DELIVERY  1991; 1993; 1994; 1996     Family History   Problem Relation Age of Onset    Cancer Father     Breast cancer Neg Hx     Colon cancer Neg Hx     Ovarian cancer Neg Hx      Review of patient's allergies indicates:  No Known Allergies    OBJECTIVE:       Physical Exam:  Vitals:    Physical Exam  Constitutional:       General: She is not in acute distress.  HENT:      Head: Normocephalic and atraumatic.   Eyes:      General: No scleral icterus.  Pulmonary:      Effort: Pulmonary effort is normal. No respiratory distress.   Musculoskeletal:      Cervical back: Neck supple.   Neurological:      Mental Status: She is alert and oriented to person, place, and time.   Psychiatric:         Mood and Affect: Mood and affect normal.         Labs:  CBC:   WBC   Date Value Ref Range Status   03/21/2023 4.60 3.90 - 12.70 K/uL Final       Hemoglobin   Date Value Ref Range Status   03/21/2023 10.1 (L) 12.0 - 16.0 g/dL Final       POC Hematocrit   Date Value Ref Range Status   12/13/2021 37 36 - 54 %PCV Final     Hematocrit   Date Value Ref Range Status   03/21/2023 31.0 (L) 37.0 - 48.5 % Final       MCV   Date Value Ref Range Status   03/21/2023 82 82 - 98 fL Final       Platelets   Date Value Ref Range Status   03/21/2023 183 150 - 450 K/uL Final       LFT:   Lab Results   Component Value Date    AST 10 03/21/2023    ALKPHOS 88 03/21/2023    BILITOT 0.9 03/21/2023       Albumin:   Albumin   Date Value Ref Range Status   03/21/2023 3.3 (L) 3.5 - 5.2 g/dL Final     Protein:   Total Protein   Date Value Ref Range Status   03/21/2023 6.4 6.0 - 8.4 g/dL Final         Radiology:    Results for orders placed during the hospital encounter of 03/19/23  Echo  Interpretation Summary  · The left ventricle is normal in size with concentric remodeling and normal systolic function. The estimated ejection fraction is 60%.  · Severe right ventricular  enlargement with mildly to moderately reduced right ventricular systolic function.  · Indeterminate left ventricular diastolic function.  · Severe right atrial enlargement.  · Moderate to severe tricuspid regurgitation.  · Small pericardial effusion.  · The estimated PA systolic pressure is 89 mmHg.  · Intermediate central venous pressure (8 mmHg).          17 minutes spent in discussing ACP    This note was partially created using [employer] Voice Recognition software. Typographical and content errors may occur with this process. While efforts are made to detect and correct such errors, in some cases errors will persist. For this reason, wording in this document should be considered in the proper context and not strictly verbatim.      Signature: Sonja Mcgill MD

## 2023-05-24 NOTE — LETTER
May 29, 2023        Sho Guy, ANA M  [address]  [facility] [address]             [facility] [address]  [facility] [address]  Phone: [phone]  Fax: [phone]   Patient: Kristin Maier   MR Number: [id number]   YOB: 1975   Date of Visit: 5/24/2023       Dear Dr. Guy:    Thank you for referring Kristin Maier to me for evaluation. Below are the relevant portions of my assessment and plan of care.            If you have questions, please do not hesitate to call me. I look forward to following Kristin along with you.    Sincerely,      Sonja Mcgill MD           CC    No Recipients

## 2023-05-25 ENCOUNTER — PATIENT MESSAGE (OUTPATIENT)
Dept: TRANSPLANT | Facility: CLINIC | Age: 48
End: 2023-05-25
Payer: MEDICAID

## 2023-05-25 ENCOUNTER — TELEPHONE (OUTPATIENT)
Dept: TRANSPLANT | Facility: CLINIC | Age: 48
End: 2023-05-25
Payer: MEDICAID

## 2023-05-25 NOTE — TELEPHONE ENCOUNTER
NN attempted to contact patient regarding lab work, and procedure today.  Patient did not answer and unable to leave a message  - voicemail full.

## 2023-05-26 ENCOUNTER — DOCUMENTATION ONLY (OUTPATIENT)
Dept: TRANSPLANT | Facility: CLINIC | Age: 48
End: 2023-05-26
Payer: MEDICAID

## 2023-05-26 ENCOUNTER — PATIENT MESSAGE (OUTPATIENT)
Dept: TRANSPLANT | Facility: CLINIC | Age: 48
End: 2023-05-26
Payer: MEDICAID

## 2023-05-26 NOTE — PROGRESS NOTES
05/26/2023   Kristinpio Dowell Maier  [address] [facility]        OUTPATIENT RIGHT HEART CATHETERIZATION INSTRUCTIONS     You have been scheduled for a procedure in the catheterization lab on 6/27/2023.      Please report to the Cardiology Waiting Area on the Third floor of the hospital and check in at 6:30 am.    You will then be taken to the SSCU (Short Stay Cardiac Unit) and prepared for your procedure. Please be aware that this is not the time of your procedure but the time you are to arrive. The procedures are scheduled on an hourly basis; however, emergency cases take precedence over all other cases.      You may eat a light meal before your procedure.    You may take your regular morning medications with water. If there are any medications that you should not take you will be instructed to hold them that morning.   If you are on Pradaxa, Eliquis or Coumadin , you can hold them 3 days prior to your procedure.  CALL US if you are on blood thinners for instructions. [phone]  Do not stop your Aspirin, Plavix, Effient, or Brilinta.    The procedure will take 30 minutes to perform. After the procedure, you will return to SSCU on the third floor of the hospital. Your family may remain in the room with you during this time.     You will be monitored for bleeding from the puncture site.  Your dressing may be removed the next day and dressed with a Band-Aid.  You may be able to be discharged home that same afternoon if there is someone to drive you home and there were no complications.     You may need to be admitted to the hospital after your procedure, so pack an overnight bag. Your doctor will determine, based on your progress, the date and time of your discharge. The results of your procedure will be discussed with you before you are discharged. Any further testing or procedures will be scheduled for you either before you leave or you will be called with these appointments.      If you should have any  questions, concerns, or need to change the date of your procedure, please call  Heart Transplant office and ask for your nurse. [phone]    Special Instructions:     THE ABOVE INSTRUCTIONS WERE GIVEN TO THE PATIENT VERBALLY AND THEY VERBALIZED UNDERSTANDING.  THEY DO NOT REQUIRE ANY SPECIAL NEEDS AND DO NOT HAVE ANY LEARNING BARRIERS.     Directions for Reporting to Cardiology Waiting Area in the Hospital  If you park in the Parking Garage:  Take elevators to the1st floor of the parking garage.  Continue past the gift shop, coffee shop, and piano.  Take a right and go to the gold elevators. (Elevator B)  Take the elevator to the 3rd floor.  Follow the arrow on the sign on the wall that says Cath Lab Registration/EP Lab Registration.  Follow the long hallway all the way around until you come to a big open area.  This is the registration area.  Check in at Reception Desk.     OR     If family is dropping you off:  Have them drop you off at the front of the Hospital under the green overhang.  Enter through the doors and take a right.  Take the E elevators to the 3rd floor Cardiology Waiting Area.  Check in at the Reception Desk in the waiting room.

## 2023-05-31 ENCOUNTER — TELEPHONE (OUTPATIENT)
Dept: PALLIATIVE MEDICINE | Facility: CLINIC | Age: 48
End: 2023-05-31
Payer: MEDICAID

## 2023-05-31 ENCOUNTER — OFFICE VISIT (OUTPATIENT)
Dept: PALLIATIVE MEDICINE | Facility: CLINIC | Age: 48
End: 2023-05-31
Payer: MEDICAID

## 2023-05-31 DIAGNOSIS — M25.50 ARTHRALGIA, UNSPECIFIED JOINT: ICD-10-CM

## 2023-05-31 DIAGNOSIS — M79.605 PAIN IN BOTH LOWER EXTREMITIES: ICD-10-CM

## 2023-05-31 DIAGNOSIS — I27.20 PULMONARY HYPERTENSION: Primary | ICD-10-CM

## 2023-05-31 DIAGNOSIS — G47.09 OTHER INSOMNIA: ICD-10-CM

## 2023-05-31 DIAGNOSIS — M79.604 PAIN IN BOTH LOWER EXTREMITIES: ICD-10-CM

## 2023-05-31 DIAGNOSIS — R63.0 ANOREXIA: ICD-10-CM

## 2023-05-31 PROCEDURE — 99214 OFFICE O/P EST MOD 30 MIN: CPT | Mod: 95,NTX,, | Performed by: STUDENT IN AN ORGANIZED HEALTH CARE EDUCATION/TRAINING PROGRAM

## 2023-05-31 PROCEDURE — 99214 PR OFFICE/OUTPT VISIT, EST, LEVL IV, 30-39 MIN: ICD-10-PCS | Mod: 95,NTX,, | Performed by: STUDENT IN AN ORGANIZED HEALTH CARE EDUCATION/TRAINING PROGRAM

## 2023-05-31 RX ORDER — HYDROCHLOROTHIAZIDE 12.5 MG/1
1 CAPSULE ORAL EVERY MORNING
COMMUNITY
End: 2023-09-07 | Stop reason: ALTCHOICE

## 2023-05-31 RX ORDER — PREGABALIN 50 MG/1
50 CAPSULE ORAL 2 TIMES DAILY
Qty: 60 CAPSULE | Refills: 1 | Status: SHIPPED | OUTPATIENT
Start: 2023-05-31 | End: 2023-06-21 | Stop reason: SDUPTHER

## 2023-05-31 RX ORDER — GABAPENTIN 300 MG/1
1 CAPSULE ORAL
COMMUNITY
End: 2023-05-31

## 2023-05-31 NOTE — PROGRESS NOTES
Palliative Medicine Clinic Note      Consult Requested By: No ref. provider found    Primary Care Physician:   Jorge A Stack MD    Reason for Consult: Advance care planning and symptom management in the setting of Pulmonary Hypertension     The patient location is: [facility]  The chief complaint leading to consultation is: sx management     Visit type: audiovisual    Face to Face time with patient: 15min   30 minutes of total time spent on the encounter, which includes face to face time and non-face to face time preparing to see the patient (eg, review of tests), Obtaining and/or reviewing separately obtained history, Documenting clinical information in the electronic or other health record, Independently interpreting results (not separately reported) and communicating results to the patient/family/caregiver, or Care coordination (not separately reported).       Each patient provided with medical services by telemedicine is:  (1) informed of the relationship between the physician and patient and the respective role of any other health care provider with respect to management of the patient; and (2) notified that he or she may decline to receive medical services by telemedicine and may withdraw from such care at any time.          ASSESSMENT/PLAN:     Plan/Recommendations:  Diagnoses and all orders for this visit:      Hunter Symptom Assessment System  Pain Score: Seven  Tiredness Score: 0  Nausea Score: 0  Depression Score: 0  Anxiety Score: 0  Drowsiness Score: 0  Appetite Score: 5  Wellbeing Score: 0  Dyspnea Score: 4          Pulmonary hypertension  -Pulmonary HTN, WHO group 1, on remodulin.   - following with Pulmonary hypertension Clinic  -[facility] 3/21/22 indicative on worsening disease  -currently on Opsumit, Adempas and Remodulin  -reporting dyspnea     Anorexia  -   Trial of   mirtazapine (REMERON) 7.5 MG Tab; Take 1 tablet (7.5 mg total) by mouth every evening.  -she states that mirtazapine is  helping      Anxiety /Other insomnia  -     mirtazapine (REMERON) 7.5 MG Tab; Take 1 tablet (7.5 mg total) by mouth every evening.  -Lexapro did not help in the past  -Referred to [facility] behavioral [facility] and  for support  -added Lyrica 75 mg q.h.s.      Arthralgia, unspecified joint  -worse with change in medications  -currently on tramadol 50 mg sometimes she needs to take 4 at a time for it to help, ran out of tramadol during her trip to [place] and was unable to get out of bed.  She is currently lying down and unable to move because of significant pain  -discussed the need for in-person appointment to take over her pain management  -we will add Lyrica 75 mg q.h.s.    Palliative care encounter  Medicolegal: Has decision making capacity.   is surrogate decision maker.   She shared that for HCPOA she would named her  as 1st agent and her daughter Nicky as her 2nd agent.     Psychosocial:  support system consists of  and daughters     Spiritual: Islam      Understanding of disease and Illness Trajectory: Patient  has  adequate understanding of her illness, they can benefit from continued education on what to expect in the future.      Advance Care Planning   Advance Directives:   Living Will: No    [place]POST: No    Do Not Resuscitate Status: No    Medical Power of : No    Agent's Name:  Juju Maier   Agent's Contact Number:  [phone]    Decision Making:  Patient answered questions  Goals of Care: What is most important right now is to focus on spending time at home, remaining as independent as possible, improvement in condition but with limits to invasive therapies. Accordingly, we have decided that the best plan to meet the patient's goals includes continuing with treatment.          min time was spent on advance care planning, goals of care discussion, emotional support, formulating and communicating prognosis and goals of care, exploring burden/benefit of various approaches  of treatment.     Follow up: 1 month    Plan discussed with Sho Guy     SUBJECTIVE:     History obtained from: Patient      complaint: No chief complaint on file.      Disease History:  Pulmonary HTN, WHO group 1, on remodulin. [facility] 3/21/22 indicative on worsening disease      History of Present Illness / Interval History:  Kristin Maier is 47 y.o. year old female presenting with PAH.  Referred to Palliative Care for evaluation and management of physical symptoms, advance care planning,, and additional support.. female attended the appointment alone       She shared that she ran out of tramadol and then she had a trip to [place] where she could not move at all because of pain.  She is currently only taking Tylenol and she needs to take it around the clock it brings her pain from 7-6.  Mirtazapine help with her appetite  Not sleeping well because of pain  She has nausea however Zofran helps    ------------------------------  Patient states that she has significant pain in her joints fingers legs elbow and ankles.  The pain is worse at night improves with tramadol 2 to 3 times a day.  And message in her hands legs.   She feels her shortness of breath has worsened.  She was an active person before was able to walk outside.  She is still able to go to the grocery store however needs a scooter.  With movement she has tachycardia heart rate 120-150 and sometimes gets chest pain.  Today was not a good day, she becomes short of breath with walking in the living room.   Nausea approximately once a week Zofran can help.  All of her symptoms are worse when she changes her medication and have worsened lately because of the medication titration.  She has anxiety at night when she sits quietly.  She hopes to be able to have more strength more energy more time and to decrease her amount of pain.  Her main worries not being here for her grand kids      [place] Symptom Assessment System  Pain Score:  Seven  Tiredness Score: 0  Nausea Score: 0  Depression Score: 0  Anxiety Score: 0  Drowsiness Score: 0  Appetite Score: 5  Wellbeing Score: 0  Dyspnea Score: 4        Review of Symptoms      Symptom Assessment (ESAS 0-10 Scale)  Pain:  7  Dyspnea:  0  Anxiety:  0  Nausea:  0  Depression:  0  Anorexia:  4  Fatigue:  0  Insomnia:  5  Restlessness:  0  Agitation:  0     CAM / Delirium:  Negative  Constipation:  Negative  Diarrhea:  Negative      Pain Assessment:    Location(s): generalized (joint)    Generalized       Location: generalized        Quality: Aching        Quantity: 7/10 in intensity        Chronicity: Onset 0 month(s) ago, unchanged        Aggravating Factors: None        Alleviating Factors: Acetaminophen        Associated Symptoms: None    Modified Jessica Scale:  3    Performance Status:  60    Living Arrangements:  Lives with family    Psychosocial/Cultural:   See Palliative Psychosocial Note: Yes  . Has 3 living daughters and a  son. Has grandchildren who she enjoys spending time with. Likes teaching them to play different sports. Enjoys gardening   **Primary  to Follow**  Palliative Care  Consult: Yes      Previous experience or exposure to a serious illness: Yes        Medications:    Current Outpatient Medications:     furosemide (LASIX) 20 MG tablet, TAKE 1 TABLET BY MOUTH EVERY DAY, Disp: 30 tablet, Rfl: 11    loperamide (IMODIUM) 2 mg capsule, Take 1 capsule (2 mg total) by mouth 4 (four) times daily as needed for Diarrhea., Disp: 30 capsule, Rfl: 3    mirtazapine (REMERON) 7.5 MG Tab, Take 1 tablet (7.5 mg total) by mouth every evening., Disp: 30 tablet, Rfl: 11    ondansetron (ZOFRAN) 4 MG tablet, Take 1 tablet (4 mg total) by mouth every 8 (eight) hours as needed for Nausea., Disp: 30 tablet, Rfl: 6    OPSUMIT 10 mg Tab, TAKE 1 TABLET BY MOUTH DAILY. DO NOT HANDLE IF PREGNANT. DO NOT SPLIT, CRUSH, OR CHEW. REVIEW MEDICATION GUIDE., Disp: 30 tablet, Rfl:  10    potassium chloride SA (K-DUR,KLOR-CON M) 10 MEQ tablet, TAKE 2 TABLETS (20 MEQ TOTAL) BY MOUTH ONCE DAILY., Disp: 60 tablet, Rfl: 11    REMODULIN 5 mg/mL Soln, , Disp: , Rfl:     riociguat (ADEMPAS) 2.5 mg tablet, Take 1 tablet three times a day. Dose changes may occur throughout the course of therapy as directed by your prescriber., Disp: 90 tablet, Rfl: 11    sodium chloride 0.9% SolP 100 mL with treprostinil 1 mg/mL Soln 6,000,000 ng, Inject 2,145 ng/min into the vein continuous., Disp: , Rfl:     traMADoL (ULTRAM) 50 mg tablet, Take 1 tablet (50 mg total) by mouth every 6 (six) hours as needed for Pain. (Patient not taking: Reported on 2023), Disp: 60 tablet, Rfl: 1      External  database queried on 2023  by Sonja Mcgill .   The results reviewed and considered with the clinical data in the decision whether or not to prescribe a controlled substance.  2023   3  Tramadol Hcl 50 Mg Tablet 30.00  20  [facility]  [id number]   [place] ([id number])  2  7.50 MME  [place]icaid [place]    2023   3  Tr[facility]ad[facility] Hcl 50 Mg [facility] 45.00  [id number]   [place] ([id number])  1  7.50 MME  [place]icaid [place]    2023   3  Pregabalin 75 Mg Capsule 120.00  30  [facility]  [id number]   [place] ([id number])  2  2.01 LME  [place]icaid [place]    2023   3  Tramadol Hcl 50 Mg Tablet 45.00  30  [facility]  [id number]   [place] ([id number])  0  7.50 MME  [place]icaid [place]    2023   3  Tramadol Hcl 50 Mg Tablet 15.00  4  [facility]  [id number]             Past Medical History:   Diagnosis Date    Elevated liver enzymes     Essential (primary) hypertension     Heart failure, unspecified     Hypokalemia     Mixed hyperlipidemia     Neuropathic pain     Tx w/ tramadol & Lyrica    Pulmonary hypertension     Receiving Remodulin continuously through tunneled central line     Past Surgical History:   Procedure Laterality Date    APPENDECTOMY       SECTION      Transverse cut    HYSTERECTOMY  2017     [facility]- bleeding    OOPHORECTOMY      RIGHT HEART CATHETERIZATION Right 12/2/2021    Procedure: INSERTION, CATHETER, RIGHT HEART;  Surgeon: Chloe Gregory MD;  Location: [facility] CATH LAB;  Service: Cardiology;  Laterality: Right;    RIGHT HEART CATHETERIZATION Right 3/16/2022    Procedure: INSERTION, CATHETER, RIGHT HEART;  Surgeon: Hu Silverman MD;  Location: [facility] CATH LAB;  Service: Cardiology;  Laterality: Right;    RIGHT HEART CATHETERIZATION Right 7/19/2022    Procedure: INSERTION, CATHETER, RIGHT HEART;  Surgeon: Chloe Gregory MD;  Location: [facility] CATH LAB;  Service: Cardiology;  Laterality: Right;    RIGHT HEART CATHETERIZATION Right 3/21/2023    Procedure: INSERTION, CATHETER, RIGHT HEART;  Surgeon: Kahlil Cisneros MD;  Location: [facility] CATH LAB;  Service: Cardiology;  Laterality: Right;    TUBAL LIGATION  1996    VAGINAL DELIVERY  1991; 1993; 1994; 1996     Family History   Problem Relation Age of Onset    Cancer Father     Breast cancer Neg Hx     Colon cancer Neg Hx     Ovarian cancer Neg Hx      Review of patient's allergies indicates:  No Known Allergies    OBJECTIVE:       Physical Exam:  Vitals:    Physical Exam  Constitutional:       General: She is not in acute distress.     Comments: Lying in the couch, uncomfortable, in pain   HENT:      Head: Normocephalic and atraumatic.   Eyes:      General: No scleral icterus.  Pulmonary:      Effort: Pulmonary effort is normal. No respiratory distress.   Musculoskeletal:      Cervical back: Neck supple.   Neurological:      Mental Status: She is alert and oriented to person, place, and time.   Psychiatric:         Mood and Affect: Mood and affect normal.         Labs:  CBC:   WBC   Date Value Ref Range Status   03/21/2023 4.60 3.90 - 12.70 K/uL Final       Hemoglobin   Date Value Ref Range Status   03/21/2023 10.1 (L) 12.0 - 16.0 g/dL Final       POC Hematocrit   Date Value Ref Range Status   12/13/2021 37 36 - 54 %PCV Final     Hematocrit   Date Value Ref  Range Status   03/21/2023 31.0 (L) 37.0 - 48.5 % Final       MCV   Date Value Ref Range Status   03/21/2023 82 82 - 98 fL Final       Platelets   Date Value Ref Range Status   03/21/2023 183 150 - 450 K/uL Final       LFT:   Lab Results   Component Value Date    AST 10 03/21/2023    ALKPHOS 88 03/21/2023    BILITOT 0.9 03/21/2023       Albumin:   Albumin   Date Value Ref Range Status   03/21/2023 3.3 (L) 3.5 - 5.2 g/dL Final     Protein:   Total Protein   Date Value Ref Range Status   03/21/2023 6.4 6.0 - 8.4 g/dL Final         Radiology:    Results for orders placed during the hospital encounter of 03/19/23  Echo  Interpretation Summary  · The left ventricle is normal in size with concentric remodeling and normal systolic function. The estimated ejection fraction is 60%.  · Severe right ventricular enlargement with mildly to moderately reduced right ventricular systolic function.  · Indeterminate left ventricular diastolic function.  · Severe right atrial enlargement.  · Moderate to severe tricuspid regurgitation.  · Small pericardial effusion.  · The estimated PA systolic pressure is 89 mmHg.  · Intermediate central venous pressure (8 mmHg).          I spent a total of 30 minutes on the day of the visit. This includes face to face time in discussion of goals of care, symptom assessment, coordination of care and emotional support.  This also includes non-face to face time preparing to see the patient (eg, review of tests/imaging), obtaining and/or reviewing separately obtained history, documenting clinical information in the electronic or other health record, independently interpreting results and communicating results to the patient/family/caregiver, or care coordinator.         This note was partially created using [employer] Voice Recognition software. Typographical and content errors may occur with this process. While efforts are made to detect and correct such errors, in some cases errors will persist. For this  reason, wording in this document should be considered in the proper context and not strictly verbatim.      Signature: Sonja Mcgill MD

## 2023-06-01 ENCOUNTER — TELEPHONE (OUTPATIENT)
Dept: TRANSPLANT | Facility: CLINIC | Age: 48
End: 2023-06-01
Payer: MEDICAID

## 2023-06-01 ENCOUNTER — OFFICE VISIT (OUTPATIENT)
Dept: PALLIATIVE MEDICINE | Facility: CLINIC | Age: 48
End: 2023-06-01
Payer: MEDICAID

## 2023-06-01 DIAGNOSIS — F41.9 ANXIETY: Primary | ICD-10-CM

## 2023-06-01 PROCEDURE — 1159F PR MEDICATION LIST DOCUMENTED IN MEDICAL RECORD: ICD-10-PCS | Mod: CPTII,95,NTX, | Performed by: SOCIAL WORKER

## 2023-06-01 PROCEDURE — 90791 PSYCH DIAGNOSTIC EVALUATION: CPT | Mod: 95,NTX,, | Performed by: SOCIAL WORKER

## 2023-06-01 PROCEDURE — 90791 PR PSYCHIATRIC DIAGNOSTIC EVALUATION: ICD-10-PCS | Mod: 95,NTX,, | Performed by: SOCIAL WORKER

## 2023-06-01 PROCEDURE — 1159F MED LIST DOCD IN RCRD: CPT | Mod: CPTII,95,NTX, | Performed by: SOCIAL WORKER

## 2023-06-01 RX ORDER — TRAMADOL HYDROCHLORIDE 50 MG/1
50 TABLET ORAL EVERY 6 HOURS PRN
Qty: 60 TABLET | Refills: 1 | Status: SHIPPED | OUTPATIENT
Start: 2023-06-01 | End: 2023-06-13

## 2023-06-01 NOTE — PROGRESS NOTES
Psychiatry Initial Visit (PhD/LCSW)  Diagnostic Interview - CPT 27787    Date: 6/1/2023    The patient location is: [place], [place]  The chief complaint leading to consultation is: Anxiety, Depression, Sleep    Visit type: audiovisual    Face to Face time with patient: 52  65 minutes of total time spent on the encounter, which includes face to face time and non-face to face time preparing to see the patient (eg, review of tests), Obtaining and/or reviewing separately obtained history, Documenting clinical information in the electronic or other health record, Independently interpreting results (not separately reported) and communicating results to the patient/family/caregiver, or Care coordination (not separately reported).     Each patient to whom he or she provides medical services by telemedicine is:  (1) informed of the relationship between the physician and patient and the respective role of any other health care provider with respect to management of the patient; and (2) notified that he or she may decline to receive medical services by telemedicine and may withdraw from such care at any time.    Site: [facility]    Referral source: Dr. Sonja Lee, Palliative Medicine    Clinical status of patient: Outpatient    Kristin Maier, a 47 y.o. female, for initial evaluation visit.  Met with patient.    Chief complaint/reason for encounter: depression, anxiety, and sleep    History of present illness:     LCSW and patient completed initial psychotherapy session this date, virtually. She was referred by Dr. Sonja Lee to address increased s/s of anxiety and depression related to her chronic illness. She has a dx of PH and has been experiencing increased SOB, fluid retention, and fatigue over the past few months. She saw palliative medicine yesterday and was restarted on Lyrica 50 mg twice daily. She reports she slept really well and was able to get up easily this morning. She saw a significant improvement  "in the quality of sleep. She does not like to take it during the day due to feeling drowsy.  Feels she is not able to be as active and is used to "staying busy" with household responsibilities, work. Struggling with not having a daily routine and discussed how it's a goal for her to develop a routine.     Regarding anxiety/depression,Kristin reports she "has good days and bad days". Continuing to work towards acceptance of illness and adapting to physical changes.Kristin expressed being angry at times for being sick and feels frustrated with her physical limitations. She has a clear understanding of her dx and her main focus at this time is to be able to spend time with her family and grandchildren.  of her grandchildren are present and spend most days with Kristin, especially during the summer. They live in the same neighborhood and she enjoys being able to see them on a regular basis.     Explored pt's anxiety and utilized anxiety reduction techniques in order to identify pt's realistic verus unrealistic thought processes. Continued to navigate ongoing stressors affecting pt's depression and anxiety. Engaged in active discussion, constructive processing, support, feedback, and re-framing. Pt fully engaged and remains receptive. Pt to return as scheduled.     Pain: 3    Symptoms:   Mood: depressed mood, insomnia, fatigue, and worthlessness/guilt  Anxiety: excessive anxiety/worry, restlessness/keyed up, and irritability  Substance abuse: denied  Cognitive functioning: denied  Health behaviors: noncontributory    Psychiatric history: has participated in counseling/psychotherapy on an outpatient basis in the past    Medical history:   Past Medical History:   Diagnosis Date    Elevated liver enzymes     Essential (primary) hypertension     Heart failure, unspecified     Hypokalemia     Mixed hyperlipidemia     Neuropathic pain     Tx w/ tramadol & Lyrica    Pulmonary hypertension     Receiving Remodulin continuously through " "tunneled central line        Family history of psychiatric illness: none    Social history (marriage, employment, etc.):      for 15 years. Close relationship with her  who provides her with strong support. She has 4 daughters (31, 27, 23), and 9 grandchildren. She enjoys spending time with her children and grandchildren. States she has a great support system in place. She "pushes" herself to go to grandchildren's sporting events most weekends. 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 However, she reached a point where she was not able to work the 12 hour shift. She stopped working in September 2022. She wishes she could continue to work as she is used to being financially independent. She does not have any financial stress due to her  working, but she struggles with "asking for money". Enjoys gardening, has planted vegetables. She enjoys helping adolescences and has close relations with her neighbors.     Substance use:   Alcohol: none   Drugs: none   Tobacco: none   Caffeine: none    Current medications and drug reactions (include OTC, herbal): see medication list     Strengths and liabilities: Strength: Patient accepts guidance/feedback, Strength: Patient is expressive/articulate., Strength: Patient is intelligent., Strength: Patient has positive support network.    Current Evaluation:     Mental Status Exam:  General Appearance:  unremarkable, age appropriate   Speech: normal tone, normal rate, normal pitch, normal volume      Level of Cooperation: cooperative      Thought Processes: normal and logical   Mood: anxious, sad      Thought Content: normal, no suicidality, no homicidality, delusions, or paranoia   Affect: congruent and appropriate   Orientation: Oriented x3   Memory: recent >  intact   Attention Span & Concentration: intact   Fund of General Knowledge: intact and appropriate to age and level of education   Abstract Reasoning: interpretation of " similarities was concrete   Judgment & Insight: good     Language  intact     Diagnostic Impression - Plan:       ICD-10-CM ICD-9-CM   1. Anxiety  F41.9 300.00       Plan:individual psychotherapy    Return to Clinic: 2 weeks    Length of Service (minutes): 52

## 2023-06-01 NOTE — TELEPHONE ENCOUNTER
NN contacted patient concerning voice message, today.  Patient states that she wants to know when she is going to see Dr. Quick again.  NN advised patient that she is not scheduled for a PH visit at this time. Advised patient that the schedulers will reach out to schedule her follow up.  Patient asked about her Tramadol prescription and states that she only got 10 days supply for May.  When asked how she feels today, patient states that she feels good today.  Pain, when it happens is in the joints of her arms and legs.  Patient states that she is currently at 110 ng/kg/min.  Advised patient that message would be sent to provider concerning the Tramadol prescription.  Patient acknowledged.

## 2023-06-07 ENCOUNTER — TELEPHONE (OUTPATIENT)
Dept: TRANSPLANT | Facility: CLINIC | Age: 48
End: 2023-06-07
Payer: MEDICAID

## 2023-06-07 ENCOUNTER — PATIENT MESSAGE (OUTPATIENT)
Dept: TRANSPLANT | Facility: CLINIC | Age: 48
End: 2023-06-07
Payer: MEDICAID

## 2023-06-07 NOTE — TELEPHONE ENCOUNTER
NN contacted patient regarding message concerning the medication Adempas.    Patient was advised that [facility] has been trying to contact her to schedule a delivery.  Patient states that she will call [facility].

## 2023-06-08 ENCOUNTER — TELEPHONE (OUTPATIENT)
Dept: PALLIATIVE MEDICINE | Facility: CLINIC | Age: 48
End: 2023-06-08
Payer: MEDICAID

## 2023-06-08 NOTE — TELEPHONE ENCOUNTER
Called to check on effect of Lyrica that Dr. Lee prescribed last week. Patient reports that it is not effective and she tosses and turns all night, She requests refill on Tramadol. Explained to her that Dr. Lee needs to see her in person before prescribing pain medication. She understands and will contact prescribing Provider for refill. Informed her we will try to fit her in for earlier appointment.

## 2023-06-12 ENCOUNTER — TELEPHONE (OUTPATIENT)
Dept: PALLIATIVE MEDICINE | Facility: CLINIC | Age: 48
End: 2023-06-12
Payer: MEDICAID

## 2023-06-12 NOTE — TELEPHONE ENCOUNTER
NN contacted patient to inquire about contacting specialty pharmacy for Ademaps.  Patient states that she is getting a shipment on 6/12/23 - confirmed by specialty pharmacy.

## 2023-06-13 ENCOUNTER — TELEPHONE (OUTPATIENT)
Dept: PALLIATIVE MEDICINE | Facility: CLINIC | Age: 48
End: 2023-06-13
Payer: MEDICAID

## 2023-06-13 ENCOUNTER — OFFICE VISIT (OUTPATIENT)
Dept: PALLIATIVE MEDICINE | Facility: CLINIC | Age: 48
End: 2023-06-13
Payer: MEDICAID

## 2023-06-13 ENCOUNTER — PATIENT MESSAGE (OUTPATIENT)
Dept: TRANSPLANT | Facility: CLINIC | Age: 48
End: 2023-06-13
Payer: MEDICAID

## 2023-06-13 DIAGNOSIS — M79.605 PAIN IN BOTH LOWER EXTREMITIES: ICD-10-CM

## 2023-06-13 DIAGNOSIS — M25.542 ARTHRALGIA OF BOTH HANDS: ICD-10-CM

## 2023-06-13 DIAGNOSIS — M79.604 PAIN IN BOTH LOWER EXTREMITIES: ICD-10-CM

## 2023-06-13 DIAGNOSIS — M25.541 ARTHRALGIA OF BOTH HANDS: ICD-10-CM

## 2023-06-13 DIAGNOSIS — Z51.5 PALLIATIVE CARE ENCOUNTER: ICD-10-CM

## 2023-06-13 DIAGNOSIS — I27.20 PULMONARY HYPERTENSION: Primary | ICD-10-CM

## 2023-06-13 PROCEDURE — 99215 OFFICE O/P EST HI 40 MIN: CPT | Mod: S$PBB,NTX,, | Performed by: STUDENT IN AN ORGANIZED HEALTH CARE EDUCATION/TRAINING PROGRAM

## 2023-06-13 PROCEDURE — 99999 PR PBB SHADOW E&M-EST. PATIENT-LVL III: CPT | Mod: PBBFAC,TXP,, | Performed by: STUDENT IN AN ORGANIZED HEALTH CARE EDUCATION/TRAINING PROGRAM

## 2023-06-13 PROCEDURE — 99213 OFFICE O/P EST LOW 20 MIN: CPT | Mod: PBBFAC,NTX | Performed by: STUDENT IN AN ORGANIZED HEALTH CARE EDUCATION/TRAINING PROGRAM

## 2023-06-13 PROCEDURE — 1159F MED LIST DOCD IN RCRD: CPT | Mod: CPTII,NTX,, | Performed by: STUDENT IN AN ORGANIZED HEALTH CARE EDUCATION/TRAINING PROGRAM

## 2023-06-13 PROCEDURE — 99497 PR ADVNCD CARE PLAN 30 MIN: ICD-10-PCS | Mod: S$PBB,NTX,, | Performed by: STUDENT IN AN ORGANIZED HEALTH CARE EDUCATION/TRAINING PROGRAM

## 2023-06-13 PROCEDURE — 99999 PR PBB SHADOW E&M-EST. PATIENT-LVL III: ICD-10-PCS | Mod: PBBFAC,TXP,, | Performed by: STUDENT IN AN ORGANIZED HEALTH CARE EDUCATION/TRAINING PROGRAM

## 2023-06-13 PROCEDURE — 99497 ADVNCD CARE PLAN 30 MIN: CPT | Mod: S$PBB,NTX,, | Performed by: STUDENT IN AN ORGANIZED HEALTH CARE EDUCATION/TRAINING PROGRAM

## 2023-06-13 PROCEDURE — 99215 PR OFFICE/OUTPT VISIT, EST, LEVL V, 40-54 MIN: ICD-10-PCS | Mod: S$PBB,NTX,, | Performed by: STUDENT IN AN ORGANIZED HEALTH CARE EDUCATION/TRAINING PROGRAM

## 2023-06-13 PROCEDURE — 99497 ADVNCD CARE PLAN 30 MIN: CPT | Mod: PBBFAC,NTX | Performed by: STUDENT IN AN ORGANIZED HEALTH CARE EDUCATION/TRAINING PROGRAM

## 2023-06-13 PROCEDURE — 1159F PR MEDICATION LIST DOCUMENTED IN MEDICAL RECORD: ICD-10-PCS | Mod: CPTII,NTX,, | Performed by: STUDENT IN AN ORGANIZED HEALTH CARE EDUCATION/TRAINING PROGRAM

## 2023-06-13 RX ORDER — OXYCODONE AND ACETAMINOPHEN 5; 325 MG/1; MG/1
1 TABLET ORAL EVERY 8 HOURS PRN
Qty: 90 TABLET | Refills: 0 | Status: SHIPPED | OUTPATIENT
Start: 2023-06-13 | End: 2023-06-21 | Stop reason: SDUPTHER

## 2023-06-13 RX ORDER — OXYCODONE AND ACETAMINOPHEN 5; 325 MG/1; MG/1
1 TABLET ORAL EVERY 8 HOURS PRN
Qty: 90 TABLET | Refills: 0 | Status: SHIPPED | OUTPATIENT
Start: 2023-06-13 | End: 2023-06-13

## 2023-06-13 NOTE — PROGRESS NOTES
Palliative Medicine Clinic Note      Consult Requested By: No ref. provider found    Primary Care Physician:   Jorge A Stack MD    Reason for Consult: Advance care planning and symptom management in the setting of Pulmonary Hypertension     In-Person appointment    ASSESSMENT/PLAN:     Plan/Recommendations:  Diagnoses and all orders for this visit:    Pulmonary hypertension  -Pulmonary HTN, WHO group 1, on remodulin.   - following with Pulmonary hypertension Clinic  -[facility] 3/21/22 indicative on worsening disease  -currently on Opsumit, Adempas and Remodulin  -reporting dyspnea on exertion.    -Not requiring oxygen   -trying to do graded exercise, however this has been limited by her pain      Anorexia  -   Trial of mirtazapine (REMERON) 7.5 MG Tab; Take 1 tablet (7.5 mg total) by mouth every evening.  -eating better      Anxiety /Other insomnia  -     mirtazapine (REMERON) 7.5 MG Tab; Take 1 tablet (7.5 mg total) by mouth every evening.  -Referred to PM behavioral health and  for support      Pain in both lower extremities  Arthralgia in both hands  -pain in lower extremities is related to her medication, it is worse when her Remodulin is uptitrated  -requiring 150 mg of tramadol to achieve some pain relief  -oxycodone has worked in the past, will trial Percocet 5-325mg every 8 hours p.r.n.  -she states that Lyrica is not helping with her pain will try to titrate off she is currently taking 50 mg q.h.s. will take half for a week and then off      Palliative care encounter  Medicolegal: Has decision making capacity.   is surrogate decision maker.   She shared that for HCPOA she would named her  as 1st agent and her daughter Nicky as her 2nd agent.     Psychosocial:  support system consists of  and daughters     Spiritual: Zoroastrian    Understanding of disease and Illness Trajectory: Patient  has  adequate understanding of her illness, they can benefit from continued education on what to  expect in the future.      Advance Care Planning   Advance Directives:   Living Will: No    LaPOST: No    Do Not Resuscitate Status: No    Medical Power of : Yes    Agent's Name:  Juju Maier   Agent's Contact Number:  [phone]    Decision Making:  Patient answered questions  Goals of Care: What is most important right now is to focus on spending time at home, remaining as independent as possible, improvement in condition but with limits to invasive therapies. Accordingly, we have decided that the best plan to meet the patient's goals includes continuing with treatment.    Date: 06/13/2023  Power of   I introduced the concept of advance directives to the patient, as well. Then the patient received detailed information about the importance of designating a Health Care Power of  (HCPOA). She was also instructed to communicate with this person about their wishes for future healthcare, should she become sick and lose decision-making capacity. The patient has not previously appointed a HCPOA. After our discussion, the patient has decided to complete a HCPOA and has appointed her significant other, health care agent:  Juju Maier  & her daughter Navya Dowell as second agent. Form completed and will be scanned today.             17 min time was spent on advance care planning, goals of care discussion, emotional support, formulating and communicating prognosis and goals of care, exploring burden/benefit of various approaches of treatment.     Follow up: 1 month    Plan discussed with Sho Guy     SUBJECTIVE:     History obtained from: Patient      complaint: No chief complaint on file.      Disease History:  Pulmonary HTN, WHO group 1, on remodulin. [facility] 3/21/22 indicative on worsening disease      History of Present Illness / Interval History:  Kristin Maier is 47 y.o. year old female presenting with PAH.  Referred to Palliative Care for evaluation and management  of physical symptoms, advance care planning,, and additional support.. female attended the appointment alone       Patient reports significant left leg pain and sometimes right leg pain that feels like someone is hitting her with a hammer constantly.  This pain is related as the side effect of her PH treatment.  She has taken 3 tramadol today in order to be able to walk  She endorsed shortness of breath with walking over to the clinic.  She states that at home on good days without any pain she is able to walk 1 block.  Most days she is only able to walk from the bed to the living room because of her vein.  When this happens her  and her children are able to assist her.  She states that Lyrica has not helped with her pain    -------------------------    She shared that she ran out of tramadol and then she had a trip to [place] where she could not move at all because of pain.  She is currently only taking Tylenol and she needs to take it around the clock it brings her pain from 7-6.  Mirtazapine help with her appetite  Not sleeping well because of pain  She has nausea however Zofran helps    ------------------------------  Patient states that she has significant pain in her joints fingers legs elbow and ankles.  The pain is worse at night improves with tramadol 2 to 3 times a day.  And message in her hands legs.   She feels her shortness of breath has worsened.  She was an active person before was able to walk outside.  She is still able to go to the grocery store however needs a scooter.  With movement she has tachycardia heart rate 120-150 and sometimes gets chest pain.  Today was not a good day, she becomes short of breath with walking in the living room.   Nausea approximately once a week Zofran can help.  All of her symptoms are worse when she changes her medication and have worsened lately because of the medication titration.  She has anxiety at night when she sits quietly.  She hopes to be able to have  more strength more energy more time and to decrease her amount of pain.  Her main worries not being here for her grand kids        Review of Symptoms      Symptom Assessment (ESAS 0-10 Scale)  Pain:  8  Dyspnea:  5  Anxiety:  0  Nausea:  0  Depression:  0  Anorexia:  0  Fatigue:  0  Insomnia:  0  Restlessness:  0  Agitation:  0     CAM / Delirium:  Negative  Constipation:  Negative  Diarrhea:  Negative      Pain Assessment:    Location(s): leg (joint)    Leg       Location: bilateral        Quality: Throbbing        Quantity: 8/10 in intensity        Chronicity: Onset 2 month(s) ago, unchanged        Aggravating Factors: Standing        Alleviating Factors: Opiates        Associated Symptoms: None    Modified Jessica Scale:  3    Performance Status:  60    Living Arrangements:  Lives with family    Psychosocial/Cultural:   See Palliative Psychosocial Note: Yes  . Has 3 living daughters and a  son. Has grandchildren who she enjoys spending time with. Likes teaching them to play different sports. Enjoys gardening   **Primary  to Follow**  Palliative Care  Consult: Yes    Spiritual:  F - Leticia and Belief:  Christian      Previous experience or exposure to a serious illness: Yes        Medications:    Current Outpatient Medications:     furosemide (LASIX) 20 MG tablet, TAKE 1 TABLET BY MOUTH EVERY DAY, Disp: 30 tablet, Rfl: 11    hydroCHLOROthiazide (MICROZIDE) 12.5 mg capsule, 1 capsule every morning., Disp: , Rfl:     loperamide (IMODIUM) 2 mg capsule, Take 1 capsule (2 mg total) by mouth 4 (four) times daily as needed for Diarrhea., Disp: 30 capsule, Rfl: 3    mirtazapine (REMERON) 7.5 MG Tab, Take 1 tablet (7.5 mg total) by mouth every evening., Disp: 30 tablet, Rfl: 11    ondansetron (ZOFRAN) 4 MG tablet, Take 1 tablet (4 mg total) by mouth every 8 (eight) hours as needed for Nausea., Disp: 30 tablet, Rfl: 6    OPSUMIT 10 mg Tab, TAKE 1 TABLET BY MOUTH DAILY. DO NOT HANDLE IF  PREGNANT. DO NOT SPLIT, CRUSH, OR CHEW. REVIEW MEDICATION GUIDE., Disp: 30 tablet, Rfl: 10    potassium chloride SA (K-DUR,KLOR-CON M) 10 MEQ tablet, TAKE 2 TABLETS (20 MEQ TOTAL) BY MOUTH ONCE DAILY., Disp: 60 tablet, Rfl: 11    pregabalin (LYRICA) 50 MG capsule, Take 1 capsule (50 mg total) by mouth 2 (two) times daily., Disp: 60 capsule, Rfl: 1    REMODULIN 5 mg/mL Soln, , Disp: , Rfl:     riociguat (ADEMPAS) 2.5 mg tablet, Take 1 tablet three times a day. Dose changes may occur throughout the course of therapy as directed by your prescriber., Disp: 90 tablet, Rfl: 11    sodium chloride 0.9% SolP 100 mL with treprostinil 1 mg/mL Soln 6,000,000 ng, Inject 2,145 ng/min into the vein continuous., Disp: , Rfl:     traMADoL (ULTRAM) 50 mg tablet, Take 1 tablet (50 mg total) by mouth every 6 (six) hours as needed for Pain., Disp: 60 tablet, Rfl: 1      External  database queried on 06/13/2023  by Sonja Mcgill .   The results reviewed and considered with the clinical data in the decision whether or not to prescribe a controlled substance.  06/01/2023 06/01/2023   3  Tramadol Hcl 50 Mg Tablet 15.00  8  Ki [facility]  [id number]   [facility] ([id number])  0  9.38 MME  [place]  [place]    05/31/2023 05/31/2023   3  Pregabalin 50 Mg Capsule 60.00  30  Er Lori  [id number]   [facility] ([id number])  0  0.67 LME  [place]    05/14/2023 03/14/2023   3  Tramadol Hcl 50 Mg Tablet 30.00  20  [facility]o  [id number]   [facility] ([id number])  2  7.50 MME  [place]    04/11/2023 03/14/2023   3  Tramadol Hcl 50 Mg Tablet 45.00  30  Ki Tho  [id number]   [facility] ([id number])  1  7.50 MME  [place]    03/27/2023 12/14/2022   3  Pregabalin 75 Mg Capsule 120.00  30  Ki Tho  [id number]   [facility] ([id number]           Past Medical History:   Diagnosis Date    Elevated liver enzymes     Essential (primary) hypertension     Heart failure, unspecified     Hypokalemia     Mixed hyperlipidemia     Neuropathic pain     Tx w/ tramadol & Lyrica    Pulmonary hypertension     Receiving Remodulin continuously  through tunneled central line     Past Surgical History:   Procedure Laterality Date    APPENDECTOMY       SECTION      Transverse cut    HYSTERECTOMY  2017    TLH- bleeding    OOPHORECTOMY      RIGHT HEART CATHETERIZATION Right 2021    Procedure: INSERTION, CATHETER, RIGHT HEART;  Surgeon: Chloe Gregory MD;  Location: [facility] CATH LAB;  Service: Cardiology;  Laterality: Right;    RIGHT HEART CATHETERIZATION Right 3/16/2022    Procedure: INSERTION, CATHETER, RIGHT HEART;  Surgeon: Hu Silverman MD;  Location: [facility] CATH LAB;  Service: Cardiology;  Laterality: Right;    RIGHT HEART CATHETERIZATION Right 2022    Procedure: INSERTION, CATHETER, RIGHT HEART;  Surgeon: Chloe Gregory MD;  Location: [facility] CATH LAB;  Service: Cardiology;  Laterality: Right;    RIGHT HEART CATHETERIZATION Right 3/21/2023    Procedure: INSERTION, CATHETER, RIGHT HEART;  Surgeon: Kahlil Cisneros MD;  Location: [facility] CATH LAB;  Service: Cardiology;  Laterality: Right;    TUBAL LIGATION      VAGINAL DELIVERY  ; ; ;      Family History   Problem Relation Age of Onset    Cancer Father     Breast cancer Neg Hx     Colon cancer Neg Hx     Ovarian cancer Neg Hx      Review of patient's allergies indicates:  No Known Allergies    OBJECTIVE:       Physical Exam:  Vitals:    Physical Exam  Constitutional:       General: She is not in acute distress.  HENT:      Head: Normocephalic and atraumatic.   Eyes:      General: No scleral icterus.  Pulmonary:      Effort: Pulmonary effort is normal. No respiratory distress.   Abdominal:      General: There is no distension.   Musculoskeletal:      Cervical back: Neck supple.      Comments: Reporting RLE pain from ankle to mid thigh   Skin:     Findings: No rash.   Neurological:      Mental Status: She is alert and oriented to person, place, and time.   Psychiatric:         Mood and Affect: Mood and affect normal.         Labs:  CBC:   WBC   Date Value Ref  Range Status   03/21/2023 4.60 3.90 - 12.70 K/uL Final       Hemoglobin   Date Value Ref Range Status   03/21/2023 10.1 (L) 12.0 - 16.0 g/dL Final       POC Hematocrit   Date Value Ref Range Status   12/13/2021 37 36 - 54 %PCV Final     Hematocrit   Date Value Ref Range Status   03/21/2023 31.0 (L) 37.0 - 48.5 % Final       MCV   Date Value Ref Range Status   03/21/2023 82 82 - 98 fL Final       Platelets   Date Value Ref Range Status   03/21/2023 183 150 - 450 K/uL Final       LFT:   Lab Results   Component Value Date    AST 10 03/21/2023    ALKPHOS 88 03/21/2023    BILITOT 0.9 03/21/2023       Albumin:   Albumin   Date Value Ref Range Status   03/21/2023 3.3 (L) 3.5 - 5.2 g/dL Final     Protein:   Total Protein   Date Value Ref Range Status   03/21/2023 6.4 6.0 - 8.4 g/dL Final         Radiology:    Results for orders placed during the hospital encounter of 03/19/23  Echo  Interpretation Summary  · The left ventricle is normal in size with concentric remodeling and normal systolic function. The estimated ejection fraction is 60%.  · Severe right ventricular enlargement with mildly to moderately reduced right ventricular systolic function.  · Indeterminate left ventricular diastolic function.  · Severe right atrial enlargement.  · Moderate to severe tricuspid regurgitation.  · Small pericardial effusion.  · The estimated PA systolic pressure is 89 mmHg.  · Intermediate central venous pressure (8 mmHg).          17 minutes spent discussing ACP      This note was partially created using i[employer] Voice Recognition software. Typographical and content errors may occur with this process. While efforts are made to detect and correct such errors, in some cases errors will persist. For this reason, wording in this document should be considered in the proper context and not strictly verbatim.      Signature: Sonja Mcgill MD

## 2023-06-15 ENCOUNTER — TELEPHONE (OUTPATIENT)
Dept: GENETICS | Facility: CLINIC | Age: 48
End: 2023-06-15
Payer: MEDICAID

## 2023-06-15 ENCOUNTER — TELEPHONE (OUTPATIENT)
Dept: PALLIATIVE MEDICINE | Facility: CLINIC | Age: 48
End: 2023-06-15
Payer: MEDICAID

## 2023-06-20 ENCOUNTER — TELEPHONE (OUTPATIENT)
Dept: PALLIATIVE MEDICINE | Facility: CLINIC | Age: 48
End: 2023-06-20
Payer: MEDICAID

## 2023-06-20 NOTE — TELEPHONE ENCOUNTER
Ms. Epperson called stating she would like to change all medications over to [facility] on [facility]. She stated the rx  sent previously the pharmacy was unable to fill and she's in pain.

## 2023-06-21 ENCOUNTER — TELEPHONE (OUTPATIENT)
Dept: CARDIOLOGY | Facility: HOSPITAL | Age: 48
End: 2023-06-21
Payer: MEDICAID

## 2023-06-21 ENCOUNTER — TELEPHONE (OUTPATIENT)
Dept: PALLIATIVE MEDICINE | Facility: CLINIC | Age: 48
End: 2023-06-21

## 2023-06-21 ENCOUNTER — TELEPHONE (OUTPATIENT)
Dept: TRANSPLANT | Facility: CLINIC | Age: 48
End: 2023-06-21
Payer: MEDICAID

## 2023-06-21 DIAGNOSIS — R63.0 ANOREXIA: ICD-10-CM

## 2023-06-21 DIAGNOSIS — I27.20 PULMONARY HYPERTENSION: ICD-10-CM

## 2023-06-21 DIAGNOSIS — M79.605 PAIN IN BOTH LOWER EXTREMITIES: ICD-10-CM

## 2023-06-21 DIAGNOSIS — G47.09 OTHER INSOMNIA: ICD-10-CM

## 2023-06-21 DIAGNOSIS — M79.604 PAIN IN BOTH LOWER EXTREMITIES: ICD-10-CM

## 2023-06-22 ENCOUNTER — OFFICE VISIT (OUTPATIENT)
Dept: TRANSPLANT | Facility: CLINIC | Age: 48
End: 2023-06-22
Payer: MEDICAID

## 2023-06-22 ENCOUNTER — HOSPITAL ENCOUNTER (OUTPATIENT)
Dept: PULMONOLOGY | Facility: CLINIC | Age: 48
Discharge: HOME | End: 2023-06-22
Payer: MEDICAID

## 2023-06-22 VITALS
RESPIRATION RATE: 18 BRPM | BODY MASS INDEX: 23.01 KG/M2 | HEIGHT: 67 IN | DIASTOLIC BLOOD PRESSURE: 70 MMHG | SYSTOLIC BLOOD PRESSURE: 101 MMHG | HEART RATE: 95 BPM | WEIGHT: 146.63 LBS | OXYGEN SATURATION: 100 %

## 2023-06-22 VITALS — BODY MASS INDEX: 23.01 KG/M2 | HEIGHT: 67 IN | WEIGHT: 146.63 LBS

## 2023-06-22 DIAGNOSIS — Z76.82 LUNG TRANSPLANT CANDIDATE: ICD-10-CM

## 2023-06-22 DIAGNOSIS — I27.20 PULMONARY HYPERTENSION: ICD-10-CM

## 2023-06-22 DIAGNOSIS — I27.0 PRIMARY PULMONARY HYPERTENSION: ICD-10-CM

## 2023-06-22 DIAGNOSIS — I27.21 WHO GROUP 1 PULMONARY ARTERIAL HYPERTENSION: Primary | ICD-10-CM

## 2023-06-22 PROCEDURE — 99214 OFFICE O/P EST MOD 30 MIN: CPT | Mod: 25,S$PBB,NTX, | Performed by: PHYSICIAN ASSISTANT

## 2023-06-22 PROCEDURE — 1160F RVW MEDS BY RX/DR IN RCRD: CPT | Mod: CPTII,NTX,, | Performed by: PHYSICIAN ASSISTANT

## 2023-06-22 PROCEDURE — 3008F BODY MASS INDEX DOCD: CPT | Mod: CPTII,NTX,, | Performed by: PHYSICIAN ASSISTANT

## 2023-06-22 PROCEDURE — 94727 PR PULM FUNCTION TEST BY GAS: ICD-10-PCS | Mod: 26,S$PBB,NTX, | Performed by: INTERNAL MEDICINE

## 2023-06-22 PROCEDURE — 99214 OFFICE O/P EST MOD 30 MIN: CPT | Mod: PBBFAC,NTX | Performed by: PHYSICIAN ASSISTANT

## 2023-06-22 PROCEDURE — 94010 BREATHING CAPACITY TEST: CPT | Mod: PBBFAC,NTX | Performed by: INTERNAL MEDICINE

## 2023-06-22 PROCEDURE — 94727 GAS DIL/WSHOT DETER LNG VOL: CPT | Mod: PBBFAC,NTX | Performed by: INTERNAL MEDICINE

## 2023-06-22 PROCEDURE — 94010 BREATHING CAPACITY TEST: ICD-10-PCS | Mod: 26,S$PBB,NTX, | Performed by: INTERNAL MEDICINE

## 2023-06-22 PROCEDURE — 3078F PR MOST RECENT DIASTOLIC BLOOD PRESSURE < 80 MM HG: ICD-10-PCS | Mod: CPTII,NTX,, | Performed by: PHYSICIAN ASSISTANT

## 2023-06-22 PROCEDURE — 3074F PR MOST RECENT SYSTOLIC BLOOD PRESSURE < 130 MM HG: ICD-10-PCS | Mod: CPTII,NTX,, | Performed by: PHYSICIAN ASSISTANT

## 2023-06-22 PROCEDURE — 3074F SYST BP LT 130 MM HG: CPT | Mod: CPTII,NTX,, | Performed by: PHYSICIAN ASSISTANT

## 2023-06-22 PROCEDURE — 94727 GAS DIL/WSHOT DETER LNG VOL: CPT | Mod: 26,S$PBB,NTX, | Performed by: INTERNAL MEDICINE

## 2023-06-22 PROCEDURE — 99999 PR PBB SHADOW E&M-EST. PATIENT-LVL IV: CPT | Mod: PBBFAC,TXP,, | Performed by: PHYSICIAN ASSISTANT

## 2023-06-22 PROCEDURE — 1159F MED LIST DOCD IN RCRD: CPT | Mod: CPTII,NTX,, | Performed by: PHYSICIAN ASSISTANT

## 2023-06-22 PROCEDURE — 94729 DIFFUSING CAPACITY: CPT | Mod: PBBFAC,NTX | Performed by: INTERNAL MEDICINE

## 2023-06-22 PROCEDURE — 94618 PULMONARY STRESS TESTING: ICD-10-PCS | Mod: 26,S$PBB,NTX, | Performed by: INTERNAL MEDICINE

## 2023-06-22 PROCEDURE — 99214 PR OFFICE/OUTPT VISIT, EST, LEVL IV, 30-39 MIN: ICD-10-PCS | Mod: 25,S$PBB,NTX, | Performed by: PHYSICIAN ASSISTANT

## 2023-06-22 PROCEDURE — 94618 PULMONARY STRESS TESTING: CPT | Mod: 26,S$PBB,NTX, | Performed by: INTERNAL MEDICINE

## 2023-06-22 PROCEDURE — 94618 PULMONARY STRESS TESTING: CPT | Mod: PBBFAC,NTX | Performed by: INTERNAL MEDICINE

## 2023-06-22 PROCEDURE — 94729 DIFFUSING CAPACITY: CPT | Mod: 26,S$PBB,NTX, | Performed by: INTERNAL MEDICINE

## 2023-06-22 PROCEDURE — 94010 BREATHING CAPACITY TEST: CPT | Mod: 26,S$PBB,NTX, | Performed by: INTERNAL MEDICINE

## 2023-06-22 PROCEDURE — 3008F PR BODY MASS INDEX (BMI) DOCUMENTED: ICD-10-PCS | Mod: CPTII,NTX,, | Performed by: PHYSICIAN ASSISTANT

## 2023-06-22 PROCEDURE — 3078F DIAST BP <80 MM HG: CPT | Mod: CPTII,NTX,, | Performed by: PHYSICIAN ASSISTANT

## 2023-06-22 PROCEDURE — 99999 PR PBB SHADOW E&M-EST. PATIENT-LVL IV: ICD-10-PCS | Mod: PBBFAC,TXP,, | Performed by: PHYSICIAN ASSISTANT

## 2023-06-22 PROCEDURE — 94729 PR C02/MEMBANE DIFFUSE CAPACITY: ICD-10-PCS | Mod: 26,S$PBB,NTX, | Performed by: INTERNAL MEDICINE

## 2023-06-22 PROCEDURE — 1159F PR MEDICATION LIST DOCUMENTED IN MEDICAL RECORD: ICD-10-PCS | Mod: CPTII,NTX,, | Performed by: PHYSICIAN ASSISTANT

## 2023-06-22 PROCEDURE — 1160F PR REVIEW ALL MEDS BY PRESCRIBER/CLIN PHARMACIST DOCUMENTED: ICD-10-PCS | Mod: CPTII,NTX,, | Performed by: PHYSICIAN ASSISTANT

## 2023-06-22 RX ORDER — PREGABALIN 50 MG/1
50 CAPSULE ORAL 2 TIMES DAILY
Qty: 60 CAPSULE | Refills: 1 | Status: SHIPPED | OUTPATIENT
Start: 2023-06-22 | End: 2023-07-31 | Stop reason: SDUPTHER

## 2023-06-22 RX ORDER — OXYCODONE AND ACETAMINOPHEN 5; 325 MG/1; MG/1
1 TABLET ORAL EVERY 8 HOURS PRN
Qty: 90 TABLET | Refills: 0 | Status: SHIPPED | OUTPATIENT
Start: 2023-06-22 | End: 2023-07-20 | Stop reason: SDUPTHER

## 2023-06-22 RX ORDER — MIRTAZAPINE 7.5 MG/1
7.5 TABLET, FILM COATED ORAL NIGHTLY
Qty: 30 TABLET | Refills: 11 | Status: SHIPPED | OUTPATIENT
Start: 2023-06-22 | End: 2023-08-08

## 2023-06-22 NOTE — LETTER
June 22, 2023        Sho Guy  [address]  [facility] [address]  Phone: [phone]  Fax: [phone]             [facility] 1st Fl  [address]  [facility] [address]  Phone: [phone]   Patient: Kristin Maier   MR Number: [id number]   YOB: 1975   Date of Visit: 6/22/2023       Dear Dr. Sho Guy    Thank you for referring Kristin Maier to me for evaluation. Attached you will find relevant portions of my assessment and plan of care.    If you have questions, please do not hesitate to call me. I look forward to following Kristin Maier along with you.    Sincerely,    Sho Stephen PA-C    Enclosure    If you would like to receive this communication electronically, please contact [email or web address] or [phone] to request [employer] access.    [employer] is a tool which provides read-only access to select patient information with whom you have a relationship. Its easy to use and provides real time access to review your patients record including encounter summaries, notes, results, and demographic information.    If you feel you have received this communication in error or would no longer like to receive these types of communications, please e-mail [email or web address]

## 2023-06-22 NOTE — PROGRESS NOTES
LUNG TRANSPLANT FOLLOW UP VISIT                                                                                                                                           Reason for Visit:  Evaluation for lung transplant    Referring Physician: Sho Guy, *    History of Present Illness: Kristin Maier is a 47 y.o. female who is on 0L of oxygen.  She is on no assisted ventilation.  Her New York Heart Association Class is I and a Karnofsky score of 80% - Normal activity with effort: some symptoms of disease. She is not diabetic.    Patient presents today for routine follow up of her WHO Group I PAH. Remains on adempas, opsumit, and remodulin and tolerating well. No recent exacerbations/hospitalizations. No signs/symptoms of volume overload. She remains active. Dyspnea stable. She states she is planning on moving to [place] for convenience of lung transplant referral and workup.       Past Medical History:   Diagnosis Date    Elevated liver enzymes     Essential (primary) hypertension     Heart failure, unspecified     Hypokalemia     Mixed hyperlipidemia     Neuropathic pain     Tx w/ tramadol & Lyrica    Pulmonary hypertension     Receiving Remodulin continuously through tunneled central line       Past Surgical History:   Procedure Laterality Date    APPENDECTOMY       SECTION      Transverse cut    HYSTERECTOMY  2017    TLH- bleeding    OOPHORECTOMY      RIGHT HEART CATHETERIZATION Right 2021    Procedure: INSERTION, CATHETER, RIGHT HEART;  Surgeon: Chloe Gregory MD;  Location: [facility] CATH LAB;  Service: Cardiology;  Laterality: Right;    RIGHT HEART CATHETERIZATION Right 3/16/2022    Procedure: INSERTION, CATHETER, RIGHT HEART;  Surgeon: Hu Silverman MD;  Location: [facility] CATH LAB;  Service: Cardiology;  Laterality: Right;    RIGHT HEART CATHETERIZATION Right 2022    Procedure: INSERTION, CATHETER, RIGHT HEART;  Surgeon: Chloe Gregory MD;  Location: [facility]  CATH LAB;  Service: Cardiology;  Laterality: Right;    RIGHT HEART CATHETERIZATION Right 3/21/2023    Procedure: INSERTION, CATHETER, RIGHT HEART;  Surgeon: Kahlil Cisneros MD;  Location: [facility] CATH LAB;  Service: Cardiology;  Laterality: Right;    TUBAL LIGATION  1996    VAGINAL DELIVERY  1991; 1993; 1994; 1996       Allergies: Patient has no known allergies.    Current Outpatient Medications   Medication Sig    furosemide (LASIX) 20 MG tablet TAKE 1 TABLET BY MOUTH EVERY DAY    hydroCHLOROthiazide (MICROZIDE) 12.5 mg capsule 1 capsule every morning.    loperamide (IMODIUM) 2 mg capsule Take 1 capsule (2 mg total) by mouth 4 (four) times daily as needed for Diarrhea.    mirtazapine (REMERON) 7.5 MG Tab Take 1 tablet (7.5 mg total) by mouth every evening.    ondansetron (ZOFRAN) 4 MG tablet Take 1 tablet (4 mg total) by mouth every 8 (eight) hours as needed for Nausea.    OPSUMIT 10 mg Tab TAKE 1 TABLET BY MOUTH DAILY. DO NOT HANDLE IF PREGNANT. DO NOT SPLIT, CRUSH, OR CHEW. REVIEW MEDICATION GUIDE.    oxyCODONE-acetaminophen (PERCOCET) 5-325 mg per tablet Take 1 tablet by mouth every 8 (eight) hours as needed for Pain.    potassium chloride SA (K-DUR,KLOR-CON M) 10 MEQ tablet TAKE 2 TABLETS (20 MEQ TOTAL) BY MOUTH ONCE DAILY.    pregabalin (LYRICA) 50 MG capsule Take 1 capsule (50 mg total) by mouth 2 (two) times daily.    REMODULIN 5 mg/mL Soln     riociguat (ADEMPAS) 2.5 mg tablet Take 1 tablet three times a day. Dose changes may occur throughout the course of therapy as directed by your prescriber.    sodium chloride 0.9% SolP 100 mL with treprostinil 1 mg/mL Soln 6,000,000 ng Inject 2,145 ng/min into the vein continuous.     No current facility-administered medications for this visit.       Immunization History   Administered Date(s) Administered    Tdap 10/24/2020     Family History:    Family History   Problem Relation Age of Onset    Cancer Father     Breast cancer Neg Hx     Colon cancer Neg Hx     Ovarian  cancer Neg Hx      Social History     Substance and Sexual Activity   Alcohol Use Not Currently    Alcohol/week: 2.0 standard drinks    Types: 2 Glasses of wine per week    Comment: socially- 2 or 3 times a week-2 glasses of wine      Social History     Substance and Sexual Activity   Drug Use Not Currently    Types: Marijuana    Comment: 2-6 MONTHS AGO      Social History     Socioeconomic History    Marital status:    Tobacco Use    Smoking status: Former     Types: Cigars     Quit date: 2020     Years since quittin.5     Passive exposure: Past    Smokeless tobacco: Never    Tobacco comments:     social smoker-light   Substance and Sexual Activity    Alcohol use: Not Currently     Alcohol/week: 2.0 standard drinks     Types: 2 Glasses of wine per week     Comment: socially- 2 or 3 times a week-2 glasses of wine    Drug use: Not Currently     Types: Marijuana     Comment: 2-6 MONTHS AGO    Sexual activity: Yes     Partners: Male     Birth control/protection: See Surgical Hx     Comment:      Social Determinants of Health     Financial Resource Strain: Low Risk     Difficulty of Paying Living Expenses: Not very hard   Food Insecurity: No Food Insecurity    Worried About Running Out of Food in the Last Year: Never true    Ran Out of Food in the Last Year: Never true   Transportation Needs: No Transportation Needs    Lack of Transportation (Medical): No    Lack of Transportation (Non-Medical): No   Physical Activity: Insufficiently Active    Days of Exercise per Week: 7 days    Minutes of Exercise per Session: 20 min   Stress: Stress Concern Present    Feeling of Stress : To some extent   Social Connections: Unknown    Frequency of Communication with Friends and Family: More than three times a week    Frequency of Social Gatherings with Friends and Family: More than three times a week    Attends [employer] Services: More than 4 times per year    Active Member of Clubs or Organizations: No     "Attends Club or Organization Meetings: Never   Housing Stability: Low Risk     Unable to Pay for Housing in the Last Year: No    Number of Places Lived in the Last Year: 2    Unstable Housing in the Last Year: No     Review of Systems   Constitutional:  Positive for malaise/fatigue. Negative for chills, diaphoresis, fever and weight loss.   HENT:  Negative for congestion, ear discharge, ear pain, hearing loss, nosebleeds, sinus pain, sore throat and tinnitus.    Eyes:  Negative for blurred vision, double vision, photophobia, pain, discharge and redness.   Respiratory:  Positive for cough and shortness of breath. Negative for hemoptysis, sputum production, wheezing and stridor.    Cardiovascular:  Positive for palpitations, orthopnea and leg swelling (occasional). Negative for chest pain, claudication and PND.   Gastrointestinal:  Negative for abdominal pain, blood in stool, constipation, diarrhea, heartburn, melena, nausea and vomiting.   Genitourinary:  Negative for dysuria, flank pain, frequency, hematuria and urgency.   Musculoskeletal:  Positive for joint pain and myalgias. Negative for back pain, falls and neck pain.   Skin:  Negative for itching and rash.   Neurological:  Negative for dizziness, tingling, tremors, sensory change, speech change, focal weakness, seizures, loss of consciousness, weakness and headaches.   Endo/Heme/Allergies:  Negative for environmental allergies and polydipsia. Does not bruise/bleed easily.   Psychiatric/Behavioral:  Negative for depression, hallucinations, memory loss, substance abuse and suicidal ideas. The patient is not nervous/anxious and does not have insomnia.    Vitals  Ht 5' 7" (1.702 m)   Wt 66.5 kg (146 lb 9.7 oz)   LMP 06/19/2017 (Approximate)   BMI 22.96 kg/m²   Physical Exam  Vitals and nursing note reviewed.   Constitutional:       General: She is not in acute distress.     Appearance: Normal appearance. She is well-developed.   HENT:      Head: Normocephalic " and atraumatic.      Nose: No congestion or rhinorrhea.      Mouth/Throat:      Mouth: Mucous membranes are moist.   Eyes:      General: No scleral icterus.     Extraocular Movements: Extraocular movements intact.      Conjunctiva/sclera: Conjunctivae normal.   Cardiovascular:      Rate and Rhythm: Normal rate.      Pulses: Normal pulses.      Heart sounds: Normal heart sounds. No murmur heard.    No friction rub.   Pulmonary:      Effort: Pulmonary effort is normal. No respiratory distress.      Breath sounds: Normal breath sounds. No stridor. No wheezing, rhonchi or rales.   Chest:      Chest wall: No tenderness.   Abdominal:      General: Bowel sounds are normal. There is no distension.      Palpations: Abdomen is soft.      Tenderness: There is no abdominal tenderness.   Musculoskeletal:         General: Normal range of motion.      Cervical back: Normal range of motion.      Right lower leg: No edema.      Left lower leg: No edema.   Skin:     General: Skin is warm and dry.   Neurological:      General: No focal deficit present.      Mental Status: She is alert and oriented to person, place, and time.   Psychiatric:         Mood and Affect: Mood normal.         Behavior: Behavior normal.       Labs:  No visits with results within 7 Day(s) from this visit.   Latest known visit with results is:   Admission on 03/19/2023, Discharged on 03/21/2023   Component Date Value    HIV 1/2 Ag/Ab 03/19/2023 Non-reactive     Hepatitis C Ab 03/19/2023 Non-reactive     WBC 03/19/2023 4.89     RBC 03/19/2023 4.29     Hemoglobin 03/19/2023 11.2 (L)     Hematocrit 03/19/2023 35.3 (L)     MCV 03/19/2023 82     MCH 03/19/2023 26.1 (L)     MCHC 03/19/2023 31.7 (L)     RDW 03/19/2023 18.7 (H)     Platelets 03/19/2023 188     MPV 03/19/2023 10.6     Immature Granulocytes 03/19/2023 0.2     Gran # (ANC) 03/19/2023 2.7     Immature Grans (Abs) 03/19/2023 0.01     Lymph # 03/19/2023 1.7     Mono # 03/19/2023 0.4     Eos # 03/19/2023 0.1      Baso # 03/19/2023 0.02     nRBC 03/19/2023 0     Gran % 03/19/2023 54.4     Lymph % 03/19/2023 35.6     Mono % 03/19/2023 7.6     Eosinophil % 03/19/2023 1.8     Basophil % 03/19/2023 0.4     Differential Method 03/19/2023 Automated     Sodium 03/19/2023 140     Potassium 03/19/2023 4.7     Chloride 03/19/2023 112 (H)     CO2 03/19/2023 18 (L)     Glucose 03/19/2023 112 (H)     BUN 03/19/2023 14     Creatinine 03/19/2023 0.8     Calcium 03/19/2023 9.5     Total Protein 03/19/2023 7.4     Albumin 03/19/2023 3.6     Total Bilirubin 03/19/2023 2.0 (H)     Alkaline Phosphatase 03/19/2023 109     AST 03/19/2023 25     ALT 03/19/2023 19     Anion Gap 03/19/2023 10     eGFR 03/19/2023 >60.0     Troponin I 03/19/2023 0.007     BNP 03/19/2023 67     SARS-CoV2 (COVID-19) Rony* 03/19/2023 Not Detected     Influenza A, Molecular 03/19/2023 Not Detected     Influenza B, Molecular 03/19/2023 Not Detected     RSV Ag by Molecular Meth* 03/19/2023 Not Detected     Specimen UA 03/19/2023 Urine, Clean Catch     Color, UA 03/19/2023 Yellow     Appearance, UA 03/19/2023 Clear     pH, UA 03/19/2023 6.0     Specific Gravity, UA 03/19/2023 1.025     Protein, UA 03/19/2023 Negative     Glucose, UA 03/19/2023 Negative     Ketones, UA 03/19/2023 Negative     Bilirubin (UA) 03/19/2023 Negative     Occult Blood UA 03/19/2023 Negative     Nitrite, UA 03/19/2023 Negative     Leukocytes, UA 03/19/2023 Negative     Alcohol, Urine 03/19/2023 <10     Benzodiazepines 03/19/2023 Negative     Methadone metabolites 03/19/2023 Negative     Cocaine (Metab.) 03/19/2023 Negative     Opiate Scrn, Ur 03/19/2023 Negative     Barbiturate Screen, Ur 03/19/2023 Negative     Amphetamine Screen, Ur 03/19/2023 Negative     THC 03/19/2023 Negative     Phencyclidine 03/19/2023 Negative     Creatinine, Urine 03/19/2023 187.0     Toxicology Information 03/19/2023 SEE COMMENT     Alcohol, Serum 03/19/2023 <10     Sodium 03/20/2023 139     Potassium 03/20/2023 3.8      Chloride 03/20/2023 113 (H)     CO2 03/20/2023 19 (L)     Glucose 03/20/2023 108     BUN 03/20/2023 13     Creatinine 03/20/2023 0.8     Calcium 03/20/2023 9.2     Total Protein 03/20/2023 6.1     Albumin 03/20/2023 3.2 (L)     Total Bilirubin 03/20/2023 1.1 (H)     Alkaline Phosphatase 03/20/2023 96     AST 03/20/2023 11     ALT 03/20/2023 15     Anion Gap 03/20/2023 7 (L)     eGFR 03/20/2023 >60.0     Magnesium 03/20/2023 1.8     Phosphorus 03/20/2023 4.1     WBC 03/20/2023 5.01     RBC 03/20/2023 3.67 (L)     Hemoglobin 03/20/2023 9.8 (L)     Hematocrit 03/20/2023 29.9 (L)     MCV 03/20/2023 82     MCH 03/20/2023 26.7 (L)     MCHC 03/20/2023 32.8     RDW 03/20/2023 18.5 (H)     Platelets 03/20/2023 170     MPV 03/20/2023 11.0     Immature Granulocytes 03/20/2023 0.2     Gran # (ANC) 03/20/2023 2.5     Immature Grans (Abs) 03/20/2023 0.01     Lymph # 03/20/2023 2.0     Mono # 03/20/2023 0.4     Eos # 03/20/2023 0.1     Baso # 03/20/2023 0.02     nRBC 03/20/2023 0     Gran % 03/20/2023 49.3     Lymph % 03/20/2023 39.9     Mono % 03/20/2023 8.8     Eosinophil % 03/20/2023 1.4     Basophil % 03/20/2023 0.4     Differential Method 03/20/2023 Automated     BSA 03/20/2023 1.83     TDI SEPTAL 03/20/2023 0.06     LA WIDTH 03/20/2023 3.37     TDI LATERAL 03/20/2023 0.12     LVIDd 03/20/2023 3.56     IVS 03/20/2023 1.03     Posterior Wall 03/20/2023 0.93     LVIDs 03/20/2023 2.22     FS 03/20/2023 38     LA volume 03/20/2023 44.24     Sinus 03/20/2023 3.08     STJ 03/20/2023 3.22     Ascending aorta 03/20/2023 3.06     LV mass 03/20/2023 103.00     LA size 03/20/2023 2.82     RVDD 03/20/2023 5.50     TAPSE 03/20/2023 1.88     Left Ventricle Relative * 03/20/2023 0.52     Mean e' 03/20/2023 0.09     LVOT diameter 03/20/2023 1.98     LVOT area 03/20/2023 3.1     TR Max Josiah 03/20/2023 4.50     LV Systolic Volume 03/20/2023 16.51     LV Systolic Volume Index 03/20/2023 9.1     LV Diastolic Volume 03/20/2023 52.92     LV  Diastolic Volume Index 03/20/2023 29.24     LA Volume Index 03/20/2023 24.4     LV Mass Index 03/20/2023 57     RA Major Axis 03/20/2023 6.03     Left Atrium Minor Axis 03/20/2023 5.11     Left Atrium Major Axis 03/20/2023 5.90     Triscuspid Valve Regurgi* 03/20/2023 81     RA Width 03/20/2023 4.87     Right Atrial Pressure (f* 03/20/2023 8     EF 03/20/2023 60     RV S' 03/20/2023 13     TV rest pulmonary artery* 03/20/2023 89     Sodium 03/21/2023 138     Potassium 03/21/2023 3.7     Chloride 03/21/2023 114 (H)     CO2 03/21/2023 19 (L)     Glucose 03/21/2023 101     BUN 03/21/2023 10     Creatinine 03/21/2023 0.7     Calcium 03/21/2023 9.2     Total Protein 03/21/2023 6.4     Albumin 03/21/2023 3.3 (L)     Total Bilirubin 03/21/2023 0.9     Alkaline Phosphatase 03/21/2023 88     AST 03/21/2023 10     ALT 03/21/2023 11     Anion Gap 03/21/2023 5 (L)     eGFR 03/21/2023 >60.0     Magnesium 03/21/2023 1.8     Phosphorus 03/21/2023 3.6     WBC 03/21/2023 4.60     RBC 03/21/2023 3.80 (L)     Hemoglobin 03/21/2023 10.1 (L)     Hematocrit 03/21/2023 31.0 (L)     MCV 03/21/2023 82     MCH 03/21/2023 26.6 (L)     MCHC 03/21/2023 32.6     RDW 03/21/2023 19.0 (H)     Platelets 03/21/2023 183     MPV 03/21/2023 11.4     Immature Granulocytes 03/21/2023 0.2     Gran # (ANC) 03/21/2023 2.0     Immature Grans (Abs) 03/21/2023 0.01     Lymph # 03/21/2023 2.1     Mono # 03/21/2023 0.4     Eos # 03/21/2023 0.1     Baso # 03/21/2023 0.01     nRBC 03/21/2023 0     Gran % 03/21/2023 43.3     Lymph % 03/21/2023 45.0     Mono % 03/21/2023 9.1     Eosinophil % 03/21/2023 2.2     Basophil % 03/21/2023 0.2     Differential Method 03/21/2023 Automated        Pulmonary Function Tests 6/22/2023   FVC 2.95   FEV1 2.34   TLC (liters) 4.01   DLCO (ml/mmHg sec) 14.58   FVC% 89.6   FEV1% 88.8   FEF 25-75 2.25   FEF 25-75% 86.7   TLC% 73.6   RV 1.06   RV% 56.7   DLCO% 55.6     6MW 6/22/2023 2/6/2023 11/14/2022 6/15/2022 2/11/2022 11/29/2021    6MWT Status completed without stopping completed without stopping completed without stopping completed without stopping completed without stopping not completed   Patient Reported Dyspnea No complaints Leg pain Dyspnea No complaints Other (Comment);Chest pain;Dyspnea   Was O2 used? No No No No No -   6MW Distance walked (feet) 1277 1200 1200 1250 1200 -   Distance walked (meters) 389.23 365.76 365.76 381 365.76 -   Did patient stop? No No No No No -   Oxygen Saturation 98 98 97 99 99 97   Supplemental Oxygen Room Air Room Air Room Air Room Air Room Air Room Air   Heart Rate 96 86 94 94 89 97   Blood Pressure 97/66 102/66 96/68 97/64 138/95 126/97   Jessica Dyspnea Rating  nothing at all nothing at all somewhat heavy very light nothing at all somewhat heavy   Oxygen Saturation 94 98 99 96 100 -   Supplemental Oxygen Room Air Room Air Room Air Room Air Room Air -   Heart Rate 104 100 112 95 76 -   Blood Pressure 102/67 102/68 110/69 109/72 136/93 -   Jessica Dyspnea Rating  somewhat heavy very light somewhat heavy light nothing at all -   Recovery Time (seconds) 55 76 75 48 60 -   Oxygen Saturation 98 98 99 99 100 -   Supplemental Oxygen Room Air Room Air Room Air Room Air Room Air -   Heart Rate 104 90 99 93 77 -       Imaging:  CT Chest: 9/12/2022  Impression:     1. Constellation of findings suggestive of pulmonary arterial hypertension.  No significant pulmonary edema or pleural effusion.  No evidence of pulmonary thromboembolism.  2. Cardiomegaly with markedly enlarged right atrium and the right ventricle.  3. Multiple small pulmonary nodules, with the largest measuring 0.4 cm.  For multiple solid nodules all <6 mm, Fleischner Society 2017 guidelines recommend no routine follow up for a low risk patient, or follow up with non-contrast chest CT at 12 months after discovery in a high risk patient.  4. 3.9 cm borderline aneurysmal ascending aorta.           Results for orders placed during the hospital encounter of  12/02/21    CT Abdomen Pelvis With Contrast    Narrative  EXAMINATION:  CT ABDOMEN PELVIS WITH CONTRAST    CLINICAL HISTORY:  Flank pain with UTI despite IV antibiotics x 4 days;    TECHNIQUE:  Low-dose, axial CT images of the abdomen and pelvis were obtained after the administration of 100 cc of Omnipaque 350 intravenous contrast material. No oral contrast was administered.  Coronal and sagittal reformations were provided for review.    COMPARISON:  CT abdomen pelvis 11/17/2021, 11/05/2016.    FINDINGS:  Heart: Cardiomegaly noting severe enlargement of the right atrium and right ventricle, similar to prior.  Reflux of contrast into the IVC and hepatic veins suggest elevated right heart pressure    Lung Bases: 3 mm micronodule abutting pleura in the lateral basal segment of the right lower lobe (axial series 2, image 11), stable compared to 11/05/2016. 3 mm left lower lobe nodule image 3 also similar.  Bandlike subsegmental atelectasis in the left lung base.  No pleural effusions.    Liver: Normal in size.  Diffuse parenchymal hypoattenuation suggestive of hepatic steatosis.  Vague enhancing foci in the left and right hepatic lobes, the largest measures 0.6 cm in hepatic segment Olivia (axial series 2, image 24), similar to prior exam.  The portal vein is not well opacified on exam.    Gallbladder: Cholelithiasis.    Bile Ducts: No intra or extrahepatic biliary ductal dilation.    Pancreas: No pancreatic mass lesion or peripancreatic inflammatory change.    Spleen: Small accessory splenule noted.  Normal size.  No focal lesions.    Adrenals: 1.7 cm hypoattenuating right adrenal lesion with subtle punctate enhancement/calcification appears stable in size compared to 11/05/2016 and previously characterized as an adenoma.  Left adrenal gland is unremarkable.    Genitourinary: Normal in size and location.  Subcentimeter hypodensity in the left kidney similar to prior that is too small to definitively characterize.  No  nephrolithiasis or hydroureteronephrosis.  Bladder demonstrates smooth contours without bladder wall thickening.    Reproductive organs: Uterus is surgically absent.  Small volume pelvic free fluid similar.    GI tract/Mesentery: Stomach and distal esophagus are normal.  Visualized loops of small and large bowel are normal in caliber without evidence for inflammation or obstruction.  There is diffuse mesenteric edema similar.    Peritoneal Space: No abdominopelvic ascites or intraperitoneal free air.    Retroperitoneum: No significant adenopathy.    Abdominal wall: Diffuse body wall edema mildly increased compared to prior.  Few foci of air in the right lower quadrant subcutaneous soft tissues, may be sequela of prior injections.    Vasculature: Abdominal aorta is normal in caliber with mild calcific and noncalcific atherosclerosis extending into the iliac arteries.    Bones: Normal appearance without acute fracture or bony destructive process.    Impression  Cardiomegaly noting severe enlargement of the right atrium and right ventricle, similar to prior exam. Reflux of contrast into the IVC and hepatic veins may reflect component of heart failure.    Hepatic steatosis.    Vague subcentimeter enhancing foci throughout the liver, the largest measures 0.6 cm and appears similar to the 11/17/2021.  This was not definitively seen on 11/05/2016 noting that exam was without intravenous contrast.  Etiology is nonspecific.  Attention on follow-up.    Diffuse mesenteric edema, similar to prior.    Diffuse body wall edema mildly increased compared to prior.    Cholelithiasis.    3 mm bilateral lower lobe micronodules stable dating back to 11/05/2016 and favored to reflect benign etiology.    Probable right adrenal adenoma.    Small volume pelvic free fluid similar.    Additional findings as above.    Electronically signed by resident: Raymond Chao  Date:    12/02/2021  Time:    13:02    Electronically signed by: Edwin  MD Susannah  Date:    12/02/2021  Time:    13:52      Cardiodiagnostics:  Results for orders placed during the hospital encounter of 11/14/22    Echo    Interpretation Summary  · The estimated ejection fraction is 60%.  · The left ventricle is normal in size with normal systolic function.  · There is abnormal septal wall motion. There is systolic and diastolic flattening of the interventricular septum consistent with right ventricle pressure and volume overload.  · Indeterminate left ventricular diastolic function.  · Moderate right ventricular enlargement with moderately reduced right ventricular systolic function.  · There is right ventricular hypertrophy.  · Severe tricuspid regurgitation.  · Moderate right atrial enlargement.  · There is pulmonary hypertension.  · The estimated PA systolic pressure is 84 mmHg.  · Normal central venous pressure (3 mmHg).  · Trivial circumferential pericardial effusion.    Results for orders placed during the hospital encounter of 07/19/22    Cardiac catheterization    Conclusion  · The estimated blood loss was <50 mL.  · Severe pulmonary hypertension however CO/CI have finally come up to normal range. Continue uptitration of riociguat and consider uptitration of remodulin after.  · Recommend sleep clinic evaluation.    The procedure log was documented by Documenter: Sallie Kurtz and verified by Chloe Gregory MD.    Date: 7/19/2022  Time: 1:47 PM    Results for orders placed during the hospital encounter of 03/19/23    Cardiac catheterization    Conclusion    The estimated blood loss was <50 mL.    Procedure: Right Heart Catheterization  Physician: Kahlil Cisneros MD    Access: Right IJ  Analgesia: 2% Lidocaine    Measurements done while on Opsumit 10 mg/day, Adempas 2.5 mg TID, and Remodulin 90 ng/kg/min.    Findings:    RA: 10  RV: 75/3, EDP 12  PA: 82/33, mean 48  PCWP: 13    Saturation:  Arterial: 100 %  PA: 60 %    Kristel CI/CO: 2.5/4.5    PVR: 7.8 Wood  Units    Conclusions:  Severe pre capillary pulmonary hypertension  Elevated right sided filling pressures  Low normal CI/CO      Assessment:  1. WHO group 1 pulmonary arterial hypertension    2. Lung transplant candidate      Plan:     Patient followed for WHO Group I PAH. Remains stable on remodulin, opmsumit, and adempas. Low normal CI on RHC. No exertional hypoxemia and 6MWT stable.     Remains early for lung transplant workup. Unable to offer transplant at [facility] due to severe pulmonary hypertension. Will continue to monitor for signs/symptoms of disease progression and refer to outside center if appropriate. Patient states she plans on moving to [place] eventually, but would like to follow up with [facility] in the interim.     RTC in 6 months or sooner if needed. Repeat spirometry and 6MWT at routine visits.       Sho Stephen PA-C  Lung Transplant

## 2023-06-26 NOTE — PROCEDURES
Kristin Maier is a 47 y.o.  female patient, who presents for a 6 minute walk test ordered by ANA M Guy.  The diagnosis is Pulmonary Hypertension.  The patient's BMI is 23 kg/m2.  Predicted distance (lower limit of normal) is 448.81 meters.      Test Results:    The test was completed without stopping.  The total time walked was 360 seconds.  During walking, the patient reported:  Dyspnea.  The patient used no assistive devices during testing.     06/22/2023---------Distance: 389.23 meters (1277 feet)     Lap Walk Time O2 Sat % Supplemental Oxygen Heart Rate Blood Pressure Jessica Scale   Pre-exercise  (Resting) 0 0 98 % Room Air 96 bpm 97/66 mmHg 0   During Exercise 1 55 sec 94 % Room Air 103 bpm     During Exercise 2 108 sec 91 % Room Air 106 bpm     During Exercise 3 161 sec 92 % Room Air 102 bpm     During Exercise 4 221 sec 94 % Room Air 98 bpm     During Exercise 5 278 sec 94 % Room Air 96 bpm     During Exercise 6 338 sec 94 % Room Air 99 bpm     End of Exercise  360 sec 94 % Room Air 104 bpm 102/67 mmHg 4   Post-exercise  (Recovery)   98 % Room Air  104 bpm       Recovery Time: 55 seconds    Performing nurse/tech: SERA Mcgill      PREVIOUS STUDY:   02/06/2023---------Distance: 365.76 meters (1200 feet)       O2 Sat % Supplemental Oxygen Heart Rate Blood Pressure Jessica Scale   Pre-exercise  (Resting) 98 % Room Air 86 bpm 102/66 mmHg 0   During Exercise 98 % Room Air 100 bpm 102/68 mmHg 1   Post-exercise  (Recovery) 98 % Room Air  90 bpm          CLINICAL INTERPRETATION:  Six minute walk distance is 389.23 meters (1277 feet) with somewhat heavy dyspnea.  During exercise, there was significant desaturation while breathing room air.  Both blood pressure and heart rate remained stable with walking.  The patient did not report non-pulmonary symptoms during exercise.  Since the previous study in February 2023, exercise capacity is unchanged.  Based upon age and body mass index, exercise capacity is  less than predicted.

## 2023-06-29 ENCOUNTER — TELEPHONE (OUTPATIENT)
Dept: PALLIATIVE MEDICINE | Facility: CLINIC | Age: 48
End: 2023-06-29
Payer: MEDICAID

## 2023-07-03 ENCOUNTER — TELEPHONE (OUTPATIENT)
Dept: PALLIATIVE MEDICINE | Facility: CLINIC | Age: 48
End: 2023-07-03
Payer: MEDICAID

## 2023-07-05 ENCOUNTER — TELEPHONE (OUTPATIENT)
Dept: PALLIATIVE MEDICINE | Facility: CLINIC | Age: 48
End: 2023-07-05
Payer: MEDICAID

## 2023-07-05 NOTE — TELEPHONE ENCOUNTER
Patient missed appointment this morning for 10 am. Called to reschedule there was no answer and voicemail was full.

## 2023-07-07 ENCOUNTER — TELEPHONE (OUTPATIENT)
Dept: GENETICS | Facility: CLINIC | Age: 48
End: 2023-07-07
Payer: MEDICAID

## 2023-07-10 ENCOUNTER — TELEPHONE (OUTPATIENT)
Dept: GENETICS | Facility: CLINIC | Age: 48
End: 2023-07-10
Payer: MEDICAID

## 2023-07-10 NOTE — TELEPHONE ENCOUNTER
Omar informing pt to call office callback [phone] to reschedule missed appt.        ----- Message from Alyssia Walker CGC sent at 7/10/2023 11:16 AM CDT -----  Do you mind calling to see if she is able to make today's appointment or if she needs to reschedule. Thank you!

## 2023-07-12 ENCOUNTER — PATIENT MESSAGE (OUTPATIENT)
Dept: PALLIATIVE MEDICINE | Facility: CLINIC | Age: 48
End: 2023-07-12
Payer: MEDICAID

## 2023-07-12 ENCOUNTER — TELEPHONE (OUTPATIENT)
Dept: TRANSPLANT | Facility: CLINIC | Age: 48
End: 2023-07-12
Payer: MEDICAID

## 2023-07-12 ENCOUNTER — PATIENT MESSAGE (OUTPATIENT)
Dept: TRANSPLANT | Facility: CLINIC | Age: 48
End: 2023-07-12
Payer: MEDICAID

## 2023-07-12 NOTE — TELEPHONE ENCOUNTER
"NN contacted Ms. Maier, "I was trying to get in touch with palliative care.  I had missed an appointment with them."   NN advised her that a message would be sent to [facility] staff.        1128 ----- NN attempted to return call to Ms. Maier, no answer no voicemail availabe.  "

## 2023-07-13 ENCOUNTER — TELEPHONE (OUTPATIENT)
Dept: PALLIATIVE MEDICINE | Facility: CLINIC | Age: 48
End: 2023-07-13
Payer: MEDICAID

## 2023-07-20 DIAGNOSIS — I27.20 PULMONARY HYPERTENSION: ICD-10-CM

## 2023-07-20 DIAGNOSIS — M79.605 PAIN IN BOTH LOWER EXTREMITIES: ICD-10-CM

## 2023-07-20 DIAGNOSIS — M79.604 PAIN IN BOTH LOWER EXTREMITIES: ICD-10-CM

## 2023-07-20 RX ORDER — OXYCODONE AND ACETAMINOPHEN 5; 325 MG/1; MG/1
1 TABLET ORAL EVERY 8 HOURS PRN
Qty: 90 TABLET | Refills: 0 | Status: SHIPPED | OUTPATIENT
Start: 2023-07-20 | End: 2023-08-08

## 2023-07-22 RX ORDER — ONDANSETRON 4 MG/1
4 TABLET, FILM COATED ORAL EVERY 8 HOURS PRN
Qty: 30 TABLET | Refills: 6 | Status: SHIPPED | OUTPATIENT
Start: 2023-07-22 | End: 2023-10-03 | Stop reason: SDUPTHER

## 2023-07-22 RX ORDER — LOPERAMIDE HYDROCHLORIDE 2 MG/1
2 CAPSULE ORAL 4 TIMES DAILY PRN
Qty: 30 CAPSULE | Refills: 3 | Status: SHIPPED | OUTPATIENT
Start: 2023-07-22 | End: 2023-07-31 | Stop reason: SDUPTHER

## 2023-07-27 ENCOUNTER — TELEPHONE (OUTPATIENT)
Dept: TRANSPLANT | Facility: CLINIC | Age: 48
End: 2023-07-27
Payer: MEDICAID

## 2023-07-27 ENCOUNTER — TELEPHONE (OUTPATIENT)
Dept: PALLIATIVE MEDICINE | Facility: CLINIC | Age: 48
End: 2023-07-27
Payer: MEDICAID

## 2023-07-27 NOTE — TELEPHONE ENCOUNTER
"Kristin Maier contacted NN concerning having "a bad day. She states that she has the pain in her legs - more pain in left leg and ankle.  She is asking if there is something that she can do to help the pain.  She states that she knows that she needs to get up and exercise, but she does not.  Symptom questioned: negative for weight gain, increased SOB, and chest pain also negative for swelling, injury, and discoloration on the skin.  She states that she has medication for pain, but she wants to know if there is something else that she can do. NN advised her that there are many things that can influence her pain on Remodulin - and yes including not circulating blood.  NN asked if she had contacted her specialty pharmacy nurse for any recommendations and she states that she has not.  NN advised to try non weight bearing movement in bed at first and to alternate heat/cold.  NN also requested that she give an update on whether these things worked or not.  She acknowledged.  "

## 2023-07-28 ENCOUNTER — OFFICE VISIT (OUTPATIENT)
Dept: PALLIATIVE MEDICINE | Facility: CLINIC | Age: 48
End: 2023-07-28
Payer: MEDICAID

## 2023-07-28 DIAGNOSIS — G47.09 OTHER INSOMNIA: ICD-10-CM

## 2023-07-28 DIAGNOSIS — F41.9 ANXIETY: Primary | ICD-10-CM

## 2023-07-28 PROCEDURE — 90834 PSYTX W PT 45 MINUTES: CPT | Mod: 95,NTX,, | Performed by: SOCIAL WORKER

## 2023-07-28 PROCEDURE — 1159F PR MEDICATION LIST DOCUMENTED IN MEDICAL RECORD: ICD-10-PCS | Mod: CPTII,95,NTX, | Performed by: SOCIAL WORKER

## 2023-07-28 PROCEDURE — 90834 PR PSYCHOTHERAPY W/PATIENT, 45 MIN: ICD-10-PCS | Mod: 95,NTX,, | Performed by: SOCIAL WORKER

## 2023-07-28 PROCEDURE — 1159F MED LIST DOCD IN RCRD: CPT | Mod: CPTII,95,NTX, | Performed by: SOCIAL WORKER

## 2023-07-28 NOTE — PROGRESS NOTES
"Individual Psychotherapy (PhD/LCSW)    7/28/2023    The patient location is: [place], [place]  The chief complaint leading to consultation is: Depression, Anxiety    Visit type: audiovisual    Face to Face time with patient: 46  50 minutes of total time spent on the encounter, which includes face to face time and non-face to face time preparing to see the patient (eg, review of tests), Obtaining and/or reviewing separately obtained history, Documenting clinical information in the electronic or other health record, Independently interpreting results (not separately reported) and communicating results to the patient/family/caregiver, or Care coordination (not separately reported).     Each patient to whom he or she provides medical services by telemedicine is:  (1) informed of the relationship between the physician and patient and the respective role of any other health care provider with respect to management of the patient; and (2) notified that he or she may decline to receive medical services by telemedicine and may withdraw from such care at any time.    Site:  [facility]         Therapeutic Intervention: Met with patient.  Outpatient - Insight oriented psychotherapy 45 min - CPT code 34375 and Outpatient - Behavior modifying psychotherapy 45 min - CPT code 84380    Chief complaint/reason for encounter: depression, anxiety, and sleep     Interval history and content of current session:  LCSW and patient completed follow up psychotherapy session this date. Patient reports increased pain this past week which has impacted her overall mood. She feels she is not able to be as active as she used to be and when she has "pain flare ups", she feels more down. Endorses feelings of anxiety -  restlessness, fatigued,  muscle tightness, feeling constantly on edge, concentration difficulties, and a general state of irritability that all significantly impacts pt's daily life and functioning. She continues to have " difficulties with sleep, she will begin a log/sleep journal in order to begin discussing sleep hygiene.  Continued to navigate ongoing stressors affecting pt's depression and anxiety. Engaged in active discussion, constructive processing, support, feedback, and re-framing. Pt fully engaged and remains receptive. Pt to return as scheduled.     Treatment plan:  Target symptoms: depression, anxiety   Why chosen therapy is appropriate versus another modality: relevant to diagnosis, patient responds to this modality, evidence based practice  Outcome monitoring methods: self-report, observation, checklist/rating scale  Therapeutic intervention type: insight oriented psychotherapy, behavior modifying psychotherapy    Risk parameters:  Patient reports no suicidal ideation  Patient reports no homicidal ideation  Patient reports no self-injurious behavior  Patient reports no violent behavior    Verbal deficits: None    Patient's response to intervention:  The patient's response to intervention is accepting.    Progress toward goals and other mental status changes:  The patient's progress toward goals is good.    Diagnosis:   No diagnosis found.    Plan:  individual psychotherapy    Return to clinic: 2 weeks    Length of Service (minutes): 45

## 2023-07-31 DIAGNOSIS — M79.605 PAIN IN BOTH LOWER EXTREMITIES: ICD-10-CM

## 2023-07-31 DIAGNOSIS — M79.604 PAIN IN BOTH LOWER EXTREMITIES: ICD-10-CM

## 2023-07-31 RX ORDER — PREGABALIN 50 MG/1
50 CAPSULE ORAL 2 TIMES DAILY
Qty: 60 CAPSULE | Refills: 1 | Status: SHIPPED | OUTPATIENT
Start: 2023-07-31 | End: 2023-09-20

## 2023-07-31 RX ORDER — FUROSEMIDE 20 MG/1
20 TABLET ORAL DAILY
Qty: 30 TABLET | Refills: 11 | Status: SHIPPED | OUTPATIENT
Start: 2023-07-31 | End: 2023-09-07 | Stop reason: SDUPTHER

## 2023-07-31 RX ORDER — LOPERAMIDE HYDROCHLORIDE 2 MG/1
2 CAPSULE ORAL 4 TIMES DAILY PRN
Qty: 30 CAPSULE | Refills: 3 | Status: ON HOLD | OUTPATIENT
Start: 2023-07-31 | End: 2024-01-04 | Stop reason: HOSPADM

## 2023-08-08 ENCOUNTER — TELEPHONE (OUTPATIENT)
Dept: PALLIATIVE MEDICINE | Facility: CLINIC | Age: 48
End: 2023-08-08
Payer: MEDICAID

## 2023-08-08 ENCOUNTER — OFFICE VISIT (OUTPATIENT)
Dept: PALLIATIVE MEDICINE | Facility: CLINIC | Age: 48
End: 2023-08-08
Payer: MEDICAID

## 2023-08-08 DIAGNOSIS — M79.605 PAIN IN BOTH LOWER EXTREMITIES: ICD-10-CM

## 2023-08-08 DIAGNOSIS — G47.09 OTHER INSOMNIA: ICD-10-CM

## 2023-08-08 DIAGNOSIS — I27.20 PULMONARY HYPERTENSION: ICD-10-CM

## 2023-08-08 DIAGNOSIS — R63.0 ANOREXIA: ICD-10-CM

## 2023-08-08 DIAGNOSIS — M79.604 PAIN IN BOTH LOWER EXTREMITIES: ICD-10-CM

## 2023-08-08 PROCEDURE — 1159F MED LIST DOCD IN RCRD: CPT | Mod: CPTII,95,NTX, | Performed by: STUDENT IN AN ORGANIZED HEALTH CARE EDUCATION/TRAINING PROGRAM

## 2023-08-08 PROCEDURE — 99215 OFFICE O/P EST HI 40 MIN: CPT | Mod: 95,NTX,, | Performed by: STUDENT IN AN ORGANIZED HEALTH CARE EDUCATION/TRAINING PROGRAM

## 2023-08-08 PROCEDURE — 1159F PR MEDICATION LIST DOCUMENTED IN MEDICAL RECORD: ICD-10-PCS | Mod: CPTII,95,NTX, | Performed by: STUDENT IN AN ORGANIZED HEALTH CARE EDUCATION/TRAINING PROGRAM

## 2023-08-08 PROCEDURE — 99215 PR OFFICE/OUTPT VISIT, EST, LEVL V, 40-54 MIN: ICD-10-PCS | Mod: 95,NTX,, | Performed by: STUDENT IN AN ORGANIZED HEALTH CARE EDUCATION/TRAINING PROGRAM

## 2023-08-08 RX ORDER — MIRTAZAPINE 15 MG/1
15 TABLET, FILM COATED ORAL NIGHTLY
Qty: 30 TABLET | Refills: 11 | Status: SHIPPED | OUTPATIENT
Start: 2023-08-08 | End: 2023-09-20

## 2023-08-08 RX ORDER — OXYCODONE AND ACETAMINOPHEN 7.5; 325 MG/1; MG/1
1 TABLET ORAL EVERY 8 HOURS PRN
Qty: 90 TABLET | Refills: 0 | Status: SHIPPED | OUTPATIENT
Start: 2023-08-08 | End: 2023-09-09 | Stop reason: SDUPTHER

## 2023-08-08 NOTE — PROGRESS NOTES
Palliative Medicine Clinic Note      Consult Requested By: No ref. provider found    Primary Care Physician:   Jorge A Stack MD    Reason for Consult: Advance care planning and symptom management in the setting of Pulmonary Hypertension     The patient location is: [place]   The chief complaint leading to consultation is: pain    Visit type: audiovisual    Face to Face time with patient: 19min  40minutes of total time spent on the encounter, which includes face to face time and non-face to face time preparing to see the patient (eg, review of tests), Obtaining and/or reviewing separately obtained history, Documenting clinical information in the electronic or other health record, Independently interpreting results (not separately reported) and communicating results to the patient/family/caregiver, or Care coordination (not separately reported).       Each patient provided with medical services by telemedicine is:  (1) informed of the relationship between the physician and patient and the respective role of any other health care provider with respect to management of the patient; and (2) notified that he or she may decline to receive medical services by telemedicine and may withdraw from such care at any time.      ASSESSMENT/PLAN:     Plan/Recommendations:  Diagnoses and all orders for this visit:    Pulmonary hypertension  -Pulmonary HTN, WHO group 1, on remodulin.   - following with Pulmonary hypertension Clinic  -[facility] 3/21/22 indicative on worsening disease  -currently on Opsumit, Adempas and Remodulin  -Not requiring oxygen   -trying to exercise, however this has been limited by her pain      Anorexia  -continues to have anorexia  -  will increase mirtazapine (REMERON) to 15 MG Tab;   -no weight loss    Anxiety /Other insomnia  -   increase  mirtazapine (REMERON) to 15 MG q.h.s.  - working with [facility] and  for support      Pain in both lower extremities  Arthralgia in both  hands  -pain in lower extremities is related to her medication, it is worse when her Remodulin is uptitrated  -requiring Percocet 5-325mg every 8 hours, sometimes she needs to take 2 for the work.  Usually depends on the level of activity.   -will increase Percocet 7.5-325mg q.8 hours p.r.n.  -add outpatient physical therapy  -Lyrica 50mg q12h       Palliative care encounter  Medicolegal: Has decision making capacity.   is surrogate decision maker.   She shared that for HCPOA she would named her  as 1st agent and her daughter Nicky as her 2nd agent.     Psychosocial:  support system consists of  and daughters     Spiritual: Yazdanism    Understanding of disease and Illness Trajectory: Patient  has  adequate understanding of her illness, they can benefit from continued education on what to expect in the future.      Advance Care Planning   Advance Directives:   Living Will: No    [place]: No    Do Not Resuscitate Status: No    Medical Power of : Yes     Agent's Contact Number:  [phone]    Decision Making:  Patient answered questions  Goals of Care: What is most important right now is to focus on spending time at home, remaining as independent as possible, improvement in condition but with limits to invasive therapies. Accordingly, we have decided that the best plan to meet the patient's goals includes continuing with treatment.              Date: 06/13/2023  Power of   I introduced the concept of advance directives to the patient, as well. Then the patient received detailed information about the importance of designating a Health Care Power of  (HCPOA). She was also instructed to communicate with this person about their wishes for future healthcare, should she become sick and lose decision-making capacity. The patient has not previously appointed a HCPOA. After our discussion, the patient has decided to complete a HCPOA and has appointed her significant other, health  care agent: Juju Maier & her daughter Navya Dowell as second agent. Form completed and will be scanned today.       min time was spent on advance care planning, goals of care discussion, emotional support, formulating and communicating prognosis and goals of care, exploring burden/benefit of various approaches of treatment.     Follow up: 1 month      SUBJECTIVE:     History obtained from: Patient      complaint: No chief complaint on file.      Disease History:  Pulmonary HTN, WHO group 1, on remodulin. [facility] 3/21/22 indicative on worsening disease      History of Present Illness / Interval History:  Kristin Maier is 47 y.o. year old female presenting with PAH.  Referred to Palliative Care for evaluation and management of physical symptoms, advance care planning,, and additional support.. female attended the appointment alone     Patient continues to report pain she rates her pain as a 7 today.  She is able to walk around.  She states that Percocet helps however it depends on the day she sometimes needs to take 2.  Today which is about they because she was walking around she will need medication at 4:00 p.m. then before she goes to sleep.  Day she might only need 1 dose.  She felt very depressed 2 weeks ago  She has been more sedentary lately mostly spending her days the bed or recliner or sofa  Has decreased appetite however no weight loss      -------------  Patient reports significant left leg pain and sometimes right leg pain that feels like someone is hitting her with a hammer constantly.  This pain is related as the side effect of her PH treatment.  She has taken 3 tramadol today in order to be able to walk  She endorsed shortness of breath with walking over to the clinic.  She states that at home on good days without any pain she is able to walk 1 block.  Most days she is only able to walk from the bed to the living room because of her vein.  When this happens her  and her children  are able to assist her.  She states that Lyrica has not helped with her pain    -------------------------    She shared that she ran out of tramadol and then she had a trip to [place] where she could not move at all because of pain.  She is currently only taking Tylenol and she needs to take it around the clock it brings her pain from 7-6.  Mirtazapine help with her appetite  Not sleeping well because of pain  She has nausea however Zofran helps    ------------------------------  Patient states that she has significant pain in her joints fingers legs elbow and ankles.  The pain is worse at night improves with tramadol 2 to 3 times a day.  And message in her hands legs.   She feels her shortness of breath has worsened.  She was an active person before was able to walk outside.  She is still able to go to the grocery store however needs a scooter.  With movement she has tachycardia heart rate 120-150 and sometimes gets chest pain.  Today was not a good day, she becomes short of breath with walking in the living room.   Nausea approximately once a week Zofran can help.  All of her symptoms are worse when she changes her medication and have worsened lately because of the medication titration.  She has anxiety at night when she sits quietly.  She hopes to be able to have more strength more energy more time and to decrease her amount of pain.  Her main worries not being here for her grand kids        Review of Symptoms      Symptom Assessment (ESAS 0-10 Scale)  Pain:  7  Dyspnea:  0  Anxiety:  9  Nausea:  0  Depression:  6  Anorexia:  7  Fatigue:  0  Insomnia:  6  Restlessness:  0  Agitation:  0     CAM / Delirium:  Negative  Constipation:  Negative  Diarrhea:  Negative      Pain Assessment:    Location(s): leg (joint)    Leg       Location: bilateral        Quality: Throbbing        Quantity: 7/10 in intensity        Chronicity: Onset 2 month(s) ago, unchanged        Aggravating Factors: Standing        Alleviating  Factors: Opiates        Associated Symptoms: None    Modified Jessica Scale:  3    Performance Status:  60    Living Arrangements:  Lives with family    Psychosocial/Cultural:   See Palliative Psychosocial Note: Yes  . Has 3 living daughters and a  son. Has grandchildren who she enjoys spending time with. Likes teaching them to play different sports. Enjoys gardening   **Primary  to Follow**  Palliative Care  Consult: Yes    Spiritual:  F - Leticia and Belief:  Mandaen        Previous experience or exposure to a serious illness: Yes        Medications:    Current Outpatient Medications:     diphenhydrAMINE-acetaminophen (TYLENOL PM)  mg Tab, Take 2 tablets by mouth nightly as needed (insomnia)., Disp: , Rfl:     furosemide (LASIX) 20 MG tablet, Take 1 tablet (20 mg total) by mouth once daily., Disp: 30 tablet, Rfl: 11    hydroCHLOROthiazide (MICROZIDE) 12.5 mg capsule, 1 capsule every morning., Disp: , Rfl:     loperamide (IMODIUM) 2 mg capsule, Take 1 capsule (2 mg total) by mouth 4 (four) times daily as needed for Diarrhea., Disp: 30 capsule, Rfl: 3    mirtazapine (REMERON) 7.5 MG Tab, Take 1 tablet (7.5 mg total) by mouth every evening., Disp: 30 tablet, Rfl: 11    multivitamin with minerals tablet, Take 1 tablet by mouth once daily., Disp: , Rfl:     ondansetron (ZOFRAN) 4 MG tablet, Take 1 tablet (4 mg total) by mouth every 8 (eight) hours as needed for Nausea., Disp: 30 tablet, Rfl: 6    OPSUMIT 10 mg Tab, TAKE 1 TABLET BY MOUTH DAILY. DO NOT HANDLE IF PREGNANT. DO NOT SPLIT, CRUSH, OR CHEW. REVIEW MEDICATION GUIDE., Disp: 30 tablet, Rfl: 10    oxyCODONE-acetaminophen (PERCOCET) 5-325 mg per tablet, Take 1 tablet by mouth every 8 (eight) hours as needed for Pain., Disp: 90 tablet, Rfl: 0    potassium chloride SA (K-DUR,KLOR-CON M) 10 MEQ tablet, TAKE 2 TABLETS (20 MEQ TOTAL) BY MOUTH ONCE DAILY., Disp: 60 tablet, Rfl: 11    pregabalin (LYRICA) 50 MG capsule, Take 1  capsule (50 mg total) by mouth 2 (two) times daily., Disp: 60 capsule, Rfl: 1    REMODULIN 5 mg/mL Soln, , Disp: , Rfl:     riociguat (ADEMPAS) 2.5 mg tablet, Take 1 tablet three times a day. Dose changes may occur throughout the course of therapy as directed by your prescriber., Disp: 90 tablet, Rfl: 11    sodium chloride 0.9% SolP 100 mL with treprostinil 1 mg/mL Soln 6,000,000 ng, Inject 2,145 ng/min into the vein continuous., Disp: , Rfl:       External  database queried on 2023  by Sonja Mcgill .   The results reviewed and considered with the clinical data in the decision whether or not to prescribe a controlled substance.  2023   3  Pregabalin 50 Mg Capsule 60.00  30  Da Nod  [id number]   Peo (2843)  0  0.67 LME  [place]    2023   3  Oxycodone-Acetaminophen 5-325 90.00  30  Er Lori  [id number]   Peo (2843)  0  22.50 MME  Other  [place]    2023   3  Oxycodone-Acetaminophen 5-325 90.00  30  Er Lori  [id number]   Peo (2843)  0  22.50 [place]ME  [place]    2023   2  Tramadol Hcl 50 Mg Tablet 15.00  8  Ki Tho  [id number]          Past Medical History:   Diagnosis Date    Elevated liver enzymes     Essential (primary) hypertension     Heart failure, unspecified     Hypokalemia     Mixed hyperlipidemia     Neuropathic pain     Tx w/ tramadol & Lyrica    Pulmonary hypertension     Receiving Remodulin continuously through tunneled central line     Past Surgical History:   Procedure Laterality Date    APPENDECTOMY       SECTION      Transverse cut    HYSTERECTOMY  2017    [facility]- bleeding    OOPHORECTOMY      RIGHT HEART CATHETERIZATION Right 2021    Procedure: INSERTION, CATHETER, RIGHT HEART;  Surgeon: Chloe Gregory MD;  Location: [facility] CATH LAB;  Service: Cardiology;  Laterality: Right;    RIGHT HEART CATHETERIZATION Right 3/16/2022    Procedure: INSERTION, CATHETER, RIGHT HEART;  Surgeon: Hu Silverman MD;   Location: [facility] CATH LAB;  Service: Cardiology;  Laterality: Right;    RIGHT HEART CATHETERIZATION Right 7/19/2022    Procedure: INSERTION, CATHETER, RIGHT HEART;  Surgeon: Chloe Gregory MD;  Location: [facility] CATH LAB;  Service: Cardiology;  Laterality: Right;    RIGHT HEART CATHETERIZATION Right 3/21/2023    Procedure: INSERTION, CATHETER, RIGHT HEART;  Surgeon: Kahlil Cisneros MD;  Location: [facility] CATH LAB;  Service: Cardiology;  Laterality: Right;    TUBAL LIGATION  1996    VAGINAL DELIVERY  1991; 1993; 1994; 1996     Family History   Problem Relation Age of Onset    Cancer Father 69        stomach    No Known Problems Sister     No Known Problems Brother     No Known Problems Brother     Breast cancer Neg Hx     Colon cancer Neg Hx     Ovarian cancer Neg Hx      Review of patient's allergies indicates:  No Known Allergies    OBJECTIVE:       Physical Exam:  Vitals:    Physical Exam  Constitutional:       General: She is not in acute distress.  HENT:      Head: Normocephalic and atraumatic.   Eyes:      General: No scleral icterus.  Pulmonary:      Effort: Pulmonary effort is normal. No respiratory distress.   Abdominal:      General: There is no distension.   Musculoskeletal:      Cervical back: Neck supple.   Skin:     Findings: No rash.   Neurological:      Mental Status: She is alert and oriented to person, place, and time.   Psychiatric:         Mood and Affect: Mood and affect normal.           Labs:  CBC:   WBC   Date Value Ref Range Status   03/21/2023 4.60 3.90 - 12.70 K/uL Final       Hemoglobin   Date Value Ref Range Status   03/21/2023 10.1 (L) 12.0 - 16.0 g/dL Final       POC Hematocrit   Date Value Ref Range Status   12/13/2021 37 36 - 54 %PCV Final     Hematocrit   Date Value Ref Range Status   03/21/2023 31.0 (L) 37.0 - 48.5 % Final       MCV   Date Value Ref Range Status   03/21/2023 82 82 - 98 fL Final       Platelets   Date Value Ref Range Status   03/21/2023 183 150 - 450 K/uL Final       LFT:    Lab Results   Component Value Date    AST 10 03/21/2023    ALKPHOS 88 03/21/2023    BILITOT 0.9 03/21/2023       Albumin:   Albumin   Date Value Ref Range Status   03/21/2023 3.3 (L) 3.5 - 5.2 g/dL Final     Protein:   Total Protein   Date Value Ref Range Status   03/21/2023 6.4 6.0 - 8.4 g/dL Final         Radiology:    Results for orders placed during the hospital encounter of 03/19/23  Echo  Interpretation Summary  · The left ventricle is normal in size with concentric remodeling and normal systolic function. The estimated ejection fraction is 60%.  · Severe right ventricular enlargement with mildly to moderately reduced right ventricular systolic function.  · Indeterminate left ventricular diastolic function.  · Severe right atrial enlargement.  · Moderate to severe tricuspid regurgitation.  · Small pericardial effusion.  · The estimated PA systolic pressure is 89 mmHg.  · Intermediate central venous pressure (8 mmHg).          This note was partially created using [employer] Voice Recognition software. Typographical and content errors may occur with this process. While efforts are made to detect and correct such errors, in some cases errors will persist. For this reason, wording in this document should be considered in the proper context and not strictly verbatim.      Signature: Sonja Mcgill MD

## 2023-09-07 RX ORDER — FUROSEMIDE 20 MG/1
20 TABLET ORAL DAILY
Qty: 30 TABLET | Refills: 11 | Status: SHIPPED | OUTPATIENT
Start: 2023-09-07 | End: 2023-10-03 | Stop reason: SDUPTHER

## 2023-09-09 DIAGNOSIS — M79.604 PAIN IN BOTH LOWER EXTREMITIES: ICD-10-CM

## 2023-09-09 DIAGNOSIS — M79.605 PAIN IN BOTH LOWER EXTREMITIES: ICD-10-CM

## 2023-09-09 DIAGNOSIS — I27.20 PULMONARY HYPERTENSION: ICD-10-CM

## 2023-09-11 RX ORDER — OXYCODONE AND ACETAMINOPHEN 7.5; 325 MG/1; MG/1
1 TABLET ORAL EVERY 8 HOURS PRN
Qty: 90 TABLET | Refills: 0 | Status: SHIPPED | OUTPATIENT
Start: 2023-09-11 | End: 2023-09-20 | Stop reason: SDUPTHER

## 2023-09-12 ENCOUNTER — CLINICAL SUPPORT (OUTPATIENT)
Dept: REHABILITATION | Facility: HOSPITAL | Age: 48
End: 2023-09-12
Attending: STUDENT IN AN ORGANIZED HEALTH CARE EDUCATION/TRAINING PROGRAM
Payer: MEDICAID

## 2023-09-12 DIAGNOSIS — R29.898 DECREASED STRENGTH OF LOWER EXTREMITY: ICD-10-CM

## 2023-09-12 DIAGNOSIS — R26.9 GAIT ABNORMALITY: ICD-10-CM

## 2023-09-12 DIAGNOSIS — R53.81 DEBILITY: Primary | ICD-10-CM

## 2023-09-12 DIAGNOSIS — M79.605 PAIN IN BOTH LOWER EXTREMITIES: ICD-10-CM

## 2023-09-12 DIAGNOSIS — R29.898 IMPAIRED FLEXIBILITY OF LOWER EXTREMITY: ICD-10-CM

## 2023-09-12 DIAGNOSIS — M79.604 PAIN IN BOTH LOWER EXTREMITIES: ICD-10-CM

## 2023-09-12 PROCEDURE — 97110 THERAPEUTIC EXERCISES: CPT | Mod: PN

## 2023-09-12 PROCEDURE — 97162 PT EVAL MOD COMPLEX 30 MIN: CPT | Mod: PN

## 2023-09-20 ENCOUNTER — OFFICE VISIT (OUTPATIENT)
Dept: PALLIATIVE MEDICINE | Facility: CLINIC | Age: 48
End: 2023-09-20
Payer: MEDICAID

## 2023-09-20 DIAGNOSIS — M79.604 PAIN IN BOTH LOWER EXTREMITIES: ICD-10-CM

## 2023-09-20 DIAGNOSIS — M79.605 PAIN IN BOTH LOWER EXTREMITIES: ICD-10-CM

## 2023-09-20 DIAGNOSIS — F41.9 ANXIETY: Primary | ICD-10-CM

## 2023-09-20 DIAGNOSIS — I27.20 PULMONARY HYPERTENSION: ICD-10-CM

## 2023-09-20 PROCEDURE — 99215 PR OFFICE/OUTPT VISIT, EST, LEVL V, 40-54 MIN: ICD-10-PCS | Mod: 95,NTX,, | Performed by: STUDENT IN AN ORGANIZED HEALTH CARE EDUCATION/TRAINING PROGRAM

## 2023-09-20 PROCEDURE — 99215 OFFICE O/P EST HI 40 MIN: CPT | Mod: 95,NTX,, | Performed by: STUDENT IN AN ORGANIZED HEALTH CARE EDUCATION/TRAINING PROGRAM

## 2023-09-20 RX ORDER — SERTRALINE HYDROCHLORIDE 25 MG/1
25 TABLET, FILM COATED ORAL DAILY
Qty: 30 TABLET | Refills: 11 | Status: SHIPPED | OUTPATIENT
Start: 2023-09-20 | End: 2023-12-21 | Stop reason: SDUPTHER

## 2023-09-20 RX ORDER — PREGABALIN 75 MG/1
CAPSULE ORAL
Qty: 90 CAPSULE | Refills: 6 | Status: SHIPPED | OUTPATIENT
Start: 2023-09-20 | End: 2023-10-25 | Stop reason: SDUPTHER

## 2023-09-20 RX ORDER — OXYCODONE AND ACETAMINOPHEN 7.5; 325 MG/1; MG/1
1 TABLET ORAL EVERY 8 HOURS PRN
Qty: 90 TABLET | Refills: 0 | Status: ON HOLD | OUTPATIENT
Start: 2023-09-20 | End: 2023-10-09

## 2023-09-20 NOTE — PROGRESS NOTES
Palliative Medicine Clinic Note      Consult Requested By: No ref. provider found    Primary Care Physician:   Jorge A Stack MD    Reason for Consult: Advance care planning and symptom management in the setting of Pulmonary Hypertension     The patient location is: [place]   The chief complaint leading to consultation is: pain    Visit type: audiovisual    Face to Face time with patient: 23min  43 minutes of total time spent on the encounter, which includes face to face time and non-face to face time preparing to see the patient (eg, review of tests), Obtaining and/or reviewing separately obtained history, Documenting clinical information in the electronic or other health record, Independently interpreting results (not separately reported) and communicating results to the patient/family/caregiver, or Care coordination (not separately reported).       Each patient provided with medical services by telemedicine is:  (1) informed of the relationship between the physician and patient and the respective role of any other health care provider with respect to management of the patient; and (2) notified that he or she may decline to receive medical services by telemedicine and may withdraw from such care at any time.      ASSESSMENT/PLAN:     Plan/Recommendations:  Diagnoses and all orders for this visit:    Pulmonary hypertension  -Pulmonary HTN, WHO group 1, on remodulin.   - following with Pulmonary hypertension Clinic  -currently on Opsumit, Adempas and Remodulin  -Not requiring oxygen       Anorexia  -continues to have anorexia  - Mirtazapine didn't help - will stop  - no weight loss      Anxiety /Other insomnia  - sertraline 25mg daily (refilled)  - working with [facility] and  for support  - continue to have anxiety    Nausea  Every other day       Pain in both lower extremities  Arthralgia in both hands  -pain in lower extremities is related to her medication, it is worse when her  Remodulin is uptitrated- feels like her feet are on fire   -Percocet 7.5-325mg q.8 hours p.r.n.  -Lyrica 50mg in am and 100mg qhs       Palliative care encounter  Medicolegal: Has decision making capacity.   is surrogate decision maker.   She shared that for HCPOA she would named her  as 1st agent and her daughter Nicky as her 2nd agent.     Psychosocial:  support system consists of  and daughters     Spiritual: Latter day    Understanding of disease and Illness Trajectory: Patient  has  adequate understanding of her illness, they can benefit from continued education on what to expect in the future.      Advance Care Planning   Advance Directives:   Living Will: No    [place]POST: No    Do Not Resuscitate Status: No    Medical Power of : Yes     Agent's Contact Number:  [phone]    Decision Making:  Patient answered questions  Goals of Care: What is most important right now is to focus on spending time at home, remaining as independent as possible, improvement in condition but with limits to invasive therapies. Accordingly, we have decided that the best plan to meet the patient's goals includes continuing with treatment.              Date: 06/13/2023  Power of   I introduced the concept of advance directives to the patient, as well. Then the patient received detailed information about the importance of designating a Health Care Power of  (HCPOA). She was also instructed to communicate with this person about their wishes for future healthcare, should she become sick and lose decision-making capacity. The patient has not previously appointed a HCPOA. After our discussion, the patient has decided to complete a HCPOA and has appointed her significant other, health care agent: Juju Darrion Maier & her daughter Navya Dowell as second agent. Form completed and will be scanned today.       min time was spent on advance care planning, goals of care discussion, emotional support,  formulating and communicating prognosis and goals of care, exploring burden/benefit of various approaches of treatment.     Follow up: 1 month      SUBJECTIVE:     History obtained from: Patient      complaint: No chief complaint on file.      Disease History:  Pulmonary HTN, WHO group 1, on remodulin. RHC 3/21/22 indicative on worsening disease      History of Present Illness / Interval History:  Kristin Maier is 47 y.o. year old female presenting with PAH.  Referred to Palliative Care for evaluation and management of physical symptoms, advance care planning,, and additional support.. female attended the appointment alone     Continues to have anxiety  Nausea every other day  Continues to have LE pain, feet on fire, legs like hammer  Trying to get back to nromal       -------    Patient continues to report pain she rates her pain as a 7 today.  She is able to walk around.  She states that Percocet helps however it depends on the day she sometimes needs to take 2.  Today which is about they because she was walking around she will need medication at 4:00 p.m. then before she goes to sleep.  Day she might only need 1 dose.  She felt very depressed 2 weeks ago  She has been more sedentary lately mostly spending her days the bed or recliner or sofa  Has decreased appetite however no weight loss      -------------  Patient reports significant left leg pain and sometimes right leg pain that feels like someone is hitting her with a hammer constantly.  This pain is related as the side effect of her PH treatment.  She has taken 3 tramadol today in order to be able to walk  She endorsed shortness of breath with walking over to the clinic.  She states that at home on good days without any pain she is able to walk 1 block.  Most days she is only able to walk from the bed to the living room because of her vein.  When this happens her  and her children are able to assist her.  She states that Lyrica has not  helped with her pain    -------------------------    She shared that she ran out of tramadol and then she had a trip to [place] where she could not move at all because of pain.  She is currently only taking Tylenol and she needs to take it around the clock it brings her pain from 7-6.  Mirtazapine help with her appetite  Not sleeping well because of pain  She has nausea however Zofran helps    ------------------------------  Patient states that she has significant pain in her joints fingers legs elbow and ankles.  The pain is worse at night improves with tramadol 2 to 3 times a day.  And message in her hands legs.   She feels her shortness of breath has worsened.  She was an active person before was able to walk outside.  She is still able to go to the grocery store however needs a scooter.  With movement she has tachycardia heart rate 120-150 and sometimes gets chest pain.  Today was not a good day, she becomes short of breath with walking in the living room.   Nausea approximately once a week Zofran can help.  All of her symptoms are worse when she changes her medication and have worsened lately because of the medication titration.  She has anxiety at night when she sits quietly.  She hopes to be able to have more strength more energy more time and to decrease her amount of pain.  Her main worries not being here for her grand kids        Review of Symptoms      Symptom Assessment (ESAS 0-10 Scale)  Pain:  7  Dyspnea:  4  Anxiety:  8  Nausea:  5  Depression:  3  Anorexia:  5  Fatigue:  5  Insomnia:  4  Restlessness:  0  Agitation:  0     CAM / Delirium:  Negative  Constipation:  Negative  Diarrhea:  Negative      Pain Assessment:    Location(s): leg (joint)    Leg       Location: bilateral        Quality: Throbbing        Quantity: 7/10 in intensity        Chronicity: Onset 2 month(s) ago, unchanged        Aggravating Factors: Standing        Alleviating Factors: Opiates        Associated Symptoms: None    Modified  Jessica Scale:  3    Performance Status:  60    Living Arrangements:  Lives with family    Psychosocial/Cultural:   See Palliative Psychosocial Note: Yes  . Has 3 living daughters and a  son. Has grandchildren who she enjoys spending time with. Likes teaching them to play different sports. Enjoys gardening   **Primary  to Follow**  Palliative Care  Consult: Yes    Spiritual:  F - Leticia and Belief:  Rastafarian        Previous experience or exposure to a serious illness: Yes        Medications:    Current Outpatient Medications:     diphenhydrAMINE-acetaminophen (TYLENOL PM)  mg Tab, Take 2 tablets by mouth nightly as needed (insomnia)., Disp: , Rfl:     furosemide (LASIX) 20 MG tablet, Take 1 tablet (20 mg total) by mouth once daily., Disp: 30 tablet, Rfl: 11    loperamide (IMODIUM) 2 mg capsule, Take 1 capsule (2 mg total) by mouth 4 (four) times daily as needed for Diarrhea., Disp: 30 capsule, Rfl: 3    mirtazapine (REMERON) 15 MG tablet, Take 1 tablet (15 mg total) by mouth every evening., Disp: 30 tablet, Rfl: 11    multivitamin with minerals tablet, Take 1 tablet by mouth once daily., Disp: , Rfl:     ondansetron (ZOFRAN) 4 MG tablet, Take 1 tablet (4 mg total) by mouth every 8 (eight) hours as needed for Nausea., Disp: 30 tablet, Rfl: 6    OPSUMIT 10 mg Tab, TAKE 1 TABLET BY MOUTH DAILY. DO NOT HANDLE IF PREGNANT. DO NOT SPLIT, CRUSH, OR CHEW. REVIEW MEDICATION GUIDE., Disp: 30 tablet, Rfl: 10    oxyCODONE-acetaminophen (PERCOCET) 7.5-325 mg per tablet, Take 1 tablet by mouth every 8 (eight) hours as needed for Pain., Disp: 90 tablet, Rfl: 0    potassium chloride SA (K-DUR,KLOR-CON M) 10 MEQ tablet, TAKE 2 TABLETS (20 MEQ TOTAL) BY MOUTH ONCE DAILY., Disp: 60 tablet, Rfl: 11    pregabalin (LYRICA) 50 MG capsule, Take 1 capsule (50 mg total) by mouth 2 (two) times daily., Disp: 60 capsule, Rfl: 1    REMODULIN 5 mg/mL Soln, , Disp: , Rfl:     riociguat (ADEMPAS) 2.5 mg  tablet, Take 1 tablet three times a day. Dose changes may occur throughout the course of therapy as directed by your prescriber., Disp: 90 tablet, Rfl: 11    sodium chloride 0.9% SolP 100 mL with treprostinil 1 mg/mL Soln 6,000,000 ng, Inject 2,145 ng/min into the vein continuous., Disp: , Rfl:       External  database queried on 2023  by Sonja Mcgill .   The results reviewed and considered with the clinical data in the decision whether or not to prescribe a controlled substance.  2023   3  Pregabalin 50 Mg Capsule 60.00  30  Da Nod  [id number]   [place] (2843)  0  0.67 LME  [place]icaid  [place]    2023   3  Oxycodone-Acetaminophen 5-325 90.00  30  Er Lori  [id number]   Peo (2843)  0  22.50 MME  Other  [place]    2023   3  Oxycodone-Acetaminophen 5-325 90.00  30  Er Lori  [id number]   Peo (2843)  0  22.50 MME  Other  [place]    2023   2  Tramadol Hcl 50 Mg Tablet 15.00  8  Ki Tho  [id number]          Past Medical History:   Diagnosis Date    Elevated liver enzymes     Essential (primary) hypertension     Heart failure, unspecified     Hypokalemia     Mixed hyperlipidemia     Neuropathic pain     Tx w/ tramadol & Lyrica    Pulmonary hypertension     Receiving Remodulin continuously through tunneled central line     Past Surgical History:   Procedure Laterality Date    APPENDECTOMY       SECTION      Transverse cut    HYSTERECTOMY  2017    TLH- bleeding    OOPHORECTOMY      RIGHT HEART CATHETERIZATION Right 2021    Procedure: INSERTION, CATHETER, RIGHT HEART;  Surgeon: Chloe Gregory MD;  Location: [facility] CATH LAB;  Service: Cardiology;  Laterality: Right;    RIGHT HEART CATHETERIZATION Right 3/16/2022    Procedure: INSERTION, CATHETER, RIGHT HEART;  Surgeon: Hu Silverman MD;  Location: [facility] CATH LAB;  Service: Cardiology;  Laterality: Right;    RIGHT HEART CATHETERIZATION Right 2022    Procedure: INSERTION, CATHETER, RIGHT  HEART;  Surgeon: Chloe Gregory MD;  Location: [facility] CATH LAB;  Service: Cardiology;  Laterality: Right;    RIGHT HEART CATHETERIZATION Right 3/21/2023    Procedure: INSERTION, CATHETER, RIGHT HEART;  Surgeon: Kahlil Cisneros MD;  Location: [facility] CATH LAB;  Service: Cardiology;  Laterality: Right;    TUBAL LIGATION  1996    VAGINAL DELIVERY  1991; 1993; 1994; 1996     Family History   Problem Relation Age of Onset    Cancer Father 69        stomach    No Known Problems Sister     No Known Problems Brother     No Known Problems Brother     Breast cancer Neg Hx     Colon cancer Neg Hx     Ovarian cancer Neg Hx      Review of patient's allergies indicates:  No Known Allergies    OBJECTIVE:       Physical Exam:  Vitals:    Physical Exam  Constitutional:       General: She is not in acute distress.  HENT:      Head: Normocephalic and atraumatic.   Eyes:      General: No scleral icterus.  Pulmonary:      Effort: Pulmonary effort is normal. No respiratory distress.   Abdominal:      General: There is no distension.   Musculoskeletal:      Cervical back: Neck supple.   Skin:     Findings: No rash.   Neurological:      Mental Status: She is alert and oriented to person, place, and time.   Psychiatric:         Mood and Affect: Mood and affect normal.           Labs:  CBC:   WBC   Date Value Ref Range Status   03/21/2023 4.60 3.90 - 12.70 K/uL Final       Hemoglobin   Date Value Ref Range Status   03/21/2023 10.1 (L) 12.0 - 16.0 g/dL Final       POC Hematocrit   Date Value Ref Range Status   12/13/2021 37 36 - 54 %PCV Final     Hematocrit   Date Value Ref Range Status   03/21/2023 31.0 (L) 37.0 - 48.5 % Final       MCV   Date Value Ref Range Status   03/21/2023 82 82 - 98 fL Final       Platelets   Date Value Ref Range Status   03/21/2023 183 150 - 450 K/uL Final       LFT:   Lab Results   Component Value Date    AST 10 03/21/2023    ALKPHOS 88 03/21/2023    BILITOT 0.9 03/21/2023       Albumin:   Albumin   Date Value Ref Range  Status   03/21/2023 3.3 (L) 3.5 - 5.2 g/dL Final     Protein:   Total Protein   Date Value Ref Range Status   03/21/2023 6.4 6.0 - 8.4 g/dL Final         Radiology:    Results for orders placed during the hospital encounter of 03/19/23  Echo  Interpretation Summary  · The left ventricle is normal in size with concentric remodeling and normal systolic function. The estimated ejection fraction is 60%.  · Severe right ventricular enlargement with mildly to moderately reduced right ventricular systolic function.  · Indeterminate left ventricular diastolic function.  · Severe right atrial enlargement.  · Moderate to severe tricuspid regurgitation.  · Small pericardial effusion.  · The estimated PA systolic pressure is 89 mmHg.  · Intermediate central venous pressure (8 mmHg).      I spent a total of 41 minutes on the day of the visit. This includes face to face time in discussion of goals of care, symptom assessment, coordination of care and emotional support.  This also includes non-face to face time preparing to see the patient (eg, review of tests/imaging), obtaining and/or reviewing separately obtained history, documenting clinical information in the electronic or other health record, independently interpreting results and communicating results to the patient/family/caregiver, or care coordinator.         This note was partially created using EM[employer] Voice Recognition software. Typographical and content errors may occur with this process. While efforts are made to detect and correct such errors, in some cases errors will persist. For this reason, wording in this document should be considered in the proper context and not strictly verbatim.      Signature: Sonja Mcgill MD

## 2023-10-03 RX ORDER — FUROSEMIDE 20 MG/1
20 TABLET ORAL DAILY
Qty: 30 TABLET | Refills: 11 | Status: ON HOLD | OUTPATIENT
Start: 2023-10-03 | End: 2023-10-09 | Stop reason: SDUPTHER

## 2023-10-04 RX ORDER — ONDANSETRON 4 MG/1
4 TABLET, FILM COATED ORAL EVERY 8 HOURS PRN
Qty: 30 TABLET | Refills: 6 | Status: SHIPPED | OUTPATIENT
Start: 2023-10-04 | End: 2023-10-25 | Stop reason: SDUPTHER

## 2023-10-05 ENCOUNTER — HOSPITAL ENCOUNTER (INPATIENT)
Facility: HOSPITAL | Age: 48
LOS: 4 days | Discharge: HOME | DRG: 287 | End: 2023-10-09
Attending: STUDENT IN AN ORGANIZED HEALTH CARE EDUCATION/TRAINING PROGRAM | Admitting: INTERNAL MEDICINE
Payer: MEDICAID

## 2023-10-05 ENCOUNTER — TELEPHONE (OUTPATIENT)
Dept: TRANSPLANT | Facility: CLINIC | Age: 48
End: 2023-10-05
Payer: MEDICAID

## 2023-10-05 ENCOUNTER — PATIENT MESSAGE (OUTPATIENT)
Dept: PALLIATIVE MEDICINE | Facility: CLINIC | Age: 48
End: 2023-10-05
Payer: MEDICAID

## 2023-10-05 DIAGNOSIS — I27.9 CHRONIC PULMONARY HEART DISEASE: ICD-10-CM

## 2023-10-05 DIAGNOSIS — I27.21 WHO GROUP 1 PULMONARY ARTERIAL HYPERTENSION: Primary | ICD-10-CM

## 2023-10-05 DIAGNOSIS — M79.89 LEFT LEG SWELLING: ICD-10-CM

## 2023-10-05 DIAGNOSIS — E44.0 MODERATE PROTEIN-CALORIE MALNUTRITION: ICD-10-CM

## 2023-10-05 DIAGNOSIS — R26.9 GAIT ABNORMALITY: ICD-10-CM

## 2023-10-05 DIAGNOSIS — R09.02 HYPOXIA: ICD-10-CM

## 2023-10-05 DIAGNOSIS — I10 ESSENTIAL HYPERTENSION: ICD-10-CM

## 2023-10-05 DIAGNOSIS — R53.81 DEBILITY: ICD-10-CM

## 2023-10-05 DIAGNOSIS — R29.898 IMPAIRED FLEXIBILITY OF LOWER EXTREMITY: ICD-10-CM

## 2023-10-05 DIAGNOSIS — R55 SYNCOPE: ICD-10-CM

## 2023-10-05 DIAGNOSIS — I27.20 PULMONARY HYPERTENSION: ICD-10-CM

## 2023-10-05 PROBLEM — I07.1: Status: ACTIVE | Noted: 2023-10-05

## 2023-10-05 LAB
ALBUMIN SERPL BCP-MCNC: 3.9 G/DL (ref 3.5–5.2)
ALP SERPL-CCNC: 105 U/L (ref 55–135)
ALT SERPL W/O P-5'-P-CCNC: 81 U/L (ref 10–44)
ANION GAP SERPL CALC-SCNC: 7 MMOL/L (ref 8–16)
AST SERPL-CCNC: 54 U/L (ref 10–40)
BASOPHILS # BLD AUTO: 0.01 K/UL (ref 0–0.2)
BASOPHILS NFR BLD: 0.2 % (ref 0–1.9)
BILIRUB SERPL-MCNC: 1.7 MG/DL (ref 0.1–1)
BNP SERPL-MCNC: 311 PG/ML (ref 0–99)
BUN SERPL-MCNC: 16 MG/DL (ref 6–20)
CALCIUM SERPL-MCNC: 8.8 MG/DL (ref 8.7–10.5)
CHLORIDE SERPL-SCNC: 112 MMOL/L (ref 95–110)
CO2 SERPL-SCNC: 18 MMOL/L (ref 23–29)
CREAT SERPL-MCNC: 0.8 MG/DL (ref 0.5–1.4)
D DIMER PPP IA.FEU-MCNC: 1.69 MG/L FEU
DIFFERENTIAL METHOD: ABNORMAL
EOSINOPHIL # BLD AUTO: 0.1 K/UL (ref 0–0.5)
EOSINOPHIL NFR BLD: 1.6 % (ref 0–8)
ERYTHROCYTE [DISTWIDTH] IN BLOOD BY AUTOMATED COUNT: 14.5 % (ref 11.5–14.5)
EST. GFR  (NO RACE VARIABLE): >60 ML/MIN/1.73 M^2
GLUCOSE SERPL-MCNC: 106 MG/DL (ref 70–110)
HCT VFR BLD AUTO: 35.5 % (ref 37–48.5)
HGB BLD-MCNC: 11.9 G/DL (ref 12–16)
IMM GRANULOCYTES # BLD AUTO: 0.03 K/UL (ref 0–0.04)
IMM GRANULOCYTES NFR BLD AUTO: 0.5 % (ref 0–0.5)
LYMPHOCYTES # BLD AUTO: 2.1 K/UL (ref 1–4.8)
LYMPHOCYTES NFR BLD: 34.3 % (ref 18–48)
MAGNESIUM SERPL-MCNC: 1.9 MG/DL (ref 1.6–2.6)
MCH RBC QN AUTO: 29.6 PG (ref 27–31)
MCHC RBC AUTO-ENTMCNC: 33.5 G/DL (ref 32–36)
MCV RBC AUTO: 88 FL (ref 82–98)
MONOCYTES # BLD AUTO: 0.4 K/UL (ref 0.3–1)
MONOCYTES NFR BLD: 6.4 % (ref 4–15)
NEUTROPHILS # BLD AUTO: 3.6 K/UL (ref 1.8–7.7)
NEUTROPHILS NFR BLD: 57 % (ref 38–73)
NRBC BLD-RTO: 0 /100 WBC
PLATELET # BLD AUTO: 143 K/UL (ref 150–450)
PMV BLD AUTO: 11.4 FL (ref 9.2–12.9)
POTASSIUM SERPL-SCNC: 3.5 MMOL/L (ref 3.5–5.1)
PROT SERPL-MCNC: 7 G/DL (ref 6–8.4)
RBC # BLD AUTO: 4.02 M/UL (ref 4–5.4)
SODIUM SERPL-SCNC: 137 MMOL/L (ref 136–145)
TROPONIN I SERPL DL<=0.01 NG/ML-MCNC: 0.01 NG/ML (ref 0–0.03)
WBC # BLD AUTO: 6.23 K/UL (ref 3.9–12.7)

## 2023-10-05 PROCEDURE — 93005 ELECTROCARDIOGRAM TRACING: CPT | Mod: NTX

## 2023-10-05 PROCEDURE — 99285 EMERGENCY DEPT VISIT HI MDM: CPT | Mod: 25,NTX

## 2023-10-05 PROCEDURE — 93010 ELECTROCARDIOGRAM REPORT: CPT | Mod: NTX,,, | Performed by: INTERNAL MEDICINE

## 2023-10-05 PROCEDURE — 85025 COMPLETE CBC W/AUTO DIFF WBC: CPT | Mod: NTX | Performed by: STUDENT IN AN ORGANIZED HEALTH CARE EDUCATION/TRAINING PROGRAM

## 2023-10-05 PROCEDURE — 96374 THER/PROPH/DIAG INJ IV PUSH: CPT | Mod: NTX

## 2023-10-05 PROCEDURE — 99223 1ST HOSP IP/OBS HIGH 75: CPT | Mod: NTX,,, | Performed by: INTERNAL MEDICINE

## 2023-10-05 PROCEDURE — 63600175 PHARM REV CODE 636 W HCPCS: Mod: NTX | Performed by: STUDENT IN AN ORGANIZED HEALTH CARE EDUCATION/TRAINING PROGRAM

## 2023-10-05 PROCEDURE — 93010 EKG 12-LEAD: ICD-10-PCS | Mod: NTX,,, | Performed by: INTERNAL MEDICINE

## 2023-10-05 PROCEDURE — 85379 FIBRIN DEGRADATION QUANT: CPT | Mod: NTX | Performed by: STUDENT IN AN ORGANIZED HEALTH CARE EDUCATION/TRAINING PROGRAM

## 2023-10-05 PROCEDURE — 83735 ASSAY OF MAGNESIUM: CPT | Mod: NTX | Performed by: STUDENT IN AN ORGANIZED HEALTH CARE EDUCATION/TRAINING PROGRAM

## 2023-10-05 PROCEDURE — 83880 ASSAY OF NATRIURETIC PEPTIDE: CPT | Mod: NTX | Performed by: STUDENT IN AN ORGANIZED HEALTH CARE EDUCATION/TRAINING PROGRAM

## 2023-10-05 PROCEDURE — 12000002 HC ACUTE/MED SURGE SEMI-PRIVATE ROOM: Mod: NTX

## 2023-10-05 PROCEDURE — 99223 PR INITIAL HOSPITAL CARE,LEVL III: ICD-10-PCS | Mod: NTX,,, | Performed by: INTERNAL MEDICINE

## 2023-10-05 PROCEDURE — 84484 ASSAY OF TROPONIN QUANT: CPT | Mod: NTX | Performed by: STUDENT IN AN ORGANIZED HEALTH CARE EDUCATION/TRAINING PROGRAM

## 2023-10-05 PROCEDURE — 93010 ELECTROCARDIOGRAM REPORT: CPT | Mod: 59,NTX,, | Performed by: INTERNAL MEDICINE

## 2023-10-05 PROCEDURE — 80053 COMPREHEN METABOLIC PANEL: CPT | Mod: NTX | Performed by: STUDENT IN AN ORGANIZED HEALTH CARE EDUCATION/TRAINING PROGRAM

## 2023-10-05 RX ORDER — OXYCODONE AND ACETAMINOPHEN 7.5; 325 MG/1; MG/1
1 TABLET ORAL EVERY 8 HOURS PRN
Status: DISCONTINUED | OUTPATIENT
Start: 2023-10-05 | End: 2023-10-06

## 2023-10-05 RX ORDER — KETOROLAC TROMETHAMINE 30 MG/ML
15 INJECTION, SOLUTION INTRAMUSCULAR; INTRAVENOUS
Status: COMPLETED | OUTPATIENT
Start: 2023-10-05 | End: 2023-10-05

## 2023-10-05 RX ORDER — LOPERAMIDE HYDROCHLORIDE 2 MG/1
2 CAPSULE ORAL EVERY 4 HOURS PRN
Status: DISCONTINUED | OUTPATIENT
Start: 2023-10-05 | End: 2023-10-09 | Stop reason: HOSPADM

## 2023-10-05 RX ORDER — FUROSEMIDE 20 MG/1
20 TABLET ORAL DAILY
Status: DISCONTINUED | OUTPATIENT
Start: 2023-10-06 | End: 2023-10-06

## 2023-10-05 RX ORDER — PREGABALIN 75 MG/1
150 CAPSULE ORAL NIGHTLY
Status: DISCONTINUED | OUTPATIENT
Start: 2023-10-05 | End: 2023-10-09 | Stop reason: HOSPADM

## 2023-10-05 RX ORDER — SERTRALINE HYDROCHLORIDE 25 MG/1
25 TABLET, FILM COATED ORAL DAILY
Status: DISCONTINUED | OUTPATIENT
Start: 2023-10-06 | End: 2023-10-06

## 2023-10-05 RX ORDER — ONDANSETRON 8 MG/1
8 TABLET, ORALLY DISINTEGRATING ORAL EVERY 8 HOURS PRN
Status: DISCONTINUED | OUTPATIENT
Start: 2023-10-05 | End: 2023-10-09 | Stop reason: HOSPADM

## 2023-10-05 RX ORDER — PREGABALIN 75 MG/1
75 CAPSULE ORAL EVERY MORNING
Status: DISCONTINUED | OUTPATIENT
Start: 2023-10-06 | End: 2023-10-06

## 2023-10-05 RX ORDER — ONDANSETRON 2 MG/ML
4 INJECTION INTRAMUSCULAR; INTRAVENOUS EVERY 8 HOURS PRN
Status: DISCONTINUED | OUTPATIENT
Start: 2023-10-05 | End: 2023-10-09 | Stop reason: HOSPADM

## 2023-10-05 RX ORDER — ACETAMINOPHEN 325 MG/1
650 TABLET ORAL EVERY 6 HOURS PRN
Status: DISCONTINUED | OUTPATIENT
Start: 2023-10-05 | End: 2023-10-07

## 2023-10-05 RX ADMIN — KETOROLAC TROMETHAMINE 15 MG: 30 INJECTION INTRAMUSCULAR; INTRAVENOUS at 10:10

## 2023-10-05 NOTE — TELEPHONE ENCOUNTER
"Kristin Dowell Maier contacted Pulmonary Hypertension Nurse Navigator to advised that she had woken up today to discover that her IV Remodulin pump had been turned off since the night before last.  She states that she woke up this morning and "was barely breathing."  Symptoms reported as now feeling dizzy, woozy, and breathing heavily. Confirmed IV Remodulin dose as 110 ng/kg/min. Advised to come here to [facility] ED with supplies.  HTS providers notified.  "

## 2023-10-05 NOTE — Clinical Note
The PA catheter was repositioned to the main pulmonary artery. Hemodynamics were performed. O2 saturation was measured at 63%. CO=3.8  CI=2.26

## 2023-10-06 LAB
ANION GAP SERPL CALC-SCNC: 10 MMOL/L (ref 8–16)
ANION GAP SERPL CALC-SCNC: 8 MMOL/L (ref 8–16)
BASOPHILS # BLD AUTO: 0.02 K/UL (ref 0–0.2)
BASOPHILS NFR BLD: 0.5 % (ref 0–1.9)
BSA FOR ECHO PROCEDURE: 1.78 M2
BUN SERPL-MCNC: 14 MG/DL (ref 6–20)
BUN SERPL-MCNC: 16 MG/DL (ref 6–20)
CALCIUM SERPL-MCNC: 8.5 MG/DL (ref 8.7–10.5)
CALCIUM SERPL-MCNC: 8.9 MG/DL (ref 8.7–10.5)
CHLORIDE SERPL-SCNC: 109 MMOL/L (ref 95–110)
CHLORIDE SERPL-SCNC: 110 MMOL/L (ref 95–110)
CO2 SERPL-SCNC: 19 MMOL/L (ref 23–29)
CO2 SERPL-SCNC: 21 MMOL/L (ref 23–29)
CREAT SERPL-MCNC: 0.8 MG/DL (ref 0.5–1.4)
CREAT SERPL-MCNC: 0.8 MG/DL (ref 0.5–1.4)
CV ECHO LV RWT: 0.56 CM
DIFFERENTIAL METHOD: ABNORMAL
ECHO LV POSTERIOR WALL: 1 CM (ref 0.6–1.1)
EOSINOPHIL # BLD AUTO: 0.1 K/UL (ref 0–0.5)
EOSINOPHIL NFR BLD: 2.5 % (ref 0–8)
ERYTHROCYTE [DISTWIDTH] IN BLOOD BY AUTOMATED COUNT: 14.7 % (ref 11.5–14.5)
EST. GFR  (NO RACE VARIABLE): >60 ML/MIN/1.73 M^2
EST. GFR  (NO RACE VARIABLE): >60 ML/MIN/1.73 M^2
GLUCOSE SERPL-MCNC: 102 MG/DL (ref 70–110)
GLUCOSE SERPL-MCNC: 95 MG/DL (ref 70–110)
HCT VFR BLD AUTO: 34.2 % (ref 37–48.5)
HGB BLD-MCNC: 11.2 G/DL (ref 12–16)
IMM GRANULOCYTES # BLD AUTO: 0.01 K/UL (ref 0–0.04)
IMM GRANULOCYTES NFR BLD AUTO: 0.2 % (ref 0–0.5)
INTERVENTRICULAR SEPTUM: 1.1 CM (ref 0.6–1.1)
LEFT VENTRICLE MASS INDEX: 67 G/M2
LEFT VENTRICULAR INTERNAL DIMENSION IN DIASTOLE: 3.6 CM (ref 3.5–6)
LEFT VENTRICULAR MASS: 115.86 G
LYMPHOCYTES # BLD AUTO: 1.9 K/UL (ref 1–4.8)
LYMPHOCYTES NFR BLD: 43.2 % (ref 18–48)
MAGNESIUM SERPL-MCNC: 1.9 MG/DL (ref 1.6–2.6)
MAGNESIUM SERPL-MCNC: 1.9 MG/DL (ref 1.6–2.6)
MCH RBC QN AUTO: 29.4 PG (ref 27–31)
MCHC RBC AUTO-ENTMCNC: 32.7 G/DL (ref 32–36)
MCV RBC AUTO: 90 FL (ref 82–98)
MONOCYTES # BLD AUTO: 0.3 K/UL (ref 0.3–1)
MONOCYTES NFR BLD: 7.1 % (ref 4–15)
NEUTROPHILS # BLD AUTO: 2 K/UL (ref 1.8–7.7)
NEUTROPHILS NFR BLD: 46.5 % (ref 38–73)
NRBC BLD-RTO: 0 /100 WBC
PHOSPHATE SERPL-MCNC: 4.3 MG/DL (ref 2.7–4.5)
PISA TR MAX VEL: 4.3 M/S
PLATELET # BLD AUTO: 142 K/UL (ref 150–450)
PMV BLD AUTO: 11.6 FL (ref 9.2–12.9)
POCT GLUCOSE: 130 MG/DL (ref 70–110)
POTASSIUM SERPL-SCNC: 3.3 MMOL/L (ref 3.5–5.1)
POTASSIUM SERPL-SCNC: 3.8 MMOL/L (ref 3.5–5.1)
RA PRESSURE ESTIMATED: 3 MMHG
RBC # BLD AUTO: 3.81 M/UL (ref 4–5.4)
RV TB RVSP: 7 MMHG
SODIUM SERPL-SCNC: 137 MMOL/L (ref 136–145)
SODIUM SERPL-SCNC: 140 MMOL/L (ref 136–145)
TR MAX PG: 74 MMHG
TR MEAN GRADIENT: 88 MMHG
TRICUSPID ANNULAR PLANE SYSTOLIC EXCURSION: 1.66 CM
TV REST PULMONARY ARTERY PRESSURE: 77 MMHG
WBC # BLD AUTO: 4.35 K/UL (ref 3.9–12.7)
Z-SCORE OF LEFT VENTRICULAR DIMENSION IN END DIASTOLE: -2.86

## 2023-10-06 PROCEDURE — 20600001 HC STEP DOWN PRIVATE ROOM: Mod: NTX

## 2023-10-06 PROCEDURE — 99900035 HC TECH TIME PER 15 MIN (STAT): Mod: NTX

## 2023-10-06 PROCEDURE — 25000003 PHARM REV CODE 250: Mod: NTX | Performed by: INTERNAL MEDICINE

## 2023-10-06 PROCEDURE — 25000003 PHARM REV CODE 250: Mod: NTX | Performed by: STUDENT IN AN ORGANIZED HEALTH CARE EDUCATION/TRAINING PROGRAM

## 2023-10-06 PROCEDURE — 94761 N-INVAS EAR/PLS OXIMETRY MLT: CPT | Mod: NTX

## 2023-10-06 PROCEDURE — 99223 PR INITIAL HOSPITAL CARE,LEVL III: ICD-10-PCS | Mod: NTX,,, | Performed by: INTERNAL MEDICINE

## 2023-10-06 PROCEDURE — 80048 BASIC METABOLIC PNL TOTAL CA: CPT | Mod: 91,NTX | Performed by: INTERNAL MEDICINE

## 2023-10-06 PROCEDURE — 83735 ASSAY OF MAGNESIUM: CPT | Mod: NTX | Performed by: STUDENT IN AN ORGANIZED HEALTH CARE EDUCATION/TRAINING PROGRAM

## 2023-10-06 PROCEDURE — 36415 COLL VENOUS BLD VENIPUNCTURE: CPT | Mod: NTX | Performed by: INTERNAL MEDICINE

## 2023-10-06 PROCEDURE — 83735 ASSAY OF MAGNESIUM: CPT | Mod: 91,NTX | Performed by: INTERNAL MEDICINE

## 2023-10-06 PROCEDURE — 80048 BASIC METABOLIC PNL TOTAL CA: CPT | Mod: NTX | Performed by: STUDENT IN AN ORGANIZED HEALTH CARE EDUCATION/TRAINING PROGRAM

## 2023-10-06 PROCEDURE — 63600175 PHARM REV CODE 636 W HCPCS: Mod: NTX | Performed by: STUDENT IN AN ORGANIZED HEALTH CARE EDUCATION/TRAINING PROGRAM

## 2023-10-06 PROCEDURE — 27000221 HC OXYGEN, UP TO 24 HOURS: Mod: NTX

## 2023-10-06 PROCEDURE — 99223 1ST HOSP IP/OBS HIGH 75: CPT | Mod: NTX,,, | Performed by: INTERNAL MEDICINE

## 2023-10-06 PROCEDURE — 84100 ASSAY OF PHOSPHORUS: CPT | Mod: NTX | Performed by: STUDENT IN AN ORGANIZED HEALTH CARE EDUCATION/TRAINING PROGRAM

## 2023-10-06 PROCEDURE — 36415 COLL VENOUS BLD VENIPUNCTURE: CPT | Mod: NTX | Performed by: STUDENT IN AN ORGANIZED HEALTH CARE EDUCATION/TRAINING PROGRAM

## 2023-10-06 PROCEDURE — 85025 COMPLETE CBC W/AUTO DIFF WBC: CPT | Mod: NTX | Performed by: INTERNAL MEDICINE

## 2023-10-06 RX ORDER — POTASSIUM CHLORIDE 20 MEQ/1
40 TABLET, EXTENDED RELEASE ORAL ONCE
Status: DISCONTINUED | OUTPATIENT
Start: 2023-10-06 | End: 2023-10-06

## 2023-10-06 RX ORDER — POTASSIUM CHLORIDE 20 MEQ/1
40 TABLET, EXTENDED RELEASE ORAL ONCE
Status: COMPLETED | OUTPATIENT
Start: 2023-10-06 | End: 2023-10-06

## 2023-10-06 RX ORDER — FUROSEMIDE 10 MG/ML
40 INJECTION INTRAMUSCULAR; INTRAVENOUS
Status: COMPLETED | OUTPATIENT
Start: 2023-10-06 | End: 2023-10-06

## 2023-10-06 RX ORDER — OXYCODONE AND ACETAMINOPHEN 7.5; 325 MG/1; MG/1
1 TABLET ORAL EVERY 4 HOURS PRN
Status: DISCONTINUED | OUTPATIENT
Start: 2023-10-06 | End: 2023-10-09 | Stop reason: HOSPADM

## 2023-10-06 RX ORDER — FUROSEMIDE 10 MG/ML
40 INJECTION INTRAMUSCULAR; INTRAVENOUS
Status: DISCONTINUED | OUTPATIENT
Start: 2023-10-06 | End: 2023-10-06

## 2023-10-06 RX ORDER — SERTRALINE HYDROCHLORIDE 25 MG/1
25 TABLET, FILM COATED ORAL NIGHTLY
Status: DISCONTINUED | OUTPATIENT
Start: 2023-10-06 | End: 2023-10-09 | Stop reason: HOSPADM

## 2023-10-06 RX ORDER — FUROSEMIDE 10 MG/ML
40 INJECTION INTRAMUSCULAR; INTRAVENOUS ONCE
Status: COMPLETED | OUTPATIENT
Start: 2023-10-06 | End: 2023-10-06

## 2023-10-06 RX ORDER — POTASSIUM CHLORIDE 20 MEQ/1
40 TABLET, EXTENDED RELEASE ORAL EVERY 4 HOURS
Status: DISCONTINUED | OUTPATIENT
Start: 2023-10-06 | End: 2023-10-06

## 2023-10-06 RX ORDER — FUROSEMIDE 20 MG/1
20 TABLET ORAL DAILY
Status: DISCONTINUED | OUTPATIENT
Start: 2023-10-07 | End: 2023-10-06

## 2023-10-06 RX ADMIN — FUROSEMIDE 40 MG: 10 INJECTION, SOLUTION INTRAVENOUS at 09:10

## 2023-10-06 RX ADMIN — LOPERAMIDE HYDROCHLORIDE 2 MG: 2 CAPSULE ORAL at 04:10

## 2023-10-06 RX ADMIN — POTASSIUM CHLORIDE 40 MEQ: 1500 TABLET, EXTENDED RELEASE ORAL at 02:10

## 2023-10-06 RX ADMIN — POTASSIUM CHLORIDE 40 MEQ: 1500 TABLET, EXTENDED RELEASE ORAL at 09:10

## 2023-10-06 RX ADMIN — ACETAMINOPHEN 650 MG: 325 TABLET ORAL at 08:10

## 2023-10-06 RX ADMIN — SERTRALINE HYDROCHLORIDE 25 MG: 25 TABLET ORAL at 08:10

## 2023-10-06 RX ADMIN — PREGABALIN 150 MG: 75 CAPSULE ORAL at 01:10

## 2023-10-06 RX ADMIN — OXYCODONE AND ACETAMINOPHEN 1 TABLET: 7.5; 325 TABLET ORAL at 06:10

## 2023-10-06 RX ADMIN — RIOCIGUAT 2.5 MG: 1.5 TABLET, FILM COATED ORAL at 08:10

## 2023-10-06 RX ADMIN — RIOCIGUAT 2.5 MG: 1.5 TABLET, FILM COATED ORAL at 01:10

## 2023-10-06 RX ADMIN — OXYCODONE AND ACETAMINOPHEN 1 TABLET: 7.5; 325 TABLET ORAL at 10:10

## 2023-10-06 RX ADMIN — OXYCODONE AND ACETAMINOPHEN 1 TABLET: 7.5; 325 TABLET ORAL at 01:10

## 2023-10-06 RX ADMIN — MACITENTAN 10 MG: 10 TABLET, FILM COATED ORAL at 10:10

## 2023-10-06 RX ADMIN — FUROSEMIDE 40 MG: 10 INJECTION, SOLUTION INTRAVENOUS at 04:10

## 2023-10-06 RX ADMIN — LOPERAMIDE HYDROCHLORIDE 2 MG: 2 CAPSULE ORAL at 06:10

## 2023-10-06 RX ADMIN — OXYCODONE AND ACETAMINOPHEN 1 TABLET: 7.5; 325 TABLET ORAL at 11:10

## 2023-10-06 RX ADMIN — PREGABALIN 150 MG: 75 CAPSULE ORAL at 08:10

## 2023-10-06 NOTE — PLAN OF CARE
CADD rate current 44mls/24hrs  Resovior volume is 59.5mls remaining to be infused  Patient has 32.5hours remaining on home cassette      Patient realized upon changing her cassette that her Remodulin was turned off x 2 days    She resumed her home dose of 110ngs/kg/min and started to be symptomatic with SOB, Dizzy. N    Patient reported to ED with home dose running at 110ng/kg/min     DW 55kg    See Dosing sheet below for Home Remodulin Variables    Patient doing better this am will likely stay till tomorrow to monitor BP    If patient has enough Remodulin till tomorrow afternoon she can stay on her Home Cassette, If not she will need to switch to hospital and need to have home medication and supplies to mix for discharge

## 2023-10-06 NOTE — ASSESSMENT & PLAN NOTE
TTE with mod-sev TR  Patient with precapillary pulmonary HTN that likely is leading to TR  Obtain limited TTE to assess TR + RV

## 2023-10-06 NOTE — PROGRESS NOTES
Attempted to see pt in the morning but pt was out of room for procedure. SW returned to room in the afternoon but pt was asleep in bed with spouse. Further attempts will be made. Per MDR pt is diuresing and will be here into next week. Providing ongoing psychosocial and counseling support, education, resources, assistance and discharge planning as indicated. Following and available.

## 2023-10-06 NOTE — CONSULTS
"[facility] - Transplant Stepdown  Palliative Medicine  Consult Note    Patient Name: Kristin Maier  MRN: [id number]  Admission Date: 10/5/2023  Hospital Length of Stay: 1 days  Code Status: Full Code   Attending Provider: Hu Glover, *  Consulting Provider: Saritha Venegas MD  Primary Care Physician: Jorge A Stack MD  Principal Problem:WHO group 1 pulmonary arterial hypertension    Patient information was obtained from patient, spouse/SO and primary team.      Inpatient consult to Palliative Care  Consult performed by: Saritha Venegas MD  Consult ordered by: Sharan Slade MD        Assessment/Plan:     Palliative Care  Palliative care encounter  Pulmonary hypertension  -Pulmonary HTN, WHO group 1, on remodulin.   - following with Pulmonary hypertension Clinic  -currently on Opsumit, Adempas and Remodulin  -Not requiring oxygen at baseline        Anxiety /Other insomnia  - continue sertraline 25mg  - working with [facility] and  for support as outpatient         Pain in both lower extremities  Arthralgia in both hands  -pain in lower extremities is related to her medication, it is worse when her Remodulin is uptitrated- feels like her feet are on fire   -Percocet 7.5-325mg q4h prn   -Lyrica 150mg qhs          Palliative care encounter  Medicolegal: Has decision making capacity.   is surrogate decision maker.   She shared that for HCPOA she would named her  as 1st agent and her daughter Nicky as her 2nd agent.     Psychosocial:  support system consists of  and daughters     Spiritual: Sabianist     Understanding of disease and Illness Trajectory: Patient  has  adequate understanding of her illness, they can benefit from continued education on what to expect in the future.        Thank you for your consult. I will follow-up with patient. Please contact us if you have any additional questions.    Subjective:     HPI:   Per cardiology H&P  "Patient is a " "47 year old female with WHO Group I Pulm HTN on Remodulin 110ng/kg/min, followed by Dr JOSE J Rachel in Transplant Pulmonology, chronic neuropathy followed by Palliative medicine on Percocet and Lyrica, prediabetes (a1c 6.4% 12/2021) that presented with shortness of breath, dizziness and wooziness x approx 36 hours. She had noticed prior but recently noted that her pump for Remodulin had stopped functioning. [facility] signed out AMA after eval with desire to come to [facility] to "see my docs". She reports that she didn't realize she lost consciousness but  by bedside states that the daughter stated that she lost consciousness on the ride to [facility] briefly. Previously was active with some mild level of dyspnea on exertion. Reports that she has been taking her macitentan and riociguat without interruption and has been tolerating the medicines well. She denies passing out. Her right heart cath findings with Dr Gregory and Dr Cisneros (Dr Cisneros's most recent, listed below).      RHC 03/19/2023 on macitentan 10 + riociguat 2.5 TID + Remodulin 90 ng/kg/min:  RA: 10  RV: 75/3, EDP 12  PA: 82/33, mean 48  PCWP: 13  Saturation:  Arterial: 100 %  PA: 60 %  Kristel CI/CO: 2.5/4.5  PVR: 7.8 Wood Units  Conclusions:  Severe pre capillary pulmonary hypertension  Elevated right sided filling pressures  Low normal CI/CO  Assessment:  1.         WHO group 1 pulmonary arterial hypertension   2.         Lung transplant candidate "    Palliative consulted for symptom management. Patient reports feeling much better once her remodulin was restarted. Shortness of breath and pain back to baseline       Hospital Course:  No notes on file        Past Medical History:   Diagnosis Date    Elevated liver enzymes     Essential (primary) hypertension     Heart failure, unspecified     Hypokalemia     Mixed hyperlipidemia     Neuropathic pain     Tx w/ tramadol & Lyrica    Pulmonary " hypertension     Receiving Remodulin continuously through tunneled central line       Past Surgical History:   Procedure Laterality Date    APPENDECTOMY       SECTION      Transverse cut    HYSTERECTOMY  2017    TLH- bleeding    OOPHORECTOMY      RIGHT HEART CATHETERIZATION Right 2021    Procedure: INSERTION, CATHETER, RIGHT HEART;  Surgeon: Chloe Gregory MD;  Location: [facility] CATH LAB;  Service: Cardiology;  Laterality: Right;    RIGHT HEART CATHETERIZATION Right 3/16/2022    Procedure: INSERTION, CATHETER, RIGHT HEART;  Surgeon: Hu Silverman MD;  Location: [facility] CATH LAB;  Service: Cardiology;  Laterality: Right;    RIGHT HEART CATHETERIZATION Right 2022    Procedure: INSERTION, CATHETER, RIGHT HEART;  Surgeon: Chloe Gregory MD;  Location: [facility] CATH LAB;  Service: Cardiology;  Laterality: Right;    RIGHT HEART CATHETERIZATION Right 3/21/2023    Procedure: INSERTION, CATHETER, RIGHT HEART;  Surgeon: Kahlil Cisneros MD;  Location: [facility] CATH LAB;  Service: Cardiology;  Laterality: Right;    TUBAL LIGATION      VAGINAL DELIVERY  ; ; ;        Review of patient's allergies indicates:  No Known Allergies    Medications:  Continuous Infusions:   [START ON 10/7/2023] treprostinil (REMODULIN) 6,000,000 ng in sodium chloride 0.9% 100 mL infusion      [START ON 10/7/2023] veletri/remodulin cassette      veletri/remodulin tubing       Scheduled Meds:   furosemide (LASIX) injection  40 mg Intravenous TID AC    macitentan  10 mg Oral Daily    pregabalin  150 mg Oral QHS    riociguat  2.5 mg Oral TID    sertraline  25 mg Oral QHS     PRN Meds:acetaminophen, loperamide, ondansetron, ondansetron, oxyCODONE-acetaminophen    Family History       Problem Relation (Age of Onset)    Cancer Father (69)    No Known Problems Sister, Brother, Brother          Tobacco Use    Smoking status: Former     Types: Cigars     Quit date: 2020     Years since  quittin.8     Passive exposure: Past    Smokeless tobacco: Never    Tobacco comments:     social smoker-light - quit    Substance and Sexual Activity    Alcohol use: Not Currently     Alcohol/week: 2.0 standard drinks of alcohol     Types: 2 Glasses of wine per week     Comment: None now -  previously: socially- 2 or 3 times a week-2 glasses of wine    Drug use: Not Currently     Types: Marijuana     Comment:  last use    Sexual activity: Yes     Partners: Male     Birth control/protection: See Surgical Hx     Comment:        Review of Systems   Constitutional:  Positive for activity change and fatigue.   HENT: Negative.     Eyes: Negative.    Respiratory:  Positive for shortness of breath.    Gastrointestinal: Negative.    Endocrine: Negative.    Musculoskeletal: Negative.    Skin: Negative.    Psychiatric/Behavioral: Negative.       Objective:     Vital Signs (Most Recent):  Temp: 98.5 °F (36.9 °C) (10/06/23 1543)  Pulse: 95 (10/06/23 1543)  Resp: 18 (10/06/23 1543)  BP: (!) 90/58 (10/06/23 1543)  SpO2: (!) 93 % (10/06/23 1543) Vital Signs (24h Range):  Temp:  [97.8 °F (36.6 °C)-98.5 °F (36.9 °C)] 98.5 °F (36.9 °C)  Pulse:  [] 95  Resp:  [11-23] 18  SpO2:  [93 %-99 %] 93 %  BP: ()/(40-81) 90/58     Weight: 72.1 kg (159 lb)  Body mass index is 29.08 kg/m².       Physical Exam  Vitals and nursing note reviewed.   Constitutional:       General: She is not in acute distress.  HENT:      Head: Normocephalic.      Right Ear: External ear normal.      Left Ear: External ear normal.      Nose: Nose normal.      Mouth/Throat:      Mouth: Mucous membranes are moist.   Eyes:      Pupils: Pupils are equal, round, and reactive to light.   Cardiovascular:      Rate and Rhythm: Normal rate.   Pulmonary:      Effort: Pulmonary effort is normal. No respiratory distress.      Comments: On RA  Abdominal:      General: There is no distension.   Musculoskeletal:         General: Normal range of  motion.      Cervical back: Normal range of motion.   Neurological:      Mental Status: She is alert and oriented to person, place, and time.   Psychiatric:         Mood and Affect: Mood normal.         Thought Content: Thought content normal.            Review of Symptoms      Symptom Assessment (ESAS 0-10 Scale)  Pain:  3  Dyspnea:  2  Anxiety:  3  Nausea:  0  Depression:  0  Anorexia:  0  Fatigue:  6  Insomnia:  0  Restlessness:  0  Agitation:  0     CAM / Delirium:  Negative  Constipation:  Negative  Diarrhea:  Positive      Pain Assessment:    Location(s): leg    Leg       Location: bilateral        Quality: Throbbing        Quantity: 7/10 in intensity        Chronicity: Onset 4 month(s) ago, stable        Aggravating Factors: Recumbency        Alleviating Factors: Opiates        Associated Symptoms: None    Modified Jessica Scale:  0    Performance Status:  60    Living Arrangements:  Lives with spouse    Psychosocial/Cultural:   See Palliative Psychosocial Note: No  . Has 3 living daughters and a  son. Has grandchildren who she enjoys spending time with. Likes teaching them to play different sports. Enjoys gardening   **Primary  to Follow**  Palliative Care  Consult: No    Spiritual:  F - Leticia and Belief:  Quaker   I - Importance:  Yes  A - Address in Care:  Yes        Advance Care Planning  Advance Directives:   Living Will: No    LaPOST: No    Do Not Resuscitate Status: No    Medical Power of : Yes    Agent's Name:  Juju Maier   Agent's Contact Number:  [phone]    Decision Making:  Patient answered questions  Goals of Care: What is most important right now is to focus on spending time at home, remaining as independent as possible, improvement in condition but with limits to invasive therapies. Accordingly, we have decided that the best plan to meet the patient's goals includes continuing with treatment.         Significant Labs: All pertinent  labs within the past 24 hours have been reviewed.  CBC:   Recent Labs   Lab 10/06/23  0601   WBC 4.35   HGB 11.2*   HCT 34.2*   MCV 90   *     BMP:  Recent Labs   Lab 10/06/23  1327         K 3.8      CO2 21*   BUN 14   CREATININE 0.8   CALCIUM 8.9   MG 1.9     LFT:  Lab Results   Component Value Date    AST 54 (H) 10/05/2023    ALKPHOS 105 10/05/2023    BILITOT 1.7 (H) 10/05/2023     Albumin:   Albumin   Date Value Ref Range Status   10/05/2023 3.9 3.5 - 5.2 g/dL Final     Protein:   Total Protein   Date Value Ref Range Status   10/05/2023 7.0 6.0 - 8.4 g/dL Final     Lactic acid:   Lab Results   Component Value Date    LACTATE 1.3 12/05/2021    LACTATE 1.7 12/04/2021       Significant Imaging: I have reviewed all pertinent imaging results/findings within the past 24 hours.          I spent a total of 75 minutes on the day of the visit. This includes face to face time in discussion of goals of care, symptom assessment, coordination of care and emotional support.  This also includes non-face to face time preparing to see the patient (eg, review of tests/imaging), obtaining and/or reviewing separately obtained history, documenting clinical information in the electronic or other health record, independently interpreting results and communicating results to the patient/family/caregiver, or care coordinator.    Saritha Venegas MD  Palliative Medicine  [facility] - Transplant [facility]

## 2023-10-06 NOTE — NURSING
Pt arrived to the floor with her home cassette of Remodulin infusing. Spoke with Rose Wagoner PharmD and pt is to stay on her home infusion pump until after 7am because it cant be verified until after 7am. Pt has only used 27.72ml out of 100ml cartridge. Pt states she also has additional cassettes if needed. Settings verified with Estefania Condon charge nurse.

## 2023-10-06 NOTE — HPI
"Per cardiology H&P  "Patient is a 47 year old female with WHO Group I Pulm HTN on Remodulin 110ng/kg/min, followed by Dr JOSE J Rachel in Transplant Pulmonology, chronic neuropathy followed by Palliative medicine on Percocet and Lyrica, prediabetes (a1c 6.4% 12/2021) that presented with shortness of breath, dizziness and wooziness x approx 36 hours. She had noticed prior but recently noted that her pump for Remodulin had stopped functioning. She reports that she went to [facility] at [facility], signed out AMA after eval with desire to come to [facility] to "see my docs". She reports that she didn't realize she lost consciousness but  by bedside states that the daughter stated that she lost consciousness on the ride to [facility] briefly. Previously was active with some mild level of dyspnea on exertion. Reports that she has been taking her macitentan and riociguat without interruption and has been tolerating the medicines well. She denies passing out. Her right heart cath findings with Dr Gregory and Dr Cisneros (Dr Cisneros's most recent, listed below).      C 03/19/2023 on macitentan 10 + riociguat 2.5 TID + Remodulin 90 ng/kg/min:  RA: 10  RV: 75/3, EDP 12  PA: 82/33, mean 48  PCWP: 13  Saturation:  Arterial: 100 %  PA: 60 %  Kristel CI/CO: 2.5/4.5  PVR: 7.8 Wood Units  Conclusions:  Severe pre capillary pulmonary hypertension  Elevated right sided filling pressures  Low normal CI/CO  Assessment:  1.         WHO group 1 pulmonary arterial hypertension   2.         Lung transplant candidate "    Palliative consulted for symptom management. Patient reports feeling much better once her remodulin was restarted. Shortness of breath and pain back to baseline   "

## 2023-10-06 NOTE — PLAN OF CARE
Patient was feeling poorly earlier this am (low BP, headache, leg cramps, weakness, SOB with exertion.) Potassium replaced. Unable to collect urine tox specimen secondary to diarrhea. Immodium given. Patient feeling better this afternoon with improvement in BP.  remains at bedside. Echo done. LE US shows no DVTs.

## 2023-10-06 NOTE — HPI
Patient is a 47 year old female with WHO Group I Pulm HTN on Remodulin 110ng/kg/min, followed by Dr JOSE J Rachel in Transplant Pulmonology, chronic neuropathy followed by Palliative medicine on Percocet and Lyrica, prediabetes (a1c 6.4% 12/2021) that presented with shortness of breath, dizziness and wooziness x approx 36 hours. She had noticed prior but recently noted that her pump for Remodulin had stopped functioning. Previously was active with some mild level of dyspnea on exertion. Reports that she has been taking her macitentan and riociguat without interruption and has been tolerating the medicines well. She denies passing out. Her right heart cath findings with Dr Gregory and Dr Cisneros (Dr Cisneros's most recent, listed below).     RHC 03/19/2023 on macitentan 10 + riociguat 2.5 TID + Remodulin 90 ng/kg/min:  RA: 10  RV: 75/3, EDP 12  PA: 82/33, mean 48  PCWP: 13  Saturation:  Arterial: 100 %  PA: 60 %  Kristel CI/CO: 2.5/4.5  PVR: 7.8 Wood Units  Conclusions:  Severe pre capillary pulmonary hypertension  Elevated right sided filling pressures  Low normal CI/CO  Assessment:  1. WHO group 1 pulmonary arterial hypertension   2. Lung transplant candidate

## 2023-10-06 NOTE — SUBJECTIVE & OBJECTIVE
Past Medical History:   Diagnosis Date    Elevated liver enzymes     Essential (primary) hypertension     Heart failure, unspecified     Hypokalemia     Mixed hyperlipidemia     Neuropathic pain     Tx w/ tramadol & Lyrica    Pulmonary hypertension     Receiving Remodulin continuously through tunneled central line       Past Surgical History:   Procedure Laterality Date    APPENDECTOMY       SECTION      Transverse cut    HYSTERECTOMY  2017    TLH- bleeding    OOPHORECTOMY      RIGHT HEART CATHETERIZATION Right 2021    Procedure: INSERTION, CATHETER, RIGHT HEART;  Surgeon: Chloe Gregory MD;  Location: [facility] CATH LAB;  Service: Cardiology;  Laterality: Right;    RIGHT HEART CATHETERIZATION Right 3/16/2022    Procedure: INSERTION, CATHETER, RIGHT HEART;  Surgeon: Hu Silverman MD;  Location: [facility] CATH LAB;  Service: Cardiology;  Laterality: Right;    RIGHT HEART CATHETERIZATION Right 2022    Procedure: INSERTION, CATHETER, RIGHT HEART;  Surgeon: Chloe Gregory MD;  Location: [facility] CATH LAB;  Service: Cardiology;  Laterality: Right;    RIGHT HEART CATHETERIZATION Right 3/21/2023    Procedure: INSERTION, CATHETER, RIGHT HEART;  Surgeon: Kahlil Cisneros MD;  Location: [facility] CATH LAB;  Service: Cardiology;  Laterality: Right;    TUBAL LIGATION      VAGINAL DELIVERY  ; ; ;        Review of patient's allergies indicates:  No Known Allergies    No current facility-administered medications on file prior to encounter.     Current Outpatient Medications on File Prior to Encounter   Medication Sig    diphenhydrAMINE-acetaminophen (TYLENOL PM)  mg Tab Take 2 tablets by mouth nightly as needed (insomnia).    furosemide (LASIX) 20 MG tablet Take 1 tablet (20 mg total) by mouth once daily.    loperamide (IMODIUM) 2 mg capsule Take 1 capsule (2 mg total) by mouth 4 (four) times daily as needed for Diarrhea.    multivitamin with minerals tablet Take 1 tablet by mouth  once daily.    ondansetron (ZOFRAN) 4 MG tablet Take 1 tablet (4 mg total) by mouth every 8 (eight) hours as needed for Nausea.    OPSUMIT 10 mg Tab TAKE 1 TABLET BY MOUTH DAILY. DO NOT HANDLE IF PREGNANT. DO NOT SPLIT, CRUSH, OR CHEW. REVIEW MEDICATION GUIDE.    oxyCODONE-acetaminophen (PERCOCET) 7.5-325 mg per tablet Take 1 tablet by mouth every 8 (eight) hours as needed for Pain.    potassium chloride SA (K-DUR,KLOR-CON M) 10 MEQ tablet TAKE 2 TABLETS (20 MEQ TOTAL) BY MOUTH ONCE DAILY.    pregabalin (LYRICA) 75 MG capsule Take 1 capsule (75 mg total) by mouth once daily AND 2 capsules (150 mg total) every evening.    REMODULIN 5 mg/mL Soln     riociguat (ADEMPAS) 2.5 mg tablet Take 1 tablet three times a day. Dose changes may occur throughout the course of therapy as directed by your prescriber.    sertraline (ZOLOFT) 25 MG tablet Take 1 tablet (25 mg total) by mouth once daily.    sodium chloride 0.9% SolP 100 mL with treprostinil 1 mg/mL Soln 6,000,000 ng Inject 2,145 ng/min into the vein continuous.     Family History       Problem Relation (Age of Onset)    Cancer Father (69)    No Known Problems Sister, Brother, Brother          Tobacco Use    Smoking status: Former     Types: Cigars     Quit date: 2020     Years since quittin.8     Passive exposure: Past    Smokeless tobacco: Never    Tobacco comments:     social smoker-light - quit    Substance and Sexual Activity    Alcohol use: Not Currently     Alcohol/week: 2.0 standard drinks of alcohol     Types: 2 Glasses of wine per week     Comment: None now -  previously: socially- 2 or 3 times a week-2 glasses of wine    Drug use: Not Currently     Types: Marijuana     Comment:  last use    Sexual activity: Yes     Partners: Male     Birth control/protection: See Surgical Hx     Comment:      Review of Systems   Constitutional: Positive for malaise/fatigue. Negative for chills and fever.   Cardiovascular:  Negative for chest pain,  orthopnea and palpitations.   Respiratory:  Positive for shortness of breath. Negative for cough.    Gastrointestinal:  Negative for abdominal pain.   Neurological:  Positive for dizziness and weakness. Negative for headaches and light-headedness.   Psychiatric/Behavioral:  Negative for altered mental status.      Objective:     Vital Signs (Most Recent):  Temp: 98.2 °F (36.8 °C) (10/05/23 2024)  Pulse: 98 (10/05/23 2024)  Resp: 20 (10/05/23 2024)  BP: 94/66 (10/05/23 2024)  SpO2: (!) 94 % (10/05/23 2024) Vital Signs (24h Range):  Temp:  [98.2 °F (36.8 °C)] 98.2 °F (36.8 °C)  Pulse:  [98] 98  Resp:  [20] 20  SpO2:  [94 %] 94 %  BP: (94)/(66) 94/66     Weight: 66.2 kg (146 lb)  Body mass index is 26.7 kg/m².    SpO2: (!) 94 %       No intake or output data in the 24 hours ending 10/05/23 2041    Lines/Drains/Airways       Central Venous Catheter Line  Duration             Tunneled Central Line Insertion/Assessment - Single Lumen  03/16/22 1021 right subclavian 568 days                     Physical Exam  Vitals and nursing note reviewed.   Constitutional:       General: She is not in acute distress.     Appearance: Normal appearance. She is ill-appearing. She is not toxic-appearing.   HENT:      Head: Normocephalic and atraumatic.      Mouth/Throat:      Mouth: Mucous membranes are moist.   Eyes:      Extraocular Movements: Extraocular movements intact.   Neck:      Comments: Mildly elevated JVD to the mid neck at 30* angle  Cardiovascular:      Rate and Rhythm: Normal rate and regular rhythm.      Heart sounds: No murmur heard.     No friction rub. No gallop.   Pulmonary:      Effort: Respiratory distress (mild) present.      Breath sounds: Normal breath sounds.   Abdominal:      Palpations: Abdomen is soft.   Musculoskeletal:      Right lower leg: No edema.      Left lower leg: No edema.   Skin:     General: Skin is warm.   Neurological:      Mental Status: She is alert and oriented to person, place, and time.  "Mental status is at baseline.        Significant Labs: BMP: No results for input(s): "GLU", "NA", "K", "CL", "CO2", "BUN", "CREATININE", "CALCIUM", "MG" in the last 48 hours., CMP No results for input(s): "NA", "K", "CL", "CO2", "GLU", "BUN", "CREATININE", "CALCIUM", "PROT", "ALBUMIN", "BILITOT", "ALKPHOS", "AST", "ALT", "ANIONGAP", "ESTGFRAFRICA", "EGFRNONAA" in the last 48 hours., CBC No results for input(s): "WBC", "HGB", "HCT", "PLT" in the last 48 hours., and All pertinent lab results from the last 24 hours have been reviewed.    Significant Imaging: Echocardiogram: Transthoracic echo (TTE) complete (Cupid Only):   Results for orders placed or performed during the hospital encounter of 03/19/23   Echo   Result Value Ref Range    BSA 1.83 m2    TDI SEPTAL 0.06 m/s    LA WIDTH 3.37 cm    TDI LATERAL 0.12 m/s    LVIDd 3.56 3.5 - 6.0 cm    IVS 1.03 0.6 - 1.1 cm    Posterior Wall 0.93 0.6 - 1.1 cm    LVIDs 2.22 2.1 - 4.0 cm    FS 38 28 - 44 %    LA volume 44.24 cm3    Sinus 3.08 cm    STJ 3.22 cm    Ascending aorta 3.06 cm    LV mass 103.00 g    LA size 2.82 cm    RVDD 5.50 cm    TAPSE 1.88 cm    Left Ventricle Relative Wall Thickness 0.52 cm    Mean e' 0.09 m/s    LVOT diameter 1.98 cm    LVOT area 3.1 cm2    TR Max Josiah 4.50 m/s    LV Systolic Volume 16.51 mL    LV Systolic Volume Index 9.1 mL/m2    LV Diastolic Volume 52.92 mL    LV Diastolic Volume Index 29.24 mL/m2    LA Volume Index 24.4 mL/m2    LV Mass Index 57 g/m2    RA Major Axis 6.03 cm    Left Atrium Minor Axis 5.11 cm    Left Atrium Major Axis 5.90 cm    Triscuspid Valve Regurgitation Peak Gradient 81 mmHg    RA Width 4.87 cm    Right Atrial Pressure (from IVC) 8 mmHg    EF 60 %    RV S' 13 cm/s    TV resting pulmonary artery pressure 89 mmHg    Narrative    · The left ventricle is normal in size with concentric remodeling and   normal systolic function. The estimated ejection fraction is 60%.  · Severe right ventricular enlargement with mildly to " moderately reduced   right ventricular systolic function.  · Indeterminate left ventricular diastolic function.  · Severe right atrial enlargement.  · Moderate to severe tricuspid regurgitation.  · Small pericardial effusion.  · The estimated PA systolic pressure is 89 mmHg.  · Intermediate central venous pressure (8 mmHg).

## 2023-10-06 NOTE — ASSESSMENT & PLAN NOTE
Patient is a 47 year old female with WHO Group I Pulm HTN on Remodulin 110ng/kg/min, followed by Dr JOSE J Rachel in Transplant Pulmonology, chronic neuropathy followed by Palliative medicine on Percocet and Lyrica, prediabetes (a1c 6.4% 12/2021) that presented with shortness of breath, dizziness and wooziness x approx 36 hours. She had noticed prior but recently noted that her pump for Remodulin had stopped functioning.     RHC 03/19/2023 on macitentan 10 + riociguat 2.5 TID + Remodulin 90 ng/kg/min:  RA: 10 ; RV: 75/3, EDP 12 ; PA: 82/33, mean 48 ; PCWP: 13 ; Arterial: 100 % ; PA: 60 %  Kristel CI/CO: 2.5/4.5      PVR: 7.8 Wood Units  Conclusions:  Severe pre capillary pulmonary hypertension  Elevated right sided filling pressures  Low normal CI/CO  Assessment:  1. WHO group 1 pulmonary arterial hypertension   2. Lung transplant candidate     Admit to [facility]   Restart macitentan 10 + riociguat 2.5 TID + Remodulin 110 ng/kg/min  Infusion pump from pharmacy while home supplies are being sorted  Monitor on TSU  Supplemental O2 PRN to maintain O2 Sat > 88%  Recent echocardiogram from 03/2023 independently reviewed, unlikely to change significantly but ordered limited echocardiogram  Zofran PRN nausea  Loperamide PRN diarrhea

## 2023-10-06 NOTE — H&P
"[facility] - Emergency Dept  Heart Transplant  History and Physical     Patient Name: Kristin Maier  MRN: [id number]  Admission Date: 10/05/2023  Code Status: Prior   Attending Provider: Hu Garcia MD   Primary Care Physician: Jorge A Stack MD  Principal Problem:WHO group 1 pulmonary arterial hypertension    Patient information was obtained from patient, past medical records and ER records.     Subjective:     Chief Complaint:  Shortness of breath     HPI:  Patient is a 47 year old female with WHO Group I Pulm HTN on Remodulin 110ng/kg/min, followed by Dr JOSE J Rachel in Transplant Pulmonology, chronic neuropathy followed by Palliative medicine on Percocet and Lyrica, prediabetes (a1c 6.4% 12/2021) that presented with shortness of breath, dizziness and wooziness x approx 36 hours. She had noticed prior but recently noted that her pump for Remodulin had stopped functioning. [facility] reports [facility], signed out AMA after eval with desire to come to [facility] to "see my docs". She reports that she didn't realize she lost consciousness but  by bedside states that the daughter stated that she lost consciousness on the ride to [facility] briefly. Previously was active with some mild level of dyspnea on exertion. Reports that she has been taking her macitentan and riociguat without interruption and has been tolerating the medicines well. She denies passing out. Her right heart cath findings with Dr Gregory and Dr Cisneros (Dr Cisneros's most recent, listed below).     C 03/19/2023 on macitentan 10 + riociguat 2.5 TID + Remodulin 90 ng/kg/min:  RA: 10  RV: 75/3, EDP 12  PA: 82/33, mean 48  PCWP: 13  Saturation:  Arterial: 100 %  PA: 60 %  Kristel CI/CO: 2.5/4.5  PVR: 7.8 Wood Units  Conclusions:  Severe pre capillary pulmonary hypertension  Elevated right sided filling pressures  Low normal CI/CO  Assessment:  1. WHO group 1 pulmonary arterial hypertension   2. Lung " transplant candidate       Past Medical History:   Diagnosis Date    Elevated liver enzymes     Essential (primary) hypertension     Heart failure, unspecified     Hypokalemia     Mixed hyperlipidemia     Neuropathic pain     Tx w/ tramadol & Lyrica    Pulmonary hypertension     Receiving Remodulin continuously through tunneled central line       Past Surgical History:   Procedure Laterality Date    APPENDECTOMY       SECTION      Transverse cut    HYSTERECTOMY  2017    TLH- bleeding    OOPHORECTOMY      RIGHT HEART CATHETERIZATION Right 2021    Procedure: INSERTION, CATHETER, RIGHT HEART;  Surgeon: Chloe Gregory MD;  Location: [facility] CATH LAB;  Service: Cardiology;  Laterality: Right;    RIGHT HEART CATHETERIZATION Right 3/16/2022    Procedure: INSERTION, CATHETER, RIGHT HEART;  Surgeon: Hu Silverman MD;  Location: [facility] CATH LAB;  Service: Cardiology;  Laterality: Right;    RIGHT HEART CATHETERIZATION Right 2022    Procedure: INSERTION, CATHETER, RIGHT HEART;  Surgeon: Chloe Gregory MD;  Location: [facility] CATH LAB;  Service: Cardiology;  Laterality: Right;    RIGHT HEART CATHETERIZATION Right 3/21/2023    Procedure: INSERTION, CATHETER, RIGHT HEART;  Surgeon: Kahlil Cisneros MD;  Location: [facility] CATH LAB;  Service: Cardiology;  Laterality: Right;    TUBAL LIGATION      VAGINAL DELIVERY  ; ; ;        Review of patient's allergies indicates:  No Known Allergies    No current facility-administered medications on file prior to encounter.     Current Outpatient Medications on File Prior to Encounter   Medication Sig    diphenhydrAMINE-acetaminophen (TYLENOL PM)  mg Tab Take 2 tablets by mouth nightly as needed (insomnia).    furosemide (LASIX) 20 MG tablet Take 1 tablet (20 mg total) by mouth once daily.    loperamide (IMODIUM) 2 mg capsule Take 1 capsule (2 mg total) by mouth 4 (four) times daily as needed for Diarrhea.    multivitamin with minerals  tablet Take 1 tablet by mouth once daily.    ondansetron (ZOFRAN) 4 MG tablet Take 1 tablet (4 mg total) by mouth every 8 (eight) hours as needed for Nausea.    OPSUMIT 10 mg Tab TAKE 1 TABLET BY MOUTH DAILY. DO NOT HANDLE IF PREGNANT. DO NOT SPLIT, CRUSH, OR CHEW. REVIEW MEDICATION GUIDE.    oxyCODONE-acetaminophen (PERCOCET) 7.5-325 mg per tablet Take 1 tablet by mouth every 8 (eight) hours as needed for Pain.    potassium chloride SA (K-DUR,KLOR-CON M) 10 MEQ tablet TAKE 2 TABLETS (20 MEQ TOTAL) BY MOUTH ONCE DAILY.    pregabalin (LYRICA) 75 MG capsule Take 1 capsule (75 mg total) by mouth once daily AND 2 capsules (150 mg total) every evening.    REMODULIN 5 mg/mL Soln     riociguat (ADEMPAS) 2.5 mg tablet Take 1 tablet three times a day. Dose changes may occur throughout the course of therapy as directed by your prescriber.    sertraline (ZOLOFT) 25 MG tablet Take 1 tablet (25 mg total) by mouth once daily.    sodium chloride 0.9% SolP 100 mL with treprostinil 1 mg/mL Soln 6,000,000 ng Inject 2,145 ng/min into the vein continuous.     Family History       Problem Relation (Age of Onset)    Cancer Father (69)    No Known Problems Sister, Brother, Brother          Tobacco Use    Smoking status: Former     Types: Cigars     Quit date: 2020     Years since quittin.8     Passive exposure: Past    Smokeless tobacco: Never    Tobacco comments:     social smoker-light - quit    Substance and Sexual Activity    Alcohol use: Not Currently     Alcohol/week: 2.0 standard drinks of alcohol     Types: 2 Glasses of wine per week     Comment: None now -  previously: socially- 2 or 3 times a week-2 glasses of wine    Drug use: Not Currently     Types: Marijuana     Comment:  last use    Sexual activity: Yes     Partners: Male     Birth control/protection: See Surgical Hx     Comment:      Review of Systems   Constitutional: Positive for malaise/fatigue. Negative for chills and fever.   Cardiovascular:   Negative for chest pain, orthopnea and palpitations.   Respiratory:  Positive for shortness of breath. Negative for cough.    Gastrointestinal:  Negative for abdominal pain.   Neurological:  Positive for dizziness and weakness. Negative for headaches and light-headedness.   Psychiatric/Behavioral:  Negative for altered mental status.      Objective:     Vital Signs (Most Recent):  Temp: 98.2 °F (36.8 °C) (10/05/23 2024)  Pulse: 98 (10/05/23 2024)  Resp: 20 (10/05/23 2024)  BP: 94/66 (10/05/23 2024)  SpO2: (!) 94 % (10/05/23 2024) Vital Signs (24h Range):  Temp:  [98.2 °F (36.8 °C)] 98.2 °F (36.8 °C)  Pulse:  [98] 98  Resp:  [20] 20  SpO2:  [94 %] 94 %  BP: (94)/(66) 94/66     Weight: 66.2 kg (146 lb)  Body mass index is 26.7 kg/m².    SpO2: (!) 94 %       No intake or output data in the 24 hours ending 10/05/23 2041    Lines/Drains/Airways       Central Venous Catheter Line  Duration             Tunneled Central Line Insertion/Assessment - Single Lumen  03/16/22 1021 right subclavian 568 days                     Physical Exam  Vitals and nursing note reviewed.   Constitutional:       General: She is not in acute distress.     Appearance: Normal appearance. She is ill-appearing. She is not toxic-appearing.   HENT:      Head: Normocephalic and atraumatic.      Mouth/Throat:      Mouth: Mucous membranes are moist.   Eyes:      Extraocular Movements: Extraocular movements intact.   Neck:      Comments: Mildly elevated JVD to the mid neck at 30* angle  Cardiovascular:      Rate and Rhythm: Normal rate and regular rhythm.      Heart sounds: Grade 3/6 holosystolic murmur heard at LSB     No friction rub. No gallop.   Pulmonary:      Effort: Respiratory distress (mild) present.      Breath sounds: Normal breath sounds.   Abdominal:      Palpations: Abdomen is soft.   Musculoskeletal:      Right lower leg: No edema.      Left lower leg: No edema.      LLE tender to palpation  Skin:     General: Skin is warm.   Neurological:  "     Mental Status: She is alert and oriented to person, place, and time. Mental status is at baseline.        Significant Labs: BMP: No results for input(s): "GLU", "NA", "K", "CL", "CO2", "BUN", "CREATININE", "CALCIUM", "MG" in the last 48 hours., CMP No results for input(s): "NA", "K", "CL", "CO2", "GLU", "BUN", "CREATININE", "CALCIUM", "PROT", "ALBUMIN", "BILITOT", "ALKPHOS", "AST", "ALT", "ANIONGAP", "ESTGFRAFRICA", "EGFRNONAA" in the last 48 hours., CBC No results for input(s): "WBC", "HGB", "HCT", "PLT" in the last 48 hours., and All pertinent lab results from the last 24 hours have been reviewed.    Significant Imaging: Echocardiogram: Transthoracic echo (TTE) complete (Cupid Only):   Results for orders placed or performed during the hospital encounter of 03/19/23   Echo   Result Value Ref Range    BSA 1.83 m2    TDI SEPTAL 0.06 m/s    LA WIDTH 3.37 cm    TDI LATERAL 0.12 m/s    LVIDd 3.56 3.5 - 6.0 cm    IVS 1.03 0.6 - 1.1 cm    Posterior Wall 0.93 0.6 - 1.1 cm    LVIDs 2.22 2.1 - 4.0 cm    FS 38 28 - 44 %    LA volume 44.24 cm3    Sinus 3.08 cm    STJ 3.22 cm    Ascending aorta 3.06 cm    LV mass 103.00 g    LA size 2.82 cm    RVDD 5.50 cm    TAPSE 1.88 cm    Left Ventricle Relative Wall Thickness 0.52 cm    Mean e' 0.09 m/s    LVOT diameter 1.98 cm    LVOT area 3.1 cm2    TR Max Josiah 4.50 m/s    LV Systolic Volume 16.51 mL    LV Systolic Volume Index 9.1 mL/m2    LV Diastolic Volume 52.92 mL    LV Diastolic Volume Index 29.24 mL/m2    LA Volume Index 24.4 mL/m2    LV Mass Index 57 g/m2    RA Major Axis 6.03 cm    Left Atrium Minor Axis 5.11 cm    Left Atrium Major Axis 5.90 cm    Triscuspid Valve Regurgitation Peak Gradient 81 mmHg    RA Width 4.87 cm    Right Atrial Pressure (from IVC) 8 mmHg    EF 60 %    RV S' 13 cm/s    TV resting pulmonary artery pressure 89 mmHg    Narrative    · The left ventricle is normal in size with concentric remodeling and   normal systolic function. The estimated ejection " fraction is 60%.  · Severe right ventricular enlargement with mildly to moderately reduced   right ventricular systolic function.  · Indeterminate left ventricular diastolic function.  · Severe right atrial enlargement.  · Moderate to severe tricuspid regurgitation.  · Small pericardial effusion.  · The estimated PA systolic pressure is 89 mmHg.  · Intermediate central venous pressure (8 mmHg).        Assessment and Plan:     * WHO group 1 pulmonary arterial hypertension  Patient is a 47 year old female with WHO Group I Pulm HTN on Remodulin 110ng/kg/min, followed by Dr JOSE J Rachel in Transplant Pulmonology, chronic neuropathy followed by Palliative medicine on Percocet and Lyrica, prediabetes (a1c 6.4% 12/2021) that presented with shortness of breath, dizziness and wooziness x approx 36 hours. She had noticed prior but recently noted that her pump for Remodulin had stopped functioning.     RHC 03/19/2023 on macitentan 10 + riociguat 2.5 TID + Remodulin 90 ng/kg/min:  RA: 10 ; RV: 75/3, EDP 12 ; PA: 82/33, mean 48 ; PCWP: 13 ; Arterial: 100 % ; PA: 60 %  Kristel CI/CO: 2.5/4.5      PVR: 7.8 Wood Units  Conclusions:  Severe pre capillary pulmonary hypertension  Elevated right sided filling pressures  Low normal CI/CO  Assessment:  1. WHO group 1 pulmonary arterial hypertension   2. Lung transplant candidate     Admit to [facility]   Restart macitentan 10 + riociguat 2.5 TID + Remodulin 110 ng/kg/min  Infusion pump from pharmacy while home supplies are being sorted  Monitor on TSU  Supplemental O2 PRN to maintain O2 Sat > 88%  Recent echocardiogram from 03/2023 independently reviewed, unlikely to change significantly but ordered limited echocardiogram  Zofran PRN nausea  Loperamide PRN diarrhea      Pulmonary hypertension  As noted in WHO group I Pulmonary HTN    Secondary tricuspid regurgitation  TTE with mod-sev TR  Patient with precapillary pulmonary HTN that likely is leading to TR  Obtain limited TTE to assess TR +  RV    Debility  Encourage ambulation as tolerated    Palliative care encounter  On Percocet 7.5-325 mg PO q 6 hrs PRN pain  On pregabalin 75 mg PO q AM and 150 mg PO q PM  Consider inpatient palliative care consultation for optimization of pulm HTN management as outpatient messages suggest additional pain concerns    Essential hypertension  Noted in documentation   BP controlled without medication        VTE Risk Mitigation (From admission, onward)      None        Discussed with staff, Dr Luz Silverman.     Sharan Slade MD  Heart Transplant PGY6   [facility] - Emergency Dept

## 2023-10-06 NOTE — SUBJECTIVE & OBJECTIVE
Past Medical History:   Diagnosis Date    Elevated liver enzymes     Essential (primary) hypertension     Heart failure, unspecified     Hypokalemia     Mixed hyperlipidemia     Neuropathic pain     Tx w/ tramadol & Lyrica    Pulmonary hypertension     Receiving Remodulin continuously through tunneled central line       Past Surgical History:   Procedure Laterality Date    APPENDECTOMY       SECTION      Transverse cut    HYSTERECTOMY  2017    TLH- bleeding    OOPHORECTOMY      RIGHT HEART CATHETERIZATION Right 2021    Procedure: INSERTION, CATHETER, RIGHT HEART;  Surgeon: Chloe Gregory MD;  Location: [facility] CATH LAB;  Service: Cardiology;  Laterality: Right;    RIGHT HEART CATHETERIZATION Right 3/16/2022    Procedure: INSERTION, CATHETER, RIGHT HEART;  Surgeon: Hu Silverman MD;  Location: [facility] CATH LAB;  Service: Cardiology;  Laterality: Right;    RIGHT HEART CATHETERIZATION Right 2022    Procedure: INSERTION, CATHETER, RIGHT HEART;  Surgeon: Chloe Gregory MD;  Location: [facility] CATH LAB;  Service: Cardiology;  Laterality: Right;    RIGHT HEART CATHETERIZATION Right 3/21/2023    Procedure: INSERTION, CATHETER, RIGHT HEART;  Surgeon: Kahlil Cisneros MD;  Location: [facility] CATH LAB;  Service: Cardiology;  Laterality: Right;    TUBAL LIGATION      VAGINAL DELIVERY  ; ; ;        Review of patient's allergies indicates:  No Known Allergies    Medications:  Continuous Infusions:   [START ON 10/7/2023] treprostinil (REMODULIN) 6,000,000 ng in sodium chloride 0.9% 100 mL infusion      [START ON 10/7/2023] veletri/remodulin cassette      veletri/remodulin tubing       Scheduled Meds:   furosemide (LASIX) injection  40 mg Intravenous TID AC    macitentan  10 mg Oral Daily    pregabalin  150 mg Oral QHS    riociguat  2.5 mg Oral TID    sertraline  25 mg Oral QHS     PRN Meds:acetaminophen, loperamide, ondansetron, ondansetron, oxyCODONE-acetaminophen    Family  History       Problem Relation (Age of Onset)    Cancer Father (69)    No Known Problems Sister, Brother, Brother          Tobacco Use    Smoking status: Former     Types: Cigars     Quit date: 2020     Years since quittin.8     Passive exposure: Past    Smokeless tobacco: Never    Tobacco comments:     social smoker-light - quit    Substance and Sexual Activity    Alcohol use: Not Currently     Alcohol/week: 2.0 standard drinks of alcohol     Types: 2 Glasses of wine per week     Comment: None now -  previously: socially- 2 or 3 times a week-2 glasses of wine    Drug use: Not Currently     Types: Marijuana     Comment:  last use    Sexual activity: Yes     Partners: Male     Birth control/protection: See Surgical Hx     Comment:        Review of Systems   Constitutional:  Positive for activity change and fatigue.   HENT: Negative.     Eyes: Negative.    Respiratory:  Positive for shortness of breath.    Gastrointestinal: Negative.    Endocrine: Negative.    Musculoskeletal: Negative.    Skin: Negative.    Psychiatric/Behavioral: Negative.       Objective:     Vital Signs (Most Recent):  Temp: 98.5 °F (36.9 °C) (10/06/23 1543)  Pulse: 95 (10/06/23 1543)  Resp: 18 (10/06/23 1543)  BP: (!) 90/58 (10/06/23 1543)  SpO2: (!) 93 % (10/06/23 1543) Vital Signs (24h Range):  Temp:  [97.8 °F (36.6 °C)-98.5 °F (36.9 °C)] 98.5 °F (36.9 °C)  Pulse:  [] 95  Resp:  [11-23] 18  SpO2:  [93 %-99 %] 93 %  BP: ()/(40-81) 90/58     Weight: 72.1 kg (159 lb)  Body mass index is 29.08 kg/m².       Physical Exam  Vitals and nursing note reviewed.   Constitutional:       General: She is not in acute distress.  HENT:      Head: Normocephalic.      Right Ear: External ear normal.      Left Ear: External ear normal.      Nose: Nose normal.      Mouth/Throat:      Mouth: Mucous membranes are moist.   Eyes:      Pupils: Pupils are equal, round, and reactive to light.   Cardiovascular:      Rate and Rhythm:  Normal rate.   Pulmonary:      Effort: Pulmonary effort is normal. No respiratory distress.      Comments: On RA  Abdominal:      General: There is no distension.   Musculoskeletal:         General: Normal range of motion.      Cervical back: Normal range of motion.   Neurological:      Mental Status: She is alert and oriented to person, place, and time.   Psychiatric:         Mood and Affect: Mood normal.         Thought Content: Thought content normal.            Review of Symptoms      Symptom Assessment (ESAS 0-10 Scale)  Pain:  3  Dyspnea:  2  Anxiety:  3  Nausea:  0  Depression:  0  Anorexia:  0  Fatigue:  6  Insomnia:  0  Restlessness:  0  Agitation:  0     CAM / Delirium:  Negative  Constipation:  Negative  Diarrhea:  Positive      Pain Assessment:    Location(s): leg    Leg       Location: bilateral        Quality: Throbbing        Quantity: 7/10 in intensity        Chronicity: Onset 4 month(s) ago, stable        Aggravating Factors: Recumbency        Alleviating Factors: Opiates        Associated Symptoms: None    Modified Jessica Scale:  0    Performance Status:  60    Living Arrangements:  Lives with spouse    Psychosocial/Cultural:   See Palliative Psychosocial Note: No  . Has 3 living daughters and a  son. Has grandchildren who she enjoys spending time with. Likes teaching them to play different sports. Enjoys gardening   **Primary  to Follow**  Palliative Care  Consult: No    Spiritual:  F - Leticia and Belief:  [employer]beterian   I - Importance:  Yes  A - Address in Care:  Yes        Advance Care Planning   Advance Directives:   Living Will: No    LaPOST: No    Do Not Resuscitate Status: No    Medical Power of : Yes    Agent's Name:  Juju Maier   Agent's Contact Number:  [phone]    Decision Making:  Patient answered questions  Goals of Care: What is most important right now is to focus on spending time at home, remaining as independent as possible,  improvement in condition but with limits to invasive therapies. Accordingly, we have decided that the best plan to meet the patient's goals includes continuing with treatment.         Significant Labs: All pertinent labs within the past 24 hours have been reviewed.  CBC:   Recent Labs   Lab 10/06/23  0601   WBC 4.35   HGB 11.2*   HCT 34.2*   MCV 90   *     BMP:  Recent Labs   Lab 10/06/23  1327         K 3.8      CO2 21*   BUN 14   CREATININE 0.8   CALCIUM 8.9   MG 1.9     LFT:  Lab Results   Component Value Date    AST 54 (H) 10/05/2023    ALKPHOS 105 10/05/2023    BILITOT 1.7 (H) 10/05/2023     Albumin:   Albumin   Date Value Ref Range Status   10/05/2023 3.9 3.5 - 5.2 g/dL Final     Protein:   Total Protein   Date Value Ref Range Status   10/05/2023 7.0 6.0 - 8.4 g/dL Final     Lactic acid:   Lab Results   Component Value Date    LACTATE 1.3 12/05/2021    LACTATE 1.7 12/04/2021       Significant Imaging: I have reviewed all pertinent imaging results/findings within the past 24 hours.

## 2023-10-06 NOTE — ASSESSMENT & PLAN NOTE
On Percocet 7.5-325 mg PO q 6 hrs PRN pain  On pregabalin 75 mg PO q AM and 150 mg PO q PM  Consider inpatient palliative care consultation for optimization of pulm HTN management as outpatient messages suggest additional pain concerns

## 2023-10-06 NOTE — PLAN OF CARE
Plan of care reviewed with the patient upon admission. Pt states that she changed her Remodulin cassette at home but the line was clamped. Pt states it was off for over 24hrs. Pt began having worsening shortness of breath and mobility intolerance as well as a headache. Pt states that she restarted her pump as soon as she noticed which was about 11am on yesterday 10/5. Pt went to another hospital initally because her family brought her to the closest facility when she experienced loss of consciousness. She left there and was brought here by her . Pt placed on 3L NC in the ED but has required 4L to maintain sats 92% while sleeping. BP is soft, HTS aware. No orders given. Fall precautions maintained. She is up with stand by assist. Pt remained free from falls and injury this shift. Bed locked in lowest position, side rails up x2, call light within reach. Instructed pt to call for assistance as needed. Pt verbalized understanding. Vitals stable. Pt afebrile overnight. No acute issues overnight. Will continue to monitor.

## 2023-10-06 NOTE — PROGRESS NOTES
10/06/23 0446   Vital Signs   BP (!) 84/56   BP Location Left arm   BP Method Manual   Patient Position Lying     Tandem, MD aware of BP. No orders given at this time. Pt asymptomatic. Pt sleeping, has taken Percocet and lyrica. Will continue to monitor.

## 2023-10-06 NOTE — PROGRESS NOTES
[facility] [place]  Heart Failure/Transplant Service  Progress Note    Kristin Maier  YOB: 1975  Medical Record Number:  [id number]  Attending Physician:  Hu Glover, *   Date of Admission: 10/5/2023     Hospital Day:  1  Current Principal Problem:  WHO group 1 pulmonary arterial hypertension    Interval Events   Still feels short of breath on 4L NC. Has not had any UOP since admission and feels somewhat fatigued.    Cardiac Imaging      ECG 10/5: sinus w/LAE, Qtc 479    CXR 10/6: no overt signs of congestion    TTE 3/2023:  The left ventricle is normal in size with concentric remodeling and normal systolic function. The estimated ejection fraction is 60%.  Severe right ventricular enlargement with mildly to moderately reduced right ventricular systolic function.  Indeterminate left ventricular diastolic function.  Severe right atrial enlargement.  Moderate to severe tricuspid regurgitation.  Small pericardial effusion.  The estimated PA systolic pressure is 89 mmHg.  Intermediate central venous pressure (8 mmHg).    RHC 3/21/23:  Measurements done while on Opsumit 10 mg/day, Adempas 2.5 mg TID, and Remodulin 90 ng/kg/min.    RA: 10  RV: 75/3, EDP 12  PA: 82/33, mean 48  PCWP: 13     Saturation:  Arterial: 100 %  PA: 60 %     Kristel CI/CO: 2.5/4.5     PVR: 7.8 Wood Units     Conclusions:  Severe pre capillary pulmonary hypertension  Elevated right sided filling pressures  Low normal CI/CO    [facility] 7/2022:    RA: 15/ 11/ 10   RV: 70/ 9/ 10   PA: 72/ 29/ 43   PWP: 12/ 12/ 11 .   Cardiac output was 4.29  by Kristel.   Cardiac index is 2.61 L/min/m2.   O2 Sat: PA 69%.     Normal CO/CI on remodulin 80 ng/kg/min, riociguat 2mg po TID, macitentan 10mg.  Mildly increased right and normal left sided filling pressures.  Severe pulmonary hypertension.   TPG  32   PVR  7.45  MAP 75  SVR 1212    [facility] 12/2021:  RA: 25/ 26/ 22   RV: 90/ 1/ 21   PA: 86/ 42/ 57   PWP: 9/ 8/ 7 .   Cardiac output  was 1.97  by Kristel. Cardiac index is 1.2 L/min/m2.   O2 Sat: PA 38%.       Severely reduced CO/CI off inotrope and pulmonary vasodilator therapy.  Severely elevated right and normal left sided filling pressure.  Severe pulmonary hypertension in setting of normal left sided filling pressure.  TPG   50    PVR   25    Medications - Current  Scheduled Meds:   [START ON 10/7/2023] furosemide  20 mg Oral Daily    macitentan  10 mg Oral Daily    pregabalin  150 mg Oral QHS    riociguat  2.5 mg Oral TID    sertraline  25 mg Oral Daily     Continuous Infusions:   treprostinil (REMODULIN) 6,000,000 ng in sodium chloride 0.9% 100 mL infusion      [START ON 10/7/2023] veletri/remodulin cassette      veletri/remodulin tubing       PRN Meds:.acetaminophen, loperamide, ondansetron, ondansetron, oxyCODONE-acetaminophen        Physical Examination     Vital Signs  Vitals  Temp: 97.8 °F (36.6 °C)  Temp Source: Oral  Pulse: 86  Resp: 16  SpO2: 95 %  Pulse Oximetry Type: Intermittent  Flow (L/min): 4  BP: (!) 80/54  MAP (mmHg): 61  BP Location: Left arm  BP Method: Manual  Patient Position: Lying          24 Hour VS Range    Temp:  [97.8 °F (36.6 °C)-98.3 °F (36.8 °C)]   Pulse:  [81-99]   Resp:  [11-23]   BP: ()/(54-81)   SpO2:  [94 %-97 %]   No intake or output data in the 24 hours ending 10/06/23 1058      General:   Head: NCAT  Eyes: conjunctivae and lids normal, no scleral icterus, EOMI.  ENMT:  no gingival bleeding, normal oral mucosa without pallor or cyanosis.   Neck:  JVP significantly increased to mandible with positive hepatojugular reflux.  Trachea non-displaced.     Chest:  Normal respiratory effort on 4L NC.  Chest clear to auscultation.  No wheezes, rales, or rhonchi.  Heart:  Normal PMI, S1 S2 normal quality, splitting.  No murmurs rubs or gallops.  Peripheral pulses 2+.  Abdomen:  Non-distended, normal bowel sounds, non-tender.  Significant hepato-jugular reflux.  Extremities:  No edema. Normal capillary refill.   "  Skin:  Warm and dry.  No cyanosis or pallor.  No ulcers, stasis.  IV sites without tenderness or inflammation.    Neurological / Psychiatric:  Oriented to person, time, and place.  No facial asymmetry, drift.  Fluent without dysarthria.  Mood euthymic, affect normal.       Data       Recent Labs   Lab 10/05/23  2220 10/06/23  0601   WBC 6.23 4.35   HGB 11.9* 11.2*   HCT 35.5* 34.2*   * 142*        No results for input(s): "PROTIME", "INR" in the last 168 hours.     Recent Labs   Lab 10/05/23  2220 10/06/23  0601    137   K 3.5 3.3*   * 110   CO2 18* 19*   BUN 16 16   CREATININE 0.8 0.8   ANIONGAP 7* 8   CALCIUM 8.8 8.5*        Recent Labs   Lab 10/05/23  2220   PROT 7.0   ALBUMIN 3.9   BILITOT 1.7*   ALKPHOS 105   AST 54*   ALT 81*        Recent Labs   Lab 10/05/23  2220   TROPONINI 0.008        BNP (pg/mL)   Date Value   10/05/2023 311 (H)   03/19/2023 67   02/06/2023 50   12/15/2022 42   11/14/2022 47       No results for input(s): "LABBLOO" in the last 168 hours.       Assessment & Plan     Kristin Maier is a 47 y.o. female with WHO Group I Pulm HTN on Remodulin 110ng/kg/min, followed by Dr JOSE J Rachel in Transplant Pulmonology, chronic neuropathy followed by Palliative medicine on Percocet and Lyrica, prediabetes (a1c 6.4% 12/2021) that initially presented to [facility] 10/5 w/CHU, shortness of breath at  rest, lightheadedness x 36 hrs. When she changed her cassette on Tuesday 10/3 evening she crimped the line and hadn't realized it was still crimped until next day (had been without remodulin for 36 hrs). Previously was active with some mild level of dyspnea on exertion. Reports that she has been taking her macitentan and riociguat without interruption and has been tolerating the medicines well. Presented to [facility] initially though then came here to [facility] as all of her care is here. Here significantly overloaded, diuresing, will repeat TTE. Borderline hypotenisve concern for worsneining RV " failure in setting of pulmonary HTN, if she remains hypotensive despite diuresis will need to transfer to ICU for RV failure needing vasopressors/inotrope and invasive monitoring (arterial line, central line).     Last saw Dr. Rachel pulhomer 6/2023 reported remains early for lung transplant workup though would not be able to undergo lung transplant here given severe pulm HTN (would need to be [place], which she is planning to move to eventually).    # WHO group 1 pulmonary arterial hypertension  # Severe RV dysfunction in setting of PAH  # TR 2/2 PAH  - Continue macitentan 10 + riociguat 2.5 TID + Remodulin 110 ng/kg/min  - Infusion pump from pharmacy while home supplies are being sorted  - Diuresis 40 mg IV lasix now and assess response  - repeat TTE to eval hemodynamics  - Supplemental O2 PRN to maintain O2 Sat > 88%  - Zofran PRN nausea  - Loperamide PRN diarrhea  - tylenol PRN       - Monitor on TSU for now though if worsens will consider transfer to CICU for vasopressors/inotropes, diuresis + invasive monitoring (e.g. CBC + Art line +/- PA catheter)       - will likely need pulmonary artery catheterization at some point in near future (has been > 6 mos since last eval)    # Essential hypertension  - per history; controlled off meds; is actually hypotensive in setting of RV failure    # [facility] pain/Palliative care encounter  On Percocet 7.5-325 mg PO q 6 hrs PRN pain  On pregabalin 75 mg PO q AM and 150 mg PO q PM  Consider inpatient palliative care consultation for optimization of pulm HTN management as outpatient messages suggest additional pain concerns    # DM2  -recheck A1c, lipids    #Decreased Appetite outpatient  - mirtazipine did not help    Feeding: cardiac low salt fluid restriction 1500 mL daily  Analgesia: as above  Sedation: none  Thromboembolic PPx: SCD/MARGARITA Hose    HOB: 30 degrees  Ulcer ppx: n/a  Glycemic Control: n/a  SBT: n/a    Bowel Regimen: n/a  Indwelling Catheter removal: left port long term  needed for remodulin infusion  De-escalation of abx: n/a    Rony Boogie MD - Heart Failure/Transplant Service  Cardiovascular Disease PGY-VI    [id number]

## 2023-10-06 NOTE — ED PROVIDER NOTES
Encounter Date: 10/5/2023       History     Chief Complaint   Patient presents with    Loss of Consciousness     Pt had a syncopal episode with hx of pulmonary htn, pt stopped at a hospital on the [facility] and left ama to come here     HPI    48 yo F w/pulmonary HTN on remodulin infusion, chronic neuropathy on percocet, presenting with 1 x episode of syncope associated with shortness of breath x 2 days. Pt reports her remodulin was stopped for 1 day on Wednesday due to errors with the pump, today she had severe SOB with 1 x episode of syncope, no head trauma, at 7PM. No home O2 use. Currently requiring 4 L NC.    Denies active chest pain, had exertional chest pain/SOB earlier this afternoon starting at 2PM.    Reports has had a gradual L sided temporal headache since 2 PM, no vision changes, no numbness/weakness to extremities, no neck pain    Has also had left leg edema worse than right for the past several days, no history of DVT or PE, no AC use, no pleuritic chest pain    Review of patient's allergies indicates:  No Known Allergies  Past Medical History:   Diagnosis Date    Elevated liver enzymes     Essential (primary) hypertension     Heart failure, unspecified     Hypokalemia     Mixed hyperlipidemia     Neuropathic pain     Tx w/ tramadol & Lyrica    Pulmonary hypertension     Receiving Remodulin continuously through tunneled central line     Past Surgical History:   Procedure Laterality Date    APPENDECTOMY       SECTION      Transverse cut    HYSTERECTOMY  2017    TLH- bleeding    OOPHORECTOMY      RIGHT HEART CATHETERIZATION Right 2021    Procedure: INSERTION, CATHETER, RIGHT HEART;  Surgeon: Chloe Gregory MD;  Location: [facility] CATH LAB;  Service: Cardiology;  Laterality: Right;    RIGHT HEART CATHETERIZATION Right 3/16/2022    Procedure: INSERTION, CATHETER, RIGHT HEART;  Surgeon: Hu Silverman MD;  Location: [facility] CATH LAB;  Service: Cardiology;  Laterality: Right;     RIGHT HEART CATHETERIZATION Right 2022    Procedure: INSERTION, CATHETER, RIGHT HEART;  Surgeon: Chloe Gregory MD;  Location: [facility] CATH LAB;  Service: Cardiology;  Laterality: Right;    RIGHT HEART CATHETERIZATION Right 3/21/2023    Procedure: INSERTION, CATHETER, RIGHT HEART;  Surgeon: Kahlil Cisneros MD;  Location: [facility] CATH LAB;  Service: Cardiology;  Laterality: Right;    TUBAL LIGATION      VAGINAL DELIVERY  ; ; 1996     Family History   Problem Relation Age of Onset    Cancer Father 69        stomach    No Known Problems Sister     No Known Problems Brother     No Known Problems Brother     Breast cancer Neg Hx     Colon cancer Neg Hx     Ovarian cancer Neg Hx      Social History     Tobacco Use    Smoking status: Former     Types: Cigars     Quit date: 2020     Years since quittin.8     Passive exposure: Past    Smokeless tobacco: Never    Tobacco comments:     social smoker-light - quit    Substance Use Topics    Alcohol use: Not Currently     Alcohol/week: 2.0 standard drinks of alcohol     Types: 2 Glasses of wine per week     Comment: None now -  previously: socially- 2 or 3 times a week-2 glasses of wine    Drug use: Not Currently     Types: Marijuana     Comment:  last use     Review of Systems    Physical Exam     Initial Vitals [10/05/23 2024]   BP Pulse Resp Temp SpO2   94/66 98 20 98.2 °F (36.8 °C) (!) 94 %      MAP       --         Physical Exam    Nursing note and vitals reviewed.  Constitutional: No distress.   HENT:   Head: Atraumatic.   Mouth/Throat: Oropharynx is clear and moist and mucous membranes are normal.   Eyes: Conjunctivae and EOM are normal. Right conjunctiva is not injected. Left conjunctiva is not injected. No scleral icterus.   Neck: Neck supple.    Full passive range of motion without pain.     Cardiovascular:  S1 normal, S2 normal, normal heart sounds and normal pulses.           No murmur heard.  Pulses:       Radial pulses are 2+ on the  right side and 2+ on the left side.   Pulmonary/Chest: She has no wheezes.   Mild tachypnea, crackles L lung base, no wheezing, no ronchi   Abdominal: Abdomen is soft. She exhibits no distension. There is no abdominal tenderness.   Musculoskeletal:         General: Normal range of motion.      Cervical back: Full passive range of motion without pain and neck supple.     Neurological: She is alert and oriented to person, place, and time. No cranial nerve deficit. Gait normal.   No nystagmus, shoulder shrug intact, face symmetric, tongue midline, finger-nose-finger nl, sensation intact and equal in face/arms/legs, upper extremity and lower extremity flexor and extensor strength equal and intact     Skin: Skin is warm. No ecchymosis and no rash noted.         ED Course   Procedures  Labs Reviewed   CBC W/ AUTO DIFFERENTIAL   B-TYPE NATRIURETIC PEPTIDE   COMPREHENSIVE METABOLIC PANEL   MAGNESIUM   TROPONIN I   URINALYSIS, REFLEX TO URINE CULTURE   D DIMER, QUANTITATIVE     EKG Readings: (Independently Interpreted)   Peaked P waves seen on previous EKG consistent with pulmonary hypertension, sinus rhythm rate of 84, no acute signs of ischemia       Imaging Results    None          Medications - No data to display  Medical Decision Making  48 yo F w/pulmonary HTN on remodulin infusion, chronic neuropathy on percocet, presenting with 1 x episode of syncope associated with shortness of breath x 2 days.     Differential includes worsening pulmonary hypertension 2/2 remodulin issue, PE, aortic pathology, hypovolemia/dehydration, volume overload    Given LLE edema > RLE, will obtain DVT study of LLE  Less concern for aortic pathology causing syncope given no active chest pain, no history of chest pain or abdominal pain before or after syncope  Patient requiring 4 L nasal cannula, desats to the 80s off oxygen    Left-sided headache with no red flags, no fever, no neck pain, was not sudden onset, no vision changes or focal neuro  deficits, less concern for ICH      Amount and/or Complexity of Data Reviewed  Labs: ordered.  Radiology: ordered.    Risk  Prescription drug management.  Decision regarding hospitalization.                               Clinical Impression:   Final diagnoses:  [R55] Syncope          Please put in 15 minutes of critical care due to patient having hypoxia, with risk of respiratory failure.  Separate from teaching and exclusive of procedure and ekg time  Includes:  Time at bedside  Time reviewing test results  Time discussing case with staff  Time documenting the medical record  Time spent with family members  Time spent with consults  Management      Paty Goins MD  10/06/23 0057       Paty Goins MD  10/06/23 0059

## 2023-10-06 NOTE — ASSESSMENT & PLAN NOTE
Pulmonary hypertension  -Pulmonary HTN, WHO group 1, on remodulin.   - following with Pulmonary hypertension Clinic  -currently on Opsumit, Adempas and Remodulin  -Not requiring oxygen at baseline        Anxiety /Other insomnia  - continue sertraline 25mg  - working with [facility] and  for support as outpatient         Pain in both lower extremities  Arthralgia in both hands  -pain in lower extremities is related to her medication, it is worse when her Remodulin is uptitrated- feels like her feet are on fire   -Percocet 7.5-325mg q4h prn   -Lyrica 150mg qhs          Palliative care encounter  Medicolegal: Has decision making capacity.   is surrogate decision maker.   She shared that for HCPOA she would named her  as 1st agent and her daughter Nicky as her 2nd agent.     Psychosocial:  support system consists of  and daughters     Spiritual: Zoroastrian     Understanding of disease and Illness Trajectory: Patient  has  adequate understanding of her illness, they can benefit from continued education on what to expect in the future.

## 2023-10-06 NOTE — ED TRIAGE NOTES
Kristin Maier, an 47 y.o. female presents to the ED after LOC in the car today and SOB for the past 2 days. Pts family member stated she passed out and was frothing from the mouth. Hx of pulmonary HTN. + H/A and left leg pain. Denies other symptoms.      Chief Complaint   Patient presents with    Loss of Consciousness     Pt had a syncopal episode with hx of pulmonary htn, pt stopped at a hospital on the [facility] and left ama to come here     Review of patient's allergies indicates:  No Known Allergies  Past Medical History:   Diagnosis Date    Elevated liver enzymes     Essential (primary) hypertension     Heart failure, unspecified     Hypokalemia     Mixed hyperlipidemia     Neuropathic pain     Tx w/ tramadol & Lyrica    Pulmonary hypertension     Receiving Remodulin continuously through tunneled central line      Adult Physical Assessment  LOC: Kristin Maier, 47 y.o. female verified via two identifiers.  The patient is awake, alert, oriented and speaking appropriately at this time.  APPEARANCE: Patient resting in a half fetal position. Complaining of H/A and leg pain. Patient is clean and well groomed, patient's clothing is properly fastened.  SKIN:The skin is warm and dry, color consistent with ethnicity, patient has normal skin turgor and moist mucus membranes, central line noted to right upper chest, dressing intact.  MUSCULOSKELETAL: Patient moderately disabled with active ROM, left leg pain. No obvious swelling or deformities noted.  RESPIRATORY: Airway is open and patent, respirations are spontaneous, patient has a minimal elevated effort, no accessory muscle use noted.  CARDIAC: Patient has a normal rate and rhythm, no periphreal edema noted in any extremity, capillary refill < 3 seconds in all extremities  ABDOMEN: Soft and non tender to palpation, no abdominal distention noted. Hyperactive bowel sounds.  NEUROLOGIC: Eyes open spontaneously, behavior appropriate to situation, follows  commands, facial expression symmetrical, bilateral hand grasp equal and even, purposeful motor response noted, normal sensation in all extremities when touched with a finger.

## 2023-10-07 LAB
ANION GAP SERPL CALC-SCNC: 9 MMOL/L (ref 8–16)
BASOPHILS # BLD AUTO: 0.01 K/UL (ref 0–0.2)
BASOPHILS NFR BLD: 0.2 % (ref 0–1.9)
BUN SERPL-MCNC: 10 MG/DL (ref 6–20)
CALCIUM SERPL-MCNC: 8.9 MG/DL (ref 8.7–10.5)
CHLORIDE SERPL-SCNC: 111 MMOL/L (ref 95–110)
CHOLEST SERPL-MCNC: 183 MG/DL (ref 120–199)
CHOLEST/HDLC SERPL: 4 {RATIO} (ref 2–5)
CO2 SERPL-SCNC: 19 MMOL/L (ref 23–29)
CREAT SERPL-MCNC: 0.8 MG/DL (ref 0.5–1.4)
DIFFERENTIAL METHOD: ABNORMAL
EOSINOPHIL # BLD AUTO: 0.1 K/UL (ref 0–0.5)
EOSINOPHIL NFR BLD: 2 % (ref 0–8)
ERYTHROCYTE [DISTWIDTH] IN BLOOD BY AUTOMATED COUNT: 14.1 % (ref 11.5–14.5)
EST. GFR  (NO RACE VARIABLE): >60 ML/MIN/1.73 M^2
ESTIMATED AVG GLUCOSE: 108 MG/DL (ref 68–131)
GLUCOSE SERPL-MCNC: 94 MG/DL (ref 70–110)
HBA1C MFR BLD: 5.4 % (ref 4–5.6)
HCT VFR BLD AUTO: 36 % (ref 37–48.5)
HDLC SERPL-MCNC: 46 MG/DL (ref 40–75)
HDLC SERPL: 25.1 % (ref 20–50)
HGB BLD-MCNC: 11.9 G/DL (ref 12–16)
IMM GRANULOCYTES # BLD AUTO: 0.01 K/UL (ref 0–0.04)
IMM GRANULOCYTES NFR BLD AUTO: 0.2 % (ref 0–0.5)
LDLC SERPL CALC-MCNC: 113.2 MG/DL (ref 63–159)
LYMPHOCYTES # BLD AUTO: 2.1 K/UL (ref 1–4.8)
LYMPHOCYTES NFR BLD: 46.1 % (ref 18–48)
MAGNESIUM SERPL-MCNC: 1.8 MG/DL (ref 1.6–2.6)
MCH RBC QN AUTO: 28.7 PG (ref 27–31)
MCHC RBC AUTO-ENTMCNC: 33.1 G/DL (ref 32–36)
MCV RBC AUTO: 87 FL (ref 82–98)
MONOCYTES # BLD AUTO: 0.3 K/UL (ref 0.3–1)
MONOCYTES NFR BLD: 7.2 % (ref 4–15)
NEUTROPHILS # BLD AUTO: 2 K/UL (ref 1.8–7.7)
NEUTROPHILS NFR BLD: 44.3 % (ref 38–73)
NONHDLC SERPL-MCNC: 137 MG/DL
NRBC BLD-RTO: 0 /100 WBC
PLATELET # BLD AUTO: 150 K/UL (ref 150–450)
PMV BLD AUTO: 11.4 FL (ref 9.2–12.9)
POTASSIUM SERPL-SCNC: 3.6 MMOL/L (ref 3.5–5.1)
RBC # BLD AUTO: 4.15 M/UL (ref 4–5.4)
SODIUM SERPL-SCNC: 139 MMOL/L (ref 136–145)
T4 FREE SERPL-MCNC: 0.91 NG/DL (ref 0.71–1.51)
TRIGL SERPL-MCNC: 119 MG/DL (ref 30–150)
TSH SERPL DL<=0.005 MIU/L-ACNC: 0.23 UIU/ML (ref 0.4–4)
WBC # BLD AUTO: 4.6 K/UL (ref 3.9–12.7)

## 2023-10-07 PROCEDURE — 83735 ASSAY OF MAGNESIUM: CPT | Mod: NTX | Performed by: INTERNAL MEDICINE

## 2023-10-07 PROCEDURE — 63600175 PHARM REV CODE 636 W HCPCS: Mod: JG,NTX | Performed by: INTERNAL MEDICINE

## 2023-10-07 PROCEDURE — 25000003 PHARM REV CODE 250: Mod: NTX | Performed by: INTERNAL MEDICINE

## 2023-10-07 PROCEDURE — 20600001 HC STEP DOWN PRIVATE ROOM: Mod: NTX

## 2023-10-07 PROCEDURE — 36415 COLL VENOUS BLD VENIPUNCTURE: CPT | Mod: NTX | Performed by: INTERNAL MEDICINE

## 2023-10-07 PROCEDURE — 83036 HEMOGLOBIN GLYCOSYLATED A1C: CPT | Mod: NTX | Performed by: STUDENT IN AN ORGANIZED HEALTH CARE EDUCATION/TRAINING PROGRAM

## 2023-10-07 PROCEDURE — 80048 BASIC METABOLIC PNL TOTAL CA: CPT | Mod: NTX | Performed by: INTERNAL MEDICINE

## 2023-10-07 PROCEDURE — 84439 ASSAY OF FREE THYROXINE: CPT | Mod: NTX | Performed by: STUDENT IN AN ORGANIZED HEALTH CARE EDUCATION/TRAINING PROGRAM

## 2023-10-07 PROCEDURE — 93750 INTERROGATION VAD IN PERSON: CPT | Mod: NTX,,, | Performed by: INTERNAL MEDICINE

## 2023-10-07 PROCEDURE — 80307 DRUG TEST PRSMV CHEM ANLYZR: CPT | Mod: NTX | Performed by: STUDENT IN AN ORGANIZED HEALTH CARE EDUCATION/TRAINING PROGRAM

## 2023-10-07 PROCEDURE — 84443 ASSAY THYROID STIM HORMONE: CPT | Mod: NTX | Performed by: STUDENT IN AN ORGANIZED HEALTH CARE EDUCATION/TRAINING PROGRAM

## 2023-10-07 PROCEDURE — 25000003 PHARM REV CODE 250: Mod: NTX | Performed by: STUDENT IN AN ORGANIZED HEALTH CARE EDUCATION/TRAINING PROGRAM

## 2023-10-07 PROCEDURE — 85025 COMPLETE CBC W/AUTO DIFF WBC: CPT | Mod: NTX | Performed by: INTERNAL MEDICINE

## 2023-10-07 PROCEDURE — 93750 PR INTERROGATE VENT ASSIST DEV, IN PERSON, W PHYSICIAN ANALYSIS: ICD-10-PCS | Mod: NTX,,, | Performed by: INTERNAL MEDICINE

## 2023-10-07 PROCEDURE — 80061 LIPID PANEL: CPT | Mod: NTX | Performed by: STUDENT IN AN ORGANIZED HEALTH CARE EDUCATION/TRAINING PROGRAM

## 2023-10-07 RX ORDER — ATORVASTATIN CALCIUM 20 MG/1
80 TABLET, FILM COATED ORAL DAILY
Status: DISCONTINUED | OUTPATIENT
Start: 2023-10-07 | End: 2023-10-09 | Stop reason: HOSPADM

## 2023-10-07 RX ORDER — FUROSEMIDE 40 MG/1
40 TABLET ORAL 2 TIMES DAILY WITH MEALS
Status: DISCONTINUED | OUTPATIENT
Start: 2023-10-07 | End: 2023-10-08

## 2023-10-07 RX ORDER — FUROSEMIDE 20 MG/1
20 TABLET ORAL 2 TIMES DAILY WITH MEALS
Status: DISCONTINUED | OUTPATIENT
Start: 2023-10-07 | End: 2023-10-07

## 2023-10-07 RX ORDER — ACETAMINOPHEN 500 MG
1000 TABLET ORAL EVERY 6 HOURS PRN
Status: DISCONTINUED | OUTPATIENT
Start: 2023-10-07 | End: 2023-10-09 | Stop reason: HOSPADM

## 2023-10-07 RX ADMIN — Medication: at 12:10

## 2023-10-07 RX ADMIN — RIOCIGUAT 2.5 MG: 1.5 TABLET, FILM COATED ORAL at 08:10

## 2023-10-07 RX ADMIN — OXYCODONE AND ACETAMINOPHEN 1 TABLET: 7.5; 325 TABLET ORAL at 09:10

## 2023-10-07 RX ADMIN — FUROSEMIDE 40 MG: 40 TABLET ORAL at 06:10

## 2023-10-07 RX ADMIN — RIOCIGUAT 2.5 MG: 1.5 TABLET, FILM COATED ORAL at 09:10

## 2023-10-07 RX ADMIN — OXYCODONE AND ACETAMINOPHEN 1 TABLET: 7.5; 325 TABLET ORAL at 08:10

## 2023-10-07 RX ADMIN — TREPROSTINIL 110 NG/KG/MIN: 200 INJECTION, SOLUTION INTRAVENOUS; SUBCUTANEOUS at 02:10

## 2023-10-07 RX ADMIN — MACITENTAN 10 MG: 10 TABLET, FILM COATED ORAL at 08:10

## 2023-10-07 RX ADMIN — SERTRALINE HYDROCHLORIDE 25 MG: 25 TABLET ORAL at 09:10

## 2023-10-07 RX ADMIN — RIOCIGUAT 2.5 MG: 1.5 TABLET, FILM COATED ORAL at 04:10

## 2023-10-07 RX ADMIN — PREGABALIN 150 MG: 75 CAPSULE ORAL at 09:10

## 2023-10-07 RX ADMIN — OXYCODONE AND ACETAMINOPHEN 1 TABLET: 7.5; 325 TABLET ORAL at 02:10

## 2023-10-07 NOTE — PROGRESS NOTES
Pt's home concentration aspirated , line flushed and line primed with hospital concentration.  When attempting to connect to home pump - home pump alarming turned off with no batteries in place.  Backup utulized.  PH coordinator notified- told to have patient call [facility] for new shipment of pump asap.  Notified bedside nurse.  Hospital concentration infusing to [facility] at 97cc/24.  Pt tolerated well.

## 2023-10-07 NOTE — PROGRESS NOTES
[facility] [place]  Heart Failure/Transplant Service  Progress Note    Kristin Maier  YOB: 1975  Medical Record Number:  [id number]  Attending Physician:  Hu Glover, *   Date of Admission: 10/5/2023     Hospital Day:  2  Current Principal Problem:  WHO group 1 pulmonary arterial hypertension    Interval Events   Feeling much better on 2L NC now, urinated > 3L with the 40 IV lasix x 2.     Cardiac Imaging      ECG 10/5: sinus w/LAE, Qtc 479    CXR 10/6: no overt signs of congestion    TTE 3/2023:  The left ventricle is normal in size with concentric remodeling and normal systolic function. The estimated ejection fraction is 60%.  Severe right ventricular enlargement with mildly to moderately reduced right ventricular systolic function.  Indeterminate left ventricular diastolic function.  Severe right atrial enlargement.  Moderate to severe tricuspid regurgitation.  Small pericardial effusion.  The estimated PA systolic pressure is 89 mmHg.  Intermediate central venous pressure (8 mmHg).    [facility] 3/21/23:  Measurements done while on Opsumit 10 mg/day, Adempas 2.5 mg TID, and Remodulin 90 ng/kg/min.    RA: 10  RV: 75/3, EDP 12  PA: 82/33, mean 48  PCWP: 13     Saturation:  Arterial: 100 %  PA: 60 %     Kristel CI/CO: 2.5/4.5     PVR: 7.8 Wood Units     Conclusions:  Severe pre capillary pulmonary hypertension  Elevated right sided filling pressures  Low normal CI/CO    [facility] 7/2022:    RA: 15/ 11/ 10   RV: 70/ 9/ 10   PA: 72/ 29/ 43   PWP: 12/ 12/ 11 .   Cardiac output was 4.29  by Kristel.   Cardiac index is 2.61 L/min/m2.   O2 Sat: PA 69%.     Normal CO/CI on remodulin 80 ng/kg/min, riociguat 2mg po TID, macitentan 10mg.  Mildly increased right and normal left sided filling pressures.  Severe pulmonary hypertension.   TPG  32   PVR  7.45  MAP 75  SVR 1212    [facility] 12/2021:  RA: 25/ 26/ 22   RV: 90/ 1/ 21   PA: 86/ 42/ 57   PWP: 9/ 8/ 7 .   Cardiac output was 1.97  by Kristel. Cardiac  index is 1.2 L/min/m2.   O2 Sat: PA 38%.       Severely reduced CO/CI off inotrope and pulmonary vasodilator therapy.  Severely elevated right and normal left sided filling pressure.  Severe pulmonary hypertension in setting of normal left sided filling pressure.  TPG   50    PVR   25    Medications - Current  Scheduled Meds:   macitentan  10 mg Oral Daily    pregabalin  150 mg Oral QHS    riociguat  2.5 mg Oral TID    sertraline  25 mg Oral QHS     Continuous Infusions:   treprostinil (REMODULIN) 6,000,000 ng in sodium chloride 0.9% 100 mL infusion      veletri/remodulin cassette      veletri/remodulin tubing       PRN Meds:.acetaminophen, loperamide, ondansetron, ondansetron, oxyCODONE-acetaminophen        Physical Examination     Vital Signs  Vitals  Temp: 98.7 °F (37.1 °C)  Temp Source: Oral  Pulse: 97  Heart Rate Source: Monitor  Resp: 16  SpO2: 97 %  Pulse Oximetry Type: Intermittent  Flow (L/min): 2  Oxygen Concentration (%): 28  BP: (!) 84/57  MAP (mmHg): 67  BP Location: Left arm  BP Method: Automatic  Patient Position: Lying          24 Hour VS Range    Temp:  [98.3 °F (36.8 °C)-98.7 °F (37.1 °C)]   Pulse:  []   Resp:  [16-19]   BP: (83-99)/(57-63)   SpO2:  [93 %-100 %]     Intake/Output Summary (Last 24 hours) at 10/7/2023 1033  Last data filed at 10/7/2023 0000  Gross per 24 hour   Intake 480 ml   Output 3500 ml   Net -3020 ml         General:   Head: NCAT  Eyes: conjunctivae and lids normal, no scleral icterus, EOMI.  ENMT:  no gingival bleeding, normal oral mucosa without pallor or cyanosis.   Neck:  JVP moderately increased.  Trachea non-displaced.     Chest:  Normal respiratory effort on 4L NC.  Chest clear to auscultation.  No wheezes, rales, or rhonchi.  Heart:  Normal PMI, S1 S2 normal quality, splitting.  No murmurs rubs or gallops.  Peripheral pulses 2+.  Abdomen:  Non-distended, normal bowel sounds, non-tender.  Extremities:  No edema. Normal capillary refill.    Skin:  Warm and dry.  No  "cyanosis or pallor.  No ulcers, stasis.  IV sites without tenderness or inflammation.    Neurological / Psychiatric:  Oriented to person, time, and place.  No facial asymmetry, drift.  Fluent without dysarthria.  Mood euthymic, affect normal.       Data       Recent Labs   Lab 10/05/23  2220 10/06/23  0601 10/07/23  0528   WBC 6.23 4.35 4.60   HGB 11.9* 11.2* 11.9*   HCT 35.5* 34.2* 36.0*   * 142* 150        No results for input(s): "PROTIME", "INR" in the last 168 hours.     Recent Labs   Lab 10/05/23  2220 10/06/23  0601 10/06/23  1327 10/07/23  0528    137 140 139   K 3.5 3.3* 3.8 3.6   * 110 109 111*   CO2 18* 19* 21* 19*   BUN 16 16 14 10   CREATININE 0.8 0.8 0.8 0.8   ANIONGAP 7* 8 10 9   CALCIUM 8.8 8.5* 8.9 8.9        Recent Labs   Lab 10/05/23  2220   PROT 7.0   ALBUMIN 3.9   BILITOT 1.7*   ALKPHOS 105   AST 54*   ALT 81*        Recent Labs   Lab 10/05/23  2220   TROPONINI 0.008        BNP (pg/mL)   Date Value   10/05/2023 311 (H)   03/19/2023 67   02/06/2023 50   12/15/2022 42   11/14/2022 47       No results for input(s): "LABBLOO" in the last 168 hours.       Assessment & Plan     Kristin Maier is a 47 y.o. female with WHO Group I Pulm HTN on Remodulin 110ng/kg/min, followed by Dr JOSE J Rachel in Transplant Pulmonology, chronic neuropathy followed by Palliative medicine on Percocet and Lyrica, prediabetes (a1c 6.4% 12/2021) that initially presented to [facility] 10/5 w/CHU, shortness of breath at  rest, lightheadedness x 36 hrs. When she changed her cassette on Tuesday 10/3 evening she crimped the line and hadn't realized it was still crimped until next day (had been without remodulin for 36 hrs). Previously was active with some mild level of dyspnea on exertion. Reports that she has been taking her macitentan and riociguat without interruption and has been tolerating the medicines well. Presented to [facility] initially though then came here to [facility] as all of her care is here. Here " significantly overloaded, diuresing, will repeat TTE. Borderline hypotenisve concern for worsneining RV failure in setting of pulmonary HTN, if she remains hypotensive despite diuresis will need to transfer to ICU for RV failure needing vasopressors/inotrope and invasive monitoring (arterial line, central line).     Last saw Dr. Rachel pulm 6/2023 reported remains early for lung transplant workup though would not be able to undergo lung transplant here given severe pulm HTN (would need to be [place], which she is planning to move to eventually). Repeat TTE 10/6 w/significatnly reduced and dilated RV w/RVPO and small collapsible IVC.     # WHO group 1 pulmonary arterial hypertension  # Severe RV dysfunction in setting of PAH  # TR 2/2 PAH  - Continue macitentan 10 + riociguat 2.5 TID + Remodulin 110 ng/kg/min  - Infusion pump from pharmacy while home supplies are being sorted  - Switch back to 20 mg PO lasix daily with PRN afternoon to maintain euvolemia  -repeat CXR ap and lateral to further evaluate volume status  - Supplemental O2 PRN to maintain O2 Sat > 88%  - Zofran PRN nausea  - Loperamide PRN diarrhea  - tylenol PRN       - Monitor on TSU for now though if worsens will consider transfer to CICU for vasopressors/inotropes, diuresis + invasive monitoring (e.g. CBC + Art line +/- PA catheter)       - will likely need pulmonary artery catheterization at some point in near future (has been > 6 mos since last eval)       - plan to consult transplant pulm to determine reevaluation for lung transplant    # Essential hypertension  - per history; controlled off meds; is actually hypotensive in setting of RV failure    # [facility] pain/Palliative care encounter  On Percocet 7.5-325 mg PO q 6 hrs PRN pain  On pregabalin 75 mg PO q AM and 150 mg PO q PM  Consider inpatient palliative care consultation for optimization of pulm HTN management as outpatient messages suggest additional pain concerns    # ?hx DM2  -A1c in 5 range,  not diabetic    #HLD  -start statin atorva 80    #Decreased Appetite outpatient  - mirtazipine did not help    Feeding: cardiac low salt fluid restriction 1500 mL daily  Analgesia: as above  Sedation: none  Thromboembolic PPx: SCD/MARGARITA Hose    HOB: 30 degrees  Ulcer ppx: n/a  Glycemic Control: n/a  SBT: n/a    Bowel Regimen: n/a  Indwelling Catheter removal: left port long term needed for remodulin infusion  De-escalation of abx: n/a    Rony Boogie MD - Heart Failure/Transplant Service  Cardiovascular Disease PGY-VI

## 2023-10-07 NOTE — PLAN OF CARE
- TSU OC swapped patient to hospital concentration of remodulin and hospital pump.  Current rate is 97 mL/24 hr.  Patient states understanding to call [facility] to have new home CADD send ASAP due to her malfunctioning home pump.  Patient states she will call Monday AM as they are not available on the weekend  - Off unit at this time for CXR PA/Lat  - Weaning O2, currently at 0.5 mL/hr  - NSR with inverted T-wave on continuous telemetry monitoring with intermittent PVCs  - Keep bed low & locked, call light in reach, nonslip footwear in use, calls appropriately for assistance    Problem: Adult Inpatient Plan of Care  Goal: Plan of Care Review  Outcome: Ongoing, Progressing  Goal: Patient-Specific Goal (Individualized)  Outcome: Ongoing, Progressing  Flowsheets (Taken 10/7/2023 1704)  Anxieties, Fears or Concerns: cannot address malfunctioning pump until monday  Individualized Care Needs: discuss any staff interactions with CADD & remodulin  Goal: Absence of Hospital-Acquired Illness or Injury  Outcome: Ongoing, Progressing  Goal: Optimal Comfort and Wellbeing  Outcome: Ongoing, Progressing  Goal: Readiness for Transition of Care  Outcome: Ongoing, Progressing     Problem: Coping Ineffective  Goal: Effective Coping  Outcome: Ongoing, Progressing     Problem: Fluid and Electrolyte Imbalance (Acute Kidney Injury/Impairment)  Goal: Fluid and Electrolyte Balance  Outcome: Ongoing, Progressing     Problem: Oral Intake Inadequate (Acute Kidney Injury/Impairment)  Goal: Optimal Nutrition Intake  Outcome: Ongoing, Progressing     Problem: Renal Function Impairment (Acute Kidney Injury/Impairment)  Goal: Effective Renal Function  Outcome: Ongoing, Progressing     Problem: Infection  Goal: Absence of Infection Signs and Symptoms  Outcome: Ongoing, Progressing     Problem: Adjustment to Illness (Heart Failure)  Goal: Optimal Coping  Outcome: Ongoing, Progressing     Problem: Cardiac Output Decreased (Heart Failure)  Goal: Optimal  Cardiac Output  Outcome: Ongoing, Progressing     Problem: Dysrhythmia (Heart Failure)  Goal: Stable Heart Rate and Rhythm  Outcome: Ongoing, Progressing     Problem: Fluid Imbalance (Heart Failure)  Goal: Fluid Balance  Outcome: Ongoing, Progressing     Problem: Functional Ability Impaired (Heart Failure)  Goal: Optimal Functional Ability  Outcome: Ongoing, Progressing     Problem: Oral Intake Inadequate (Heart Failure)  Goal: Optimal Nutrition Intake  Outcome: Ongoing, Progressing     Problem: Respiratory Compromise (Heart Failure)  Goal: Effective Oxygenation and Ventilation  Outcome: Ongoing, Progressing     Problem: Sleep Disordered Breathing (Heart Failure)  Goal: Effective Breathing Pattern During Sleep  Outcome: Ongoing, Progressing     Problem: Gas Exchange Impaired  Goal: Optimal Gas Exchange  Outcome: Ongoing, Progressing

## 2023-10-07 NOTE — PROGRESS NOTES
Notified pharm-D of necessary change to  remodulin dose.  At current concentration CADD pump will be over 100 cc/24 hrs.  Order modified to higher concentration.  Pharm D to notify IV room for new cassette

## 2023-10-07 NOTE — PROGRESS NOTES
Admit Note/Discharge Note     Met with patient to assess needs. Patient is a 47 y.o.  female, admitted for Who group 1 pulmonary arterial hypertension.  Pt receives home IV medication-Remodulin.     Patient admitted ton 10/5/2023 .  At this time, patient presents as alert and oriented x 4, good eye contact, calm, and communicative.  At this time, patients caregiver is not currently in attendance.      Household/Family Systems     Patient resides with patient's spouse and 25 yr old daughter , at     [address] [place] [address].        Support system includes spouse, adult children and extended family.    Patient does not have dependents that are need of being cared for.     Patients primary caregiver is self with support from her spouse when needed.     Pt's cell: [phone]    Emergency contacts:   Juju Maier (spouse, drives, works) [phone]  Navya Dowell (daughter, lives in [place] and drives) [phone]    .    During admission, patient's caregiver plans to stay at home.  Confirmed patient and patients caregivers do have access to reliable transportation. Pt only drives a little.  Family assists with transportation.     Cognitive Status/Learning     Patient reports reading ability as 11th grade and states patient does not have difficulty with reading, writing, seeing, hearing, comprehension, and learning.Pt reported difficulty in the past with eyesight and reading, however pt reports this difficulty has improved. Pt reports she uses glasses which helps her eyesight.   Patient reports patient learns best by one on one.   Needed: No.   Highest education level: High School (9-12) or GED    Vocation/Disability   .  Working for Income: No  If no, reason not working: Demands of Treatment  Patient has an appointment on Nov 2nd, 2023 for disability.   Pt's spouse is employed.   Pt did not report to worker any financial challenges at this time.     Adherence     Patient reports  a high level of adherence to patients health care regimen.  Adherence counseling and education provided. Patient verbalizes understanding.    Substance Use    Patient reports the following substance usage.    Tobacco: no current use.  Pt reports no tobacco use x one year.  Pt used to smoke cigars.   Alcohol: no current use.   Illicit Drugs/Non-prescribed Medications: no current use.  Pt used to take an marijuana edible  once a day  Patient states clear understanding of the potential impact of substance use.  Substance abstinence/cessation counseling, education and resources provided and reviewed.     Services Utilizing/ADLS    Infusion Service: Prior to admission, patient utilizing? yes Remodulin.Pt goes to an out pt lab for blood work.   Home Health: Prior to admission, patient utilizing? no  DME: Prior to admission, no.  Pt states she does not have any DME at home including 02.  Pt would like a BSC, due to leg pain.   Pulmonary/Cardiac Rehab: Prior to admission, no  Dialysis:  Prior to admission, no  Transplant Specialty Pharmacy:  Prior to admission, no.    Prior to admission, patient reports patient was somewhat  independent with ADLS and was driving (a little).  Patient reports patient is able to care for self at this time..  Patient indicates a willingness to care for self once medically cleared to do so.    Insurance/Medications    Insured by   Payer/Plan Subscr  Sex Relation Sub. Ins. ID Effective Group Num   1. HIRA NARAYAN* ELVIS HUTTON CHRISTIANO* 1975 Female Self  18                                    [address] [address]   2. MEDICAID - AM* ELVIS HUTTON* 1975 Female Self [id number]* 17                                    [address]      Primary Insurance (for UNOS reporting): Public Insurance - Medicaid  Secondary Insurance (for UNOS reporting): None    Patient reports patient is able to obtain and afford medications at this time and at time of discharge.    Living  Will/Healthcare Power of     Patient states patient has a LW and/or HCPA.   provided education regarding LW and HCPA and the completion of forms.    Coping/Mental Health    Patient is coping adequately with the aid of  family members.   Patient indicates mental health difficulties.   Pt reports continued difficulty with anxiety due to her medical needs/difficulty with pain control. Pt reports family is supportive. Pt reports she has therapy with Dr. Hartman at [facility].    General support provided given hospital admission.     Discharge Planning    At time of discharge, patient plans to return to patient's home under the care of self and spouse.  Patients  spouse and family  will transport patient.  Per rounds today, expected discharge date has not been medically determined at this time. Patient and patients caregiver  verbalize understanding and are involved in treatment planning and discharge process.    Additional Concerns    Patient is being followed for needs, education, resources, information, emotional support, supportive counseling, and for supportive and skilled discharge plan of care.  providing ongoing psychosocial support, education, resources and d/c planning as needed.  SW remains available.  provided resource list, patient choice, psychosocial and supportive counseling, resources, education, assistance and discharge planning with patient and caregiver involvement, ongoing SW availability and services as appropriate.  remains available. Patient denies additional needs and/or concerns at this time. Patient verbalizes understanding and agreement with information reviewed, social work availability, and how to access available resources as needed.

## 2023-10-07 NOTE — PLAN OF CARE
Plan of care reviewed with the patient at the beginning of the shift. Overall, pt is feeling much better. Pt admitted after an interruption in her home remodulin pump infusion. Pt is currently still on her home infusion and cassette is to be changed this morning before 11am. Shortness of breath is improved. O2 at 2L  nasal cannula. BP is soft, HTS aware. No orders given. Lasix given during the day and pt has voided without difficulty. Fall precautions maintained. She is up with stand by assist. Pt remained free from falls and injury this shift. Bed locked in lowest position, side rails up x2, call light within reach. Instructed pt to call for assistance as needed. Pt verbalized understanding. Vitals stable. Pt afebrile overnight. No acute issues overnight. Will continue to monitor.

## 2023-10-08 LAB
AMPHET+METHAMPHET UR QL: NEGATIVE
ANION GAP SERPL CALC-SCNC: 9 MMOL/L (ref 8–16)
BARBITURATES UR QL SCN>200 NG/ML: NEGATIVE
BASOPHILS # BLD AUTO: 0.02 K/UL (ref 0–0.2)
BASOPHILS NFR BLD: 0.5 % (ref 0–1.9)
BENZODIAZ UR QL SCN>200 NG/ML: NEGATIVE
BUN SERPL-MCNC: 12 MG/DL (ref 6–20)
BZE UR QL SCN: NEGATIVE
CALCIUM SERPL-MCNC: 9.1 MG/DL (ref 8.7–10.5)
CANNABINOIDS UR QL SCN: NEGATIVE
CHLORIDE SERPL-SCNC: 109 MMOL/L (ref 95–110)
CO2 SERPL-SCNC: 20 MMOL/L (ref 23–29)
CREAT SERPL-MCNC: 0.8 MG/DL (ref 0.5–1.4)
CREAT UR-MCNC: 35 MG/DL (ref 15–325)
DIFFERENTIAL METHOD: ABNORMAL
EOSINOPHIL # BLD AUTO: 0.1 K/UL (ref 0–0.5)
EOSINOPHIL NFR BLD: 3.1 % (ref 0–8)
ERYTHROCYTE [DISTWIDTH] IN BLOOD BY AUTOMATED COUNT: 14.4 % (ref 11.5–14.5)
EST. GFR  (NO RACE VARIABLE): >60 ML/MIN/1.73 M^2
ETHANOL UR-MCNC: <10 MG/DL
GLUCOSE SERPL-MCNC: 91 MG/DL (ref 70–110)
HCT VFR BLD AUTO: 34.3 % (ref 37–48.5)
HGB BLD-MCNC: 11.7 G/DL (ref 12–16)
IMM GRANULOCYTES # BLD AUTO: 0.01 K/UL (ref 0–0.04)
IMM GRANULOCYTES NFR BLD AUTO: 0.2 % (ref 0–0.5)
LYMPHOCYTES # BLD AUTO: 2.3 K/UL (ref 1–4.8)
LYMPHOCYTES NFR BLD: 53.9 % (ref 18–48)
MAGNESIUM SERPL-MCNC: 1.9 MG/DL (ref 1.6–2.6)
MCH RBC QN AUTO: 29.1 PG (ref 27–31)
MCHC RBC AUTO-ENTMCNC: 34.1 G/DL (ref 32–36)
MCV RBC AUTO: 85 FL (ref 82–98)
METHADONE UR QL SCN>300 NG/ML: NEGATIVE
MONOCYTES # BLD AUTO: 0.3 K/UL (ref 0.3–1)
MONOCYTES NFR BLD: 7.8 % (ref 4–15)
NEUTROPHILS # BLD AUTO: 1.5 K/UL (ref 1.8–7.7)
NEUTROPHILS NFR BLD: 34.5 % (ref 38–73)
NRBC BLD-RTO: 0 /100 WBC
OPIATES UR QL SCN: NEGATIVE
PCP UR QL SCN>25 NG/ML: NEGATIVE
PLATELET # BLD AUTO: 168 K/UL (ref 150–450)
PMV BLD AUTO: 11.6 FL (ref 9.2–12.9)
POTASSIUM SERPL-SCNC: 3.7 MMOL/L (ref 3.5–5.1)
RBC # BLD AUTO: 4.02 M/UL (ref 4–5.4)
SODIUM SERPL-SCNC: 138 MMOL/L (ref 136–145)
TOXICOLOGY INFORMATION: NORMAL
WBC # BLD AUTO: 4.21 K/UL (ref 3.9–12.7)

## 2023-10-08 PROCEDURE — 99233 SBSQ HOSP IP/OBS HIGH 50: CPT | Mod: NTX,,, | Performed by: INTERNAL MEDICINE

## 2023-10-08 PROCEDURE — 80048 BASIC METABOLIC PNL TOTAL CA: CPT | Mod: NTX | Performed by: INTERNAL MEDICINE

## 2023-10-08 PROCEDURE — 25000003 PHARM REV CODE 250: Mod: NTX | Performed by: INTERNAL MEDICINE

## 2023-10-08 PROCEDURE — 99233 PR SUBSEQUENT HOSPITAL CARE,LEVL III: ICD-10-PCS | Mod: NTX,,, | Performed by: INTERNAL MEDICINE

## 2023-10-08 PROCEDURE — 63600175 PHARM REV CODE 636 W HCPCS: Mod: JG,NTX | Performed by: INTERNAL MEDICINE

## 2023-10-08 PROCEDURE — 36415 COLL VENOUS BLD VENIPUNCTURE: CPT | Mod: NTX | Performed by: INTERNAL MEDICINE

## 2023-10-08 PROCEDURE — 83735 ASSAY OF MAGNESIUM: CPT | Mod: NTX | Performed by: INTERNAL MEDICINE

## 2023-10-08 PROCEDURE — 85025 COMPLETE CBC W/AUTO DIFF WBC: CPT | Mod: NTX | Performed by: INTERNAL MEDICINE

## 2023-10-08 PROCEDURE — 20600001 HC STEP DOWN PRIVATE ROOM: Mod: NTX

## 2023-10-08 PROCEDURE — 25000003 PHARM REV CODE 250: Mod: NTX | Performed by: STUDENT IN AN ORGANIZED HEALTH CARE EDUCATION/TRAINING PROGRAM

## 2023-10-08 RX ORDER — TORSEMIDE 20 MG/1
20 TABLET ORAL DAILY
Status: DISCONTINUED | OUTPATIENT
Start: 2023-10-08 | End: 2023-10-09 | Stop reason: HOSPADM

## 2023-10-08 RX ORDER — LANOLIN ALCOHOL/MO/W.PET/CERES
400 CREAM (GRAM) TOPICAL DAILY
Status: DISCONTINUED | OUTPATIENT
Start: 2023-10-08 | End: 2023-10-09 | Stop reason: HOSPADM

## 2023-10-08 RX ORDER — POTASSIUM CHLORIDE 20 MEQ/1
40 TABLET, EXTENDED RELEASE ORAL ONCE
Status: COMPLETED | OUTPATIENT
Start: 2023-10-08 | End: 2023-10-08

## 2023-10-08 RX ADMIN — FUROSEMIDE 40 MG: 40 TABLET ORAL at 05:10

## 2023-10-08 RX ADMIN — OXYCODONE AND ACETAMINOPHEN 1 TABLET: 7.5; 325 TABLET ORAL at 07:10

## 2023-10-08 RX ADMIN — TORSEMIDE 20 MG: 20 TABLET ORAL at 02:10

## 2023-10-08 RX ADMIN — PREGABALIN 150 MG: 75 CAPSULE ORAL at 07:10

## 2023-10-08 RX ADMIN — RIOCIGUAT 2.5 MG: 1.5 TABLET, FILM COATED ORAL at 08:10

## 2023-10-08 RX ADMIN — OXYCODONE AND ACETAMINOPHEN 1 TABLET: 7.5; 325 TABLET ORAL at 08:10

## 2023-10-08 RX ADMIN — Medication 400 MG: at 08:10

## 2023-10-08 RX ADMIN — ATORVASTATIN CALCIUM 80 MG: 20 TABLET, FILM COATED ORAL at 08:10

## 2023-10-08 RX ADMIN — TREPROSTINIL 110 NG/KG/MIN: 200 INJECTION, SOLUTION INTRAVENOUS; SUBCUTANEOUS at 01:10

## 2023-10-08 RX ADMIN — SERTRALINE HYDROCHLORIDE 25 MG: 25 TABLET ORAL at 07:10

## 2023-10-08 RX ADMIN — OXYCODONE AND ACETAMINOPHEN 1 TABLET: 7.5; 325 TABLET ORAL at 02:10

## 2023-10-08 RX ADMIN — OXYCODONE AND ACETAMINOPHEN 1 TABLET: 7.5; 325 TABLET ORAL at 11:10

## 2023-10-08 RX ADMIN — POTASSIUM CHLORIDE 40 MEQ: 1500 TABLET, EXTENDED RELEASE ORAL at 08:10

## 2023-10-08 RX ADMIN — MACITENTAN 10 MG: 10 TABLET, FILM COATED ORAL at 08:10

## 2023-10-08 RX ADMIN — RIOCIGUAT 2.5 MG: 1.5 TABLET, FILM COATED ORAL at 04:10

## 2023-10-08 NOTE — PLAN OF CARE
Patient AAO X 4, VSS. Gave PRN once for pain.   Telemonitor in place NSR. Remodulin going at 110ng/kg/min, dosing weight 55kg. Cassette change yesterday. Chest xray completed yesterday. Sent urine for toxicology. Up ambulatory, bed locked at lowest setting, yellow socks on, call bell in reach. Remains free from falls.

## 2023-10-08 NOTE — PLAN OF CARE
- Remodulin cassette changed.  Current rate is 97 mL/24 hr.  Patient states understanding to call [facility] to have new home CADD send ASAP due to her malfunctioning home pump.  Patient states she will call Monday AM as they are not available on the weekend  - Remains on RA  - NSR with inverted T-wave on continuous telemetry monitoring with intermittent PVCs  - Keep bed low & locked, call light in reach, nonslip footwear in use, calls appropriately for assistance    Problem: Adult Inpatient Plan of Care  Goal: Plan of Care Review  Outcome: Ongoing, Progressing  Flowsheets (Taken 10/8/2023 3240)  Plan of Care Reviewed With:   patient   spouse  Goal: Patient-Specific Goal (Individualized)  Outcome: Ongoing, Progressing  Goal: Absence of Hospital-Acquired Illness or Injury  Outcome: Ongoing, Progressing  Goal: Optimal Comfort and Wellbeing  Outcome: Ongoing, Progressing  Goal: Readiness for Transition of Care  Outcome: Ongoing, Progressing     Problem: Coping Ineffective  Goal: Effective Coping  Outcome: Ongoing, Progressing     Problem: Fluid and Electrolyte Imbalance (Acute Kidney Injury/Impairment)  Goal: Fluid and Electrolyte Balance  Outcome: Ongoing, Progressing     Problem: Oral Intake Inadequate (Acute Kidney Injury/Impairment)  Goal: Optimal Nutrition Intake  Outcome: Ongoing, Progressing     Problem: Renal Function Impairment (Acute Kidney Injury/Impairment)  Goal: Effective Renal Function  Outcome: Ongoing, Progressing     Problem: Infection  Goal: Absence of Infection Signs and Symptoms  Outcome: Ongoing, Progressing     Problem: Adjustment to Illness (Heart Failure)  Goal: Optimal Coping  Outcome: Ongoing, Progressing     Problem: Cardiac Output Decreased (Heart Failure)  Goal: Optimal Cardiac Output  Outcome: Ongoing, Progressing     Problem: Dysrhythmia (Heart Failure)  Goal: Stable Heart Rate and Rhythm  Outcome: Ongoing, Progressing     Problem: Fluid Imbalance (Heart Failure)  Goal: Fluid Balance  Outcome:  Ongoing, Progressing     Problem: Functional Ability Impaired (Heart Failure)  Goal: Optimal Functional Ability  Outcome: Ongoing, Progressing     Problem: Oral Intake Inadequate (Heart Failure)  Goal: Optimal Nutrition Intake  Outcome: Ongoing, Progressing     Problem: Respiratory Compromise (Heart Failure)  Goal: Effective Oxygenation and Ventilation  Outcome: Ongoing, Progressing     Problem: Sleep Disordered Breathing (Heart Failure)  Goal: Effective Breathing Pattern During Sleep  Outcome: Ongoing, Progressing     Problem: Gas Exchange Impaired  Goal: Optimal Gas Exchange  Outcome: Ongoing, Progressing     Problem: Pain Acute  Goal: Acceptable Pain Control and Functional Ability  Outcome: Ongoing, Progressing

## 2023-10-08 NOTE — PROGRESS NOTES
[facility] [place]  Heart Failure/Transplant Service  Progress Note    Kristin Maier  YOB: 1975  Medical Record Number:  [id number]  Attending Physician:  Hu Glover, *   Date of Admission: 10/5/2023     Hospital Day:  3  Current Principal Problem:  WHO group 1 pulmonary arterial hypertension    Interval Events   Feeling great this morning no issues walking around the unit to the vending machine no issues. On RA since last night. Urinating > 1500 mL (she had drained the hat herself but is  urinating well on current regimen more than was charted; on PO lasix 40).    Cardiac Imaging      ECG 10/5: sinus w/LAE, Qtc 479    CXR 10/6: no overt signs of congestion    TTE 3/2023:  The left ventricle is normal in size with concentric remodeling and normal systolic function. The estimated ejection fraction is 60%.  Severe right ventricular enlargement with mildly to moderately reduced right ventricular systolic function.  Indeterminate left ventricular diastolic function.  Severe right atrial enlargement.  Moderate to severe tricuspid regurgitation.  Small pericardial effusion.  The estimated PA systolic pressure is 89 mmHg.  Intermediate central venous pressure (8 mmHg).    RHC 3/21/23:  Measurements done while on Opsumit 10 mg/day, Adempas 2.5 mg TID, and Remodulin 90 ng/kg/min.    RA: 10  RV: 75/3, EDP 12  PA: 82/33, mean 48  PCWP: 13     Saturation:  Arterial: 100 %  PA: 60 %     Kristel CI/CO: 2.5/4.5     PVR: 7.8 Wood Units     Conclusions:  Severe pre capillary pulmonary hypertension  Elevated right sided filling pressures  Low normal CI/CO    RH 7/2022:    RA: 15/ 11/ 10   RV: 70/ 9/ 10   PA: 72/ 29/ 43   PWP: 12/ 12/ 11 .   Cardiac output was 4.29  by Kristel.   Cardiac index is 2.61 L/min/m2.   O2 Sat: PA 69%.     Normal CO/CI on remodulin 80 ng/kg/min, riociguat 2mg po TID, macitentan 10mg.  Mildly increased right and normal left sided filling pressures.  Severe pulmonary  hypertension.   TPG  32   PVR  7.45  MAP 75  SVR 1212    [facility] 12/2021:  RA: 25/ 26/ 22   RV: 90/ 1/ 21   PA: 86/ 42/ 57   PWP: 9/ 8/ 7 .   Cardiac output was 1.97  by Kristel. Cardiac index is 1.2 L/min/m2.   O2 Sat: PA 38%.       Severely reduced CO/CI off inotrope and pulmonary vasodilator therapy.  Severely elevated right and normal left sided filling pressure.  Severe pulmonary hypertension in setting of normal left sided filling pressure.  TPG   50    PVR   25    Medications - Current  Scheduled Meds:   atorvastatin  80 mg Oral Daily    furosemide  40 mg Oral BID WM    macitentan  10 mg Oral Daily    magnesium oxide  400 mg Oral Daily    pregabalin  150 mg Oral QHS    riociguat  2.5 mg Oral TID    sertraline  25 mg Oral QHS     Continuous Infusions:   treprostinil (REMODULIN) 9,000,000 ng in sodium chloride 0.9% 100 mL infusion 110 ng/kg/min (10/07/23 1446)    veletri/remodulin cassette      veletri/remodulin tubing       PRN Meds:.acetaminophen, loperamide, ondansetron, ondansetron, oxyCODONE-acetaminophen        Physical Examination     Vital Signs  Vitals  Temp: 98.3 °F (36.8 °C)  Temp Source: Oral  Pulse: 73  Heart Rate Source: Monitor  Resp: 16  SpO2: 98 %  Pulse Oximetry Type: Intermittent  Flow (L/min): 0  Oxygen Concentration (%): 28  BP: 100/68  MAP (mmHg): 79  BP Location: Left arm  BP Method: Automatic  Patient Position: Lying          24 Hour VS Range    Temp:  [98 °F (36.7 °C)-98.6 °F (37 °C)]   Pulse:  [73-88]   Resp:  [16-20]   BP: ()/(59-68)   SpO2:  [95 %-100 %]     Intake/Output Summary (Last 24 hours) at 10/8/2023 0856  Last data filed at 10/8/2023 0011  Gross per 24 hour   Intake --   Output 200 ml   Net -200 ml         General:   Head: NCAT  Eyes: conjunctivae and lids normal, no scleral icterus, EOMI.  ENMT:  no gingival bleeding, normal oral mucosa without pallor or cyanosis.   Neck:  JVP moderately increased.  Trachea non-displaced.     Chest:  Normal respiratory effort on 4L NC.   "Chest clear to auscultation.  No wheezes, rales, or rhonchi.  Heart:  Normal PMI, S1 S2 normal quality, splitting.  No murmurs rubs or gallops.  Peripheral pulses 2+.  Abdomen:  Non-distended, normal bowel sounds, non-tender.  Extremities:  No edema. Normal capillary refill.    Skin:  Warm and dry.  No cyanosis or pallor.  No ulcers, stasis.  IV sites without tenderness or inflammation.    Neurological / Psychiatric:  Oriented to person, time, and place.  No facial asymmetry, drift.  Fluent without dysarthria.  Mood euthymic, affect normal.       Data       Recent Labs   Lab 10/05/23  2220 10/06/23  0601 10/07/23  0528 10/08/23  0520   WBC 6.23 4.35 4.60 4.21   HGB 11.9* 11.2* 11.9* 11.7*   HCT 35.5* 34.2* 36.0* 34.3*   * 142* 150 168        No results for input(s): "PROTIME", "INR" in the last 168 hours.     Recent Labs   Lab 10/05/23  2220 10/06/23  0601 10/06/23  1327 10/07/23  0528 10/08/23  0520    137 140 139 138   K 3.5 3.3* 3.8 3.6 3.7   * 110 109 111* 109   CO2 18* 19* 21* 19* 20*   BUN 16 16 14 10 12   CREATININE 0.8 0.8 0.8 0.8 0.8   ANIONGAP 7* 8 10 9 9   CALCIUM 8.8 8.5* 8.9 8.9 9.1        Recent Labs   Lab 10/05/23  2220   PROT 7.0   ALBUMIN 3.9   BILITOT 1.7*   ALKPHOS 105   AST 54*   ALT 81*        Recent Labs   Lab 10/05/23  2220   TROPONINI 0.008        BNP (pg/mL)   Date Value   10/05/2023 311 (H)   03/19/2023 67   02/06/2023 50   12/15/2022 42   11/14/2022 47       No results for input(s): "LABBLOO" in the last 168 hours.       Assessment & Plan     Kristin Maier is a 47 y.o. female with WHO Group I Pulm HTN on Remodulin 110ng/kg/min, followed by Dr JOSE J Rachel in Transplant Pulmonology, chronic neuropathy followed by Palliative medicine on Percocet and Lyrica, prediabetes (a1c 6.4% 12/2021) that initially presented to [facility] 10/5 w/[facility], shortness of breath at  rest, lightheadedness x 36 hrs. When she changed her cassette on Tuesday 10/3 evening she crimped the line and " hadn't realized it was still crimped until next day (had been without remodulin for 36 hrs). Previously was active with some mild level of dyspnea on exertion. Reports that she has been taking her macitentan and riociguat without interruption and has been tolerating the medicines well. Presented to [facility] though then came here to [facility] as all of her care is here. Here significantly overloaded, diuresing, will repeat TTE. Borderline hypotenisve concern for worsneining RV failure in setting of pulmonary HTN, if she remains hypotensive despite diuresis will need to transfer to ICU for RV failure needing vasopressors/inotrope and invasive monitoring (arterial line, central line).     Last saw Dr. Wilson rowland 6/2023 reported remains early for lung transplant workup though would not be able to undergo lung transplant here given severe pulm HTN (would need to be [place], which she is planning to move to eventually). Repeat TTE 10/6 w/significatnly reduced and dilated RV w/RVPO and small collapsible IVC.     # WHO group 1 pulmonary arterial hypertension  # Severe RV dysfunction in setting of PAH  # TR 2/2 PAH  - NPO for [facility] tomorrow AM  - Continue macitentan 10 + riociguat 2.5 TID + Remodulin 110 ng/kg/min  - Infusion pump from pharmacy while home supplies are being sorted  - Increased lasix to 40 mg PO daily on 10/7 w/good UOP, now switch to torsemide 20 mg daily for  better bioavailiability   -repeat CXR ap and lateral ->no effusion/no pulmonary congestion  - Supplemental O2 PRN to maintain O2 Sat > 88% (on RA today)  - Zofran PRN nausea  - Loperamide PRN diarrhea  - tylenol PRN       - will likely need pulmonary artery catheterization at some point in near future (has been > 6 mos since last eval)       - discussed case with transplant pulm over phone, preferred to have patient follow up in the outpatient setting within the next 1-2 weeks with repeat 6MWT, etc    # Essential hypertension  - per history;  controlled off meds; is actually hypotensive in setting of RV failure    # [facility] pain/Palliative care encounter  On Percocet 7.5-325 mg PO q 6 hrs PRN pain  PRN tylenol 1 gm Q8H (avoid > 3 gm tylenol from all sources over 24 hrs)  On pregabalin 75 mg PO q AM and 150 mg PO q PM  Consider inpatient palliative care consultation for optimization of pulm HTN management as outpatient messages suggest additional pain concerns    # ?hx DM2  -A1c in 5 range, not diabetic    #HLD  -statin atorva 80    #Decreased Appetite outpatient  - mirtazipine did not help    Feeding: cardiac low salt fluid restriction 1500 mL daily  Analgesia: as above  Sedation: none  Thromboembolic PPx: SCD/MARGARITA Hose    HOB: 30 degrees  Ulcer ppx: n/a  Glycemic Control: n/a  SBT: n/a    Bowel Regimen: n/a  Indwelling Catheter removal: left port long term needed for remodulin infusion  De-escalation of abx: n/a    Rony Boogie MD - Heart Failure/Transplant Service  Cardiovascular Disease PGY-VI

## 2023-10-08 NOTE — PLAN OF CARE
Problem: Adult Inpatient Plan of Care  Goal: Plan of Care Review  Outcome: Ongoing, Progressing  Goal: Patient-Specific Goal (Individualized)  Outcome: Ongoing, Progressing  Goal: Absence of Hospital-Acquired Illness or Injury  Outcome: Ongoing, Progressing  Goal: Optimal Comfort and Wellbeing  Outcome: Ongoing, Progressing  Goal: Readiness for Transition of Care  Outcome: Ongoing, Progressing     Problem: Coping Ineffective  Goal: Effective Coping  Outcome: Ongoing, Progressing     Problem: Fluid and Electrolyte Imbalance (Acute Kidney Injury/Impairment)  Goal: Fluid and Electrolyte Balance  Outcome: Ongoing, Progressing     Problem: Oral Intake Inadequate (Acute Kidney Injury/Impairment)  Goal: Optimal Nutrition Intake  Outcome: Ongoing, Progressing     Problem: Renal Function Impairment (Acute Kidney Injury/Impairment)  Goal: Effective Renal Function  Outcome: Ongoing, Progressing     Problem: Infection  Goal: Absence of Infection Signs and Symptoms  Outcome: Ongoing, Progressing     Problem: Adjustment to Illness (Heart Failure)  Goal: Optimal Coping  Outcome: Ongoing, Progressing     Problem: Cardiac Output Decreased (Heart Failure)  Goal: Optimal Cardiac Output  Outcome: Ongoing, Progressing     Problem: Dysrhythmia (Heart Failure)  Goal: Stable Heart Rate and Rhythm  Outcome: Ongoing, Progressing     Problem: Fluid Imbalance (Heart Failure)  Goal: Fluid Balance  Outcome: Ongoing, Progressing     Problem: Functional Ability Impaired (Heart Failure)  Goal: Optimal Functional Ability  Outcome: Ongoing, Progressing     Problem: Oral Intake Inadequate (Heart Failure)  Goal: Optimal Nutrition Intake  Outcome: Ongoing, Progressing     Problem: Respiratory Compromise (Heart Failure)  Goal: Effective Oxygenation and Ventilation  Outcome: Ongoing, Progressing     Problem: Sleep Disordered Breathing (Heart Failure)  Goal: Effective Breathing Pattern During Sleep  Outcome: Ongoing, Progressing     Problem: Gas Exchange  Impaired  Goal: Optimal Gas Exchange  Outcome: Ongoing, Progressing     Problem: Pain Acute  Goal: Acceptable Pain Control and Functional Ability  Outcome: Ongoing, Progressing

## 2023-10-09 ENCOUNTER — TELEPHONE (OUTPATIENT)
Dept: PALLIATIVE MEDICINE | Facility: CLINIC | Age: 48
End: 2023-10-09
Payer: MEDICAID

## 2023-10-09 ENCOUNTER — TELEPHONE (OUTPATIENT)
Dept: TRANSPLANT | Facility: CLINIC | Age: 48
End: 2023-10-09
Payer: MEDICAID

## 2023-10-09 ENCOUNTER — PATIENT MESSAGE (OUTPATIENT)
Dept: TRANSPLANT | Facility: CLINIC | Age: 48
End: 2023-10-09
Payer: MEDICAID

## 2023-10-09 VITALS
DIASTOLIC BLOOD PRESSURE: 63 MMHG | TEMPERATURE: 98 F | OXYGEN SATURATION: 96 % | RESPIRATION RATE: 17 BRPM | BODY MASS INDEX: 27.23 KG/M2 | HEART RATE: 80 BPM | WEIGHT: 148 LBS | HEIGHT: 62 IN | SYSTOLIC BLOOD PRESSURE: 100 MMHG

## 2023-10-09 DIAGNOSIS — M79.605 PAIN IN BOTH LOWER EXTREMITIES: ICD-10-CM

## 2023-10-09 DIAGNOSIS — M79.604 PAIN IN BOTH LOWER EXTREMITIES: ICD-10-CM

## 2023-10-09 DIAGNOSIS — I27.20 PULMONARY HYPERTENSION: ICD-10-CM

## 2023-10-09 LAB
ANION GAP SERPL CALC-SCNC: 9 MMOL/L (ref 8–16)
BASOPHILS # BLD AUTO: 0.03 K/UL (ref 0–0.2)
BASOPHILS NFR BLD: 0.6 % (ref 0–1.9)
BUN SERPL-MCNC: 21 MG/DL (ref 6–20)
CALCIUM SERPL-MCNC: 9.9 MG/DL (ref 8.7–10.5)
CHLORIDE SERPL-SCNC: 105 MMOL/L (ref 95–110)
CO2 SERPL-SCNC: 19 MMOL/L (ref 23–29)
CREAT SERPL-MCNC: 1 MG/DL (ref 0.5–1.4)
DIFFERENTIAL METHOD: ABNORMAL
EOSINOPHIL # BLD AUTO: 0.2 K/UL (ref 0–0.5)
EOSINOPHIL NFR BLD: 3.7 % (ref 0–8)
ERYTHROCYTE [DISTWIDTH] IN BLOOD BY AUTOMATED COUNT: 14 % (ref 11.5–14.5)
EST. GFR  (NO RACE VARIABLE): >60 ML/MIN/1.73 M^2
GLUCOSE SERPL-MCNC: 94 MG/DL (ref 70–110)
HCT VFR BLD AUTO: 39.8 % (ref 37–48.5)
HGB BLD-MCNC: 13.1 G/DL (ref 12–16)
IMM GRANULOCYTES # BLD AUTO: 0.01 K/UL (ref 0–0.04)
IMM GRANULOCYTES NFR BLD AUTO: 0.2 % (ref 0–0.5)
LYMPHOCYTES # BLD AUTO: 2.7 K/UL (ref 1–4.8)
LYMPHOCYTES NFR BLD: 55 % (ref 18–48)
MAGNESIUM SERPL-MCNC: 1.8 MG/DL (ref 1.6–2.6)
MCH RBC QN AUTO: 29.4 PG (ref 27–31)
MCHC RBC AUTO-ENTMCNC: 32.9 G/DL (ref 32–36)
MCV RBC AUTO: 89 FL (ref 82–98)
MONOCYTES # BLD AUTO: 0.4 K/UL (ref 0.3–1)
MONOCYTES NFR BLD: 7.6 % (ref 4–15)
NEUTROPHILS # BLD AUTO: 1.6 K/UL (ref 1.8–7.7)
NEUTROPHILS NFR BLD: 32.9 % (ref 38–73)
NRBC BLD-RTO: 0 /100 WBC
PLATELET # BLD AUTO: 171 K/UL (ref 150–450)
PMV BLD AUTO: 11.4 FL (ref 9.2–12.9)
POTASSIUM SERPL-SCNC: 3.8 MMOL/L (ref 3.5–5.1)
RBC # BLD AUTO: 4.45 M/UL (ref 4–5.4)
SODIUM SERPL-SCNC: 133 MMOL/L (ref 136–145)
WBC # BLD AUTO: 4.84 K/UL (ref 3.9–12.7)

## 2023-10-09 PROCEDURE — C1894 INTRO/SHEATH, NON-LASER: HCPCS | Mod: NTX | Performed by: INTERNAL MEDICINE

## 2023-10-09 PROCEDURE — 63600175 PHARM REV CODE 636 W HCPCS: Mod: JG,NTX | Performed by: INTERNAL MEDICINE

## 2023-10-09 PROCEDURE — 80048 BASIC METABOLIC PNL TOTAL CA: CPT | Mod: NTX | Performed by: INTERNAL MEDICINE

## 2023-10-09 PROCEDURE — 25000003 PHARM REV CODE 250: Mod: NTX | Performed by: STUDENT IN AN ORGANIZED HEALTH CARE EDUCATION/TRAINING PROGRAM

## 2023-10-09 PROCEDURE — 85025 COMPLETE CBC W/AUTO DIFF WBC: CPT | Mod: NTX | Performed by: INTERNAL MEDICINE

## 2023-10-09 PROCEDURE — 93451 RIGHT HEART CATH: CPT | Mod: NTX | Performed by: INTERNAL MEDICINE

## 2023-10-09 PROCEDURE — 25000003 PHARM REV CODE 250: Mod: NTX | Performed by: INTERNAL MEDICINE

## 2023-10-09 PROCEDURE — 99233 SBSQ HOSP IP/OBS HIGH 50: CPT | Mod: NTX,,, | Performed by: INTERNAL MEDICINE

## 2023-10-09 PROCEDURE — 99233 PR SUBSEQUENT HOSPITAL CARE,LEVL III: ICD-10-PCS | Mod: NTX,,, | Performed by: INTERNAL MEDICINE

## 2023-10-09 PROCEDURE — 36415 COLL VENOUS BLD VENIPUNCTURE: CPT | Mod: NTX | Performed by: INTERNAL MEDICINE

## 2023-10-09 PROCEDURE — 93451 PR RIGHT HEART CATH O2 SATURATION & CARDIAC OUTPUT: ICD-10-PCS | Mod: 26,NTX,, | Performed by: INTERNAL MEDICINE

## 2023-10-09 PROCEDURE — 83735 ASSAY OF MAGNESIUM: CPT | Mod: NTX | Performed by: INTERNAL MEDICINE

## 2023-10-09 PROCEDURE — 93451 RIGHT HEART CATH: CPT | Mod: 26,NTX,, | Performed by: INTERNAL MEDICINE

## 2023-10-09 PROCEDURE — C1751 CATH, INF, PER/CENT/MIDLINE: HCPCS | Mod: NTX | Performed by: INTERNAL MEDICINE

## 2023-10-09 RX ORDER — FUROSEMIDE 20 MG/1
20 TABLET ORAL DAILY
Qty: 90 TABLET | Refills: 3 | Status: SHIPPED | OUTPATIENT
Start: 2023-10-09 | End: 2024-10-08

## 2023-10-09 RX ORDER — LIDOCAINE HYDROCHLORIDE 20 MG/ML
INJECTION, SOLUTION INFILTRATION; PERINEURAL
Status: DISCONTINUED | OUTPATIENT
Start: 2023-10-09 | End: 2023-10-09 | Stop reason: HOSPADM

## 2023-10-09 RX ORDER — SPIRONOLACTONE 25 MG/1
25 TABLET ORAL DAILY
Qty: 90 TABLET | Refills: 3 | Status: SHIPPED | OUTPATIENT
Start: 2023-10-10 | End: 2023-11-06 | Stop reason: SDUPTHER

## 2023-10-09 RX ORDER — SPIRONOLACTONE 25 MG/1
25 TABLET ORAL DAILY
Status: DISCONTINUED | OUTPATIENT
Start: 2023-10-09 | End: 2023-10-09 | Stop reason: HOSPADM

## 2023-10-09 RX ORDER — OXYCODONE AND ACETAMINOPHEN 10; 325 MG/1; MG/1
1 TABLET ORAL EVERY 8 HOURS PRN
Qty: 90 TABLET | Refills: 0 | Status: SHIPPED | OUTPATIENT
Start: 2023-10-09 | End: 2023-11-02 | Stop reason: SDUPTHER

## 2023-10-09 RX ORDER — ATORVASTATIN CALCIUM 80 MG/1
80 TABLET, FILM COATED ORAL DAILY
Qty: 90 TABLET | Refills: 3 | Status: SHIPPED | OUTPATIENT
Start: 2023-10-10 | End: 2024-01-12

## 2023-10-09 RX ORDER — LANOLIN ALCOHOL/MO/W.PET/CERES
400 CREAM (GRAM) TOPICAL DAILY
Qty: 90 TABLET | Refills: 3 | Status: SHIPPED | OUTPATIENT
Start: 2023-10-10 | End: 2023-11-06 | Stop reason: SDUPTHER

## 2023-10-09 RX ADMIN — OXYCODONE AND ACETAMINOPHEN 1 TABLET: 7.5; 325 TABLET ORAL at 08:10

## 2023-10-09 RX ADMIN — OXYCODONE AND ACETAMINOPHEN 1 TABLET: 7.5; 325 TABLET ORAL at 03:10

## 2023-10-09 RX ADMIN — TREPROSTINIL 110 NG/KG/MIN: 200 INJECTION, SOLUTION INTRAVENOUS; SUBCUTANEOUS at 02:10

## 2023-10-09 RX ADMIN — RIOCIGUAT 2.5 MG: 1.5 TABLET, FILM COATED ORAL at 11:10

## 2023-10-09 RX ADMIN — Medication 400 MG: at 08:10

## 2023-10-09 RX ADMIN — SPIRONOLACTONE 25 MG: 25 TABLET ORAL at 11:10

## 2023-10-09 RX ADMIN — ATORVASTATIN CALCIUM 80 MG: 20 TABLET, FILM COATED ORAL at 08:10

## 2023-10-09 RX ADMIN — MACITENTAN 10 MG: 10 TABLET, FILM COATED ORAL at 08:10

## 2023-10-09 RX ADMIN — TORSEMIDE 20 MG: 20 TABLET ORAL at 08:10

## 2023-10-09 NOTE — NURSING
Discharge instructions reviewed with the patient. Patient verbalized her understanding and denies any questions or concerns. Patient preparing for discharge, awaiting transporting home.

## 2023-10-09 NOTE — TELEPHONE ENCOUNTER
Patient called she is in hosp ready for d/c and she is asking if she can go up to 10mg on Percocet because she is asking for meds before they are due and she was having a lot of pain before hospitalization.    She is being discharged today     09/20/2023 09/20/2023   3  Pregabalin 75 Mg Capsule 90.00  30  Er Lori  [id number]   [facility] ([id number])  0  1.51 LME  Medicaid  [place]    09/14/2023 09/11/2023   3  Oxycodone-Acetaminophn 7.5-325 45.00  30  Er Lori  [id number]   [facility] ([id number])  1  16.88 MME  Other  [place]    09/14/2023 09/11/2023   3  Oxycodone-Acetaminophn 7.5-325 45.00  30  Er Lori  [id number]   [facility] ([id number])

## 2023-10-09 NOTE — DISCHARGE INSTRUCTIONS
You were admitted for an exacerbation of your pulmonary hypertension and right heart failure/dysfunction. Please make sure you follow up with both the heart failure and with the lung transplant doctors within the next week.   Please get labwork done in 1 week (to assess electrolytes and kidney function)  Also regarding diet, please limit fluid intake to < 2 L daily and salt/sodium intake to < 2 gm daily    INSTRUCTIONS AFTER RIGHT HEART CATHETERIZATION:    AFTER THE PROCEDURE:  -You may remove the bandage in 24 hours and wash with soap and water.  -You may shower, but do not soak in a tub for three days.   PRECAUTIONS FOR THE NEXT 24 HOURS:  -If you need to cough, sneeze, have a bowel movement, or bear down, hold pressure over your bandage.  -Do not  anything heavier than a gallon of milk(about 5 pounds)  -Avoid excessive bending over.  SYMPTOMS TO WATCH FOR AND REPORT TO YOUR DOCTOR:  -BLEEDING: hold pressure over the site until bleeding stops. Proceed to Emergency Room by ambulance (do not drive yourself) if unable to stop bleeding. Notify your doctor.  -HEMATOMA(hard bruise under the skin): José Luis around the bruise if one develops. Call your doctor if it increases in size or if you have difficulty talking, swallowing, breathing or anything unusual.  SIGNS OF INFECTION:Fever (temperature over 100.5 F), pus or redness  -RASH  -CHEST PAIN OR SHORTNESS OF BREATH    You may call you coordinator in the Heart Failure/Heart Transplant/Pulmonary Hypertension Clinic at [phone] during normal business hours(Monday through Friday from 8 A.M. to 5 P.M.) After hours, call the Heart Transplant Service doctor on call at [phone].

## 2023-10-09 NOTE — INTERVAL H&P NOTE
I have seen the patient and reviewed this note, and there is no change in history and physical since the last exam. Proceed with RHC to re-evaluate pulmonary hypertension.

## 2023-10-09 NOTE — TELEPHONE ENCOUNTER
Called patient per her request via a portal message and she stated she was currently hospitalized at [facility] and had undergone a RHC today. Patient stated Dr. Gregory told her she needs a referral to another center to be considered for lung transplantation. Patient asked about the process for referring her to a lung transplant program in [place], [place]. Informed patient that none of the transplant centers in [place] accept patients with [place] Medicaid. Patient asked about the process to change her insurance should she move to [place]. Explained that she would need to establish residency and, after 30 days, she could apply for [place] Medicaid. The patient verbalized her understanding and all questions at this time were answered to her satisfaction.

## 2023-10-09 NOTE — NURSING
Patient mixed her home Remodulin and switched to her home pump. Will monitor and make sure the pump does not malfunctions.

## 2023-10-09 NOTE — NURSING
Pt complained of bilateral leg pain and a headache during the night and received her prn oxycodone twice; pt denied any other complaints during the night; pts IV remodulin still infusing through her catheter as ordered; no family present at the bedside during the night; pts SBPs still in the 90s; pts heart rate SR with occassional PVCs; pt alert and oriented x 4; will continue to monitor

## 2023-10-09 NOTE — PROGRESS NOTES
Discharge    Pt presents in room aaox4 with pleasant affect. Pt voices agreement with plan to discharge to home today pending results of RHC. Pt requested to get a rollater if insurance covers it. Requested order be placed. Pt's dgtr will transport pt home. Pt reports coping well and denies further needs, questions, concerns at this time and none indicated. Providing psychosocial and counseling support, education, resources, assistance and discharge planning as indicated. SW remains available.

## 2023-10-09 NOTE — PROGRESS NOTES
[facility] [place]  Heart Failure/Transplant Service  Progress Note    Kristin Maier  YOB: 1975  Medical Record Number:  [id number]  Attending Physician:  Hu Glover, *   Date of Admission: 10/5/2023     Hospital Day:  4  Current Principal Problem:  WHO group 1 pulmonary arterial hypertension    Interval Events   NAEON doing well no issues, urinating well on PO torsemide    Cardiac Imaging      ECG 10/5: sinus w/LAE, Qtc 479    CXR 10/6: no overt signs of congestion    TTE 3/2023:  The left ventricle is normal in size with concentric remodeling and normal systolic function. The estimated ejection fraction is 60%.  Severe right ventricular enlargement with mildly to moderately reduced right ventricular systolic function.  Indeterminate left ventricular diastolic function.  Severe right atrial enlargement.  Moderate to severe tricuspid regurgitation.  Small pericardial effusion.  The estimated PA systolic pressure is 89 mmHg.  Intermediate central venous pressure (8 mmHg).    RHC 3/21/23:  Measurements done while on Opsumit 10 mg/day, Adempas 2.5 mg TID, and Remodulin 90 ng/kg/min.    RA: 10  RV: 75/3, EDP 12  PA: 82/33, mean 48  PCWP: 13     Saturation:  Arterial: 100 %  PA: 60 %     Kristel CI/CO: 2.5/4.5     PVR: 7.8 Wood Units     Conclusions:  Severe pre capillary pulmonary hypertension  Elevated right sided filling pressures  Low normal CI/CO    [facility] 7/2022:    RA: 15/ 11/ 10   RV: 70/ 9/ 10   PA: 72/ 29/ 43   PWP: 12/ 12/ 11 .   Cardiac output was 4.29  by Kristel.   Cardiac index is 2.61 L/min/m2.   O2 Sat: PA 69%.     Normal CO/CI on remodulin 80 ng/kg/min, riociguat 2mg po TID, macitentan 10mg.  Mildly increased right and normal left sided filling pressures.  Severe pulmonary hypertension.   TPG  32   PVR  7.45  MAP 75  SVR 1212    [facility] 12/2021:  RA: 25/ 26/ 22   RV: 90/ 1/ 21   PA: 86/ 42/ 57   PWP: 9/ 8/ 7 .   Cardiac output was 1.97  by Kristel. Cardiac index is 1.2  L/min/m2.   O2 Sat: PA 38%.       Severely reduced CO/CI off inotrope and pulmonary vasodilator therapy.  Severely elevated right and normal left sided filling pressure.  Severe pulmonary hypertension in setting of normal left sided filling pressure.  TPG   50    PVR   25    Medications - Current  Scheduled Meds:   atorvastatin  80 mg Oral Daily    macitentan  10 mg Oral Daily    magnesium oxide  400 mg Oral Daily    pregabalin  150 mg Oral QHS    riociguat  2.5 mg Oral TID    sertraline  25 mg Oral QHS    torsemide  20 mg Oral Daily     Continuous Infusions:   treprostinil (REMODULIN) 9,000,000 ng in sodium chloride 0.9% 100 mL infusion 110 ng/kg/min (10/08/23 1357)    veletri/remodulin cassette      veletri/remodulin tubing       PRN Meds:.acetaminophen, loperamide, ondansetron, ondansetron, oxyCODONE-acetaminophen        Physical Examination     Vital Signs  Vitals  Temp: 98.1 °F (36.7 °C)  Temp Source: Oral  Pulse: 75  Heart Rate Source: Monitor  Resp: 16  SpO2: 97 %  Pulse Oximetry Type: Intermittent  Flow (L/min): 0  Oxygen Concentration (%): 28  BP: (!) 84/57  MAP (mmHg): 66  BP Location: Right arm  BP Method: Automatic  Patient Position: Lying          24 Hour VS Range    Temp:  [97.9 °F (36.6 °C)-99.1 °F (37.3 °C)]   Pulse:  [75-94]   Resp:  [16-20]   BP: (84-99)/(57-66)   SpO2:  [90 %-99 %]   No intake or output data in the 24 hours ending 10/09/23 0947        General:   Head: NCAT  Eyes: conjunctivae and lids normal, no scleral icterus, EOMI.  ENMT:  no gingival bleeding, normal oral mucosa without pallor or cyanosis.   Neck:  JVP mildly increased.  Trachea non-displaced.     Chest:  Normal respiratory effort on 4L NC.  Chest clear to auscultation.  No wheezes, rales, or rhonchi.  Heart:  Normal PMI, S1 S2 normal quality, splitting.  No murmurs rubs or gallops.  Peripheral pulses 2+.  Abdomen:  Non-distended, normal bowel sounds, non-tender.  Extremities:  No edema. Normal capillary refill.    Skin:  Warm  "and dry.  No cyanosis or pallor.  No ulcers, stasis.  IV sites without tenderness or inflammation.    Neurological / Psychiatric:  Oriented to person, time, and place.  No facial asymmetry, drift.  Fluent without dysarthria.  Mood euthymic, affect normal.       Data       Recent Labs   Lab 10/05/23  2220 10/06/23  0601 10/07/23  0528 10/08/23  0520 10/09/23  0555   WBC 6.23 4.35 4.60 4.21 4.84   HGB 11.9* 11.2* 11.9* 11.7* 13.1   HCT 35.5* 34.2* 36.0* 34.3* 39.8   * 142* 150 168 171        No results for input(s): "PROTIME", "INR" in the last 168 hours.     Recent Labs   Lab 10/06/23  0601 10/06/23  1327 10/07/23  0528 10/08/23  0520 10/09/23  0555    140 139 138 133*   K 3.3* 3.8 3.6 3.7 3.8    109 111* 109 105   CO2 19* 21* 19* 20* 19*   BUN 16 14 10 12 21*   CREATININE 0.8 0.8 0.8 0.8 1.0   ANIONGAP 8 10 9 9 9   CALCIUM 8.5* 8.9 8.9 9.1 9.9        Recent Labs   Lab 10/05/23  2220   PROT 7.0   ALBUMIN 3.9   BILITOT 1.7*   ALKPHOS 105   AST 54*   ALT 81*        Recent Labs   Lab 10/05/23  2220   TROPONINI 0.008        BNP (pg/mL)   Date Value   10/05/2023 311 (H)   03/19/2023 67   02/06/2023 50   12/15/2022 42   11/14/2022 47       No results for input(s): "LABBLOO" in the last 168 hours.       Assessment & Plan     Kristin Maier is a 47 y.o. female with WHO Group I Pulm HTN on Remodulin 110ng/kg/min, followed by Dr JOSE J Rachel in Transplant Pulmonology, chronic neuropathy followed by Palliative medicine on Percocet and Lyrica, prediabetes (a1c 6.4% 12/2021) that initially presented to [facility] 10/5 w/[facility], shortness of breath at  rest, lightheadedness x 36 hrs. When she changed her cassette on Tuesday 10/3 evening she crimped the line and hadn't realized it was still crimped until next day (had been without remodulin for 36 hrs). Previously was active with some mild level of dyspnea on exertion. Reports that she has been taking her macitentan and riociguat without interruption and has been " tolerating the medicines well. Presented to [facility] though then came here to [facility] as all of her care is here. Here significantly overloaded, diuresing, will repeat TTE. Borderline hypotenisve concern for worsneining RV failure in setting of pulmonary HTN, if she remains hypotensive despite diuresis will need to transfer to ICU for RV failure needing vasopressors/inotrope and invasive monitoring (arterial line, central line).     Last saw Dr. Wilson rowland 6/2023 reported remains early for lung transplant workup though would not be able to undergo lung transplant here given severe pulm HTN (would need to be [place], which she is planning to move to eventually). Repeat TTE 10/6 w/significatnly reduced and dilated RV w/RVPO and small collapsible IVC.     # WHO group 1 pulmonary arterial hypertension  # Severe RV dysfunction in setting of PAH  # TR 2/2 PAH  - RHC today with Dr. Gregory  - Continue macitentan 10 + riociguat 2.5 TID + Remodulin 110 ng/kg/min  - Infusion pump from pharmacy while home supplies are being sorted  - Increased lasix to 40 mg PO daily on 10/7 w/good UOP, now switch to torsemide 20 mg daily for  better bioavailiability   -start aldactone 25 mg daily today  -repeat CXR ap and lateral ->no effusion/no pulmonary congestion  - Supplemental O2 PRN to maintain O2 Sat > 88% (on RA today)  - Zofran PRN nausea  - Loperamide PRN diarrhea  - tylenol PRN       - discussed case with transplant pulm over phone, preferred to have patient follow up in the outpatient setting within the next 1-2 weeks with repeat 6MWT, etc    # Essential hypertension  - per history; controlled off meds; is actually hypotensive in setting of RV failure    # [facility] pain/Palliative care encounter  On Percocet 7.5-325 mg PO q 6 hrs PRN pain  PRN tylenol 1 gm Q8H (avoid > 3 gm tylenol from all sources over 24 hrs)  On pregabalin 75 mg PO q AM and 150 mg PO q PM  Consider inpatient palliative care consultation for optimization of  pulm HTN management as outpatient messages suggest additional pain concerns    # ?hx DM2  -A1c in 5 range, not diabetic    #HLD  -statin atorva 80    #Decreased Appetite outpatient  - mirtazipine did not help    Feeding: cardiac low salt fluid restriction 1500 mL daily  Analgesia: as above  Sedation: none  Thromboembolic PPx: SCD/MARGARITA Hose    HOB: 30 degrees  Ulcer ppx: n/a  Glycemic Control: n/a  SBT: n/a    Bowel Regimen: n/a  Indwelling Catheter removal: left port long term needed for remodulin infusion  De-escalation of abx: n/a    Rony Boogie MD - Heart Failure/Transplant Service  Cardiovascular Disease PGY-VI

## 2023-10-09 NOTE — DISCHARGE SUMMARY
[facility] - Transplant Stepdown  Cardiology  Discharge Summary      Patient Name: Kristin Maier  MRN: [id number]  Admission Date: 10/5/2023  Hospital Length of Stay: 4 days  Discharge Date and Time:  10/09/2023 4:09 PM  Attending Physician: Ce Tucker DO  Discharging Provider: Rony Boogie MD  Primary Care Physician: Jorge A Stack MD    Procedure(s) (LRB):  INSERTION, CATHETER, RIGHT HEART (Right)     Indwelling Lines/Drains at time of discharge:  Lines/Drains/Airways       Central Venous Catheter Line  Duration             Tunneled Central Line Insertion/Assessment - Single Lumen  03/16/22 1021 right subclavian 572 days                    Hospital Course (synopsis of major diagnoses, care, treatment, and services provided during the course of the hospital stay):     Kristin Maier is a 47 y.o. female with WHO Group I Pulm HTN on Remodulin 110ng/kg/min, followed by Dr JOSE J Rachel in Transplant Pulmonology, chronic neuropathy followed by Palliative medicine on Percocet and Lyrica, prediabetes (a1c 6.4% 12/2021) that initially presented to [facility] 10/5 w/[facility], shortness of breath at  rest, lightheadedness x 36 hrs. When she changed her cassette on Tuesday 10/3 evening she crimped the line and hadn't realized it was still crimped until next day (had been without remodulin for 36 hrs). Previously was active with some mild level of dyspnea on exertion; was taking her macitentan and riociguat without interruption and has been tolerating the medicines well. Presented to [facility] initially though then came here to [facility] as all of her care is here. Here initially overloaded, diuresed to euvolemia, repeat TTE 10/6 w/significatnly reduced and dilated RV w/RVPO and small collapsible IVC. Repeat CXR ap and lateral ->no effusion/no pulmonary congestion. Last saw Dr. Wilson rowland 6/2023 reported remains ?early for lung transplant workup though would not be able to undergo lung transplant here given severe  pulm HTN (would need to be [place], which she is planning to move to eventually). Repeat [facility] post diuresis as below, added daily aldactone 25 to daily lasix 20 mg daily with repeat labs in 1 week. Will follow up with transplant pulm and with PH cardiology clinic (referrals placed).      # WHO group 1 pulmonary arterial hypertension  # Severe RV dysfunction in setting of PAH  # TR 2/2 PAH  -Continue macitentan 10 + riociguat 2.5 TID + Remodulin 110 ng/kg/min  -Switch back to home lasix 20 mg PO daily + mag ox 400 mg daily + kcl 20mEq daily (consider switching to PRN torsemide 20 mg in clinic)  -aldactone 25 mg daily with labs in 1 week       -follow up with transplant pulm and PH cardiology     # Essential hypertension  - per history; controlled off meds     # [facility] pain/Palliative care encounter  On Percocet 7.5-325 mg PO q 6 hrs PRN pain  PRN tylenol 1 gm Q8H (avoid > 3 gm tylenol from all sources over 24 hrs)  On pregabalin 75 mg PO q AM and 150 mg PO q PM     # ?hx DM2  -A1c in 5 range, not diabetic     #HLD  -statin atorva 80     #Decreased Appetite outpatient  - mirtazipine did not help in past, did not start     Consults:   Consults (From admission, onward)          Status Ordering Provider     Inpatient consult to Social Work/Case Management  Once        Provider:  (Not yet assigned)    Acknowledged KANU GALLARDO     Inpatient consult to Palliative Care  Once        Provider:  (Not yet assigned)    Completed KANU GALLARDO            Significant Diagnostic Studies: Cardiac Graphics: Echocardiogram: 2D echo with color flow doppler:       [facility] 10/9/2023:   RA: 7/ 6/ 5 RV: 71/ 3/ 4 PA: 71/ 32/ 43 PWP: 12/ 10/ 10 .   Cardiac output was 3.8  by Kristel. Cardiac index is 2.26 L/min/m2.     Low normal CO/CI on PH therapy (remodulin, macitentan, riociguat).   Normal right and left sided filling pressures.  Severe pulmonary hypertension in setting of normal left sided filling pressures.  TPG  33   PVR  8.68    Thermodilution  CO average 3.82    Results for orders placed or performed during the hospital encounter of 11/17/21   2D echo with color flow doppler    Narrative           Transthoracic Echocardiogram Report    Patient Name  : ELVIS HUTTON  [address] [address]    Phone: [phone]    Interpreting Physician: WHITLEY HICKEY MD  Demographics:  Name:  ELVIS HUTTON   YOB: 1975  Age/Sex:  45, Female   Height: 5 ft 2 in  Weight:   132 pounds    BSA: 1.63 cc/m?  BMI:      24.1   Date of Study: 11/18/2021  Medical Record No:   [id number]   Location: [facility]  Referring Physician:   SATISH ALCANTARA   Technologist: Denise Oseguera RDCS  Study:   Trans Thoracic Echocardiogram  Study Quality:   Good  Procedure  Type: Adult TTE (Date Study Ordered : 11/18/2021)  Components: 2D,Color Flow Doppler,Doppler,M-mode,TDI(Tissue Doppler   Imaging)   Referral diagnosis  Abnormal CXR  Hemodynamics  Heart rate: 88 beats/min  Blood pressure:  130/99 mmHg  ECG:     Final Impressions  1. The study quality is good.   2. Global left ventricular systolic function is mildly reduced.  Left   ventricular ejection fraction is 40-45%.   3. Severely dilated right ventricle with severely reduced function.  4. The right atrium is severly enlarged. Right atrial diameter is 5.7   cms.    5. Mild (1+) aortic regurgitation. Severe (4+) tricuspid   regurgitation. Trace pulmonic regurgitation.    6. Flattening of the interventricular septum consistent with right   ventricular pressure overload.     7. Severe pulmonary hypertension.  The pulmonary artery systolic   pressure is 121 mmHg.    8. Stage 1 diastolic dysfunction.  9. A small pericardial effusion is noted.     Findings    Left Atrium  The left atrium is decreased in size. Left atrial diameter is 2.5 cms.     Right Atrium  The right atrium is severly enlarged. Right atrial diameter is 5.7   cms.      Left Ventricle  The left ventricle is decreased in size. Left  ventricular diastolic   dimension is 3.64 cms. Left ventricular systolic dimension is 2.6 cms.   Left ventricular diastolic septal thickness is 1.3 cms. Left   ventricular diastolic postero basal free wall thickness is 1.4 cms.   Global left ventricular systolic function is mildly decreased. The   left ventricular ejection fraction is 40%. Left ventricular outflow   tract VTI is 8.8 cms. Left ventricular outflow tract mean velocity is   0.5 m/s. Left ventricular mean gradient is 1 mmHg.      Right Ventricle  The right ventricle is severely enlarged. Right ventricular systolic   function is severely decreased. Right ventricular diastolic dimension   is 5.3 cms. Right ventricular systolic pressure is 121 mmHg.      Atrial Septum  The atrial septum is normal.     Ventricular Septum  The ventricular septum is normal.     Pulmonary Artery  The pulmonary artery systolic pressure is 121 mmHg.     Pulmonary Vein  The pulmonary vein appears normal.     IVC  The inferior vena cava is moderately increased in size.     Pulmonic Valve  Evidence of pulmonic regurgitation is noted. Trace pulmonic   regurgitation. The peak velocity is 0.4 m/s. The mean velocity is 0.3   m/s. The peak trans pulmonic gradient is 1 mmHg.      Tricuspid Valve  Evidence of tricuspid regurgitation is present. Severe (4+) tricuspid   regurgitation. The peak tricuspid regurgitant velocity is 5.3 m/s. The   peak trans tricuspid gradient is 100 mmHg. TR ERO is 0.3 cm^2.     Mitral Valve  The pressure half time is 44 ms. The deceleration time is 149 ms. The   E velocity is 0.3 m/s. The A velocity is 0.7 m/s.      Aortic Valve  The aortic valve is tricuspid. Noted evidence of aortic regurgitation.   Mild (1+) aortic regurgitation. The trans-aortic peak velocity is 0.8   m/s. The trans-aortic peak gradient is 3 mmHg. The trans-aortic mean   velocity is 0.6 m/s. The trans-aortic mean gradient is 2 mmHg. Aortic   valve VTI measures 12.8 cms.      Aorta  Aortic  root diameter is 3.2 cms. Aortic arch is normal.    Pericardium  A small pericardial effusion is noted.     Measurements  Left Atrium  Diameter   2.5cm(s)    Right Atrium  Diameter   5.7cm(s)    Left Ventricle  End Diastolic Dimension   3.64cm(s)  End Systolic Dimension   2.6cm(s)  Posterior Basal Free Wall Thickness   1.4cm(s)  End Diastolic Septal Thickness   1.3cm(s)  Ejection Fraction   40%    Right Ventricle  Diastolic Diameter   5.3cm(s)    Pulmonic Valve  Peak Pulmonic Velocity   0.4m/s  Mean Pulmonic Velocity   0.3m/s  Peak Trans Pulmonic Gradient   1mmHg    Tricuspid Valve  Peak Tricuspid Regurgitant Velocity   5.3m/s  Peak Tricuspid Regurgitant Gradient   100mmHg  Pulmonary Artery Systolic Pressure  121mmHg    Mitral Valve  Pressure Half Time   44ms  Deceleration Time   149ms  A Velocity   0.7m/s  E Velocity   0.3m/s  Area By Pressure Half Time   5cm?    Aortic Valve  Trans Aortic Peak Velocity   0.8m/s  Trans Aortic Mean Velocity   0.6m/s  Trans Aortic Peak Gradient   3mmHg  Trans Aortic Mean Gradient   2mmHg  Aortic Valve VTI   12.8cm(s)        Electronically Authenticated by  WHITLEY HICKEY MD  11/18/2021 , 09:14      and Transthoracic echo (TTE) complete (Cupid Only):   Results for orders placed or performed during the hospital encounter of 10/05/23   Echo Saline Bubble? No   Result Value Ref Range    TAPSE 1.66 cm    TR Max Josiah 4.3 m/s    Triscuspid Valve Regurgitation Peak Gradient 74 mmHg    BSA 1.78 m2    TV mean gradient 88 mmHg    TV resting pulmonary artery pressure 77 mmHg    RV TB RVSP 7 mmHg    Est. RA pres 3 mmHg    LVIDd 3.6 3.5 - 6.0 cm    IVS 1.1 0.6 - 1.1 cm    Left Ventricle Relative Wall Thickness 0.56 cm    Posterior Wall 1.0 0.6 - 1.1 cm    LV mass 115.86 g    LV Mass Index 67 g/m2    ZLVIDD -2.86     Narrative      Left Ventricle: The left ventricle is normal in size. Normal wall   thickness. Septal flattening in systole consistent with right ventricular   pressure overload. There is  "normal systolic function with a visually   estimated ejection fraction of 65%.    Right Ventricle: Severe right ventricular enlargement. Systolic   function is moderately reduced. The free wall strain is -12.1%.    Right Atrium: Right atrium is severely dilated.    Tricuspid Valve: Mildly thickened leaflets. There is mild annular   dilation present. There is moderate to severe regurgitation with a   centrally directed jet.    Pulmonary Artery: The estimated pulmonary artery systolic pressure is   77 mmHg.    IVC/SVC: Normal venous pressure at 3 mmHg.    Pericardium: There is a small circumferential effusion. There is no   tamponade physiology.         Pending Diagnostic Studies:       None            Final Active Diagnoses:    Diagnosis Date Noted POA    PRINCIPAL PROBLEM:  WHO group 1 pulmonary arterial hypertension [I27.21] 02/11/2022 Yes    Secondary tricuspid regurgitation [I07.1] 10/05/2023 Yes    Debility [R53.81] 09/12/2023 Yes    Palliative care encounter [Z51.5] 03/21/2023 Not Applicable    Essential hypertension [I10] 12/02/2021 Yes    Pulmonary hypertension [I27.20] 11/30/2021 Yes      Problems Resolved During this Admission:       Discharged Condition: good    Follow Up:    Patient Instructions:      WALKER FOR HOME USE     Order Specific Question Answer Comments   Type of Walker: Rollator with brakes and/or seat    With wheels? Yes    Height: 5' 2" (1.575 m)    Weight: 67.1 kg (148 lb)    Length of need (1-99 months): 99    Please check all that apply: Patient's condition impairs ambulation.      Magnesium   Standing Status: Future Standing Exp. Date: 12/07/24     PHOSPHORUS   Standing Status: Future Standing Exp. Date: 12/07/24     COMPREHENSIVE METABOLIC PANEL   Standing Status: Future Standing Exp. Date: 12/07/24     B-TYPE NATRIURETIC PEPTIDE   Standing Status: Future Standing Exp. Date: 12/07/24     Ambulatory referral/consult to Transplant, Heart   Standing Status: Future   Referral Priority: " Routine Referral Type: Transplants   Number of Visits Requested: 1     Ambulatory referral/consult to Transplant, Lung   Standing Status: Future   Referral Priority: Routine Referral Type: Transplants   Number of Visits Requested: 1     Diet Cardiac   Order Comments: Low sodium less than 2000 mg daily; and less than 2L of fluids daily     Notify your health care provider if you experience any of the following:  temperature >100.4     Notify your health care provider if you experience any of the following:  persistent nausea and vomiting or diarrhea     Notify your health care provider if you experience any of the following:  severe uncontrolled pain     Notify your health care provider if you experience any of the following:  redness, tenderness, or signs of infection (pain, swelling, redness, odor or green/yellow discharge around incision site)     Notify your health care provider if you experience any of the following:  difficulty breathing or increased cough     Notify your health care provider if you experience any of the following:  severe persistent headache     Notify your health care provider if you experience any of the following:  worsening rash     Notify your health care provider if you experience any of the following:  persistent dizziness, light-headedness, or visual disturbances     Notify your health care provider if you experience any of the following:  increased confusion or weakness     Remove dressing in 24 hours   Order Comments: Can replace with bandaid as needed     Activity as tolerated     Medications:  Reconciled Home Medications:      Medication List        START taking these medications      atorvastatin 80 MG tablet  Commonly known as: LIPITOR  Take 1 tablet (80 mg total) by mouth once daily.  Start taking on: October 10, 2023     magnesium oxide 400 mg (241.3 mg magnesium) tablet  Commonly known as: MAG-OX  Take 1 tablet (400 mg total) by mouth once daily.  Start taking on: October 10,  2023     spironolactone 25 MG tablet  Commonly known as: ALDACTONE  Take 1 tablet (25 mg total) by mouth once daily.  Start taking on: October 10, 2023            CONTINUE taking these medications      ADEMPAS 2.5 mg tablet  Generic drug: riociguat  Take 1 tablet three times a day. Dose changes may occur throughout the course of therapy as directed by your prescriber.     furosemide 20 MG tablet  Commonly known as: LASIX  Take 1 tablet (20 mg total) by mouth once daily.     loperamide 2 mg capsule  Commonly known as: IMODIUM  Take 1 capsule (2 mg total) by mouth 4 (four) times daily as needed for Diarrhea.     multivitamin with minerals tablet  Take 1 tablet by mouth once daily.     ondansetron 4 MG tablet  Commonly known as: ZOFRAN  Take 1 tablet (4 mg total) by mouth every 8 (eight) hours as needed for Nausea.     OPSUMIT 10 mg Tab  Generic drug: macitentan  TAKE 1 TABLET BY MOUTH DAILY. DO NOT HANDLE IF PREGNANT. DO NOT SPLIT, CRUSH, OR CHEW. REVIEW MEDICATION GUIDE.     oxyCODONE-acetaminophen 7.5-325 mg per tablet  Commonly known as: PERCOCET  Take 1 tablet by mouth every 8 (eight) hours as needed for Pain.     potassium chloride SA 10 MEQ tablet  Commonly known as: K-DUR,KLOR-CON M  TAKE 2 TABLETS (20 MEQ TOTAL) BY MOUTH ONCE DAILY.     pregabalin 75 MG capsule  Commonly known as: LYRICA  Take 1 capsule (75 mg total) by mouth once daily AND 2 capsules (150 mg total) every evening.     REMODULIN 5 mg/mL Soln  Generic drug: treprostinil sodium     sertraline 25 MG tablet  Commonly known as: ZOLOFT  Take 1 tablet (25 mg total) by mouth once daily.     sodium chloride 0.9% SolP 100 mL with treprostinil 1 mg/mL Soln 6,000,000 ng  Inject 2,145 ng/min into the vein continuous.            STOP taking these medications      diphenhydrAMINE-acetaminophen  mg Tab  Commonly known as: TYLENOL PM              Time spent on the discharge of patient: 30 minutes    Rony Boogie MD PGY VI  Cardiology  [facility] -  Transplant Stepdown

## 2023-10-09 NOTE — BRIEF OP NOTE
Patient tolerated the procedure well. Please see complete RHC procedure note for full details and results. Stable to return from cath lab to their hospital room.  Procedure: Right heart catheterization   Procedure date: 10/09/23    Discharge date: 10/09/23  Estimated blood loss: less than 10 cc  Complications: none  Condition at discharge: stable  Discharge instructions: no heavy lifting greater than 5 lbs and no bearing down/coughing without holding pressure over incision site.  Activity: as tolerated  Dispo: hospital room  Diet: as tolerated

## 2023-10-11 ENCOUNTER — PATIENT MESSAGE (OUTPATIENT)
Dept: TRANSPLANT | Facility: CLINIC | Age: 48
End: 2023-10-11
Payer: MEDICAID

## 2023-10-12 ENCOUNTER — PATIENT MESSAGE (OUTPATIENT)
Dept: TRANSPLANT | Facility: CLINIC | Age: 48
End: 2023-10-12
Payer: MEDICAID

## 2023-10-25 DIAGNOSIS — M79.604 PAIN IN BOTH LOWER EXTREMITIES: ICD-10-CM

## 2023-10-25 DIAGNOSIS — M79.605 PAIN IN BOTH LOWER EXTREMITIES: ICD-10-CM

## 2023-10-25 RX ORDER — ONDANSETRON 4 MG/1
4 TABLET, FILM COATED ORAL EVERY 8 HOURS PRN
Qty: 30 TABLET | Refills: 6 | Status: SHIPPED | OUTPATIENT
Start: 2023-10-25

## 2023-10-26 ENCOUNTER — PATIENT MESSAGE (OUTPATIENT)
Dept: OBSTETRICS AND GYNECOLOGY | Facility: CLINIC | Age: 48
End: 2023-10-26
Payer: MEDICAID

## 2023-10-26 RX ORDER — PREGABALIN 75 MG/1
CAPSULE ORAL
Qty: 90 CAPSULE | Refills: 6 | Status: SHIPPED | OUTPATIENT
Start: 2023-10-26 | End: 2024-01-19 | Stop reason: SDUPTHER

## 2023-10-27 ENCOUNTER — TELEPHONE (OUTPATIENT)
Dept: TRANSPLANT | Facility: CLINIC | Age: 48
End: 2023-10-27
Payer: MEDICAID

## 2023-10-27 ENCOUNTER — OFFICE VISIT (OUTPATIENT)
Dept: TRANSPLANT | Facility: CLINIC | Age: 48
End: 2023-10-27
Payer: MEDICAID

## 2023-10-27 VITALS
HEIGHT: 62 IN | WEIGHT: 142 LBS | BODY MASS INDEX: 26.13 KG/M2 | DIASTOLIC BLOOD PRESSURE: 59 MMHG | SYSTOLIC BLOOD PRESSURE: 108 MMHG | HEART RATE: 106 BPM

## 2023-10-27 DIAGNOSIS — R53.81 DEBILITY: ICD-10-CM

## 2023-10-27 DIAGNOSIS — R06.02 SHORTNESS OF BREATH: ICD-10-CM

## 2023-10-27 DIAGNOSIS — I27.21 WHO GROUP 1 PULMONARY ARTERIAL HYPERTENSION: Primary | ICD-10-CM

## 2023-10-27 DIAGNOSIS — Z79.899 HIGH RISK MEDICATION USE: ICD-10-CM

## 2023-10-27 PROCEDURE — 3078F PR MOST RECENT DIASTOLIC BLOOD PRESSURE < 80 MM HG: ICD-10-PCS | Mod: CPTII,95,NTX,

## 2023-10-27 PROCEDURE — 1159F MED LIST DOCD IN RCRD: CPT | Mod: CPTII,95,NTX,

## 2023-10-27 PROCEDURE — 3044F PR MOST RECENT HEMOGLOBIN A1C LEVEL <7.0%: ICD-10-PCS | Mod: CPTII,95,NTX,

## 2023-10-27 PROCEDURE — 1111F DSCHRG MED/CURRENT MED MERGE: CPT | Mod: CPTII,95,NTX,

## 2023-10-27 PROCEDURE — 3074F PR MOST RECENT SYSTOLIC BLOOD PRESSURE < 130 MM HG: ICD-10-PCS | Mod: CPTII,95,NTX,

## 2023-10-27 PROCEDURE — 99214 OFFICE O/P EST MOD 30 MIN: CPT | Mod: 95,NTX,,

## 2023-10-27 PROCEDURE — 99214 PR OFFICE/OUTPT VISIT, EST, LEVL IV, 30-39 MIN: ICD-10-PCS | Mod: 95,NTX,,

## 2023-10-27 PROCEDURE — 3074F SYST BP LT 130 MM HG: CPT | Mod: CPTII,95,NTX,

## 2023-10-27 PROCEDURE — 3008F BODY MASS INDEX DOCD: CPT | Mod: CPTII,95,NTX,

## 2023-10-27 PROCEDURE — 1159F PR MEDICATION LIST DOCUMENTED IN MEDICAL RECORD: ICD-10-PCS | Mod: CPTII,95,NTX,

## 2023-10-27 PROCEDURE — 1111F PR DISCHARGE MEDS RECONCILED W/ CURRENT OUTPATIENT MED LIST: ICD-10-PCS | Mod: CPTII,95,NTX,

## 2023-10-27 PROCEDURE — 3078F DIAST BP <80 MM HG: CPT | Mod: CPTII,95,NTX,

## 2023-10-27 PROCEDURE — 3044F HG A1C LEVEL LT 7.0%: CPT | Mod: CPTII,95,NTX,

## 2023-10-27 PROCEDURE — 1160F RVW MEDS BY RX/DR IN RCRD: CPT | Mod: CPTII,95,NTX,

## 2023-10-27 PROCEDURE — 3008F PR BODY MASS INDEX (BMI) DOCUMENTED: ICD-10-PCS | Mod: CPTII,95,NTX,

## 2023-10-27 PROCEDURE — 1160F PR REVIEW ALL MEDS BY PRESCRIBER/CLIN PHARMACIST DOCUMENTED: ICD-10-PCS | Mod: CPTII,95,NTX,

## 2023-10-27 NOTE — TELEPHONE ENCOUNTER
Pulmonary Hypertension Nurse Navigator contacted Kristin Dowell Darrion to advise that an appointment for labs was made today for [facility]  NN went over her Remodulin dosing with her over the phone.  She had not received a new dosing sheet since coming home from the hospital (October 2023). Patient states that she is waiting, currently, to get her medication delivery. She states that she ordered it last week, and they have advised her every day this week that she will be getting her shipment.  She would also, normally, get the new dosing sheet with her new bottle of medication. NN advised that [facility] would be contacted to find out when she will get a new dosing sheet, and when she will get delivery, today.    NN contacted [facility] to check on delivery.  NN was advised by pharmacist that the order was placed on 10/24/23 for both Remodulin and Adempas. Medication shipped via [employer]. Will get shipment by 7pm.  Pharmacist to contact her to discuss Remodulin dosing.  Provider SOFIE Guy DNP notified.

## 2023-10-31 DIAGNOSIS — I27.21 WHO GROUP 1 PULMONARY ARTERIAL HYPERTENSION: Primary | ICD-10-CM

## 2023-10-31 DIAGNOSIS — Z76.82 LUNG TRANSPLANT CANDIDATE: ICD-10-CM

## 2023-10-31 NOTE — PROGRESS NOTES
The patient location is: Home  The chief complaint leading to consultation is: Pulmonary Hypertension    Visit type: audiovisual    Face to Face time with patient: 20  40 minutes of total time spent on the encounter, which includes face to face time and non-face to face time preparing to see the patient (eg, review of tests), Obtaining and/or reviewing separately obtained history, Documenting clinical information in the electronic or other health record, Independently interpreting results (not separately reported) and communicating results to the patient/family/caregiver, or Care coordination (not separately reported).     Each patient to whom he or she provides medical services by telemedicine is:  (1) informed of the relationship between the physician and patient and the respective role of any other health care provider with respect to management of the patient; and (2) notified that he or she may decline to receive medical services by telemedicine and may withdraw from such care at any time.    Subjective:    Patient ID:  Kristin Maier is a 47 y.o. female who presents for follow-up of Pulmonary Hypertension.    HPI:   Mrs. Maier is a 47 y/o AA female diagnosed with WHO Group 1 PAH, referred by Dr. MAINE Stack. She was initially seen in clinic by Dr. Dutta on 11/29/2021, presenting with symptoms of CHU and severe PAH by ECHO. Scheduled for RHC the next day. Same day of her appt she went to an OSH for a UTI.  Given her pulmonary hypertension history a decision was [place] [facility].   12.2.21 RHC revealed: RA: 25/ 26/ 22 RV: 90/ 1/ 21 PA: 86/ 42/ 57 PWP: 9/ 8/ 7. TPG 50, PVR 25 (severely elevated R sided filling pressure. CI 1.2, CO 1.97. Patient was classified as WHO group I PH and started on Veletri while inpatient, patient required  at rate of 5mg/kg/min. Patient had PICC line placed on 12/8/21 for long term administerationof vasodilator therapy. Switched  "to Remodulin at discharge.     2023 admissions: 3/2023 - c/o weakness, dizziness, lethargy; 9/21/2023 - chest pain, headache; 10/5 - remodulin interruption, SOB, syncope.    PH Medications: Remodulin 110 ng/kg/min, Opsumit 10 mg daily, and adempas 2.5 mg, TID.    10/5-10/9 - Hospital Course (synopsis of major diagnoses, care, treatment, and services provided during the course of the hospital stay):      Kristin Maier is a 47 y.o. female with WHO Group I Pulm HTN on Remodulin 110ng/kg/min, followed by Dr JOSE J Rachel in Transplant Pulmonology, chronic neuropathy followed by Palliative medicine on Percocet and Lyrica, prediabetes (a1c 6.4% 12/2021) that initially presented to [facility] [place]5 w/CHU, shortness of breath at  rest, lightheadedness x 36 hrs. When she changed her cassette on Tuesday 10/3 evening she crimped the line and hadn't realized it was still crimped until next day (had been without remodulin for 36 hrs). Previously was active with some mild level of dyspnea on exertion; was taking her macitentan and riociguat without interruption and has been tolerating the medicines well. Presented to [place] initially though then came here to [facility] as all of her care is here. Here initially overloaded, diuresed to euvolemia, repeat TTE 10/6 w/significatnly reduced and dilated RV w/RVPO and small collapsible IVC. Repeat CXR ap and lateral ->no effusion/no pulmonary congestion. Last saw Dr. Rachel pulm 6/2023 reported remains ?early for lung transplant workup though would not be able to undergo lung transplant here given severe pulm HTN (would need to be [place], which she is planning to move to eventually). Repeat RHC post diuresis as below, added daily aldactone 25 to daily lasix 20 mg daily with repeat labs in 1 week.     10/27/2023: Mrs. Maier reports doing okay since discharge. She says today is a "good day." She did notice some swelling, particularly in her face, and took an extra lasix for 2 days. She thinks " "this helped and her breathing is baseline. She says her "bad days" are when she doesn't feel like doing much, has low energy. Tolerating her PH meds. She notes that diarrhea, headache, leg pains seem to be managed. Her pump is functioning. She is a little stressed, however, because she is no her last bottle of remodulin. They are supposed to deliver her medication today.  Denies CP, palpitations, syncope, presyncope, dizziness, lightheadedness, abdominal pain or discomfort.      6MWT:   2/6/2023 6/22/2023   6MW     6MWT Status completed without stopping  completed without stopping    Patient Reported No complaints  Dyspnea    Was O2 used? No  No    6MW Distance walked (feet) 1200 feet  1277 feet    Distance walked (meters) 365.76 meters  389.23 meters    Did patient stop? No  No    Oxygen Saturation 98 %  98 %    Supplemental Oxygen Room Air  Room Air    Heart Rate 86 bpm  96 bpm    Blood Pressure 102/66  97/66    Jessica Dyspnea Rating  nothing at all  nothing at all    Oxygen Saturation 98 %  94 %    Supplemental Oxygen Room Air  Room Air    Heart Rate 100 bpm  104 bpm    Blood Pressure 102/68  102/67    Jessica Dyspnea Rating  very light  somewhat heavy    Recovery Time (seconds) 76 seconds  55 seconds    Oxygen Saturation 98 %  98 %    Supplemental Oxygen Room Air  Room Air    Heart Rate 90 bpm  104 bpm        ECHO: 10/6/2023    Left Ventricle: The left ventricle is normal in size. Normal wall thickness. Septal flattening in systole consistent with right ventricular pressure overload. There is normal systolic function with a visually estimated ejection fraction of 65%.    Right Ventricle: Severe right ventricular enlargement. Systolic function is moderately reduced. The free wall strain is -12.1%.    Right Atrium: Right atrium is severely dilated.    Tricuspid Valve: Mildly thickened leaflets. There is mild annular dilation present. There is moderate to severe regurgitation with a centrally directed jet.    Pulmonary " Artery: The estimated pulmonary artery systolic pressure is 77 mmHg.    IVC/SVC: Normal venous pressure at 3 mmHg.    Pericardium: There is a small circumferential effusion. There is no tamponade physiology.    ECHO: 12/15/2022  The left ventricle is normal in size with normal systolic function.  The estimated ejection fraction is 65%.  Normal left ventricular diastolic function.  There is abnormal septal wall motion. There is systolic flattening of the interventricular septum consistent with right ventricle pressure overload.  Severe right ventricular enlargement with moderately to severely reduced right ventricular systolic function.  Severe tricuspid regurgitation.  The estimated PA systolic pressure is 79 mmHg.  Normal central venous pressure (3 mmHg).  There is pulmonary hypertension.  Right atrial enlargement.  Small circumferential pericardial effusion.    ECHO: 11/14/2022  The estimated ejection fraction is 60%.  The left ventricle is normal in size with normal systolic function.  There is abnormal septal wall motion. There is systolic and diastolic flattening of the interventricular septum consistent with right ventricle pressure and volume overload.  Indeterminate left ventricular diastolic function.  Moderate right ventricular enlargement with moderately reduced right ventricular systolic function.  There is right ventricular hypertrophy.  Severe tricuspid regurgitation.  Moderate right atrial enlargement.  There is pulmonary hypertension.  The estimated PA systolic pressure is 84 mmHg.  Normal central venous pressure (3 mmHg).  Trivial circumferential pericardial effusion.    ECHO: 12/13/2021 (prior to initiation of remodulin)  The left ventricle is normal in size with concentric remodeling and normal systolic function.  The estimated ejection fraction is 63%.  There are segmental left ventricular wall motion abnormalities.  There is abnormal septal wall motion.  Normal left ventricular diastolic  function.  Moderate right ventricular enlargement with mildly reduced right ventricular systolic function.  Moderate right atrial enlargement.  Severe tricuspid regurgitation.  Mild pulmonic regurgitation.  Elevated central venous pressure (15 mmHg).  The estimated PA systolic pressure is 122 mmHg.  There is pulmonary hypertension.  Small pericardial effusion. Apically and trivial under the RA.    RHC: 10/9/2023  RA: 7/ 6/ 5 RV: 71/ 3/ 4 PA: 71/ 32/ 43 PWP: 12/ 10/ 10 .   Cardiac output was 3.8 by Kristel. Cardiac index is 2.26 L/min/m2.   Low normal CO/CI on PH therapy (remodulin, macitentan, riociguat).   Normal right and left sided filling pressures.  Severe pulmonary hypertension in setting of normal left sided filling pressures.  TPG 33 PVR 8.68     RHC: 7/19/2022  RA: 15/ 11/ 10 RV: 70/ 9/ 10 PA: 72/ 29/ 43 PWP: 12/ 12/ 11 .   Cardiac output was 4.29  by Kristel. Cardiac index is 2.61 L/min/m2.   O2 Sat: PA 69%.   Normal CO/CI on remodulin 80 ng/kg/min, riociguat 2mg po TID, macitentan 10mg.  Mildly increased right and normal left sided filling pressures.  Severe pulmonary hypertension.   TPG  32   PVR  7.45  MAP 75  SVR 1212    RHC: 3/16/2022  RA: 25/ 26/ 22 RV: 90/ 1/ 21 PA: 86/ 42/ 57 PWP: 9/ 8/ 7 .   Cardiac output was 1.97  by Kristel. Cardiac index is 1.2 L/min/m2.   O2 Sat: PA 38%.     TPG   50    PVR   25    PFTs: 6/27/2023 6/22/2023   Pulmonary Function Tests    FVC 2.95 liters    FEV1 2.34 liters    TLC (liters) 4.01 liters    DLCO (ml/mmHg sec) 14.58 ml/mmHg sec    FVC% 89.6    FEV1% 88.8    FEF 25-75 2.25    FEF 25-75% 86.7    TLC% 73.6    RV 1.06    RV% 56.7    DLCO% 55.6      V/Q: 12/3/2021  FINDINGS:  Perfusion: No observable filling defects or perfusion defects on perfusion imaging.  Ventilation: No observable defects on ventilation imaging.  Small amount of ingested radiotracer is visualized within the aerodigestive tract.  Impression:  This represents a low probability of pulmonary embolism.    CT  Chest: 9/12/2022  Impression:     1. Constellation of findings suggestive of pulmonary arterial hypertension.  No significant pulmonary edema or pleural effusion.  No evidence of pulmonary thromboembolism.  2. Cardiomegaly with markedly enlarged right atrium and the right ventricle.  3. Multiple small pulmonary nodules, with the largest measuring 0.4 cm.  For multiple solid nodules all <6 mm, Fleischner Society 2017 guidelines recommend no routine follow up for a low risk patient, or follow up with non-contrast chest CT at 12 months after discovery in a high risk patient.  4. 3.9 cm borderline aneurysmal ascending aorta.    IV/SC:  Pulmonary Hypertension Review Flowsheet 2/7/2023   Pump Type CADD   Medication type Remodulin   Vial size 5.0 mg/mL   Dose (ng/kg/min) 76 ng/kg/min   DW (kg) 55 kg   Amt of Med used 3 mL   Amt of Diluent Used NS 97 mL   Final Concentration (ng/ml) 0.015 mg/mL   Pump Rate ml/24 hr 40 mL/24 hr     Review of Systems   Constitutional: Negative.   HENT: Negative.     Eyes: Negative.    Cardiovascular:  Positive for dyspnea on exertion. Negative for chest pain, irregular heartbeat, leg swelling, near-syncope, orthopnea, paroxysmal nocturnal dyspnea and syncope.        Occasional leg swelling, resolves w/o diuretics   Respiratory: Negative.     Endocrine: Negative.    Hematologic/Lymphatic: Negative.    Skin: Negative.    Musculoskeletal:  Positive for myalgias. Negative for falls and joint swelling.        Foot and leg pain related to medication SE   Gastrointestinal:  Positive for diarrhea. Negative for abdominal pain, dysphagia, heartburn, nausea and vomiting.        Occasional nausea and diarrhea related to medication   Genitourinary: Negative.    Neurological:  Positive for excessive daytime sleepiness. Negative for dizziness, headaches and loss of balance.   Psychiatric/Behavioral: Negative.          Objective: BP (!) 108/59 (BP Location: Left arm, Patient Position: Sitting)   Pulse 106   " Ht 5' 2" (1.575 m)   Wt 64.4 kg (142 lb)   LMP 06/19/2017 (Approximate)   BMI 25.97 kg/m²       Physical Exam  Constitutional:       General: She is not in acute distress.     Appearance: Normal appearance. She is normal weight. She is not ill-appearing.   HENT:      Head: Normocephalic and atraumatic.      Nose: Nose normal.   Eyes:      Extraocular Movements: Extraocular movements intact.      Pupils: Pupils are equal, round, and reactive to light.   Neck:      Vascular: No JVD.   Cardiovascular:      Rate and Rhythm: Normal rate and regular rhythm.   Chest:      Comments: R chest wall Pedro Catheter infusing Remodulin. Dressing is C/D/I  Musculoskeletal:      Cervical back: Normal range of motion and neck supple.   Neurological:      Mental Status: She is alert.       Lab Results   Component Value Date     (H) 10/05/2023     (L) 10/09/2023    K 3.8 10/09/2023    MG 1.8 10/09/2023     10/09/2023    CO2 19 (L) 10/09/2023    BUN 21 (H) 10/09/2023    CREATININE 1.0 10/09/2023    GLU 94 10/09/2023    HGBA1C 5.4 10/07/2023    AST 54 (H) 10/05/2023    ALT 81 (H) 10/05/2023    ALBUMIN 3.9 10/05/2023    PROT 7.0 10/05/2023    BILITOT 1.7 (H) 10/05/2023    CHOL 183 10/07/2023    HDL 46 10/07/2023    LDLCALC 113.2 10/07/2023    TRIG 119 10/07/2023       Magnesium   Date Value Ref Range Status   10/09/2023 1.8 1.6 - 2.6 mg/dL Final       Lab Results   Component Value Date    WBC 4.84 10/09/2023    HGB 13.1 10/09/2023    HCT 39.8 10/09/2023    MCV 89 10/09/2023     10/09/2023       Lab Results   Component Value Date    INR 1.1 07/19/2022    INR 1.1 12/23/2021    INR 1.1 12/22/2021       BNP   Date Value Ref Range Status   10/05/2023 311 (H) 0 - 99 pg/mL Final     Comment:     Values of less than 100 pg/ml are consistent with non-CHF populations.   03/19/2023 67 0 - 99 pg/mL Final     Comment:     Values of less than 100 pg/ml are consistent with non-CHF populations.   02/06/2023 50 0 - 99 " "pg/mL Final     Comment:     Values of less than 100 pg/ml are consistent with non-CHF populations.       No results found for: "LDH"      WHO Group: 1 Functional Class: II   REVEAL Score: 9 (High Risk)  Assessment:       1. WHO group 1 pulmonary arterial hypertension    2. High risk medication use    3. Shortness of breath    4. Debility         Plan:       Mrs. Maier has severe PAH on triple therapy, to include IV remodulin. Her REVEAL score is high. She has had 3 hospital admissions this year. We will attempt to increase her remodulin and continue to optimize her fluid status. She is not on oxygen, but last check was in June. She has an appointment with lung transplant in December.    I have talked to the lung transplant team and to Mrs. Maier. Because of her insurance she needs to either: move to [place], establish residency (30 days), and apply for [place] medicaid, then contact a lung transplant center or find insurance on the [place] marketplace that she can afford and is accepted by one of the out of state facilities. The  for our transplant program will reach out to her to better explain her options.    Noted that Mrs. Maier did not show for her genetics testing appointment scheduled for July. Will follow-up on this. We do not have a clear etiology for her severe PAH. She has 4 adult children, age 24-31.    Mrs. Maier has also not had a sleep study. Will place another referral.    Have labs drawn today.    Return to clinic in 3 months with labs, walk    "

## 2023-10-31 NOTE — PROGRESS NOTES
Per DEISY Rachel, patient RTC 12/2023 with PFTs, 6MWT. Start on room air, modified. Request to .

## 2023-10-31 NOTE — TELEPHONE ENCOUNTER
Pulmonary Hypertension Nurse Navigator contacted Kristin Maier to discuss diuretic use and labs that were scheduled for 10/27/2023.  Patient states that she forgot about getting lab work done on Friday. She states that she will go tomorrow at noon.  NN made appointment for 1215 at [facility].  Patient states that she has been taking lasix 20 mg per day except for 10/30 and 10/31.  She had taken two tabs of 20 mg both days due to swelling. She also states that she does take spironolactone 25 mg daily.  NN tried to impress upon her how important getting the lab work processed when taking any diuretic medications. She states that she understands at this time. Provider SOFIE Guy, JAYCEE notified of rescheduled lab work.

## 2023-11-01 ENCOUNTER — PATIENT MESSAGE (OUTPATIENT)
Dept: PALLIATIVE MEDICINE | Facility: CLINIC | Age: 48
End: 2023-11-01
Payer: MEDICAID

## 2023-11-01 DIAGNOSIS — F41.9 ANXIETY: ICD-10-CM

## 2023-11-01 NOTE — TELEPHONE ENCOUNTER
"Pulmonary Hypertension Nurse Navigator contacted Kristin Maier to discuss lab work again. Patient states that she has not gotten up yet.  She states that she was nauseated today and took medication.  NN advised that the lab test could show if there is an abnormality. She acknowledged stating she is getting up, now, and will get labs drawn.    4:01 PM  NN missed call from Kristin Dowell Darrion due to being on the phone with a different patient.  NN returned call to Ms. Maier. She asked if the results were in for her lab work.  NN did confirm that results did not show abnormal fluid status or electrolytes.  She states that she feels nauseated, is having diarrhea and that she is "titrating". Discussed with her that provider, SOFIE Guy DNP would be notified that lab results were in, and that she will look at them. She acknowledged.  "

## 2023-11-02 ENCOUNTER — PATIENT MESSAGE (OUTPATIENT)
Dept: PALLIATIVE MEDICINE | Facility: CLINIC | Age: 48
End: 2023-11-02
Payer: MEDICAID

## 2023-11-02 ENCOUNTER — TELEPHONE (OUTPATIENT)
Dept: PALLIATIVE MEDICINE | Facility: CLINIC | Age: 48
End: 2023-11-02
Payer: MEDICAID

## 2023-11-02 RX ORDER — OXYCODONE AND ACETAMINOPHEN 10; 325 MG/1; MG/1
1 TABLET ORAL EVERY 8 HOURS PRN
Qty: 90 TABLET | Refills: 0 | Status: SHIPPED | OUTPATIENT
Start: 2023-11-02 | End: 2023-11-06 | Stop reason: SDUPTHER

## 2023-11-02 NOTE — TELEPHONE ENCOUNTER
Called patient at her request. Left  providing clinic contact number. Also sent portal message back to her.

## 2023-11-06 ENCOUNTER — TELEPHONE (OUTPATIENT)
Dept: PALLIATIVE MEDICINE | Facility: CLINIC | Age: 48
End: 2023-11-06
Payer: MEDICAID

## 2023-11-06 RX ORDER — LANOLIN ALCOHOL/MO/W.PET/CERES
400 CREAM (GRAM) TOPICAL DAILY
Qty: 90 TABLET | Refills: 3 | Status: SHIPPED | OUTPATIENT
Start: 2023-11-06 | End: 2024-11-05

## 2023-11-06 RX ORDER — OXYCODONE AND ACETAMINOPHEN 10; 325 MG/1; MG/1
1 TABLET ORAL EVERY 6 HOURS PRN
Qty: 120 TABLET | Refills: 0 | Status: SHIPPED | OUTPATIENT
Start: 2023-11-06 | End: 2023-12-07 | Stop reason: SDUPTHER

## 2023-11-06 RX ORDER — SPIRONOLACTONE 25 MG/1
25 TABLET ORAL DAILY
Qty: 90 TABLET | Refills: 3 | Status: SHIPPED | OUTPATIENT
Start: 2023-11-06 | End: 2024-11-05

## 2023-11-06 NOTE — TELEPHONE ENCOUNTER
Called patient. She states that she has been titrating remodulin and having more pain with that. She is asking if Percocet interval can be increased. She is currently out of medication and refill due this Thursday. Informed her that I will relay message to Dr. Lee and call her back.

## 2023-12-01 DIAGNOSIS — I27.21 WHO GROUP 1 PULMONARY ARTERIAL HYPERTENSION: Primary | ICD-10-CM

## 2023-12-04 RX ORDER — MACITENTAN 10 MG/1
TABLET, FILM COATED ORAL
Qty: 30 TABLET | Refills: 10 | Status: SHIPPED | OUTPATIENT
Start: 2023-12-04 | End: 2023-12-20 | Stop reason: SDUPTHER

## 2023-12-07 RX ORDER — OXYCODONE AND ACETAMINOPHEN 10; 325 MG/1; MG/1
1 TABLET ORAL EVERY 6 HOURS PRN
Qty: 120 TABLET | Refills: 0 | Status: SHIPPED | OUTPATIENT
Start: 2023-12-07 | End: 2024-01-12 | Stop reason: SDUPTHER

## 2023-12-18 ENCOUNTER — TELEPHONE (OUTPATIENT)
Dept: TRANSPLANT | Facility: CLINIC | Age: 48
End: 2023-12-18
Payer: MEDICAID

## 2023-12-18 NOTE — TELEPHONE ENCOUNTER
Patient called and LVM for Nurse navigator. NN called back but there was no answer. Message left for patient to call back.

## 2023-12-19 ENCOUNTER — TELEPHONE (OUTPATIENT)
Dept: TRANSPLANT | Facility: CLINIC | Age: 48
End: 2023-12-19
Payer: MEDICAID

## 2023-12-19 NOTE — LETTER
December 20, 2023    [address] [place] [address]          Dear Kristin Maier:    Patient: Kristin Maier   MR Number: [id number]   YOB: 1975     We hope this letter finds you well. You missed your appointment in the lung transplant department that was scheduled on December 11, 2023. Please contact us at [phone] to re-schedule your appointment.                                                                               Sincerely,     America Rachel D.O.  Medical Director, Lung Transplant  Pulmonary & Critical Care Medicine  [facility]  [address] [place] [address]  [phone]

## 2023-12-19 NOTE — LETTER
December 20, 2023    [address] [place] [id number]          Dear Kristin Maier:    Patient: Kristin Maier   MR Number: [id number]   YOB: 1975     We hope this letter finds you well. You missed your appointment in the lung transplant department ***. Please contact us at [phone] to re-schedule your appointment.                                                                               Sincerely,     America Rachel D.O.  Medical Director, Lung Transplant  Pulmonary & Critical Care Medicine  [facility]  [address] [place] [id number]  [phone]

## 2023-12-20 DIAGNOSIS — I27.21 WHO GROUP 1 PULMONARY ARTERIAL HYPERTENSION: Primary | ICD-10-CM

## 2023-12-21 ENCOUNTER — TELEPHONE (OUTPATIENT)
Dept: TRANSPLANT | Facility: CLINIC | Age: 48
End: 2023-12-21
Payer: MEDICAID

## 2023-12-21 ENCOUNTER — PATIENT MESSAGE (OUTPATIENT)
Dept: TRANSPLANT | Facility: CLINIC | Age: 48
End: 2023-12-21
Payer: MEDICAID

## 2023-12-21 RX ORDER — MIRTAZAPINE 15 MG/1
15 TABLET, FILM COATED ORAL
COMMUNITY
Start: 2023-12-04 | End: 2024-01-12

## 2023-12-21 RX ORDER — SERTRALINE HYDROCHLORIDE 50 MG/1
50 TABLET, FILM COATED ORAL DAILY
Qty: 30 TABLET | Refills: 11 | Status: SHIPPED | OUTPATIENT
Start: 2023-12-21 | End: 2024-12-20

## 2023-12-21 NOTE — TELEPHONE ENCOUNTER
Tried to call - voicemail is full. Sent msg via [facility].    ----- Message from America Mirza sent at 12/21/2023  1:24 PM CST -----  Regarding: Appt  Contact: Pt  [phone]          Date/Time Preference:  Next agnieszka     Caller Name:  Kristin     Contact Prefer: [phone]    Comments/Notes: Called to reschedule canceled appts from 12/11/23

## 2023-12-21 NOTE — TELEPHONE ENCOUNTER
Tried to call Ms. Maier - unable to leave message - voicemail box is full.    ----- Message from America Mirza sent at 12/21/2023  1:24 PM CST -----  Regarding: Appt  Contact: Pt  [phone]          Date/Time Preference:  Next agnieszka     Caller Name:  Kristin     Contact Prefer: [phone]    Comments/Notes: Called to reschedule canceled appts from 12/11/23

## 2023-12-23 RX ORDER — MACITENTAN 10 MG/1
10 TABLET, FILM COATED ORAL DAILY
Qty: 30 TABLET | Refills: 11 | Status: SHIPPED | OUTPATIENT
Start: 2023-12-23 | End: 2024-12-22

## 2024-01-02 ENCOUNTER — HOSPITAL ENCOUNTER (OUTPATIENT)
Facility: HOSPITAL | Age: 49
Discharge: HOME | End: 2024-01-04
Attending: EMERGENCY MEDICINE | Admitting: INTERNAL MEDICINE
Payer: MEDICAID

## 2024-01-02 DIAGNOSIS — R55 SYNCOPAL EPISODES: ICD-10-CM

## 2024-01-02 DIAGNOSIS — I27.21 WHO GROUP 1 PULMONARY ARTERIAL HYPERTENSION: Primary | ICD-10-CM

## 2024-01-02 DIAGNOSIS — I27.20 PULMONARY HYPERTENSION: ICD-10-CM

## 2024-01-02 DIAGNOSIS — R40.20 LOSS OF CONSCIOUSNESS: ICD-10-CM

## 2024-01-02 DIAGNOSIS — I27.9 CHRONIC PULMONARY HEART DISEASE: ICD-10-CM

## 2024-01-02 DIAGNOSIS — R06.02 SHORTNESS OF BREATH: ICD-10-CM

## 2024-01-02 DIAGNOSIS — R55 SYNCOPE: ICD-10-CM

## 2024-01-02 PROBLEM — R42 DIZZINESS: Status: ACTIVE | Noted: 2024-01-02

## 2024-01-02 LAB
ADENOVIRUS: NOT DETECTED
ALBUMIN SERPL BCP-MCNC: 4 G/DL (ref 3.5–5.2)
ALLENS TEST: ABNORMAL
ALP SERPL-CCNC: 81 U/L (ref 55–135)
ALT SERPL W/O P-5'-P-CCNC: 22 U/L (ref 10–44)
AMPHET+METHAMPHET UR QL: NEGATIVE
ANION GAP SERPL CALC-SCNC: 13 MMOL/L (ref 8–16)
AST SERPL-CCNC: 25 U/L (ref 10–40)
BARBITURATES UR QL SCN>200 NG/ML: NEGATIVE
BASOPHILS # BLD AUTO: 0.02 K/UL (ref 0–0.2)
BASOPHILS NFR BLD: 0.5 % (ref 0–1.9)
BENZODIAZ UR QL SCN>200 NG/ML: NEGATIVE
BILIRUB SERPL-MCNC: 1.7 MG/DL (ref 0.1–1)
BILIRUB UR QL STRIP: NEGATIVE
BNP SERPL-MCNC: 183 PG/ML (ref 0–99)
BORDETELLA PARAPERTUSSIS (IS1001): NOT DETECTED
BORDETELLA PERTUSSIS (PTXP): NOT DETECTED
BUN SERPL-MCNC: 26 MG/DL (ref 6–20)
BZE UR QL SCN: NEGATIVE
CALCIUM SERPL-MCNC: 9.3 MG/DL (ref 8.7–10.5)
CANNABINOIDS UR QL SCN: NEGATIVE
CHLAMYDIA PNEUMONIAE: NOT DETECTED
CHLORIDE SERPL-SCNC: 110 MMOL/L (ref 95–110)
CLARITY UR REFRACT.AUTO: CLEAR
CO2 SERPL-SCNC: 19 MMOL/L (ref 23–29)
COLOR UR AUTO: YELLOW
CORONAVIRUS 229E, COMMON COLD VIRUS: NOT DETECTED
CORONAVIRUS HKU1, COMMON COLD VIRUS: NOT DETECTED
CORONAVIRUS NL63, COMMON COLD VIRUS: NOT DETECTED
CORONAVIRUS OC43, COMMON COLD VIRUS: NOT DETECTED
CREAT SERPL-MCNC: 1 MG/DL (ref 0.5–1.4)
CREAT UR-MCNC: 103 MG/DL (ref 15–325)
DIFFERENTIAL METHOD BLD: ABNORMAL
EOSINOPHIL # BLD AUTO: 0.1 K/UL (ref 0–0.5)
EOSINOPHIL NFR BLD: 2.2 % (ref 0–8)
ERYTHROCYTE [DISTWIDTH] IN BLOOD BY AUTOMATED COUNT: 14.4 % (ref 11.5–14.5)
EST. GFR  (NO RACE VARIABLE): >60 ML/MIN/1.73 M^2
ETHANOL UR-MCNC: <10 MG/DL
FLUBV RNA NPH QL NAA+NON-PROBE: NOT DETECTED
GLUCOSE SERPL-MCNC: 85 MG/DL (ref 70–110)
GLUCOSE UR QL STRIP: NEGATIVE
HCO3 UR-SCNC: 20.2 MMOL/L (ref 24–28)
HCT VFR BLD AUTO: 37.9 % (ref 37–48.5)
HGB BLD-MCNC: 12.3 G/DL (ref 12–16)
HGB UR QL STRIP: NEGATIVE
HPIV1 RNA NPH QL NAA+NON-PROBE: NOT DETECTED
HPIV2 RNA NPH QL NAA+NON-PROBE: NOT DETECTED
HPIV3 RNA NPH QL NAA+NON-PROBE: NOT DETECTED
HPIV4 RNA NPH QL NAA+NON-PROBE: NOT DETECTED
HUMAN METAPNEUMOVIRUS: NOT DETECTED
IMM GRANULOCYTES # BLD AUTO: 0.01 K/UL (ref 0–0.04)
IMM GRANULOCYTES NFR BLD AUTO: 0.2 % (ref 0–0.5)
INFLUENZA A (SUBTYPES H1,H1-2009,H3): NOT DETECTED
INFLUENZA A, MOLECULAR: NEGATIVE
INFLUENZA B, MOLECULAR: NEGATIVE
KETONES UR QL STRIP: NEGATIVE
LACTATE SERPL-SCNC: 0.6 MMOL/L (ref 0.5–2.2)
LEUKOCYTE ESTERASE UR QL STRIP: NEGATIVE
LYMPHOCYTES # BLD AUTO: 2.3 K/UL (ref 1–4.8)
LYMPHOCYTES NFR BLD: 56 % (ref 18–48)
MAGNESIUM SERPL-MCNC: 2.2 MG/DL (ref 1.6–2.6)
MCH RBC QN AUTO: 28.3 PG (ref 27–31)
MCHC RBC AUTO-ENTMCNC: 32.5 G/DL (ref 32–36)
MCV RBC AUTO: 87 FL (ref 82–98)
METHADONE UR QL SCN>300 NG/ML: NEGATIVE
MONOCYTES # BLD AUTO: 0.3 K/UL (ref 0.3–1)
MONOCYTES NFR BLD: 7.2 % (ref 4–15)
MYCOPLASMA PNEUMONIAE: NOT DETECTED
NEUTROPHILS # BLD AUTO: 1.4 K/UL (ref 1.8–7.7)
NEUTROPHILS NFR BLD: 33.9 % (ref 38–73)
NITRITE UR QL STRIP: NEGATIVE
NRBC BLD-RTO: 0 /100 WBC
OPIATES UR QL SCN: ABNORMAL
PCO2 BLDA: 33.5 MMHG (ref 35–45)
PCP UR QL SCN>25 NG/ML: ABNORMAL
PH SMN: 7.39 [PH] (ref 7.35–7.45)
PH UR STRIP: 5 [PH] (ref 5–8)
PLATELET # BLD AUTO: 172 K/UL (ref 150–450)
PMV BLD AUTO: 10.9 FL (ref 9.2–12.9)
PO2 BLDA: 63 MMHG (ref 40–60)
POC BE: -5 MMOL/L
POC SATURATED O2: 92 % (ref 95–100)
POC TCO2: 21 MMOL/L (ref 24–29)
POTASSIUM SERPL-SCNC: 3.7 MMOL/L (ref 3.5–5.1)
PROT SERPL-MCNC: 7.2 G/DL (ref 6–8.4)
PROT UR QL STRIP: NEGATIVE
RBC # BLD AUTO: 4.35 M/UL (ref 4–5.4)
RESPIRATORY INFECTION PANEL SOURCE: NORMAL
RSV RNA NPH QL NAA+NON-PROBE: NOT DETECTED
RV+EV RNA NPH QL NAA+NON-PROBE: NOT DETECTED
SAMPLE: ABNORMAL
SARS-COV-2 RDRP RESP QL NAA+PROBE: NEGATIVE
SARS-COV-2 RNA RESP QL NAA+PROBE: NOT DETECTED
SITE: ABNORMAL
SODIUM SERPL-SCNC: 142 MMOL/L (ref 136–145)
SP GR UR STRIP: 1.02 (ref 1–1.03)
SPECIMEN SOURCE: NORMAL
TOXICOLOGY INFORMATION: ABNORMAL
TROPONIN I SERPL DL<=0.01 NG/ML-MCNC: 0.01 NG/ML (ref 0–0.03)
TSH SERPL DL<=0.005 MIU/L-ACNC: 0.62 UIU/ML (ref 0.4–4)
URN SPEC COLLECT METH UR: NORMAL
WBC # BLD AUTO: 4.05 K/UL (ref 3.9–12.7)

## 2024-01-02 PROCEDURE — 80053 COMPREHEN METABOLIC PANEL: CPT | Mod: NTX

## 2024-01-02 PROCEDURE — 93005 ELECTROCARDIOGRAM TRACING: CPT | Mod: NTX

## 2024-01-02 PROCEDURE — 96366 THER/PROPH/DIAG IV INF ADDON: CPT

## 2024-01-02 PROCEDURE — 84443 ASSAY THYROID STIM HORMONE: CPT | Mod: NTX

## 2024-01-02 PROCEDURE — G0378 HOSPITAL OBSERVATION PER HR: HCPCS | Mod: NTX

## 2024-01-02 PROCEDURE — 87502 INFLUENZA DNA AMP PROBE: CPT | Mod: NTX

## 2024-01-02 PROCEDURE — 81003 URINALYSIS AUTO W/O SCOPE: CPT | Mod: NTX

## 2024-01-02 PROCEDURE — 96365 THER/PROPH/DIAG IV INF INIT: CPT

## 2024-01-02 PROCEDURE — 93010 ELECTROCARDIOGRAM REPORT: CPT | Mod: 59,NTX,, | Performed by: INTERNAL MEDICINE

## 2024-01-02 PROCEDURE — 25000003 PHARM REV CODE 250: Mod: NTX | Performed by: STUDENT IN AN ORGANIZED HEALTH CARE EDUCATION/TRAINING PROGRAM

## 2024-01-02 PROCEDURE — 93010 ELECTROCARDIOGRAM REPORT: CPT | Mod: NTX,,, | Performed by: INTERNAL MEDICINE

## 2024-01-02 PROCEDURE — 25000003 PHARM REV CODE 250: Mod: NTX | Performed by: INTERNAL MEDICINE

## 2024-01-02 PROCEDURE — 51701 INSERT BLADDER CATHETER: CPT | Mod: NTX

## 2024-01-02 PROCEDURE — 25000003 PHARM REV CODE 250: Mod: NTX

## 2024-01-02 PROCEDURE — 96361 HYDRATE IV INFUSION ADD-ON: CPT | Mod: NTX

## 2024-01-02 PROCEDURE — 83735 ASSAY OF MAGNESIUM: CPT | Mod: NTX

## 2024-01-02 PROCEDURE — 87633 RESP VIRUS 12-25 TARGETS: CPT | Mod: NTX | Performed by: STUDENT IN AN ORGANIZED HEALTH CARE EDUCATION/TRAINING PROGRAM

## 2024-01-02 PROCEDURE — 82803 BLOOD GASES ANY COMBINATION: CPT | Mod: NTX

## 2024-01-02 PROCEDURE — U0002 COVID-19 LAB TEST NON-CDC: HCPCS | Mod: NTX

## 2024-01-02 PROCEDURE — 85025 COMPLETE CBC W/AUTO DIFF WBC: CPT | Mod: NTX

## 2024-01-02 PROCEDURE — 83605 ASSAY OF LACTIC ACID: CPT | Mod: NTX | Performed by: STUDENT IN AN ORGANIZED HEALTH CARE EDUCATION/TRAINING PROGRAM

## 2024-01-02 PROCEDURE — 63600175 PHARM REV CODE 636 W HCPCS: Mod: JG,NTX | Performed by: INTERNAL MEDICINE

## 2024-01-02 PROCEDURE — 84484 ASSAY OF TROPONIN QUANT: CPT | Mod: NTX

## 2024-01-02 PROCEDURE — 87502 INFLUENZA DNA AMP PROBE: CPT | Mod: 59,NTX

## 2024-01-02 PROCEDURE — 51798 US URINE CAPACITY MEASURE: CPT | Mod: NTX

## 2024-01-02 PROCEDURE — 83880 ASSAY OF NATRIURETIC PEPTIDE: CPT | Mod: NTX

## 2024-01-02 PROCEDURE — 36415 COLL VENOUS BLD VENIPUNCTURE: CPT | Mod: NTX | Performed by: STUDENT IN AN ORGANIZED HEALTH CARE EDUCATION/TRAINING PROGRAM

## 2024-01-02 PROCEDURE — 93005 ELECTROCARDIOGRAM TRACING: CPT | Mod: NTX | Performed by: INTERNAL MEDICINE

## 2024-01-02 PROCEDURE — 80307 DRUG TEST PRSMV CHEM ANLYZR: CPT | Mod: NTX | Performed by: INTERNAL MEDICINE

## 2024-01-02 PROCEDURE — 99223 1ST HOSP IP/OBS HIGH 75: CPT | Mod: NTX,,, | Performed by: INTERNAL MEDICINE

## 2024-01-02 PROCEDURE — 99285 EMERGENCY DEPT VISIT HI MDM: CPT | Mod: 25,NTX

## 2024-01-02 PROCEDURE — 99900035 HC TECH TIME PER 15 MIN (STAT): Mod: NTX

## 2024-01-02 RX ORDER — NALOXONE HCL 0.4 MG/ML
0.4 VIAL (ML) INJECTION ONCE
Status: DISCONTINUED | OUTPATIENT
Start: 2024-01-02 | End: 2024-01-02

## 2024-01-02 RX ORDER — ACETAMINOPHEN 500 MG
1000 TABLET ORAL EVERY 8 HOURS PRN
Status: DISCONTINUED | OUTPATIENT
Start: 2024-01-02 | End: 2024-01-04 | Stop reason: HOSPADM

## 2024-01-02 RX ORDER — MIRTAZAPINE 15 MG/1
15 TABLET, FILM COATED ORAL NIGHTLY
Status: DISCONTINUED | OUTPATIENT
Start: 2024-01-02 | End: 2024-01-04 | Stop reason: HOSPADM

## 2024-01-02 RX ORDER — SERTRALINE HYDROCHLORIDE 50 MG/1
50 TABLET, FILM COATED ORAL DAILY
Status: DISCONTINUED | OUTPATIENT
Start: 2024-01-02 | End: 2024-01-04 | Stop reason: HOSPADM

## 2024-01-02 RX ORDER — SPIRONOLACTONE 25 MG/1
25 TABLET ORAL DAILY
Status: DISCONTINUED | OUTPATIENT
Start: 2024-01-02 | End: 2024-01-03

## 2024-01-02 RX ORDER — ATORVASTATIN CALCIUM 20 MG/1
80 TABLET, FILM COATED ORAL DAILY
Status: DISCONTINUED | OUTPATIENT
Start: 2024-01-02 | End: 2024-01-04 | Stop reason: HOSPADM

## 2024-01-02 RX ORDER — SODIUM CHLORIDE 0.9 % (FLUSH) 0.9 %
10 SYRINGE (ML) INJECTION
Status: DISCONTINUED | OUTPATIENT
Start: 2024-01-02 | End: 2024-01-04 | Stop reason: HOSPADM

## 2024-01-02 RX ADMIN — ACETAMINOPHEN 1000 MG: 500 TABLET ORAL at 02:01

## 2024-01-02 RX ADMIN — SODIUM CHLORIDE 250 ML: 9 INJECTION, SOLUTION INTRAVENOUS at 06:01

## 2024-01-02 RX ADMIN — ATORVASTATIN CALCIUM 80 MG: 20 TABLET, FILM COATED ORAL at 12:01

## 2024-01-02 RX ADMIN — TREPROSTINIL 110 NG/KG/MIN: 200 INJECTION, SOLUTION INTRAVENOUS; SUBCUTANEOUS at 12:01

## 2024-01-02 RX ADMIN — MIRTAZAPINE 15 MG: 15 TABLET, FILM COATED ORAL at 08:01

## 2024-01-02 RX ADMIN — RIOCIGUAT 2.5 MG: 2.5 TABLET, FILM COATED ORAL at 02:01

## 2024-01-02 RX ADMIN — RIOCIGUAT 2.5 MG: 2.5 TABLET, FILM COATED ORAL at 08:01

## 2024-01-02 RX ADMIN — SERTRALINE HYDROCHLORIDE 50 MG: 50 TABLET ORAL at 12:01

## 2024-01-02 RX ADMIN — SODIUM CHLORIDE 250 ML: 9 INJECTION, SOLUTION INTRAVENOUS at 05:01

## 2024-01-02 RX ADMIN — MACITENTAN 10 MG: 10 TABLET, FILM COATED ORAL at 12:01

## 2024-01-02 NOTE — ED TRIAGE NOTES
"Kristin Maier, a 48 y.o. female presents to the ED w/ complaint of several syncopal episodes at home, fell and slid out of bed. Pt states she has been dizzy and weak all day. Hx of pulmonary hypertension and is on a Remodulin pump at home    Triage note:  Chief Complaint   Patient presents with    Loss of Consciousness     Pt states she "passed out" twice today. Pt reports severe weakness beginning this morning. First syncopal episode pt was in standing position- denies striking head or any other injuries. Second episode pt states she "slid out of bed onto floor." +dizziness prior to both episodes. Hx of pulm HTN, CHF on home remodulin infusion.     Review of patient's allergies indicates:  No Known Allergies  Past Medical History:   Diagnosis Date    Elevated liver enzymes     Essential (primary) hypertension     Heart failure, unspecified     Hypokalemia     Mixed hyperlipidemia     Neuropathic pain     Tx w/ tramadol & Lyrica    Pulmonary hypertension     Receiving Remodulin continuously through tunneled central line      "

## 2024-01-02 NOTE — PROGRESS NOTES
Pt back from CT. No distress noted, pt remains lethargic but arouses with stimulation. VSS. Bed alarm set.

## 2024-01-02 NOTE — HPI
Mrs. 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 At the time of my examination patient was A&Ox3 but extremely sleepy thoughout conversation so history was limitied. States she was dealing with generalized weakness all day prior to dizziness starting. Denied any chest pain, SOB, fever, chills, nausea, vomiting, headache, pro-dromal symptoms, FND, orthopnea, PND, abdominal distention/tenderness. States she has had similar episodes in the past but it was usually in the setting of severe headache or fluid overload.     In th ED, the cbc, cmp, troponin, CXRY, and EKG were unremarkable. Patient admitted to observation for evaluation of dizziness.

## 2024-01-02 NOTE — PROVIDER PROGRESS NOTES - EMERGENCY DEPT.
Encounter Date: 1/2/2024    ED Physician Progress Notes        ED Resident HAND-OFF NOTE:  Kristin Maier is a 48 y.o. female who presented to the ED on 1/2/2024, patient C/O syncope. I assumed care of patient from off-going ED physician team patient pending recommendations from cardiology.    On my evaluation, Kristin Maier appears well, hemodynamically stable and in NAD. Thus far, Kristin Maier has received:  Medications   sodium chloride 0.9% bolus 250 mL 250 mL (0 mLs Intravenous Stopped 1/2/24 0719)       /74   Pulse 81   Temp 97.6 °F (36.4 °C) (Oral)   Resp 17   Wt 64.4 kg (141 lb 15.6 oz)   LMP 06/19/2017 (Approximate)   SpO2 95%   BMI 25.97 kg/m²         Disposition: I anticipate patient will admitted to cardiology.  ______________________  Gerry Beard MD   Emergency Medicine Resident      UPDATE: Patient sleeping in room, NAD. Discussed patient with pulmonary hypertension team (HTS), who admitted patient.        :  Loss of consciousness

## 2024-01-02 NOTE — PROGRESS NOTES
Pt arrived to [facility]SU [id number] from ED. See flowhseets for vitals and assessment. Bedside cardiac monitoring applied. Pt is lethargic, arouses to voice. Oriented x3 when stimulated enough. Bed alarm set, SO at bedside, side rails x2, bed locked/lowest position, nonskid socks on. Call bell use reviewed and left within reach. Pt and SO verbalized understanding to call for needs/assistance.

## 2024-01-02 NOTE — PROGRESS NOTES
PH Admit Assessment for Continuation of Treprostinil (Remodulin)    Drug Infusing: Treprostinil (Remodulin)  Route: Intravenous  Pump Type: CADD  Current Pump Rate = 43 ml / 24 hours  Current Reservoir Volume = 36.4 (mls remaining to be infused)    Patient reports utilizing: Patient mixed cassettes    Home mixing instructions:   Patient reports mixing 5 ml of concentrated drug in 95 ml of diluent

## 2024-01-02 NOTE — SUBJECTIVE & OBJECTIVE
Past Medical History:   Diagnosis Date    Elevated liver enzymes     Essential (primary) hypertension     Heart failure, unspecified     Hypokalemia     Mixed hyperlipidemia     Neuropathic pain     Tx w/ tramadol & Lyrica    Pulmonary hypertension     Receiving Remodulin continuously through tunneled central line       Past Surgical History:   Procedure Laterality Date    APPENDECTOMY       SECTION      Transverse cut    HYSTERECTOMY  2017    TLH- bleeding    OOPHORECTOMY      RIGHT HEART CATHETERIZATION Right 2021    Procedure: INSERTION, CATHETER, RIGHT HEART;  Surgeon: Chloe Gregory MD;  Location: [facility] CATH LAB;  Service: Cardiology;  Laterality: Right;    RIGHT HEART CATHETERIZATION Right 3/16/2022    Procedure: INSERTION, CATHETER, RIGHT HEART;  Surgeon: Hu Silverman MD;  Location: [facility] CATH LAB;  Service: Cardiology;  Laterality: Right;    RIGHT HEART CATHETERIZATION Right 2022    Procedure: INSERTION, CATHETER, RIGHT HEART;  Surgeon: Chloe Gregory MD;  Location: [facility] CATH LAB;  Service: Cardiology;  Laterality: Right;    RIGHT HEART CATHETERIZATION Right 3/21/2023    Procedure: INSERTION, CATHETER, RIGHT HEART;  Surgeon: Kahlil Cisneros MD;  Location: [facility] CATH LAB;  Service: Cardiology;  Laterality: Right;    RIGHT HEART CATHETERIZATION Right 10/9/2023    Procedure: INSERTION, CATHETER, RIGHT HEART;  Surgeon: Chloe Gregory MD;  Location: [facility] CATH LAB;  Service: Cardiology;  Laterality: Right;    TUBAL LIGATION      VAGINAL DELIVERY  1993; 1996       Review of patient's allergies indicates:  No Known Allergies    Current Facility-Administered Medications   Medication    atorvastatin tablet 80 mg    macitentan tablet 10 mg    mirtazapine tablet 15 mg    PHARMACY TO CONFIRM TREPROSTINIL (REMODULIN) DOSING infusion    riociguat tablet 2.5 mg    sertraline tablet 50 mg    sodium chloride 0.9% flush 10 mL    spironolactone tablet 25 mg     VELETRI/REMODULIN CASSETTE    VELETRI/REMODULIN TUBING     Family History       Problem Relation (Age of Onset)    Cancer Father (69)    No Known Problems Sister, Brother, Brother          Tobacco Use    Smoking status: Former     Types: Cigars     Quit date: 12/1/2020     Years since quitting: 3.0     Passive exposure: Past    Smokeless tobacco: Never    Tobacco comments:     social smoker-light - quit 2001   Substance and Sexual Activity    Alcohol use: Not Currently     Alcohol/week: 2.0 standard drinks of alcohol     Types: 2 Glasses of wine per week     Comment: None now -  previously: socially- 2 or 3 times a week-2 glasses of wine    Drug use: Not Currently     Types: Marijuana     Comment: 2021 last use    Sexual activity: Yes     Partners: Male     Birth control/protection: See Surgical Hx     Comment:      Review of Systems 12 point ROS negative except for HPI.   Objective:     Vital Signs (Most Recent):  Temp: 97.6 °F (36.4 °C) (01/02/24 1102)  Pulse: 77 (01/02/24 1102)  Resp: 16 (01/02/24 1102)  BP: 100/66 (01/02/24 1102)  SpO2: (!) 93 % (01/02/24 1102) Vital Signs (24h Range):  Temp:  [97.6 °F (36.4 °C)-97.7 °F (36.5 °C)] 97.6 °F (36.4 °C)  Pulse:  [77-87] 77  Resp:  [11-27] 16  SpO2:  [93 %-99 %] 93 %  BP: ()/(60-98) 100/66     Patient Vitals for the past 72 hrs (Last 3 readings):   Weight   01/02/24 0948 72.1 kg (158 lb 15.2 oz)   01/02/24 0331 64.4 kg (141 lb 15.6 oz)     Body mass index is 29.07 kg/m².      Intake/Output Summary (Last 24 hours) at 1/2/2024 1120  Last data filed at 1/2/2024 0719  Gross per 24 hour   Intake 250 ml   Output --   Net 250 ml          Physical Exam  Constitutional:       Comments: Alert, Oriented, No acute distress, sleepy   HENT:      Head: Normocephalic and atraumatic.   Eyes:      Conjunctiva/sclera: Conjunctivae normal.      Comments: Pupils weakly reactive. Unable to evaluate H-test since patient was not able to keep eyes open.    Neck:      Vascular: No  "hepatojugular reflux or JVD.      Comments: JVP 8cm of H20  Cardiovascular:      Rate and Rhythm: Normal rate and regular rhythm.   Pulmonary:      Comments: Normal effort, Clear to auscultation   Abdominal:      Comments: Soft, Non-tender, Non-distended   Musculoskeletal:      Cervical back: Normal range of motion.      Comments: No peripheral edema   Skin:     Comments: Dry, Intact, Warm, Capillary refill <2 seconds.    Neurological:      Mental Status: She is oriented to person, place, and time. She is lethargic.      Sensory: Sensation is intact.      Motor: Motor function is intact.      Coordination: Coordination is intact.      Comments: Normal speech. Motor strength 4/5 in all extrermities.    Psychiatric:         Mood and Affect: Mood and affect normal.            Significant Labs:  CBC:  Recent Labs   Lab 01/02/24 0421   WBC 4.05   RBC 4.35   HGB 12.3   HCT 37.9      MCV 87   MCH 28.3   MCHC 32.5     BNP:  Recent Labs   Lab 01/02/24 0421   *     CMP:  Recent Labs   Lab 01/02/24 0421   GLU 85   CALCIUM 9.3   ALBUMIN 4.0   PROT 7.2      K 3.7   CO2 19*      BUN 26*   CREATININE 1.0   ALKPHOS 81   ALT 22   AST 25   BILITOT 1.7*      Coagulation:   No results for input(s): "PT", "INR", "APTT" in the last 168 hours.  LDH:  No results for input(s): "LDH" in the last 72 hours.  Microbiology:  Microbiology Results (last 7 days)       Procedure Component Value Units Date/Time    Respiratory Infection Panel (PCR), Nasopharyngeal [[id number]] Collected: 01/02/24 0855    Order Status: Completed Specimen: Nasopharyngeal Swab Updated: 01/02/24 1046     Respiratory Infection Panel Source NP Swab     Adenovirus Not Detected     Coronavirus 229E, Common Cold Virus Not Detected     Coronavirus HKU1, Common Cold Virus Not Detected     Coronavirus NL63, Common Cold Virus Not Detected     Coronavirus OC43, Common Cold Virus Not Detected     Comment: The Coronavirus strains detected in this test " cause the common cold.  These strains are not the COVID-19 (novel Coronavirus)strain   associated with the respiratory disease outbreak.          SARS-CoV2 (COVID-19) Qualitative PCR Not Detected     Human Metapneumovirus Not Detected     Human Rhinovirus/Enterovirus Not Detected     Influenza A (subtypes H1, H1-2009,H3) Not Detected     Influenza B Not Detected     Parainfluenza Virus 1 Not Detected     Parainfluenza Virus 2 Not Detected     Parainfluenza Virus 3 Not Detected     Parainfluenza Virus 4 Not Detected     Respiratory Syncytial Virus Not Detected     Bordetella Parapertussis (QV6509) Not Detected     Bordetella pertussis (ptxP) Not Detected     Chlamydia pneumoniae Not Detected     Mycoplasma pneumoniae Not Detected    Narrative:      For all other respiratory sources, order [id number] -  Respiratory Viral Panel by PCR    Influenza A & B by Molecular [[id number]] Collected: 01/02/24 0432    Order Status: Completed Specimen: Nasopharyngeal Swab Updated: 01/02/24 0512     Influenza A, Molecular Negative     Influenza B, Molecular Negative     Flu A & B Source Nasal swab            I have reviewed all pertinent labs within the past 24 hours.    Diagnostic Results:  I have reviewed all pertinent imaging results/findings within the past 24 hours.

## 2024-01-02 NOTE — ED PROVIDER NOTES
"Encounter Date: 2024       History     Chief Complaint   Patient presents with    Loss of Consciousness     Pt states she "passed out" twice today. Pt reports severe weakness beginning this morning. First syncopal episode pt was in standing position- denies striking head or any other injuries. Second episode pt states she "slid out of bed onto floor." +dizziness prior to both episodes. Hx of pulm HTN, CHF on home remodulin infusion.     48-year-old female with history of pulmonary hypertension (on home Remodulin pump), HLD, and HTN who presents to the ED for chief complaint of loss of consciousness that occurred yesterday.   is at bedside.  Patient had 2 episodes of loss of consciousness.  She had 1 episode of LOC after standing, denies hitting head.  She also endorses another episode when she slid out of bed after passing out.  Patient states that she has been having generalized fatigue and weakness.  She endorses having prior right-sided chest pain, now resolved.  She also endorses shortness of breath and feeling feverish yesterday.  She states that she has bilateral leg pain at baseline.  She denies any cough, abdominal pain, dysuria, increased urination, nausea, or vomiting.      The history is provided by the patient. No  was used.     Review of patient's allergies indicates:  No Known Allergies  Past Medical History:   Diagnosis Date    Elevated liver enzymes     Essential (primary) hypertension     Heart failure, unspecified     Hypokalemia     Mixed hyperlipidemia     Neuropathic pain     Tx w/ tramadol & Lyrica    Pulmonary hypertension     Receiving Remodulin continuously through tunneled central line     Past Surgical History:   Procedure Laterality Date    APPENDECTOMY  1989     SECTION  1997    Transverse cut    HYSTERECTOMY  2017    TLH- bleeding    OOPHORECTOMY      RIGHT HEART CATHETERIZATION Right 2021    Procedure: INSERTION, CATHETER, RIGHT HEART;  " Surgeon: Chloe Gregory MD;  Location: [facility] CATH LAB;  Service: Cardiology;  Laterality: Right;    RIGHT HEART CATHETERIZATION Right 3/16/2022    Procedure: INSERTION, CATHETER, RIGHT HEART;  Surgeon: Hu Silverman MD;  Location: [facility] CATH LAB;  Service: Cardiology;  Laterality: Right;    RIGHT HEART CATHETERIZATION Right 7/19/2022    Procedure: INSERTION, CATHETER, RIGHT HEART;  Surgeon: Chloe Gregory MD;  Location: [facility] CATH LAB;  Service: Cardiology;  Laterality: Right;    RIGHT HEART CATHETERIZATION Right 3/21/2023    Procedure: INSERTION, CATHETER, RIGHT HEART;  Surgeon: Kahlil Cisneros MD;  Location: [facility] CATH LAB;  Service: Cardiology;  Laterality: Right;    RIGHT HEART CATHETERIZATION Right 10/9/2023    Procedure: INSERTION, CATHETER, RIGHT HEART;  Surgeon: Chloe Gregory MD;  Location: [facility] CATH LAB;  Service: Cardiology;  Laterality: Right;    TUBAL LIGATION  1996    VAGINAL DELIVERY  1991; 1993; 1994; 1996     Family History   Problem Relation Age of Onset    Cancer Father 69        stomach    No Known Problems Sister     No Known Problems Brother     No Known Problems Brother     Breast cancer Neg Hx     Colon cancer Neg Hx     Ovarian cancer Neg Hx      Social History     Tobacco Use    Smoking status: Former     Types: Cigars     Quit date: 12/1/2020     Years since quitting: 3.0     Passive exposure: Past    Smokeless tobacco: Never    Tobacco comments:     social smoker-light - quit 2001   Substance Use Topics    Alcohol use: Not Currently     Alcohol/week: 2.0 standard drinks of alcohol     Types: 2 Glasses of wine per week     Comment: None now -  previously: socially- 2 or 3 times a week-2 glasses of wine    Drug use: Not Currently     Types: Marijuana     Comment: 2021 last use     Review of Systems    Physical Exam     Initial Vitals [01/02/24 0331]   BP Pulse Resp Temp SpO2   110/79 83 18 97.7 °F (36.5 °C) 95 %      MAP       --         Physical Exam    Nursing note and vitals  reviewed.  Constitutional: No distress.   Patient appears fatigued, but non-toxic   HENT:   Head: Normocephalic.   Mouth/Throat: Oropharynx is clear and moist.   Eyes: Conjunctivae are normal. No scleral icterus.   Neck: Neck supple.   Normal range of motion.  Cardiovascular:  Normal rate, regular rhythm and normal heart sounds.           Pulmonary/Chest: No respiratory distress. She has no wheezes. She has no rhonchi. She has no rales.   Decreased breath sounds in the right lower lung field   Abdominal: Abdomen is soft. She exhibits no distension. There is no abdominal tenderness. There is no rebound and no guarding.   Musculoskeletal:         General: Tenderness (BLE, tender at baseline) present. No edema. Normal range of motion.      Cervical back: Normal range of motion and neck supple.     Neurological: She is alert and oriented to person, place, and time.   Skin: Skin is warm. Capillary refill takes less than 2 seconds.   Psychiatric: She has a normal mood and affect.         ED Course   Procedures  Labs Reviewed   CBC W/ AUTO DIFFERENTIAL - Abnormal; Notable for the following components:       Result Value    Gran # (ANC) 1.4 (*)     Gran % 33.9 (*)     Lymph % 56.0 (*)     All other components within normal limits   COMPREHENSIVE METABOLIC PANEL - Abnormal; Notable for the following components:    CO2 19 (*)     BUN 26 (*)     Total Bilirubin 1.7 (*)     All other components within normal limits   B-TYPE NATRIURETIC PEPTIDE - Abnormal; Notable for the following components:     (*)     All other components within normal limits   ISTAT PROCEDURE - Abnormal; Notable for the following components:    POC PCO2 33.5 (*)     POC PO2 63 (*)     POC HCO3 20.2 (*)     POC BE -5 (*)     POC TCO2 21 (*)     All other components within normal limits   INFLUENZA A & B BY MOLECULAR   URINALYSIS, REFLEX TO URINE CULTURE    Narrative:     Specimen Source->Urine   TSH   TROPONIN I   MAGNESIUM   SARS-COV-2 RNA  AMPLIFICATION, QUAL        ECG Results              EKG 12-lead (Final result)  Result time 01/02/24 15:03:31      Final result by Interface, Lab In [facility] (01/02/24 15:03:31)                   Narrative:    Test Reason : R55,    Vent. Rate : 086 BPM     Atrial Rate : 086 BPM     P-R Int : 156 ms          QRS Dur : 080 ms      QT Int : 408 ms       P-R-T Axes : 066 081 034 degrees     QTc Int : 488 ms    Normal sinus rhythm  Biatrial enlargement  T wave abnormality, consider anterior ischemia  Abnormal ECG  When compared with ECG of 02-JAN-2024 03:36,  T wave inversion no longer evident in Inferior leads  Confirmed by Lefty LINARES MD ([id number]) on 1/2/2024 8:55:27 AM    Referred By: [email or web address]   SELF           Confirmed By:Lefty LINARES MD                                     EKG 12-lead (Final result)  Result time 01/02/24 15:03:42      Final result by Interface, Lab In [facility] (01/02/24 15:03:42)                   Narrative:    Test Reason : R40.20,    Vent. Rate : 086 BPM     Atrial Rate : 086 BPM     P-R Int : 156 ms          QRS Dur : 078 ms      QT Int : 394 ms       P-R-T Axes : 069 086 -39 degrees     QTc Int : 471 ms    Normal sinus rhythm  Biatrial enlargement  ST and T wave abnormality, consider inferior ischemia  Prolonged QT  Abnormal ECG  When compared with ECG of 05-OCT-2023 22:16,  Nonspecific T wave abnormality no longer evident in Lateral leads  Confirmed by Lefty LINARES MD ([id number]) on 1/2/2024 8:56:24 AM    Referred By: [email or web address]   SELF           Confirmed By:Lefty LINARES MD                                  Imaging Results              X-Ray Chest AP Portable (Final result)  Result time 01/02/24 04:35:39      Final result by Jonah Heart MD (01/02/24 04:35:39)                   Impression:      As above.      Electronically signed by: Jonah Heart MD  Date:    01/02/2024  Time:    04:35               Narrative:    EXAMINATION:  XR CHEST AP PORTABLE    CLINICAL HISTORY:  LOC;    TECHNIQUE:  Single  frontal view of the chest was performed.    COMPARISON:  10/07/2023.    FINDINGS:  Right-sided port catheter, similar to prior.    Cardiomegaly with mild perihilar edema.  No large effusion.  No pneumothorax.    Heart and lungs otherwise appear unchanged when allowing for differences in technique and positioning.                                       Medications   sodium chloride 0.9% flush 10 mL (has no administration in time range)   VELETRI/REMODULIN CASSETTE ( Intravenous Canceled Entry 1/2/24 1115)   VELETRI/REMODULIN TUBING 1 each ( Intravenous Canceled Entry 1/2/24 1115)   atorvastatin tablet 80 mg (80 mg Oral Given 1/4/24 0854)   mirtazapine tablet 15 mg (15 mg Oral Given 1/3/24 2136)   sertraline tablet 50 mg (50 mg Oral Given 1/4/24 0855)   treprostinil (REMODULIN) 12,000,000 ng in sodium chloride 0.9% 100 mL infusion (110 ng/kg/min × 67 kg (Order-Specific) Intravenous New Bag 1/3/24 1351)   acetaminophen tablet 1,000 mg (1,000 mg Oral Given 1/2/24 1439)   loperamide capsule 2 mg (2 mg Oral Given 1/4/24 0605)   riociguat tablet 2.5 mg (2.5 mg Oral Given 1/4/24 0855)   macitentan tablet 10 mg (10 mg Oral Given 1/4/24 1019)   sodium chloride 0.9% bolus 250 mL 250 mL (0 mLs Intravenous Stopped 1/2/24 0719)   sodium chloride 0.9% bolus 250 mL 250 mL (0 mLs Intravenous Stopped 1/2/24 2141)   potassium chloride SA CR tablet 40 mEq (40 mEq Oral Given 1/3/24 0927)   meclizine tablet 12.5 mg (12.5 mg Oral Given 1/3/24 1844)   ondansetron injection 4 mg (4 mg Intravenous Given 1/4/24 1314)     Medical Decision Making  48-year-old female with history of pulmonary hypertension (on home Remodulin pump) who presents with 2 episodes of LOC.  She endorses generalized weakness and fatigue, feeling feverish, dyspnea, and prior chest pain.  Denies any cough, abdominal pain, dysuria, increased urination, nausea, or emesis.  Her vitals are WNL.  On exam, she appears fatigued but nontoxic.  She has decreased breath sounds in  the lower right lung field.  Please see physical exam findings for additional details above.  Differential diagnoses include but are not limited to ACS, CHF, COVID, influenza, PNA, UTI, syncope, electrolyte abnormality.  Obtained labs and CXR.  Please see ED course for additional details.    Discussed patient with change of shift ED team.  Patient is pending evaluation by the cardiology team.  Plan for patient to be admitted to the hospital in the setting of her hypoxia.    Amount and/or Complexity of Data Reviewed  Labs: ordered. Decision-making details documented in ED Course.  Radiology: ordered.  ECG/medicine tests: independent interpretation performed. Decision-making details documented in ED Course.              Attending Attestation:   Physician Attestation Statement for Resident:  As the supervising MD   Physician Attestation Statement: I have personally seen and examined this patient.   I agree with the above history.  -:   As the supervising MD I agree with the above PE.     As the supervising MD I agree with the above treatment, course, plan, and disposition.                    ED Course as of 01/04/24 1335   Tue Jan 02, 2024   0418 Patient's oxygen saturation was at 91-92% on room air during exam, placed on 2 L O2 NC and patient's saturation improved to 97% [MD]   0439 Patient has normal sinus rhythm, rate of 86 beats per minute, normal PA and QT intervals, inverted T-waves in V1 and V2, and no ST elevation. [MD]   0441 WBC: 4.05  Patient does not have leukocytosis [MD]   0442 POC PH: 7.388  PH is WNL [MD]   0442 POC PCO2(!): 33.5  Patient does not have hypercapnia. [MD]   0444 Patient reassessed and she is saturating at 91% on 2 L O2 NC, increased NC to 4 L O2. [MD]   0535 influenza and COVID are negative. [MD]   0611 Patient reassessed and her BP is 80s/50s, administered 250 mL bolus of normal saline [MD]   0614 BNP(!): 183  BNP is mildly elevated [MD]   0614 Troponin I: 0.008  Troponin is WNL [MD]    0616 CXR shows cardiomegaly with mild perihilar edema.  [MD]   0651 Consulted and discussed patient with Cardiology team.  Cardiology team will come to assess patient at bedside. [MD]      ED Course User Index  [MD] Jayesh Lynn MD                             Clinical Impression:  Final diagnoses:  [R40.20] Loss of consciousness  [R55] Syncope          ED Disposition Condition    Observation                 Jayesh Lynn MD  Resident  01/02/24 0840       Alina Braswell MD  01/04/24 2680

## 2024-01-02 NOTE — ASSESSMENT & PLAN NOTE
Secondary to unknown cause.     - Will start evaluation for stroke w/ CT brain w/o contrast.  - Patient appears euvolemic but will get an ECHO to further evaluate.   - Orthostatics ordered.

## 2024-01-02 NOTE — PROGRESS NOTES
Dr. Valencia at bedside to assess pt.     100/66 lying flat in bed.    91/65 with HOB at highest setting.

## 2024-01-02 NOTE — H&P
[facility]  Heart Transplant  H&P    Patient Name: Kristin Maier  MRN: [id number]  Admission Date: 2024  Attending Physician: Tracey Dutta MD  Primary Care Provider: Jorge A Stack MD  Principal Problem:Dizziness    Subjective:     History of Present Illness:  Mrs. 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 At the time of my examination patient was A&Ox3 but extremely sleepy thoughout conversation so history was limitied. States she was dealing with generalized weakness all day prior to dizziness starting. Denied any chest pain, SOB, fever, chills, nausea, vomiting, headache, pro-dromal symptoms, FND, orthopnea, PND, abdominal distention/tenderness. States she has had similar episodes in the past but it was usually in the setting of severe headache or fluid overload.     In th ED, the cbc, cmp, troponin, CXRY, and EKG were unremarkable. Patient admitted to observation for evaluation of dizziness.     Past Medical History:   Diagnosis Date    Elevated liver enzymes     Essential (primary) hypertension     Heart failure, unspecified     Hypokalemia     Mixed hyperlipidemia     Neuropathic pain     Tx w/ tramadol & Lyrica    Pulmonary hypertension     Receiving Remodulin continuously through tunneled central line       Past Surgical History:   Procedure Laterality Date    APPENDECTOMY       SECTION      Transverse cut    HYSTERECTOMY  2017    TLH- bleeding    OOPHORECTOMY      RIGHT HEART CATHETERIZATION Right 2021    Procedure: INSERTION, CATHETER, RIGHT HEART;  Surgeon: Chloe Gregory MD;  Location: [facility] CATH LAB;  Service: Cardiology;  Laterality: Right;    RIGHT HEART CATHETERIZATION Right 3/16/2022    Procedure: INSERTION, CATHETER, RIGHT  HEART;  Surgeon: Hu Silverman MD;  Location: [facility] CATH LAB;  Service: Cardiology;  Laterality: Right;    RIGHT HEART CATHETERIZATION Right 7/19/2022    Procedure: INSERTION, CATHETER, RIGHT HEART;  Surgeon: Chloe Gregory MD;  Location: [facility] CATH LAB;  Service: Cardiology;  Laterality: Right;    RIGHT HEART CATHETERIZATION Right 3/21/2023    Procedure: INSERTION, CATHETER, RIGHT HEART;  Surgeon: Kahlil Cisneros MD;  Location: [facility] CATH LAB;  Service: Cardiology;  Laterality: Right;    RIGHT HEART CATHETERIZATION Right 10/9/2023    Procedure: INSERTION, CATHETER, RIGHT HEART;  Surgeon: Chloe Gregory MD;  Location: [facility] CATH LAB;  Service: Cardiology;  Laterality: Right;    TUBAL LIGATION  1996    VAGINAL DELIVERY  1991; 1993; 1994; 1996       Review of patient's allergies indicates:  No Known Allergies    Current Facility-Administered Medications   Medication    atorvastatin tablet 80 mg    macitentan tablet 10 mg    mirtazapine tablet 15 mg    PHARMACY TO CONFIRM TREPROSTINIL (REMODULIN) DOSING infusion    riociguat tablet 2.5 mg    sertraline tablet 50 mg    sodium chloride 0.9% flush 10 mL    spironolactone tablet 25 mg    VELETRI/REMODULIN CASSETTE    VELETRI/REMODULIN TUBING     Family History       Problem Relation (Age of Onset)    Cancer Father (69)    No Known Problems Sister, Brother, Brother          Tobacco Use    Smoking status: Former     Types: Cigars     Quit date: 12/1/2020     Years since quitting: 3.0     Passive exposure: Past    Smokeless tobacco: Never    Tobacco comments:     social smoker-light - quit 2001   Substance and Sexual Activity    Alcohol use: Not Currently     Alcohol/week: 2.0 standard drinks of alcohol     Types: 2 Glasses of wine per week     Comment: None now -  previously: socially- 2 or 3 times a week-2 glasses of wine    Drug use: Not Currently     Types: Marijuana     Comment: 2021 last use    Sexual activity: Yes     Partners: Male     Birth control/protection:  See Surgical Hx     Comment:      Review of Systems 12 point ROS negative except for HPI.   Objective:     Vital Signs (Most Recent):  Temp: 97.6 °F (36.4 °C) (01/02/24 1102)  Pulse: 77 (01/02/24 1102)  Resp: 16 (01/02/24 1102)  BP: 100/66 (01/02/24 1102)  SpO2: (!) 93 % (01/02/24 1102) Vital Signs (24h Range):  Temp:  [97.6 °F (36.4 °C)-97.7 °F (36.5 °C)] 97.6 °F (36.4 °C)  Pulse:  [77-87] 77  Resp:  [11-27] 16  SpO2:  [93 %-99 %] 93 %  BP: ()/(60-98) 100/66     Patient Vitals for the past 72 hrs (Last 3 readings):   Weight   01/02/24 0948 72.1 kg (158 lb 15.2 oz)   01/02/24 0331 64.4 kg (141 lb 15.6 oz)     Body mass index is 29.07 kg/m².      Intake/Output Summary (Last 24 hours) at 1/2/2024 1120  Last data filed at 1/2/2024 0719  Gross per 24 hour   Intake 250 ml   Output --   Net 250 ml          Physical Exam  Constitutional:       Comments: Alert, Oriented, No acute distress, sleepy   HENT:      Head: Normocephalic and atraumatic.   Eyes:      Conjunctiva/sclera: Conjunctivae normal.      Comments: Pupils weakly reactive. Unable to evaluate H-test since patient was not able to keep eyes open.    Neck:      Vascular: No hepatojugular reflux or JVD.      Comments: JVP 8cm of H20  Cardiovascular:      Rate and Rhythm: Normal rate and regular rhythm.   Pulmonary:      Comments: Normal effort, Clear to auscultation   Abdominal:      Comments: Soft, Non-tender, Non-distended   Musculoskeletal:      Cervical back: Normal range of motion.      Comments: No peripheral edema   Skin:     Comments: Dry, Intact, Warm, Capillary refill <2 seconds.    Neurological:      Mental Status: She is oriented to person, place, and time. She is lethargic.      Sensory: Sensation is intact.      Motor: Motor function is intact.      Coordination: Coordination is intact.      Comments: Normal speech. Motor strength 4/5 in all extrermities.    Psychiatric:         Mood and Affect: Mood and affect normal.         "    Significant Labs:  CBC:  Recent Labs   Lab 01/02/24 0421   WBC 4.05   RBC 4.35   HGB 12.3   HCT 37.9      MCV 87   MCH 28.3   MCHC 32.5     BNP:  Recent Labs   Lab 01/02/24 0421   *     CMP:  Recent Labs   Lab 01/02/24 0421   GLU 85   CALCIUM 9.3   ALBUMIN 4.0   PROT 7.2      K 3.7   CO2 19*      BUN 26*   CREATININE 1.0   ALKPHOS 81   ALT 22   AST 25   BILITOT 1.7*      Coagulation:   No results for input(s): "PT", "INR", "APTT" in the last 168 hours.  LDH:  No results for input(s): "LDH" in the last 72 hours.  Microbiology:  Microbiology Results (last 7 days)       Procedure Component Value Units Date/Time    Respiratory Infection Panel (PCR), Nasopharyngeal [[id number]] Collected: 01/02/24 0855    Order Status: Completed Specimen: Nasopharyngeal Swab Updated: 01/02/24 1046     Respiratory Infection Panel Source NP Swab     Adenovirus Not Detected     Coronavirus 229E, Common Cold Virus Not Detected     Coronavirus HKU1, Common Cold Virus Not Detected     Coronavirus NL63, Common Cold Virus Not Detected     Coronavirus OC43, Common Cold Virus Not Detected     Comment: The Coronavirus strains detected in this test cause the common cold.  These strains are not the COVID-19 (novel Coronavirus)strain   associated with the respiratory disease outbreak.          SARS-CoV2 (COVID-19) Qualitative PCR Not Detected     Human Metapneumovirus Not Detected     Human Rhinovirus/Enterovirus Not Detected     Influenza A (subtypes H1, H1-2009,H3) Not Detected     Influenza B Not Detected     Parainfluenza Virus 1 Not Detected     Parainfluenza Virus 2 Not Detected     Parainfluenza Virus 3 Not Detected     Parainfluenza Virus 4 Not Detected     Respiratory Syncytial Virus Not Detected     Bordetella Parapertussis (FK5995) Not Detected     Bordetella pertussis (ptxP) Not Detected     Chlamydia pneumoniae Not Detected     Mycoplasma pneumoniae Not Detected    Narrative:      For all other respiratory " sources, order [id number] -  Respiratory Viral Panel by PCR    Influenza A & B by Molecular [[id number]] Collected: 01/02/24 0432    Order Status: Completed Specimen: Nasopharyngeal Swab Updated: 01/02/24 0512     Influenza A, Molecular Negative     Influenza B, Molecular Negative     Flu A & B Source Nasal swab            I have reviewed all pertinent labs within the past 24 hours.    Diagnostic Results:  I have reviewed all pertinent imaging results/findings within the past 24 hours.    Assessment/Plan:     * Dizziness  Secondary to unknown cause.     - Will start evaluation for stroke w/ CT brain w/o contrast.  - Patient appears euvolemic but will get an ECHO to further evaluate.   - Orthostatics ordered.     Pulmonary hypertension  - restart home remodulin, adempas, and opsumit.         Shyanne Valencia MD  Heart Transplant  [facility]

## 2024-01-02 NOTE — NURSING
BP noted to drop to 80/57 on monitor. Manual BP 78/62. Dr. Valencia notified. BP now up to 92/62. Pt resting comfortably, appears to be sleeping. Dr. Valencia instructed RN to recheck in 5mins and that he was on his way to come see pt.

## 2024-01-02 NOTE — Clinical Note
Diagnosis: Syncope [206001]   Future Attending Provider: EDEN PRETTY [[id number]]   Is the patient being sent to ED Observation?: No   Admitting Provider:: EDEN PRETTY [[id number]]   Bed request comments: tsu   Special Needs:: No Special Needs [1]

## 2024-01-02 NOTE — PROGRESS NOTES
Home Parenteral Prostacyclin Continuation: Pharmacist Verification Note  Home medication variables  Specialty Pharmacy Contacted: [facility]: [phone]  Date of latest prescription (mm/dd/yyyy): 1.2.2024  Type of ambulatory infusion pump: CADD  Ambulatory pump rate (mL/day): 43 mL / 24 hours   Drug: Treprostinil (Remodulin)  Route: Intravenous  Dosing weight (kg): 67 kg   How supplied: Patient mixed cassettes  Home vial concentration:  5 mg/ml  Patient reports mixing 5mL of concentrated drug in 95 ml of diluent   Final concentration:  0.25 mg/ml  Verified current dose: 110 ng/kg/min  Patient is titrating therapy (yes or no) no  Volume of drug solution remaining in the patient's pump (mL): 36.4  Time until depletion of drug solution in the patient's pump (hours): 20.3    Hospital medication variables  Drug : Treprostinil (Remodulin)  Route: Intravenous  Dosing weight (kg): 67        Hospital concentration (mg/mL or ng/mL): 12,000,000ng/100mL          Dose to be administered in hospital (ng/kg/min): 110 ng/kg/min         Hospital infusion initiation time (stat vs standard administration time): 1300    Contacted [facility] attending: (yes/no): no   Physician contacted: no  Discussion: ANDREA     Pharmacist: Cheryle Pina  Extension: [phone]

## 2024-01-03 PROBLEM — R40.20 LOSS OF CONSCIOUSNESS: Status: RESOLVED | Noted: 2024-01-02 | Resolved: 2024-01-03

## 2024-01-03 LAB
ALBUMIN SERPL BCP-MCNC: 3.5 G/DL (ref 3.5–5.2)
ALP SERPL-CCNC: 75 U/L (ref 55–135)
ALT SERPL W/O P-5'-P-CCNC: 17 U/L (ref 10–44)
ANION GAP SERPL CALC-SCNC: 9 MMOL/L (ref 8–16)
ASCENDING AORTA: 3.42 CM
AST SERPL-CCNC: 17 U/L (ref 10–40)
AV INDEX (PROSTH): 0.9
AV MEAN GRADIENT: 2 MMHG
AV PEAK GRADIENT: 4 MMHG
AV VALVE AREA BY VELOCITY RATIO: 2.13 CM²
AV VALVE AREA: 2.28 CM²
AV VELOCITY RATIO: 0.84
BASOPHILS # BLD AUTO: 0.02 K/UL (ref 0–0.2)
BASOPHILS NFR BLD: 0.5 % (ref 0–1.9)
BILIRUB SERPL-MCNC: 1.6 MG/DL (ref 0.1–1)
BSA FOR ECHO PROCEDURE: 1.77 M2
BUN SERPL-MCNC: 15 MG/DL (ref 6–20)
CALCIUM SERPL-MCNC: 8.7 MG/DL (ref 8.7–10.5)
CHLORIDE SERPL-SCNC: 114 MMOL/L (ref 95–110)
CO2 SERPL-SCNC: 20 MMOL/L (ref 23–29)
CREAT SERPL-MCNC: 0.8 MG/DL (ref 0.5–1.4)
CV ECHO LV RWT: 0.49 CM
DIFFERENTIAL METHOD BLD: ABNORMAL
DOP CALC AO PEAK VEL: 1.05 M/S
DOP CALC AO VTI: 19.84 CM
DOP CALC LVOT AREA: 2.5 CM2
DOP CALC LVOT DIAMETER: 1.8 CM
DOP CALC LVOT PEAK VEL: 0.88 M/S
DOP CALC LVOT STROKE VOLUME: 45.22 CM3
DOP CALCLVOT PEAK VEL VTI: 17.78 CM
E WAVE DECELERATION TIME: 187.18 MSEC
E/A RATIO: 0.78
E/E' RATIO: 6.63 M/S
ECHO LV POSTERIOR WALL: 0.98 CM (ref 0.6–1.1)
EOSINOPHIL # BLD AUTO: 0.1 K/UL (ref 0–0.5)
EOSINOPHIL NFR BLD: 1.5 % (ref 0–8)
ERYTHROCYTE [DISTWIDTH] IN BLOOD BY AUTOMATED COUNT: 14.6 % (ref 11.5–14.5)
EST. GFR  (NO RACE VARIABLE): >60 ML/MIN/1.73 M^2
FRACTIONAL SHORTENING: 49 % (ref 28–44)
GLUCOSE SERPL-MCNC: 93 MG/DL (ref 70–110)
HCT VFR BLD AUTO: 35.6 % (ref 37–48.5)
HGB BLD-MCNC: 11.6 G/DL (ref 12–16)
IMM GRANULOCYTES # BLD AUTO: 0.01 K/UL (ref 0–0.04)
IMM GRANULOCYTES NFR BLD AUTO: 0.2 % (ref 0–0.5)
INTERVENTRICULAR SEPTUM: 0.75 CM (ref 0.6–1.1)
LA MAJOR: 4.49 CM
LA MINOR: 4.14 CM
LA WIDTH: 3.78 CM
LEFT ATRIUM SIZE: 2.26 CM
LEFT ATRIUM VOLUME INDEX MOD: 23.2 ML/M2
LEFT ATRIUM VOLUME INDEX: 18.1 ML/M2
LEFT ATRIUM VOLUME MOD: 40.22 CM3
LEFT ATRIUM VOLUME: 31.28 CM3
LEFT INTERNAL DIMENSION IN SYSTOLE: 2.04 CM (ref 2.1–4)
LEFT VENTRICLE DIASTOLIC VOLUME INDEX: 40.86 ML/M2
LEFT VENTRICLE DIASTOLIC VOLUME: 70.68 ML
LEFT VENTRICLE MASS INDEX: 61 G/M2
LEFT VENTRICLE SYSTOLIC VOLUME INDEX: 7.7 ML/M2
LEFT VENTRICLE SYSTOLIC VOLUME: 13.31 ML
LEFT VENTRICULAR INTERNAL DIMENSION IN DIASTOLE: 4.02 CM (ref 3.5–6)
LEFT VENTRICULAR MASS: 104.72 G
LV LATERAL E/E' RATIO: 4.82 M/S
LV SEPTAL E/E' RATIO: 10.6 M/S
LYMPHOCYTES # BLD AUTO: 2 K/UL (ref 1–4.8)
LYMPHOCYTES NFR BLD: 48.8 % (ref 18–48)
MAGNESIUM SERPL-MCNC: 2 MG/DL (ref 1.6–2.6)
MCH RBC QN AUTO: 28.7 PG (ref 27–31)
MCHC RBC AUTO-ENTMCNC: 32.6 G/DL (ref 32–36)
MCV RBC AUTO: 88 FL (ref 82–98)
MONOCYTES # BLD AUTO: 0.3 K/UL (ref 0.3–1)
MONOCYTES NFR BLD: 6.6 % (ref 4–15)
MV PEAK A VEL: 0.68 M/S
MV PEAK E VEL: 0.53 M/S
MV STENOSIS PRESSURE HALF TIME: 54.28 MS
MV VALVE AREA P 1/2 METHOD: 4.05 CM2
NEUTROPHILS # BLD AUTO: 1.7 K/UL (ref 1.8–7.7)
NEUTROPHILS NFR BLD: 42.4 % (ref 38–73)
NRBC BLD-RTO: 0 /100 WBC
PISA TR MAX VEL: 4.6 M/S
PLATELET # BLD AUTO: 166 K/UL (ref 150–450)
PMV BLD AUTO: 11.4 FL (ref 9.2–12.9)
POTASSIUM SERPL-SCNC: 3.5 MMOL/L (ref 3.5–5.1)
PROT SERPL-MCNC: 6.1 G/DL (ref 6–8.4)
RA MAJOR: 5.85 CM
RA PRESSURE ESTIMATED: 8 MMHG
RA WIDTH: 5.85 CM
RBC # BLD AUTO: 4.04 M/UL (ref 4–5.4)
RIGHT VENTRICULAR END-DIASTOLIC DIMENSION: 5.92 CM
RV TB RVSP: 13 MMHG
SINUS: 3.74 CM
SODIUM SERPL-SCNC: 143 MMOL/L (ref 136–145)
STJ: 3.43 CM
TDI LATERAL: 0.11 M/S
TDI SEPTAL: 0.05 M/S
TDI: 0.08 M/S
TR MAX PG: 85 MMHG
TRICUSPID ANNULAR PLANE SYSTOLIC EXCURSION: 1.19 CM
TV REST PULMONARY ARTERY PRESSURE: 93 MMHG
WBC # BLD AUTO: 4.08 K/UL (ref 3.9–12.7)
Z-SCORE OF LEFT VENTRICULAR DIMENSION IN END DIASTOLE: -1.76
Z-SCORE OF LEFT VENTRICULAR DIMENSION IN END SYSTOLE: -2.96

## 2024-01-03 PROCEDURE — 63600175 PHARM REV CODE 636 W HCPCS: Mod: JG,NTX | Performed by: INTERNAL MEDICINE

## 2024-01-03 PROCEDURE — 36415 COLL VENOUS BLD VENIPUNCTURE: CPT | Mod: NTX | Performed by: STUDENT IN AN ORGANIZED HEALTH CARE EDUCATION/TRAINING PROGRAM

## 2024-01-03 PROCEDURE — 83735 ASSAY OF MAGNESIUM: CPT | Mod: NTX | Performed by: STUDENT IN AN ORGANIZED HEALTH CARE EDUCATION/TRAINING PROGRAM

## 2024-01-03 PROCEDURE — 25000003 PHARM REV CODE 250: Mod: NTX | Performed by: INTERNAL MEDICINE

## 2024-01-03 PROCEDURE — G0378 HOSPITAL OBSERVATION PER HR: HCPCS | Mod: NTX

## 2024-01-03 PROCEDURE — 85025 COMPLETE CBC W/AUTO DIFF WBC: CPT | Mod: NTX | Performed by: STUDENT IN AN ORGANIZED HEALTH CARE EDUCATION/TRAINING PROGRAM

## 2024-01-03 PROCEDURE — 96366 THER/PROPH/DIAG IV INF ADDON: CPT

## 2024-01-03 PROCEDURE — 25000003 PHARM REV CODE 250: Mod: NTX | Performed by: STUDENT IN AN ORGANIZED HEALTH CARE EDUCATION/TRAINING PROGRAM

## 2024-01-03 PROCEDURE — 80053 COMPREHEN METABOLIC PANEL: CPT | Mod: NTX | Performed by: STUDENT IN AN ORGANIZED HEALTH CARE EDUCATION/TRAINING PROGRAM

## 2024-01-03 RX ORDER — MECLIZINE HCL 12.5 MG 12.5 MG/1
12.5 TABLET ORAL ONCE
Status: COMPLETED | OUTPATIENT
Start: 2024-01-03 | End: 2024-01-03

## 2024-01-03 RX ORDER — LOPERAMIDE HYDROCHLORIDE 2 MG/1
2 CAPSULE ORAL 4 TIMES DAILY PRN
Status: DISCONTINUED | OUTPATIENT
Start: 2024-01-03 | End: 2024-01-04 | Stop reason: HOSPADM

## 2024-01-03 RX ORDER — LOPERAMIDE HYDROCHLORIDE 2 MG/1
2 CAPSULE ORAL 4 TIMES DAILY PRN
Status: DISCONTINUED | OUTPATIENT
Start: 2024-01-03 | End: 2024-01-03

## 2024-01-03 RX ORDER — POTASSIUM CHLORIDE 20 MEQ/1
40 TABLET, EXTENDED RELEASE ORAL ONCE
Status: COMPLETED | OUTPATIENT
Start: 2024-01-03 | End: 2024-01-03

## 2024-01-03 RX ADMIN — MIRTAZAPINE 15 MG: 15 TABLET, FILM COATED ORAL at 09:01

## 2024-01-03 RX ADMIN — POTASSIUM CHLORIDE 40 MEQ: 1500 TABLET, EXTENDED RELEASE ORAL at 09:01

## 2024-01-03 RX ADMIN — MECLIZINE 12.5 MG: 12.5 TABLET ORAL at 06:01

## 2024-01-03 RX ADMIN — RIOCIGUAT 2.5 MG: 2.5 TABLET, FILM COATED ORAL at 02:01

## 2024-01-03 RX ADMIN — RIOCIGUAT 2.5 MG: 2.5 TABLET, FILM COATED ORAL at 09:01

## 2024-01-03 RX ADMIN — ATORVASTATIN CALCIUM 80 MG: 20 TABLET, FILM COATED ORAL at 09:01

## 2024-01-03 RX ADMIN — SERTRALINE HYDROCHLORIDE 50 MG: 50 TABLET ORAL at 09:01

## 2024-01-03 RX ADMIN — TREPROSTINIL 110 NG/KG/MIN: 200 INJECTION, SOLUTION INTRAVENOUS; SUBCUTANEOUS at 01:01

## 2024-01-03 NOTE — PLAN OF CARE
Pt admitted today with syncopal episodes at home. PMHx of pulm HTN on home remodulin. Pt transitioned to inpatient dosing this afternoon around 1230. 110ng/kg/min, dosing weight 67kg, 88cc/24hrs, see MAR for other details. Remodulin infusing to designated R Chest PICC line. Per pt, dressing changed this past Sunday, it remains C/D/I. Pt is a little more alert this evening compared to earlier in shift, she is still rather lethargic. She has remained oriented x4 so far today. Head CT negative for any abnormalities. BP noted to be labile, see notes and flowsheets. Pt currently receiving 250cc NS bolus over 4-hour time period. All other vitals are stable. NSR on tele. >96% on RA. PRN tylenol given for pain management.  at bedside.

## 2024-01-03 NOTE — PLAN OF CARE
Patient aao today, sleeping between care. 1 unmeasured urine and 1 bm noted. Appetite poor. Opsumit dcd. PT consulted. Patient remains on remodulin pump, will change cassette shortly. BP low 102/68, 93/53 patient asymptomatic.

## 2024-01-03 NOTE — PROGRESS NOTES
Patient aao today, ambulated to the bathroom to void with one person assist. Patient transported jazz to [facility].

## 2024-01-03 NOTE — NURSING
Bladder scan completed with >500cc. MD MAMADOU Vazquez notified, in/out cath ordered. In/Out catheterization completed with 500cc UOP. Pt tolerated well.

## 2024-01-03 NOTE — PROGRESS NOTES
[facility]  Heart Transplant  Progress Note    Patient Name: Kristin Maier  MRN: [id number]  Admission Date: 1/2/2024  Hospital Length of Stay: 0 days  Attending Physician: Tracey Dutta MD  Primary Care Provider: Jorge A Stack MD  Principal Problem:Dizziness    Subjective:   Interval History: NAEON. This morning patient was able to wake up and walk to the bathroom without any dizzyness. SBP remained>100. However, after getting her morning ECHO her dizzyness (room spinning) returned. Appears to possibly be positional.     Continuous Infusions:   treprostinil (REMODULIN) 12,000,000 ng in sodium chloride 0.9% 100 mL infusion 110 ng/kg/min (01/02/24 2158)    veletri/remodulin cassette      veletri/remodulin tubing       Scheduled Meds:   atorvastatin  80 mg Oral Daily    mirtazapine  15 mg Oral QHS    riociguat  2.5 mg Oral TID    sertraline  50 mg Oral Daily     PRN Meds:acetaminophen, sodium chloride 0.9%    Review of patient's allergies indicates:  No Known Allergies  Objective:     Vital Signs (Most Recent):  Temp: 98 °F (36.7 °C) (01/03/24 0800)  Pulse: 95 (01/03/24 0836)  Resp: 12 (01/03/24 0800)  BP: 102/68 (01/03/24 0800)  SpO2: 100 % (01/03/24 0800) Vital Signs (24h Range):  Temp:  [97.6 °F (36.4 °C)-98.8 °F (37.1 °C)] 98 °F (36.7 °C)  Pulse:  [75-98] 95  Resp:  [11-40] 12  SpO2:  [92 %-100 %] 100 %  BP: ()/(51-97) 102/68     Patient Vitals for the past 72 hrs (Last 3 readings):   Weight   01/03/24 0836 71.7 kg (158 lb)   01/02/24 0948 72.1 kg (158 lb 15.2 oz)   01/02/24 0331 64.4 kg (141 lb 15.6 oz)     Body mass index is 28.9 kg/m².      Intake/Output Summary (Last 24 hours) at 1/3/2024 1047  Last data filed at 1/2/2024 2323  Gross per 24 hour   Intake 447.32 ml   Output 1000 ml   Net -552.68 ml       Hemodynamic Parameters:              Physical Exam  Constitutional:       Comments: Alert, Oriented, No acute distress, lethargic but more awake today.    HENT:       "Head: Normocephalic and atraumatic.   Eyes:      Conjunctiva/sclera: Conjunctivae normal.   Neck:      Comments: JVP 8cm  Cardiovascular:      Rate and Rhythm: Normal rate and regular rhythm.   Pulmonary:      Comments: Normal effort, Clear to auscultation   Abdominal:      Comments: Soft, Non-tender, Non-distended   Musculoskeletal:      Cervical back: Normal range of motion.      Comments: No peripheral edema   Skin:     Comments: Dry, Intact, Warm, Capillary refill <2 seconds.    Neurological:      Mental Status: She is oriented to person, place, and time. She is lethargic.      Comments: Normal speech   Psychiatric:         Mood and Affect: Mood and affect normal.            Significant Labs:  CBC:  Recent Labs   Lab 01/02/24 0421 01/03/24  0627   WBC 4.05 4.08   RBC 4.35 4.04   HGB 12.3 11.6*   HCT 37.9 35.6*    166   MCV 87 88   MCH 28.3 28.7   MCHC 32.5 32.6     BNP:  Recent Labs   Lab 01/02/24 0421   *     CMP:  Recent Labs   Lab 01/02/24 0421 01/03/24  0627   GLU 85 93   CALCIUM 9.3 8.7   ALBUMIN 4.0 3.5   PROT 7.2 6.1    143   K 3.7 3.5   CO2 19* 20*    114*   BUN 26* 15   CREATININE 1.0 0.8   ALKPHOS 81 75   ALT 22 17   AST 25 17   BILITOT 1.7* 1.6*      Coagulation:   No results for input(s): "PT", "INR", "APTT" in the last 168 hours.  LDH:  No results for input(s): "LDH" in the last 72 hours.  Microbiology:  Microbiology Results (last 7 days)       Procedure Component Value Units Date/Time    Respiratory Infection Panel (PCR), Nasopharyngeal [[id number]] Collected: 01/02/24 0855    Order Status: Completed Specimen: Nasopharyngeal Swab Updated: 01/02/24 1046     Respiratory Infection Panel Source NP Swab     Adenovirus Not Detected     Coronavirus 229E, Common Cold Virus Not Detected     Coronavirus HKU1, Common Cold Virus Not Detected     Coronavirus NL63, Common Cold Virus Not Detected     Coronavirus OC43, Common Cold Virus Not Detected     Comment: The Coronavirus strains " detected in this test cause the common cold.  These strains are not the COVID-19 (novel Coronavirus)strain   associated with the respiratory disease outbreak.          SARS-CoV2 (COVID-19) Qualitative PCR Not Detected     Human Metapneumovirus Not Detected     Human Rhinovirus/Enterovirus Not Detected     Influenza A (subtypes H1, H1-2009,H3) Not Detected     Influenza B Not Detected     Parainfluenza Virus 1 Not Detected     Parainfluenza Virus 2 Not Detected     Parainfluenza Virus 3 Not Detected     Parainfluenza Virus 4 Not Detected     Respiratory Syncytial Virus Not Detected     Bordetella Parapertussis ([id number]) Not Detected     Bordetella pertussis (ptxP) Not Detected     Chlamydia pneumoniae Not Detected     Mycoplasma pneumoniae Not Detected    Narrative:      For all other respiratory sources, order [id number] -  Respiratory Viral Panel by PCR    Influenza A & B by Molecular [[id number]] Collected: 01/02/24 0432    Order Status: Completed Specimen: Nasopharyngeal Swab Updated: 01/02/24 0512     Influenza A, Molecular Negative     Influenza B, Molecular Negative     Flu A & B Source Nasal swab            I have reviewed all pertinent labs within the past 24 hours.    Estimated Creatinine Clearance: 78 mL/min (based on SCr of 0.8 mg/dL).    Diagnostic Results:  I have reviewed all pertinent imaging results/findings within the past 24 hours.  Assessment and Plan:     Mrs. 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 At the time of my examination patient was A&Ox3 but extremely sleepy thoughout conversation so history was limitied. States she was dealing with generalized weakness all day prior to dizziness starting. Denied any chest pain, SOB, fever, chills, nausea, vomiting, headache, pro-dromal symptoms,  FND, orthopnea, PND, abdominal distention/tenderness. States she has had similar episodes in the past but it was usually in the setting of severe headache or fluid overload.     In  ED, the cbc, cmp, troponin, CXRY, and EKG were unremarkable. Patient admitted to observation for evaluation of dizziness.     * Dizziness  Secondary to unknown cause. Possibly due to lower BP vs BPPV.     - S/p 500c of fluid  - Will hold opsumit today.  - PT ordered.     Pulmonary hypertension  - continue home remodulin, adempas.   - Stop opsumit.         Shyanne Valencia MD  Heart Transplant  [facility]

## 2024-01-03 NOTE — ASSESSMENT & PLAN NOTE
Secondary to unknown cause. Possibly due to lower BP vs BPPV.     - S/p 500c of fluid  - Will hold opsumit today.  - PT ordered.

## 2024-01-03 NOTE — PLAN OF CARE
Pt AAOx4. Pt lethargic and arouses to voice-- does not stay awake during care. Bps fluctuating from 70s-100s systolic, see previous note--all other VSS. NS bolus of 250cc completed. Remodulin in place at 110ng/kg/min. Pt with pure wick in place-- states she does not feel the urge to void--pt instructed to attempt to void. Pt does not seem to be engaged and understanding information-- falls asleep immediately following/during conversation. In/out cath performed per MD (see previous note). Bed in lowest locked position, call light within reach, pt instructed to attempt to void, POC ongoing.

## 2024-01-03 NOTE — SUBJECTIVE & OBJECTIVE
Interval History: NAEON. This morning patient was able to wake up and walk to the bathroom without any dizzyness. SBP remained>100. However, after getting her morning ECHO her dizzyness (room spinning) returned. Appears to possibly be positional.     Continuous Infusions:   treprostinil (REMODULIN) 12,000,000 ng in sodium chloride 0.9% 100 mL infusion 110 ng/kg/min (01/02/24 2158)    veletri/remodulin cassette      veletri/remodulin tubing       Scheduled Meds:   atorvastatin  80 mg Oral Daily    mirtazapine  15 mg Oral QHS    riociguat  2.5 mg Oral TID    sertraline  50 mg Oral Daily     PRN Meds:acetaminophen, sodium chloride 0.9%    Review of patient's allergies indicates:  No Known Allergies  Objective:     Vital Signs (Most Recent):  Temp: 98 °F (36.7 °C) (01/03/24 0800)  Pulse: 95 (01/03/24 0836)  Resp: 12 (01/03/24 0800)  BP: 102/68 (01/03/24 0800)  SpO2: 100 % (01/03/24 0800) Vital Signs (24h Range):  Temp:  [97.6 °F (36.4 °C)-98.8 °F (37.1 °C)] 98 °F (36.7 °C)  Pulse:  [75-98] 95  Resp:  [11-40] 12  SpO2:  [92 %-100 %] 100 %  BP: ()/(51-97) 102/68     Patient Vitals for the past 72 hrs (Last 3 readings):   Weight   01/03/24 0836 71.7 kg (158 lb)   01/02/24 0948 72.1 kg (158 lb 15.2 oz)   01/02/24 0331 64.4 kg (141 lb 15.6 oz)     Body mass index is 28.9 kg/m².      Intake/Output Summary (Last 24 hours) at 1/3/2024 1047  Last data filed at 1/2/2024 2323  Gross per 24 hour   Intake 447.32 ml   Output 1000 ml   Net -552.68 ml       Hemodynamic Parameters:              Physical Exam  Constitutional:       Comments: Alert, Oriented, No acute distress, lethargic but more awake today.    HENT:      Head: Normocephalic and atraumatic.   Eyes:      Conjunctiva/sclera: Conjunctivae normal.   Neck:      Comments: JVP 8cm  Cardiovascular:      Rate and Rhythm: Normal rate and regular rhythm.   Pulmonary:      Comments: Normal effort, Clear to auscultation   Abdominal:      Comments: Soft, Non-tender,  "Non-distended   Musculoskeletal:      Cervical back: Normal range of motion.      Comments: No peripheral edema   Skin:     Comments: Dry, Intact, Warm, Capillary refill <2 seconds.    Neurological:      Mental Status: She is oriented to person, place, and time. She is lethargic.      Comments: Normal speech   Psychiatric:         Mood and Affect: Mood and affect normal.            Significant Labs:  CBC:  Recent Labs   Lab 01/02/24 0421 01/03/24 0627   WBC 4.05 4.08   RBC 4.35 4.04   HGB 12.3 11.6*   HCT 37.9 35.6*    166   MCV 87 88   MCH 28.3 28.7   MCHC 32.5 32.6     BNP:  Recent Labs   Lab 01/02/24 0421   *     CMP:  Recent Labs   Lab 01/02/24 0421 01/03/24 0627   GLU 85 93   CALCIUM 9.3 8.7   ALBUMIN 4.0 3.5   PROT 7.2 6.1    143   K 3.7 3.5   CO2 19* 20*    114*   BUN 26* 15   CREATININE 1.0 0.8   ALKPHOS 81 75   ALT 22 17   AST 25 17   BILITOT 1.7* 1.6*      Coagulation:   No results for input(s): "PT", "INR", "APTT" in the last 168 hours.  LDH:  No results for input(s): "LDH" in the last 72 hours.  Microbiology:  Microbiology Results (last 7 days)       Procedure Component Value Units Date/Time    Respiratory Infection Panel (PCR), Nasopharyngeal [[id number]] Collected: 01/02/24 0855    Order Status: Completed Specimen: Nasopharyngeal Swab Updated: 01/02/24 1046     Respiratory Infection Panel Source NP Swab     Adenovirus Not Detected     Coronavirus 229E, Common Cold Virus Not Detected     Coronavirus HKU1, Common Cold Virus Not Detected     Coronavirus NL63, Common Cold Virus Not Detected     Coronavirus OC43, Common Cold Virus Not Detected     Comment: The Coronavirus strains detected in this test cause the common cold.  These strains are not the COVID-19 (novel Coronavirus)strain   associated with the respiratory disease outbreak.          SARS-CoV2 (COVID-19) Qualitative PCR Not Detected     Human Metapneumovirus Not Detected     Human Rhinovirus/Enterovirus Not Detected "     Influenza A (subtypes H1, H1-2009,H3) Not Detected     Influenza B Not Detected     Parainfluenza Virus 1 Not Detected     Parainfluenza Virus 2 Not Detected     Parainfluenza Virus 3 Not Detected     Parainfluenza Virus 4 Not Detected     Respiratory Syncytial Virus Not Detected     Bordetella Parapertussis ([id number]) Not Detected     Bordetella pertussis (ptxP) Not Detected     Chlamydia pneumoniae Not Detected     Mycoplasma pneumoniae Not Detected    Narrative:      For all other respiratory sources, order [id number] -  Respiratory Viral Panel by PCR    Influenza A & B by Molecular [[id number]] Collected: 01/02/24 0432    Order Status: Completed Specimen: Nasopharyngeal Swab Updated: 01/02/24 0512     Influenza A, Molecular Negative     Influenza B, Molecular Negative     Flu A & B Source Nasal swab            I have reviewed all pertinent labs within the past 24 hours.    Estimated Creatinine Clearance: 78 mL/min (based on SCr of 0.8 mg/dL).    Diagnostic Results:  I have reviewed all pertinent imaging results/findings within the past 24 hours.

## 2024-01-04 VITALS
HEART RATE: 91 BPM | HEIGHT: 62 IN | BODY MASS INDEX: 26.61 KG/M2 | WEIGHT: 144.63 LBS | RESPIRATION RATE: 18 BRPM | TEMPERATURE: 99 F | DIASTOLIC BLOOD PRESSURE: 61 MMHG | SYSTOLIC BLOOD PRESSURE: 96 MMHG | OXYGEN SATURATION: 96 %

## 2024-01-04 LAB
ALBUMIN SERPL BCP-MCNC: 3.9 G/DL (ref 3.5–5.2)
ALP SERPL-CCNC: 81 U/L (ref 55–135)
ALT SERPL W/O P-5'-P-CCNC: 16 U/L (ref 10–44)
ANION GAP SERPL CALC-SCNC: 11 MMOL/L (ref 8–16)
AST SERPL-CCNC: 16 U/L (ref 10–40)
BASOPHILS # BLD AUTO: 0.02 K/UL (ref 0–0.2)
BASOPHILS NFR BLD: 0.4 % (ref 0–1.9)
BILIRUB SERPL-MCNC: 2.4 MG/DL (ref 0.1–1)
BUN SERPL-MCNC: 13 MG/DL (ref 6–20)
CALCIUM SERPL-MCNC: 9.5 MG/DL (ref 8.7–10.5)
CHLORIDE SERPL-SCNC: 117 MMOL/L (ref 95–110)
CO2 SERPL-SCNC: 14 MMOL/L (ref 23–29)
CREAT SERPL-MCNC: 0.9 MG/DL (ref 0.5–1.4)
DIFFERENTIAL METHOD BLD: ABNORMAL
EOSINOPHIL # BLD AUTO: 0.1 K/UL (ref 0–0.5)
EOSINOPHIL NFR BLD: 1.6 % (ref 0–8)
ERYTHROCYTE [DISTWIDTH] IN BLOOD BY AUTOMATED COUNT: 14.7 % (ref 11.5–14.5)
EST. GFR  (NO RACE VARIABLE): >60 ML/MIN/1.73 M^2
GLUCOSE SERPL-MCNC: 81 MG/DL (ref 70–110)
HCT VFR BLD AUTO: 38.4 % (ref 37–48.5)
HGB BLD-MCNC: 12.8 G/DL (ref 12–16)
IMM GRANULOCYTES # BLD AUTO: 0.02 K/UL (ref 0–0.04)
IMM GRANULOCYTES NFR BLD AUTO: 0.4 % (ref 0–0.5)
LYMPHOCYTES # BLD AUTO: 2.5 K/UL (ref 1–4.8)
LYMPHOCYTES NFR BLD: 44.5 % (ref 18–48)
MAGNESIUM SERPL-MCNC: 2.1 MG/DL (ref 1.6–2.6)
MCH RBC QN AUTO: 28.7 PG (ref 27–31)
MCHC RBC AUTO-ENTMCNC: 33.3 G/DL (ref 32–36)
MCV RBC AUTO: 86 FL (ref 82–98)
MONOCYTES # BLD AUTO: 0.4 K/UL (ref 0.3–1)
MONOCYTES NFR BLD: 6.8 % (ref 4–15)
NEUTROPHILS # BLD AUTO: 2.6 K/UL (ref 1.8–7.7)
NEUTROPHILS NFR BLD: 46.3 % (ref 38–73)
NRBC BLD-RTO: 0 /100 WBC
PLATELET # BLD AUTO: 178 K/UL (ref 150–450)
PMV BLD AUTO: 11.5 FL (ref 9.2–12.9)
POTASSIUM SERPL-SCNC: 3.7 MMOL/L (ref 3.5–5.1)
PROT SERPL-MCNC: 7 G/DL (ref 6–8.4)
RBC # BLD AUTO: 4.46 M/UL (ref 4–5.4)
SODIUM SERPL-SCNC: 142 MMOL/L (ref 136–145)
WBC # BLD AUTO: 5.62 K/UL (ref 3.9–12.7)

## 2024-01-04 PROCEDURE — 25000003 PHARM REV CODE 250: Mod: NTX | Performed by: STUDENT IN AN ORGANIZED HEALTH CARE EDUCATION/TRAINING PROGRAM

## 2024-01-04 PROCEDURE — 83735 ASSAY OF MAGNESIUM: CPT | Mod: NTX | Performed by: STUDENT IN AN ORGANIZED HEALTH CARE EDUCATION/TRAINING PROGRAM

## 2024-01-04 PROCEDURE — G0378 HOSPITAL OBSERVATION PER HR: HCPCS | Mod: NTX

## 2024-01-04 PROCEDURE — 97530 THERAPEUTIC ACTIVITIES: CPT | Mod: NTX

## 2024-01-04 PROCEDURE — 63600175 PHARM REV CODE 636 W HCPCS: Mod: NTX | Performed by: STUDENT IN AN ORGANIZED HEALTH CARE EDUCATION/TRAINING PROGRAM

## 2024-01-04 PROCEDURE — 99233 SBSQ HOSP IP/OBS HIGH 50: CPT | Mod: NTX,,, | Performed by: INTERNAL MEDICINE

## 2024-01-04 PROCEDURE — 85025 COMPLETE CBC W/AUTO DIFF WBC: CPT | Mod: NTX | Performed by: STUDENT IN AN ORGANIZED HEALTH CARE EDUCATION/TRAINING PROGRAM

## 2024-01-04 PROCEDURE — 80053 COMPREHEN METABOLIC PANEL: CPT | Mod: NTX | Performed by: STUDENT IN AN ORGANIZED HEALTH CARE EDUCATION/TRAINING PROGRAM

## 2024-01-04 PROCEDURE — 97161 PT EVAL LOW COMPLEX 20 MIN: CPT | Mod: NTX

## 2024-01-04 PROCEDURE — 36415 COLL VENOUS BLD VENIPUNCTURE: CPT | Mod: NTX | Performed by: STUDENT IN AN ORGANIZED HEALTH CARE EDUCATION/TRAINING PROGRAM

## 2024-01-04 PROCEDURE — 96374 THER/PROPH/DIAG INJ IV PUSH: CPT | Mod: 59

## 2024-01-04 PROCEDURE — 25000003 PHARM REV CODE 250: Mod: NTX | Performed by: INTERNAL MEDICINE

## 2024-01-04 RX ORDER — MECLIZINE HCL 12.5 MG 12.5 MG/1
12.5 TABLET ORAL 3 TIMES DAILY PRN
Qty: 30 TABLET | Refills: 0 | Status: SHIPPED | OUTPATIENT
Start: 2024-01-04 | End: 2024-01-12

## 2024-01-04 RX ORDER — ONDANSETRON 2 MG/ML
4 INJECTION INTRAMUSCULAR; INTRAVENOUS ONCE
Status: COMPLETED | OUTPATIENT
Start: 2024-01-04 | End: 2024-01-04

## 2024-01-04 RX ADMIN — ONDANSETRON 4 MG: 2 INJECTION INTRAMUSCULAR; INTRAVENOUS at 01:01

## 2024-01-04 RX ADMIN — RIOCIGUAT 2.5 MG: 2 TABLET, FILM COATED ORAL at 08:01

## 2024-01-04 RX ADMIN — MACITENTAN 10 MG: 10 TABLET, FILM COATED ORAL at 10:01

## 2024-01-04 RX ADMIN — RIOCIGUAT 2.5 MG: 2 TABLET, FILM COATED ORAL at 02:01

## 2024-01-04 RX ADMIN — ATORVASTATIN CALCIUM 80 MG: 20 TABLET, FILM COATED ORAL at 08:01

## 2024-01-04 RX ADMIN — LOPERAMIDE HYDROCHLORIDE 2 MG: 2 CAPSULE ORAL at 12:01

## 2024-01-04 RX ADMIN — SERTRALINE HYDROCHLORIDE 50 MG: 50 TABLET ORAL at 08:01

## 2024-01-04 RX ADMIN — LOPERAMIDE HYDROCHLORIDE 2 MG: 2 CAPSULE ORAL at 06:01

## 2024-01-04 NOTE — PLAN OF CARE
Pt participated in evaluation. Pt evaluated for vestibular disorder. After intervention, pt reports no further dizziness at this time, able to demo' independence with all mobility. Pt does not require further acute skilled therapy intervention. Discharge from PT services and re-consult if pt experiences a change in status.

## 2024-01-04 NOTE — HOSPITAL COURSE
Upon admission, patient underwent CT scan to further evaluate dizziness and it was negative. Neuro exam was unremarkable. Euvolemic on exam. Room spinning vertigo thought to be due to BPPV w/ resolution after meclizine. BP were on the lower side so patient received 250cc bolus of NS and opsumit was held. Opsumit restarted next day and patient blood pressure remained normotensive. At the time of discharge patient was asymptomatic and denied any dizziness, weakness, SOB. Patient was given prescription for meclizine. At the time of discharge patient was clinically and hemodynamically stable. She agreed w/ plan for discharge.

## 2024-01-04 NOTE — SUBJECTIVE & OBJECTIVE
Interval History: NAEON. Denies anymore dizzyness. Patient to be discharged home today if she tolerates her opsumit dose.     Continuous Infusions:   treprostinil (REMODULIN) 12,000,000 ng in sodium chloride 0.9% 100 mL infusion 110 ng/kg/min (01/03/24 1351)    veletri/remodulin cassette      veletri/remodulin tubing       Scheduled Meds:   atorvastatin  80 mg Oral Daily    macitentan  10 mg Oral Daily    mirtazapine  15 mg Oral QHS    riociguat  2.5 mg Oral TID    sertraline  50 mg Oral Daily     PRN Meds:acetaminophen, loperamide, sodium chloride 0.9%    Review of patient's allergies indicates:  No Known Allergies  Objective:     Vital Signs (Most Recent):  Temp: 98.5 °F (36.9 °C) (01/04/24 0730)  Pulse: 80 (01/04/24 0730)  Resp: (!) 22 (01/04/24 0730)  BP: 102/68 (01/04/24 0730)  SpO2: 98 % (01/04/24 0730) Vital Signs (24h Range):  Temp:  [98.4 °F (36.9 °C)-99.2 °F (37.3 °C)] 98.5 °F (36.9 °C)  Pulse:  [78-93] 80  Resp:  [12-22] 22  SpO2:  [91 %-98 %] 98 %  BP: ()/(53-82) 102/68     Patient Vitals for the past 72 hrs (Last 3 readings):   Weight   01/04/24 0400 65.6 kg (144 lb 10 oz)   01/03/24 0836 71.7 kg (158 lb)   01/02/24 0948 72.1 kg (158 lb 15.2 oz)       Body mass index is 26.45 kg/m².      Intake/Output Summary (Last 24 hours) at 1/4/2024 1024  Last data filed at 1/4/2024 0608  Gross per 24 hour   Intake 450 ml   Output --   Net 450 ml         Hemodynamic Parameters:              Physical Exam  Constitutional:       Comments: Alert, Oriented, No acute distress, lethargic but more awake today.    HENT:      Head: Normocephalic and atraumatic.   Eyes:      Conjunctiva/sclera: Conjunctivae normal.   Neck:      Comments: No JVP on exam  Cardiovascular:      Rate and Rhythm: Normal rate and regular rhythm.   Pulmonary:      Comments: Normal effort, Clear to auscultation   Abdominal:      Comments: Soft, Non-tender, Non-distended   Musculoskeletal:      Cervical back: Normal range of motion.       "Comments: No peripheral edema   Skin:     Comments: Dry, Intact, Warm, Capillary refill <2 seconds.    Neurological:      Mental Status: She is oriented to person, place, and time. She is lethargic.      Comments: Normal speech   Psychiatric:         Mood and Affect: Mood and affect normal.            Significant Labs:  CBC:  Recent Labs   Lab 01/02/24 0421 01/03/24 0627 01/04/24  0611   WBC 4.05 4.08 5.62   RBC 4.35 4.04 4.46   HGB 12.3 11.6* 12.8   HCT 37.9 35.6* 38.4    166 178   MCV 87 88 86   MCH 28.3 28.7 28.7   MCHC 32.5 32.6 33.3       BNP:  Recent Labs   Lab 01/02/24 0421   *       CMP:  Recent Labs   Lab 01/02/24 0421 01/03/24 0627 01/04/24  0611   GLU 85 93 81   CALCIUM 9.3 8.7 9.5   ALBUMIN 4.0 3.5 3.9   PROT 7.2 6.1 7.0    143 142   K 3.7 3.5 3.7   CO2 19* 20* 14*    114* 117*   BUN 26* 15 13   CREATININE 1.0 0.8 0.9   ALKPHOS 81 75 81   ALT 22 17 16   AST 25 17 16   BILITOT 1.7* 1.6* 2.4*        Coagulation:   No results for input(s): "PT", "INR", "APTT" in the last 168 hours.  LDH:  No results for input(s): "LDH" in the last 72 hours.  Microbiology:  Microbiology Results (last 7 days)       Procedure Component Value Units Date/Time    Respiratory Infection Panel (PCR), Nasopharyngeal [[id number]] Collected: 01/02/24 0855    Order Status: Completed Specimen: Nasopharyngeal Swab Updated: 01/02/24 1046     Respiratory Infection Panel Source NP Swab     Adenovirus Not Detected     Coronavirus 229E, Common Cold Virus Not Detected     Coronavirus HKU1, Common Cold Virus Not Detected     Coronavirus NL63, Common Cold Virus Not Detected     Coronavirus OC43, Common Cold Virus Not Detected     Comment: The Coronavirus strains detected in this test cause the common cold.  These strains are not the COVID-19 (novel Coronavirus)strain   associated with the respiratory disease outbreak.          SARS-CoV2 (COVID-19) Qualitative PCR Not Detected     Human Metapneumovirus Not Detected "     Human Rhinovirus/Enterovirus Not Detected     Influenza A (subtypes H1, H1-2009,H3) Not Detected     Influenza B Not Detected     Parainfluenza Virus 1 Not Detected     Parainfluenza Virus 2 Not Detected     Parainfluenza Virus 3 Not Detected     Parainfluenza Virus 4 Not Detected     Respiratory Syncytial Virus Not Detected     Bordetella Parapertussis ([id number]) Not Detected     Bordetella pertussis (ptxP) Not Detected     Chlamydia pneumoniae Not Detected     Mycoplasma pneumoniae Not Detected    Narrative:      For all other respiratory sources, order [id number] -  Respiratory Viral Panel by PCR    Influenza A & B by Molecular [[id number]] Collected: 01/02/24 0432    Order Status: Completed Specimen: Nasopharyngeal Swab Updated: 01/02/24 0512     Influenza A, Molecular Negative     Influenza B, Molecular Negative     Flu A & B Source Nasal swab            I have reviewed all pertinent labs within the past 24 hours.    Estimated Creatinine Clearance: 66.5 mL/min (based on SCr of 0.9 mg/dL).    Diagnostic Results:  I have reviewed all pertinent imaging results/findings within the past 24 hours.

## 2024-01-04 NOTE — PROGRESS NOTES
[facility] - Transplant Stepdown  Heart Transplant  Progress Note    Patient Name: Kristin Maier  MRN: [id number]  Admission Date: 1/2/2024  Hospital Length of Stay: 0 days  Attending Physician: Tracey Dutta MD  Primary Care Provider: Jorge A Stack MD  Principal Problem:Dizziness    Subjective:   Interval History: NAEON. Denies anymore dizzyness. Patient to be discharged home today if she tolerates her opsumit dose.     Continuous Infusions:   treprostinil (REMODULIN) 12,000,000 ng in sodium chloride 0.9% 100 mL infusion 110 ng/kg/min (01/03/24 1351)    veletri/remodulin cassette      veletri/remodulin tubing       Scheduled Meds:   atorvastatin  80 mg Oral Daily    macitentan  10 mg Oral Daily    mirtazapine  15 mg Oral QHS    riociguat  2.5 mg Oral TID    sertraline  50 mg Oral Daily     PRN Meds:acetaminophen, loperamide, sodium chloride 0.9%    Review of patient's allergies indicates:  No Known Allergies  Objective:     Vital Signs (Most Recent):  Temp: 98.5 °F (36.9 °C) (01/04/24 0730)  Pulse: 80 (01/04/24 0730)  Resp: (!) 22 (01/04/24 0730)  BP: 102/68 (01/04/24 0730)  SpO2: 98 % (01/04/24 0730) Vital Signs (24h Range):  Temp:  [98.4 °F (36.9 °C)-99.2 °F (37.3 °C)] 98.5 °F (36.9 °C)  Pulse:  [78-93] 80  Resp:  [12-22] 22  SpO2:  [91 %-98 %] 98 %  BP: ()/(53-82) 102/68     Patient Vitals for the past 72 hrs (Last 3 readings):   Weight   01/04/24 0400 65.6 kg (144 lb 10 oz)   01/03/24 0836 71.7 kg (158 lb)   01/02/24 0948 72.1 kg (158 lb 15.2 oz)       Body mass index is 26.45 kg/m².      Intake/Output Summary (Last 24 hours) at 1/4/2024 1024  Last data filed at 1/4/2024 0608  Gross per 24 hour   Intake 450 ml   Output --   Net 450 ml         Hemodynamic Parameters:              Physical Exam  Constitutional:       Comments: Alert, Oriented, No acute distress, lethargic but more awake today.    HENT:      Head: Normocephalic and atraumatic.   Eyes:      Conjunctiva/sclera: Conjunctivae  "normal.   Neck:      Comments: No JVP on exam  Cardiovascular:      Rate and Rhythm: Normal rate and regular rhythm.   Pulmonary:      Comments: Normal effort, Clear to auscultation   Abdominal:      Comments: Soft, Non-tender, Non-distended   Musculoskeletal:      Cervical back: Normal range of motion.      Comments: No peripheral edema   Skin:     Comments: Dry, Intact, Warm, Capillary refill <2 seconds.    Neurological:      Mental Status: She is oriented to person, place, and time. She is lethargic.      Comments: Normal speech   Psychiatric:         Mood and Affect: Mood and affect normal.            Significant Labs:  CBC:  Recent Labs   Lab 01/02/24 0421 01/03/24 0627 01/04/24  0611   WBC 4.05 4.08 5.62   RBC 4.35 4.04 4.46   HGB 12.3 11.6* 12.8   HCT 37.9 35.6* 38.4    166 178   MCV 87 88 86   MCH 28.3 28.7 28.7   MCHC 32.5 32.6 33.3       BNP:  Recent Labs   Lab 01/02/24 0421   *       CMP:  Recent Labs   Lab 01/02/24 0421 01/03/24  0627 01/04/24  0611   GLU 85 93 81   CALCIUM 9.3 8.7 9.5   ALBUMIN 4.0 3.5 3.9   PROT 7.2 6.1 7.0    143 142   K 3.7 3.5 3.7   CO2 19* 20* 14*    114* 117*   BUN 26* 15 13   CREATININE 1.0 0.8 0.9   ALKPHOS 81 75 81   ALT 22 17 16   AST 25 17 16   BILITOT 1.7* 1.6* 2.4*        Coagulation:   No results for input(s): "PT", "INR", "APTT" in the last 168 hours.  LDH:  No results for input(s): "LDH" in the last 72 hours.  Microbiology:  Microbiology Results (last 7 days)       Procedure Component Value Units Date/Time    Respiratory Infection Panel (PCR), Nasopharyngeal [[id number]] Collected: 01/02/24 0855    Order Status: Completed Specimen: Nasopharyngeal Swab Updated: 01/02/24 1046     Respiratory Infection Panel Source NP Swab     Adenovirus Not Detected     Coronavirus 229E, Common Cold Virus Not Detected     Coronavirus HKU1, Common Cold Virus Not Detected     Coronavirus NL63, Common Cold Virus Not Detected     Coronavirus OC43, Common Cold " Virus Not Detected     Comment: The Coronavirus strains detected in this test cause the common cold.  These strains are not the COVID-19 (novel Coronavirus)strain   associated with the respiratory disease outbreak.          SARS-CoV2 (COVID-19) Qualitative PCR Not Detected     Human Metapneumovirus Not Detected     Human Rhinovirus/Enterovirus Not Detected     Influenza A (subtypes H1, H1-2009,H3) Not Detected     Influenza B Not Detected     Parainfluenza Virus 1 Not Detected     Parainfluenza Virus 2 Not Detected     Parainfluenza Virus 3 Not Detected     Parainfluenza Virus 4 Not Detected     Respiratory Syncytial Virus Not Detected     Bordetella Parapertussis ([id number]) Not Detected     Bordetella pertussis (ptxP) Not Detected     Chlamydia pneumoniae Not Detected     Mycoplasma pneumoniae Not Detected    Narrative:      For all other respiratory sources, order [id number] -  Respiratory Viral Panel by PCR    Influenza A & B by Molecular [[id number]] Collected: 01/02/24 0432    Order Status: Completed Specimen: Nasopharyngeal Swab Updated: 01/02/24 0512     Influenza A, Molecular Negative     Influenza B, Molecular Negative     Flu A & B Source Nasal swab            I have reviewed all pertinent labs within the past 24 hours.    Estimated Creatinine Clearance: 66.5 mL/min (based on SCr of 0.9 mg/dL).    Diagnostic Results:  I have reviewed all pertinent imaging results/findings within the past 24 hours.  Assessment and Plan:     Mrs. 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 At the time of my examination patient was A&Ox3 but extremely sleepy thoughout conversation so history was limitied. States she was dealing with generalized weakness all day prior to dizziness starting. Denied any chest pain, SOB, fever,  chills, nausea, vomiting, headache, pro-dromal symptoms, FND, orthopnea, PND, abdominal distention/tenderness. States she has had similar episodes in the past but it was usually in the setting of severe headache or fluid overload.     In  ED, the cbc, cmp, troponin, CXRY, and EKG were unremarkable. Patient admitted to observation for evaluation of dizziness.     * Dizziness  Secondary to unknown cause. Possibly due to lower BP vs BPPV.     - Resolved  - S/p 500c of fluid  - Will restart opsumit today.  - PT ordered.     Pulmonary hypertension  - continue home remodulin, adempas.   - restart opsumit.         Shyanne Valencia MD  Heart Transplant  [facility] - [facility] Step[facility]

## 2024-01-04 NOTE — ASSESSMENT & PLAN NOTE
Secondary to unknown cause. Possibly due to lower BP vs BPPV.     - Resolved  - S/p 500c of fluid  - Will restart opsumit today.  - PT ordered.

## 2024-01-04 NOTE — TREATMENT PLAN
HOME REMODULIN VARIABLES          Tamanna Harding RN   Registered Nurse  Med/Surg     Progress Notes     Signed     Creation Time: 1/2/2024  9:37 AM       PH Admit Assessment for Continuation of Treprostinil (Remodulin)     Drug Infusing: Treprostinil (Remodulin)  Route: Intravenous  Pump Type: CADD  Current Pump Rate = 43 ml / 24 hours  Current Reservoir Volume = 36.4 (mls remaining to be infused)     Patient reports utilizing: Patient mixed cassettes     Home mixing instructions:   Patient reports mixing 5 ml of concentrated drug in 95 ml of diluent

## 2024-01-04 NOTE — PLAN OF CARE
Pt AAOx4. Pt much more awake/responsive and engaged in care. VSS, pt not orthostatic. NSR on tele. Remodulin in place at 110ng/kg/min. Pt up and ambulatory to restroom, states that she feels mild dizziness but it is improving. Pt up with standby assist, instructed to call prior to ambulation but does not call---bed alarm set. Pt voiding spontaneously, pt with 3 loose stools PRN Imodium given 2x. Bed in lowest locked position, call light within reach, pt instructed to attempt to void, POC ongoing.

## 2024-01-04 NOTE — DISCHARGE SUMMARY
[facility] - Transplant Stepdown  Heart Transplant  Discharge Summary      Patient Name: Kristin Maier  MRN: [id number]  Admission Date: 1/2/2024  Hospital Length of Stay: 0 days  Discharge Date and Time: 01/04/2024 2:16 PM  Attending Physician: Tracey Dutta MD   Discharging Provider: Shyanne Valencia MD  Primary Care Provider: Jorge A Stack MD     HPI: Mrs. 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 At the time of my examination patient was A&Ox3 but extremely sleepy thoughout conversation so history was limitied. States she was dealing with generalized weakness all day prior to dizziness starting. Denied any chest pain, SOB, fever, chills, nausea, vomiting, headache, pro-dromal symptoms, FND, orthopnea, PND, abdominal distention/tenderness. States she has had similar episodes in the past but it was usually in the setting of severe headache or fluid overload.     In  ED, the cbc, cmp, troponin, CXRY, and EKG were unremarkable. Patient admitted to observation for evaluation of dizziness.     * No surgery found *     Hospital Course: Upon admission, patient underwent CT scan to further evaluate dizziness and it was negative. Neuro exam was unremarkable. Euvolemic on exam. Room spinning vertigo thought to be due to BPPV w/ resolution after meclizine. BP were on the lower side so patient received 250cc bolus of NS and opsumit was held. Opsumit restarted next day and patient blood pressure remained normotensive. At the time of discharge patient was asymptomatic and denied any dizziness, weakness, SOB. Patient was given prescription for meclizine. At the time of discharge patient was clinically and hemodynamically stable. She agreed w/ plan for discharge.     Goals of Care Treatment  Preferences:  Code Status: Full Code    Health care agent: Juju Maier Critical access hospital agent number: [phone]          What is most important right now is to focus on spending time at home, remaining as independent as possible, improvement in condition but with limits to invasive therapies.  Accordingly, we have decided that the best plan to meet the patient's goals includes continuing with treatment.      Consults (From admission, onward)          Status Ordering Provider     Inpatient consult to Heart Transplant  Once        Provider:  (Not yet assigned)    Acknowledged RASHEED ROSA     Inpatient consult to Cardiology  Once        Provider:  (Not yet assigned)    Completed ROSEMARIE YODER            Significant Diagnostic Studies: Labs: BMP:   Recent Labs   Lab 01/03/24  0627 01/04/24  0611   GLU 93 81    142   K 3.5 3.7   * 117*   CO2 20* 14*   BUN 15 13   CREATININE 0.8 0.9   CALCIUM 8.7 9.5   MG 2.0 2.1       Pending Diagnostic Studies:       None          Final Active Diagnoses:    Diagnosis Date Noted POA    PRINCIPAL PROBLEM:  Dizziness [R42] 01/02/2024 Unknown    Pulmonary hypertension [I27.20] 11/30/2021 Yes      Problems Resolved During this Admission:    Diagnosis Date Noted Date Resolved POA    Loss of consciousness [R40.20] 01/02/2024 01/03/2024 Yes      Discharged Condition: good    Disposition:     Follow Up:    Patient Instructions:   No discharge procedures on file.  Medications:  Reconciled Home Medications:      Medication List        START taking these medications      meclizine 12.5 mg tablet  Commonly known as: ANTIVERT  Take 1 tablet (12.5 mg total) by mouth 3 (three) times daily as needed for Dizziness.     sodium chloride 0.9% SolP 100 mL with treprostinil 1 mg/mL Soln 6,000,000 ng  Inject 2,145 ng/min into the vein continuous.            CONTINUE taking these medications      atorvastatin 80 MG tablet  Commonly known as: LIPITOR  Take 1 tablet (80 mg total) by mouth once  daily.     furosemide 20 MG tablet  Commonly known as: LASIX  Take 1 tablet (20 mg total) by mouth once daily.     magnesium oxide 400 mg (241.3 mg magnesium) tablet  Commonly known as: MAG-OX  Take 1 tablet (400 mg total) by mouth once daily.     mirtazapine 15 MG tablet  Commonly known as: REMERON  Take 15 mg by mouth.     multivitamin with minerals tablet  Take 1 tablet by mouth once daily.     ondansetron 4 MG tablet  Commonly known as: ZOFRAN  Take 1 tablet (4 mg total) by mouth every 8 (eight) hours as needed for Nausea.     OPSUMIT 10 mg Tab  Generic drug: macitentan  Take 1 tablet (10 mg total) by mouth once daily.     oxyCODONE-acetaminophen  mg per tablet  Commonly known as: PERCOCET  Take 1 tablet by mouth every 6 (six) hours as needed for Pain.     potassium chloride SA 10 MEQ tablet  Commonly known as: K-DUR,KLOR-CON M  TAKE 2 TABLETS (20 MEQ TOTAL) BY MOUTH ONCE DAILY.     pregabalin 75 MG capsule  Commonly known as: LYRICA  Take 1 capsule (75 mg total) by mouth once daily AND 2 capsules (150 mg total) every evening.     REMODULIN 5 mg/mL Soln  Generic drug: treprostinil sodium     riociguat 2.5 mg tablet  Commonly known as: ADEMPAS  Take 1 tablet three times a day. Dose changes may occur throughout the course of therapy as directed by your prescriber.     sertraline 50 MG tablet  Commonly known as: ZOLOFT  Take 1 tablet (50 mg total) by mouth once daily.     spironolactone 25 MG tablet  Commonly known as: ALDACTONE  Take 1 tablet (25 mg total) by mouth once daily.            STOP taking these medications      loperamide 2 mg capsule  Commonly known as: IMODIUM              Shyanne Valencia MD  Heart Transplant  [facility] - Transplant Stepdown

## 2024-01-04 NOTE — PLAN OF CARE
Cr=0.9. K+=3.7. Denies dizziness today. C/O nausea. Zofran ordered and given. Nausea decreased. Pt was only able to eat few bites lunch. Afebrile. Opsumit given as ordered. No dizziness after. Plan to discharge home today.  bringing home Remodulin for pt to change back to home dose.

## 2024-01-04 NOTE — PT/OT/SLP EVAL
Physical Therapy Evaluation, Treatment, and Discharge    Patient Name:  Kristin Maier   MRN:  [id number]    Recommendations:     Discharge Recommendations: No Therapy Indicated   Discharge Equipment Recommendations: none   Barriers to discharge: None    Assessment:     Kristin Maier is a 48 y.o. female admitted with a medical diagnosis of Dizziness.  She presents with the following impairments/functional limitations:  (none). Pt participated in evaluation. PT admitted with reports of dizziness with one episode contributing to a fall. Pt reports she has experienced dizziness upon waking up, and most commonly after bending over. Per chart review, dizziness not significantly consistently related to blood pressure. Assessment provided for BPPV. Pt did report onset of dizziness after ~25 seconds in L Mojgan-Hallpike position, no nystagmus observed with either side. Out of caution, pt performed Epley maneuver for L side. Pt reports dizziness resolved at end of maneuver. PT presented at a later time where pt demo'd ability to mobilize with independence, denied dizziness throughout. At this time, pt does not require further acute skilled therapy intervention. Discharge from PT services and re-consult if pt experiences a change in status.       Rehab Prognosis: Good;    Recent Surgery: * No surgery found *      Plan:       Plan of Care Expires:  01/04/24    Subjective     Chief Complaint: dizziness-not present   Patient/Family Comments/goals: to get better   Pain/Comfort:  Pain Rating 1: 0/10  Pain Rating Post-Intervention 1: 0/10    Patients cultural, spiritual, Spiritism conflicts given the current situation: no    Living Environment:  Pt lives with family in a [facility] with 3 NIKKI and no HR.   Prior to admission, patients level of function was independent with mobility and ALDs, uses RW as needed.  Equipment used at home: none (uses RW as needed).  DME owned (not currently used): none.  Upon discharge, patient will have  assistance from family.    Objective:     Communicated with RN prior to session.  Patient found HOB elevated with telemetry (remodulin pump)  upon PT entry to room.    General Precautions: Standard, fall  Orthopedic Precautions:N/A   Braces: N/A  Respiratory Status: Room air    Exams:  Cognitive Exam:  Patient is AAOx4, followed all commands, communicates clearly and fluently  Gross Motor Coordination:  WFL  RUE ROM: WFL  RUE Strength: WFL  LUE ROM: WFL  LUE Strength: WFL   RLE ROM: WFL  RLE Strength: WFL  LLE ROM: WFL  LLE Strength: WFL    Functional Mobility:  Bed Mobility:     Supine to Sit: independence  Sit to Supine: independence  Transfers:     Sit to Stand:  independence with no AD  Gait: Pt ambulated 70 ft around room with no AD and independence. Pt with no LOB, no SOB, no dizziness.       AM-PAC 6 CLICK MOBILITY  Total Score:24       Treatment & Education:  T admitted with reports of dizziness with one episode contributing to a fall. Pt reports she has experienced dizziness upon waking up, and most commonly after bending over. Per chart review, dizziness not significantly consistently related to blood pressure. Assessment provided for BPPV. Pt did report onset of dizziness after ~25 seconds in L Mojgan-Hallpike position, no nystagmus observed with either side. Out of caution, pt performed Epley maneuver for L side. Pt reports dizziness resolved at end of maneuver. PT presented at a later time where pt demo'd ability to mobilize with independence, denied dizziness throughout.   PT recommended compensations for safety with mobility upon return home including kneeling instead of bending over to avoid potential dizziness. Pt demo'd understanding.     Patient left supine with all lines intact and call button in reach.    GOALS:   Multidisciplinary Problems       Physical Therapy Goals       Not on file              Multidisciplinary Problems (Resolved)          Problem: Physical Therapy    Goal Priority  Disciplines Outcome Goal Variances Interventions   Physical Therapy Goal   (Resolved)     PT, PT/OT Met                         History:     Past Medical History:   Diagnosis Date    Elevated liver enzymes     Essential (primary) hypertension     Heart failure, unspecified     Hypokalemia     Mixed hyperlipidemia     Neuropathic pain     Tx w/ tramadol & Lyrica    Pulmonary hypertension     Receiving Remodulin continuously through tunneled central line       Past Surgical History:   Procedure Laterality Date    APPENDECTOMY       SECTION      Transverse cut    HYSTERECTOMY  2017    TLH- bleeding    OOPHORECTOMY      RIGHT HEART CATHETERIZATION Right 2021    Procedure: INSERTION, CATHETER, RIGHT HEART;  Surgeon: Chloe Gregory MD;  Location: [facility] CATH LAB;  Service: Cardiology;  Laterality: Right;    RIGHT HEART CATHETERIZATION Right 3/16/2022    Procedure: INSERTION, CATHETER, RIGHT HEART;  Surgeon: Hu Silverman MD;  Location: [facility] CATH LAB;  Service: Cardiology;  Laterality: Right;    RIGHT HEART CATHETERIZATION Right 2022    Procedure: INSERTION, CATHETER, RIGHT HEART;  Surgeon: Chloe Gregory MD;  Location: [facility] CATH LAB;  Service: Cardiology;  Laterality: Right;    RIGHT HEART CATHETERIZATION Right 3/21/2023    Procedure: INSERTION, CATHETER, RIGHT HEART;  Surgeon: Kahlil Cisneros MD;  Location: [facility] CATH LAB;  Service: Cardiology;  Laterality: Right;    RIGHT HEART CATHETERIZATION Right 10/9/2023    Procedure: INSERTION, CATHETER, RIGHT HEART;  Surgeon: Chloe Gregory MD;  Location: [facility] CATH LAB;  Service: Cardiology;  Laterality: Right;    TUBAL LIGATION      VAGINAL DELIVERY  ; ; ;        Time Tracking:     PT Received On: 24  PT Start Time: 1117     PT Stop Time: 1136  PT Start Time 2: 1340  PT Stop Time 2: 1346  PT Total Time (min): 25 mins     Billable Minutes: Evaluation 10 min and Therapeutic Activity 15 mins       2024

## 2024-01-05 NOTE — NURSING
Pt mixed home concentration of 5ml Remodulin with 95ml diluent and had her Remodulin dose set at 45ml/24hrs instead of 43ml/24hrs per PharmD note with copy of dosing sheet. Pt stated she had been setting her Remodulin at 45ml/24hrs for a few months. She continued mixing 5ml Remodulin with 95ml diluent as done this evening. [facility] called to confirm dosing again and pharmacist confirmed her dose was 43ml/24hrs. Pt stated she kept titrating herself up and 45ml/24hrs was the the last dose she titrated up to. Pt reviewed dosing sheet in note and stated she thought she was to continue titrating up and did not notice concentration was to change at that rate. Pt changed her CADD pump down to 43ml/24hrs as pt was already receiving the 110ng/kg/min dose while in hospital. Dr. Dutta and Dr. Valnecia were sent secure chat message regarding this.

## 2024-01-05 NOTE — NURSING
Discharge instructions given and reviewed. RX for Meclizine delivered to room. Pt stated has all other home meds. Remodulin pump set at 43ml/24hrs.

## 2024-01-12 ENCOUNTER — HOSPITAL ENCOUNTER (OUTPATIENT)
Dept: PULMONOLOGY | Facility: CLINIC | Age: 49
Discharge: HOME | End: 2024-01-12
Payer: MEDICAID

## 2024-01-12 ENCOUNTER — TELEPHONE (OUTPATIENT)
Dept: TRANSPLANT | Facility: CLINIC | Age: 49
End: 2024-01-12
Payer: MEDICAID

## 2024-01-12 ENCOUNTER — OFFICE VISIT (OUTPATIENT)
Dept: TRANSPLANT | Facility: CLINIC | Age: 49
End: 2024-01-12
Payer: MEDICAID

## 2024-01-12 VITALS
BODY MASS INDEX: 24.36 KG/M2 | WEIGHT: 155.19 LBS | HEART RATE: 95 BPM | DIASTOLIC BLOOD PRESSURE: 62 MMHG | HEIGHT: 67 IN | OXYGEN SATURATION: 95 % | SYSTOLIC BLOOD PRESSURE: 88 MMHG | BODY MASS INDEX: 28.56 KG/M2 | WEIGHT: 155.19 LBS | HEIGHT: 62 IN

## 2024-01-12 DIAGNOSIS — Z79.899 HIGH RISK MEDICATION USE: ICD-10-CM

## 2024-01-12 DIAGNOSIS — I27.21 WHO GROUP 1 PULMONARY ARTERIAL HYPERTENSION: Primary | ICD-10-CM

## 2024-01-12 DIAGNOSIS — I27.9 CHRONIC PULMONARY HEART DISEASE: ICD-10-CM

## 2024-01-12 DIAGNOSIS — R42 DIZZINESS: ICD-10-CM

## 2024-01-12 PROCEDURE — 99999 PR PBB SHADOW E&M-EST. PATIENT-LVL IV: CPT | Mod: PBBFAC,TXP,,

## 2024-01-12 PROCEDURE — 94618 PULMONARY STRESS TESTING: CPT | Mod: 26,S$PBB,NTX, | Performed by: INTERNAL MEDICINE

## 2024-01-12 PROCEDURE — 3078F DIAST BP <80 MM HG: CPT | Mod: CPTII,NTX,,

## 2024-01-12 PROCEDURE — 3008F BODY MASS INDEX DOCD: CPT | Mod: CPTII,NTX,,

## 2024-01-12 PROCEDURE — 1159F MED LIST DOCD IN RCRD: CPT | Mod: CPTII,NTX,,

## 2024-01-12 PROCEDURE — 99214 OFFICE O/P EST MOD 30 MIN: CPT | Mod: S$PBB,NTX,,

## 2024-01-12 PROCEDURE — 3074F SYST BP LT 130 MM HG: CPT | Mod: CPTII,NTX,,

## 2024-01-12 PROCEDURE — 99214 OFFICE O/P EST MOD 30 MIN: CPT | Mod: PBBFAC,NTX

## 2024-01-12 PROCEDURE — 94618 PULMONARY STRESS TESTING: CPT | Mod: PBBFAC,NTX | Performed by: INTERNAL MEDICINE

## 2024-01-12 RX ORDER — OXYCODONE AND ACETAMINOPHEN 10; 325 MG/1; MG/1
1 TABLET ORAL EVERY 6 HOURS PRN
Qty: 120 TABLET | Refills: 0 | Status: SHIPPED | OUTPATIENT
Start: 2024-01-12 | End: 2024-01-16 | Stop reason: SDUPTHER

## 2024-01-12 NOTE — TELEPHONE ENCOUNTER
NN met with patient to discuss remodulin. Patient confirmed that she is on 110.2 ng/kg/min. She states that she deals with neuropathy and it is worse first thing in the morning and at night. Patient is taking her percocet every 4 hours instead of 6 and she takes lyrica. She states that changes her lyrica to see if she can avoid taking percocet (today she took 2 lyricas in the morning instead of 1). Patient states that she also has nausea and diarrhea occasionally but zofran and imodium has helped. Patient states that she feels like she is not doing as well as she was before, just doing typical housework is a lot for her to handle sometimes so she finds herself just sitting or laying down. Patient also complained of some abdominal soreness/pain when grabbing her stomach x3 weeks but will see PCP for it. ANA M Quick notified.

## 2024-01-12 NOTE — PATIENT INSTRUCTIONS
- Stable by symptoms and exam  - Holding lasix while treating NIKHIL   Skip afternoon Adempas dose.     May take Adempas if top number (systolic) is 95 or above.     Please do not take Adempas if top number is below 95.    Will have pharmacy send Adempas 2mg tablets to be taken 3 times a day.    Return to clinic in 3 months with labs, walk.    If you are on any blood pressure medications make sure you have taken them. For blood pressure readings, sit in a calm, quite room with dim lighting. Feet flat on the floor, without stimulation (no TV, no talking) and relax for at least 3-5 minutes prior to measuring.    Recommend 2 gram sodium restriction and 1500cc fluid restriction.  Encourage physical activity with graded exercise program.  Requested patient to weigh themselves daily, and to notify us if their weight increases by more than 3 lbs in 1 day or 5 lbs in 1 week.

## 2024-01-12 NOTE — PROGRESS NOTES
Subjective:    Patient ID:  Kristin Maier is a 48 y.o. female who presents for follow-up of Pulmonary Hypertension.    HPI:   Mrs. Maier is a 45 y/o AA female diagnosed with WHO Group 1 PAH, referred by Dr. MAINE Stack. She was initially seen in clinic by Dr. Dutta on 11/29/2021, presenting with symptoms of CHU and severe PAH by ECHO. Scheduled for RHC the next day. Same day of her appt she went to an OSH for a UTI.  Given her pulmonary hypertension history a decision was made to transfer her to [facility] for further inpatient workup.   12.2.21 RHC revealed: RA: 25/ 26/ 22 RV: 90/ 1/ 21 PA: 86/ 42/ 57 PWP: 9/ 8/ 7. TPG 50, PVR 25 (severely elevated R sided filling pressure. CI 1.2, CO 1.97. Patient was classified as WHO group I PH and started on Veletri while inpatient, patient required  at rate of 5mg/kg/min. Patient had PICC line placed on 12/8/21 for long term administerationof vasodilator therapy. Switched to Remodulin at discharge.     2023 admissions: 3/2023 - c/o weakness, dizziness, lethargy; 9/21/2023 - chest pain, headache; 10/5 - remodulin interruption, SOB, syncope.    2024 admissions: 1/2-1/4//2024 - sudden onset dizziness described as room-spinning that started around 1-2am. CT scan -donnie, neuro unremarkable, euvolemic on exam. Given meclizine for BPPV and improved. Normotensive     PH Medications: Remodulin 110 ng/kg/min, Opsumit 10 mg daily, and adempas 2.5 mg, TID.    1/12/2024: Mrs. Maier is here for hospital follow-up. She feels okay, but is still fatigued. Walked 426m with no desaturation. Experienced some lightheadedness during the walk. Blood pressure is low today. Tolerating PH regimen. Does not complain about leg pain, today. Patient denies chest pain, chest pressure, syncope, pre-syncope, PND, orthopnea, LE edema, abdominal pain, abdominal pressure, or N/V/F/C.       6MWT:   6/22/2023 1/12/2024   6MW     6MWT Status completed without stopping  completed without stopping     Patient Reported Dyspnea  Chest pain;Dyspnea;Lightheadedness    Was O2 used? No  No    6MW Distance walked (feet) 1277 feet  1400 feet    Distance walked (meters) 389.23 meters  426.72 meters    Did patient stop? No  No    Oxygen Saturation 98 %  95 %    Supplemental Oxygen Room Air  Room Air    Heart Rate 96 bpm  101 bpm    Blood Pressure 97/66  95/57    Jessica Dyspnea Rating  nothing at all  very heavy    Oxygen Saturation 94 %  98 %    Supplemental Oxygen Room Air  Room Air    Heart Rate 104 bpm  117 bpm    Blood Pressure 102/67  103/67    Jessica Dyspnea Rating  somewhat heavy  very,very heavy    Recovery Time (seconds) 55 seconds  68 seconds    Oxygen Saturation 98 %  98 %    Supplemental Oxygen Room Air  Room Air    Heart Rate 104 bpm  104 bpm      ECHO: 1/3/2024    Left Ventricle: The left ventricle is normal in size. Normal wall thickness. There is concentric remodeling. Normal wall motion. There is normal systolic function with a visually estimated ejection fraction of 55 - 60%. There is normal diastolic function.    Right Ventricle: Severe right ventricular enlargement with hypertrophy. Systolic function is moderately reduced.    The right atrium is severely dilated.    Tricuspid Valve: There is at least moderate to severe regurgitation.    Pulmonary Artery: The estimated pulmonary artery systolic pressure is 93 mmHg.    IVC/SVC: Intermediate venous pressure at 8 mmHg.    Pericardium: There is a small effusion.    RHC: 10/9/2023  RA: 7/ 6/ 5 RV: 71/ 3/ 4 PA: 71/ 32/ 43 PWP: 12/ 10/ 10 .   Cardiac output was 3.8 by Kristel. Cardiac index is 2.26 L/min/m2.   Low normal CO/CI on PH therapy (remodulin, macitentan, riociguat).   Normal right and left sided filling pressures.  Severe pulmonary hypertension in setting of normal left sided filling pressures.  TPG 33 PVR 8.68     ECHO: 10/6/2023    Left Ventricle: The left ventricle is normal in size. Normal wall thickness. Septal flattening in systole consistent with  right ventricular pressure overload. There is normal systolic function with a visually estimated ejection fraction of 65%.    Right Ventricle: Severe right ventricular enlargement. Systolic function is moderately reduced. The free wall strain is -12.1%.    Right Atrium: Right atrium is severely dilated.    Tricuspid Valve: Mildly thickened leaflets. There is mild annular dilation present. There is moderate to severe regurgitation with a centrally directed jet.    Pulmonary Artery: The estimated pulmonary artery systolic pressure is 77 mmHg.    IVC/SVC: Normal venous pressure at 3 mmHg.    Pericardium: There is a small circumferential effusion. There is no tamponade physiology.    ECHO: 12/13/2021 (prior to initiation of remodulin)  The left ventricle is normal in size with concentric remodeling and normal systolic function.  The estimated ejection fraction is 63%.  There are segmental left ventricular wall motion abnormalities.  There is abnormal septal wall motion.  Normal left ventricular diastolic function.  Moderate right ventricular enlargement with mildly reduced right ventricular systolic function.  Moderate right atrial enlargement.  Severe tricuspid regurgitation.  Mild pulmonic regurgitation.  Elevated central venous pressure (15 mmHg).  The estimated PA systolic pressure is 122 mmHg.  There is pulmonary hypertension.  Small pericardial effusion. Apically and trivial under the RA.    RHC: 7/19/2022  RA: 15/ 11/ 10 RV: 70/ 9/ 10 PA: 72/ 29/ 43 PWP: 12/ 12/ 11 .   Cardiac output was 4.29  by Kristel. Cardiac index is 2.61 L/min/m2.   O2 Sat: PA 69%.   Normal CO/CI on remodulin 80 ng/kg/min, riociguat 2mg po TID, macitentan 10mg.  Mildly increased right and normal left sided filling pressures.  Severe pulmonary hypertension.   TPG  32   PVR  7.45  MAP 75  SVR 1212    RHC: 3/16/2022  RA: 25/ 26/ 22 RV: 90/ 1/ 21 PA: 86/ 42/ 57 PWP: 9/ 8/ 7 .   Cardiac output was 1.97  by Kristel. Cardiac index is 1.2 L/min/m2.   O2  Sat: PA 38%.     TPG   50    PVR   25    PFTs: 6/27/2023 6/22/2023   Pulmonary Function Tests    FVC 2.95 liters    FEV1 2.34 liters    TLC (liters) 4.01 liters    DLCO (ml/mmHg sec) 14.58 ml/mmHg sec    FVC% 89.6    FEV1% 88.8    FEF 25-75 2.25    FEF 25-75% 86.7    TLC% 73.6    RV 1.06    RV% 56.7    DLCO% 55.6      V/Q: 12/3/2021  FINDINGS:  Perfusion: No observable filling defects or perfusion defects on perfusion imaging.  Ventilation: No observable defects on ventilation imaging.  Small amount of ingested radiotracer is visualized within the aerodigestive tract.  Impression:  This represents a low probability of pulmonary embolism.    CT Chest: 9/12/2022  Impression:     1. Constellation of findings suggestive of pulmonary arterial hypertension.  No significant pulmonary edema or pleural effusion.  No evidence of pulmonary thromboembolism.  2. Cardiomegaly with markedly enlarged right atrium and the right ventricle.  3. Multiple small pulmonary nodules, with the largest measuring 0.4 cm.  For multiple solid nodules all <6 mm, Fleischner Society 2017 guidelines recommend no routine follow up for a low risk patient, or follow up with non-contrast chest CT at 12 months after discovery in a high risk patient.  4. 3.9 cm borderline aneurysmal ascending aorta.    IV/SC:   1/12/2024   Pulmonary Hypertension Review Flowsheet    Pump Type CADD    Medication type Remodulin    Vial size 5mg/ml    Dose (ng/kg/min) 110.2 ng/kg/min    DW (kg) 67 kg    Amt of Med used 5 ml    Amt of Diluent Used NS 95ml    Final Concentration (ng/ml) 0.25mg/ml    Pump Rate ml/24 hr 43 ml/hr        Review of Systems   Constitutional: Negative.   HENT: Negative.     Eyes: Negative.    Cardiovascular:  Positive for dyspnea on exertion. Negative for chest pain, irregular heartbeat, leg swelling, near-syncope, orthopnea, paroxysmal nocturnal dyspnea and syncope.        Occasional leg swelling, resolves w/o diuretics   Respiratory: Negative.    "  Endocrine: Negative.    Hematologic/Lymphatic: Negative.    Skin: Negative.    Musculoskeletal:  Positive for myalgias. Negative for falls and joint swelling.        Foot and leg pain related to medication SE   Gastrointestinal:  Positive for diarrhea. Negative for abdominal pain, dysphagia, heartburn, nausea and vomiting.        Occasional nausea and diarrhea related to medication   Genitourinary: Negative.    Neurological:  Positive for excessive daytime sleepiness and dizziness. Negative for headaches and loss of balance.   Psychiatric/Behavioral: Negative.          Objective: BP (!) 88/62 (BP Location: Left arm, Patient Position: Sitting, BP Method: Medium (Automatic))   Pulse 95   Ht 5' 2" (1.575 m)   Wt 70.4 kg (155 lb 3.3 oz)   LMP 06/19/2017 (Approximate)   SpO2 95%   BMI 28.39 kg/m²       Physical Exam  Constitutional:       General: She is not in acute distress.     Appearance: Normal appearance. She is normal weight. She is not ill-appearing.   HENT:      Head: Normocephalic and atraumatic.      Nose: Nose normal.   Eyes:      Extraocular Movements: Extraocular movements intact.      Pupils: Pupils are equal, round, and reactive to light.   Neck:      Vascular: No JVD.   Cardiovascular:      Rate and Rhythm: Normal rate and regular rhythm.   Chest:      Comments: R chest wall Pedro Catheter infusing Remodulin. Dressing is C/D/I  Musculoskeletal:      Cervical back: Normal range of motion and neck supple.   Neurological:      Mental Status: She is alert.     Lab Results   Component Value Date     (H) 01/12/2024     01/12/2024    K 3.4 (L) 01/12/2024    MG 1.9 01/12/2024     (H) 01/12/2024    CO2 23 01/12/2024    BUN 13 01/12/2024    CREATININE 0.9 01/12/2024    GLU 64 (L) 01/12/2024    HGBA1C 5.4 10/07/2023    AST 18 01/12/2024    ALT 17 01/12/2024    ALBUMIN 3.8 01/12/2024    PROT 6.9 01/12/2024    BILITOT 1.4 (H) 01/12/2024    CHOL 183 10/07/2023    HDL 46 10/07/2023    " "LDLCALC 113.2 10/07/2023    TRIG 119 10/07/2023       Magnesium   Date Value Ref Range Status   01/12/2024 1.9 1.6 - 2.6 mg/dL Final       Lab Results   Component Value Date    WBC 4.58 01/12/2024    HGB 11.9 (L) 01/12/2024    HCT 36.1 (L) 01/12/2024    MCV 88 01/12/2024     (L) 01/12/2024       Lab Results   Component Value Date    INR 1.1 07/19/2022    INR 1.1 12/23/2021    INR 1.1 12/22/2021       BNP   Date Value Ref Range Status   01/12/2024 209 (H) 0 - 99 pg/mL Final     Comment:     Values of less than 100 pg/ml are consistent with non-CHF populations.   01/02/2024 183 (H) 0 - 99 pg/mL Final     Comment:     Values of less than 100 pg/ml are consistent with non-CHF populations.   11/01/2023 54 0 - 99 pg/mL Final     Comment:     Values of less than 100 pg/ml are consistent with non-CHF populations.       No results found for: "LDH"      WHO Group: 1 Functional Class: II   REVEAL Score: 9 (High Risk)  Assessment:       1. WHO group 1 pulmonary arterial hypertension    2. High risk medication use    3. Dizziness         Plan:       Mrs. Maier has severe PAH on triple therapy, to include IV remodulin. She had 3 hospitalizations in 2023 and this is her first in 2024. Will decrease her adempas dose to try to give her more blood pressure room. Encouraged that she is still FC II and walked 426m while maintaing her O2 sat.     At this point, we do not have a lot of options for medical therapy.. Increasing remodulin is limited by SEs. She has spoken with our [facility] team and they are happy to refer to [facility], but it would require moving to the area. Her current insurance will not allow evaluation without establishing residency.    We will continue to support Mrs. Maier through symptom management and palliative care.     Skip afternoon Adempas dose.     May take Adempas if top number (systolic) is 95 or above.     Please do not take Adempas if top number is below 95.    Will have pharmacy send " Adempas 2mg tablets to be taken 3 times a day.    Return to clinic in 3 months with labs, walk.    If you are on any blood pressure medications make sure you have taken them. For blood pressure readings, sit in a calm, quite room with dim lighting. Feet flat on the floor, without stimulation (no TV, no talking) and relax for at least 3-5 minutes prior to measuring.    Recommend 2 gram sodium restriction and 1500cc fluid restriction.  Encourage physical activity with graded exercise program.  Requested patient to weigh themselves daily, and to notify us if their weight increases by more than 3 lbs in 1 day or 5 lbs in 1 week.

## 2024-01-12 NOTE — PROCEDURES
Kristin Maier is a 48 y.o.  female patient, who presents for a 6 minute walk test ordered by ANA M Guy.  The diagnosis is Pulmonary Hypertension.  The patient's BMI is 24.3 kg/m2.  Predicted distance (lower limit of normal) is 434.87 meters.      Test Results:    The test was completed without stopping. The total time walked was 360 seconds. During walking, the patient reported:  Chest pain, Dyspnea, Lightheadedness. The patient used no assistive devices during testing.     01/12/2024---------Distance: 426.72 meters (1400 feet)     O2 Sat % Supplemental Oxygen Heart Rate Blood Pressure Jessica Scale   Pre-exercise  (Resting) 95 % Room Air 101 bpm 95/57 mmHg 7-8   During Exercise 98 % Room Air 117 bpm 103/67 mmHg 9   Post-exercise  (Recovery) 98 % Room Air  104 bpm       Recovery Time: 68 seconds    Performing nurse/tech: Eric VELEZ      PREVIOUS STUDY:   06/22/2023---------Distance: 389.23 meters (1277 feet)       Lap Walk Time O2 Sat % Supplemental Oxygen Heart Rate Blood Pressure Jessica Scale   Pre-exercise  (Resting) 0 0 98 % Room Air 96 bpm 97/66 mmHg 0   During Exercise 1 55 sec 94 % Room Air 103 bpm       During Exercise 2 108 sec 91 % Room Air 106 bpm       During Exercise 3 161 sec 92 % Room Air 102 bpm       During Exercise 4 221 sec 94 % Room Air 98 bpm       During Exercise 5 278 sec 94 % Room Air 96 bpm       During Exercise 6 338 sec 94 % Room Air 99 bpm       End of Exercise   360 sec 94 % Room Air 104 bpm 102/67 mmHg 4   Post-exercise  (Recovery)     98 % Room Air  104 bpm           CLINICAL INTERPRETATION:  Six minute walk distance is 426.72 meters (1400 feet) with very, very heavy dyspnea.  During exercise, there was no desaturation while breathing room air.  Both blood pressure and heart rate remained stable with walking.  Tachycardia was present prior to exercise.  The patient reported non-pulmonary symptoms during exercise.  Since the previous study in June 2023, exercise  capacity is unchanged.  Based upon age and body mass index, exercise capacity is less than predicted.

## 2024-01-16 RX ORDER — OXYCODONE AND ACETAMINOPHEN 10; 325 MG/1; MG/1
1 TABLET ORAL EVERY 6 HOURS PRN
Qty: 120 TABLET | Refills: 0 | Status: SHIPPED | OUTPATIENT
Start: 2024-01-16 | End: 2024-02-12 | Stop reason: SDUPTHER

## 2024-01-18 ENCOUNTER — TELEPHONE (OUTPATIENT)
Dept: TRANSPLANT | Facility: CLINIC | Age: 49
End: 2024-01-18
Payer: MEDICAID

## 2024-01-19 DIAGNOSIS — M79.604 PAIN IN BOTH LOWER EXTREMITIES: ICD-10-CM

## 2024-01-19 DIAGNOSIS — M79.605 PAIN IN BOTH LOWER EXTREMITIES: ICD-10-CM

## 2024-01-19 RX ORDER — PREGABALIN 75 MG/1
CAPSULE ORAL
Qty: 90 CAPSULE | Refills: 6 | Status: SHIPPED | OUTPATIENT
Start: 2024-01-19 | End: 2024-05-07 | Stop reason: SDUPTHER

## 2024-01-19 RX ORDER — OXYCODONE AND ACETAMINOPHEN 10; 325 MG/1; MG/1
1 TABLET ORAL EVERY 6 HOURS PRN
Qty: 120 TABLET | Refills: 0 | OUTPATIENT
Start: 2024-01-19

## 2024-01-30 ENCOUNTER — PATIENT MESSAGE (OUTPATIENT)
Dept: TRANSPLANT | Facility: CLINIC | Age: 49
End: 2024-01-30
Payer: MEDICAID

## 2024-02-06 ENCOUNTER — OFFICE VISIT (OUTPATIENT)
Dept: PALLIATIVE MEDICINE | Facility: CLINIC | Age: 49
End: 2024-02-06
Payer: MEDICAID

## 2024-02-06 DIAGNOSIS — M79.605 PAIN IN BOTH LOWER EXTREMITIES: ICD-10-CM

## 2024-02-06 DIAGNOSIS — Z51.5 PALLIATIVE CARE ENCOUNTER: ICD-10-CM

## 2024-02-06 DIAGNOSIS — I27.20 PULMONARY HYPERTENSION: Primary | ICD-10-CM

## 2024-02-06 DIAGNOSIS — F41.9 ANXIETY: ICD-10-CM

## 2024-02-06 DIAGNOSIS — M79.604 PAIN IN BOTH LOWER EXTREMITIES: ICD-10-CM

## 2024-02-06 PROCEDURE — 99214 OFFICE O/P EST MOD 30 MIN: CPT | Mod: 95,NTX,, | Performed by: STUDENT IN AN ORGANIZED HEALTH CARE EDUCATION/TRAINING PROGRAM

## 2024-02-06 PROCEDURE — 1159F MED LIST DOCD IN RCRD: CPT | Mod: CPTII,95,NTX, | Performed by: STUDENT IN AN ORGANIZED HEALTH CARE EDUCATION/TRAINING PROGRAM

## 2024-02-06 NOTE — PROGRESS NOTES
Palliative Medicine Clinic Note      Consult Requested By: No ref. provider found    Primary Care Physician:   Jorge A Stack MD    Reason for Consult: Advance care planning and symptom management in the setting of Pulmonary Hypertension     The patient location is: [place]   The chief complaint leading to consultation is: pain    Visit type: audiovisual    Face to Face time with patient: 18min  38minutes of total time spent on the encounter, which includes face to face time and non-face to face time preparing to see the patient (eg, review of tests), Obtaining and/or reviewing separately obtained history, Documenting clinical information in the electronic or other health record, Independently interpreting results (not separately reported) and communicating results to the patient/family/caregiver, or Care coordination (not separately reported).       Each patient provided with medical services by telemedicine is:  (1) informed of the relationship between the physician and patient and the respective role of any other health care provider with respect to management of the patient; and (2) notified that he or she may decline to receive medical services by telemedicine and may withdraw from such care at any time.      ASSESSMENT/PLAN:     Plan/Recommendations:  Diagnoses and all orders for this visit:    Pulmonary hypertension  -Pulmonary HTN, WHO group 1, on remodulin.   - following with Pulmonary hypertension Clinic  -currently on Opsumit, Adempas and Remodulin - medications were adjusted based on her blood pressure  -no additional dizziness episodes  -Not requiring oxygen   -considering going to [place] for lung transplant workup        Anxiety /Other insomnia  -describing increased anxiety lately, having episodes where she has anxiety attacks that manifest as significant shortness of breath.  - we will increase sertraline to 50mg daily (refilled)  - working with  for support  -will refer to PM  behavioral health         Pain in both lower extremities  Arthralgia in both hands  -pain in lower extremities is related to her medication, it is worse when her Remodulin is uptitrated- feels like her feet are on fire   -Percocet 10-325mg taking q.4 to 6 hours now (refill sent) not reporting any significant side effects like sedation, respiratory depression or constipation  -Also on Lyrica 50mg in am and 100mg qhs   -we will refer to pain management      Palliative care encounter  Medicolegal: Has decision making capacity.   is surrogate decision maker.   She shared that for HCPOA she would named her  as 1st agent and her daughter Nicky as her 2nd agent.     Psychosocial:  support system consists of  and daughters     Spiritual: Advent    Understanding of disease and Illness Trajectory: Patient  has  adequate understanding of her illness, they can benefit from continued education on what to expect in the future.      Advance Care Planning   Advance Directives:   Living Will: No    [place]POST: No    Do Not Resuscitate Status: No    Medical Power of : Yes    Agent's Name:  Juju Maier   Agent's Contact Number:  [phone]    Decision Making:  Patient answered questions  Goals of Care: What is most important right now is to focus on spending time at home, remaining as independent as possible, improvement in condition but with limits to invasive therapies. Accordingly, we have decided that the best plan to meet the patient's goals includes continuing with treatment.              Date: 06/13/2023  Power of   I introduced the concept of advance directives to the patient, as well. Then the patient received detailed information about the importance of designating a Health Care Power of  (HCPOA). She was also instructed to communicate with this person about their wishes for future healthcare, should she become sick and lose decision-making capacity. The patient has not  previously appointed a HCPOA. After our discussion, the patient has decided to complete a HCPOA and has appointed her significant other, health care agent: Juju Maier & her daughter Navya Dowell as second agent. Form completed and will be scanned today.       min time was spent on advance care planning, goals of care discussion, emotional support, formulating and communicating prognosis and goals of care, exploring burden/benefit of various approaches of treatment.     Follow up: 2 month      SUBJECTIVE:     History obtained from: Patient      complaint: No chief complaint on file.      Disease History:  Pulmonary HTN, WHO group 1, on remodulin. [facility] 3/21/22 indicative on worsening disease      History of Present Illness / Interval History:  Kristin Maier is 48 y.o. year old female presenting with PAH.  Referred to Palliative Care for evaluation and management of physical symptoms, advance care planning,, and additional support.. female attended the appointment alone     Has right leg pain.  Especially when she has extra fluid is throbbing she is unable to stand she is unable to walk around.  Her current medication doses or controlling her pain.  However she is wondering if there is anything else that can be done to improve her mobility.  We will refer to pain management  Her oxycodone Q 4 to q.6 hours.  She is taking her Lyrica as prescribed.  She is walking with a Rollator in the kitchen however she can not stand for a long period of time.\    She is reporting significant anxiety, she has more episodes of shortness of breath or anxiety attacks.  --------    Continues to have anxiety  Nausea every other day  Continues to have LE pain, feet on fire, legs like hammer  Trying to get back to nromal       -------    Patient continues to report pain she rates her pain as a 7 today.  She is able to walk around.  She states that Percocet helps however it depends on the day she sometimes needs to take  2.  Today which is about they because she was walking around she will need medication at 4:00 p.m. then before she goes to sleep.  Day she might only need 1 dose.  She felt very depressed 2 weeks ago  She has been more sedentary lately mostly spending her days the bed or recliner or sofa  Has decreased appetite however no weight loss            Review of Symptoms      Symptom Assessment (ESAS 0-10 Scale)  Pain:  8  Dyspnea:  0  Anxiety:  10  Nausea:  0  Depression:  0  Anorexia:  0  Fatigue:  0  Insomnia:  0  Restlessness:  0  Agitation:  0     CAM / Delirium:  Negative  Constipation:  Negative  Diarrhea:  Negative      Pain Assessment:    Location(s): leg (joint)    Leg       Location: bilateral        Quality: Throbbing        Quantity: 8/10 in intensity        Chronicity: Onset 7 month(s) ago, unchanged        Aggravating Factors: Standing        Alleviating Factors: Opiates        Associated Symptoms: None    Modified Jessica Scale:  3    Performance Status:  60    Living Arrangements:  Lives with family    Psychosocial/Cultural:   See Palliative Psychosocial Note: Yes  . Has 3 living daughters youngest is 25 oldest is 33 and a  son. Has grandchildren who she enjoys spending time with. Likes teaching them to play different sports. Enjoys gardening   **Primary  to Follow**  Palliative Care  Consult: Yes    Spiritual:  F - Leticia and Belief:  Judaism        Previous experience or exposure to a serious illness: Yes        Medications:    Current Outpatient Medications:     furosemide (LASIX) 20 MG tablet, Take 1 tablet (20 mg total) by mouth once daily., Disp: 90 tablet, Rfl: 3    magnesium oxide (MAG-OX) 400 mg (241.3 mg magnesium) tablet, Take 1 tablet (400 mg total) by mouth once daily., Disp: 90 tablet, Rfl: 3    multivitamin with minerals tablet, Take 1 tablet by mouth once daily., Disp: , Rfl:     ondansetron (ZOFRAN) 4 MG tablet, Take 1 tablet (4 mg total) by mouth every  8 (eight) hours as needed for Nausea., Disp: 30 tablet, Rfl: 6    OPSUMIT 10 mg Tab, Take 1 tablet (10 mg total) by mouth once daily., Disp: 30 tablet, Rfl: 11    oxyCODONE-acetaminophen (PERCOCET)  mg per tablet, Take 1 tablet by mouth every 6 (six) hours as needed for Pain., Disp: 120 tablet, Rfl: 0    potassium chloride SA (K-DUR,KLOR-CON M) 10 MEQ tablet, TAKE 2 TABLETS (20 MEQ TOTAL) BY MOUTH ONCE DAILY., Disp: 60 tablet, Rfl: 11    pregabalin (LYRICA) 75 MG capsule, Take 1 capsule (75 mg total) by mouth once daily AND 2 capsules (150 mg total) every evening., Disp: 90 capsule, Rfl: 6    REMODULIN 5 mg/mL Soln, , Disp: , Rfl:     riociguat (ADEMPAS) 2 mg Tab tablet, Take 1 tablet (2 mg total) by mouth 3 (three) times daily., Disp: 90 tablet, Rfl: 11    sertraline (ZOLOFT) 50 MG tablet, Take 1 tablet (50 mg total) by mouth once daily., Disp: 30 tablet, Rfl: 11    sodium chloride 0.9% SolP 100 mL with treprostinil 1 mg/mL Soln 6,000,000 ng, Inject 2,145 ng/min into the vein continuous., Disp: , Rfl:     spironolactone (ALDACTONE) 25 MG tablet, Take 1 tablet (25 mg total) by mouth once daily., Disp: 90 tablet, Rfl: 3      External  database queried on 02/06/2024  by Sonja Mcgill .   The results reviewed and considered with the clinical data in the decision whether or not to prescribe a controlled substance.  01/20/2024 01/12/2024 2 Oxycodone-Acetaminophen  120.00 30 Er Lori [id number] [facility] ([id number]) 0 60.00 MME Other [place]   01/19/2024 01/19/2024 2 Pregabalin 75 Mg Capsule 90.00 30 Er Lori [id number] [facility] ([id number]) 0 1.51 LME Medicaid [place]   12/23/2023 12/07/2023 2 Oxycodone-Acetaminophen  120.00 30 Er Lori [id number] [facility] ([id number]) 0 60.00 MME Other [place]   12/04/2023 10/09/2023 2 Oxycodone-Acetaminophen  90.00 30 Er Lori [id number] [facility] ([id number]) 0 45.00 MME Other [place]   12/04/2023 10/26/2023 2 Pregabalin 75 Mg Capsule 90.00 30 Er Lori [id number] [facility] ([id number]) 1 1.51 LME Medicaid [place]   11/06/2023 11/06/2023 2  Oxycodone-Acetaminophen  120.00 30 Er Lori [id number] [facility] ([id number])         Review of patient's allergies indicates:  No Known Allergies    OBJECTIVE:       Physical Exam:  Vitals:    Physical Exam  Constitutional:       General: She is not in acute distress.  HENT:      Head: Normocephalic and atraumatic.   Pulmonary:      Effort: Pulmonary effort is normal. No respiratory distress.   Neurological:      Mental Status: She is alert and oriented to person, place, and time.   Psychiatric:         Mood and Affect: Mood and affect normal.         I spent a total of 38 minutes on the day of the visit. This includes face to face time in discussion of goals of care, symptom assessment, coordination of care and emotional support.  This also includes non-face to face time preparing to see the patient (eg, review of tests/imaging), obtaining and/or reviewing separately obtained history, documenting clinical information in the electronic or other health record, independently interpreting results and communicating results to the patient/family/caregiver, or care coordinator.         This note was partially created using [employer] Voice Recognition software. Typographical and content errors may occur with this process. While efforts are made to detect and correct such errors, in some cases errors will persist. For this reason, wording in this document should be considered in the proper context and not strictly verbatim.      Signature: Sonja Mcgill MD

## 2024-02-07 ENCOUNTER — PATIENT MESSAGE (OUTPATIENT)
Dept: PALLIATIVE MEDICINE | Facility: CLINIC | Age: 49
End: 2024-02-07
Payer: MEDICAID

## 2024-02-12 ENCOUNTER — TELEPHONE (OUTPATIENT)
Dept: TRANSPLANT | Facility: CLINIC | Age: 49
End: 2024-02-12
Payer: MEDICAID

## 2024-02-12 RX ORDER — OXYCODONE AND ACETAMINOPHEN 10; 325 MG/1; MG/1
1 TABLET ORAL EVERY 4 HOURS PRN
Qty: 180 TABLET | Refills: 0 | Status: SHIPPED | OUTPATIENT
Start: 2024-02-12 | End: 2024-02-14 | Stop reason: SDUPTHER

## 2024-02-12 RX ORDER — OXYCODONE AND ACETAMINOPHEN 10; 325 MG/1; MG/1
1 TABLET ORAL EVERY 4 HOURS PRN
Qty: 180 TABLET | Refills: 0 | Status: CANCELLED | OUTPATIENT
Start: 2024-02-12

## 2024-02-14 DIAGNOSIS — M25.542 ARTHRALGIA OF BOTH HANDS: ICD-10-CM

## 2024-02-14 DIAGNOSIS — M79.604 PAIN IN BOTH LOWER EXTREMITIES: ICD-10-CM

## 2024-02-14 DIAGNOSIS — M79.605 PAIN IN BOTH LOWER EXTREMITIES: ICD-10-CM

## 2024-02-14 DIAGNOSIS — M25.541 ARTHRALGIA OF BOTH HANDS: ICD-10-CM

## 2024-02-14 DIAGNOSIS — I27.20 PULMONARY HYPERTENSION: Primary | ICD-10-CM

## 2024-02-15 DIAGNOSIS — I27.20 PULMONARY HYPERTENSION: Primary | ICD-10-CM

## 2024-02-15 DIAGNOSIS — Z76.82 LUNG TRANSPLANT CANDIDATE: ICD-10-CM

## 2024-02-15 RX ORDER — OXYCODONE AND ACETAMINOPHEN 10; 325 MG/1; MG/1
1 TABLET ORAL EVERY 4 HOURS PRN
Qty: 180 TABLET | Refills: 0 | Status: SHIPPED | OUTPATIENT
Start: 2024-02-15 | End: 2024-02-15

## 2024-02-15 RX ORDER — OXYCODONE AND ACETAMINOPHEN 10; 325 MG/1; MG/1
1 TABLET ORAL EVERY 4 HOURS PRN
Qty: 180 TABLET | Refills: 0 | Status: SHIPPED | OUTPATIENT
Start: 2024-02-15 | End: 2024-02-19 | Stop reason: SDUPTHER

## 2024-02-17 ENCOUNTER — PATIENT MESSAGE (OUTPATIENT)
Dept: PALLIATIVE MEDICINE | Facility: CLINIC | Age: 49
End: 2024-02-17
Payer: MEDICAID

## 2024-02-19 DIAGNOSIS — M79.604 PAIN IN BOTH LOWER EXTREMITIES: ICD-10-CM

## 2024-02-19 DIAGNOSIS — M79.605 PAIN IN BOTH LOWER EXTREMITIES: ICD-10-CM

## 2024-02-19 DIAGNOSIS — M25.541 ARTHRALGIA OF BOTH HANDS: ICD-10-CM

## 2024-02-19 DIAGNOSIS — I27.20 PULMONARY HYPERTENSION: ICD-10-CM

## 2024-02-19 DIAGNOSIS — M25.542 ARTHRALGIA OF BOTH HANDS: ICD-10-CM

## 2024-02-19 RX ORDER — OXYCODONE AND ACETAMINOPHEN 10; 325 MG/1; MG/1
1 TABLET ORAL EVERY 4 HOURS PRN
Qty: 180 TABLET | Refills: 0 | Status: SHIPPED | OUTPATIENT
Start: 2024-02-19 | End: 2024-03-13 | Stop reason: SDUPTHER

## 2024-03-13 DIAGNOSIS — M25.541 ARTHRALGIA OF BOTH HANDS: ICD-10-CM

## 2024-03-13 DIAGNOSIS — M25.542 ARTHRALGIA OF BOTH HANDS: ICD-10-CM

## 2024-03-13 DIAGNOSIS — M79.604 PAIN IN BOTH LOWER EXTREMITIES: ICD-10-CM

## 2024-03-13 DIAGNOSIS — I27.20 PULMONARY HYPERTENSION: ICD-10-CM

## 2024-03-13 DIAGNOSIS — M79.605 PAIN IN BOTH LOWER EXTREMITIES: ICD-10-CM

## 2024-03-14 RX ORDER — OXYCODONE AND ACETAMINOPHEN 10; 325 MG/1; MG/1
1 TABLET ORAL EVERY 4 HOURS PRN
Qty: 180 TABLET | Refills: 0 | Status: SHIPPED | OUTPATIENT
Start: 2024-03-14 | End: 2024-04-10 | Stop reason: SDUPTHER

## 2024-03-18 ENCOUNTER — TELEPHONE (OUTPATIENT)
Dept: TRANSPLANT | Facility: CLINIC | Age: 49
End: 2024-03-18
Payer: MEDICAID

## 2024-03-18 NOTE — TELEPHONE ENCOUNTER
Your Prior Authorization for Opsumit has been approved  Message from plan: PA has been Approved. PA End Date: 03/18/2025

## 2024-03-21 ENCOUNTER — TELEPHONE (OUTPATIENT)
Dept: TRANSPLANT | Facility: CLINIC | Age: 49
End: 2024-03-21
Payer: MEDICAID

## 2024-03-22 ENCOUNTER — TELEPHONE (OUTPATIENT)
Dept: TRANSPLANT | Facility: CLINIC | Age: 49
End: 2024-03-22
Payer: MEDICAID

## 2024-03-25 ENCOUNTER — TELEPHONE (OUTPATIENT)
Dept: TRANSPLANT | Facility: CLINIC | Age: 49
End: 2024-03-25
Payer: MEDICAID

## 2024-03-26 ENCOUNTER — TELEPHONE (OUTPATIENT)
Dept: TRANSPLANT | Facility: CLINIC | Age: 49
End: 2024-03-26
Payer: MEDICAID

## 2024-04-02 ENCOUNTER — TELEPHONE (OUTPATIENT)
Dept: TRANSPLANT | Facility: CLINIC | Age: 49
End: 2024-04-02
Payer: MEDICAID

## 2024-04-03 ENCOUNTER — TELEPHONE (OUTPATIENT)
Dept: TRANSPLANT | Facility: CLINIC | Age: 49
End: 2024-04-03
Payer: MEDICAID

## 2024-04-08 ENCOUNTER — TELEPHONE (OUTPATIENT)
Dept: TRANSPLANT | Facility: CLINIC | Age: 49
End: 2024-04-08
Payer: MEDICAID

## 2024-04-08 NOTE — TELEPHONE ENCOUNTER
Generated letter to close referral due to not 'does not meet criteria' and multiple no shows in the last six months.

## 2024-04-08 NOTE — LETTER
April 8, 2024    [address]  [address] [place] [id number]  Phone: [phone]  Fax: [phone]        Dear Sho Guy DNP:    Patient: Kristin Maier   MR Number: [id number]   YOB: 1975     Thank you for the collaborative care of Kristin Maier.    After multiple attempts to continue follow up with Kristin Maier, pending this patient establishing care with an outside transplant center, we note she is not a potential candidate for lung transplantation at [facility] and will discontinue follow up with her due to the following missed / no show and cancelled appointments as follows:     10/9/23: Informed patient referral to outside center needed  12/11/23: No Show  1/18/24: Patient Cancelled  2/8/24: No Show  3/25/24: No Show  4/3/24: No Show       We appreciate your referral to our center and we look forward to working with you in the future.   If you have any questions or concerns regarding this decision, then please do not hesitate to contact me at [phone].    Sincerely,     America Rachel D.O.  Medical Director, Lung Transplant  Pulmonary & Critical Care Medicine    [facility]  [address] [place] [id number]  [phone] tel  [phone] fax    CC: Dr. Chloe Gregory, Kristin Maier  CS/fmk

## 2024-04-09 ENCOUNTER — OFFICE VISIT (OUTPATIENT)
Dept: PALLIATIVE MEDICINE | Facility: CLINIC | Age: 49
End: 2024-04-09
Payer: MEDICAID

## 2024-04-09 DIAGNOSIS — M79.604 PAIN IN BOTH LOWER EXTREMITIES: ICD-10-CM

## 2024-04-09 DIAGNOSIS — M25.50 ARTHRALGIA, UNSPECIFIED JOINT: Primary | ICD-10-CM

## 2024-04-09 DIAGNOSIS — F41.9 ANXIETY: ICD-10-CM

## 2024-04-09 DIAGNOSIS — I27.20 PULMONARY HYPERTENSION: ICD-10-CM

## 2024-04-09 DIAGNOSIS — M79.605 PAIN IN BOTH LOWER EXTREMITIES: ICD-10-CM

## 2024-04-09 PROCEDURE — 99215 OFFICE O/P EST HI 40 MIN: CPT | Mod: 95,,, | Performed by: STUDENT IN AN ORGANIZED HEALTH CARE EDUCATION/TRAINING PROGRAM

## 2024-04-09 RX ORDER — OXYCODONE 18 MG/1
18 CAPSULE, EXTENDED RELEASE ORAL EVERY 12 HOURS
Qty: 60 CAPSULE | Refills: 0 | Status: SHIPPED | OUTPATIENT
Start: 2024-04-09 | End: 2024-05-07 | Stop reason: SDUPTHER

## 2024-04-09 RX ORDER — NALOXONE HYDROCHLORIDE 4 MG/.1ML
SPRAY NASAL
Qty: 1 EACH | Refills: 11 | Status: SHIPPED | OUTPATIENT
Start: 2024-04-09

## 2024-04-09 RX ORDER — SERTRALINE HYDROCHLORIDE 100 MG/1
100 TABLET, FILM COATED ORAL DAILY
Qty: 30 TABLET | Refills: 11 | Status: SHIPPED | OUTPATIENT
Start: 2024-04-09 | End: 2025-04-09

## 2024-04-09 NOTE — PROGRESS NOTES
Palliative Medicine Clinic Note      Consult Requested By: No ref. provider found    Primary Care Physician:   Jorge A Stack MD    Reason for Consult: Advance care planning and symptom management in the setting of Pulmonary Hypertension     The patient location is: [place]   The chief complaint leading to consultation is: pain    Visit type: audiovisual    Face to Face time with patient: 18min  48minutes of total time spent on the encounter, which includes face to face time and non-face to face time preparing to see the patient (eg, review of tests), Obtaining and/or reviewing separately obtained history, Documenting clinical information in the electronic or other health record, Independently interpreting results (not separately reported) and communicating results to the patient/family/caregiver, or Care coordination (not separately reported).       Each patient provided with medical services by telemedicine is:  (1) informed of the relationship between the physician and patient and the respective role of any other health care provider with respect to management of the patient; and (2) notified that he or she may decline to receive medical services by telemedicine and may withdraw from such care at any time.      ASSESSMENT/PLAN:     Plan/Recommendations:  Diagnoses and all orders for this visit:    Pulmonary hypertension  -Pulmonary HTN, WHO group 1, on remodulin.   - following with Pulmonary hypertension Clinic  -currently on Opsumit, Adempas and Remodulin   -Not requiring oxygen   -considering moving  to [place] for lung transplant workup in next 6m      Anxiety /Other insomnia  -continues to have anxiety  - will increase sertraline to 100mg daily   - working with  for support  - working with [facility]         Pain in both lower extremities  Arthralgia in both hands  -pain in lower extremities is related to her medication, it is worse when her Remodulin is uptitrated- feels like  her feet are on fire   -On Lyrica 75mg in am and 150mg qhs   -Percocet 10-325mg taking q.4 to 6 hours now (refill sent)   -Will start Xtampza 18mg PO q 12h   -not reporting any significant side effects from opioids like sedation, respiratory depression or constipation      Palliative care encounter  Medicolegal: Has decision making capacity.   is surrogate decision maker.   She shared that for HCPOA she would named her  as 1st agent and her daughter Nicky as her 2nd agent.     Psychosocial:  support system consists of  and daughters     Spiritual: Worship    Understanding of disease and Illness Trajectory: Patient  has  adequate understanding of her illness, they can benefit from continued education on what to expect in the future.      Advance Care Planning   Advance Directives:   Living Will: No    [place]: No    Do Not Resuscitate Status: No    Medical Power of : Yes    Agent's Name:  Juju Maier   Agent's Contact Number:  [phone]    Decision Making:  Patient answered questions  Goals of Care: What is most important right now is to focus on spending time at home, remaining as independent as possible, improvement in condition but with limits to invasive therapies. Accordingly, we have decided that the best plan to meet the patient's goals includes continuing with treatment and obtaining a lung transplant               Date: 06/13/2023  Power of   I introduced the concept of advance directives to the patient, as well. Then the patient received detailed information about the importance of designating a Health Care Power of  (HCPOA). She was also instructed to communicate with this person about their wishes for future healthcare, should she become sick and lose decision-making capacity. The patient has not previously appointed a HCPOA. After our discussion, the patient has decided to complete a HCPOA and has appointed her significant other, health care agent: Juju  Darrion Maier & her daughter Navya Dowell as second agent. Form completed and will be scanned today.       min time was spent on advance care planning, goals of care discussion, emotional support, formulating and communicating prognosis and goals of care, exploring burden/benefit of various approaches of treatment.     Follow up: 2 month      SUBJECTIVE:     History obtained from: Patient      complaint: No chief complaint on file.      Disease History:  Pulmonary HTN, WHO group 1, on remodulin. [facility] 3/21/22 indicative on worsening disease      History of Present Illness / Interval History:  Kristin Maier is 48 y.o. year old female presenting with PAH.  Referred to Palliative Care for evaluation and management of physical symptoms, advance care planning,, and additional support.. female attended the appointment alone       4/9  The patient reports experiencing pain, anxiety, nausea, and fatigue. The primary pain is located in the back and leg. She states that the medication helps with the pain, but it only works for a short period of time. She is currently on Percocet (oxycodone with Tylenol) for pain management.  She has been trying to move to [place] for a lung transplant but is currently still in her current location. She is giving herself six months to make this move.    She recently went on a vacation with her grandchildren and participated in various activities, which may have contributed to her current fatigue and pain. She has been mostly bedridden since returning from the vacation. She also experiences shortness of breath and has been taking Lasix to manage fluid retention. She is on sertraline for anxiety and has been experiencing diarrhea as a side effect of her PH meds. Experiences leg pain and joint swelling Requires 's assistance for getting in and out of vehicles Struggles with nighttime sleep Maintains daily routine of watering flowers and picking up grandchildren from  school      --------    Has right leg pain.  Especially when she has extra fluid is throbbing she is unable to stand she is unable to walk around.  Her current medication doses or controlling her pain.  However she is wondering if there is anything else that can be done to improve her mobility.  We will refer to pain management  Her oxycodone Q 4 to q.6 hours.  She is taking her Lyrica as prescribed.  She is walking with a Rollator in the kitchen however she can not stand for a long period of time. She is reporting significant anxiety, she has more episodes of shortness of breath or anxiety attacks.  --------    Continues to have anxiety  Nausea every other day  Continues to have LE pain, feet on fire, legs like hammer  Trying to get back to [place]       -------    Patient continues to report pain she rates her pain as a 7 today.  She is able to walk around.  She states that Percocet helps however it depends on the day she sometimes needs to take 2.  Today which is about they because she was walking around she will need medication at 4:00 p.m. then before she goes to sleep.  Day she might only need 1 dose.  She felt very depressed 2 weeks ago  She has been more sedentary lately mostly spending her days the bed or recliner or sofa  Has decreased appetite however no weight loss            Review of Symptoms      Symptom Assessment (ESAS 0-10 Scale)  Pain:  10  Dyspnea:  8  Anxiety:  10  Nausea:  10  Depression:  6  Anorexia:  0  Fatigue:  8  Insomnia:  0  Restlessness:  8  Agitation:  0     CAM / Delirium:  Negative  Constipation:  Negative  Diarrhea:  Positive      Pain Assessment:    Location(s): leg (joint)    Leg       Location: bilateral        Quality: Throbbing        Quantity: 10/10 in intensity        Chronicity: Onset 7 month(s) ago, gradually worsening        Aggravating Factors: Standing        Alleviating Factors: Opiates        Associated Symptoms: None    Modified Jessica Scale:  3    Performance Status:   60    Living Arrangements:  Lives with family    Psychosocial/Cultural:   See Palliative Psychosocial Note: Yes  .  is a . Has 3 living daughters youngest is 25 oldest is 33 and a  son. Has grandchildren who she enjoys spending time with. Likes teaching them to play different sports. Enjoys gardening   **Primary  to Follow**  Palliative Care  Consult: Yes    Spiritual:  F - Leticia and Belief:  Anabaptist        Previous experience or exposure to a serious illness: Yes      Medications:    Current Outpatient Medications:     furosemide (LASIX) 20 MG tablet, Take 1 tablet (20 mg total) by mouth once daily., Disp: 90 tablet, Rfl: 3    magnesium oxide (MAG-OX) 400 mg (241.3 mg magnesium) tablet, Take 1 tablet (400 mg total) by mouth once daily., Disp: 90 tablet, Rfl: 3    multivitamin with minerals tablet, Take 1 tablet by mouth once daily., Disp: , Rfl:     ondansetron (ZOFRAN) 4 MG tablet, Take 1 tablet (4 mg total) by mouth every 8 (eight) hours as needed for Nausea., Disp: 30 tablet, Rfl: 6    OPSUMIT 10 mg Tab, Take 1 tablet (10 mg total) by mouth once daily., Disp: 30 tablet, Rfl: 11    oxyCODONE-acetaminophen (PERCOCET)  mg per tablet, Take 1 tablet by mouth every 4 (four) hours as needed for Pain., Disp: 180 tablet, Rfl: 0    potassium chloride SA (K-DUR,KLOR-CON M) 10 MEQ tablet, TAKE 2 TABLETS (20 MEQ TOTAL) BY MOUTH ONCE DAILY., Disp: 60 tablet, Rfl: 11    pregabalin (LYRICA) 75 MG capsule, Take 1 capsule (75 mg total) by mouth once daily AND 2 capsules (150 mg total) every evening., Disp: 90 capsule, Rfl: 6    REMODULIN 5 mg/mL Soln, , Disp: , Rfl:     riociguat (ADEMPAS) 2 mg Tab tablet, Take 1 tablet (2 mg total) by mouth 3 (three) times daily., Disp: 90 tablet, Rfl: 11    sertraline (ZOLOFT) 50 MG tablet, Take 1 tablet (50 mg total) by mouth once daily., Disp: 30 tablet, Rfl: 11    sodium chloride 0.9% SolP 100 mL with treprostinil 1 mg/mL Soln  6,000,000 ng, Inject 2,145 ng/min into the vein continuous., Disp: , Rfl:     spironolactone (ALDACTONE) 25 MG tablet, Take 1 tablet (25 mg total) by mouth once daily., Disp: 90 tablet, Rfl: 3      External  database queried on 04/09/2024  by Sonja Mcgill .   The results reviewed and considered with the clinical data in the decision whether or not to prescribe a controlled substance.  03/30/2024 01/19/2024 2 Pregabalin 75 Mg Capsule 90.00 30 Er Lori [id number] Peo ([id number]) 2 1.51 LME Medicaid [place]   03/18/2024 03/14/2024 2 Oxycodone-Acetaminophen  180.00 30 Er Lori [id number] [facility] ([id number]) 0 90.00 MME Other [place]   02/19/2024 01/19/2024 2 Pregabalin 75 Mg Capsule 90.00 30 Er Lori [id number] Peo ([id number]) 1 1.51 LME [place]   02/19/2024 02/19/2024 2 Oxycodone-Acetaminophen  180.00 30 Er Lori [id number] [place] ([id number]) 0 90.00 MME Other [place]       Review of patient's allergies indicates:  No Known Allergies    OBJECTIVE:       Physical Exam:  Vitals:    Physical Exam  Constitutional:       General: She is not in acute distress.  HENT:      Head: Normocephalic and atraumatic.   Pulmonary:      Effort: Pulmonary effort is normal. No respiratory distress.   Neurological:      Mental Status: She is alert and oriented to person, place, and time.   Psychiatric:         Mood and Affect: Mood and affect normal.         I spent a total of 48 minutes on the day of the visit. This includes face to face time in discussion of goals of care, symptom assessment, coordination of care and emotional support.  This also includes non-face to face time preparing to see the patient (eg, review of tests/imaging), obtaining and/or reviewing separately obtained history, documenting clinical information in the electronic or other health record, independently interpreting results and communicating results to the patient/family/caregiver, or care coordinator.         This note was generated with the assistance of ambient listening technology. Verbal  consent was obtained by the patient and accompanying visitor(s) for the recording of patient appointment to facilitate this note. I attest to having reviewed and edited the generated note for accuracy, though some syntax or spelling errors may persist. Please contact the author of this note for any clarification.        Signature: Sonja Mcgill MD

## 2024-04-10 ENCOUNTER — TELEPHONE (OUTPATIENT)
Dept: PALLIATIVE MEDICINE | Facility: CLINIC | Age: 49
End: 2024-04-10
Payer: MEDICAID

## 2024-04-10 DIAGNOSIS — M79.605 PAIN IN BOTH LOWER EXTREMITIES: ICD-10-CM

## 2024-04-10 DIAGNOSIS — M79.604 PAIN IN BOTH LOWER EXTREMITIES: ICD-10-CM

## 2024-04-10 DIAGNOSIS — I27.20 PULMONARY HYPERTENSION: ICD-10-CM

## 2024-04-10 DIAGNOSIS — M25.541 ARTHRALGIA OF BOTH HANDS: ICD-10-CM

## 2024-04-10 DIAGNOSIS — M25.542 ARTHRALGIA OF BOTH HANDS: ICD-10-CM

## 2024-04-10 RX ORDER — OXYCODONE AND ACETAMINOPHEN 10; 325 MG/1; MG/1
1 TABLET ORAL EVERY 4 HOURS PRN
Qty: 180 TABLET | Refills: 0 | Status: SHIPPED | OUTPATIENT
Start: 2024-04-10 | End: 2024-05-07 | Stop reason: SDUPTHER

## 2024-04-10 NOTE — TELEPHONE ENCOUNTER
Patient called stating she is not able to get rx for Xtampza from her pharmacy. I contacted [facility] to inquire and pharmacist states PA required.  Called [facility] line and spoke with agent who states PA not needed for this medication. She provided override codes for pharmacist to use.   Called [facility] back and spoke with pharmacist and provided codes for override and he attempted to in put codes and they did not work. He states he will reach out to [facility] for assistance.

## 2024-04-11 ENCOUNTER — TELEPHONE (OUTPATIENT)
Dept: TRANSPLANT | Facility: CLINIC | Age: 49
End: 2024-04-11
Payer: MEDICAID

## 2024-04-11 ENCOUNTER — HOSPITAL ENCOUNTER (OUTPATIENT)
Dept: PULMONOLOGY | Facility: CLINIC | Age: 49
Discharge: HOME | End: 2024-04-11
Payer: MEDICAID

## 2024-04-11 ENCOUNTER — PATIENT MESSAGE (OUTPATIENT)
Dept: TRANSPLANT | Facility: CLINIC | Age: 49
End: 2024-04-11

## 2024-04-11 ENCOUNTER — OFFICE VISIT (OUTPATIENT)
Dept: TRANSPLANT | Facility: CLINIC | Age: 49
End: 2024-04-11
Payer: MEDICAID

## 2024-04-11 VITALS
BODY MASS INDEX: 25.61 KG/M2 | DIASTOLIC BLOOD PRESSURE: 67 MMHG | HEART RATE: 91 BPM | HEIGHT: 67 IN | WEIGHT: 158.75 LBS | HEIGHT: 64 IN | BODY MASS INDEX: 24.92 KG/M2 | WEIGHT: 150 LBS | SYSTOLIC BLOOD PRESSURE: 102 MMHG | OXYGEN SATURATION: 99 %

## 2024-04-11 DIAGNOSIS — Z51.5 PALLIATIVE CARE ENCOUNTER: ICD-10-CM

## 2024-04-11 DIAGNOSIS — R53.81 DEBILITY: ICD-10-CM

## 2024-04-11 DIAGNOSIS — Z76.82 LUNG TRANSPLANT CANDIDATE: ICD-10-CM

## 2024-04-11 DIAGNOSIS — Z79.899 HIGH RISK MEDICATION USE: ICD-10-CM

## 2024-04-11 DIAGNOSIS — I27.21 WHO GROUP 1 PULMONARY ARTERIAL HYPERTENSION: Primary | ICD-10-CM

## 2024-04-11 DIAGNOSIS — I27.21 WHO GROUP 1 PULMONARY ARTERIAL HYPERTENSION: ICD-10-CM

## 2024-04-11 DIAGNOSIS — R19.7 DIARRHEA, UNSPECIFIED TYPE: ICD-10-CM

## 2024-04-11 PROCEDURE — 94618 PULMONARY STRESS TESTING: CPT | Mod: 26,S$PBB,, | Performed by: INTERNAL MEDICINE

## 2024-04-11 PROCEDURE — 99214 OFFICE O/P EST MOD 30 MIN: CPT | Mod: S$PBB,,,

## 2024-04-11 PROCEDURE — 3008F BODY MASS INDEX DOCD: CPT | Mod: CPTII,,,

## 2024-04-11 PROCEDURE — 3074F SYST BP LT 130 MM HG: CPT | Mod: CPTII,,,

## 2024-04-11 PROCEDURE — 99999 PR PBB SHADOW E&M-EST. PATIENT-LVL IV: CPT | Mod: PBBFAC,,,

## 2024-04-11 PROCEDURE — 94618 PULMONARY STRESS TESTING: CPT | Mod: PBBFAC | Performed by: INTERNAL MEDICINE

## 2024-04-11 PROCEDURE — 1160F RVW MEDS BY RX/DR IN RCRD: CPT | Mod: CPTII,,,

## 2024-04-11 PROCEDURE — 99214 OFFICE O/P EST MOD 30 MIN: CPT | Mod: PBBFAC,25

## 2024-04-11 PROCEDURE — 1159F MED LIST DOCD IN RCRD: CPT | Mod: CPTII,,,

## 2024-04-11 PROCEDURE — 3078F DIAST BP <80 MM HG: CPT | Mod: CPTII,,,

## 2024-04-11 NOTE — PATIENT INSTRUCTIONS
Check blood pressure before taking Adempas, if top number is under 100, please hold the medication.    Will plan to switch Adempas to Adcirca.    Return to clinic in 3 months    Recommend 2 gram sodium restriction and 1500cc fluid restriction.  Encourage physical activity with graded exercise program.  Requested patient to weigh themselves daily, and to notify us if their weight increases by more than 3 lbs in 1 day or 5 lbs in 1 week.

## 2024-04-11 NOTE — PROCEDURES
Kristin Maier is a 48 y.o.  female patient, who presents for a 6 minute walk test ordered by ANA M Guy.  The diagnosis is Pulmonary Hypertension.  The patient's BMI is 25.7 kg/m2.  Predicted distance (lower limit of normal) is 426.13 meters.      Test Results:    The test was completed without stopping.  The total time walked was 360 seconds.  During walking, the patient reported:  Leg pain, Calf pain. The patient used no assistive devices during testing.     04/11/2024---------Distance: 396.24 meters (1300 feet)     O2 Sat % Supplemental Oxygen Heart Rate Blood Pressure Jessica Scale   Pre-exercise  (Resting) 96 % Room Air 92 bpm 101/63 mmHg 1   During Exercise 98 % Room Air 89 bpm 106/67 mmHg 2   Post-exercise  (Recovery) 98 % Room Air  78 bpm       Recovery Time: 120 seconds    Performing nurse/tech: Divya ZAMORA      PREVIOUS STUDY:   01/12/2024---------Distance: 426.72 meters (1400 feet)       O2 Sat % Supplemental Oxygen Heart Rate Blood Pressure Jessica Scale   Pre-exercise  (Resting) 95 % Room Air 101 bpm 95/57 mmHg 7-8   During Exercise 98 % Room Air 117 bpm 103/67 mmHg 9   Post-exercise  (Recovery) 98 % Room Air  104 bpm           CLINICAL INTERPRETATION:  Six minute walk distance is 396.24 meters (1300 feet) with light dyspnea.  During exercise, there was no desaturation while breathing room air.  Both blood pressure and heart rate remained stable with walking.  The patient reported non-pulmonary symptoms during exercise.  Since the previous study in January 2024, exercise capacity is unchanged.  Based upon age and body mass index, exercise capacity is less than predicted.

## 2024-04-11 NOTE — TELEPHONE ENCOUNTER
NN tried to call patient to check in. Seems like patient was seen at [facility] and some adjustments to PH medications were done. NN wanted to see if they were going to follow patient for PH or lung transplant.   · Neuropathic versus tendinitis  · Recent x-rays showed no significant degenerative disease and no fracture  · Findings discussed with     · Supportive care

## 2024-04-11 NOTE — PROGRESS NOTES
"Subjective:    Patient ID:  Kristin Maier is a 48 y.o. female who presents for follow-up of Pulmonary Hypertension.    HPI:   Mrs. Maier is a 45 y/o AA female diagnosed with WHO Group 1 PAH, referred by Dr. MAINE Stack. She was initially seen in clinic by Dr. Dutta on 11/29/2021, presenting with symptoms of CHU and severe PAH by ECHO. Scheduled for RHC the next day. Same day of her appt she went to an OSH for a UTI.  Given her pulmonary hypertension history a decision was made to transfer her to [facility] for further inpatient workup.   12.2.21 RHC revealed: RA: 25/ 26/ 22 RV: 90/ 1/ 21 PA: 86/ 42/ 57 PWP: 9/ 8/ 7. TPG 50, PVR 25 (severely elevated R sided filling pressure. CI 1.2, CO 1.97. Patient was classified as WHO group I PH and started on Veletri while inpatient, patient required  at rate of 5mg/kg/min. Patient had PICC line placed on 12/8/21 for long term administerationof vasodilator therapy. Switched to Remodulin at discharge.     2023 admissions: 3/2023 - c/o weakness, dizziness, lethargy; 9/21/2023 - chest pain, headache; 10/5 - remodulin interruption, SOB, syncope.    2024 admissions:   1/2-1/4//2024 - sudden onset dizziness described as room-spinning that started around 1-2am. CT scan -donnie, neuro unremarkable, euvolemic on exam. Given meclizine for BPPV and improved. Normotensive   3/5/2024 - ED in [place] with c/o "SOB, dizziness, fatigue, and lower extremity pain." She had hypotension on admission. Adempas was decreased to 1mg, TID and diuretics held for the stay.     PH Medications: Remodulin 110 ng/kg/min, Opsumit 10 mg daily, and adempas 2 mg, TID.    Mrs. Maier is here for follow-up. She reports being hospitalized for 3 days in [place] for shortness of breath and low blood pressure. She feels ok, now, but complains of fatigue. She also mentioned that she is sometimes hard to wake up in the mornings. We discussed her pain medication regimen. She states that she takes percocet, 4 times a " day, because it's the only way she can function. She endorses taking lyrica once a day, usually at bedtime. She is following with palliative care medicine. Mrs. Maier notes that she is taking Adempas 2.5mg, TID, because that is what they sent her this month. Patient denies chest pain, chest pressure, syncope, pre-syncope, lightheadedness, dizziness, PND, orthopnea, LE edema, abdominal pain, abdominal pressure, or N/V/F/C.      6MWT:   1/12/2024 4/11/2024   6MW     6MWT Status completed without stopping  completed without stopping    Patient Reported Chest pain;Dyspnea;Lightheadedness  Leg pain;Calf pain    Was O2 used? No  No    6MW Distance walked (feet) 1400 feet  1300 feet    Distance walked (meters) 426.72 meters  396.24 meters    Did patient stop? No  No    Oxygen Saturation 95 %  96 %    Supplemental Oxygen Room Air  Room Air    Heart Rate 101 bpm  92 bpm    Blood Pressure 95/57  101/63    Jessica Dyspnea Rating  very heavy  very light    Oxygen Saturation 98 %  98 %    Supplemental Oxygen Room Air  Room Air    Heart Rate 117 bpm  89 bpm    Blood Pressure 103/67  106/67    Jessica Dyspnea Rating  very,very heavy  light    Recovery Time (seconds) 68 seconds  120 seconds    Oxygen Saturation 98 %  98 %    Supplemental Oxygen Room Air  Room Air    Heart Rate 104 bpm  78 bpm        ECHO: 1/3/2024    Left Ventricle: The left ventricle is normal in size. Normal wall thickness. There is concentric remodeling. Normal wall motion. There is normal systolic function with a visually estimated ejection fraction of 55 - 60%. There is normal diastolic function.    Right Ventricle: Severe right ventricular enlargement with hypertrophy. Systolic function is moderately reduced.    The right atrium is severely dilated.    Tricuspid Valve: There is at least moderate to severe regurgitation.    Pulmonary Artery: The estimated pulmonary artery systolic pressure is 93 mmHg.    IVC/SVC: Intermediate venous pressure at 8 mmHg.     Pericardium: There is a small effusion.    RHC: 10/9/2023  RA: 7/ 6/ 5 RV: 71/ 3/ 4 PA: 71/ 32/ 43 PWP: 12/ 10/ 10 .   Cardiac output was 3.8 by Kristel. Cardiac index is 2.26 L/min/m2.   Low normal CO/CI on PH therapy (remodulin, macitentan, riociguat).   Normal right and left sided filling pressures.  Severe pulmonary hypertension in setting of normal left sided filling pressures.  TPG 33 PVR 8.68     ECHO: 10/6/2023    Left Ventricle: The left ventricle is normal in size. Normal wall thickness. Septal flattening in systole consistent with right ventricular pressure overload. There is normal systolic function with a visually estimated ejection fraction of 65%.    Right Ventricle: Severe right ventricular enlargement. Systolic function is moderately reduced. The free wall strain is -12.1%.    Right Atrium: Right atrium is severely dilated.    Tricuspid Valve: Mildly thickened leaflets. There is mild annular dilation present. There is moderate to severe regurgitation with a centrally directed jet.    Pulmonary Artery: The estimated pulmonary artery systolic pressure is 77 mmHg.    IVC/SVC: Normal venous pressure at 3 mmHg.    Pericardium: There is a small circumferential effusion. There is no tamponade physiology.    ECHO: 12/13/2021 (prior to initiation of remodulin)  The left ventricle is normal in size with concentric remodeling and normal systolic function.  The estimated ejection fraction is 63%.  There are segmental left ventricular wall motion abnormalities.  There is abnormal septal wall motion.  Normal left ventricular diastolic function.  Moderate right ventricular enlargement with mildly reduced right ventricular systolic function.  Moderate right atrial enlargement.  Severe tricuspid regurgitation.  Mild pulmonic regurgitation.  Elevated central venous pressure (15 mmHg).  The estimated PA systolic pressure is 122 mmHg.  There is pulmonary hypertension.  Small pericardial effusion. Apically and trivial  under the RA.    RHC: 7/19/2022  RA: 15/ 11/ 10 RV: 70/ 9/ 10 PA: 72/ 29/ 43 PWP: 12/ 12/ 11 .   Cardiac output was 4.29  by Kristel. Cardiac index is 2.61 L/min/m2.   O2 Sat: PA 69%.   Normal CO/CI on remodulin 80 ng/kg/min, riociguat 2mg po TID, macitentan 10mg.  Mildly increased right and normal left sided filling pressures.  Severe pulmonary hypertension.   TPG  32   PVR  7.45  MAP 75  SVR 1212    RHC: 3/16/2022  RA: 25/ 26/ 22 RV: 90/ 1/ 21 PA: 86/ 42/ 57 PWP: 9/ 8/ 7 .   Cardiac output was 1.97  by Kristel. Cardiac index is 1.2 L/min/m2.   O2 Sat: PA 38%.     TPG   50    PVR   25    PFTs: 6/27/2023 6/22/2023   Pulmonary Function Tests    FVC 2.95 liters    FEV1 2.34 liters    TLC (liters) 4.01 liters    DLCO (ml/mmHg sec) 14.58 ml/mmHg sec    FVC% 89.6    FEV1% 88.8    FEF 25-75 2.25    FEF 25-75% 86.7    TLC% 73.6    RV 1.06    RV% 56.7    DLCO% 55.6      V/Q: 12/3/2021  FINDINGS:  Perfusion: No observable filling defects or perfusion defects on perfusion imaging.  Ventilation: No observable defects on ventilation imaging.  Small amount of ingested radiotracer is visualized within the aerodigestive tract.  Impression:  This represents a low probability of pulmonary embolism.    CT Chest: 9/12/2022  Impression:     1. Constellation of findings suggestive of pulmonary arterial hypertension.  No significant pulmonary edema or pleural effusion.  No evidence of pulmonary thromboembolism.  2. Cardiomegaly with markedly enlarged right atrium and the right ventricle.  3. Multiple small pulmonary nodules, with the largest measuring 0.4 cm.  For multiple solid nodules all <6 mm, Fleischner Society 2017 guidelines recommend no routine follow up for a low risk patient, or follow up with non-contrast chest CT at 12 months after discovery in a high risk patient.  4. 3.9 cm borderline aneurysmal ascending aorta.    IV/SC:   4/11/2024   Pulmonary Hypertension Review Flowsheet    Pump Type CADD    Medication type Remodulin   "  Vial size 5mg/ml    Dose (ng/kg/min) 110.2 ng/kg/min    DW (kg) 67 kg    Amt of Med used 5 ml    Amt of Diluent Used NS 95ml    Final Concentration (ng/ml) 0.25mg/ml    Pump Rate ml/24 hr 43 ml/hr        Review of Systems   Constitutional: Negative.   HENT: Negative.     Eyes: Negative.    Cardiovascular:  Positive for dyspnea on exertion. Negative for chest pain, irregular heartbeat, leg swelling, near-syncope, orthopnea, paroxysmal nocturnal dyspnea and syncope.        Occasional leg swelling, resolves w/o diuretics   Respiratory: Negative.     Endocrine: Negative.    Hematologic/Lymphatic: Negative.    Skin: Negative.    Musculoskeletal:  Positive for myalgias. Negative for falls and joint swelling.        Foot and leg pain related to medication SE   Gastrointestinal:  Positive for diarrhea. Negative for abdominal pain, dysphagia, heartburn, nausea and vomiting.        Occasional nausea and diarrhea related to medication   Genitourinary: Negative.    Neurological:  Positive for excessive daytime sleepiness and dizziness. Negative for headaches and loss of balance.   Psychiatric/Behavioral: Negative.          Objective: /67 (BP Location: Left arm, Patient Position: Sitting, BP Method: Medium (Automatic))   Pulse 91   Ht 5' 7" (1.702 m)   Wt 72 kg (158 lb 11.7 oz)   LMP 06/19/2017 (Approximate)   SpO2 99%   BMI 24.86 kg/m²       Physical Exam  Constitutional:       General: She is not in acute distress.     Appearance: Normal appearance. She is normal weight. She is not ill-appearing.   HENT:      Head: Normocephalic and atraumatic.      Nose: Nose normal.   Eyes:      Extraocular Movements: Extraocular movements intact.      Pupils: Pupils are equal, round, and reactive to light.   Neck:      Vascular: No JVD.   Cardiovascular:      Rate and Rhythm: Normal rate and regular rhythm.   Chest:      Comments: R chest wall Pedro Catheter infusing Remodulin. Dressing is C/D/I  Musculoskeletal:      " "Cervical back: Normal range of motion and neck supple.   Neurological:      Mental Status: She is alert.       Lab Results   Component Value Date    BNP 94 04/11/2024     04/11/2024    K 4.1 04/11/2024    MG 2.0 04/11/2024     04/11/2024    CO2 27 04/11/2024    BUN 19 04/11/2024    CREATININE 1.0 04/11/2024    GLU 73 04/11/2024    HGBA1C 5.4 10/07/2023    AST 13 04/11/2024    ALT 10 04/11/2024    ALBUMIN 4.3 04/11/2024    PROT 7.6 04/11/2024    BILITOT 1.6 (H) 04/11/2024    CHOL 183 10/07/2023    HDL 46 10/07/2023    LDLCALC 113.2 10/07/2023    TRIG 119 10/07/2023       Magnesium   Date Value Ref Range Status   04/11/2024 2.0 1.6 - 2.6 mg/dL Final       Lab Results   Component Value Date    WBC 5.18 04/11/2024    HGB 12.8 04/11/2024    HCT 39.6 04/11/2024    MCV 89 04/11/2024     04/11/2024       Lab Results   Component Value Date    INR 1.1 07/19/2022    INR 1.1 12/23/2021    INR 1.1 12/22/2021       BNP   Date Value Ref Range Status   04/11/2024 94 0 - 99 pg/mL Final     Comment:     Values of less than 100 pg/ml are consistent with non-CHF populations.   01/12/2024 209 (H) 0 - 99 pg/mL Final     Comment:     Values of less than 100 pg/ml are consistent with non-CHF populations.   01/02/2024 183 (H) 0 - 99 pg/mL Final     Comment:     Values of less than 100 pg/ml are consistent with non-CHF populations.       No results found for: "LDH"      WHO Group: 1 Functional Class: II   REVEAL Score: 8 (Intermediate Risk)  Assessment:       1. WHO group 1 pulmonary arterial hypertension    2. High risk medication use    3. Diarrhea, unspecified type    4. Debility    5. Palliative care encounter         Plan:       Mrs. Maier has severe PAH on triple therapy, to include IV remodulin. She had 3 hospitalizations in 2023 and this is her first in 2024. Will decrease her adempas dose to try to give her more blood pressure room. Encouraged that she is still FC II and walked 426m while maintaing her O2 sat. "     Will switch adempas to tadalafil/opsumit (Opsynvi) to see if BP tolerates better,    At this point, we do not have a lot of options for medical therapy. Increasing remodulin is limited by SEs. She has spoken with our Advanced [facility] Disease team and they are happy to refer to [place], but it would require moving to the area. Her current insurance will not allow evaluation without establishing residency.    We will continue to support Mrs. Maier through symptom management and palliative care.     Skip afternoon Adempas dose.     May take Adempas if top number (systolic) is 95 or above.     Please do not take Adempas if top number is below 95.    Will have pharmacy send Adempas 2mg tablets to be taken 3 times a day.    Return to clinic in 3 months with labs, walk.    If you are on any blood pressure medications make sure you have taken them. For blood pressure readings, sit in a calm, quite room with dim lighting. Feet flat on the floor, without stimulation (no TV, no talking) and relax for at least 3-5 minutes prior to measuring.    Recommend 2 gram sodium restriction and 1500cc fluid restriction.  Encourage physical activity with graded exercise program.  Requested patient to weigh themselves daily, and to notify us if their weight increases by more than 3 lbs in 1 day or 5 lbs in 1 week.

## 2024-04-12 ENCOUNTER — TELEPHONE (OUTPATIENT)
Dept: PALLIATIVE MEDICINE | Facility: CLINIC | Age: 49
End: 2024-04-12
Payer: MEDICAID

## 2024-04-16 ENCOUNTER — TELEPHONE (OUTPATIENT)
Dept: PALLIATIVE MEDICINE | Facility: CLINIC | Age: 49
End: 2024-04-16
Payer: MEDICAID

## 2024-04-16 NOTE — TELEPHONE ENCOUNTER
Called patient to check in on pain control. Patient reports that she was having phone issues last week and could not be reached but she has a new phone today. She states she has been taking the new long acting pain medication Xtampza every 12 hrs as prescribed by Dr. Lee. She states that pain is not gone but it is better controlled. She is taking Percocet as well but reports using less often and not within 4 hours of taking Xtampza. Discussed that these medications can be sedating and she states that she does not feel sedated at all. She reports being able to attend her [employer] activities at their school this past week. She states that prior to meeting Dr. Lee she spent all her time in bed and now she is able to function.

## 2024-04-25 ENCOUNTER — TELEPHONE (OUTPATIENT)
Dept: TRANSPLANT | Facility: CLINIC | Age: 49
End: 2024-04-25
Payer: MEDICAID

## 2024-04-25 NOTE — TELEPHONE ENCOUNTER
NN called and spoke to patient and discuss the initiation of Opsynvi. Patient will take one tablet once daily. It will be initiated at 10/20 or 10/40 dose. Educated patient on application process and role of Specialty Pharmacy. PH Coordinator will send paperwork but patient needs to speak with [facility] and [facility] to review benefits and arrange shipment of medication. The most common side effects are fluid retention, headache, muscle pain, flushing, anemia, nausea, pain in arms, legs, back, or upset stomach, and/or congested nose.Patient was made aware that ospynvi can drop blood pressure so it is very important for her check BP before starting to make sure it is within range and to monitor BP while taking PH medication. Nitrates are contraindicated because it can drop BP too quickly and too much. Patient is also aware that Opsyni is not recommended in women of child bearing age. Patient states that she has had a hysterectomy. Patient was also advised to stop Adempas 24 hours before starting Opsynvi.     Patient was informed that referral will be sent to [facility] and will be processed through insurance. Information pamphlet/webiste provided to patient on medication information. Patient verbalized understanding and had no further questions. Nurse coordinator is available for further questions or concerns at any time, patient verbalized understanding.      Opsynvi referral sent to [facility].

## 2024-05-07 ENCOUNTER — PATIENT MESSAGE (OUTPATIENT)
Dept: PALLIATIVE MEDICINE | Facility: CLINIC | Age: 49
End: 2024-05-07
Payer: MEDICAID

## 2024-05-07 ENCOUNTER — TELEPHONE (OUTPATIENT)
Dept: TRANSPLANT | Facility: CLINIC | Age: 49
End: 2024-05-07
Payer: MEDICAID

## 2024-05-07 DIAGNOSIS — M79.605 PAIN IN BOTH LOWER EXTREMITIES: ICD-10-CM

## 2024-05-07 DIAGNOSIS — I27.20 PULMONARY HYPERTENSION: ICD-10-CM

## 2024-05-07 DIAGNOSIS — M25.542 ARTHRALGIA OF BOTH HANDS: ICD-10-CM

## 2024-05-07 DIAGNOSIS — M79.604 PAIN IN BOTH LOWER EXTREMITIES: ICD-10-CM

## 2024-05-07 DIAGNOSIS — M25.50 ARTHRALGIA, UNSPECIFIED JOINT: ICD-10-CM

## 2024-05-07 DIAGNOSIS — M25.541 ARTHRALGIA OF BOTH HANDS: ICD-10-CM

## 2024-05-07 RX ORDER — OXYCODONE AND ACETAMINOPHEN 10; 325 MG/1; MG/1
1 TABLET ORAL EVERY 4 HOURS PRN
Qty: 180 TABLET | Refills: 0 | Status: SHIPPED | OUTPATIENT
Start: 2024-05-07 | End: 2024-06-04 | Stop reason: SDUPTHER

## 2024-05-07 RX ORDER — POTASSIUM CHLORIDE 750 MG/1
20 TABLET, EXTENDED RELEASE ORAL DAILY
Qty: 60 TABLET | Refills: 11 | Status: SHIPPED | OUTPATIENT
Start: 2024-05-07

## 2024-05-07 RX ORDER — FUROSEMIDE 20 MG/1
20 TABLET ORAL DAILY
Qty: 90 TABLET | Refills: 3 | Status: SHIPPED | OUTPATIENT
Start: 2024-05-07 | End: 2025-05-07

## 2024-05-07 RX ORDER — PREGABALIN 75 MG/1
CAPSULE ORAL
Qty: 90 CAPSULE | Refills: 6 | Status: SHIPPED | OUTPATIENT
Start: 2024-05-07 | End: 2024-12-03

## 2024-05-07 RX ORDER — OXYCODONE 18 MG/1
18 CAPSULE, EXTENDED RELEASE ORAL EVERY 12 HOURS
Qty: 60 CAPSULE | Refills: 0 | Status: SHIPPED | OUTPATIENT
Start: 2024-05-07 | End: 2024-06-04 | Stop reason: SDUPTHER

## 2024-05-20 ENCOUNTER — TELEPHONE (OUTPATIENT)
Dept: TRANSPLANT | Facility: CLINIC | Age: 49
End: 2024-05-20
Payer: MEDICAID

## 2024-05-24 ENCOUNTER — TELEPHONE (OUTPATIENT)
Dept: TRANSPLANT | Facility: CLINIC | Age: 49
End: 2024-05-24
Payer: MEDICAID

## 2024-05-24 ENCOUNTER — PATIENT MESSAGE (OUTPATIENT)
Dept: TRANSPLANT | Facility: CLINIC | Age: 49
End: 2024-05-24
Payer: MEDICAID

## 2024-05-24 NOTE — TELEPHONE ENCOUNTER
Nn was checking in with patient to see if she started Opsynvi and stopped Adempas. LM for patient to call back and also sent a message through the portal.

## 2024-05-28 ENCOUNTER — TELEPHONE (OUTPATIENT)
Dept: TRANSPLANT | Facility: CLINIC | Age: 49
End: 2024-05-28
Payer: MEDICAID

## 2024-05-28 NOTE — TELEPHONE ENCOUNTER
"Patient reached out to NN about Opsynvi and states that she has it but has not started it. She was told by "someone" to wait until a nurse reached out to her from [facility] but no one has called her. NN informed patient that she can start Opsynvi but she should stop Adempas and Opsumit 24 hours before. Patient states that she has not taken Adempas today yet so she will start Opsynvi tomorrow. Patient also had complaints of 2 episodes where BP was low but 80's/60's  but no symptoms. NN reiterated the importance of continuing to monitor BP while taking Opsynvi but if she has any symptoms (symptoms of low BP was discussed) or it becomes consistent and worsens then she should go to [facility]. Patient verbalized understanding.   "

## 2024-05-29 ENCOUNTER — TELEPHONE (OUTPATIENT)
Dept: TRANSPLANT | Facility: CLINIC | Age: 49
End: 2024-05-29
Payer: MEDICAID

## 2024-05-29 NOTE — TELEPHONE ENCOUNTER
Patient called in because she was confused, she had received a shipment of 10/20 and 10/40 of opsynvi and she did not know what to take. NN explained that she is to take 10/20 opsynvi for two weeks and then she can start 10/40 as long as she tolerates the 10/20. Patient verbalized understanding.

## 2024-06-04 DIAGNOSIS — M79.605 PAIN IN BOTH LOWER EXTREMITIES: ICD-10-CM

## 2024-06-04 DIAGNOSIS — M25.542 ARTHRALGIA OF BOTH HANDS: ICD-10-CM

## 2024-06-04 DIAGNOSIS — I27.20 PULMONARY HYPERTENSION: ICD-10-CM

## 2024-06-04 DIAGNOSIS — M79.604 PAIN IN BOTH LOWER EXTREMITIES: ICD-10-CM

## 2024-06-04 DIAGNOSIS — M25.50 ARTHRALGIA, UNSPECIFIED JOINT: ICD-10-CM

## 2024-06-04 DIAGNOSIS — M25.541 ARTHRALGIA OF BOTH HANDS: ICD-10-CM

## 2024-06-04 RX ORDER — OXYCODONE AND ACETAMINOPHEN 10; 325 MG/1; MG/1
1 TABLET ORAL EVERY 4 HOURS PRN
Qty: 180 TABLET | Refills: 0 | Status: SHIPPED | OUTPATIENT
Start: 2024-06-04

## 2024-06-05 RX ORDER — LANOLIN ALCOHOL/MO/W.PET/CERES
400 CREAM (GRAM) TOPICAL DAILY
Qty: 90 TABLET | Refills: 3 | Status: SHIPPED | OUTPATIENT
Start: 2024-06-05 | End: 2025-06-05

## 2024-06-05 RX ORDER — SPIRONOLACTONE 25 MG/1
25 TABLET ORAL DAILY
Qty: 90 TABLET | Refills: 3 | Status: SHIPPED | OUTPATIENT
Start: 2024-06-05 | End: 2025-06-05

## 2024-06-05 RX ORDER — OXYCODONE 18 MG/1
18 CAPSULE, EXTENDED RELEASE ORAL EVERY 12 HOURS
Qty: 60 CAPSULE | Refills: 0 | Status: SHIPPED | OUTPATIENT
Start: 2024-06-05

## 2024-06-13 ENCOUNTER — PATIENT MESSAGE (OUTPATIENT)
Dept: TRANSPLANT | Facility: CLINIC | Age: 49
End: 2024-06-13
Payer: MEDICAID

## 2024-06-13 RX ORDER — MACITENTAN AND TADALAFIL 10; 40 MG/1; MG/1
10-40 TABLET, FILM COATED ORAL DAILY
COMMUNITY

## 2024-06-27 DIAGNOSIS — M79.604 PAIN IN BOTH LOWER EXTREMITIES: ICD-10-CM

## 2024-06-27 DIAGNOSIS — M25.542 ARTHRALGIA OF BOTH HANDS: ICD-10-CM

## 2024-06-27 DIAGNOSIS — I27.20 PULMONARY HYPERTENSION: ICD-10-CM

## 2024-06-27 DIAGNOSIS — M25.541 ARTHRALGIA OF BOTH HANDS: ICD-10-CM

## 2024-06-27 DIAGNOSIS — M79.605 PAIN IN BOTH LOWER EXTREMITIES: ICD-10-CM

## 2024-06-28 RX ORDER — PREGABALIN 75 MG/1
CAPSULE ORAL
Qty: 90 CAPSULE | Refills: 6 | Status: SHIPPED | OUTPATIENT
Start: 2024-06-28 | End: 2025-01-24

## 2024-06-28 RX ORDER — OXYCODONE AND ACETAMINOPHEN 10; 325 MG/1; MG/1
1 TABLET ORAL EVERY 4 HOURS PRN
Qty: 180 TABLET | Refills: 0 | Status: SHIPPED | OUTPATIENT
Start: 2024-06-28

## 2024-07-01 DIAGNOSIS — I27.20 PULMONARY HYPERTENSION: ICD-10-CM

## 2024-07-01 DIAGNOSIS — M79.605 PAIN IN BOTH LOWER EXTREMITIES: ICD-10-CM

## 2024-07-01 DIAGNOSIS — M25.50 ARTHRALGIA, UNSPECIFIED JOINT: ICD-10-CM

## 2024-07-01 DIAGNOSIS — M79.604 PAIN IN BOTH LOWER EXTREMITIES: ICD-10-CM

## 2024-07-01 RX ORDER — OXYCODONE 18 MG/1
18 CAPSULE, EXTENDED RELEASE ORAL EVERY 12 HOURS
Qty: 60 CAPSULE | Refills: 0 | Status: SHIPPED | OUTPATIENT
Start: 2024-07-01

## 2024-07-01 RX ORDER — ONDANSETRON 4 MG/1
4 TABLET, FILM COATED ORAL EVERY 8 HOURS PRN
Qty: 30 TABLET | Refills: 6 | Status: SHIPPED | OUTPATIENT
Start: 2024-07-01

## 2024-07-22 ENCOUNTER — TELEPHONE (OUTPATIENT)
Dept: PALLIATIVE MEDICINE | Facility: CLINIC | Age: 49
End: 2024-07-22
Payer: MEDICAID

## 2024-07-24 ENCOUNTER — PATIENT MESSAGE (OUTPATIENT)
Dept: TRANSPLANT | Facility: CLINIC | Age: 49
End: 2024-07-24
Payer: MEDICAID

## 2024-07-30 DIAGNOSIS — M25.50 ARTHRALGIA, UNSPECIFIED JOINT: ICD-10-CM

## 2024-07-30 DIAGNOSIS — I27.20 PULMONARY HYPERTENSION: ICD-10-CM

## 2024-07-30 DIAGNOSIS — M79.605 PAIN IN BOTH LOWER EXTREMITIES: ICD-10-CM

## 2024-07-30 DIAGNOSIS — M79.604 PAIN IN BOTH LOWER EXTREMITIES: ICD-10-CM

## 2024-07-30 DIAGNOSIS — M25.541 ARTHRALGIA OF BOTH HANDS: ICD-10-CM

## 2024-07-30 DIAGNOSIS — M25.542 ARTHRALGIA OF BOTH HANDS: ICD-10-CM

## 2024-07-30 RX ORDER — OXYCODONE AND ACETAMINOPHEN 10; 325 MG/1; MG/1
1 TABLET ORAL EVERY 4 HOURS PRN
Qty: 180 TABLET | Refills: 0 | Status: ON HOLD | OUTPATIENT
Start: 2024-07-30

## 2024-07-30 RX ORDER — OXYCODONE 18 MG/1
18 CAPSULE, EXTENDED RELEASE ORAL EVERY 12 HOURS
Qty: 60 CAPSULE | Refills: 0 | Status: ON HOLD | OUTPATIENT
Start: 2024-07-30

## 2024-08-01 ENCOUNTER — TELEPHONE (OUTPATIENT)
Dept: TRANSPLANT | Facility: CLINIC | Age: 49
End: 2024-08-01
Payer: MEDICAID

## 2024-08-01 RX ORDER — POTASSIUM CHLORIDE 750 MG/1
20 TABLET, EXTENDED RELEASE ORAL DAILY
Qty: 60 TABLET | Refills: 11 | Status: ON HOLD | OUTPATIENT
Start: 2024-08-01

## 2024-08-01 NOTE — TELEPHONE ENCOUNTER
NN received a call back from patient who states that she will be arriving to ED after 4pm because she is waiting for her  to get off of work. Patient did not sound to be in any distress. Patient states that her last cassette change of remodulin was yesterday and she is due to change tomorrow. Patient is aware that she must bring all supplies and will also bring home supply of opsynvi as well.

## 2024-08-01 NOTE — TELEPHONE ENCOUNTER
"Patient called NN today stating that she has not been feeling well x 1 month. Patient states she has been feeling increased SOB, dizziness is worse especially when bending down, and last week she noticed she gained weight (denied swelling) so she decided to "double up" her lasix. Patient has been taking lasix 40mg daily instead of 20mg but has not noticed a difference in weight. NN reiterated to patient that she needs to call because diuretics can be harsh on the kidneys and she has not had labs since April. Patient states "Well I was going to come to the ER because I am not feeling well." NN did explain to the patient that PH is chronic and progressive so symptoms can change. YnesNP recommended that patient be scheduled for her follow up but if patient is feeling that bad then she needs to be evaluated. Patient verbalized understanding.     NN tried to reach back out to patient to remind her to bring remodulin supplies and Opsynvi with her to ER but patient has not answered the phone. Message also sent via portal.   "

## 2024-08-03 ENCOUNTER — HOSPITAL ENCOUNTER (INPATIENT)
Facility: HOSPITAL | Age: 49
LOS: 7 days | Discharge: HOME | DRG: 315 | End: 2024-08-12
Attending: EMERGENCY MEDICINE | Admitting: INTERNAL MEDICINE
Payer: MEDICAID

## 2024-08-03 DIAGNOSIS — I27.21 WHO GROUP 1 PULMONARY ARTERIAL HYPERTENSION: ICD-10-CM

## 2024-08-03 DIAGNOSIS — R78.81 GRAM-POSITIVE BACTEREMIA: ICD-10-CM

## 2024-08-03 DIAGNOSIS — I10 HYPERTENSION, UNSPECIFIED TYPE: ICD-10-CM

## 2024-08-03 DIAGNOSIS — I27.20 PULMONARY HYPERTENSION: ICD-10-CM

## 2024-08-03 DIAGNOSIS — A41.9 SEPSIS: ICD-10-CM

## 2024-08-03 DIAGNOSIS — R78.81 GRAM-NEGATIVE BACTEREMIA: Primary | ICD-10-CM

## 2024-08-03 PROBLEM — R53.1 GENERALIZED WEAKNESS: Status: ACTIVE | Noted: 2023-09-12

## 2024-08-03 PROBLEM — I50.810 RIGHT-SIDED HEART FAILURE: Status: ACTIVE | Noted: 2024-08-03

## 2024-08-03 LAB
ADENOVIRUS: NOT DETECTED
ALBUMIN SERPL BCP-MCNC: 3.8 G/DL (ref 3.5–5.2)
ALLENS TEST: NORMAL
ALP SERPL-CCNC: 88 U/L (ref 55–135)
ALT SERPL W/O P-5'-P-CCNC: 15 U/L (ref 10–44)
ANION GAP SERPL CALC-SCNC: 9 MMOL/L (ref 8–16)
APTT PPP: 32.2 SEC (ref 21–32)
AST SERPL-CCNC: 18 U/L (ref 10–40)
BASOPHILS # BLD AUTO: 0.02 K/UL (ref 0–0.2)
BASOPHILS NFR BLD: 0.3 % (ref 0–1.9)
BILIRUB SERPL-MCNC: 1.1 MG/DL (ref 0.1–1)
BILIRUB UR QL STRIP: NEGATIVE
BNP SERPL-MCNC: 220 PG/ML (ref 0–99)
BORDETELLA PARAPERTUSSIS (IS1001): NOT DETECTED
BORDETELLA PERTUSSIS (PTXP): NOT DETECTED
BUN SERPL-MCNC: 10 MG/DL (ref 6–20)
CALCIUM SERPL-MCNC: 9.2 MG/DL (ref 8.7–10.5)
CHLAMYDIA PNEUMONIAE: NOT DETECTED
CHLORIDE SERPL-SCNC: 110 MMOL/L (ref 95–110)
CLARITY UR REFRACT.AUTO: CLEAR
CO2 SERPL-SCNC: 21 MMOL/L (ref 23–29)
COLOR UR AUTO: YELLOW
CORONAVIRUS 229E, COMMON COLD VIRUS: NOT DETECTED
CORONAVIRUS HKU1, COMMON COLD VIRUS: NOT DETECTED
CORONAVIRUS NL63, COMMON COLD VIRUS: NOT DETECTED
CORONAVIRUS OC43, COMMON COLD VIRUS: NOT DETECTED
CREAT SERPL-MCNC: 0.9 MG/DL (ref 0.5–1.4)
DIFFERENTIAL METHOD BLD: ABNORMAL
EOSINOPHIL # BLD AUTO: 0.1 K/UL (ref 0–0.5)
EOSINOPHIL NFR BLD: 1.3 % (ref 0–8)
ERYTHROCYTE [DISTWIDTH] IN BLOOD BY AUTOMATED COUNT: 14.8 % (ref 11.5–14.5)
EST. GFR  (NO RACE VARIABLE): >60 ML/MIN/1.73 M^2
FLUBV RNA NPH QL NAA+NON-PROBE: NOT DETECTED
GLUCOSE SERPL-MCNC: 90 MG/DL (ref 70–110)
GLUCOSE UR QL STRIP: NEGATIVE
HCT VFR BLD AUTO: 35.1 % (ref 37–48.5)
HGB BLD-MCNC: 11.5 G/DL (ref 12–16)
HGB UR QL STRIP: NEGATIVE
HPIV1 RNA NPH QL NAA+NON-PROBE: NOT DETECTED
HPIV2 RNA NPH QL NAA+NON-PROBE: NOT DETECTED
HPIV3 RNA NPH QL NAA+NON-PROBE: NOT DETECTED
HPIV4 RNA NPH QL NAA+NON-PROBE: NOT DETECTED
HUMAN METAPNEUMOVIRUS: NOT DETECTED
IMM GRANULOCYTES # BLD AUTO: 0.03 K/UL (ref 0–0.04)
IMM GRANULOCYTES NFR BLD AUTO: 0.5 % (ref 0–0.5)
INFLUENZA A (SUBTYPES H1,H1-2009,H3): NOT DETECTED
INFLUENZA A, MOLECULAR: NEGATIVE
INFLUENZA B, MOLECULAR: NEGATIVE
INR PPP: 1.1 (ref 0.8–1.2)
KETONES UR QL STRIP: NEGATIVE
LDH SERPL L TO P-CCNC: 0.53 MMOL/L (ref 0.5–2.2)
LEUKOCYTE ESTERASE UR QL STRIP: NEGATIVE
LIPASE SERPL-CCNC: 18 U/L (ref 4–60)
LYMPHOCYTES # BLD AUTO: 2.2 K/UL (ref 1–4.8)
LYMPHOCYTES NFR BLD: 33.6 % (ref 18–48)
MCH RBC QN AUTO: 28.8 PG (ref 27–31)
MCHC RBC AUTO-ENTMCNC: 32.8 G/DL (ref 32–36)
MCV RBC AUTO: 88 FL (ref 82–98)
MONOCYTES # BLD AUTO: 0.7 K/UL (ref 0.3–1)
MONOCYTES NFR BLD: 10.8 % (ref 4–15)
MYCOPLASMA PNEUMONIAE: NOT DETECTED
NEUTROPHILS # BLD AUTO: 3.4 K/UL (ref 1.8–7.7)
NEUTROPHILS NFR BLD: 53.5 % (ref 38–73)
NITRITE UR QL STRIP: NEGATIVE
NRBC BLD-RTO: 0 /100 WBC
PH UR STRIP: 6 [PH] (ref 5–8)
PLATELET # BLD AUTO: 111 K/UL (ref 150–450)
PMV BLD AUTO: 10.1 FL (ref 9.2–12.9)
POTASSIUM SERPL-SCNC: 3.9 MMOL/L (ref 3.5–5.1)
PROCALCITONIN SERPL IA-MCNC: 0.19 NG/ML
PROT SERPL-MCNC: 7.1 G/DL (ref 6–8.4)
PROT UR QL STRIP: ABNORMAL
PROTHROMBIN TIME: 11.6 SEC (ref 9–12.5)
RBC # BLD AUTO: 3.99 M/UL (ref 4–5.4)
RESPIRATORY INFECTION PANEL SOURCE: NORMAL
RSV RNA NPH QL NAA+NON-PROBE: NOT DETECTED
RV+EV RNA NPH QL NAA+NON-PROBE: NOT DETECTED
SAMPLE: NORMAL
SARS-COV-2 RDRP RESP QL NAA+PROBE: NEGATIVE
SARS-COV-2 RNA RESP QL NAA+PROBE: NOT DETECTED
SITE: NORMAL
SODIUM SERPL-SCNC: 140 MMOL/L (ref 136–145)
SP GR UR STRIP: >=1.03 (ref 1–1.03)
SPECIMEN SOURCE: NORMAL
TROPONIN I SERPL DL<=0.01 NG/ML-MCNC: 0.01 NG/ML (ref 0–0.03)
URN SPEC COLLECT METH UR: ABNORMAL
WBC # BLD AUTO: 6.4 K/UL (ref 3.9–12.7)

## 2024-08-03 PROCEDURE — 85730 THROMBOPLASTIN TIME PARTIAL: CPT | Performed by: EMERGENCY MEDICINE

## 2024-08-03 PROCEDURE — 87581 M.PNEUMON DNA AMP PROBE: CPT | Performed by: STUDENT IN AN ORGANIZED HEALTH CARE EDUCATION/TRAINING PROGRAM

## 2024-08-03 PROCEDURE — 87502 INFLUENZA DNA AMP PROBE: CPT | Performed by: EMERGENCY MEDICINE

## 2024-08-03 PROCEDURE — 83690 ASSAY OF LIPASE: CPT | Performed by: EMERGENCY MEDICINE

## 2024-08-03 PROCEDURE — 87798 DETECT AGENT NOS DNA AMP: CPT | Mod: 59 | Performed by: STUDENT IN AN ORGANIZED HEALTH CARE EDUCATION/TRAINING PROGRAM

## 2024-08-03 PROCEDURE — 93005 ELECTROCARDIOGRAM TRACING: CPT

## 2024-08-03 PROCEDURE — 83880 ASSAY OF NATRIURETIC PEPTIDE: CPT | Performed by: EMERGENCY MEDICINE

## 2024-08-03 PROCEDURE — 87154 CUL TYP ID BLD PTHGN 6+ TRGT: CPT | Performed by: EMERGENCY MEDICINE

## 2024-08-03 PROCEDURE — 63600175 PHARM REV CODE 636 W HCPCS: Performed by: STUDENT IN AN ORGANIZED HEALTH CARE EDUCATION/TRAINING PROGRAM

## 2024-08-03 PROCEDURE — 93010 ELECTROCARDIOGRAM REPORT: CPT | Mod: ,,, | Performed by: INTERNAL MEDICINE

## 2024-08-03 PROCEDURE — G0378 HOSPITAL OBSERVATION PER HR: HCPCS

## 2024-08-03 PROCEDURE — U0002 COVID-19 LAB TEST NON-CDC: HCPCS | Performed by: EMERGENCY MEDICINE

## 2024-08-03 PROCEDURE — 99900035 HC TECH TIME PER 15 MIN (STAT)

## 2024-08-03 PROCEDURE — 87040 BLOOD CULTURE FOR BACTERIA: CPT | Mod: 59 | Performed by: EMERGENCY MEDICINE

## 2024-08-03 PROCEDURE — 83605 ASSAY OF LACTIC ACID: CPT

## 2024-08-03 PROCEDURE — 81003 URINALYSIS AUTO W/O SCOPE: CPT | Performed by: EMERGENCY MEDICINE

## 2024-08-03 PROCEDURE — 96367 TX/PROPH/DG ADDL SEQ IV INF: CPT

## 2024-08-03 PROCEDURE — 25000003 PHARM REV CODE 250: Performed by: STUDENT IN AN ORGANIZED HEALTH CARE EDUCATION/TRAINING PROGRAM

## 2024-08-03 PROCEDURE — 87633 RESP VIRUS 12-25 TARGETS: CPT | Performed by: STUDENT IN AN ORGANIZED HEALTH CARE EDUCATION/TRAINING PROGRAM

## 2024-08-03 PROCEDURE — 96365 THER/PROPH/DIAG IV INF INIT: CPT

## 2024-08-03 PROCEDURE — 63600175 PHARM REV CODE 636 W HCPCS: Performed by: EMERGENCY MEDICINE

## 2024-08-03 PROCEDURE — 84484 ASSAY OF TROPONIN QUANT: CPT | Performed by: EMERGENCY MEDICINE

## 2024-08-03 PROCEDURE — 25000003 PHARM REV CODE 250: Performed by: EMERGENCY MEDICINE

## 2024-08-03 PROCEDURE — 85025 COMPLETE CBC W/AUTO DIFF WBC: CPT | Performed by: EMERGENCY MEDICINE

## 2024-08-03 PROCEDURE — 85610 PROTHROMBIN TIME: CPT | Performed by: EMERGENCY MEDICINE

## 2024-08-03 PROCEDURE — 99291 CRITICAL CARE FIRST HOUR: CPT

## 2024-08-03 PROCEDURE — 80053 COMPREHEN METABOLIC PANEL: CPT | Performed by: EMERGENCY MEDICINE

## 2024-08-03 PROCEDURE — 84145 PROCALCITONIN (PCT): CPT | Performed by: EMERGENCY MEDICINE

## 2024-08-03 RX ORDER — SPIRONOLACTONE 25 MG/1
25 TABLET ORAL DAILY
Status: DISCONTINUED | OUTPATIENT
Start: 2024-08-03 | End: 2024-08-12 | Stop reason: HOSPADM

## 2024-08-03 RX ORDER — ONDANSETRON HYDROCHLORIDE 2 MG/ML
4 INJECTION, SOLUTION INTRAVENOUS EVERY 8 HOURS PRN
Status: DISCONTINUED | OUTPATIENT
Start: 2024-08-03 | End: 2024-08-12 | Stop reason: HOSPADM

## 2024-08-03 RX ORDER — PREGABALIN 75 MG/1
75 CAPSULE ORAL NIGHTLY
Status: DISCONTINUED | OUTPATIENT
Start: 2024-08-03 | End: 2024-08-12 | Stop reason: HOSPADM

## 2024-08-03 RX ORDER — OXYCODONE AND ACETAMINOPHEN 10; 325 MG/1; MG/1
1 TABLET ORAL EVERY 6 HOURS PRN
Status: DISCONTINUED | OUTPATIENT
Start: 2024-08-03 | End: 2024-08-09

## 2024-08-03 RX ORDER — SODIUM CHLORIDE 0.9 % (FLUSH) 0.9 %
10 SYRINGE (ML) INJECTION
Status: DISCONTINUED | OUTPATIENT
Start: 2024-08-03 | End: 2024-08-12 | Stop reason: HOSPADM

## 2024-08-03 RX ORDER — ACETAMINOPHEN 500 MG
1000 TABLET ORAL
Status: COMPLETED | OUTPATIENT
Start: 2024-08-03 | End: 2024-08-03

## 2024-08-03 RX ORDER — FUROSEMIDE 10 MG/ML
80 INJECTION INTRAMUSCULAR; INTRAVENOUS ONCE
Status: DISCONTINUED | OUTPATIENT
Start: 2024-08-03 | End: 2024-08-03

## 2024-08-03 RX ADMIN — ACETAMINOPHEN 1000 MG: 500 TABLET ORAL at 09:08

## 2024-08-03 RX ADMIN — PREGABALIN 75 MG: 75 CAPSULE ORAL at 09:08

## 2024-08-03 RX ADMIN — OXYCODONE AND ACETAMINOPHEN 1 TABLET: 10; 325 TABLET ORAL at 04:08

## 2024-08-03 RX ADMIN — PIPERACILLIN SODIUM AND TAZOBACTAM SODIUM 4.5 G: 4; .5 INJECTION, POWDER, FOR SOLUTION INTRAVENOUS at 09:08

## 2024-08-03 RX ADMIN — VANCOMYCIN HYDROCHLORIDE 1250 MG: 1.25 INJECTION, POWDER, LYOPHILIZED, FOR SOLUTION INTRAVENOUS at 10:08

## 2024-08-03 RX ADMIN — OXYCODONE AND ACETAMINOPHEN 1 TABLET: 10; 325 TABLET ORAL at 10:08

## 2024-08-03 RX ADMIN — SPIRONOLACTONE 25 MG: 25 TABLET ORAL at 06:08

## 2024-08-03 RX ADMIN — PIPERACILLIN SODIUM AND TAZOBACTAM SODIUM 4.5 G: 4; .5 INJECTION, POWDER, FOR SOLUTION INTRAVENOUS at 06:08

## 2024-08-03 NOTE — CONSULTS
Patient Admitted to [facility] for Observation. Please see full H&P     Discussed with HTS Attending    Jeri Castillo MD  Cardiovascular Disease, PGY V  [facility]

## 2024-08-03 NOTE — PROGRESS NOTES
Pt arrived to TSU via stretcher. Pt vitals/assessment complete. Pt acclimated to room and call light.

## 2024-08-03 NOTE — PROGRESS NOTES
PH Admit Assessment for Continuation of Treprostinil (Remodulin)    Drug Infusing: Treprostinil (Remodulin)  Route: Intravenous  Pump Type: CADD  Current Pump Rate = 43 ml / 24 hours  Current Reservoir Volume = 79.2 (mls remaining to be infused)    Patient reports utilizing: Patient mixed cassettes    Home mixing instructions:   Patient reports mixing 5ml of concentrated drug in 95 ml of diluent      DW: 67kg

## 2024-08-03 NOTE — PLAN OF CARE
[facility] - Transplant Stepdown  Initial Discharge Assessment       Primary Care Provider: Jorge A Stack MD    Admission Diagnosis: Pulmonary hypertension [I27.20]  Sepsis [A41.9]    Admission Date: 8/3/2024  Expected Discharge Date: 8/5/2024    Pt stated she is independent with her ambulation and ADL's and does not require equipment or assistance    Pt to d/c home with no needs when ready    Transition of Care Barriers: (P) None    Payor: MEDICAID / Plan: [employer] [facility] / Product Type: Managed Medicaid /     Extended Emergency Contact Information  Primary Emergency Contact: Navya Dowell   [facility]  Mobile Phone: [phone]  Relation: Daughter  Secondary Emergency Contact: BOB HUTTON  Mobile Phone: [phone]  Relation: Spouse  Preferred language: English   needed? No    Discharge Plan A: (P) Home  Discharge Plan B: (P) Home      [id number] [place] - [address] [facility]  [address]  [place] [address]  Phone: [phone] Fax: [phone]    [facility] [place] - [address]  [address] [address]  Phone: [phone] Fax: [phone]    [facility] [address]  [address] [address]  Phone: [phone] Fax: [phone]    [facility] [place] - [address]  [place] [address]  Phone: [phone] Fax: [phone]      Initial Assessment (most recent)       Adult Discharge Assessment - 08/03/24 1515          Discharge Assessment    Assessment Type Discharge Planning Assessment (P)      Confirmed/corrected address, phone number and insurance Yes (P)      Confirmed Demographics Correct on Facesheet (P)      Source of Information patient (P)      People in Home spouse (P)      Facility Arrived From: home (P)      Do you expect to return to your current living  situation? Yes (P)      Do you have help at home or someone to help you manage your care at home? No (P)      Prior to hospitilization cognitive status: Alert/Oriented;No Deficits (P)      Current cognitive status: No Deficits;Alert/Oriented (P)      Walking or Climbing Stairs Difficulty no (P)      Dressing/Bathing Difficulty no (P)      Home Accessibility stairs to enter home (P)      Number of Stairs, Main Entrance three (P)      Home Layout Able to live on 1st floor (P)      Equipment Currently Used at Home none (P)      Do you currently have service(s) that help you manage your care at home? No (P)      Do you have any problems affording any of your prescribed medications? No (P)      Is the patient taking medications as prescribed? yes (P)      Who is going to help you get home at discharge? family/friends (P)      How do you get to doctors appointments? car, drives self;family or friend will provide (P)      Are you on dialysis? No (P)      Do you take coumadin? No (P)      Discharge Plan A Home (P)      Discharge Plan B Home (P)      DME Needed Upon Discharge  none (P)      Discharge Plan discussed with: Patient (P)      Transition of Care Barriers None (P)         Physical Activity    On average, how many days per week do you engage in moderate to strenuous exercise (like a brisk walk)? 4 days (P)      On average, how many minutes do you engage in exercise at this level? 20 min (P)         Financial Resource Strain    How hard is it for you to pay for the very basics like food, housing, medical care, and heating? Not very hard (P)         Housing Stability    In the last 12 months, was there a time when you were not able to pay the mortgage or rent on time? No (P)      At any time in the past 12 months, were you homeless or living in a shelter (including now)? No (P)         Transportation Needs    Has the lack of transportation kept you from medical appointments, meetings, work or from getting things  needed for daily living? No (P)         Food Insecurity    Within the past 12 months, you worried that your food would run out before you got the money to buy more. Never true (P)      Within the past 12 months, the food you bought just didn't last and you didn't have money to get more. Never true (P)         Stress    Do you feel stress - tense, restless, nervous, or anxious, or unable to sleep at night because your mind is troubled all the time - these days? To some extent (P)         Social Isolation    How often do you feel lonely or isolated from those around you?  Rarely (P)         Alcohol Use    Q1: How often do you have a drink containing alcohol? Never (P)      Q2: How many drinks containing alcohol do you have on a typical day when you are drinking? Patient does not drink (P)      Q3: How often do you have six or more drinks on one occasion? Never (P)         Utilities    In the past 12 months has the electric, gas, oil, or water company threatened to shut off services in your home? No (P)         Health Literacy    How often do you need to have someone help you when you read instructions, pamphlets, or other written material from your doctor or pharmacy? Rarely (P)         OTHER    Name(s) of People in Home spouse [place] (P)                    Celia Toussaint CD, MSW, LMSW, RSW   Case Management  [facility]  Email: [email or web address]

## 2024-08-03 NOTE — ED PROVIDER NOTES
Encounter Date: 8/3/2024       History     Chief Complaint   Patient presents with    Shortness of Breath     Pt reports SOB x1 week. Pt on romodulin pump.      HPI patient is a 48-year-old female with a past medical history of pulmonary artery hypertension on a continuous remodeling infusion who presents to the emergency department with 3 days of lightheaded dizziness particularly when bending forward, progressive shortness of breath and dyspnea on exertion, facial swelling, abdominal distention.  Patient notes shortness of breath is getting progressively worse over the previous week.  Patient denies any cough, no change in sputum, no recent fevers or chills, no known sick contacts.  Patient denies any lower extremity edema.  Review of patient's allergies indicates:  No Known Allergies  Past Medical History:   Diagnosis Date    Elevated liver enzymes     Essential (primary) hypertension     Heart failure, unspecified     Hypokalemia     Mixed hyperlipidemia     Neuropathic pain     Tx w/ tramadol & Lyrica    Pulmonary hypertension     Receiving Remodulin continuously through tunneled central line     Past Surgical History:   Procedure Laterality Date    APPENDECTOMY       SECTION      Transverse cut    HYSTERECTOMY  2017    TLH- bleeding    OOPHORECTOMY      RIGHT HEART CATHETERIZATION Right 2021    Procedure: INSERTION, CATHETER, RIGHT HEART;  Surgeon: Chloe Gregory MD;  Location: [facility] CATH LAB;  Service: Cardiology;  Laterality: Right;    RIGHT HEART CATHETERIZATION Right 3/16/2022    Procedure: INSERTION, CATHETER, RIGHT HEART;  Surgeon: Hu Silverman MD;  Location: [facility] CATH LAB;  Service: Cardiology;  Laterality: Right;    RIGHT HEART CATHETERIZATION Right 2022    Procedure: INSERTION, CATHETER, RIGHT HEART;  Surgeon: Chloe Gregory MD;  Location: [facility] CATH LAB;  Service: Cardiology;  Laterality: Right;    RIGHT HEART CATHETERIZATION Right 3/21/2023    Procedure:  INSERTION, CATHETER, RIGHT HEART;  Surgeon: Kahlil Cisneros MD;  Location: [facility] CATH LAB;  Service: Cardiology;  Laterality: Right;    RIGHT HEART CATHETERIZATION Right 10/9/2023    Procedure: INSERTION, CATHETER, RIGHT HEART;  Surgeon: Chloe Gregory MD;  Location: [facility] CATH LAB;  Service: Cardiology;  Laterality: Right;    TUBAL LIGATION  1996    VAGINAL DELIVERY  1991; 1993; 1994; 1996     Family History   Problem Relation Name Age of Onset    Cancer Father  69        stomach    No Known Problems Sister      No Known Problems Brother      No Known Problems Brother      Breast cancer Neg Hx      Colon cancer Neg Hx      Ovarian cancer Neg Hx       Social History     Tobacco Use    Smoking status: Former     Types: Cigars     Quit date: 12/1/2020     Years since quitting: 3.6     Passive exposure: Past    Smokeless tobacco: Never    Tobacco comments:     social smoker-light - quit 2001   Substance Use Topics    Alcohol use: Not Currently     Alcohol/week: 2.0 standard drinks of alcohol     Types: 2 Glasses of wine per week     Comment: None now -  previously: socially- 2 or 3 times a week-2 glasses of wine    Drug use: Not Currently     Types: Marijuana     Comment: 2021 last use         Physical Exam     Initial Vitals [08/03/24 0835]   BP Pulse Resp Temp SpO2   129/73 109 (!) 22 99.7 °F (37.6 °C) 95 %      MAP       --         Physical Exam     Nursing note and vitals reviewed.  Constitutional: Patient appears well-developed and well-nourished. Uncomfortable appearing   HENT:   Head: Normocephalic and atraumatic.   Eyes: Conjunctivae and EOM are normal. Pupils are equal, round, and reactive to light.   Neck: Neck supple.   Normal range of motion.  Cardiovascular: Normal rate, regular rhythm, normal heart sounds and intact distal pulses.   Pulmonary/Chest: Breath sounds normal.   Abdominal: Abdomen is soft. Patient exhibits no distension. There is no abdominal tenderness. + suprapubic abdominal TTP    Musculoskeletal:      Cervical back: Normal range of motion and neck supple.   Neurological: Patient is alert and oriented to person, place, and time. No cranial nerve deficit. Gait normal. GCS score is 15.    Skin: Skin is warm and dry.  Psych: Normal mood/affect    ED Course   Procedures  Labs Reviewed   CBC W/ AUTO DIFFERENTIAL - Abnormal       Result Value    WBC 6.40      RBC 3.99 (*)     Hemoglobin 11.5 (*)     Hematocrit 35.1 (*)     MCV 88      MCH 28.8      MCHC 32.8      RDW 14.8 (*)     Platelets 111 (*)     MPV 10.1      Immature Granulocytes 0.5      Gran # (ANC) 3.4      Immature Grans (Abs) 0.03      Lymph # 2.2      Mono # 0.7      Eos # 0.1      Baso # 0.02      nRBC 0      Gran % 53.5      Lymph % 33.6      Mono % 10.8      Eosinophil % 1.3      Basophil % 0.3      Differential Method Automated     COMPREHENSIVE METABOLIC PANEL - Abnormal    Sodium 140      Potassium 3.9      Chloride 110      CO2 21 (*)     Glucose 90      BUN 10      Creatinine 0.9      Calcium 9.2      Total Protein 7.1      Albumin 3.8      Total Bilirubin 1.1 (*)     Alkaline Phosphatase 88      AST 18      ALT 15      eGFR >60.0      Anion Gap 9     URINALYSIS, REFLEX TO URINE CULTURE - Abnormal    Specimen UA Urine, Clean Catch      Color, UA Yellow      Appearance, UA Clear      pH, UA 6.0      Specific Gravity, UA >=1.030 (*)     Protein, UA Trace (*)     Glucose, UA Negative      Ketones, UA Negative      Bilirubin (UA) Negative      Occult Blood UA Negative      Nitrite, UA Negative      Leukocytes, UA Negative      Narrative:     Specimen Source->Urine   B-TYPE NATRIURETIC PEPTIDE - Abnormal     (*)    APTT - Abnormal    aPTT 32.2 (*)    INFLUENZA A & B BY MOLECULAR    Influenza A, Molecular Negative      Influenza B, Molecular Negative      Flu A & B Source Nasal swab     CULTURE, BLOOD   CULTURE, BLOOD   RESPIRATORY INFECTION PANEL (PCR), NASOPHARYNGEAL   TROPONIN I    Troponin I 0.013     PROTIME-INR     "Prothrombin Time 11.6      INR 1.1     PROCALCITONIN    Procalcitonin 0.19     LIPASE    Lipase 18     SARS-COV-2 RNA AMPLIFICATION, QUAL    SARS-CoV-2 RNA, Amplification, Qual Negative     ISTAT LACTATE    POC Lactate 0.53      Sample VENOUS      Site Other      Allens Test N/A            Imaging Results              X-Ray Chest AP Portable (Final result)  Result time 08/03/24 10:48:24      Final result by Trevon Mejia MD (08/03/24 10:48:24)                   Impression:      Cardiomegaly with central vascular congestion and minimal perihilar interstitial edema.  No confluent area of consolidation identified on this single view.      Electronically signed by: Trevon Mejia MD  Date:    08/03/2024  Time:    10:48               Narrative:    EXAMINATION:  XR CHEST AP PORTABLE    CLINICAL HISTORY:  Provided history is "Sepsis;  ".    TECHNIQUE:  One view of the chest.    COMPARISON:  01/02/2024.    FINDINGS:  Cardiomediastinal silhouette is enlarged and similar to the prior study.  Right-sided central venous catheter overlies the SVC.  Central vascular congestion.  Minimally coarsened perihilar interstitial lung markings but no large focal area of consolidation.  No sizable pleural effusion.  No pneumothorax.                                       Medications   vancomycin 1,250 mg in D5W 250 mL IVPB (Vial-Mate) (1,250 mg Intravenous New Bag 8/3/24 1035)   spironolactone tablet 25 mg (has no administration in time range)   PHARMACY TO CONFIRM TREPROSTINIL (REMODULIN) DOSING infusion (has no administration in time range)   VELETRI/REMODULIN CASSETTE (has no administration in time range)   VELETRI/REMODULIN TUBING (has no administration in time range)   sodium chloride 0.9% flush 10 mL (has no administration in time range)   ondansetron injection 4 mg (has no administration in time range)   furosemide injection 80 mg (has no administration in time range)   acetaminophen tablet 1,000 mg (1,000 mg Oral Given 8/3/24 " 0944)   piperacillin-tazobactam (ZOSYN) 4.5 g in D5W 100 mL IVPB (MB+) (0 g Intravenous Stopped 8/3/24 1028)     Medical Decision Making  Amount and/or Complexity of Data Reviewed  Labs: ordered.  Radiology: ordered.    Risk  OTC drugs.                     I have personally reviewed and interpreted the patients laboratory studies:  Urinalysis without evidence of UTI, lactate 0.5, , troponin 0.01, lipase within normal limits, CMP with normal creatinine, INR 1.1, hemoglobin 11  I have personally reviewed and interpreted imaging studies:  Chest x-ray on my review with concern for possible right sided infiltrate  I have personally reviewed and interpreted the patient's EKG:  Normal sinus rhythm at 97 beats per minute, QRS 76, , T-wave inversions in II, III, V 1-4      I have also reviewed the following:   external records:  Normal systolic function with EF 55-60%, estimated pulmonary artery systolic pressure is 93 mm Hg  EKG January 20, 2024 with T-wave inversion in II, III. V1-3       Critical Care   Date: 08/03/2024  Performed by: Angela Fish MD   Authorized by: Angela Fish MD      Total critical care time (exclusive of procedural time) : 35 minutes, exclusive of separately billable procedures and treating other patients and teaching time.    Critical care was necessary to treat or prevent imminent or life-threatening deterioration of the following conditions:  sepsis    Due to a high probability of clinically significant, life threatening deterioration, the patient required my highest level of preparedness to intervene emergently and I personally spent this critical care time directly and personally managing the patient. This critical care time included obtaining a history; examining the patient; pulse oximetry; ordering and review of studies; arranging urgent treatment with development of a management plan; evaluation of patient's response to treatment; frequent reassessment, documentation, and,  discussions with other providers    This patient does have evidence of infective focus  My overall impression is sepsis.  Source: Respiratory  Antibiotics given-   Antibiotics (72h ago, onward)      Start     Stop Route Frequency Ordered    08/03/24 0930  vancomycin 1,250 mg in D5W 250 mL IVPB (Vial-Mate)         08/03/24 2129 IV ED 1 Time 08/03/24 0921          Latest lactate reviewed-  Recent Labs   Lab 08/03/24  0939   POCLAC 0.53     Fluid challenge Contraindicated- Fluid bolus is contraindicated in this patient due to Congestive Heart Failure     Post- resuscitation assessment Yes Perfusion exam was performed within 6 hours of septic shock presentation after bolus shows Adequate tissue perfusion assessed by non-invasive monitoring       Will Not start Pressors- Levophed for MAP of 65  Source control achieved by: IV abx      Patient with a history of pulmonary hypertension maintained on chronic and constant IV phosphodiesterase inhibitors presents emergency department with progressive shortness of breath x1 week, several days of low back pain, no cough.    The patient was febrile upon arrival, mild tachypnea without tachycardia, mild hypotension    The patient had a normal lactate in the emergency department, chest x-ray on my review was concerning for possible right-sided infiltrate.  The patient received broad-spectrum antibiotics upon arrival, fluids are contraindicated in this patient.    The patient was evaluated by cardiology team and admitted to the HTN service for further treatment and evaluation                     Clinical Impression:  Final diagnoses:  [A41.9] Sepsis  [I27.20] Pulmonary hypertension          ED Disposition Condition    Observation                 Angela Fish MD  08/03/24 8336

## 2024-08-03 NOTE — Clinical Note
Right: Chest.   Scrubbed with ChloroPrep With Tint.    Hair: N/A.  Skin prep dry before draping.  Prepped by: Chidi Garcia,  8/6/2024 3:42 PM.

## 2024-08-03 NOTE — ASSESSMENT & PLAN NOTE
- IVC collapsible, appears euvolemic on exam, unable to assess JVP  - Will obtain TTE  - Hold diuretics at this time

## 2024-08-03 NOTE — PLAN OF CARE
48 year-old female admitted 8/3/24 for SOB/Dizziness. Pt has hx of Pulmonary HTN, HF, HLD  -AAOx4, ambulates w/standby assist  -Rt subclavian tunneled cath  -18 G Rt FA  -Tele NSR  -Zosyn Q8 IVPB  -Respiratory Culture collected  -pt's home Remodulin in place (48 hr cassette)  -blood cultures NGTD  -PRN Percocet Q6 for chronic back pain  -pt lying in bed, bed in lowest position, wheels locked, non-skid socks in place, call light within reach

## 2024-08-03 NOTE — PLAN OF CARE
Home Parenteral Prostacyclin Continuation: Pharmacist Verification Note      Spoke to pharmacist with [facility] ([phone]) and cross-referenced infusion rate and reservoir volume at bedside in ED.    Home medication variables  Specialty Pharmacy Contacted: [facility]: [phone]  Date of latest prescription (mm/dd/yyyy): 7/23/24   Type of ambulatory infusion pump: CADD Legacy  Ambulatory pump rate (mL/day): 43 mL/day (confirmed on pump at bedside)  Drug: Treprostinil (Remodulin)  Route: Intravenous  Dosing weight (kg): 67 kg  How supplied: Patient mixed cassettes  Home vial concentration:  5 mg/ml  Patient reports mixing 5 mL of concentrated drug in 95 mL of diluent  Final concentration:  0.25 mg/ml  Verified current dose: 110.2 ng/kg/min  Patient is titrating therapy (yes or no): No  Volume of drug solution remaining in the patient's pump (mL): 83.7 mL (confirmed on pump at bedside @ 1230 8/3/24)  Time until depletion of drug solution in the patient's pump (hours): 46.7 hours    Hospital medication variables  Drug : Treprostinil (Remodulin)  Route: Intravenous  Dosing weight (kg): 67 kg        Hospital concentration (mg/mL or ng/mL): 120,000 ng/mL        Dose to be administered in hospital (ng/kg/min): 110.2 ng/kg/min        Hospital infusion initiation time (stat vs standard administration time): standard    Contacted [facility] attending: (yes/no): No  Physician contacted: N/A  Discussion: no discrepancies noted       Orders placed for inpatient treprostinil IV infusion      Aldo Melendez PharmRahulD., BCPS  [id number]

## 2024-08-03 NOTE — ASSESSMENT & PLAN NOTE
- Resume Remodulin dosing per pharmacy  - Resume Adempas 2 mg, TID  - Resume OPSYNVI 10-40 mg, daily

## 2024-08-03 NOTE — ED NOTES
Patient identifiers for Kristin Maier 48 y.o. female checked and correct.  Past Medical History:   Diagnosis Date    Elevated liver enzymes     Essential (primary) hypertension     Heart failure, unspecified     Hypokalemia     Mixed hyperlipidemia     Neuropathic pain     Tx w/ tramadol & Lyrica    Pulmonary hypertension     Receiving Remodulin continuously through tunneled central line     Allergies reported: Review of patient's allergies indicates:  No Known Allergies      LOC: Patient is awake, alert, and aware of environment with an appropriate affect. Patient is oriented x 4 and speaking appropriately.  APPEARANCE: Patient is well appearing and appears stated age. Patient is clean, well groomed, and dressed appropriately for season.   HEENT:   SKIN: The skin is warm and dry. Patient has normal skin turgor and moist mucus membranes.   MUSCULOSKELETAL: Patient is moving all extremities well with no obvious deformities. 3+ pulses palpated in all extremities.   RESPIRATORY: Airway is open and patent. Respirations are spontaneous and non-labored with normal effort and rate.  CARDIAC: Patient has a regular rate and rhythm. NSR on cardiac monitor. No peripheral edema noted.   ABDOMEN: Soft and non-tender upon palpation and non distended.   NEUROLOGICAL: pupils 3 mm, PERRL. Facial expression is symmetrical. Hand grasps are equal bilaterally. Normal sensation in all extremities when touched with finger.

## 2024-08-03 NOTE — HPI
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 Patient states that for the past two weeks she started to initially just feel more bloated and weak. She noticed her weight was going up and so she started to take two lasix pills instead of her usual one for an entire week. However, despite this she didn't feel any better. She denies any fever, cough, chills, chest pain, palpitations, shortness of breath, orthopnea, PND or lower extremity edema. Reports compliance with all her medication. Patient is currently on Remodulin 110 ng/kg/min, Opsumit 10 mg daily, and adempas 2 mg, TID.

## 2024-08-03 NOTE — ED NOTES
Telemetry Verification   Patient placed on Telemetry Box  Verified with War Room  Box # [id number]   Monitor Tech    Rate 80   Rhythm NSR

## 2024-08-03 NOTE — ASSESSMENT & PLAN NOTE
- No evidence or source of infection at this time  - Negative PCT  - No Leukocytosis. COVID negative.   - Follow up viral panel  - Follow up blood cultures  - S/p Vancomycin and Zosyn in the ED  - Will continue with Zosyn for now and de-escalate tomorrow if cultures remain negative

## 2024-08-03 NOTE — H&P
[facility] - Emergency Dept  Heart Transplant  H&P    Patient Name: Kristin Maier  MRN: [id number]  Admission Date: 8/3/2024  Attending Physician: Kahlil Cisneros MD  Primary Care Provider: Jorge A Stack MD  Principal Problem:Generalized weakness    Subjective:     History of Present Illness:  48 year old female with Hx of  WHO Group 1 PAH on Remodulin, HFpEF, Hyperlipidemia and Hypertesnion who presented to the the ED tod with weakness and feeling unwell. Patient states that for the past two weeks she started to initially just feel more bloated and weak. She noticed her weight was going up and so she started to take two lasix pills instead of her usual one for an entire week. However, despite this she didn't feel any better. She denies any fever, cough, chills, chest pain, palpitations, shortness of breath, orthopnea, PND or lower extremity edema. Reports compliance with all her medication. Patient is currently on Remodulin 110 ng/kg/min, Opsumit 10 mg daily, and adempas 2 mg, TID.    In the ED, T 100.7, HR: 98, /73. No leukocytosis, Hb 11.5 stable. . CMP WNL. , PCT 0.19. COVID negative. Bedside TTE dilated RA, RVE with moderately reduced RVSF. EF appears preserved EF.     Past Medical History:   Diagnosis Date    Elevated liver enzymes     Essential (primary) hypertension     Heart failure, unspecified     Hypokalemia     Mixed hyperlipidemia     Neuropathic pain     Tx w/ tramadol & Lyrica    Pulmonary hypertension     Receiving Remodulin continuously through tunneled central line       Past Surgical History:   Procedure Laterality Date    APPENDECTOMY       SECTION      Transverse cut    HYSTERECTOMY  2017    TLH- bleeding    OOPHORECTOMY      RIGHT HEART CATHETERIZATION Right 2021    Procedure: INSERTION, CATHETER, RIGHT HEART;  Surgeon: Chloe Gregory MD;  Location: [facility] CATH LAB;  Service: Cardiology;  Laterality: Right;    RIGHT HEART  CATHETERIZATION Right 3/16/2022    Procedure: INSERTION, CATHETER, RIGHT HEART;  Surgeon: Hu Silverman MD;  Location: [facility] CATH LAB;  Service: Cardiology;  Laterality: Right;    RIGHT HEART CATHETERIZATION Right 7/19/2022    Procedure: INSERTION, CATHETER, RIGHT HEART;  Surgeon: Chloe Gregory MD;  Location: [facility] CATH LAB;  Service: Cardiology;  Laterality: Right;    RIGHT HEART CATHETERIZATION Right 3/21/2023    Procedure: INSERTION, CATHETER, RIGHT HEART;  Surgeon: Kahlil Cisneros MD;  Location: [facility] CATH LAB;  Service: Cardiology;  Laterality: Right;    RIGHT HEART CATHETERIZATION Right 10/9/2023    Procedure: INSERTION, CATHETER, RIGHT HEART;  Surgeon: Chloe Gregory MD;  Location: [facility] CATH LAB;  Service: Cardiology;  Laterality: Right;    TUBAL LIGATION  1996    VAGINAL DELIVERY  1991; 1993; 1994; 1996       Review of patient's allergies indicates:  No Known Allergies    Current Facility-Administered Medications   Medication    furosemide injection 80 mg    ondansetron injection 4 mg    PHARMACY TO CONFIRM TREPROSTINIL (REMODULIN) DOSING infusion    sodium chloride 0.9% flush 10 mL    spironolactone tablet 25 mg    VELETRI/REMODULIN CASSETTE    VELETRI/REMODULIN TUBING     Current Outpatient Medications   Medication Sig    furosemide (LASIX) 20 MG tablet Take 1 tablet (20 mg total) by mouth once daily.    macitentan-tadalafil (OPSYNVI) 10-40 mg Tab Take 10-40 mg by mouth once daily.    magnesium oxide (MAG-OX) 400 mg (241.3 mg magnesium) tablet Take 1 tablet (400 mg total) by mouth once daily.    naloxone (NARCAN) 4 mg/actuation Spry 4mg by nasal route as needed for opioid overdose; may repeat every 2-3 minutes in alternating nostrils until medical help arrives. Call 911    ondansetron (ZOFRAN) 4 MG tablet Take 1 tablet (4 mg total) by mouth every 8 (eight) hours as needed for Nausea.    oxyCODONE myristate (XTAMPZA ER) 18 mg CSpT Take 1 capsule (18 mg total) by mouth every 12 (twelve) hours.     oxyCODONE-acetaminophen (PERCOCET)  mg per tablet Take 1 tablet by mouth every 4 (four) hours as needed for Pain.    potassium chloride SA (K-DUR,KLOR-CON M) 10 MEQ tablet Take 2 tablets (20 mEq total) by mouth once daily.    pregabalin (LYRICA) 75 MG capsule Take 1 capsule (75 mg total) by mouth once daily AND 2 capsules (150 mg total) every evening.    REMODULIN 5 mg/mL Soln     riociguat (ADEMPAS) 2 mg Tab tablet Take 1 tablet (2 mg total) by mouth 3 (three) times daily.    sertraline (ZOLOFT) 100 MG tablet Take 1 tablet (100 mg total) by mouth once daily.    sodium chloride 0.9% SolP 100 mL with treprostinil 1 mg/mL Soln 6,000,000 ng Inject 2,145 ng/min into the vein continuous.    spironolactone (ALDACTONE) 25 MG tablet Take 1 tablet (25 mg total) by mouth once daily.    multivitamin with minerals tablet Take 1 tablet by mouth once daily.     Family History       Problem Relation (Age of Onset)    Cancer Father (69)    No Known Problems Sister, Brother, Brother          Tobacco Use    Smoking status: Former     Types: Cigars     Quit date: 12/1/2020     Years since quitting: 3.6     Passive exposure: Past    Smokeless tobacco: Never    Tobacco comments:     social smoker-light - quit 2001   Substance and Sexual Activity    Alcohol use: Not Currently     Alcohol/week: 2.0 standard drinks of alcohol     Types: 2 Glasses of wine per week     Comment: None now -  previously: socially- 2 or 3 times a week-2 glasses of wine    Drug use: Not Currently     Types: Marijuana     Comment: 2021 last use    Sexual activity: Yes     Partners: Male     Birth control/protection: See Surgical Hx     Comment:        Objective:     Vital Signs (Most Recent):  Temp: (!) 100.7 °F (38.2 °C) (08/03/24 0919)  Pulse: 91 (08/03/24 1202)  Resp: 15 (08/03/24 1202)  BP: (!) 83/51 (08/03/24 1202)  SpO2: (!) 91 % (08/03/24 1202) Vital Signs (24h Range):  Temp:  [99.7 °F (37.6 °C)-100.7 °F (38.2 °C)] 100.7 °F (38.2 °C)  Pulse:   "[] 91  Resp:  [15-22] 15  SpO2:  [91 %-100 %] 91 %  BP: ()/(51-76) 83/51     Patient Vitals for the past 72 hrs (Last 3 readings):   Weight   08/03/24 0919 63.5 kg (140 lb)   08/03/24 0835 66.2 kg (146 lb)     Body mass index is 21.93 kg/m².      Intake/Output Summary (Last 24 hours) at 8/3/2024 1251  Last data filed at 8/3/2024 1028  Gross per 24 hour   Intake 100 ml   Output --   Net 100 ml          Physical Exam  Vitals reviewed.   Constitutional:       Appearance: Normal appearance.   HENT:      Head: Atraumatic.      Mouth/Throat:      Pharynx: Oropharynx is clear.   Eyes:      Conjunctiva/sclera: Conjunctivae normal.   Cardiovascular:      Rate and Rhythm: Normal rate and regular rhythm.      Heart sounds: No murmur heard.  Pulmonary:      Effort: Pulmonary effort is normal.      Breath sounds: Normal breath sounds.   Abdominal:      General: There is no distension.      Palpations: Abdomen is soft.      Tenderness: There is no abdominal tenderness.   Musculoskeletal:      Right lower leg: No edema.      Left lower leg: No edema.   Skin:     General: Skin is warm.   Neurological:      General: No focal deficit present.      Mental Status: She is alert.            Significant Labs:  CBC:  Recent Labs   Lab 08/03/24  0937   WBC 6.40   RBC 3.99*   HGB 11.5*   HCT 35.1*   *   MCV 88   MCH 28.8   MCHC 32.8     BNP:  Recent Labs   Lab 08/03/24  0937   *     CMP:  Recent Labs   Lab 08/03/24  0937   GLU 90   CALCIUM 9.2   ALBUMIN 3.8   PROT 7.1      K 3.9   CO2 21*      BUN 10   CREATININE 0.9   ALKPHOS 88   ALT 15   AST 18   BILITOT 1.1*      Coagulation:   Recent Labs   Lab 08/03/24  0937   INR 1.1   APTT 32.2*     LDH:  No results for input(s): "LDH" in the last 72 hours.  Microbiology:  Microbiology Results (last 7 days)       Procedure Component Value Units Date/Time    Respiratory Infection Panel (PCR), Nasopharyngeal [[id number]]     Order Status: No result Specimen: " Nasopharyngeal Swab     Influenza A & B by Molecular [[id number]] Collected: 08/03/24 0942    Order Status: Completed Specimen: Nasopharyngeal Swab Updated: 08/03/24 1037     Influenza A, Molecular Negative     Influenza B, Molecular Negative     Flu A & B Source Nasal swab    Blood culture x two cultures. Draw prior to antibiotics. [[id number]] Collected: 08/03/24 0938    Order Status: Sent Specimen: Blood from Peripheral, Forearm, Left Updated: 08/03/24 0943    Blood culture x two cultures. Draw prior to antibiotics. [[id number]] Collected: 08/03/24 0937    Order Status: Sent Specimen: Blood from Peripheral, Forearm, Right Updated: 08/03/24 0943            I have reviewed all pertinent labs within the past 24 hours.    Diagnostic Results:  I have reviewed and interpreted all pertinent imaging results/findings within the past 24 hours.    Echocardiogram (1/3/2024)    Left Ventricle: The left ventricle is normal in size. Normal wall thickness. There is concentric remodeling. Normal wall motion. There is normal systolic function with a visually estimated ejection fraction of 55 - 60%. There is normal diastolic function.    Right Ventricle: Severe right ventricular enlargement with hypertrophy. Systolic function is moderately reduced.    The right atrium is severely dilated.    Tricuspid Valve: There is at least moderate to severe regurgitation.    Pulmonary Artery: The estimated pulmonary artery systolic pressure is 93 mmHg.    IVC/SVC: Intermediate venous pressure at 8 mmHg.    Pericardium: There is a small effusion.       Assessment/Plan:     * Generalized weakness  - No evidence or source of infection at this time  - Negative PCT  - No Leukocytosis. COVID negative.   - Follow up viral panel  - Follow up blood cultures  - S/p Vancomycin and Zosyn in the ED  - Will continue with Zosyn for now and de-escalate tomorrow if cultures remain negative    Hypertension  - Hold any home anti-hypertensives    Right-sided heart  failure  - IVC collapsible, appears euvolemic on exam, unable to assess JVP  - Will obtain TTE  - Hold diuretics at this time    WHO group 1 pulmonary arterial hypertension  - Resume Remodulin dosing per pharmacy  - Resume Adempas 2 mg, TID  - Resume OPSYNVI 10-40 mg, daily        Jeri Castillo MD  Heart Transplant  [facility] Dept

## 2024-08-03 NOTE — SUBJECTIVE & OBJECTIVE
Past Medical History:   Diagnosis Date    Elevated liver enzymes     Essential (primary) hypertension     Heart failure, unspecified     Hypokalemia     Mixed hyperlipidemia     Neuropathic pain     Tx w/ tramadol & Lyrica    Pulmonary hypertension     Receiving Remodulin continuously through tunneled central line       Past Surgical History:   Procedure Laterality Date    APPENDECTOMY       SECTION      Transverse cut    HYSTERECTOMY  2017    TLH- bleeding    OOPHORECTOMY      RIGHT HEART CATHETERIZATION Right 2021    Procedure: INSERTION, CATHETER, RIGHT HEART;  Surgeon: Chloe Gregory MD;  Location: [facility] CATH LAB;  Service: Cardiology;  Laterality: Right;    RIGHT HEART CATHETERIZATION Right 3/16/2022    Procedure: INSERTION, CATHETER, RIGHT HEART;  Surgeon: Hu Silverman MD;  Location: [facility] CATH LAB;  Service: Cardiology;  Laterality: Right;    RIGHT HEART CATHETERIZATION Right 2022    Procedure: INSERTION, CATHETER, RIGHT HEART;  Surgeon: Chloe Gregory MD;  Location: [facility] CATH LAB;  Service: Cardiology;  Laterality: Right;    RIGHT HEART CATHETERIZATION Right 3/21/2023    Procedure: INSERTION, CATHETER, RIGHT HEART;  Surgeon: Kahlil Cisneros MD;  Location: [facility] CATH LAB;  Service: Cardiology;  Laterality: Right;    RIGHT HEART CATHETERIZATION Right 10/9/2023    Procedure: INSERTION, CATHETER, RIGHT HEART;  Surgeon: Chloe Gregory MD;  Location: [facility] CATH LAB;  Service: Cardiology;  Laterality: Right;    TUBAL LIGATION      VAGINAL DELIVERY  1993; 1996       Review of patient's allergies indicates:  No Known Allergies    Current Facility-Administered Medications   Medication    furosemide injection 80 mg    ondansetron injection 4 mg    PHARMACY TO CONFIRM TREPROSTINIL (REMODULIN) DOSING infusion    sodium chloride 0.9% flush 10 mL    spironolactone tablet 25 mg    VELETRI/REMODULIN CASSETTE    VELETRI/REMODULIN TUBING     Current Outpatient  Medications   Medication Sig    furosemide (LASIX) 20 MG tablet Take 1 tablet (20 mg total) by mouth once daily.    macitentan-tadalafil (OPSYNVI) 10-40 mg Tab Take 10-40 mg by mouth once daily.    magnesium oxide (MAG-OX) 400 mg (241.3 mg magnesium) tablet Take 1 tablet (400 mg total) by mouth once daily.    naloxone (NARCAN) 4 mg/actuation Spry 4mg by nasal route as needed for opioid overdose; may repeat every 2-3 minutes in alternating nostrils until medical help arrives. Call 911    ondansetron (ZOFRAN) 4 MG tablet Take 1 tablet (4 mg total) by mouth every 8 (eight) hours as needed for Nausea.    oxyCODONE myristate (XTAMPZA ER) 18 mg CSpT Take 1 capsule (18 mg total) by mouth every 12 (twelve) hours.    oxyCODONE-acetaminophen (PERCOCET)  mg per tablet Take 1 tablet by mouth every 4 (four) hours as needed for Pain.    potassium chloride SA (K-DUR,KLOR-CON M) 10 MEQ tablet Take 2 tablets (20 mEq total) by mouth once daily.    pregabalin (LYRICA) 75 MG capsule Take 1 capsule (75 mg total) by mouth once daily AND 2 capsules (150 mg total) every evening.    REMODULIN 5 mg/mL Soln     riociguat (ADEMPAS) 2 mg Tab tablet Take 1 tablet (2 mg total) by mouth 3 (three) times daily.    sertraline (ZOLOFT) 100 MG tablet Take 1 tablet (100 mg total) by mouth once daily.    sodium chloride 0.9% SolP 100 mL with treprostinil 1 mg/mL Soln 6,000,000 ng Inject 2,145 ng/min into the vein continuous.    spironolactone (ALDACTONE) 25 MG tablet Take 1 tablet (25 mg total) by mouth once daily.    multivitamin with minerals tablet Take 1 tablet by mouth once daily.     Family History       Problem Relation (Age of Onset)    Cancer Father (69)    No Known Problems Sister, Brother, Brother          Tobacco Use    Smoking status: Former     Types: Cigars     Quit date: 12/1/2020     Years since quitting: 3.6     Passive exposure: Past    Smokeless tobacco: Never    Tobacco comments:     social smoker-light - quit 2001   Substance  and Sexual Activity    Alcohol use: Not Currently     Alcohol/week: 2.0 standard drinks of alcohol     Types: 2 Glasses of wine per week     Comment: None now -  previously: socially- 2 or 3 times a week-2 glasses of wine    Drug use: Not Currently     Types: Marijuana     Comment: 2021 last use    Sexual activity: Yes     Partners: Male     Birth control/protection: See Surgical Hx     Comment:        Objective:     Vital Signs (Most Recent):  Temp: (!) 100.7 °F (38.2 °C) (08/03/24 0919)  Pulse: 91 (08/03/24 1202)  Resp: 15 (08/03/24 1202)  BP: (!) 83/51 (08/03/24 1202)  SpO2: (!) 91 % (08/03/24 1202) Vital Signs (24h Range):  Temp:  [99.7 °F (37.6 °C)-100.7 °F (38.2 °C)] 100.7 °F (38.2 °C)  Pulse:  [] 91  Resp:  [15-22] 15  SpO2:  [91 %-100 %] 91 %  BP: ()/(51-76) 83/51     Patient Vitals for the past 72 hrs (Last 3 readings):   Weight   08/03/24 0919 63.5 kg (140 lb)   08/03/24 0835 66.2 kg (146 lb)     Body mass index is 21.93 kg/m².      Intake/Output Summary (Last 24 hours) at 8/3/2024 1251  Last data filed at 8/3/2024 1028  Gross per 24 hour   Intake 100 ml   Output --   Net 100 ml          Physical Exam  Vitals reviewed.   Constitutional:       Appearance: Normal appearance.   HENT:      Head: Atraumatic.      Mouth/Throat:      Pharynx: Oropharynx is clear.   Eyes:      Conjunctiva/sclera: Conjunctivae normal.   Cardiovascular:      Rate and Rhythm: Normal rate and regular rhythm.      Heart sounds: No murmur heard.  Pulmonary:      Effort: Pulmonary effort is normal.      Breath sounds: Normal breath sounds.   Abdominal:      General: There is no distension.      Palpations: Abdomen is soft.      Tenderness: There is no abdominal tenderness.   Musculoskeletal:      Right lower leg: No edema.      Left lower leg: No edema.   Skin:     General: Skin is warm.   Neurological:      General: No focal deficit present.      Mental Status: She is alert.            Significant Labs:  CBC:  Recent  "Labs   Lab 08/03/24 0937   WBC 6.40   RBC 3.99*   HGB 11.5*   HCT 35.1*   *   MCV 88   MCH 28.8   MCHC 32.8     BNP:  Recent Labs   Lab 08/03/24 0937   *     CMP:  Recent Labs   Lab 08/03/24 0937   GLU 90   CALCIUM 9.2   ALBUMIN 3.8   PROT 7.1      K 3.9   CO2 21*      BUN 10   CREATININE 0.9   ALKPHOS 88   ALT 15   AST 18   BILITOT 1.1*      Coagulation:   Recent Labs   Lab 08/03/24 0937   INR 1.1   APTT 32.2*     LDH:  No results for input(s): "LDH" in the last 72 hours.  Microbiology:  Microbiology Results (last 7 days)       Procedure Component Value Units Date/Time    Respiratory Infection Panel (PCR), Nasopharyngeal [[id number]]     Order Status: No result Specimen: Nasopharyngeal Swab     Influenza A & B by Molecular [[id number]] Collected: 08/03/24 0942    Order Status: Completed Specimen: Nasopharyngeal Swab Updated: 08/03/24 1037     Influenza A, Molecular Negative     Influenza B, Molecular Negative     Flu A & B Source Nasal swab    Blood culture x two cultures. Draw prior to antibiotics. [[id number]] Collected: 08/03/24 0938    Order Status: Sent Specimen: Blood from Peripheral, Forearm, Left Updated: 08/03/24 0943    Blood culture x two cultures. Draw prior to antibiotics. [[id number]] Collected: 08/03/24 0937    Order Status: Sent Specimen: Blood from Peripheral, Forearm, Right Updated: 08/03/24 0943            I have reviewed all pertinent labs within the past 24 hours.    Diagnostic Results:  I have reviewed and interpreted all pertinent imaging results/findings within the past 24 hours.    "

## 2024-08-04 LAB
ACINETOBACTER CALCOACETICUS/BAUMANNII COMPLEX: NOT DETECTED
ALBUMIN SERPL BCP-MCNC: 3.2 G/DL (ref 3.5–5.2)
ALP SERPL-CCNC: 80 U/L (ref 55–135)
ALT SERPL W/O P-5'-P-CCNC: 13 U/L (ref 10–44)
ANION GAP SERPL CALC-SCNC: 7 MMOL/L (ref 8–16)
ASCENDING AORTA: 3.55 CM
AST SERPL-CCNC: 15 U/L (ref 10–40)
AV INDEX (PROSTH): 0.71
AV MEAN GRADIENT: 4 MMHG
AV PEAK GRADIENT: 7 MMHG
AV VALVE AREA BY VELOCITY RATIO: 2.39 CM²
AV VALVE AREA: 2.07 CM²
AV VELOCITY RATIO: 0.82
BACTEROIDES FRAGILIS: NOT DETECTED
BASOPHILS # BLD AUTO: 0.01 K/UL (ref 0–0.2)
BASOPHILS NFR BLD: 0.1 % (ref 0–1.9)
BILIRUB SERPL-MCNC: 1.6 MG/DL (ref 0.1–1)
BSA FOR ECHO PROCEDURE: 1.77 M2
BUN SERPL-MCNC: 9 MG/DL (ref 6–20)
CALCIUM SERPL-MCNC: 9.1 MG/DL (ref 8.7–10.5)
CANDIDA ALBICANS: NOT DETECTED
CANDIDA AURIS: NOT DETECTED
CANDIDA GLABRATA: NOT DETECTED
CANDIDA KRUSEI: NOT DETECTED
CANDIDA PARAPSILOSIS: NOT DETECTED
CANDIDA TROPICALIS: NOT DETECTED
CHLORIDE SERPL-SCNC: 109 MMOL/L (ref 95–110)
CO2 SERPL-SCNC: 23 MMOL/L (ref 23–29)
CREAT SERPL-MCNC: 0.8 MG/DL (ref 0.5–1.4)
CRYPTOCOCCUS NEOFORMANS/GATTII: NOT DETECTED
CTX-M GENE (ESBL PRODUCER): NORMAL
CV ECHO LV RWT: 0.4 CM
DIFFERENTIAL METHOD BLD: ABNORMAL
DOP CALC AO PEAK VEL: 1.31 M/S
DOP CALC AO VTI: 25.33 CM
DOP CALC LVOT AREA: 2.9 CM2
DOP CALC LVOT DIAMETER: 1.92 CM
DOP CALC LVOT PEAK VEL: 1.08 M/S
DOP CALC LVOT STROKE VOLUME: 52.35 CM3
DOP CALCLVOT PEAK VEL VTI: 18.09 CM
E WAVE DECELERATION TIME: 150.44 MSEC
E/A RATIO: 0.6
E/E' RATIO: 5.04 M/S
ECHO LV POSTERIOR WALL: 0.88 CM (ref 0.6–1.1)
ENTEROBACTER CLOACAE COMPLEX: NOT DETECTED
ENTEROBACTERALES: NOT DETECTED
ENTEROCOCCUS FAECALIS: NOT DETECTED
ENTEROCOCCUS FAECIUM: NOT DETECTED
EOSINOPHIL # BLD AUTO: 0.1 K/UL (ref 0–0.5)
EOSINOPHIL NFR BLD: 1.1 % (ref 0–8)
ERYTHROCYTE [DISTWIDTH] IN BLOOD BY AUTOMATED COUNT: 14.8 % (ref 11.5–14.5)
ESCHERICHIA COLI: NOT DETECTED
EST. GFR  (NO RACE VARIABLE): >60 ML/MIN/1.73 M^2
FRACTIONAL SHORTENING: 45 % (ref 28–44)
GLUCOSE SERPL-MCNC: 111 MG/DL (ref 70–110)
HAEMOPHILUS INFLUENZAE: NOT DETECTED
HCT VFR BLD AUTO: 36 % (ref 37–48.5)
HGB BLD-MCNC: 12.1 G/DL (ref 12–16)
IMM GRANULOCYTES # BLD AUTO: 0.03 K/UL (ref 0–0.04)
IMM GRANULOCYTES NFR BLD AUTO: 0.4 % (ref 0–0.5)
IMP GENE (CARBAPENEM RESISTANT): NORMAL
INTERVENTRICULAR SEPTUM: 1 CM (ref 0.6–1.1)
IVRT: 94.2 MSEC
KLEBSIELLA AEROGENES: NOT DETECTED
KLEBSIELLA OXYTOCA: NOT DETECTED
KLEBSIELLA PNEUMONIAE GROUP: NOT DETECTED
KPC RESISTANCE GENE (CARBAPENEM): NORMAL
LA MAJOR: 5.46 CM
LA MINOR: 5.46 CM
LA WIDTH: 2.87 CM
LEFT ATRIUM SIZE: 3.1 CM
LEFT ATRIUM VOLUME INDEX MOD: 15.7 ML/M2
LEFT ATRIUM VOLUME INDEX: 23.3 ML/M2
LEFT ATRIUM VOLUME MOD: 27.79 CM3
LEFT ATRIUM VOLUME: 41.29 CM3
LEFT INTERNAL DIMENSION IN SYSTOLE: 2.4 CM (ref 2.1–4)
LEFT VENTRICLE DIASTOLIC VOLUME INDEX: 48.37 ML/M2
LEFT VENTRICLE DIASTOLIC VOLUME: 85.62 ML
LEFT VENTRICLE MASS INDEX: 76 G/M2
LEFT VENTRICLE SYSTOLIC VOLUME INDEX: 11.4 ML/M2
LEFT VENTRICLE SYSTOLIC VOLUME: 20.24 ML
LEFT VENTRICULAR INTERNAL DIMENSION IN DIASTOLE: 4.36 CM (ref 3.5–6)
LEFT VENTRICULAR MASS: 133.79 G
LISTERIA MONOCYTOGENES: NOT DETECTED
LV LATERAL E/E' RATIO: 4.46 M/S
LV SEPTAL E/E' RATIO: 5.8 M/S
LYMPHOCYTES # BLD AUTO: 1.9 K/UL (ref 1–4.8)
LYMPHOCYTES NFR BLD: 27.3 % (ref 18–48)
MAGNESIUM SERPL-MCNC: 1.9 MG/DL (ref 1.6–2.6)
MCH RBC QN AUTO: 29.2 PG (ref 27–31)
MCHC RBC AUTO-ENTMCNC: 33.6 G/DL (ref 32–36)
MCR-1: NORMAL
MCV RBC AUTO: 87 FL (ref 82–98)
MEC A/C AND MREJ (MRSA): NORMAL
MEC A/C: NORMAL
MONOCYTES # BLD AUTO: 0.9 K/UL (ref 0.3–1)
MONOCYTES NFR BLD: 12.4 % (ref 4–15)
MV PEAK A VEL: 0.97 M/S
MV PEAK E VEL: 0.58 M/S
MV STENOSIS PRESSURE HALF TIME: 43.63 MS
MV VALVE AREA P 1/2 METHOD: 5.04 CM2
NDM GENE (CARBAPENEM RESISTANT): NORMAL
NEISSERIA MENINGITIDIS: NOT DETECTED
NEUTROPHILS # BLD AUTO: 4.1 K/UL (ref 1.8–7.7)
NEUTROPHILS NFR BLD: 58.7 % (ref 38–73)
NRBC BLD-RTO: 0 /100 WBC
OHS QRS DURATION: 76 MS
OHS QTC CALCULATION: 469 MS
OXA-48-LIKE (CARBAPENEM RESISTANT): NORMAL
PISA TR MAX VEL: 3.77 M/S
PLATELET # BLD AUTO: 108 K/UL (ref 150–450)
PMV BLD AUTO: 11.8 FL (ref 9.2–12.9)
POTASSIUM SERPL-SCNC: 3.7 MMOL/L (ref 3.5–5.1)
PROT SERPL-MCNC: 6.5 G/DL (ref 6–8.4)
PROTEUS SPECIES: NOT DETECTED
PSEUDOMONAS AERUGINOSA: NOT DETECTED
RA MAJOR: 4.66 CM
RA PRESSURE ESTIMATED: 3 MMHG
RA WIDTH: 3.56 CM
RBC # BLD AUTO: 4.15 M/UL (ref 4–5.4)
RIGHT VENTRICLE DIASTOLIC BASEL DIMENSION: 4.6 CM
RIGHT VENTRICLE FREE WALL STRAIN: 18 %
RIGHT VENTRICLE GLOBAL SYSTOLIC STRAIN: 16.6 %
RV TB RVSP: 7 MMHG
SALMONELLA SP: NOT DETECTED
SERRATIA MARCESCENS: NOT DETECTED
SINUS: 3.29 CM
SODIUM SERPL-SCNC: 139 MMOL/L (ref 136–145)
STAPHYLOCOCCUS AUREUS: NOT DETECTED
STAPHYLOCOCCUS EPIDERMIDIS: NOT DETECTED
STAPHYLOCOCCUS LUGDUNESIS: NOT DETECTED
STAPHYLOCOCCUS SPECIES: NOT DETECTED
STENOTROPHOMONAS MALTOPHILIA: NOT DETECTED
STJ: 3.01 CM
STREPTOCOCCUS AGALACTIAE: NOT DETECTED
STREPTOCOCCUS PNEUMONIAE: NOT DETECTED
STREPTOCOCCUS PYOGENES: NOT DETECTED
STREPTOCOCCUS SPECIES: NOT DETECTED
TDI LATERAL: 0.13 M/S
TDI SEPTAL: 0.1 M/S
TDI: 0.12 M/S
TR MAX PG: 57 MMHG
TRICUSPID ANNULAR PLANE SYSTOLIC EXCURSION: 1.52 CM
TV REST PULMONARY ARTERY PRESSURE: 60 MMHG
VAN A/B (VRE GENE): NORMAL
VIM GENE (CARBAPENEM RESISTANT): NORMAL
WBC # BLD AUTO: 6.99 K/UL (ref 3.9–12.7)
Z-SCORE OF LEFT VENTRICULAR DIMENSION IN END DIASTOLE: -1.15
Z-SCORE OF LEFT VENTRICULAR DIMENSION IN END SYSTOLE: -1.83

## 2024-08-04 PROCEDURE — G0378 HOSPITAL OBSERVATION PER HR: HCPCS

## 2024-08-04 PROCEDURE — 87070 CULTURE OTHR SPECIMN AEROBIC: CPT | Performed by: STUDENT IN AN ORGANIZED HEALTH CARE EDUCATION/TRAINING PROGRAM

## 2024-08-04 PROCEDURE — 83735 ASSAY OF MAGNESIUM: CPT | Performed by: STUDENT IN AN ORGANIZED HEALTH CARE EDUCATION/TRAINING PROGRAM

## 2024-08-04 PROCEDURE — 63600175 PHARM REV CODE 636 W HCPCS: Performed by: STUDENT IN AN ORGANIZED HEALTH CARE EDUCATION/TRAINING PROGRAM

## 2024-08-04 PROCEDURE — 25000003 PHARM REV CODE 250: Performed by: STUDENT IN AN ORGANIZED HEALTH CARE EDUCATION/TRAINING PROGRAM

## 2024-08-04 PROCEDURE — 36415 COLL VENOUS BLD VENIPUNCTURE: CPT | Performed by: STUDENT IN AN ORGANIZED HEALTH CARE EDUCATION/TRAINING PROGRAM

## 2024-08-04 PROCEDURE — 27000221 HC OXYGEN, UP TO 24 HOURS

## 2024-08-04 PROCEDURE — 99233 SBSQ HOSP IP/OBS HIGH 50: CPT | Mod: ,,, | Performed by: INTERNAL MEDICINE

## 2024-08-04 PROCEDURE — 80053 COMPREHEN METABOLIC PANEL: CPT | Performed by: STUDENT IN AN ORGANIZED HEALTH CARE EDUCATION/TRAINING PROGRAM

## 2024-08-04 PROCEDURE — 25000003 PHARM REV CODE 250: Performed by: INTERNAL MEDICINE

## 2024-08-04 PROCEDURE — 87075 CULTR BACTERIA EXCEPT BLOOD: CPT | Performed by: STUDENT IN AN ORGANIZED HEALTH CARE EDUCATION/TRAINING PROGRAM

## 2024-08-04 PROCEDURE — 85025 COMPLETE CBC W/AUTO DIFF WBC: CPT | Performed by: STUDENT IN AN ORGANIZED HEALTH CARE EDUCATION/TRAINING PROGRAM

## 2024-08-04 PROCEDURE — 63600175 PHARM REV CODE 636 W HCPCS: Mod: JG | Performed by: INTERNAL MEDICINE

## 2024-08-04 RX ORDER — LOPERAMIDE HYDROCHLORIDE 2 MG/1
2 CAPSULE ORAL 4 TIMES DAILY PRN
Status: DISCONTINUED | OUTPATIENT
Start: 2024-08-04 | End: 2024-08-12 | Stop reason: HOSPADM

## 2024-08-04 RX ORDER — ACETAMINOPHEN 325 MG/1
650 TABLET ORAL EVERY 6 HOURS PRN
Status: DISCONTINUED | OUTPATIENT
Start: 2024-08-04 | End: 2024-08-12 | Stop reason: HOSPADM

## 2024-08-04 RX ADMIN — PIPERACILLIN SODIUM AND TAZOBACTAM SODIUM 4.5 G: 4; .5 INJECTION, POWDER, FOR SOLUTION INTRAVENOUS at 06:08

## 2024-08-04 RX ADMIN — LOPERAMIDE HYDROCHLORIDE 2 MG: 2 CAPSULE ORAL at 08:08

## 2024-08-04 RX ADMIN — OXYCODONE AND ACETAMINOPHEN 1 TABLET: 10; 325 TABLET ORAL at 04:08

## 2024-08-04 RX ADMIN — PIPERACILLIN SODIUM AND TAZOBACTAM SODIUM 4.5 G: 4; .5 INJECTION, POWDER, FOR SOLUTION INTRAVENOUS at 11:08

## 2024-08-04 RX ADMIN — TREPROSTINIL 7383.4 NG/MIN: 100 INJECTION, SOLUTION INTRAVENOUS; SUBCUTANEOUS at 05:08

## 2024-08-04 RX ADMIN — ONDANSETRON 4 MG: 2 INJECTION INTRAMUSCULAR; INTRAVENOUS at 04:08

## 2024-08-04 RX ADMIN — PREGABALIN 75 MG: 75 CAPSULE ORAL at 07:08

## 2024-08-04 RX ADMIN — OXYCODONE AND ACETAMINOPHEN 1 TABLET: 10; 325 TABLET ORAL at 10:08

## 2024-08-04 RX ADMIN — OXYCODONE AND ACETAMINOPHEN 1 TABLET: 10; 325 TABLET ORAL at 08:08

## 2024-08-04 RX ADMIN — ONDANSETRON 4 MG: 2 INJECTION INTRAMUSCULAR; INTRAVENOUS at 11:08

## 2024-08-04 RX ADMIN — PIPERACILLIN SODIUM AND TAZOBACTAM SODIUM 4.5 G: 4; .5 INJECTION, POWDER, FOR SOLUTION INTRAVENOUS at 02:08

## 2024-08-04 RX ADMIN — SPIRONOLACTONE 25 MG: 25 TABLET ORAL at 08:08

## 2024-08-04 NOTE — SUBJECTIVE & OBJECTIVE
Interval History:   No overnight acute events. Febrile this morning. Labs with no leukocytosis. CMP WNL. RVP negative. Cultures still in process. Will obtain CT C/A/P without contrast    Continuous Infusions:   veletri/remodulin cassette   Intravenous Continuous        veletri/remodulin tubing   Intravenous Continuous         Scheduled Meds:   macitentan-tadalafil  1 tablet Oral Daily    piperacillin-tazobactam (Zosyn) IV (PEDS and ADULTS) (extended infusion is not appropriate)  4.5 g Intravenous Q8H    pregabalin  75 mg Oral QHS    spironolactone  25 mg Oral Daily    treprostinil (REMODULIN) 12,000,000 ng in 0.9% NaCl 100 mL infusion  110.2 ng/kg/min (Order-Specific) Intravenous Q24H     PRN Meds:  Current Facility-Administered Medications:     acetaminophen, 650 mg, Oral, Q6H PRN    ondansetron, 4 mg, Intravenous, Q8H PRN    oxyCODONE-acetaminophen, 1 tablet, Oral, Q6H PRN    sodium chloride 0.9%, 10 mL, Intravenous, PRN    Review of patient's allergies indicates:  No Known Allergies  Objective:     Vital Signs (Most Recent):  Temp: 98.4 °F (36.9 °C) (08/04/24 1144)  Pulse: 90 (08/04/24 1144)  Resp: 18 (08/04/24 1144)  BP: (!) 86/56 (08/04/24 1144)  SpO2: 99 % (08/04/24 1144) Vital Signs (24h Range):  Temp:  [98.3 °F (36.8 °C)-101 °F (38.3 °C)] 98.4 °F (36.9 °C)  Pulse:  [] 90  Resp:  [14-20] 18  SpO2:  [93 %-100 %] 99 %  BP: ()/(51-75) 86/56     Patient Vitals for the past 72 hrs (Last 3 readings):   Weight   08/03/24 0919 63.5 kg (140 lb)   08/03/24 0835 66.2 kg (146 lb)     Body mass index is 21.93 kg/m².      Intake/Output Summary (Last 24 hours) at 8/4/2024 1247  Last data filed at 8/4/2024 0826  Gross per 24 hour   Intake 652 ml   Output 800 ml   Net -148 ml          Physical Exam  Constitutional:       Appearance: Normal appearance.   HENT:      Head: Atraumatic.   Eyes:      Conjunctiva/sclera: Conjunctivae normal.   Cardiovascular:      Rate and Rhythm: Normal rate and regular rhythm.       "Heart sounds: Murmur heard.   Pulmonary:      Effort: Pulmonary effort is normal.      Breath sounds: Normal breath sounds. No wheezing or rhonchi.   Abdominal:      General: There is no distension.      Palpations: Abdomen is soft.      Tenderness: There is no abdominal tenderness.   Musculoskeletal:      Right lower leg: No edema.      Left lower leg: No edema.   Skin:     General: Skin is warm.   Neurological:      General: No focal deficit present.      Mental Status: She is alert.   Psychiatric:         Mood and Affect: Mood normal.            Significant Labs:  CBC:  Recent Labs   Lab 08/03/24  0937 08/04/24  0631   WBC 6.40 6.99   RBC 3.99* 4.15   HGB 11.5* 12.1   HCT 35.1* 36.0*   * 108*   MCV 88 87   MCH 28.8 29.2   MCHC 32.8 33.6     BNP:  Recent Labs   Lab 08/03/24  0937   *     CMP:  Recent Labs   Lab 08/03/24  0937 08/04/24  0632   GLU 90 111*   CALCIUM 9.2 9.1   ALBUMIN 3.8 3.2*   PROT 7.1 6.5    139   K 3.9 3.7   CO2 21* 23    109   BUN 10 9   CREATININE 0.9 0.8   ALKPHOS 88 80   ALT 15 13   AST 18 15   BILITOT 1.1* 1.6*      Coagulation:   Recent Labs   Lab 08/03/24  0937   INR 1.1   APTT 32.2*     LDH:  No results for input(s): "LDH" in the last 72 hours.  Microbiology:  Microbiology Results (last 7 days)       Procedure Component Value Units Date/Time    Blood culture x two cultures. Draw prior to antibiotics. [[id number]] Collected: 08/03/24 0938    Order Status: Completed Specimen: Blood from Peripheral, Forearm, Left Updated: 08/04/24 1212     Blood Culture, Routine No Growth to date      No Growth to date    Narrative:      Aerobic and anaerobic    Blood culture x two cultures. Draw prior to antibiotics. [[id number]] Collected: 08/03/24 0937    Order Status: Completed Specimen: Blood from Peripheral, Forearm, Right Updated: 08/04/24 1212     Blood Culture, Routine No Growth to date      No Growth to date    Narrative:      Aerobic and anaerobic    Culture, Anaerobe " [[id number]] Collected: 08/04/24 1147    Order Status: Sent Specimen: Skin from Chest, Right Updated: 08/04/24 1156    Aerobic culture [[id number]] Collected: 08/04/24 1147    Order Status: Sent Specimen: Skin from Chest, Right Updated: 08/04/24 1156    Respiratory Infection Panel (PCR), Nasopharyngeal [[id number]] Collected: 08/03/24 1849    Order Status: Completed Specimen: Nasopharyngeal Swab Updated: 08/03/24 1956     Respiratory Infection Panel Source NP Swab     Adenovirus Not Detected     Coronavirus 229E, Common Cold Virus Not Detected     Coronavirus HKU1, Common Cold Virus Not Detected     Coronavirus NL63, Common Cold Virus Not Detected     Coronavirus OC43, Common Cold Virus Not Detected     Comment: The Coronavirus strains detected in this test cause the common cold.  These strains are not the COVID-19 (novel Coronavirus)strain   associated with the respiratory disease outbreak.          SARS-CoV2 (COVID-19) Qualitative PCR Not Detected     Human Metapneumovirus Not Detected     Human Rhinovirus/Enterovirus Not Detected     Influenza A (subtypes H1, H1-2009,H3) Not Detected     Influenza B Not Detected     Parainfluenza Virus 1 Not Detected     Parainfluenza Virus 2 Not Detected     Parainfluenza Virus 3 Not Detected     Parainfluenza Virus 4 Not Detected     Respiratory Syncytial Virus Not Detected     Bordetella Parapertussis (DN9358) Not Detected     Bordetella pertussis (ptxP) Not Detected     Chlamydia pneumoniae Not Detected     Mycoplasma pneumoniae Not Detected    Narrative:      Assay not valid for lower respiratory specimens, alternate  testing required.    Influenza A & B by Molecular [[id number]] Collected: 08/03/24 0942    Order Status: Completed Specimen: Nasopharyngeal Swab Updated: 08/03/24 1037     Influenza A, Molecular Negative     Influenza B, Molecular Negative     Flu A & B Source Nasal swab            I have reviewed all pertinent labs within the past 24 hours.    Estimated  Creatinine Clearance: 81.8 mL/min (based on SCr of 0.8 mg/dL).    Diagnostic Results:  I have reviewed and interpreted all pertinent imaging results/findings within the past 24 hours.

## 2024-08-04 NOTE — ASSESSMENT & PLAN NOTE
- No evidence or source of infection at this time  - Negative PCT  - No Leukocytosis. COVID negative.   - Viral panel negative  - Follow up blood cultures  - S/p Vancomycin and Zosyn in the ED  - Will continue with Zosyn for now and de-escalate tomorrow if cultures remain negative  - Follow up CT C/AP   - Culture line site

## 2024-08-04 NOTE — PLAN OF CARE
AAOx4. VSS. C/o back pain. PRN percocet given. On 2L O2 and RA O/N. Oxygen saturation in high 90s. No c/o dizziness or SOB. Continuous remodulin @ 43ml/24hr. Pt on home cassette, changed q48h. Regular diet w 1.5 L FR. Zosyn q8 given per MAR. Tele- NSR. Bed in low position. Side rails raisedx2, wheels locked. Nonskid shoes when OOB. Call bell and personal items within reach.

## 2024-08-04 NOTE — PLAN OF CARE
48 year-old female admitted 8/3/24 for SOB/Dizziness. Pt has hx of Pulmonary HTN, HF, HLD  -AAOx4, ambulates w/standby assist  -Rt subclavian tunneled cath  -18 G Rt FA  -18 G Lt FA  -Tele NSR  -Zosyn Q8 IVPB  -pt's home Remodulin in place   -blood cultures positive, team notified  -CT Chest A/P complete  -MRI Abd ordered  -PRN Percocet Q6   -PRN Zofran IVP  -pt lying in bed, bed in lowest position, wheels locked, non-skid socks in place, call light within reach

## 2024-08-04 NOTE — PROGRESS NOTES
[facility] - Transplant Stepdown  Heart Transplant  Progress Note    Patient Name: Kristin Maier  MRN: [id number]  Admission Date: 8/3/2024  Hospital Length of Stay: 0 days  Attending Physician: Kahlil Cisneros MD  Primary Care Provider: Jorge A Stack MD  Principal Problem:Generalized weakness    Subjective:   Interval History:   No overnight acute events. Febrile this morning. Labs with no leukocytosis. CMP WNL. RVP negative. Cultures still in process. Will obtain CT C/A/P without contrast    Continuous Infusions:   veletri/remodulin cassette   Intravenous Continuous        veletri/remodulin tubing   Intravenous Continuous         Scheduled Meds:   macitentan-tadalafil  1 tablet Oral Daily    piperacillin-tazobactam (Zosyn) IV (PEDS and ADULTS) (extended infusion is not appropriate)  4.5 g Intravenous Q8H    pregabalin  75 mg Oral QHS    spironolactone  25 mg Oral Daily    treprostinil (REMODULIN) 12,000,000 ng in 0.9% NaCl 100 mL infusion  110.2 ng/kg/min (Order-Specific) Intravenous Q24H     PRN Meds:  Current Facility-Administered Medications:     acetaminophen, 650 mg, Oral, Q6H PRN    ondansetron, 4 mg, Intravenous, Q8H PRN    oxyCODONE-acetaminophen, 1 tablet, Oral, Q6H PRN    sodium chloride 0.9%, 10 mL, Intravenous, PRN    Review of patient's allergies indicates:  No Known Allergies  Objective:     Vital Signs (Most Recent):  Temp: 98.4 °F (36.9 °C) (08/04/24 1144)  Pulse: 90 (08/04/24 1144)  Resp: 18 (08/04/24 1144)  BP: (!) 86/56 (08/04/24 1144)  SpO2: 99 % (08/04/24 1144) Vital Signs (24h Range):  Temp:  [98.3 °F (36.8 °C)-101 °F (38.3 °C)] 98.4 °F (36.9 °C)  Pulse:  [] 90  Resp:  [14-20] 18  SpO2:  [93 %-100 %] 99 %  BP: ()/(51-75) 86/56     Patient Vitals for the past 72 hrs (Last 3 readings):   Weight   08/03/24 0919 63.5 kg (140 lb)   08/03/24 0835 66.2 kg (146 lb)     Body mass index is 21.93 kg/m².      Intake/Output Summary (Last 24 hours) at 8/4/2024 7237  Last data filed  "at 8/4/2024 0826  Gross per 24 hour   Intake 652 ml   Output 800 ml   Net -148 ml          Physical Exam  Constitutional:       Appearance: Normal appearance.   HENT:      Head: Atraumatic.   Eyes:      Conjunctiva/sclera: Conjunctivae normal.   Cardiovascular:      Rate and Rhythm: Normal rate and regular rhythm.      Heart sounds: Murmur heard.   Pulmonary:      Effort: Pulmonary effort is normal.      Breath sounds: Normal breath sounds. No wheezing or rhonchi.   Abdominal:      General: There is no distension.      Palpations: Abdomen is soft.      Tenderness: There is no abdominal tenderness.   Musculoskeletal:      Right lower leg: No edema.      Left lower leg: No edema.   Skin:     General: Skin is warm.   Neurological:      General: No focal deficit present.      Mental Status: She is alert.   Psychiatric:         Mood and Affect: Mood normal.            Significant Labs:  CBC:  Recent Labs   Lab 08/03/24  0937 08/04/24  0631   WBC 6.40 6.99   RBC 3.99* 4.15   HGB 11.5* 12.1   HCT 35.1* 36.0*   * 108*   MCV 88 87   MCH 28.8 29.2   MCHC 32.8 33.6     BNP:  Recent Labs   Lab 08/03/24  0937   *     CMP:  Recent Labs   Lab 08/03/24  0937 08/04/24  0632   GLU 90 111*   CALCIUM 9.2 9.1   ALBUMIN 3.8 3.2*   PROT 7.1 6.5    139   K 3.9 3.7   CO2 21* 23    109   BUN 10 9   CREATININE 0.9 0.8   ALKPHOS 88 80   ALT 15 13   AST 18 15   BILITOT 1.1* 1.6*      Coagulation:   Recent Labs   Lab 08/03/24  0937   INR 1.1   APTT 32.2*     LDH:  No results for input(s): "LDH" in the last 72 hours.  Microbiology:  Microbiology Results (last 7 days)       Procedure Component Value Units Date/Time    Blood culture x two cultures. Draw prior to antibiotics. [[id number]] Collected: 08/03/24 0938    Order Status: Completed Specimen: Blood from Peripheral, Forearm, Left Updated: 08/04/24 1212     Blood Culture, Routine No Growth to date      No Growth to date    Narrative:      Aerobic and anaerobic    Blood " culture x two cultures. Draw prior to antibiotics. [[id number]] Collected: 08/03/24 0937    Order Status: Completed Specimen: Blood from Peripheral, Forearm, Right Updated: 08/04/24 1212     Blood Culture, Routine No Growth to date      No Growth to date    Narrative:      Aerobic and anaerobic    Culture, Anaerobe [[id number]] Collected: 08/04/24 1147    Order Status: Sent Specimen: Skin from Chest, Right Updated: 08/04/24 1156    Aerobic culture [[id number]] Collected: 08/04/24 1147    Order Status: Sent Specimen: Skin from Chest, Right Updated: 08/04/24 1156    Respiratory Infection Panel (PCR), Nasopharyngeal [[id number]] Collected: 08/03/24 1849    Order Status: Completed Specimen: Nasopharyngeal Swab Updated: 08/03/24 1956     Respiratory Infection Panel Source NP Swab     Adenovirus Not Detected     Coronavirus 229E, Common Cold Virus Not Detected     Coronavirus HKU1, Common Cold Virus Not Detected     Coronavirus NL63, Common Cold Virus Not Detected     Coronavirus OC43, Common Cold Virus Not Detected     Comment: The Coronavirus strains detected in this test cause the common cold.  These strains are not the COVID-19 (novel Coronavirus)strain   associated with the respiratory disease outbreak.          SARS-CoV2 (COVID-19) Qualitative PCR Not Detected     Human Metapneumovirus Not Detected     Human Rhinovirus/Enterovirus Not Detected     Influenza A (subtypes H1, H1-2009,H3) Not Detected     Influenza B Not Detected     Parainfluenza Virus 1 Not Detected     Parainfluenza Virus 2 Not Detected     Parainfluenza Virus 3 Not Detected     Parainfluenza Virus 4 Not Detected     Respiratory Syncytial Virus Not Detected     Bordetella Parapertussis (TD0600) Not Detected     Bordetella pertussis (ptxP) Not Detected     Chlamydia pneumoniae Not Detected     Mycoplasma pneumoniae Not Detected    Narrative:      Assay not valid for lower respiratory specimens, alternate  testing required.    Influenza A & B by  Molecular [[id number]] Collected: 08/03/24 0942    Order Status: Completed Specimen: Nasopharyngeal Swab Updated: 08/03/24 1037     Influenza A, Molecular Negative     Influenza B, Molecular Negative     Flu A & B Source Nasal swab            I have reviewed all pertinent labs within the past 24 hours.    Estimated Creatinine Clearance: 81.8 mL/min (based on SCr of 0.8 mg/dL).    Diagnostic Results:  I have reviewed and interpreted all pertinent imaging results/findings within the past 24 hours.  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 Patient states that for the past two weeks she started to initially just feel more bloated and weak. She noticed her weight was going up and so she started to take two lasix pills instead of her usual one for an entire week. However, despite this she didn't feel any better. She denies any fever, cough, chills, chest pain, palpitations, shortness of breath, orthopnea, PND or lower extremity edema. Reports compliance with all her medication.     * Generalized weakness  - No evidence or source of infection at this time  - Negative PCT  - No Leukocytosis. COVID negative.   - Viral panel negative  - Follow up blood cultures  - S/p Vancomycin and Zosyn in the ED  - Will continue with Zosyn for now and de-escalate tomorrow if cultures remain negative  - Follow up CT C/AP   - Culture line site    Hypertension  - Hold any home anti-hypertensives    Right-sided heart failure  - IVC collapsible, appears euvolemic on exam, unable to assess JVP  - Will obtain TTE  - Hold diuretics at this time    WHO group 1 pulmonary arterial hypertension  - Resume Remodulin dosing per pharmacy  - Resume OPSYNVI 10-40 mg, daily        Jeri Castillo MD  Heart Transplant  [facility] - [facility] Step[facility]

## 2024-08-04 NOTE — PROGRESS NOTES
Unable to aspirate home concentration from mays. Hospital concentration to infuse at home rate 43cc/24 for 30 minutes and then will increase to hospital rate of 88cc/24. Pt has positive blood cultures and is complaining of body aches.

## 2024-08-05 PROBLEM — R78.81 GRAM-NEGATIVE BACTEREMIA: Status: ACTIVE | Noted: 2023-09-12

## 2024-08-05 PROBLEM — R93.2 ABNORMAL LIVER CT: Status: ACTIVE | Noted: 2024-08-05

## 2024-08-05 PROBLEM — R78.81 BACTEREMIA: Status: ACTIVE | Noted: 2024-08-05

## 2024-08-05 LAB
ALBUMIN SERPL BCP-MCNC: 3.2 G/DL (ref 3.5–5.2)
ALP SERPL-CCNC: 77 U/L (ref 55–135)
ALT SERPL W/O P-5'-P-CCNC: 10 U/L (ref 10–44)
ANION GAP SERPL CALC-SCNC: 7 MMOL/L (ref 8–16)
AST SERPL-CCNC: 11 U/L (ref 10–40)
BASOPHILS # BLD AUTO: 0.01 K/UL (ref 0–0.2)
BASOPHILS NFR BLD: 0.2 % (ref 0–1.9)
BILIRUB SERPL-MCNC: 1.1 MG/DL (ref 0.1–1)
BUN SERPL-MCNC: 9 MG/DL (ref 6–20)
CALCIUM SERPL-MCNC: 9.4 MG/DL (ref 8.7–10.5)
CHLORIDE SERPL-SCNC: 110 MMOL/L (ref 95–110)
CO2 SERPL-SCNC: 23 MMOL/L (ref 23–29)
CREAT SERPL-MCNC: 0.8 MG/DL (ref 0.5–1.4)
CRP SERPL-MCNC: 42.9 MG/L (ref 0–8.2)
DIFFERENTIAL METHOD BLD: ABNORMAL
EOSINOPHIL # BLD AUTO: 0.1 K/UL (ref 0–0.5)
EOSINOPHIL NFR BLD: 2.9 % (ref 0–8)
ERYTHROCYTE [DISTWIDTH] IN BLOOD BY AUTOMATED COUNT: 14.7 % (ref 11.5–14.5)
EST. GFR  (NO RACE VARIABLE): >60 ML/MIN/1.73 M^2
GLUCOSE SERPL-MCNC: 109 MG/DL (ref 70–110)
HCT VFR BLD AUTO: 36.6 % (ref 37–48.5)
HGB BLD-MCNC: 12.1 G/DL (ref 12–16)
IMM GRANULOCYTES # BLD AUTO: 0.01 K/UL (ref 0–0.04)
IMM GRANULOCYTES NFR BLD AUTO: 0.2 % (ref 0–0.5)
LYMPHOCYTES # BLD AUTO: 1.8 K/UL (ref 1–4.8)
LYMPHOCYTES NFR BLD: 38.6 % (ref 18–48)
MAGNESIUM SERPL-MCNC: 2.2 MG/DL (ref 1.6–2.6)
MCH RBC QN AUTO: 29 PG (ref 27–31)
MCHC RBC AUTO-ENTMCNC: 33.1 G/DL (ref 32–36)
MCV RBC AUTO: 88 FL (ref 82–98)
MONOCYTES # BLD AUTO: 0.4 K/UL (ref 0.3–1)
MONOCYTES NFR BLD: 9.7 % (ref 4–15)
NEUTROPHILS # BLD AUTO: 2.2 K/UL (ref 1.8–7.7)
NEUTROPHILS NFR BLD: 48.4 % (ref 38–73)
NRBC BLD-RTO: 0 /100 WBC
PLATELET # BLD AUTO: 115 K/UL (ref 150–450)
PMV BLD AUTO: 11.5 FL (ref 9.2–12.9)
POTASSIUM SERPL-SCNC: 3.4 MMOL/L (ref 3.5–5.1)
PROT SERPL-MCNC: 6.7 G/DL (ref 6–8.4)
RBC # BLD AUTO: 4.17 M/UL (ref 4–5.4)
SODIUM SERPL-SCNC: 140 MMOL/L (ref 136–145)
WBC # BLD AUTO: 4.53 K/UL (ref 3.9–12.7)

## 2024-08-05 PROCEDURE — 76937 US GUIDE VASCULAR ACCESS: CPT

## 2024-08-05 PROCEDURE — 25000003 PHARM REV CODE 250: Performed by: STUDENT IN AN ORGANIZED HEALTH CARE EDUCATION/TRAINING PROGRAM

## 2024-08-05 PROCEDURE — 87040 BLOOD CULTURE FOR BACTERIA: CPT | Performed by: PHYSICIAN ASSISTANT

## 2024-08-05 PROCEDURE — 99223 1ST HOSP IP/OBS HIGH 75: CPT | Mod: ,,, | Performed by: STUDENT IN AN ORGANIZED HEALTH CARE EDUCATION/TRAINING PROGRAM

## 2024-08-05 PROCEDURE — 36415 COLL VENOUS BLD VENIPUNCTURE: CPT | Performed by: STUDENT IN AN ORGANIZED HEALTH CARE EDUCATION/TRAINING PROGRAM

## 2024-08-05 PROCEDURE — 25000003 PHARM REV CODE 250: Performed by: INTERNAL MEDICINE

## 2024-08-05 PROCEDURE — 25500020 PHARM REV CODE 255: Performed by: INTERNAL MEDICINE

## 2024-08-05 PROCEDURE — 80053 COMPREHEN METABOLIC PANEL: CPT | Performed by: STUDENT IN AN ORGANIZED HEALTH CARE EDUCATION/TRAINING PROGRAM

## 2024-08-05 PROCEDURE — 99233 SBSQ HOSP IP/OBS HIGH 50: CPT | Mod: ,,, | Performed by: PHYSICIAN ASSISTANT

## 2024-08-05 PROCEDURE — 63600175 PHARM REV CODE 636 W HCPCS: Performed by: INTERNAL MEDICINE

## 2024-08-05 PROCEDURE — 63600175 PHARM REV CODE 636 W HCPCS: Performed by: STUDENT IN AN ORGANIZED HEALTH CARE EDUCATION/TRAINING PROGRAM

## 2024-08-05 PROCEDURE — 85025 COMPLETE CBC W/AUTO DIFF WBC: CPT | Performed by: STUDENT IN AN ORGANIZED HEALTH CARE EDUCATION/TRAINING PROGRAM

## 2024-08-05 PROCEDURE — 86140 C-REACTIVE PROTEIN: CPT | Performed by: STUDENT IN AN ORGANIZED HEALTH CARE EDUCATION/TRAINING PROGRAM

## 2024-08-05 PROCEDURE — 94761 N-INVAS EAR/PLS OXIMETRY MLT: CPT

## 2024-08-05 PROCEDURE — 83735 ASSAY OF MAGNESIUM: CPT | Performed by: STUDENT IN AN ORGANIZED HEALTH CARE EDUCATION/TRAINING PROGRAM

## 2024-08-05 PROCEDURE — 36415 COLL VENOUS BLD VENIPUNCTURE: CPT | Mod: XB | Performed by: PHYSICIAN ASSISTANT

## 2024-08-05 PROCEDURE — 20600001 HC STEP DOWN PRIVATE ROOM

## 2024-08-05 PROCEDURE — C1751 CATH, INF, PER/CENT/MIDLINE: HCPCS

## 2024-08-05 PROCEDURE — 63600175 PHARM REV CODE 636 W HCPCS: Mod: JG | Performed by: INTERNAL MEDICINE

## 2024-08-05 PROCEDURE — 36410 VNPNXR 3YR/> PHY/QHP DX/THER: CPT

## 2024-08-05 RX ORDER — SODIUM CHLORIDE 0.9 % (FLUSH) 0.9 %
10 SYRINGE (ML) INJECTION EVERY 6 HOURS
Status: DISCONTINUED | OUTPATIENT
Start: 2024-08-05 | End: 2024-08-05

## 2024-08-05 RX ORDER — SODIUM CHLORIDE 0.9 % (FLUSH) 0.9 %
10 SYRINGE (ML) INJECTION
Status: DISCONTINUED | OUTPATIENT
Start: 2024-08-05 | End: 2024-08-05

## 2024-08-05 RX ADMIN — PIPERACILLIN SODIUM AND TAZOBACTAM SODIUM 4.5 G: 4; .5 INJECTION, POWDER, FOR SOLUTION INTRAVENOUS at 09:08

## 2024-08-05 RX ADMIN — OXYCODONE AND ACETAMINOPHEN 1 TABLET: 10; 325 TABLET ORAL at 08:08

## 2024-08-05 RX ADMIN — ONDANSETRON 4 MG: 2 INJECTION INTRAMUSCULAR; INTRAVENOUS at 08:08

## 2024-08-05 RX ADMIN — PIPERACILLIN SODIUM AND TAZOBACTAM SODIUM 4.5 G: 4; .5 INJECTION, POWDER, FOR SOLUTION INTRAVENOUS at 01:08

## 2024-08-05 RX ADMIN — OXYCODONE AND ACETAMINOPHEN 1 TABLET: 10; 325 TABLET ORAL at 11:08

## 2024-08-05 RX ADMIN — IOHEXOL 75 ML: 350 INJECTION, SOLUTION INTRAVENOUS at 10:08

## 2024-08-05 RX ADMIN — TREPROSTINIL 7383.4 NG/MIN: 100 INJECTION, SOLUTION INTRAVENOUS; SUBCUTANEOUS at 05:08

## 2024-08-05 RX ADMIN — VANCOMYCIN HYDROCHLORIDE 1250 MG: 1.25 INJECTION, POWDER, LYOPHILIZED, FOR SOLUTION INTRAVENOUS at 11:08

## 2024-08-05 RX ADMIN — OXYCODONE AND ACETAMINOPHEN 1 TABLET: 10; 325 TABLET ORAL at 04:08

## 2024-08-05 RX ADMIN — LOPERAMIDE HYDROCHLORIDE 2 MG: 2 CAPSULE ORAL at 08:08

## 2024-08-05 RX ADMIN — LOPERAMIDE HYDROCHLORIDE 2 MG: 2 CAPSULE ORAL at 11:08

## 2024-08-05 RX ADMIN — LOPERAMIDE HYDROCHLORIDE 2 MG: 2 CAPSULE ORAL at 04:08

## 2024-08-05 RX ADMIN — PIPERACILLIN SODIUM AND TAZOBACTAM SODIUM 4.5 G: 4; .5 INJECTION, POWDER, FOR SOLUTION INTRAVENOUS at 06:08

## 2024-08-05 RX ADMIN — PREGABALIN 75 MG: 75 CAPSULE ORAL at 08:08

## 2024-08-05 NOTE — PROGRESS NOTES
Admit Note     Met with patient to assess needs. Patient is a 48 y.o.  female, admitted for Pulmonary hypertension [I27.20], Sepsis [A41.9], with a history of WHO Group 1 pulmonary arterial hypertension, HFpEF, and Hyperlipidemia per medical record.    Patient admitted from ED on 8/3/2024 .  At this time, patient presents as alert and oriented x 4, pleasant, good eye contact, calm, communicative, cooperative, and asking and answering questions appropriately.  At this time, patient presents alone in room.    Household/Family Systems     Patient resides with patient's spouse and adult daughter.    Address:   [address] [place] [id number].    Patient phone number: [phone]    Emergency contacts:  Navya Dowell, daughter: [phone]  Juju Maier: spouse: [phone]    Support system includes spouse, adult children and extended family.  Patient does not have dependents that are on need of being cared for.     Patients primary caregiver is self with support from spouse when needed.    During admission, patient's caregiver plans to stay at home.  Confirmed patient and patients caregivers do have access to reliable transportation.    Cognitive Status/Learning     Patient reports reading ability as 11th grade and states patient does not have difficulty with reading, writing, seeing, hearing, comprehension, and learning.  Patient reports patient learns best by one on one.   Needed: No.   Highest education level: High School (9-12) or GED    Vocation/Disability     Working for Income: No  If no, reason not working: Demands of Treatment  Patient applied for disability in November 2023 and reports that she is still waiting on a determination  Patient's spouse is employed as a .  Patient denies financial difficulties at this time but asked SW for a list of food bank resources in the [facility].    Adherence     Patient reports a high level of adherence to patients health care  regimen.  Adherence counseling and education provided. Patient verbalizes understanding.    Substance Use    Patient reports the following substance usage.    Tobacco:  Patient denies current use.  Patient reports no tobacco use for over a year.  Patient used to smoke cigars .  Alcohol:  Patient reports drinking a glass of wine occasionally .  Illicit Drugs/Non-prescribed Medications:  Patient denies current use. Patient used to take a marijuana edible once a day. .  Patient states clear understanding of the potential impact of substance use.  Substance abstinence/cessation counseling, education and resources provided and reviewed.     Services Utilizing/ADLS    Infusion Service: Prior to admission, patient utilizing? yes.  Patient reports using [facility] for Remodulin.  Patient goes to an outpatient lab for bloodwork.   Home Health: Prior to admission, patient utilizing? no  DME: Prior to admission, yes.  Patient has a walker and bedside commode.  Pulmonary/Cardiac Rehab: Prior to admission, no  Dialysis:  Prior to admission, no  Transplant Specialty Pharmacy:  Prior to admission, no.    Prior to admission, patient reports patient was independent with ADLS and was driving a little.  Patient reports patient is not able to care for self at this time due to compromised medical condition (as documented in medical record) and physical weakness..  Patient indicates a willingness to care for self once medically cleared to do so.    Insurance/Medications    Insured by   Payer/Plan Subscr  Sex Relation Sub. Ins. ID Effective Group Num   1. MEDICAID - AM* ELVIS HUTTON CHRISTIANO* 1975 Female Self [id number]* 19                                    [address]   2. HIRA NARAYAN* ANNIELVIS ALVARADO* 1975 Female Self  18                                    [address] [address]      Primary Insurance (for UNOS reporting): Public Insurance - Medicaid  Secondary Insurance (for UNOS reporting):  None    Patient reports patient is able to obtain and afford medications at this time and at time of discharge.    Living Will/Healthcare Power of     Patient states patient has a LW and/or HCPA.  HPCA on file in [facility]    Coping/Mental Health    Patient is coping adequately with the aid of  family members. Patient denies current mental health difficulties. Patient reports a history of anxiety due to her medical needs/difficulty with pain control.  Patient reports family is supportive.  Patient reports she sees Dr. Hartman at [facility] for therapy. SW provided general suppport    Discharge Planning    At time of discharge, patient plans to return to patient's home under the care of self and spouse.  Patients spouse will transport patient.  Per rounds today, expected discharge date has not been medically determined at this time. Patient and patients caregiver  verbalize understanding and are involved in treatment planning and discharge process.    Additional Concerns    Patient is being followed for needs, education, resources, information, emotional support, supportive counseling, and for supportive and skilled discharge plan of care.  providing ongoing psychosocial support, education, resources and d/c planning as needed.  SW remains available. Patient denies additional needs and/or concerns at this time. Patient verbalizes understanding and agreement with information reviewed, social work availability, and how to access available resources as needed.

## 2024-08-05 NOTE — CONSULTS
KWAKU consulted for Midline insertion in real time using u/s guidance.     Indication: REMODULIN  Gauge: 20  Location: RIGHT CEPHALIC  Length in cm: 8  Max dwell date: 9/3/2024  Lot #: [id number]    Image saved and uploaded to EMR

## 2024-08-05 NOTE — ASSESSMENT & PLAN NOTE
- No evidence or source of infection at this time  - Negative PCT  - No Leukocytosis. COVID negative.   - Viral panel negative  - Blood cultures frmo 8/3 positive for GNR.  Repeat cultures ordered today.   - On Zosyn  - CT of C/A/P done: 2.1 cm heterogeneous hypodensity right lobe of the liver new compared to prior. Both abscess and neoplasm need to considered. Unable to obtain MRI due to remodulin.  Triple phase CT ordered  - Culture line site  -ID consult

## 2024-08-05 NOTE — ASSESSMENT & PLAN NOTE
I have reviewed hospital notes from   service and other specialty providers. I have also reviewed CBC, CMP/BMP,  cultures and imaging with my interpretation as documented.       48F with pulm HTN on Remodulin infused viai tunneled chest Rt sub clavian central line (placed 03/2022) - who presented 08/03 for general weakness, with abd bloating. Pt presented febrile 100.7F, with some mild hypotension 76/51, otherwise VSS, HDS. Wbc nml, , procalc 0.19.  Index bld cxs 08/01 positive GPRs, +Diphtheroids on one; while ID and susc pending on the other set.  Repeat bld cxs 08/05 NGTD. TTE neg for IE or veg. Suspected source picc line vs. GI translation in setting of abd bloating and new non-specific liver lesion.     Ct-chest-a/p revealed 2cm heterogenous hypodensity Rt lobe liver, new compared to prior 12/2021; ddx to include abscess or neoplasm. ID consulted for positive bld cxs with GPR.  Pt started 08/03 on vanc / zosyn; then 08/04 zosyn monotherapy. Fever curve improving.     Recommendations / Plan:  Discontinue zosyn.   To start Iv-vancomycin. Inpatient / (infusion if outpt) pharmD to dose vanc with target trough level of 15-20.   Suspect Rt tunneled chest central line as possible source of corynebacterium bacteremia; thus, recommend consult IR for removal of tunneled line Rt subclavian (used for Remodulin continuous infusion), and to also consider IR aspiration biopsy of liver lesion to send for both path, and micro (gram stain, AFB, fungal, aerobic, anaerobic).  Will f/u repeat bld cxs 08/05 to monitor for clearance of bacteremia.       -- Discussed with ID staff and primary team   -- ID will continue to follow w/ further recs.

## 2024-08-05 NOTE — PLAN OF CARE
AAOx4. VSS. C/o general malaise and Remodulin associated diarrhea. PRN percocet and immodium given. O2 sats in low 90s on RA. Hypotensive overnight. BP monitored, and on call care provider informed. Pt able to ambulate without incr symptoms. Up to bathroom independently. PRN zofran x 1 given for nausea. Continuous remodulin @ 110.2ng/kg/min; dosing weight 67kg. Hospital cassette in place; home pump in use. Regular diet w 1.5 L FR. Zosyn q8 given per MAR. Tele- NSR. Bed in low position. Side rails raisedx2, wheels locked. Nonskid shoes when OOB. Call bell and personal items within reach.

## 2024-08-05 NOTE — PLAN OF CARE
- Remodulin cassette changed.  Continue at 88 mL/24 hr.  Tolerating well  - Swapped remodulin infusion to KRAIG midline  - BCx resulted & ID consulted.  Beresykym Q8H continues  - MRI cancelled, 3 phase CT ordered abdomen & pelvis  - Afebrile.  VSS.  Patient still reporting fatigue, though improved from yesterday    Problem: Adult Inpatient Plan of Care  Goal: Plan of Care Review  Outcome: Progressing  Goal: Patient-Specific Goal (Individualized)  Outcome: Progressing  Goal: Absence of Hospital-Acquired Illness or Injury  Outcome: Progressing  Goal: Optimal Comfort and Wellbeing  Outcome: Progressing  Goal: Readiness for Transition of Care  Outcome: Progressing     Problem: Acute Kidney Injury/Impairment  Goal: Fluid and Electrolyte Balance  Outcome: Progressing  Goal: Improved Oral Intake  Outcome: Progressing  Goal: Effective Renal Function  Outcome: Progressing     Problem: Infection  Goal: Absence of Infection Signs and Symptoms  Outcome: Progressing     Problem: Heart Failure  Goal: Optimal Coping  Outcome: Progressing  Goal: Optimal Cardiac Output  Outcome: Progressing  Goal: Stable Heart Rate and Rhythm  Outcome: Progressing  Goal: Optimal Functional Ability  Outcome: Progressing  Goal: Fluid and Electrolyte Balance  Outcome: Progressing  Goal: Improved Oral Intake  Outcome: Progressing  Goal: Effective Oxygenation and Ventilation  Outcome: Progressing  Goal: Effective Breathing Pattern During Sleep  Outcome: Progressing

## 2024-08-05 NOTE — ASSESSMENT & PLAN NOTE
- IVC collapsible, appears euvolemic on exam, unable to assess JVP  - Echo 8/4/24 EF: 55-60%, LVEDD: 4.36cm, Moderate RVE with function mod reduced with moderate TR. IVC 3mm   - Hold diuretics at this time

## 2024-08-05 NOTE — CONSULTS
[facility] - Transplant Stepdown  Infectious Disease  Consult Note    Patient Name: Kristin Maier  MRN: [id number]  Admission Date: 8/3/2024  Hospital Length of Stay: 0 days  Attending Physician: Tracey Dutta MD  Primary Care Provider: Jorge A Stack MD     Isolation Status: No active isolations    Patient information was obtained from patient and ER records.      Inpatient consult to Infectious Diseases  Consult performed by: Nicholas Brar PA-C  Consult ordered by: Vanessa Deshpande PA        Assessment/Plan:     ID  Bacteremia  I have reviewed hospital notes from  HM service and other specialty providers. I have also reviewed CBC, CMP/BMP,  cultures and imaging with my interpretation as documented.       48F with pulm HTN on Remodulin infused viai tunneled chest Rt sub clavian central line (placed 03/2022) - who presented 08/03 for general weakness, with abd bloating. Pt presented febrile 100.7F, with some mild hypotension 76/51, otherwise VSS, HDS. Wbc nml, , procalc 0.19.  Index bld cxs 08/01 +corynebacterium coyleae, same species on both sets. Repeat bld cxs 08/05 NGTD. TTE neg for IE or veg. Suspected source picc line vs. GI translation in setting of abd bloating and new non-specific liver lesion.     Ct-chest-a/p revealed 2cm heterogenous hypodensity Rt lobe liver, new compared to prior 12/2021; ddx to include abscess or neoplasm. ID consulted for positive bld cxs with GPR.  Pt started 08/03 on vanc / zosyn; then 08/04 zosyn monotherapy. Fever curve improving.     Recommendations / Plan:  Discontinue zosyn.   To start Iv-vancomycin. Inpatient / (infusion if outpt) pharmD to dose vanc with target trough level of 15-20.   Suspect Rt tunneled chest central line as possible source of corynebacterium bacteremia; thus, recommend consult IR for removal of tunneled line Rt subclavian (used for Remodulin continuous infusion), and to also consider IR aspiration biopsy of liver lesion to send  for both path, and micro (gram stain, AFB, fungal, aerobic, anaerobic).  Will f/u repeat bld cxs  to monitor for clearance of bacteremia.       -- Discussed with ID staff and primary team   -- ID will continue to follow w/ further recs.        Thank you for your consult. I will follow-up with patient. Please contact us if you have any additional questions.    Nicholas Brar PA-C  Infectious Disease  [facility] - Transplant Stepdown    Subjective:     Principal Problem: Gram-negative bacteremia    HPI:  48F with pulm HTN on Remodulin infused viai tunneled chest Rt sub clavian central line (placed 2022) - who presented  for general weakness, with abd bloating. Pt presented febrile 100.7F, with some mild hypotension 76/51, otherwise VSS, HDS. Wbc nml, , procalc 0.19.  Index bld cxs  +corynebacterium coyleae, same species on both sets. Repeat bld cxs  NGTD. TTE neg for IE or veg. Suspected source picc line vs. GI translation in setting of abd bloating and new non-specific liver lesion.  ID consulted for positive bld cxs with GPR.       Past Medical History:   Diagnosis Date    Elevated liver enzymes     Essential (primary) hypertension     Heart failure, unspecified     Hypokalemia     Mixed hyperlipidemia     Neuropathic pain     Tx w/ tramadol & Lyrica    Pulmonary hypertension     Receiving Remodulin continuously through tunneled central line       Past Surgical History:   Procedure Laterality Date    APPENDECTOMY       SECTION      Transverse cut    HYSTERECTOMY  2017    TLH- bleeding    OOPHORECTOMY      RIGHT HEART CATHETERIZATION Right 2021    Procedure: INSERTION, CATHETER, RIGHT HEART;  Surgeon: Chloe Gregory MD;  Location: [facility] CATH LAB;  Service: Cardiology;  Laterality: Right;    RIGHT HEART CATHETERIZATION Right 3/16/2022    Procedure: INSERTION, CATHETER, RIGHT HEART;  Surgeon: Hu Silverman MD;  Location: [facility] CATH LAB;  Service:  Cardiology;  Laterality: Right;    RIGHT HEART CATHETERIZATION Right 7/19/2022    Procedure: INSERTION, CATHETER, RIGHT HEART;  Surgeon: Chloe Gregory MD;  Location: [facility] CATH LAB;  Service: Cardiology;  Laterality: Right;    RIGHT HEART CATHETERIZATION Right 3/21/2023    Procedure: INSERTION, CATHETER, RIGHT HEART;  Surgeon: Kahlil Cisneros MD;  Location: [facility] CATH LAB;  Service: Cardiology;  Laterality: Right;    RIGHT HEART CATHETERIZATION Right 10/9/2023    Procedure: INSERTION, CATHETER, RIGHT HEART;  Surgeon: Chloe Gregory MD;  Location: [facility] CATH LAB;  Service: Cardiology;  Laterality: Right;    TUBAL LIGATION  1996    VAGINAL DELIVERY  1991; 1993; 1994; 1996       Review of patient's allergies indicates:  No Known Allergies    Medications:  Medications Prior to Admission   Medication Sig    furosemide (LASIX) 20 MG tablet Take 1 tablet (20 mg total) by mouth once daily.    macitentan-tadalafil (OPSYNVI) 10-40 mg Tab Take 10-40 mg by mouth once daily.    magnesium oxide (MAG-OX) 400 mg (241.3 mg magnesium) tablet Take 1 tablet (400 mg total) by mouth once daily.    naloxone (NARCAN) 4 mg/actuation Spry 4mg by nasal route as needed for opioid overdose; may repeat every 2-3 minutes in alternating nostrils until medical help arrives. Call 911    ondansetron (ZOFRAN) 4 MG tablet Take 1 tablet (4 mg total) by mouth every 8 (eight) hours as needed for Nausea.    oxyCODONE myristate (XTAMPZA ER) 18 mg CSpT Take 1 capsule (18 mg total) by mouth every 12 (twelve) hours.    oxyCODONE-acetaminophen (PERCOCET)  mg per tablet Take 1 tablet by mouth every 4 (four) hours as needed for Pain.    potassium chloride SA (K-DUR,KLOR-CON M) 10 MEQ tablet Take 2 tablets (20 mEq total) by mouth once daily.    pregabalin (LYRICA) 75 MG capsule Take 1 capsule (75 mg total) by mouth once daily AND 2 capsules (150 mg total) every evening.    REMODULIN 5 mg/mL Soln     riociguat (ADEMPAS) 2 mg Tab tablet Take 1 tablet (2 mg  total) by mouth 3 (three) times daily.    sertraline (ZOLOFT) 100 MG tablet Take 1 tablet (100 mg total) by mouth once daily.    sodium chloride 0.9% SolP 100 mL with treprostinil 1 mg/mL Soln 6,000,000 ng Inject 2,145 ng/min into the vein continuous.    spironolactone (ALDACTONE) 25 MG tablet Take 1 tablet (25 mg total) by mouth once daily.    multivitamin with minerals tablet Take 1 tablet by mouth once daily.     Antibiotics (From admission, onward)      Start     Stop Route Frequency Ordered    08/03/24 1830  piperacillin-tazobactam (ZOSYN) 4.5 g in D5W 100 mL IVPB (MB+)         -- IV Every 8 hours (non-standard times) 08/03/24 1718          Antifungals (From admission, onward)      None          Antivirals (From admission, onward)      None             Immunization History   Administered Date(s) Administered    COVID-19, MRNA, LN-S, PF (MODERNA FULL 0.5 ML DOSE) 01/05/2022    Tdap 10/24/2020       Family History       Problem Relation (Age of Onset)    Cancer Father (69)    No Known Problems Sister, Brother, Brother          Social History     Socioeconomic History    Marital status:    Tobacco Use    Smoking status: Former     Types: Cigars     Quit date: 12/1/2020     Years since quitting: 3.6     Passive exposure: Past    Smokeless tobacco: Never    Tobacco comments:     social smoker-light - quit 2001   Substance and Sexual Activity    Alcohol use: Not Currently     Alcohol/week: 2.0 standard drinks of alcohol     Types: 2 Glasses of wine per week     Comment: None now -  previously: socially- 2 or 3 times a week-2 glasses of wine    Drug use: Not Currently     Types: Marijuana     Comment: 2021 last use    Sexual activity: Yes     Partners: Male     Birth control/protection: See Surgical Hx     Comment:      Social Determinants of Health     Financial Resource Strain: Low Risk  (8/3/2024)    Overall Financial Resource Strain (CARDIA)     Difficulty of Paying Living Expenses: Not very hard    Food Insecurity: No Food Insecurity (8/3/2024)    Hunger Vital Sign     Worried About Running Out of Food in the Last Year: Never true     Ran Out of Food in the Last Year: Never true   Transportation Needs: No Transportation Needs (8/3/2024)    TRANSPORTATION NEEDS     Transportation : No   Physical Activity: Insufficiently Active (8/3/2024)    Exercise Vital Sign     Days of Exercise per Week: 4 days     Minutes of Exercise per Session: 20 min   Stress: Stress Concern Present (8/3/2024)    [employer] - Occupational Stress Questionnaire     Feeling of Stress : To some extent   Housing Stability: Low Risk  (8/3/2024)    Housing Stability Vital Sign     Unable to Pay for Housing in the Last Year: No     Homeless in the Last Year: No     Review of Systems   Constitutional:  Positive for fatigue.   Gastrointestinal:  Negative for abdominal pain.   Musculoskeletal:  Positive for myalgias (chronic).   Skin:  Negative for color change and wound.   All other systems reviewed and are negative.    Objective:     Vital Signs (Most Recent):  Temp: 98.6 °F (37 °C) (08/05/24 1548)  Pulse: 87 (08/05/24 1548)  Resp: 18 (08/05/24 1600)  BP: 105/69 (08/05/24 1548)  SpO2: 97 % (08/05/24 1548) Vital Signs (24h Range):  Temp:  [97.9 °F (36.6 °C)-98.9 °F (37.2 °C)] 98.6 °F (37 °C)  Pulse:  [] 87  Resp:  [14-18] 18  SpO2:  [92 %-97 %] 97 %  BP: ()/(51-69) 105/69     Weight: 66.2 kg (146 lb)  Body mass index is 22.87 kg/m².    Estimated Creatinine Clearance: 81.8 mL/min (based on SCr of 0.8 mg/dL).     Physical Exam  Vitals and nursing note reviewed.   Constitutional:       General: She is not in acute distress.     Appearance: She is not ill-appearing, toxic-appearing or diaphoretic.   HENT:      Mouth/Throat:      Pharynx: No oropharyngeal exudate.   Eyes:      General: No scleral icterus.     Conjunctiva/sclera: Conjunctivae normal.   Cardiovascular:      Rate and Rhythm: Normal rate.       Heart sounds: Murmur heard.   Pulmonary:      Effort: No respiratory distress.      Breath sounds: Normal breath sounds.   Abdominal:      General: Bowel sounds are normal. There is no distension.      Palpations: Abdomen is soft.      Tenderness: There is no abdominal tenderness. There is no right CVA tenderness or left CVA tenderness.   Musculoskeletal:         General: No swelling or tenderness.      Cervical back: Neck supple. No tenderness.      Right lower leg: No edema.      Left lower leg: No edema.   Skin:     General: Skin is warm and dry.      Findings: No erythema or rash.      Comments: Rt subclavian central line, c/d/I, wnl.    Neurological:      Mental Status: She is alert and oriented to person, place, and time. Mental status is at baseline.   Psychiatric:         Behavior: Behavior normal.         Thought Content: Thought content normal.          Significant Labs: Blood Culture:   Recent Labs   Lab 08/03/24  0937 08/03/24  0938 08/05/24  0804 08/05/24  0807   LABBLOO Gram stain aer bottle: Gram positive rods  Results called to and read back by:Lee Olivarez RN 08/04/2024  16:37  CORYNEBACTERIUM SPECIES  further identified as Corynebacterium coyleae  For susceptibility see order #[id number]  * Gram stain aer bottle: Gram positive rods  Results called to and read back by:Lee Olivarez RN 08/04/2024  16:33  CORYNEBACTERIUM SPECIES  further identified as Corynebacterium coyleae  Susceptibility pending  * No Growth to date No Growth to date     CBC:   Recent Labs   Lab 08/04/24  0631 08/05/24  0538   WBC 6.99 4.53   HGB 12.1 12.1   HCT 36.0* 36.6*   * 115*     CMP:   Recent Labs   Lab 08/04/24  0632 08/05/24  0539    140   K 3.7 3.4*    110   CO2 23 23   * 109   BUN 9 9   CREATININE 0.8 0.8   CALCIUM 9.1 9.4   PROT 6.5 6.7   ALBUMIN 3.2* 3.2*   BILITOT 1.6* 1.1*   ALKPHOS 80 77   AST 15 11   ALT 13 10   ANIONGAP 7* 7*     Microbiology Results (last 7 days)       Procedure  Component Value Units Date/Time    Blood culture [[id number]] Collected: 08/05/24 0804    Order Status: Completed Specimen: Blood Updated: 08/05/24 1545     Blood Culture, Routine No Growth to date    Blood culture [[id number]] Collected: 08/05/24 0807    Order Status: Completed Specimen: Blood Updated: 08/05/24 1545     Blood Culture, Routine No Growth to date    Blood culture x two cultures. Draw prior to antibiotics. [[id number]]  (Abnormal) Collected: 08/03/24 0937    Order Status: Completed Specimen: Blood from Peripheral, Forearm, Right Updated: 08/05/24 1204     Blood Culture, Routine Gram stain aer bottle: Gram positive rods      Results called to and read back by:Lee Olivarez RN 08/04/2024  16:37      CORYNEBACTERIUM SPECIES  further identified as Corynebacterium coyleae  For susceptibility see order #[id number]      Narrative:      Aerobic and anaerobic    Blood culture x two cultures. Draw prior to antibiotics. [[id number]]  (Abnormal) Collected: 08/03/24 0938    Order Status: Completed Specimen: Blood from Peripheral, Forearm, Left Updated: 08/05/24 1202     Blood Culture, Routine Gram stain aer bottle: Gram positive rods      Results called to and read back by:Lee Olivarez RN 08/04/2024  16:33      CORYNEBACTERIUM SPECIES  further identified as Corynebacterium coyleae  Susceptibility pending      Narrative:      Aerobic and anaerobic    Aerobic culture [[id number]] Collected: 08/04/24 1147    Order Status: Completed Specimen: Skin from Chest, Right Updated: 08/05/24 0850     Aerobic Bacterial Culture No growth    Culture, Anaerobe [[id number]] Collected: 08/04/24 1147    Order Status: Completed Specimen: Skin from Chest, Right Updated: 08/05/24 0546     Anaerobic Culture Culture in progress    Rapid Organism ID by PCR (from Blood culture) [[id number]] Collected: 08/03/24 0938    Order Status: Completed Updated: 08/04/24 1805     Enterococcus faecalis Not Detected     Enterococcus faecium Not Detected      Listeria monocytogenes Not Detected     Staphylococcus spp. Not Detected     Staphylococcus aureus Not Detected     Staphylococcus epidermidis Not Detected     Staphylococcus lugdunensis Not Detected     Streptococcus species Not Detected     Streptococcus agalactiae Not Detected     Streptococcus pneumoniae Not Detected     Streptococcus pyogenes Not Detected     Acinetobacter calcoaceticus/baumannii complex Not Detected     Bacteroides fragilis Not Detected     Enterobacterales Not Detected     Enterobacter cloacae complex Not Detected     Escherichia coli Not Detected     Klebsiella aerogenes Not Detected     Klebsiella oxytoca Not Detected     Klebsiella pneumoniae group Not Detected     Proteus Not Detected     Salmonella sp Not Detected     Serratia marcescens Not Detected     Haemophilus influenzae Not Detected     Neisseria meningtidis Not Detected     Pseudomonas aeruginosa Not Detected     Stenotrophomonas maltophilia Not Detected     Candida albicans Not Detected     Candida auris Not Detected     Candida glabrata Not Detected     Candida krusei Not Detected     Candida parapsilosis Not Detected     Candida tropicalis Not Detected     Cryptococcus neoformans/gattii Not Detected     CTX-M (ESBL ) Test Not Applicable     IMP (Carbapenem resistant) Test Not Applicable     KPC resistance gene (Carbapenem resistant) Test Not Applicable     mcr-1  Test Not Applicable     mec A/C  Test Not Applicable     mec A/C and MREJ (MRSA) gene Test Not Applicable     NDM (Carbapenem resistant) Test Not Applicable     OXA-48-like (Carbapenem resistant) Test Not Applicable     van A/B (VRE gene) Test Not Applicable     VIM (Carbapenem resistant) Test Not Applicable    Narrative:      Aerobic and anaerobic    Respiratory Infection Panel (PCR), Nasopharyngeal [[id number]] Collected: 08/03/24 1849    Order Status: Completed Specimen: Nasopharyngeal Swab Updated: 08/03/24 1956     Respiratory Infection Panel Source NP  Swab     Adenovirus Not Detected     Coronavirus 229E, Common Cold Virus Not Detected     Coronavirus HKU1, Common Cold Virus Not Detected     Coronavirus NL63, Common Cold Virus Not Detected     Coronavirus OC43, Common Cold Virus Not Detected     Comment: The Coronavirus strains detected in this test cause the common cold.  These strains are not the COVID-19 (novel Coronavirus)strain   associated with the respiratory disease outbreak.          SARS-CoV2 (COVID-19) Qualitative PCR Not Detected     Human Metapneumovirus Not Detected     Human Rhinovirus/Enterovirus Not Detected     Influenza A (subtypes H1, H1-2009,H3) Not Detected     Influenza B Not Detected     Parainfluenza Virus 1 Not Detected     Parainfluenza Virus 2 Not Detected     Parainfluenza Virus 3 Not Detected     Parainfluenza Virus 4 Not Detected     Respiratory Syncytial Virus Not Detected     Bordetella Parapertussis ([id number]) Not Detected     Bordetella pertussis (ptxP) Not Detected     Chlamydia pneumoniae Not Detected     Mycoplasma pneumoniae Not Detected    Narrative:      Assay not valid for lower respiratory specimens, alternate  testing required.    Influenza A & B by Molecular [[id number]] Collected: 08/03/24 0942    Order Status: Completed Specimen: Nasopharyngeal Swab Updated: 08/03/24 1037     Influenza A, Molecular Negative     Influenza B, Molecular Negative     Flu A & B Source Nasal swab          All pertinent labs within the past 24 hours have been reviewed.    Significant Imaging: I have reviewed all pertinent imaging results/findings within the past 24 hours.    I have personally reviewed records / hospital notes from   service and other specialty providers. I have also reviewed CBC, CMP/BMP,  cultures and imaging with my interpretation as documented in my assessment / plan.    Patient is high risk for infectious complications given pt's age, multiple co-morbidities, and case complexity.      Time: 75 minutes   50% of time  spent on face-to-face counseling and coordination of care. Counseling included review of test results, diagnosis, and treatment plan with patient and/or family.

## 2024-08-05 NOTE — SUBJECTIVE & OBJECTIVE
Interval History: No overnight acute events. She has been afebrile since 7am yesterday.  WBC normal.  CT of C/A/P with a heterogeneous hypodensity in RLL of liver: neoplasm vs abscess.  Triple phase CT ordered.  Unable to do MRI given remodulin: we did not feel comfortable stopping it.  Repeat cultures drawn today and ID consulted.  Will follow their recommendations.  Midline placed for remodulin in the event ID recommends subclavian line removal.       Continuous Infusions:   veletri/remodulin cassette   Intravenous Continuous        veletri/remodulin tubing   Intravenous Continuous         Scheduled Meds:   macitentan-tadalafil  1 tablet Oral Daily    piperacillin-tazobactam (Zosyn) IV (PEDS and ADULTS) (extended infusion is not appropriate)  4.5 g Intravenous Q8H    pregabalin  75 mg Oral QHS    spironolactone  25 mg Oral Daily    treprostinil (REMODULIN) 12,000,000 ng in 0.9% NaCl 100 mL infusion  110.2 ng/kg/min (Order-Specific) Intravenous Q24H     PRN Meds:  Current Facility-Administered Medications:     acetaminophen, 650 mg, Oral, Q6H PRN    loperamide, 2 mg, Oral, QID PRN    ondansetron, 4 mg, Intravenous, Q8H PRN    oxyCODONE-acetaminophen, 1 tablet, Oral, Q6H PRN    sodium chloride 0.9%, 10 mL, Intravenous, PRN    Review of patient's allergies indicates:  No Known Allergies  Objective:     Vital Signs (Most Recent):  Temp: 97.9 °F (36.6 °C) (08/05/24 1126)  Pulse: 84 (08/05/24 1126)  Resp: 14 (08/05/24 1126)  BP: (!) 82/53 (08/05/24 1126)  SpO2: (!) 92 % (08/05/24 1126) Vital Signs (24h Range):  Temp:  [97.9 °F (36.6 °C)-98.9 °F (37.2 °C)] 97.9 °F (36.6 °C)  Pulse:  [] 84  Resp:  [14-18] 14  SpO2:  [92 %-97 %] 92 %  BP: ()/(51-66) 82/53     Patient Vitals for the past 72 hrs (Last 3 readings):   Weight   08/04/24 1733 66.2 kg (146 lb)   08/03/24 0919 63.5 kg (140 lb)   08/03/24 0835 66.2 kg (146 lb)     Body mass index is 22.87 kg/m².      Intake/Output Summary (Last 24 hours) at 8/5/2024  "1210  Last data filed at 8/5/2024 0823  Gross per 24 hour   Intake 694 ml   Output 750 ml   Net -56 ml          Physical Exam  Constitutional:       Appearance: Normal appearance.   HENT:      Head: Atraumatic.   Eyes:      Conjunctiva/sclera: Conjunctivae normal.   Cardiovascular:      Rate and Rhythm: Normal rate and regular rhythm.      Heart sounds: Murmur heard.   Pulmonary:      Effort: Pulmonary effort is normal.      Breath sounds: Normal breath sounds. No wheezing or rhonchi.   Abdominal:      General: There is no distension.      Palpations: Abdomen is soft.      Tenderness: There is no abdominal tenderness.   Musculoskeletal:      Right lower leg: No edema.      Left lower leg: No edema.   Skin:     General: Skin is warm.   Neurological:      General: No focal deficit present.      Mental Status: She is alert.   Psychiatric:         Mood and Affect: Mood normal.            Significant Labs:  CBC:  Recent Labs   Lab 08/03/24  0937 08/04/24  0631 08/05/24  0538   WBC 6.40 6.99 4.53   RBC 3.99* 4.15 4.17   HGB 11.5* 12.1 12.1   HCT 35.1* 36.0* 36.6*   * 108* 115*   MCV 88 87 88   MCH 28.8 29.2 29.0   MCHC 32.8 33.6 33.1     BNP:  Recent Labs   Lab 08/03/24  0937   *     CMP:  Recent Labs   Lab 08/03/24  0937 08/04/24  0632 08/05/24  0539   GLU 90 111* 109   CALCIUM 9.2 9.1 9.4   ALBUMIN 3.8 3.2* 3.2*   PROT 7.1 6.5 6.7    139 140   K 3.9 3.7 3.4*   CO2 21* 23 23    109 110   BUN 10 9 9   CREATININE 0.9 0.8 0.8   ALKPHOS 88 80 77   ALT 15 13 10   AST 18 15 11   BILITOT 1.1* 1.6* 1.1*      Coagulation:   Recent Labs   Lab 08/03/24  0937   INR 1.1   APTT 32.2*     LDH:  No results for input(s): "LDH" in the last 72 hours.  Microbiology:  Microbiology Results (last 7 days)       Procedure Component Value Units Date/Time    Blood culture x two cultures. Draw prior to antibiotics. [[id number]]  (Abnormal) Collected: 08/03/24 0937    Order Status: Completed Specimen: Blood from Peripheral, " Forearm, Right Updated: 08/05/24 1204     Blood Culture, Routine Gram stain aer bottle: Gram positive rods      Results called to and read back by:Lee Olivarez RN 08/04/2024  16:37      CORYNEBACTERIUM SPECIES  further identified as Corynebacterium coyleae  For susceptibility see order #[id number]      Narrative:      Aerobic and anaerobic    Blood culture x two cultures. Draw prior to antibiotics. [[id number]]  (Abnormal) Collected: 08/03/24 0938    Order Status: Completed Specimen: Blood from Peripheral, Forearm, Left Updated: 08/05/24 1202     Blood Culture, Routine Gram stain aer bottle: Gram positive rods      Results called to and read back by:Lee Olivarez RN 08/04/2024  16:33      CORYNEBACTERIUM SPECIES  further identified as Corynebacterium coyleae  Susceptibility pending      Narrative:      Aerobic and anaerobic    Aerobic culture [[id number]] Collected: 08/04/24 1147    Order Status: Completed Specimen: Skin from Chest, Right Updated: 08/05/24 0850     Aerobic Bacterial Culture No growth    Blood culture [[id number]] Collected: 08/05/24 0807    Order Status: Sent Specimen: Blood Updated: 08/05/24 0847    Blood culture [[id number]] Collected: 08/05/24 0804    Order Status: Sent Specimen: Blood Updated: 08/05/24 0847    Culture, Anaerobe [[id number]] Collected: 08/04/24 1147    Order Status: Completed Specimen: Skin from Chest, Right Updated: 08/05/24 0546     Anaerobic Culture Culture in progress    Rapid Organism ID by PCR (from Blood culture) [[id number]] Collected: 08/03/24 0938    Order Status: Completed Updated: 08/04/24 1805     Enterococcus faecalis Not Detected     Enterococcus faecium Not Detected     Listeria monocytogenes Not Detected     Staphylococcus spp. Not Detected     Staphylococcus aureus Not Detected     Staphylococcus epidermidis Not Detected     Staphylococcus lugdunensis Not Detected     Streptococcus species Not Detected     Streptococcus agalactiae Not Detected     Streptococcus  pneumoniae Not Detected     Streptococcus pyogenes Not Detected     Acinetobacter calcoaceticus/baumannii complex Not Detected     Bacteroides fragilis Not Detected     Enterobacterales Not Detected     Enterobacter cloacae complex Not Detected     Escherichia coli Not Detected     Klebsiella aerogenes Not Detected     Klebsiella oxytoca Not Detected     Klebsiella pneumoniae group Not Detected     Proteus Not Detected     Salmonella sp Not Detected     Serratia marcescens Not Detected     Haemophilus influenzae Not Detected     Neisseria meningtidis Not Detected     Pseudomonas aeruginosa Not Detected     Stenotrophomonas maltophilia Not Detected     Candida albicans Not Detected     Candida auris Not Detected     Candida glabrata Not Detected     Candida krusei Not Detected     Candida parapsilosis Not Detected     Candida tropicalis Not Detected     Cryptococcus neoformans/gattii Not Detected     CTX-M (ESBL ) Test Not Applicable     IMP (Carbapenem resistant) Test Not Applicable     KPC resistance gene (Carbapenem resistant) Test Not Applicable     mcr-1  Test Not Applicable     mec A/C  Test Not Applicable     mec A/C and MREJ (MRSA) gene Test Not Applicable     NDM (Carbapenem resistant) Test Not Applicable     OXA-48-like (Carbapenem resistant) Test Not Applicable     van A/B (VRE gene) Test Not Applicable     VIM (Carbapenem resistant) Test Not Applicable    Narrative:      Aerobic and anaerobic    Respiratory Infection Panel (PCR), Nasopharyngeal [[id number]] Collected: 08/03/24 1849    Order Status: Completed Specimen: Nasopharyngeal Swab Updated: 08/03/24 1956     Respiratory Infection Panel Source NP Swab     Adenovirus Not Detected     Coronavirus 229E, Common Cold Virus Not Detected     Coronavirus HKU1, Common Cold Virus Not Detected     Coronavirus NL63, Common Cold Virus Not Detected     Coronavirus OC43, Common Cold Virus Not Detected     Comment: The Coronavirus strains detected in this  test cause the common cold.  These strains are not the COVID-19 (novel Coronavirus)strain   associated with the respiratory disease outbreak.          SARS-CoV2 (COVID-19) Qualitative PCR Not Detected     Human Metapneumovirus Not Detected     Human Rhinovirus/Enterovirus Not Detected     Influenza A (subtypes H1, H1-2009,H3) Not Detected     Influenza B Not Detected     Parainfluenza Virus 1 Not Detected     Parainfluenza Virus 2 Not Detected     Parainfluenza Virus 3 Not Detected     Parainfluenza Virus 4 Not Detected     Respiratory Syncytial Virus Not Detected     Bordetella Parapertussis (JA0852) Not Detected     Bordetella pertussis (ptxP) Not Detected     Chlamydia pneumoniae Not Detected     Mycoplasma pneumoniae Not Detected    Narrative:      Assay not valid for lower respiratory specimens, alternate  testing required.    Influenza A & B by Molecular [[id number]] Collected: 08/03/24 0942    Order Status: Completed Specimen: Nasopharyngeal Swab Updated: 08/03/24 1037     Influenza A, Molecular Negative     Influenza B, Molecular Negative     Flu A & B Source Nasal swab            I have reviewed all pertinent labs within the past 24 hours.    Estimated Creatinine Clearance: 81.8 mL/min (based on SCr of 0.8 mg/dL).    Diagnostic Results:  I have reviewed and interpreted all pertinent imaging results/findings within the past 24 hours.

## 2024-08-05 NOTE — PROGRESS NOTES
[facility] - Transplant Stepdown  Heart Transplant  Progress Note    Patient Name: Kristin Maier  MRN: [id number]  Admission Date: 8/3/2024  Hospital Length of Stay: 0 days  Attending Physician: Tracey Dutta MD  Primary Care Provider: Jorge A Stack MD  Principal Problem:Gram-negative bacteremia    Subjective:   Interval History: No overnight acute events. She has been afebrile since 7am yesterday.  WBC normal.  CT of C/A/P with a heterogeneous hypodensity in RLL of liver: neoplasm vs abscess.  Triple phase CT ordered.  Unable to do MRI given remodulin: we did not feel comfortable stopping it.  Repeat cultures drawn today and ID consulted.  Will follow their recommendations.  Midline placed for remodulin in the event ID recommends subclavian line removal.       Continuous Infusions:   veletri/remodulin cassette   Intravenous Continuous        veletri/remodulin tubing   Intravenous Continuous         Scheduled Meds:   macitentan-tadalafil  1 tablet Oral Daily    piperacillin-tazobactam (Zosyn) IV (PEDS and ADULTS) (extended infusion is not appropriate)  4.5 g Intravenous Q8H    pregabalin  75 mg Oral QHS    spironolactone  25 mg Oral Daily    treprostinil (REMODULIN) 12,000,000 ng in 0.9% NaCl 100 mL infusion  110.2 ng/kg/min (Order-Specific) Intravenous Q24H     PRN Meds:  Current Facility-Administered Medications:     acetaminophen, 650 mg, Oral, Q6H PRN    loperamide, 2 mg, Oral, QID PRN    ondansetron, 4 mg, Intravenous, Q8H PRN    oxyCODONE-acetaminophen, 1 tablet, Oral, Q6H PRN    sodium chloride 0.9%, 10 mL, Intravenous, PRN    Review of patient's allergies indicates:  No Known Allergies  Objective:     Vital Signs (Most Recent):  Temp: 97.9 °F (36.6 °C) (08/05/24 1126)  Pulse: 84 (08/05/24 1126)  Resp: 14 (08/05/24 1126)  BP: (!) 82/53 (08/05/24 1126)  SpO2: (!) 92 % (08/05/24 1126) Vital Signs (24h Range):  Temp:  [97.9 °F (36.6 °C)-98.9 °F (37.2 °C)] 97.9 °F (36.6 °C)  Pulse:  []  84  Resp:  [14-18] 14  SpO2:  [92 %-97 %] 92 %  BP: ()/(51-66) 82/53     Patient Vitals for the past 72 hrs (Last 3 readings):   Weight   08/04/24 1733 66.2 kg (146 lb)   08/03/24 0919 63.5 kg (140 lb)   08/03/24 0835 66.2 kg (146 lb)     Body mass index is 22.87 kg/m².      Intake/Output Summary (Last 24 hours) at 8/5/2024 1210  Last data filed at 8/5/2024 0823  Gross per 24 hour   Intake 694 ml   Output 750 ml   Net -56 ml          Physical Exam  Constitutional:       Appearance: Normal appearance.   HENT:      Head: Atraumatic.   Eyes:      Conjunctiva/sclera: Conjunctivae normal.   Cardiovascular:      Rate and Rhythm: Normal rate and regular rhythm.      Heart sounds: Murmur heard.   Pulmonary:      Effort: Pulmonary effort is normal.      Breath sounds: Normal breath sounds. No wheezing or rhonchi.   Abdominal:      General: There is no distension.      Palpations: Abdomen is soft.      Tenderness: There is no abdominal tenderness.   Musculoskeletal:      Right lower leg: No edema.      Left lower leg: No edema.   Skin:     General: Skin is warm.   Neurological:      General: No focal deficit present.      Mental Status: She is alert.   Psychiatric:         Mood and Affect: Mood normal.            Significant Labs:  CBC:  Recent Labs   Lab 08/03/24  0937 08/04/24  0631 08/05/24  0538   WBC 6.40 6.99 4.53   RBC 3.99* 4.15 4.17   HGB 11.5* 12.1 12.1   HCT 35.1* 36.0* 36.6*   * 108* 115*   MCV 88 87 88   MCH 28.8 29.2 29.0   MCHC 32.8 33.6 33.1     BNP:  Recent Labs   Lab 08/03/24  0937   *     CMP:  Recent Labs   Lab 08/03/24  0937 08/04/24  0632 08/05/24  0539   GLU 90 111* 109   CALCIUM 9.2 9.1 9.4   ALBUMIN 3.8 3.2* 3.2*   PROT 7.1 6.5 6.7    139 140   K 3.9 3.7 3.4*   CO2 21* 23 23    109 110   BUN 10 9 9   CREATININE 0.9 0.8 0.8   ALKPHOS 88 80 77   ALT 15 13 10   AST 18 15 11   BILITOT 1.1* 1.6* 1.1*      Coagulation:   Recent Labs   Lab 08/03/24  0937   INR 1.1   APTT 32.2*  "    LDH:  No results for input(s): "LDH" in the last 72 hours.  Microbiology:  Microbiology Results (last 7 days)       Procedure Component Value Units Date/Time    Blood culture x two cultures. Draw prior to antibiotics. [[id number]]  (Abnormal) Collected: 08/03/24 0937    Order Status: Completed Specimen: Blood from Peripheral, Forearm, Right Updated: 08/05/24 1204     Blood Culture, Routine Gram stain aer bottle: Gram positive rods      Results called to and read back by:Lee Olivarez RN 08/04/2024  16:37      CORYNEBACTERIUM SPECIES  further identified as Corynebacterium coyleae  For susceptibility see order #[id number]      Narrative:      Aerobic and anaerobic    Blood culture x two cultures. Draw prior to antibiotics. [[id number]]  (Abnormal) Collected: 08/03/24 0938    Order Status: Completed Specimen: Blood from Peripheral, Forearm, Left Updated: 08/05/24 1202     Blood Culture, Routine Gram stain aer bottle: Gram positive rods      Results called to and read back by:Lee Olivarez RN 08/04/2024  16:33      CORYNEBACTERIUM SPECIES  further identified as Corynebacterium coyleae  Susceptibility pending      Narrative:      Aerobic and anaerobic    Aerobic culture [[id number]] Collected: 08/04/24 1147    Order Status: Completed Specimen: Skin from Chest, Right Updated: 08/05/24 0850     Aerobic Bacterial Culture No growth    Blood culture [[id number]] Collected: 08/05/24 0807    Order Status: Sent Specimen: Blood Updated: 08/05/24 0847    Blood culture [[id number]] Collected: 08/05/24 0804    Order Status: Sent Specimen: Blood Updated: 08/05/24 0847    Culture, Anaerobe [[id number]] Collected: 08/04/24 1147    Order Status: Completed Specimen: Skin from Chest, Right Updated: 08/05/24 0546     Anaerobic Culture Culture in progress    Rapid Organism ID by PCR (from Blood culture) [[id number]] Collected: 08/03/24 0938    Order Status: Completed Updated: 08/04/24 1805     Enterococcus faecalis Not Detected     " Enterococcus faecium Not Detected     Listeria monocytogenes Not Detected     Staphylococcus spp. Not Detected     Staphylococcus aureus Not Detected     Staphylococcus epidermidis Not Detected     Staphylococcus lugdunensis Not Detected     Streptococcus species Not Detected     Streptococcus agalactiae Not Detected     Streptococcus pneumoniae Not Detected     Streptococcus pyogenes Not Detected     Acinetobacter calcoaceticus/baumannii complex Not Detected     Bacteroides fragilis Not Detected     Enterobacterales Not Detected     Enterobacter cloacae complex Not Detected     Escherichia coli Not Detected     Klebsiella aerogenes Not Detected     Klebsiella oxytoca Not Detected     Klebsiella pneumoniae group Not Detected     Proteus Not Detected     Salmonella sp Not Detected     Serratia marcescens Not Detected     Haemophilus influenzae Not Detected     Neisseria meningtidis Not Detected     Pseudomonas aeruginosa Not Detected     Stenotrophomonas maltophilia Not Detected     Candida albicans Not Detected     Candida auris Not Detected     Candida glabrata Not Detected     Candida krusei Not Detected     Candida parapsilosis Not Detected     Candida tropicalis Not Detected     Cryptococcus neoformans/gattii Not Detected     CTX-M (ESBL ) Test Not Applicable     IMP (Carbapenem resistant) Test Not Applicable     KPC resistance gene (Carbapenem resistant) Test Not Applicable     mcr-1  Test Not Applicable     mec A/C  Test Not Applicable     mec A/C and MREJ (MRSA) gene Test Not Applicable     NDM (Carbapenem resistant) Test Not Applicable     OXA-48-like (Carbapenem resistant) Test Not Applicable     van A/B (VRE gene) Test Not Applicable     VIM (Carbapenem resistant) Test Not Applicable    Narrative:      Aerobic and anaerobic    Respiratory Infection Panel (PCR), Nasopharyngeal [[id number]] Collected: 08/03/24 1849    Order Status: Completed Specimen: Nasopharyngeal Swab Updated: 08/03/24 1956      Respiratory Infection Panel Source NP Swab     Adenovirus Not Detected     Coronavirus 229E, Common Cold Virus Not Detected     Coronavirus HKU1, Common Cold Virus Not Detected     Coronavirus NL63, Common Cold Virus Not Detected     Coronavirus OC43, Common Cold Virus Not Detected     Comment: The Coronavirus strains detected in this test cause the common cold.  These strains are not the COVID-19 (novel Coronavirus)strain   associated with the respiratory disease outbreak.          SARS-CoV2 (COVID-19) Qualitative PCR Not Detected     Human Metapneumovirus Not Detected     Human Rhinovirus/Enterovirus Not Detected     Influenza A (subtypes H1, H1-2009,H3) Not Detected     Influenza B Not Detected     Parainfluenza Virus 1 Not Detected     Parainfluenza Virus 2 Not Detected     Parainfluenza Virus 3 Not Detected     Parainfluenza Virus 4 Not Detected     Respiratory Syncytial Virus Not Detected     Bordetella Parapertussis ([id number]) Not Detected     Bordetella pertussis (ptxP) Not Detected     Chlamydia pneumoniae Not Detected     Mycoplasma pneumoniae Not Detected    Narrative:      Assay not valid for lower respiratory specimens, alternate  testing required.    Influenza A & B by Molecular [[id number]] Collected: 08/03/24 0942    Order Status: Completed Specimen: Nasopharyngeal Swab Updated: 08/03/24 1037     Influenza A, Molecular Negative     Influenza B, Molecular Negative     Flu A & B Source Nasal swab            I have reviewed all pertinent labs within the past 24 hours.    Estimated Creatinine Clearance: 81.8 mL/min (based on SCr of 0.8 mg/dL).    Diagnostic Results:  I have reviewed and interpreted all pertinent imaging results/findings within the past 24 hours.  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 Patient states that for the past two weeks she started  to initially just feel more bloated and weak. She noticed her weight was going up and so she started to take two lasix pills instead of her usual one for an entire week. However, despite this she didn't feel any better. She denies any fever, cough, chills, chest pain, palpitations, shortness of breath, orthopnea, PND or lower extremity edema. Reports compliance with all her medication. Patient is currently on Remodulin 110 ng/kg/min, Opsumit 10 mg daily, and adempas 2 mg, TID.    * Gram-negative bacteremia  - No evidence or source of infection at this time  - Negative PCT  - No Leukocytosis. COVID negative.   - Viral panel negative  - Blood cultures frmo 8/3 positive for GNR.  Repeat cultures ordered today.   - On Zosyn  - CT of C/A/P done: 2.1 cm heterogeneous hypodensity right lobe of the liver new compared to prior. Both abscess and neoplasm need to considered. Unable to obtain MRI due to remodulin.  Triple phase CT ordered  - Culture line site  -ID consult     Abnormal liver CT  -2.1 cm heterogeneous hypodensity right lobe of the liver new compared to prior. Both abscess and neoplasm need to considered.   -Triple phase CT ordered     Hypertension  - Hold any home anti-hypertensives    Right-sided heart failure  - IVC collapsible, appears euvolemic on exam, unable to assess JVP  - Echo 8/4/24 EF: 55-60%, LVEDD: 4.36cm, Moderate RVE with function mod reduced with moderate TR. IVC 3mm   - Hold diuretics at this time    WHO group 1 pulmonary arterial hypertension  - Resume Remodulin dosing per pharmacy  - Resume OPSYNVI 10-40 mg, daily        YANIV Johnson  Heart Transplant  [facility] - Transplant Stepdown

## 2024-08-05 NOTE — HPI
48F with pulm HTN on Remodulin infused viai tunneled chest Rt sub clavian central line (placed 03/2022) - who presented 08/03 for general weakness, with abd bloating. Pt presented febrile 100.7F, with some mild hypotension 76/51, otherwise VSS, HDS. Wbc nml, , procalc 0.19.  Index bld cxs 08/01 +corynebacterium coyleae, same species on both sets. Repeat bld cxs 08/05 NGTD. TTE neg for IE or veg. Suspected source picc line vs. GI translation in setting of abd bloating and new non-specific liver lesion.  ID consulted for positive bld cxs with GPR.

## 2024-08-05 NOTE — SUBJECTIVE & OBJECTIVE
Past Medical History:   Diagnosis Date    Elevated liver enzymes     Essential (primary) hypertension     Heart failure, unspecified     Hypokalemia     Mixed hyperlipidemia     Neuropathic pain     Tx w/ tramadol & Lyrica    Pulmonary hypertension     Receiving Remodulin continuously through tunneled central line       Past Surgical History:   Procedure Laterality Date    APPENDECTOMY       SECTION      Transverse cut    HYSTERECTOMY  2017    TLH- bleeding    OOPHORECTOMY      RIGHT HEART CATHETERIZATION Right 2021    Procedure: INSERTION, CATHETER, RIGHT HEART;  Surgeon: Chloe Gregory MD;  Location: [facility] CATH LAB;  Service: Cardiology;  Laterality: Right;    RIGHT HEART CATHETERIZATION Right 3/16/2022    Procedure: INSERTION, CATHETER, RIGHT HEART;  Surgeon: Hu Silverman MD;  Location: [facility] CATH LAB;  Service: Cardiology;  Laterality: Right;    RIGHT HEART CATHETERIZATION Right 2022    Procedure: INSERTION, CATHETER, RIGHT HEART;  Surgeon: Chloe Gregory MD;  Location: [facility] CATH LAB;  Service: Cardiology;  Laterality: Right;    RIGHT HEART CATHETERIZATION Right 3/21/2023    Procedure: INSERTION, CATHETER, RIGHT HEART;  Surgeon: Kahlil Cisneros MD;  Location: [facility] CATH LAB;  Service: Cardiology;  Laterality: Right;    RIGHT HEART CATHETERIZATION Right 10/9/2023    Procedure: INSERTION, CATHETER, RIGHT HEART;  Surgeon: Chloe Gregory MD;  Location: [facility] CATH LAB;  Service: Cardiology;  Laterality: Right;    TUBAL LIGATION      VAGINAL DELIVERY  1993; 1996       Review of patient's allergies indicates:  No Known Allergies    Medications:  Medications Prior to Admission   Medication Sig    furosemide (LASIX) 20 MG tablet Take 1 tablet (20 mg total) by mouth once daily.    macitentan-tadalafil (OPSYNVI) 10-40 mg Tab Take 10-40 mg by mouth once daily.    magnesium oxide (MAG-OX) 400 mg (241.3 mg magnesium) tablet Take 1 tablet (400 mg total) by mouth once  daily.    naloxone (NARCAN) 4 mg/actuation Spry 4mg by nasal route as needed for opioid overdose; may repeat every 2-3 minutes in alternating nostrils until medical help arrives. Call 911    ondansetron (ZOFRAN) 4 MG tablet Take 1 tablet (4 mg total) by mouth every 8 (eight) hours as needed for Nausea.    oxyCODONE myristate (XTAMPZA ER) 18 mg CSpT Take 1 capsule (18 mg total) by mouth every 12 (twelve) hours.    oxyCODONE-acetaminophen (PERCOCET)  mg per tablet Take 1 tablet by mouth every 4 (four) hours as needed for Pain.    potassium chloride SA (K-DUR,KLOR-CON M) 10 MEQ tablet Take 2 tablets (20 mEq total) by mouth once daily.    pregabalin (LYRICA) 75 MG capsule Take 1 capsule (75 mg total) by mouth once daily AND 2 capsules (150 mg total) every evening.    REMODULIN 5 mg/mL Soln     riociguat (ADEMPAS) 2 mg Tab tablet Take 1 tablet (2 mg total) by mouth 3 (three) times daily.    sertraline (ZOLOFT) 100 MG tablet Take 1 tablet (100 mg total) by mouth once daily.    sodium chloride 0.9% SolP 100 mL with treprostinil 1 mg/mL Soln 6,000,000 ng Inject 2,145 ng/min into the vein continuous.    spironolactone (ALDACTONE) 25 MG tablet Take 1 tablet (25 mg total) by mouth once daily.    multivitamin with minerals tablet Take 1 tablet by mouth once daily.     Antibiotics (From admission, onward)      Start     Stop Route Frequency Ordered    08/03/24 1830  piperacillin-tazobactam (ZOSYN) 4.5 g in D5W 100 mL IVPB (MB+)         -- IV Every 8 hours (non-standard times) 08/03/24 1718          Antifungals (From admission, onward)      None          Antivirals (From admission, onward)      None             Immunization History   Administered Date(s) Administered    COVID-19, MRNA, LN-S, PF (MODERNA FULL 0.5 ML DOSE) 01/05/2022    Tdap 10/24/2020       Family History       Problem Relation (Age of Onset)    Cancer Father (69)    No Known Problems Sister, Brother, Brother          Social History     Socioeconomic History     Marital status:    Tobacco Use    Smoking status: Former     Types: Cigars     Quit date: 12/1/2020     Years since quitting: 3.6     Passive exposure: Past    Smokeless tobacco: Never    Tobacco comments:     social smoker-light - quit 2001   Substance and Sexual Activity    Alcohol use: Not Currently     Alcohol/week: 2.0 standard drinks of alcohol     Types: 2 Glasses of wine per week     Comment: None now -  previously: socially- 2 or 3 times a week-2 glasses of wine    Drug use: Not Currently     Types: Marijuana     Comment: 2021 last use    Sexual activity: Yes     Partners: Male     Birth control/protection: See Surgical Hx     Comment:      Social Determinants of Health     Financial Resource Strain: Low Risk  (8/3/2024)    Overall Financial Resource Strain (CARDIA)     Difficulty of Paying Living Expenses: Not very hard   Food Insecurity: No Food Insecurity (8/3/2024)    Hunger Vital Sign     Worried About Running Out of Food in the Last Year: Never true     Ran Out of Food in the Last Year: Never true   Transportation Needs: No Transportation Needs (8/3/2024)    TRANSPORTATION NEEDS     Transportation : No   Physical Activity: Insufficiently Active (8/3/2024)    Exercise Vital Sign     Days of Exercise per Week: 4 days     Minutes of Exercise per Session: 20 min   Stress: Stress Concern Present (8/3/2024)    [employer] - Occupational Stress Questionnaire     Feeling of Stress : To some extent   Housing Stability: Low Risk  (8/3/2024)    Housing Stability Vital Sign     Unable to Pay for Housing in the Last Year: No     Homeless in the Last Year: No     Review of Systems   Constitutional:  Positive for fatigue.   Gastrointestinal:  Negative for abdominal pain.   Musculoskeletal:  Positive for myalgias (chronic).   Skin:  Negative for color change and wound.   All other systems reviewed and are negative.    Objective:     Vital Signs (Most Recent):  Temp: 98.6  °F (37 °C) (08/05/24 1548)  Pulse: 87 (08/05/24 1548)  Resp: 18 (08/05/24 1600)  BP: 105/69 (08/05/24 1548)  SpO2: 97 % (08/05/24 1548) Vital Signs (24h Range):  Temp:  [97.9 °F (36.6 °C)-98.9 °F (37.2 °C)] 98.6 °F (37 °C)  Pulse:  [] 87  Resp:  [14-18] 18  SpO2:  [92 %-97 %] 97 %  BP: ()/(51-69) 105/69     Weight: 66.2 kg (146 lb)  Body mass index is 22.87 kg/m².    Estimated Creatinine Clearance: 81.8 mL/min (based on SCr of 0.8 mg/dL).     Physical Exam  Vitals and nursing note reviewed.   Constitutional:       General: She is not in acute distress.     Appearance: She is not ill-appearing, toxic-appearing or diaphoretic.   HENT:      Mouth/Throat:      Pharynx: No oropharyngeal exudate.   Eyes:      General: No scleral icterus.     Conjunctiva/sclera: Conjunctivae normal.   Cardiovascular:      Rate and Rhythm: Normal rate.      Heart sounds: Murmur heard.   Pulmonary:      Effort: No respiratory distress.      Breath sounds: Normal breath sounds.   Abdominal:      General: Bowel sounds are normal. There is no distension.      Palpations: Abdomen is soft.      Tenderness: There is no abdominal tenderness. There is no right CVA tenderness or left CVA tenderness.   Musculoskeletal:         General: No swelling or tenderness.      Cervical back: Neck supple. No tenderness.      Right lower leg: No edema.      Left lower leg: No edema.   Skin:     General: Skin is warm and dry.      Findings: No erythema or rash.      Comments: Rt subclavian central line, c/d/I, wnl.    Neurological:      Mental Status: She is alert and oriented to person, place, and time. Mental status is at baseline.   Psychiatric:         Behavior: Behavior normal.         Thought Content: Thought content normal.          Significant Labs: Blood Culture:   Recent Labs   Lab 08/03/24  0937 08/03/24  0938 08/05/24  0804 08/05/24  0807   LABBLOO Gram stain aer bottle: Gram positive rods  Results called to and read back by:Lee Olivarez RN  08/04/2024  16:37  CORYNEBACTERIUM SPECIES  further identified as Corynebacterium coyleae  For susceptibility see order #[id number]  * Gram stain aer bottle: Gram positive rods  Results called to and read back by:Lee Olivarez RN 08/04/2024  16:33  CORYNEBACTERIUM SPECIES  further identified as Corynebacterium coyleae  Susceptibility pending  * No Growth to date No Growth to date     CBC:   Recent Labs   Lab 08/04/24  0631 08/05/24  0538   WBC 6.99 4.53   HGB 12.1 12.1   HCT 36.0* 36.6*   * 115*     CMP:   Recent Labs   Lab 08/04/24  0632 08/05/24  0539    140   K 3.7 3.4*    110   CO2 23 23   * 109   BUN 9 9   CREATININE 0.8 0.8   CALCIUM 9.1 9.4   PROT 6.5 6.7   ALBUMIN 3.2* 3.2*   BILITOT 1.6* 1.1*   ALKPHOS 80 77   AST 15 11   ALT 13 10   ANIONGAP 7* 7*     Microbiology Results (last 7 days)       Procedure Component Value Units Date/Time    Blood culture [[id number]] Collected: 08/05/24 0804    Order Status: Completed Specimen: Blood Updated: 08/05/24 1545     Blood Culture, Routine No Growth to date    Blood culture [[id number]] Collected: 08/05/24 0807    Order Status: Completed Specimen: Blood Updated: 08/05/24 1545     Blood Culture, Routine No Growth to date    Blood culture x two cultures. Draw prior to antibiotics. [[id number]]  (Abnormal) Collected: 08/03/24 0937    Order Status: Completed Specimen: Blood from Peripheral, Forearm, Right Updated: 08/05/24 1204     Blood Culture, Routine Gram stain aer bottle: Gram positive rods      Results called to and read back by:Lee Olivarez RN 08/04/2024  16:37      CORYNEBACTERIUM SPECIES  further identified as Corynebacterium coyleae  For susceptibility see order #[id number]      Narrative:      Aerobic and anaerobic    Blood culture x two cultures. Draw prior to antibiotics. [[id number]]  (Abnormal) Collected: 08/03/24 0938    Order Status: Completed Specimen: Blood from Peripheral, Forearm, Left Updated: 08/05/24 1202     Blood  Culture, Routine Gram stain aer bottle: Gram positive rods      Results called to and read back by:Lee Olivarez RN 08/04/2024  16:33      CORYNEBACTERIUM SPECIES  further identified as Corynebacterium coyleae  Susceptibility pending      Narrative:      Aerobic and anaerobic    Aerobic culture [[id number]] Collected: 08/04/24 1147    Order Status: Completed Specimen: Skin from Chest, Right Updated: 08/05/24 0850     Aerobic Bacterial Culture No growth    Culture, Anaerobe [[id number]] Collected: 08/04/24 1147    Order Status: Completed Specimen: Skin from Chest, Right Updated: 08/05/24 0546     Anaerobic Culture Culture in progress    Rapid Organism ID by PCR (from Blood culture) [[id number]] Collected: 08/03/24 0938    Order Status: Completed Updated: 08/04/24 1805     Enterococcus faecalis Not Detected     Enterococcus faecium Not Detected     Listeria monocytogenes Not Detected     Staphylococcus spp. Not Detected     Staphylococcus aureus Not Detected     Staphylococcus epidermidis Not Detected     Staphylococcus lugdunensis Not Detected     Streptococcus species Not Detected     Streptococcus agalactiae Not Detected     Streptococcus pneumoniae Not Detected     Streptococcus pyogenes Not Detected     Acinetobacter calcoaceticus/baumannii complex Not Detected     Bacteroides fragilis Not Detected     Enterobacterales Not Detected     Enterobacter cloacae complex Not Detected     Escherichia coli Not Detected     Klebsiella aerogenes Not Detected     Klebsiella oxytoca Not Detected     Klebsiella pneumoniae group Not Detected     Proteus Not Detected     Salmonella sp Not Detected     Serratia marcescens Not Detected     Haemophilus influenzae Not Detected     Neisseria meningtidis Not Detected     Pseudomonas aeruginosa Not Detected     Stenotrophomonas maltophilia Not Detected     Candida albicans Not Detected     Candida auris Not Detected     Candida glabrata Not Detected     Candida krusei Not Detected      Luz parapsilosis Not Detected     Candida tropicalis Not Detected     Cryptococcus neoformans/gattii Not Detected     CTX-M (ESBL ) Test Not Applicable     IMP (Carbapenem resistant) Test Not Applicable     KPC resistance gene (Carbapenem resistant) Test Not Applicable     mcr-1  Test Not Applicable     mec A/C  Test Not Applicable     mec A/C and MREJ (MRSA) gene Test Not Applicable     NDM (Carbapenem resistant) Test Not Applicable     OXA-48-like (Carbapenem resistant) Test Not Applicable     van A/B (VRE gene) Test Not Applicable     VIM (Carbapenem resistant) Test Not Applicable    Narrative:      Aerobic and anaerobic    Respiratory Infection Panel (PCR), Nasopharyngeal [[id number]] Collected: 08/03/24 1849    Order Status: Completed Specimen: Nasopharyngeal Swab Updated: 08/03/24 1956     Respiratory Infection Panel Source NP Swab     Adenovirus Not Detected     Coronavirus 229E, Common Cold Virus Not Detected     Coronavirus HKU1, Common Cold Virus Not Detected     Coronavirus NL63, Common Cold Virus Not Detected     Coronavirus OC43, Common Cold Virus Not Detected     Comment: The Coronavirus strains detected in this test cause the common cold.  These strains are not the COVID-19 (novel Coronavirus)strain   associated with the respiratory disease outbreak.          SARS-CoV2 (COVID-19) Qualitative PCR Not Detected     Human Metapneumovirus Not Detected     Human Rhinovirus/Enterovirus Not Detected     Influenza A (subtypes H1, H1-2009,H3) Not Detected     Influenza B Not Detected     Parainfluenza Virus 1 Not Detected     Parainfluenza Virus 2 Not Detected     Parainfluenza Virus 3 Not Detected     Parainfluenza Virus 4 Not Detected     Respiratory Syncytial Virus Not Detected     Bordetella Parapertussis (YB8250) Not Detected     Bordetella pertussis (ptxP) Not Detected     Chlamydia pneumoniae Not Detected     Mycoplasma pneumoniae Not Detected    Narrative:      Assay not valid for lower  respiratory specimens, alternate  testing required.    Influenza A & B by Molecular [[id number]] Collected: 08/03/24 0942    Order Status: Completed Specimen: Nasopharyngeal Swab Updated: 08/03/24 1037     Influenza A, Molecular Negative     Influenza B, Molecular Negative     Flu A & B Source Nasal swab          All pertinent labs within the past 24 hours have been reviewed.    Significant Imaging: I have reviewed all pertinent imaging results/findings within the past 24 hours.    I have personally reviewed records / hospital notes from   service and other specialty providers. I have also reviewed CBC, CMP/BMP,  cultures and imaging with my interpretation as documented in my assessment / plan.    Patient is high risk for infectious complications given pt's age, multiple co-morbidities, and case complexity.      Time: 75 minutes   50% of time spent on face-to-face counseling and coordination of care. Counseling included review of test results, diagnosis, and treatment plan with patient and/or family.

## 2024-08-06 LAB
ALBUMIN SERPL BCP-MCNC: 3.2 G/DL (ref 3.5–5.2)
ALP SERPL-CCNC: 70 U/L (ref 55–135)
ALT SERPL W/O P-5'-P-CCNC: 9 U/L (ref 10–44)
ANION GAP SERPL CALC-SCNC: 6 MMOL/L (ref 8–16)
AST SERPL-CCNC: 10 U/L (ref 10–40)
BACTERIA BLD CULT: ABNORMAL
BACTERIA SPEC AEROBE CULT: NORMAL
BASOPHILS # BLD AUTO: 0.01 K/UL (ref 0–0.2)
BASOPHILS NFR BLD: 0.2 % (ref 0–1.9)
BILIRUB SERPL-MCNC: 1.2 MG/DL (ref 0.1–1)
BUN SERPL-MCNC: 10 MG/DL (ref 6–20)
CALCIUM SERPL-MCNC: 9 MG/DL (ref 8.7–10.5)
CHLORIDE SERPL-SCNC: 109 MMOL/L (ref 95–110)
CO2 SERPL-SCNC: 24 MMOL/L (ref 23–29)
CREAT SERPL-MCNC: 0.8 MG/DL (ref 0.5–1.4)
DIFFERENTIAL METHOD BLD: ABNORMAL
EOSINOPHIL # BLD AUTO: 0.1 K/UL (ref 0–0.5)
EOSINOPHIL NFR BLD: 2.8 % (ref 0–8)
ERYTHROCYTE [DISTWIDTH] IN BLOOD BY AUTOMATED COUNT: 14.6 % (ref 11.5–14.5)
EST. GFR  (NO RACE VARIABLE): >60 ML/MIN/1.73 M^2
GLUCOSE SERPL-MCNC: 88 MG/DL (ref 70–110)
HCT VFR BLD AUTO: 35.2 % (ref 37–48.5)
HGB BLD-MCNC: 11.5 G/DL (ref 12–16)
IMM GRANULOCYTES # BLD AUTO: 0.02 K/UL (ref 0–0.04)
IMM GRANULOCYTES NFR BLD AUTO: 0.5 % (ref 0–0.5)
LYMPHOCYTES # BLD AUTO: 1.6 K/UL (ref 1–4.8)
LYMPHOCYTES NFR BLD: 38.7 % (ref 18–48)
MAGNESIUM SERPL-MCNC: 2.4 MG/DL (ref 1.6–2.6)
MCH RBC QN AUTO: 28.8 PG (ref 27–31)
MCHC RBC AUTO-ENTMCNC: 32.7 G/DL (ref 32–36)
MCV RBC AUTO: 88 FL (ref 82–98)
MONOCYTES # BLD AUTO: 0.4 K/UL (ref 0.3–1)
MONOCYTES NFR BLD: 8.7 % (ref 4–15)
NEUTROPHILS # BLD AUTO: 2.1 K/UL (ref 1.8–7.7)
NEUTROPHILS NFR BLD: 49.1 % (ref 38–73)
NRBC BLD-RTO: 0 /100 WBC
PLATELET # BLD AUTO: 129 K/UL (ref 150–450)
PMV BLD AUTO: 11.1 FL (ref 9.2–12.9)
POTASSIUM SERPL-SCNC: 3.9 MMOL/L (ref 3.5–5.1)
PROT SERPL-MCNC: 6.7 G/DL (ref 6–8.4)
RBC # BLD AUTO: 3.99 M/UL (ref 4–5.4)
SODIUM SERPL-SCNC: 139 MMOL/L (ref 136–145)
WBC # BLD AUTO: 4.24 K/UL (ref 3.9–12.7)

## 2024-08-06 PROCEDURE — 80053 COMPREHEN METABOLIC PANEL: CPT | Performed by: STUDENT IN AN ORGANIZED HEALTH CARE EDUCATION/TRAINING PROGRAM

## 2024-08-06 PROCEDURE — 0JPT3XZ REMOVAL OF TUNNELED VASCULAR ACCESS DEVICE FROM TRUNK SUBCUTANEOUS TISSUE AND FASCIA, PERCUTANEOUS APPROACH: ICD-10-PCS | Performed by: RADIOLOGY

## 2024-08-06 PROCEDURE — 85025 COMPLETE CBC W/AUTO DIFF WBC: CPT | Performed by: STUDENT IN AN ORGANIZED HEALTH CARE EDUCATION/TRAINING PROGRAM

## 2024-08-06 PROCEDURE — 25000003 PHARM REV CODE 250: Performed by: STUDENT IN AN ORGANIZED HEALTH CARE EDUCATION/TRAINING PROGRAM

## 2024-08-06 PROCEDURE — 99233 SBSQ HOSP IP/OBS HIGH 50: CPT | Mod: ,,, | Performed by: PHYSICIAN ASSISTANT

## 2024-08-06 PROCEDURE — 25000003 PHARM REV CODE 250: Performed by: INTERNAL MEDICINE

## 2024-08-06 PROCEDURE — 25000003 PHARM REV CODE 250: Performed by: RADIOLOGY

## 2024-08-06 PROCEDURE — 63600175 PHARM REV CODE 636 W HCPCS: Performed by: INTERNAL MEDICINE

## 2024-08-06 PROCEDURE — 36415 COLL VENOUS BLD VENIPUNCTURE: CPT | Performed by: STUDENT IN AN ORGANIZED HEALTH CARE EDUCATION/TRAINING PROGRAM

## 2024-08-06 PROCEDURE — 83735 ASSAY OF MAGNESIUM: CPT | Performed by: STUDENT IN AN ORGANIZED HEALTH CARE EDUCATION/TRAINING PROGRAM

## 2024-08-06 PROCEDURE — 63600175 PHARM REV CODE 636 W HCPCS: Mod: JG | Performed by: INTERNAL MEDICINE

## 2024-08-06 PROCEDURE — 99233 SBSQ HOSP IP/OBS HIGH 50: CPT | Mod: ,,, | Performed by: STUDENT IN AN ORGANIZED HEALTH CARE EDUCATION/TRAINING PROGRAM

## 2024-08-06 PROCEDURE — 20600001 HC STEP DOWN PRIVATE ROOM

## 2024-08-06 RX ORDER — LIDOCAINE HYDROCHLORIDE 10 MG/ML
INJECTION, SOLUTION INFILTRATION; PERINEURAL
Status: COMPLETED | OUTPATIENT
Start: 2024-08-06 | End: 2024-08-06

## 2024-08-06 RX ADMIN — OXYCODONE AND ACETAMINOPHEN 1 TABLET: 10; 325 TABLET ORAL at 03:08

## 2024-08-06 RX ADMIN — SPIRONOLACTONE 25 MG: 25 TABLET ORAL at 09:08

## 2024-08-06 RX ADMIN — VANCOMYCIN HYDROCHLORIDE 1000 MG: 1 INJECTION, POWDER, LYOPHILIZED, FOR SOLUTION INTRAVENOUS at 11:08

## 2024-08-06 RX ADMIN — OXYCODONE AND ACETAMINOPHEN 1 TABLET: 10; 325 TABLET ORAL at 11:08

## 2024-08-06 RX ADMIN — VANCOMYCIN HYDROCHLORIDE 1000 MG: 1 INJECTION, POWDER, LYOPHILIZED, FOR SOLUTION INTRAVENOUS at 12:08

## 2024-08-06 RX ADMIN — OXYCODONE AND ACETAMINOPHEN 1 TABLET: 10; 325 TABLET ORAL at 09:08

## 2024-08-06 RX ADMIN — TREPROSTINIL 7383.4 NG/MIN: 100 INJECTION, SOLUTION INTRAVENOUS; SUBCUTANEOUS at 05:08

## 2024-08-06 RX ADMIN — LIDOCAINE HYDROCHLORIDE 5 ML: 10 INJECTION, SOLUTION INFILTRATION; PERINEURAL at 03:08

## 2024-08-06 RX ADMIN — PREGABALIN 75 MG: 75 CAPSULE ORAL at 08:08

## 2024-08-06 NOTE — ASSESSMENT & PLAN NOTE
-2.1 cm heterogeneous hypodensity right lobe of the liver new compared to prior. Both abscess and neoplasm need to considered.   -Triple phase CT ordered Nodular enhancing right hepatic lesion, favoring benign hemangioma. Additional flash filling hepatic hemangiomas or altered perfusion. MRI may be helpful if clinically indicated.

## 2024-08-06 NOTE — SUBJECTIVE & OBJECTIVE
Interval History: No overnight acute events.  She has been afebrile over the last 24 hours.  Blood cultures from 8/3 positive for gram + rods: corynebacterium.  Zosyn stopped and changed to Vanc.  IR consulted for line removal and liver lesion biopsy.  Planning to remove line, but do not recommend biopsy as this time as they do not look infectious on CT scan.   Repeat cultures from 8/5 are NGTD. Patient NPO for tunneled line removal.      Continuous Infusions:   veletri/remodulin cassette   Intravenous Continuous        veletri/remodulin tubing   Intravenous Continuous         Scheduled Meds:   macitentan-tadalafil  1 tablet Oral Daily    pregabalin  75 mg Oral QHS    spironolactone  25 mg Oral Daily    treprostinil (REMODULIN) 12,000,000 ng in 0.9% NaCl 100 mL infusion  110.2 ng/kg/min (Order-Specific) Intravenous Q24H    vancomycin (VANCOCIN) IV (PEDS and ADULTS)  15 mg/kg Intravenous Q12H     PRN Meds:  Current Facility-Administered Medications:     acetaminophen, 650 mg, Oral, Q6H PRN    loperamide, 2 mg, Oral, QID PRN    ondansetron, 4 mg, Intravenous, Q8H PRN    oxyCODONE-acetaminophen, 1 tablet, Oral, Q6H PRN    sodium chloride 0.9%, 10 mL, Intravenous, PRN    Pharmacy to dose Vancomycin consult, , , Once **AND** vancomycin - pharmacy to dose, , Intravenous, pharmacy to manage frequency    Review of patient's allergies indicates:  No Known Allergies  Objective:     Vital Signs (Most Recent):  Temp: 98.6 °F (37 °C) (08/06/24 0414)  Pulse: 73 (08/06/24 0414)  Resp: 18 (08/06/24 0911)  BP: 105/70 (08/06/24 0414)  SpO2: 97 % (08/06/24 0414) Vital Signs (24h Range):  Temp:  [97.9 °F (36.6 °C)-99.1 °F (37.3 °C)] 98.6 °F (37 °C)  Pulse:  [73-90] 73  Resp:  [14-18] 18  SpO2:  [92 %-99 %] 97 %  BP: ()/(53-76) 105/70     Patient Vitals for the past 72 hrs (Last 3 readings):   Weight   08/06/24 0230 73.2 kg (161 lb 6 oz)   08/04/24 1733 66.2 kg (146 lb)     Body mass index is 25.28 kg/m².      Intake/Output  "Summary (Last 24 hours) at 8/6/2024 1112  Last data filed at 8/6/2024 0415  Gross per 24 hour   Intake 448.36 ml   Output 800 ml   Net -351.64 ml          Physical Exam  Constitutional:       Appearance: Normal appearance.   HENT:      Head: Atraumatic.   Eyes:      Conjunctiva/sclera: Conjunctivae normal.   Cardiovascular:      Rate and Rhythm: Normal rate and regular rhythm.      Heart sounds: Murmur heard.   Pulmonary:      Effort: Pulmonary effort is normal.      Breath sounds: Normal breath sounds. No wheezing or rhonchi.   Abdominal:      General: There is no distension.      Palpations: Abdomen is soft.      Tenderness: There is no abdominal tenderness.   Musculoskeletal:      Right lower leg: No edema.      Left lower leg: No edema.   Skin:     General: Skin is warm.   Neurological:      General: No focal deficit present.      Mental Status: She is alert.   Psychiatric:         Mood and Affect: Mood normal.            Significant Labs:  CBC:  Recent Labs   Lab 08/04/24  0631 08/05/24  0538 08/06/24  0641   WBC 6.99 4.53 4.24   RBC 4.15 4.17 3.99*   HGB 12.1 12.1 11.5*   HCT 36.0* 36.6* 35.2*   * 115* 129*   MCV 87 88 88   MCH 29.2 29.0 28.8   MCHC 33.6 33.1 32.7     BNP:  Recent Labs   Lab 08/03/24  0937   *     CMP:  Recent Labs   Lab 08/04/24  0632 08/05/24  0539 08/06/24  0641   * 109 88   CALCIUM 9.1 9.4 9.0   ALBUMIN 3.2* 3.2* 3.2*   PROT 6.5 6.7 6.7    140 139   K 3.7 3.4* 3.9   CO2 23 23 24    110 109   BUN 9 9 10   CREATININE 0.8 0.8 0.8   ALKPHOS 80 77 70   ALT 13 10 9*   AST 15 11 10   BILITOT 1.6* 1.1* 1.2*      Coagulation:   Recent Labs   Lab 08/03/24  0937   INR 1.1   APTT 32.2*     LDH:  No results for input(s): "LDH" in the last 72 hours.  Microbiology:  Microbiology Results (last 7 days)       Procedure Component Value Units Date/Time    Aerobic culture [[id number]] Collected: 08/04/24 1147    Order Status: Completed Specimen: Skin from Chest, Right Updated: " 08/06/24 1036     Aerobic Bacterial Culture Skin ihsa,  no predominant organism    Blood culture [[id number]] Collected: 08/05/24 0804    Order Status: Completed Specimen: Blood Updated: 08/06/24 1012     Blood Culture, Routine No Growth to date      No Growth to date    Blood culture [[id number]] Collected: 08/05/24 0807    Order Status: Completed Specimen: Blood Updated: 08/06/24 1012     Blood Culture, Routine No Growth to date      No Growth to date    Blood culture x two cultures. Draw prior to antibiotics. [[id number]]  (Abnormal) Collected: 08/03/24 0937    Order Status: Completed Specimen: Blood from Peripheral, Forearm, Right Updated: 08/06/24 0754     Blood Culture, Routine Gram stain aer bottle: Gram positive rods      Results called to and read back by:Lee Olivarez RN 08/04/2024  16:37      CORYNEBACTERIUM SPECIES  further identified as Corynebacterium coyleae  For susceptibility see order #[id number]      Narrative:      Aerobic and anaerobic    Blood culture x two cultures. Draw prior to antibiotics. [[id number]]  (Abnormal)  (Susceptibility) Collected: 08/03/24 0938    Order Status: Completed Specimen: Blood from Peripheral, Forearm, Left Updated: 08/06/24 0753     Blood Culture, Routine Gram stain aer bottle: Gram positive rods      Results called to and read back by:Lee Olivarez RN 08/04/2024  16:33      CORYNEBACTERIUM SPECIES  further identified as Corynebacterium coyleae      Narrative:      Aerobic and anaerobic    Culture, Anaerobe [[id number]] Collected: 08/04/24 1147    Order Status: Completed Specimen: Skin from Chest, Right Updated: 08/05/24 0546     Anaerobic Culture Culture in progress    Rapid Organism ID by PCR (from Blood culture) [[id number]] Collected: 08/03/24 0938    Order Status: Completed Updated: 08/04/24 1805     Enterococcus faecalis Not Detected     Enterococcus faecium Not Detected     Listeria monocytogenes Not Detected     Staphylococcus spp. Not Detected     Staphylococcus  aureus Not Detected     Staphylococcus epidermidis Not Detected     Staphylococcus lugdunensis Not Detected     Streptococcus species Not Detected     Streptococcus agalactiae Not Detected     Streptococcus pneumoniae Not Detected     Streptococcus pyogenes Not Detected     Acinetobacter calcoaceticus/baumannii complex Not Detected     Bacteroides fragilis Not Detected     Enterobacterales Not Detected     Enterobacter cloacae complex Not Detected     Escherichia coli Not Detected     Klebsiella aerogenes Not Detected     Klebsiella oxytoca Not Detected     Klebsiella pneumoniae group Not Detected     Proteus Not Detected     Salmonella sp Not Detected     Serratia marcescens Not Detected     Haemophilus influenzae Not Detected     Neisseria meningtidis Not Detected     Pseudomonas aeruginosa Not Detected     Stenotrophomonas maltophilia Not Detected     Candida albicans Not Detected     Candida auris Not Detected     Candida glabrata Not Detected     Candida krusei Not Detected     Candida parapsilosis Not Detected     Candida tropicalis Not Detected     Cryptococcus neoformans/gattii Not Detected     CTX-M (ESBL ) Test Not Applicable     IMP (Carbapenem resistant) Test Not Applicable     KPC resistance gene (Carbapenem resistant) Test Not Applicable     mcr-1  Test Not Applicable     mec A/C  Test Not Applicable     mec A/C and MREJ (MRSA) gene Test Not Applicable     NDM (Carbapenem resistant) Test Not Applicable     OXA-48-like (Carbapenem resistant) Test Not Applicable     van A/B (VRE gene) Test Not Applicable     VIM (Carbapenem resistant) Test Not Applicable    Narrative:      Aerobic and anaerobic    Respiratory Infection Panel (PCR), Nasopharyngeal [[id number]] Collected: 08/03/24 1849    Order Status: Completed Specimen: Nasopharyngeal Swab Updated: 08/03/24 1956     Respiratory Infection Panel Source NP Swab     Adenovirus Not Detected     Coronavirus 229E, Common Cold Virus Not Detected      Coronavirus HKU1, Common Cold Virus Not Detected     Coronavirus NL63, Common Cold Virus Not Detected     Coronavirus OC43, Common Cold Virus Not Detected     Comment: The Coronavirus strains detected in this test cause the common cold.  These strains are not the COVID-19 (novel Coronavirus)strain   associated with the respiratory disease outbreak.          SARS-CoV2 (COVID-19) Qualitative PCR Not Detected     Human Metapneumovirus Not Detected     Human Rhinovirus/Enterovirus Not Detected     Influenza A (subtypes H1, H1-2009,H3) Not Detected     Influenza B Not Detected     Parainfluenza Virus 1 Not Detected     Parainfluenza Virus 2 Not Detected     Parainfluenza Virus 3 Not Detected     Parainfluenza Virus 4 Not Detected     Respiratory Syncytial Virus Not Detected     Bordetella Parapertussis (MJ0227) Not Detected     Bordetella pertussis (ptxP) Not Detected     Chlamydia pneumoniae Not Detected     Mycoplasma pneumoniae Not Detected    Narrative:      Assay not valid for lower respiratory specimens, alternate  testing required.    Influenza A & B by Molecular [[id number]] Collected: 08/03/24 0942    Order Status: Completed Specimen: Nasopharyngeal Swab Updated: 08/03/24 1037     Influenza A, Molecular Negative     Influenza B, Molecular Negative     Flu A & B Source Nasal swab            I have reviewed all pertinent labs within the past 24 hours.    Estimated Creatinine Clearance: 81.8 mL/min (based on SCr of 0.8 mg/dL).    Diagnostic Results:  I have reviewed and interpreted all pertinent imaging results/findings within the past 24 hours.

## 2024-08-06 NOTE — PLAN OF CARE
Pt AAOx4. Independent. Port removed - dressing in place. PRN percocet given for pain. VS stable. Tele NS. L PIV blew - new placed - 22g L hand. Remodulin running at 3.69cc/hr - 89cc/24hr.

## 2024-08-06 NOTE — ASSESSMENT & PLAN NOTE
- Negative PCT  - No Leukocytosis. COVID negative.   - Viral panel negative  - Blood cultures frmo 8/3 positive for gram + rods corneybacterum.  Repeat cultures done yesterday are NGTD   - Started on Zosyn but changed to Vanc after cultures resulted.   - CT of C/A/P done: 2.1 cm heterogeneous hypodensity right lobe of the liver new compared to prior. Both abscess and neoplasm need to considered. Unable to obtain MRI due to remodulin.    -Triple phase CT ordered: Nodular enhancing right hepatic lesion, favoring benign hemangioma. Additional flash filling hepatic hemangiomas or altered perfusion. MRI may be helpful if clinically indicated.   -Cultures at line site sent and negative   -ID consulted and following

## 2024-08-06 NOTE — PROGRESS NOTES
Pharmacokinetic Initial Assessment: IV Vancomycin    Assessment/Plan:    Initiate intravenous vancomycin with loading dose of 1250 mg once followed by a maintenance dose of vancomycin 1000mg IV every 12 hours  Desired empiric serum trough concentration is 10 to 20 mcg/mL  Draw vancomycin trough level 60 min prior to fourth dose on 08/07/24 at approximately 1015  Pharmacy will continue to follow and monitor vancomycin.      Please contact pharmacy at extension [phone] with any questions regarding this assessment.     Thank you for the consult,   Lobo BlairD       Patient brief summary:  Kristin Maier is a 48 y.o. female initiated on antimicrobial therapy with IV Vancomycin for treatment of suspected bacteremia    Drug Allergies:   Review of patient's allergies indicates:  No Known Allergies    Actual Body Weight:   66.2kg    Renal Function:   Estimated Creatinine Clearance: 81.8 mL/min (based on SCr of 0.8 mg/dL).,     Dialysis Method (if applicable):  N/A

## 2024-08-06 NOTE — PROCEDURES
Radiology Post-Procedure Note    Pre Op Diagnosis: Bacteremia    Post Op Diagnosis: Same    Procedure: Tunneled line removal    Procedure performed by: Christiano Butler MD    Written Informed Consent Obtained: Yes  Specimen Removed: YES Right chest tunneled line   Estimated Blood Loss: Minimal    Findings:   Right chest tunneled line removed without complication. See dictation.    Patient tolerated procedure well.    Christiano Butler M.D.  Interventional Radiology  Department of Radiology

## 2024-08-06 NOTE — H&P
Radiology History & Physical      SUBJECTIVE:     Chief Complaint: weakness    History of Present Illness:  Kristin Maier is a 48 y.o. female with PMHx including pulmonary hypertension (on continuous infusion of Remodulin), and HTN who initially presented for dizziness/generalized weakness.    During this admission, blood cultures were drawn on 8/3/24 and are growing corynebacterium. She has a right-sided tunneled power Pedro catheter, for which Remodulin is continuously infused. IR consult was placed for removal of her tunneled catheter, as a potential source of infection/bacteremia. She does now have a midline in place for infusion of her Remodulin.    Past Medical History:   Diagnosis Date    Elevated liver enzymes     Essential (primary) hypertension     Heart failure, unspecified     Hypokalemia     Mixed hyperlipidemia     Neuropathic pain     Tx w/ tramadol & Lyrica    Pulmonary hypertension     Receiving Remodulin continuously through tunneled central line     Past Surgical History:   Procedure Laterality Date    APPENDECTOMY       SECTION      Transverse cut    HYSTERECTOMY  2017    TLH- bleeding    OOPHORECTOMY      RIGHT HEART CATHETERIZATION Right 2021    Procedure: INSERTION, CATHETER, RIGHT HEART;  Surgeon: Chloe Gregory MD;  Location: [facility] CATH LAB;  Service: Cardiology;  Laterality: Right;    RIGHT HEART CATHETERIZATION Right 3/16/2022    Procedure: INSERTION, CATHETER, RIGHT HEART;  Surgeon: Hu Silverman MD;  Location: [facility] CATH LAB;  Service: Cardiology;  Laterality: Right;    RIGHT HEART CATHETERIZATION Right 2022    Procedure: INSERTION, CATHETER, RIGHT HEART;  Surgeon: Chloe Gregory MD;  Location: [facility] CATH LAB;  Service: Cardiology;  Laterality: Right;    RIGHT HEART CATHETERIZATION Right 3/21/2023    Procedure: INSERTION, CATHETER, RIGHT HEART;  Surgeon: Kahlil Cisneros MD;  Location: [facility] CATH LAB;  Service: Cardiology;  Laterality: Right;     RIGHT HEART CATHETERIZATION Right 10/9/2023    Procedure: INSERTION, CATHETER, RIGHT HEART;  Surgeon: Chloe Gregory MD;  Location: [facility] CATH LAB;  Service: Cardiology;  Laterality: Right;    TUBAL LIGATION  1996    VAGINAL DELIVERY  1991; 1993; 1994; 1996       Home Meds:   Prior to Admission medications    Medication Sig Start Date End Date Taking? Authorizing Provider   furosemide (LASIX) 20 MG tablet Take 1 tablet (20 mg total) by mouth once daily. 5/7/24 5/7/25 Yes Sonja Mcgill MD   macitentan-tadalafil (OPSYNVI) 10-40 mg Tab Take 10-40 mg by mouth once daily.   Yes Sho Guy NP   magnesium oxide (MAG-OX) 400 mg (241.3 mg magnesium) tablet Take 1 tablet (400 mg total) by mouth once daily. 6/5/24 6/5/25 Yes Sho Guy NP   naloxone (NARCAN) 4 mg/actuation Spry 4mg by nasal route as needed for opioid overdose; may repeat every 2-3 minutes in alternating nostrils until medical help arrives. Call 911 4/9/24  Yes Sonja Mcgill MD   ondansetron (ZOFRAN) 4 MG tablet Take 1 tablet (4 mg total) by mouth every 8 (eight) hours as needed for Nausea. 7/1/24  Yes Sho Guy NP   oxyCODONE myristate (XTAMPZA ER) 18 mg CSpT Take 1 capsule (18 mg total) by mouth every 12 (twelve) hours. 7/30/24  Yes Yeni Newman MD   oxyCODONE-acetaminophen (PERCOCET)  mg per tablet Take 1 tablet by mouth every 4 (four) hours as needed for Pain. 7/30/24  Yes Yeni Newman MD   potassium chloride SA (K-DUR,KLOR-CON M) 10 MEQ tablet Take 2 tablets (20 mEq total) by mouth once daily. 8/1/24  Yes Sho Guy NP   pregabalin (LYRICA) 75 MG capsule Take 1 capsule (75 mg total) by mouth once daily AND 2 capsules (150 mg total) every evening. 6/28/24 1/24/25 Yes Sonja Mcgill MD   REMODULIN 5 mg/mL Soln  2/7/22  Yes Provider, Historical   riociguat (ADEMPAS) 2 mg Tab tablet Take 1 tablet (2 mg total) by mouth 3 (three) times daily. 1/12/24  Yes  Sho Guy, NP   sertraline (ZOLOFT) 100 MG tablet Take 1 tablet (100 mg total) by mouth once daily. 4/9/24 4/9/25 Yes Sonja Mcgill MD   sodium chloride 0.9% SolP 100 mL with treprostinil 1 mg/mL Soln 6,000,000 ng Inject 2,145 ng/min into the vein continuous. 12/23/21  Yes Haven Lopez PA-C   spironolactone (ALDACTONE) 25 MG tablet Take 1 tablet (25 mg total) by mouth once daily. 6/5/24 6/5/25 Yes Sho Guy, NP   multivitamin with minerals tablet Take 1 tablet by mouth once daily.    Provider, Historical     Anticoagulants/Antiplatelets: no anticoagulation    Allergies: Review of patient's allergies indicates:  No Known Allergies  Sedation History:  no adverse reactions    Review of Systems:   Hematological: no known coagulopathies  Respiratory: no shortness of breath  Cardiovascular: no chest pain  Gastrointestinal: no abdominal pain  Genito-Urinary: no dysuria  Musculoskeletal: negative  Neurological: no TIA or stroke symptoms         OBJECTIVE:     Vital Signs (Most Recent)  Temp: 98.6 °F (37 °C) (08/06/24 0414)  Pulse: 73 (08/06/24 0414)  Resp: 18 (08/06/24 0911)  BP: 105/70 (08/06/24 0414)  SpO2: 97 % (08/06/24 0414)    Physical Exam:  ASA: class 3  Mallampati: class III    General: no acute distress  Mental Status: alert and oriented to person, place and time  HEENT: normocephalic, atraumatic. Right sided tunneled catheter in place. It is clean and dry without purulent drainage or surrounding erythema. Nontender to palpation.  Chest: unlabored breathing  Heart: regular heart rate  Abdomen: nondistended  Extremity: moves all extremities    Laboratory  Lab Results   Component Value Date    INR 1.1 08/03/2024       Lab Results   Component Value Date    WBC 4.24 08/06/2024    HGB 11.5 (L) 08/06/2024    HCT 35.2 (L) 08/06/2024    MCV 88 08/06/2024     (L) 08/06/2024      Lab Results   Component Value Date    GLU 88 08/06/2024     08/06/2024    K 3.9 08/06/2024      08/06/2024    CO2 24 08/06/2024    BUN 10 08/06/2024    CREATININE 0.8 08/06/2024    CALCIUM 9.0 08/06/2024    MG 2.4 08/06/2024    ALT 9 (L) 08/06/2024    AST 10 08/06/2024    ALBUMIN 3.2 (L) 08/06/2024    BILITOT 1.2 (H) 08/06/2024    BILIDIR 0.5 (H) 12/23/2021       ASSESSMENT/PLAN:     Sedation Plan: up to moderate.    After thorough discussion with the patient regarding the risks and benefits of removal of her tunneled catheter, she has elected to proceed, and formal consent was obtained. Patient will undergo removal of her tunneled right power Pedro catheter.    Also of note, CT imaging revealed a couple of hepatic lesions that ID was initially concerned could be abscess/infection. Upon review of the images, these are more consistent with benign hepatic hemangiomas and do not seem to be infectious in nature, thus sampling/biopsy is not recommended at this time. We did discuss MR imaging for further characterization of the lesions, but cardiology recommends that her continuous Remodulin not be interrupted at all, and thus MRI is not reasonable at this time.    Danny Colón MD PGY-2  Department of Radiology  [facility]

## 2024-08-06 NOTE — PLAN OF CARE
Chest xray taken. Per Dr Butler pt is okay to return back to room. Report called to ROSIE Rahman.

## 2024-08-06 NOTE — PROGRESS NOTES
[facility] - [facility] Stepdown  Infectious Disease  Progress Note    Patient Name: Kristin Maier  MRN: [id number]  Admission Date: 8/3/2024  Length of Stay: 1 days  Attending Physician: Tracey Dutta MD  Primary Care Provider: Jorge A Stack MD    Isolation Status: No active isolations  Assessment/Plan:      ID  Bacteremia  I have reviewed hospital notes from  [facility] service and other specialty providers. I have also reviewed CBC, CMP/BMP,  cultures and imaging with my interpretation as documented.       48F with pulm HTN on Remodulin infused viai tunneled chest Rt sub clavian central line (placed 03/2022) - who presented 08/03 for general weakness, with abd bloating. Pt presented febrile 100.7F, with some mild hypotension 76/51, otherwise VSS, HDS. Wbc nml, , procalc 0.19.  Index bld cxs 08/01 positive GPRs, +Diphtheroids on one; while ID and susc pending on the other set.  Repeat bld cxs 08/05 NGTD. TTE neg for IE or veg. Suspected source picc line vs. GI translation in setting of abd bloating and new non-specific liver lesion.     Ct-chest-a/p revealed 2cm heterogenous hypodensity Rt lobe liver, new compared to prior 12/2021; ddx to include abscess or neoplasm. ID consulted for positive bld cxs with GPR.  Pt started 08/03 on vanc / zosyn; then 08/04 zosyn monotherapy. Fever curve improving.     Recommendations / Plan:  Continue Iv-vancomycin.  Inpatient / (infusion if outpt) pharmD to dose vanc with target trough level of 15-20.   To complete abx duration of 2wks s/p removal of Rt chest tunneled line 08/06, MARGARITA 08/20.  RUE Midline placed to continue remodulin pending tentative removal of RT chest tunneled central line.   Will f/u repeat bld cxs 08/05 to ensure remain cleared.   Recommend waiting atleast 48hr s/p tunneled line removal before placing new tunneled line to resume continuous remodulin for pulm HTN.       -- Discussed with ID staff and primary team   -- ID will continue to follow w/  further recs.        Thank you for your consult. I will follow-up with patient. Please contact us if you have any additional questions.    Nicholas Brar PA-C  Infectious Disease  [facility]    Subjective:     Principal Problem:Gram-positive bacteremia    HPI: 48F with pulm HTN on Remodulin infused viai tunneled chest Rt sub clavian central line (placed 03/2022) - who presented 08/03 for general weakness, with abd bloating. Pt presented febrile 100.7F, with some mild hypotension 76/51, otherwise VSS, HDS. Wbc nml, , procalc 0.19.  Index bld cxs 08/01 +corynebacterium coyleae, same species on both sets. Repeat bld cxs 08/05 NGTD. TTE neg for IE or veg. Suspected source picc line vs. GI translation in setting of abd bloating and new non-specific liver lesion.  ID consulted for positive bld cxs with GPR.     Interval History: Remains AF, VSS, HDS, wbc nml. On Iv-vanc. Cr 0.8 stable. Repeat bld cxs 08.05 NGTD. Midline placed to continue reomodulin pending tentative removal of tunneled Rt chest central line.     Review of Systems   Constitutional:  Positive for fatigue.   Gastrointestinal:  Negative for abdominal pain.   Musculoskeletal:  Positive for myalgias (chronic).   Skin:  Negative for color change and wound.   All other systems reviewed and are negative.      Objective:     Vital Signs (Most Recent):  Temp: 98.6 °F (37 °C) (08/06/24 0414)  Pulse: 73 (08/06/24 0414)  Resp: 18 (08/06/24 0911)  BP: 105/70 (08/06/24 0414)  SpO2: 97 % (08/06/24 0414) Vital Signs (24h Range):  Temp:  [97.9 °F (36.6 °C)-99.1 °F (37.3 °C)] 98.6 °F (37 °C)  Pulse:  [73-90] 73  Resp:  [14-18] 18  SpO2:  [92 %-99 %] 97 %  BP: ()/(53-76) 105/70     Weight: 73.2 kg (161 lb 6 oz)  Body mass index is 25.28 kg/m².    Estimated Creatinine Clearance: 81.8 mL/min (based on SCr of 0.8 mg/dL).     Physical Exam  Vitals and nursing note reviewed.   Constitutional:       General: She is not in acute distress.      Appearance: She is not ill-appearing, toxic-appearing or diaphoretic.   HENT:      Mouth/Throat:      Pharynx: No oropharyngeal exudate.   Eyes:      General: No scleral icterus.     Conjunctiva/sclera: Conjunctivae normal.   Cardiovascular:      Rate and Rhythm: Normal rate.      Heart sounds: Murmur heard.   Pulmonary:      Effort: No respiratory distress.      Breath sounds: Normal breath sounds.   Abdominal:      General: Bowel sounds are normal. There is no distension.      Palpations: Abdomen is soft.      Tenderness: There is no abdominal tenderness. There is no right CVA tenderness or left CVA tenderness.   Musculoskeletal:         General: No swelling or tenderness.      Cervical back: Neck supple. No tenderness.      Right lower leg: No edema.      Left lower leg: No edema.   Skin:     General: Skin is warm and dry.      Findings: No erythema or rash.      Comments: Rt subclavian central line, c/d/I, wnl. Midline RUE, c/d/I.   Neurological:      Mental Status: She is alert and oriented to person, place, and time. Mental status is at baseline.   Psychiatric:         Behavior: Behavior normal.         Thought Content: Thought content normal.          Significant Labs: Blood Culture:   Recent Labs   Lab 08/03/24  0937 08/03/24  0938 08/05/24  0804 08/05/24  0807   LABBLOO Gram stain aer bottle: Gram positive rods  Results called to and read back by:Lee Olivarez RN 08/04/2024  16:37  CORYNEBACTERIUM SPECIES  further identified as Corynebacterium coyleae  For susceptibility see order #[id number]  * Gram stain aer bottle: Gram positive rods  Results called to and read back by:Lee Olivarez RN 08/04/2024  16:33  CORYNEBACTERIUM SPECIES  further identified as Corynebacterium coyleae  * No Growth to date  No Growth to date No Growth to date  No Growth to date     CBC:   Recent Labs   Lab 08/05/24  0538 08/06/24  0641   WBC 4.53 4.24   HGB 12.1 11.5*   HCT 36.6* 35.2*   * 129*     CMP:   Recent Labs   Lab  08/05/24  0539 08/06/24  0641    139   K 3.4* 3.9    109   CO2 23 24    88   BUN 9 10   CREATININE 0.8 0.8   CALCIUM 9.4 9.0   PROT 6.7 6.7   ALBUMIN 3.2* 3.2*   BILITOT 1.1* 1.2*   ALKPHOS 77 70   AST 11 10   ALT 10 9*   ANIONGAP 7* 6*     Microbiology Results (last 7 days)       Procedure Component Value Units Date/Time    Aerobic culture [[id number]] Collected: 08/04/24 1147    Order Status: Completed Specimen: Skin from Chest, Right Updated: 08/06/24 1036     Aerobic Bacterial Culture Skin isha,  no predominant organism    Blood culture [[id number]] Collected: 08/05/24 0804    Order Status: Completed Specimen: Blood Updated: 08/06/24 1012     Blood Culture, Routine No Growth to date      No Growth to date    Blood culture [[id number]] Collected: 08/05/24 0807    Order Status: Completed Specimen: Blood Updated: 08/06/24 1012     Blood Culture, Routine No Growth to date      No Growth to date    Blood culture x two cultures. Draw prior to antibiotics. [[id number]]  (Abnormal) Collected: 08/03/24 0937    Order Status: Completed Specimen: Blood from Peripheral, Forearm, Right Updated: 08/06/24 0754     Blood Culture, Routine Gram stain aer bottle: Gram positive rods      Results called to and read back by:Lee Olivarez RN 08/04/2024  16:37      CORYNEBACTERIUM SPECIES  further identified as Corynebacterium coyleae  For susceptibility see order #[id number]      Narrative:      Aerobic and anaerobic    Blood culture x two cultures. Draw prior to antibiotics. [[id number]]  (Abnormal)  (Susceptibility) Collected: 08/03/24 0938    Order Status: Completed Specimen: Blood from Peripheral, Forearm, Left Updated: 08/06/24 0753     Blood Culture, Routine Gram stain aer bottle: Gram positive rods      Results called to and read back by:Lee Olivarez RN 08/04/2024  16:33      CORYNEBACTERIUM SPECIES  further identified as Corynebacterium coyleae      Narrative:      Aerobic and anaerobic    Culture, Anaerobe  [[id number]] Collected: 08/04/24 1147    Order Status: Completed Specimen: Skin from Chest, Right Updated: 08/05/24 0546     Anaerobic Culture Culture in progress    Rapid Organism ID by PCR (from Blood culture) [[id number]] Collected: 08/03/24 0938    Order Status: Completed Updated: 08/04/24 1802     Enterococcus faecalis Not Detected     Enterococcus faecium Not Detected     Listeria monocytogenes Not Detected     Staphylococcus spp. Not Detected     Staphylococcus aureus Not Detected     Staphylococcus epidermidis Not Detected     Staphylococcus lugdunensis Not Detected     Streptococcus species Not Detected     Streptococcus agalactiae Not Detected     Streptococcus pneumoniae Not Detected     Streptococcus pyogenes Not Detected     Acinetobacter calcoaceticus/baumannii complex Not Detected     Bacteroides fragilis Not Detected     Enterobacterales Not Detected     Enterobacter cloacae complex Not Detected     Escherichia coli Not Detected     Klebsiella aerogenes Not Detected     Klebsiella oxytoca Not Detected     Klebsiella pneumoniae group Not Detected     Proteus Not Detected     Salmonella sp Not Detected     Serratia marcescens Not Detected     Haemophilus influenzae Not Detected     Neisseria meningtidis Not Detected     Pseudomonas aeruginosa Not Detected     Stenotrophomonas maltophilia Not Detected     Candida albicans Not Detected     Candida auris Not Detected     Candida glabrata Not Detected     Candida krusei Not Detected     Candida parapsilosis Not Detected     Candida tropicalis Not Detected     Cryptococcus neoformans/gattii Not Detected     CTX-M (ESBL ) Test Not Applicable     IMP (Carbapenem resistant) Test Not Applicable     KPC resistance gene (Carbapenem resistant) Test Not Applicable     mcr-1  Test Not Applicable     mec A/C  Test Not Applicable     mec A/C and MREJ (MRSA) gene Test Not Applicable     NDM (Carbapenem resistant) Test Not Applicable     OXA-48-like (Carbapenem  resistant) Test Not Applicable     van A/B (VRE gene) Test Not Applicable     VIM (Carbapenem resistant) Test Not Applicable    Narrative:      Aerobic and anaerobic    Respiratory Infection Panel (PCR), Nasopharyngeal [[id number]] Collected: 08/03/24 1849    Order Status: Completed Specimen: Nasopharyngeal Swab Updated: 08/03/24 1956     Respiratory Infection Panel Source NP Swab     Adenovirus Not Detected     Coronavirus 229E, Common Cold Virus Not Detected     Coronavirus HKU1, Common Cold Virus Not Detected     Coronavirus NL63, Common Cold Virus Not Detected     Coronavirus OC43, Common Cold Virus Not Detected     Comment: The Coronavirus strains detected in this test cause the common cold.  These strains are not the COVID-19 (novel Coronavirus)strain   associated with the respiratory disease outbreak.          SARS-CoV2 (COVID-19) Qualitative PCR Not Detected     Human Metapneumovirus Not Detected     Human Rhinovirus/Enterovirus Not Detected     Influenza A (subtypes H1, H1-2009,H3) Not Detected     Influenza B Not Detected     Parainfluenza Virus 1 Not Detected     Parainfluenza Virus 2 Not Detected     Parainfluenza Virus 3 Not Detected     Parainfluenza Virus 4 Not Detected     Respiratory Syncytial Virus Not Detected     Bordetella Parapertussis (XC7538) Not Detected     Bordetella pertussis (ptxP) Not Detected     Chlamydia pneumoniae Not Detected     Mycoplasma pneumoniae Not Detected    Narrative:      Assay not valid for lower respiratory specimens, alternate  testing required.    Influenza A & B by Molecular [[id number]] Collected: 08/03/24 0942    Order Status: Completed Specimen: Nasopharyngeal Swab Updated: 08/03/24 1037     Influenza A, Molecular Negative     Influenza B, Molecular Negative     Flu A & B Source Nasal swab          All pertinent labs within the past 24 hours have been reviewed.    Significant Imaging: I have reviewed all pertinent imaging results/findings within the past 24  hours.    I have personally reviewed records / hospital notes from   service and other specialty providers. I have also reviewed CBC, CMP/BMP,  cultures and imaging with my interpretation as documented in my assessment / plan.    Patient is high risk for infectious complications given pt's age, multiple co-morbidities, and case complexity.      Time: 50 minutes   50% of time spent on face-to-face counseling and coordination of care. Counseling included review of test results, diagnosis, and treatment plan with patient and/or family.

## 2024-08-06 NOTE — PLAN OF CARE
AAOx4, afebrile, with c/o pain. Prn pain meds given. Prn imodium given. Tele monitor in place (NSR). Triple phase CT done. ID following. ID recommendations ordered. IV zosyn dc. IV vanc started. IR consulted. Pt is NPO after Midnight for possible IR procedures. Blood cxs from 8/5 are in process. Right tunnel cath marked off. Right upper Arm midline with IV remodulin infusing @110 ng/kg/min, DW is 66 kg, 89 cc/24 hr. Daily weight increased from previous documented weight. Pt stated she has not been weighed this admit. Pt able to position self independently. Pt in lowest position, side rails up x2, non-skid foot wear in place, call light within reach, pt verbalized understanding to call RN when needed. Hand hygiene practiced per protocol. Will continue to monitor.

## 2024-08-06 NOTE — PLAN OF CARE
Pt arrived to IR for tunneled line removal. Pt oriented to unit and staff. Plan of care reviewed with patient, patient verbalizes understanding. Comfort measures utilized. Blankets applied. Pt prepped and draped utilizing standard sterile technique. Timeouts completed utilizing standard universal time-out, per department and facility policy. RN to remain at bedside, continuous monitoring maintained. Pt resting comfortably. Denies pain/discomfort. Will continue to monitor. See flow sheets for monitoring, medication administration, and updates.

## 2024-08-06 NOTE — PROGRESS NOTES
[facility] - Transplant Stepdown  Heart Transplant  Progress Note    Patient Name: Kristin Maier  MRN: [id number]  Admission Date: 8/3/2024  Hospital Length of Stay: 1 days  Attending Physician: Tracey Dutta MD  Primary Care Provider: Jorge A Stack MD  Principal Problem:Gram-positive bacteremia    Subjective:   Interval History: No overnight acute events.  She has been afebrile over the last 24 hours.  Blood cultures from 8/3 positive for gram + rods: corynebacterium.  Zosyn stopped and changed to Vanc.  IR consulted for line removal and liver lesion biopsy.  Planning to remove line, but do not recommend biopsy as this time as they do not look infectious on CT scan.   Repeat cultures from 8/5 are NGTD. Patient NPO for tunneled line removal.      Continuous Infusions:   veletri/remodulin cassette   Intravenous Continuous        veletri/remodulin tubing   Intravenous Continuous         Scheduled Meds:   macitentan-tadalafil  1 tablet Oral Daily    pregabalin  75 mg Oral QHS    spironolactone  25 mg Oral Daily    treprostinil (REMODULIN) 12,000,000 ng in 0.9% NaCl 100 mL infusion  110.2 ng/kg/min (Order-Specific) Intravenous Q24H    vancomycin (VANCOCIN) IV (PEDS and ADULTS)  15 mg/kg Intravenous Q12H     PRN Meds:  Current Facility-Administered Medications:     acetaminophen, 650 mg, Oral, Q6H PRN    loperamide, 2 mg, Oral, QID PRN    ondansetron, 4 mg, Intravenous, Q8H PRN    oxyCODONE-acetaminophen, 1 tablet, Oral, Q6H PRN    sodium chloride 0.9%, 10 mL, Intravenous, PRN    Pharmacy to dose Vancomycin consult, , , Once **AND** vancomycin - pharmacy to dose, , Intravenous, pharmacy to manage frequency    Review of patient's allergies indicates:  No Known Allergies  Objective:     Vital Signs (Most Recent):  Temp: 98.6 °F (37 °C) (08/06/24 0414)  Pulse: 73 (08/06/24 0414)  Resp: 18 (08/06/24 0911)  BP: 105/70 (08/06/24 0414)  SpO2: 97 % (08/06/24 0414) Vital Signs (24h Range):  Temp:  [97.9 °F (36.6  °C)-99.1 °F (37.3 °C)] 98.6 °F (37 °C)  Pulse:  [73-90] 73  Resp:  [14-18] 18  SpO2:  [92 %-99 %] 97 %  BP: ()/(53-76) 105/70     Patient Vitals for the past 72 hrs (Last 3 readings):   Weight   08/06/24 0230 73.2 kg (161 lb 6 oz)   08/04/24 1733 66.2 kg (146 lb)     Body mass index is 25.28 kg/m².      Intake/Output Summary (Last 24 hours) at 8/6/2024 1112  Last data filed at 8/6/2024 0415  Gross per 24 hour   Intake 448.36 ml   Output 800 ml   Net -351.64 ml          Physical Exam  Constitutional:       Appearance: Normal appearance.   HENT:      Head: Atraumatic.   Eyes:      Conjunctiva/sclera: Conjunctivae normal.   Cardiovascular:      Rate and Rhythm: Normal rate and regular rhythm.      Heart sounds: Murmur heard.   Pulmonary:      Effort: Pulmonary effort is normal.      Breath sounds: Normal breath sounds. No wheezing or rhonchi.   Abdominal:      General: There is no distension.      Palpations: Abdomen is soft.      Tenderness: There is no abdominal tenderness.   Musculoskeletal:      Right lower leg: No edema.      Left lower leg: No edema.   Skin:     General: Skin is warm.   Neurological:      General: No focal deficit present.      Mental Status: She is alert.   Psychiatric:         Mood and Affect: Mood normal.            Significant Labs:  CBC:  Recent Labs   Lab 08/04/24  0631 08/05/24  0538 08/06/24  0641   WBC 6.99 4.53 4.24   RBC 4.15 4.17 3.99*   HGB 12.1 12.1 11.5*   HCT 36.0* 36.6* 35.2*   * 115* 129*   MCV 87 88 88   MCH 29.2 29.0 28.8   MCHC 33.6 33.1 32.7     BNP:  Recent Labs   Lab 08/03/24  0937   *     CMP:  Recent Labs   Lab 08/04/24  0632 08/05/24  0539 08/06/24  0641   * 109 88   CALCIUM 9.1 9.4 9.0   ALBUMIN 3.2* 3.2* 3.2*   PROT 6.5 6.7 6.7    140 139   K 3.7 3.4* 3.9   CO2 23 23 24    110 109   BUN 9 9 10   CREATININE 0.8 0.8 0.8   ALKPHOS 80 77 70   ALT 13 10 9*   AST 15 11 10   BILITOT 1.6* 1.1* 1.2*      Coagulation:   Recent Labs   Lab  "08/03/24  0937   INR 1.1   APTT 32.2*     LDH:  No results for input(s): "LDH" in the last 72 hours.  Microbiology:  Microbiology Results (last 7 days)       Procedure Component Value Units Date/Time    Aerobic culture [[id number]] Collected: 08/04/24 1147    Order Status: Completed Specimen: Skin from Chest, Right Updated: 08/06/24 1036     Aerobic Bacterial Culture Skin isha,  no predominant organism    Blood culture [[id number]] Collected: 08/05/24 0804    Order Status: Completed Specimen: Blood Updated: 08/06/24 1012     Blood Culture, Routine No Growth to date      No Growth to date    Blood culture [[id number]] Collected: 08/05/24 0807    Order Status: Completed Specimen: Blood Updated: 08/06/24 1012     Blood Culture, Routine No Growth to date      No Growth to date    Blood culture x two cultures. Draw prior to antibiotics. [[id number]]  (Abnormal) Collected: 08/03/24 0937    Order Status: Completed Specimen: Blood from Peripheral, Forearm, Right Updated: 08/06/24 0754     Blood Culture, Routine Gram stain aer bottle: Gram positive rods      Results called to and read back by:Lee Olivarez RN 08/04/2024  16:37      CORYNEBACTERIUM SPECIES  further identified as Corynebacterium coyleae  For susceptibility see order #[id number]      Narrative:      Aerobic and anaerobic    Blood culture x two cultures. Draw prior to antibiotics. [[id number]]  (Abnormal)  (Susceptibility) Collected: 08/03/24 0938    Order Status: Completed Specimen: Blood from Peripheral, Forearm, Left Updated: 08/06/24 0753     Blood Culture, Routine Gram stain aer bottle: Gram positive rods      Results called to and read back by:eLe Olivarez RN 08/04/2024  16:33      CORYNEBACTERIUM SPECIES  further identified as Corynebacterium coyleae      Narrative:      Aerobic and anaerobic    Culture, Anaerobe [[id number]] Collected: 08/04/24 1147    Order Status: Completed Specimen: Skin from Chest, Right Updated: 08/05/24 0546     Anaerobic Culture " Culture in progress    Rapid Organism ID by PCR (from Blood culture) [[id number]] Collected: 08/03/24 0949    Order Status: Completed Updated: 08/04/24 1436     Enterococcus faecalis Not Detected     Enterococcus faecium Not Detected     Listeria monocytogenes Not Detected     Staphylococcus spp. Not Detected     Staphylococcus aureus Not Detected     Staphylococcus epidermidis Not Detected     Staphylococcus lugdunensis Not Detected     Streptococcus species Not Detected     Streptococcus agalactiae Not Detected     Streptococcus pneumoniae Not Detected     Streptococcus pyogenes Not Detected     Acinetobacter calcoaceticus/baumannii complex Not Detected     Bacteroides fragilis Not Detected     Enterobacterales Not Detected     Enterobacter cloacae complex Not Detected     Escherichia coli Not Detected     Klebsiella aerogenes Not Detected     Klebsiella oxytoca Not Detected     Klebsiella pneumoniae group Not Detected     Proteus Not Detected     Salmonella sp Not Detected     Serratia marcescens Not Detected     Haemophilus influenzae Not Detected     Neisseria meningtidis Not Detected     Pseudomonas aeruginosa Not Detected     Stenotrophomonas maltophilia Not Detected     Candida albicans Not Detected     Candida auris Not Detected     Candida glabrata Not Detected     Candida krusei Not Detected     Candida parapsilosis Not Detected     Candida tropicalis Not Detected     Cryptococcus neoformans/gattii Not Detected     CTX-M (ESBL ) Test Not Applicable     IMP (Carbapenem resistant) Test Not Applicable     KPC resistance gene (Carbapenem resistant) Test Not Applicable     mcr-1  Test Not Applicable     mec A/C  Test Not Applicable     mec A/C and MREJ (MRSA) gene Test Not Applicable     NDM (Carbapenem resistant) Test Not Applicable     OXA-48-like (Carbapenem resistant) Test Not Applicable     van A/B (VRE gene) Test Not Applicable     VIM (Carbapenem resistant) Test Not Applicable    Narrative:       Aerobic and anaerobic    Respiratory Infection Panel (PCR), Nasopharyngeal [[id number]] Collected: 08/03/24 1849    Order Status: Completed Specimen: Nasopharyngeal Swab Updated: 08/03/24 1956     Respiratory Infection Panel Source NP Swab     Adenovirus Not Detected     Coronavirus 229E, Common Cold Virus Not Detected     Coronavirus HKU1, Common Cold Virus Not Detected     Coronavirus NL63, Common Cold Virus Not Detected     Coronavirus OC43, Common Cold Virus Not Detected     Comment: The Coronavirus strains detected in this test cause the common cold.  These strains are not the COVID-19 (novel Coronavirus)strain   associated with the respiratory disease outbreak.          SARS-CoV2 (COVID-19) Qualitative PCR Not Detected     Human Metapneumovirus Not Detected     Human Rhinovirus/Enterovirus Not Detected     Influenza A (subtypes H1, H1-2009,H3) Not Detected     Influenza B Not Detected     Parainfluenza Virus 1 Not Detected     Parainfluenza Virus 2 Not Detected     Parainfluenza Virus 3 Not Detected     Parainfluenza Virus 4 Not Detected     Respiratory Syncytial Virus Not Detected     Bordetella Parapertussis (SC6772) Not Detected     Bordetella pertussis (ptxP) Not Detected     Chlamydia pneumoniae Not Detected     Mycoplasma pneumoniae Not Detected    Narrative:      Assay not valid for lower respiratory specimens, alternate  testing required.    Influenza A & B by Molecular [[id number]] Collected: 08/03/24 0942    Order Status: Completed Specimen: Nasopharyngeal Swab Updated: 08/03/24 1037     Influenza A, Molecular Negative     Influenza B, Molecular Negative     Flu A & B Source Nasal swab            I have reviewed all pertinent labs within the past 24 hours.    Estimated Creatinine Clearance: 81.8 mL/min (based on SCr of 0.8 mg/dL).    Diagnostic Results:  I have reviewed and interpreted all pertinent imaging results/findings within the past 24 hours.  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 Patient states that for the past two weeks she started to initially just feel more bloated and weak. She noticed her weight was going up and so she started to take two lasix pills instead of her usual one for an entire week. However, despite this she didn't feel any better. She denies any fever, cough, chills, chest pain, palpitations, shortness of breath, orthopnea, PND or lower extremity edema. Reports compliance with all her medication. Patient is currently on Remodulin 110 ng/kg/min, Opsumit 10 mg daily, and adempas 2 mg, TID.    * Gram-positive bacteremia  - Negative PCT  - No Leukocytosis. COVID negative.   - Viral panel negative  - Blood cultures frmo 8/3 positive for gram + rods corneybacterum.  Repeat cultures done yesterday are NGTD   - Started on Zosyn but changed to Vanc after cultures resulted.   - CT of C/A/P done: 2.1 cm heterogeneous hypodensity right lobe of the liver new compared to prior. Both abscess and neoplasm need to considered. Unable to obtain MRI due to remodulin.    -Triple phase CT ordered: Nodular enhancing right hepatic lesion, favoring benign hemangioma. Additional flash filling hepatic hemangiomas or altered perfusion. MRI may be helpful if clinically indicated.   -Cultures at line site sent and negative   -ID consulted and following    Abnormal liver CT  -2.1 cm heterogeneous hypodensity right lobe of the liver new compared to prior. Both abscess and neoplasm need to considered.   -Triple phase CT ordered Nodular enhancing right hepatic lesion, favoring benign hemangioma. Additional flash filling hepatic hemangiomas or altered perfusion. MRI may be helpful if clinically indicated.     Hypertension  - Hold any home anti-hypertensives    Right-sided heart failure  - IVC collapsible, appears euvolemic on exam, unable to assess JVP  - Echo 8/4/24  EF: 55-60%, LVEDD: 4.36cm, Moderate RVE with function mod reduced with moderate TR. IVC 3mm   - Hold diuretics at this time    WHO group 1 pulmonary arterial hypertension  - Resume Remodulin dosing per pharmacy  - Resume OPSYNVI 10-40 mg, daily        YANIV Johnson  Heart Transplant  [facility] Step[facility]

## 2024-08-06 NOTE — ASSESSMENT & PLAN NOTE
I have reviewed hospital notes from   service and other specialty providers. I have also reviewed CBC, CMP/BMP,  cultures and imaging with my interpretation as documented.       48F with pulm HTN on Remodulin infused viai tunneled chest Rt sub clavian central line (placed 03/2022) - who presented 08/03 for general weakness, with abd bloating. Pt presented febrile 100.7F, with some mild hypotension 76/51, otherwise VSS, HDS. Wbc nml, , procalc 0.19.  Index bld cxs 08/01 positive GPRs, +Diphtheroids on one; while ID and susc pending on the other set.  Repeat bld cxs 08/05 NGTD. TTE neg for IE or veg. Suspected source picc line vs. GI translation in setting of abd bloating and new non-specific liver lesion.     Ct-chest-a/p revealed 2cm heterogenous hypodensity Rt lobe liver, new compared to prior 12/2021; ddx to include abscess or neoplasm. ID consulted for positive bld cxs with GPR.  Pt started 08/03 on vanc / zosyn; then 08/04 zosyn monotherapy. Fever curve improving.     Recommendations / Plan:  Continue Iv-vancomycin.  Inpatient / (infusion if outpt) pharmD to dose vanc with target trough level of 15-20.   To complete abx duration of 2wks s/p removal of Rt chest tunneled line 08/06, MARGARITA 08/20.  RUE Midline placed to continue remodulin pending tentative removal of RT chest tunneled central line.   Will f/u repeat bld cxs 08/05 to ensure remain cleared.   Recommend waiting atleast 48hr s/p tunneled line removal before placing new tunneled line to resume continuous remodulin for pulm HTN.       -- Discussed with ID staff and primary team   -- ID will continue to follow w/ further recs.

## 2024-08-06 NOTE — PLAN OF CARE
Tunneled line removal completed, pt tolerated well. No apparent distress noted. VSS. Dressing applied CDI. Pt awaiting on STAT chest xray.

## 2024-08-07 LAB
ALBUMIN SERPL BCP-MCNC: 3.3 G/DL (ref 3.5–5.2)
ALP SERPL-CCNC: 69 U/L (ref 55–135)
ALT SERPL W/O P-5'-P-CCNC: 7 U/L (ref 10–44)
ANION GAP SERPL CALC-SCNC: 8 MMOL/L (ref 8–16)
AST SERPL-CCNC: 12 U/L (ref 10–40)
BASOPHILS # BLD AUTO: 0.03 K/UL (ref 0–0.2)
BASOPHILS NFR BLD: 0.7 % (ref 0–1.9)
BILIRUB SERPL-MCNC: 0.7 MG/DL (ref 0.1–1)
BUN SERPL-MCNC: 11 MG/DL (ref 6–20)
CALCIUM SERPL-MCNC: 9.5 MG/DL (ref 8.7–10.5)
CHLORIDE SERPL-SCNC: 109 MMOL/L (ref 95–110)
CO2 SERPL-SCNC: 21 MMOL/L (ref 23–29)
CREAT SERPL-MCNC: 0.8 MG/DL (ref 0.5–1.4)
DIFFERENTIAL METHOD BLD: ABNORMAL
EOSINOPHIL # BLD AUTO: 0.1 K/UL (ref 0–0.5)
EOSINOPHIL NFR BLD: 2.1 % (ref 0–8)
ERYTHROCYTE [DISTWIDTH] IN BLOOD BY AUTOMATED COUNT: 14.6 % (ref 11.5–14.5)
EST. GFR  (NO RACE VARIABLE): >60 ML/MIN/1.73 M^2
GLUCOSE SERPL-MCNC: 95 MG/DL (ref 70–110)
HCT VFR BLD AUTO: 35.9 % (ref 37–48.5)
HGB BLD-MCNC: 11.5 G/DL (ref 12–16)
IMM GRANULOCYTES # BLD AUTO: 0.01 K/UL (ref 0–0.04)
IMM GRANULOCYTES NFR BLD AUTO: 0.2 % (ref 0–0.5)
LYMPHOCYTES # BLD AUTO: 1.6 K/UL (ref 1–4.8)
LYMPHOCYTES NFR BLD: 37.5 % (ref 18–48)
MAGNESIUM SERPL-MCNC: 2.1 MG/DL (ref 1.6–2.6)
MCH RBC QN AUTO: 29 PG (ref 27–31)
MCHC RBC AUTO-ENTMCNC: 32 G/DL (ref 32–36)
MCV RBC AUTO: 90 FL (ref 82–98)
MONOCYTES # BLD AUTO: 0.4 K/UL (ref 0.3–1)
MONOCYTES NFR BLD: 8.2 % (ref 4–15)
NEUTROPHILS # BLD AUTO: 2.2 K/UL (ref 1.8–7.7)
NEUTROPHILS NFR BLD: 51.3 % (ref 38–73)
NRBC BLD-RTO: 0 /100 WBC
PLATELET # BLD AUTO: 141 K/UL (ref 150–450)
PMV BLD AUTO: 11.3 FL (ref 9.2–12.9)
POTASSIUM SERPL-SCNC: 3.9 MMOL/L (ref 3.5–5.1)
PROT SERPL-MCNC: 6.9 G/DL (ref 6–8.4)
RBC # BLD AUTO: 3.97 M/UL (ref 4–5.4)
SODIUM SERPL-SCNC: 138 MMOL/L (ref 136–145)
VANCOMYCIN TROUGH SERPL-MCNC: 12.4 UG/ML (ref 10–22)
WBC # BLD AUTO: 4.37 K/UL (ref 3.9–12.7)

## 2024-08-07 PROCEDURE — 99232 SBSQ HOSP IP/OBS MODERATE 35: CPT | Mod: ,,, | Performed by: STUDENT IN AN ORGANIZED HEALTH CARE EDUCATION/TRAINING PROGRAM

## 2024-08-07 PROCEDURE — 99233 SBSQ HOSP IP/OBS HIGH 50: CPT | Mod: ,,, | Performed by: PHYSICIAN ASSISTANT

## 2024-08-07 PROCEDURE — 83735 ASSAY OF MAGNESIUM: CPT | Performed by: STUDENT IN AN ORGANIZED HEALTH CARE EDUCATION/TRAINING PROGRAM

## 2024-08-07 PROCEDURE — 25000003 PHARM REV CODE 250: Performed by: INTERNAL MEDICINE

## 2024-08-07 PROCEDURE — 36415 COLL VENOUS BLD VENIPUNCTURE: CPT | Performed by: INTERNAL MEDICINE

## 2024-08-07 PROCEDURE — 80202 ASSAY OF VANCOMYCIN: CPT | Performed by: INTERNAL MEDICINE

## 2024-08-07 PROCEDURE — 36415 COLL VENOUS BLD VENIPUNCTURE: CPT | Performed by: STUDENT IN AN ORGANIZED HEALTH CARE EDUCATION/TRAINING PROGRAM

## 2024-08-07 PROCEDURE — 63600175 PHARM REV CODE 636 W HCPCS: Performed by: INTERNAL MEDICINE

## 2024-08-07 PROCEDURE — 25000003 PHARM REV CODE 250: Performed by: STUDENT IN AN ORGANIZED HEALTH CARE EDUCATION/TRAINING PROGRAM

## 2024-08-07 PROCEDURE — 20600001 HC STEP DOWN PRIVATE ROOM

## 2024-08-07 PROCEDURE — 63600175 PHARM REV CODE 636 W HCPCS: Mod: JG | Performed by: INTERNAL MEDICINE

## 2024-08-07 PROCEDURE — 80053 COMPREHEN METABOLIC PANEL: CPT | Performed by: STUDENT IN AN ORGANIZED HEALTH CARE EDUCATION/TRAINING PROGRAM

## 2024-08-07 PROCEDURE — 85025 COMPLETE CBC W/AUTO DIFF WBC: CPT | Performed by: STUDENT IN AN ORGANIZED HEALTH CARE EDUCATION/TRAINING PROGRAM

## 2024-08-07 PROCEDURE — 63600175 PHARM REV CODE 636 W HCPCS: Performed by: STUDENT IN AN ORGANIZED HEALTH CARE EDUCATION/TRAINING PROGRAM

## 2024-08-07 RX ADMIN — VANCOMYCIN HYDROCHLORIDE 1000 MG: 1 INJECTION, POWDER, LYOPHILIZED, FOR SOLUTION INTRAVENOUS at 12:08

## 2024-08-07 RX ADMIN — ONDANSETRON 4 MG: 2 INJECTION INTRAMUSCULAR; INTRAVENOUS at 05:08

## 2024-08-07 RX ADMIN — TREPROSTINIL 7383.4 NG/MIN: 100 INJECTION, SOLUTION INTRAVENOUS; SUBCUTANEOUS at 05:08

## 2024-08-07 RX ADMIN — VANCOMYCIN HYDROCHLORIDE 1000 MG: 1 INJECTION, POWDER, LYOPHILIZED, FOR SOLUTION INTRAVENOUS at 11:08

## 2024-08-07 RX ADMIN — OXYCODONE AND ACETAMINOPHEN 1 TABLET: 10; 325 TABLET ORAL at 12:08

## 2024-08-07 RX ADMIN — SPIRONOLACTONE 25 MG: 25 TABLET ORAL at 08:08

## 2024-08-07 RX ADMIN — LOPERAMIDE HYDROCHLORIDE 2 MG: 2 CAPSULE ORAL at 05:08

## 2024-08-07 RX ADMIN — OXYCODONE AND ACETAMINOPHEN 1 TABLET: 10; 325 TABLET ORAL at 07:08

## 2024-08-07 RX ADMIN — PREGABALIN 75 MG: 75 CAPSULE ORAL at 07:08

## 2024-08-07 RX ADMIN — OXYCODONE AND ACETAMINOPHEN 1 TABLET: 10; 325 TABLET ORAL at 05:08

## 2024-08-07 NOTE — PROGRESS NOTES
Pharmacokinetic Assessment Follow Up: IV Vancomycin    Vancomycin serum concentration assessment(s):    ~24h level is 12.4. Anticipate accumulation, so will leave dose the same (goal 15-20 for bacteremia). Repeat level prior to 8/9 @ 11:30.     Drug levels (last 3 results):  Recent Labs   Lab Result Units 08/07/24  1123   Vancomycin-Trough ug/mL 12.4       Pharmacy will continue to follow and monitor vancomycin.    Please contact pharmacy at extension [phone] for questions regarding this assessment.    Thank you for the consult,   Keven Schmidt        CBC (last 72 hours):  Recent Labs   Lab Result Units 08/05/24  0538 08/06/24  0641 08/07/24  0642   WBC K/uL 4.53 4.24 4.37   Hemoglobin g/dL 12.1 11.5* 11.5*   Hematocrit % 36.6* 35.2* 35.9*   Platelets K/uL 115* 129* 141*   Gran % % 48.4 49.1 51.3   Lymph % % 38.6 38.7 37.5   Mono % % 9.7 8.7 8.2   Eosinophil % % 2.9 2.8 2.1   Basophil % % 0.2 0.2 0.7   Differential Method  Automated Automated Automated       Metabolic Panel (last 72 hours):  Recent Labs   Lab Result Units 08/05/24  0539 08/06/24  0641 08/07/24  0642   Sodium mmol/L 140 139 138   Potassium mmol/L 3.4* 3.9 3.9   Chloride mmol/L 110 109 109   CO2 mmol/L 23 24 21*   Glucose mg/dL 109 88 95   BUN mg/dL 9 10 11   Creatinine mg/dL 0.8 0.8 0.8   Albumin g/dL 3.2* 3.2* 3.3*   Total Bilirubin mg/dL 1.1* 1.2* 0.7   Alkaline Phosphatase U/L 77 70 69   AST U/L 11 10 12   ALT U/L 10 9* 7*   Magnesium mg/dL 2.2 2.4 2.1       Vancomycin Administrations:  vancomycin given in the last 96 hours                     vancomycin (VANCOCIN) 1,000 mg in D5W 250 mL IVPB (Vial-Mate) (mg) 1,000 mg New Bag 08/07/24 1245     1,000 mg New Bag 08/06/24 2337     1,000 mg New Bag  1233    vancomycin 1,250 mg in D5W 250 mL IVPB (Vial-Mate) (mg) 1,250 mg New Bag 08/05/24 1385                    Microbiologic Results:  Microbiology Results (last 7 days)       Procedure Component Value Units Date/Time    Culture, Anaerobe [[id number]]  Collected: 08/04/24 1147    Order Status: Completed Specimen: Skin from Chest, Right Updated: 08/07/24 1038     Anaerobic Culture Culture in progress    Blood culture [[id number]] Collected: 08/05/24 0804    Order Status: Completed Specimen: Blood Updated: 08/07/24 1012     Blood Culture, Routine No Growth to date      No Growth to date      No Growth to date    Blood culture [[id number]] Collected: 08/05/24 0807    Order Status: Completed Specimen: Blood Updated: 08/07/24 1012     Blood Culture, Routine No Growth to date      No Growth to date      No Growth to date    Aerobic culture [[id number]] Collected: 08/04/24 1147    Order Status: Completed Specimen: Skin from Chest, Right Updated: 08/06/24 1036     Aerobic Bacterial Culture Skin isha,  no predominant organism    Blood culture x two cultures. Draw prior to antibiotics. [[id number]]  (Abnormal) Collected: 08/03/24 0937    Order Status: Completed Specimen: Blood from Peripheral, Forearm, Right Updated: 08/06/24 0754     Blood Culture, Routine Gram stain aer bottle: Gram positive rods      Results called to and read back by:Lee Olivarez RN 08/04/2024  16:37      CORYNEBACTERIUM SPECIES  further identified as Corynebacterium coyleae  For susceptibility see order #[id number]      Narrative:      Aerobic and anaerobic    Blood culture x two cultures. Draw prior to antibiotics. [[id number]]  (Abnormal)  (Susceptibility) Collected: 08/03/24 0938    Order Status: Completed Specimen: Blood from Peripheral, Forearm, Left Updated: 08/06/24 0753     Blood Culture, Routine Gram stain aer bottle: Gram positive rods      Results called to and read back by:Lee Olivarez RN 08/04/2024  16:33      CORYNEBACTERIUM SPECIES  further identified as Corynebacterium coyleae      Narrative:      Aerobic and anaerobic    Rapid Organism ID by PCR (from Blood culture) [[id number]] Collected: 08/03/24 0938    Order Status: Completed Updated: 08/04/24 1805     Enterococcus faecalis Not  Detected     Enterococcus faecium Not Detected     Listeria monocytogenes Not Detected     Staphylococcus spp. Not Detected     Staphylococcus aureus Not Detected     Staphylococcus epidermidis Not Detected     Staphylococcus lugdunensis Not Detected     Streptococcus species Not Detected     Streptococcus agalactiae Not Detected     Streptococcus pneumoniae Not Detected     Streptococcus pyogenes Not Detected     Acinetobacter calcoaceticus/baumannii complex Not Detected     Bacteroides fragilis Not Detected     Enterobacterales Not Detected     Enterobacter cloacae complex Not Detected     Escherichia coli Not Detected     Klebsiella aerogenes Not Detected     Klebsiella oxytoca Not Detected     Klebsiella pneumoniae group Not Detected     Proteus Not Detected     Salmonella sp Not Detected     Serratia marcescens Not Detected     Haemophilus influenzae Not Detected     Neisseria meningtidis Not Detected     Pseudomonas aeruginosa Not Detected     Stenotrophomonas maltophilia Not Detected     Candida albicans Not Detected     Candida auris Not Detected     Candida glabrata Not Detected     Candida krusei Not Detected     Candida parapsilosis Not Detected     Candida tropicalis Not Detected     Cryptococcus neoformans/gattii Not Detected     CTX-M (ESBL ) Test Not Applicable     IMP (Carbapenem resistant) Test Not Applicable     KPC resistance gene (Carbapenem resistant) Test Not Applicable     mcr-1  Test Not Applicable     mec A/C  Test Not Applicable     mec A/C and MREJ (MRSA) gene Test Not Applicable     NDM (Carbapenem resistant) Test Not Applicable     OXA-48-like (Carbapenem resistant) Test Not Applicable     van A/B (VRE gene) Test Not Applicable     VIM (Carbapenem resistant) Test Not Applicable    Narrative:      Aerobic and anaerobic    Respiratory Infection Panel (PCR), Nasopharyngeal [[id number]] Collected: 08/03/24 3587    Order Status: Completed Specimen: Nasopharyngeal Swab Updated:  08/03/24 1956     Respiratory Infection Panel Source NP Swab     Adenovirus Not Detected     Coronavirus 229E, Common Cold Virus Not Detected     Coronavirus HKU1, Common Cold Virus Not Detected     Coronavirus NL63, Common Cold Virus Not Detected     Coronavirus OC43, Common Cold Virus Not Detected     Comment: The Coronavirus strains detected in this test cause the common cold.  These strains are not the COVID-19 (novel Coronavirus)strain   associated with the respiratory disease outbreak.          SARS-CoV2 (COVID-19) Qualitative PCR Not Detected     Human Metapneumovirus Not Detected     Human Rhinovirus/Enterovirus Not Detected     Influenza A (subtypes H1, H1-2009,H3) Not Detected     Influenza B Not Detected     Parainfluenza Virus 1 Not Detected     Parainfluenza Virus 2 Not Detected     Parainfluenza Virus 3 Not Detected     Parainfluenza Virus 4 Not Detected     Respiratory Syncytial Virus Not Detected     Bordetella Parapertussis ([id number]) Not Detected     Bordetella pertussis (ptxP) Not Detected     Chlamydia pneumoniae Not Detected     Mycoplasma pneumoniae Not Detected    Narrative:      Assay not valid for lower respiratory specimens, alternate  testing required.    Influenza A & B by Molecular [[id number]] Collected: 08/03/24 0942    Order Status: Completed Specimen: Nasopharyngeal Swab Updated: 08/03/24 1037     Influenza A, Molecular Negative     Influenza B, Molecular Negative     Flu A & B Source Nasal swab

## 2024-08-07 NOTE — ASSESSMENT & PLAN NOTE
I have reviewed hospital notes from   service and other specialty providers. I have also reviewed CBC, CMP/BMP,  cultures and imaging with my interpretation as documented.       48F with pulm HTN on Remodulin infused viai tunneled chest Rt sub clavian central line (placed 03/2022) - who presented 08/03 for general weakness, with abd bloating. Pt presented febrile 100.7F, with some mild hypotension 76/51, otherwise VSS, HDS. Wbc nml, , procalc 0.19.  Index bld cxs 08/01 positive GPRs, +Diphtheroids on one; while ID and susc pending on the other set.  Repeat bld cxs 08/05 NGTD. TTE neg for IE or veg. Suspected source picc line vs. GI translation in setting of abd bloating and new non-specific liver lesion.     Ct-chest-a/p revealed 2cm heterogenous hypodensity Rt lobe liver, new compared to prior 12/2021; ddx to include abscess or neoplasm. ID consulted for positive bld cxs with GPR.  Pt started 08/03 on vanc / zosyn; then 08/04 zosyn monotherapy; 08/05 switched to vanc monotherapy. Pt improving clinically, remains AF, VSS, HDS, wbc nml.  Repeat bld cxs 08/05 NGTD.    Recommendations / Plan:  Continue Iv-vancomycin.  Inpatient / (infusion if outpt) pharmD to dose vanc with target trough level of 15-20.   To complete abx duration of 2wks s/p removal of Rt chest tunneled line 08/06, MARGARITA 08/20.  RUE Midline placed to continue remodulin pending tentative removal of RT chest tunneled central line.       -- ID will schedule pt for ID clinic f/u appt   -- Discussed with ID staff and primary team.  -- ID will sign off at this time. Please see OPAT note below for outpt abx plans.       Outpatient Antibiotic Therapy Plan:    Please send referral to [facility].    1) Infection :   -- corynebacterium bacteremia 2/2 tunnel chest central line     2) Discharge Antibiotics:     Intravenous antibiotics:  IV - Vancomycin.  Infusion pharmD to dose vanc with target trough level of 15-20.     3)  Therapy Duration:   2wks s/p 08/06    Estimated end date of IV antibiotics:   08/20    4) Outpatient Weekly Labs:    Order the following labs to be drawn on Mondays:   CBC  CMP   CRP  Vancomycin trough. Target 15-20    If discharged on vancomycin IV, order the following additional labs to be drawn on Thursdays:  CMP   Vancomycin trough. Target 15-20    If vancomycin trough is not at target (15-20) prior to discharge, schedule vancomycin trough to be drawn before their fourth outpatient dose.    5) Fax Lab Results to Infectious Diseases Provider:  Nicholas Brar PA-C    [facility] Fax Number: [phone]    6) Outpatient Infectious Diseases Follow-up    Follow-up appointment will be arranged by the ID clinic and will be found in the patient's appointments tab.    Prior to discharge, please ensure the patient's follow-up has been scheduled.    If there is still no follow-up scheduled prior to discharge, please send an [employer] message to [facility] Clinical [place] or Call Infectious Diseases Dept.

## 2024-08-07 NOTE — PROGRESS NOTES
[facility] - Transplant Stepdown  Infectious Disease  Progress Note    Patient Name: Kristin Maier  MRN: [id number]  Admission Date: 8/3/2024  Length of Stay: 2 days  Attending Physician: Tracey Dutta MD  Primary Care Provider: Jorge A Stack MD    Isolation Status: No active isolations  Assessment/Plan:      ID  Bacteremia  I have reviewed hospital notes from  HM service and other specialty providers. I have also reviewed CBC, CMP/BMP,  cultures and imaging with my interpretation as documented.       48F with pulm HTN on Remodulin infused viai tunneled chest Rt sub clavian central line (placed 03/2022) - who presented 08/03 for general weakness, with abd bloating. Pt presented febrile 100.7F, with some mild hypotension 76/51, otherwise VSS, HDS. Wbc nml, , procalc 0.19.  Index bld cxs 08/01 +corynebacterium. Cleared promptly on repeat bld cxs 08/05 NGTD. TTE neg for IE or veg. Suspected source picc line vs. GI translation in setting of abd bloating and new non-specific liver lesion.     Ct-chest-a/p revealed 2cm heterogenous hypodensity Rt lobe liver, new compared to prior 12/2021; likely hemangioma (>abscess or malignancy). ID consulted for positive bld cxs with GPR.  Pt started 08/03 on vanc / zosyn; then 08/04 zosyn monotherapy; 08/05 switched to vanc monotherapy. Pt improving clinically, remains AF, VSS, HDS, wbc nml.  Repeat bld cxs 08/05 NGTD. Tunneled chest central line removed 08/06.     Recommendations / Plan:  Continue Iv-vancomycin.  Inpatient / (infusion if outpt) pharmD to dose vanc with target trough level of 15-20.   To complete abx duration of 2wks s/p removal of Rt chest tunneled line 08/06, MARGARITA 08/20.  RUE Midline placed to continue remodulin.  Repeat bld cxs 0805 remain cleared >48hrs. May place new tunneled line tomorrow 08/08 if bld cxs remain negative.     -- ID will schedule pt for ID clinic f/u appt   -- Discussed with ID staff and primary team.  -- ID will sign off at  this time. [facility].       Outpatient Antibiotic Therapy Plan:    Please send referral to [facility].    1) Infection :   -- corynebacterium bacteremia 2/2 tunnel chest central line     2) Discharge Antibiotics:     Intravenous antibiotics:  IV - Vancomycin.  Infusion pharmD to dose vanc with target trough level of 15-20.     3) Therapy Duration:   2wks s/p 08/06    Estimated end date of IV antibiotics:   08/20    4) Outpatient Weekly Labs:    Order the following labs to be drawn on Mondays:   CBC  CMP   CRP  Vancomycin trough. Target 15-20    If discharged on vancomycin IV, order the following additional labs to be drawn on Thursdays:  CMP   Vancomycin trough. Target 15-20    If vancomycin trough is not at target (15-20) prior to discharge, schedule vancomycin trough to be drawn before their fourth outpatient dose.    5) Fax Lab Results to Infectious Diseases Provider:  Nicholas Brar PA-C    [facility] Fax Number: [phone]    6) Outpatient Infectious Diseases Follow-up    Follow-up appointment will be arranged by the ID clinic and will be found in the patient's appointments tab.    Prior to discharge, please ensure the patient's follow-up has been scheduled.    If there is still no follow-up scheduled prior to discharge, please send an [employer] message to [facility] Clinical Pool or Call Infectious Diseases Dept.        Thank you for your consult. I will sign off. Please contact us if you have any additional questions.    Nicholas Brar PA-C  Infectious Disease  [facility] - Transplant Stepdown    Subjective:     Principal Problem:Gram-positive bacteremia    HPI: 48F with pulm HTN on Remodulin infused viai tunneled chest Rt sub clavian central line (placed 03/2022) - who presented 08/03 for general weakness, with abd bloating. Pt presented febrile 100.7F, with some mild hypotension 76/51, otherwise VSS, HDS. Wbc nml, , procalc 0.19.   Index bld cxs 08/01 +corynebacterium coyleae, same species on both sets. Repeat bld cxs 08/05 NGTD. TTE neg for IE or veg. Suspected source picc line vs. GI translation in setting of abd bloating and new non-specific liver lesion.  ID consulted for positive bld cxs with GPR.     Interval History: Remains AF, VSS, HDS, wbc nml. Repeat bld cxs 08/05 remain cleared. Tunneled line susp source, removed 08/06.     Review of Systems   Constitutional:  Positive for fatigue.   Gastrointestinal:  Negative for abdominal pain.   Musculoskeletal:  Positive for myalgias (chronic).   Skin:  Negative for color change and wound.   All other systems reviewed and are negative.      Objective:     Vital Signs (Most Recent):  Temp: 98 °F (36.7 °C) (08/07/24 1525)  Pulse: 76 (08/07/24 1525)  Resp: 16 (08/07/24 1525)  BP: 117/82 (08/07/24 1525)  SpO2: 95 % (08/07/24 1525) Vital Signs (24h Range):  Temp:  [97.3 °F (36.3 °C)-98.7 °F (37.1 °C)] 98 °F (36.7 °C)  Pulse:  [64-98] 76  Resp:  [15-18] 16  SpO2:  [95 %-98 %] 95 %  BP: ()/(61-82) 117/82     Weight: 73.5 kg (162 lb 2.4 oz)  Body mass index is 25.4 kg/m².    Estimated Creatinine Clearance: 81.8 mL/min (based on SCr of 0.8 mg/dL).     Physical Exam  Vitals and nursing note reviewed.   Constitutional:       General: She is not in acute distress.     Appearance: She is not ill-appearing, toxic-appearing or diaphoretic.   HENT:      Mouth/Throat:      Pharynx: No oropharyngeal exudate.   Eyes:      General: No scleral icterus.     Conjunctiva/sclera: Conjunctivae normal.   Cardiovascular:      Rate and Rhythm: Normal rate.      Heart sounds: Murmur heard.   Pulmonary:      Effort: No respiratory distress.      Breath sounds: Normal breath sounds.   Abdominal:      General: Bowel sounds are normal. There is no distension.      Palpations: Abdomen is soft.      Tenderness: There is no abdominal tenderness. There is no right CVA tenderness or left CVA tenderness.   Musculoskeletal:          General: No swelling or tenderness.      Cervical back: Neck supple. No tenderness.      Right lower leg: No edema.      Left lower leg: No edema.   Skin:     General: Skin is warm and dry.      Findings: No erythema or rash.      Comments: Midline RUE, c/d/I.   Neurological:      Mental Status: She is alert and oriented to person, place, and time. Mental status is at baseline.   Psychiatric:         Behavior: Behavior normal.         Thought Content: Thought content normal.          Significant Labs: Blood Culture:   Recent Labs   Lab 08/03/24  0937 08/03/24  0938 08/05/24  0804 08/05/24  0807   LABBLOO Gram stain aer bottle: Gram positive rods  Results called to and read back by:Lee Olivarez RN 08/04/2024  16:37  CORYNEBACTERIUM SPECIES  further identified as Corynebacterium coyleae  For susceptibility see order #[id number]  * Gram stain aer bottle: Gram positive rods  Results called to and read back by:Lee Olivarez RN 08/04/2024  16:33  CORYNEBACTERIUM SPECIES  further identified as Corynebacterium coyleae  * No Growth to date  No Growth to date  No Growth to date No Growth to date  No Growth to date  No Growth to date     CBC:   Recent Labs   Lab 08/06/24  0641 08/07/24  0642   WBC 4.24 4.37   HGB 11.5* 11.5*   HCT 35.2* 35.9*   * 141*     CMP:   Recent Labs   Lab 08/06/24  0641 08/07/24  0642    138   K 3.9 3.9    109   CO2 24 21*   GLU 88 95   BUN 10 11   CREATININE 0.8 0.8   CALCIUM 9.0 9.5   PROT 6.7 6.9   ALBUMIN 3.2* 3.3*   BILITOT 1.2* 0.7   ALKPHOS 70 69   AST 10 12   ALT 9* 7*   ANIONGAP 6* 8     Microbiology Results (last 7 days)       Procedure Component Value Units Date/Time    Culture, Anaerobe [[id number]] Collected: 08/04/24 1147    Order Status: Completed Specimen: Skin from Chest, Right Updated: 08/07/24 1038     Anaerobic Culture Culture in progress    Blood culture [[id number]] Collected: 08/05/24 0804    Order Status: Completed Specimen: Blood Updated: 08/07/24  1012     Blood Culture, Routine No Growth to date      No Growth to date      No Growth to date    Blood culture [[id number]] Collected: 08/05/24 0807    Order Status: Completed Specimen: Blood Updated: 08/07/24 1012     Blood Culture, Routine No Growth to date      No Growth to date      No Growth to date    Aerobic culture [[id number]] Collected: 08/04/24 1147    Order Status: Completed Specimen: Skin from Chest, Right Updated: 08/06/24 1036     Aerobic Bacterial Culture Skin isha,  no predominant organism    Blood culture x two cultures. Draw prior to antibiotics. [[id number]]  (Abnormal) Collected: 08/03/24 0937    Order Status: Completed Specimen: Blood from Peripheral, Forearm, Right Updated: 08/06/24 0754     Blood Culture, Routine Gram stain aer bottle: Gram positive rods      Results called to and read back by:Lee Olivarez RN 08/04/2024  16:37      CORYNEBACTERIUM SPECIES  further identified as Corynebacterium coyleae  For susceptibility see order #[id number]      Narrative:      Aerobic and anaerobic    Blood culture x two cultures. Draw prior to antibiotics. [[id number]]  (Abnormal)  (Susceptibility) Collected: 08/03/24 0938    Order Status: Completed Specimen: Blood from Peripheral, Forearm, Left Updated: 08/06/24 0753     Blood Culture, Routine Gram stain aer bottle: Gram positive rods      Results called to and read back by:Lee Olivarez RN 08/04/2024  16:33      CORYNEBACTERIUM SPECIES  further identified as Corynebacterium coyleae      Narrative:      Aerobic and anaerobic    Rapid Organism ID by PCR (from Blood culture) [[id number]] Collected: 08/03/24 0938    Order Status: Completed Updated: 08/04/24 1805     Enterococcus faecalis Not Detected     Enterococcus faecium Not Detected     Listeria monocytogenes Not Detected     Staphylococcus spp. Not Detected     Staphylococcus aureus Not Detected     Staphylococcus epidermidis Not Detected     Staphylococcus lugdunensis Not Detected     Streptococcus  species Not Detected     Streptococcus agalactiae Not Detected     Streptococcus pneumoniae Not Detected     Streptococcus pyogenes Not Detected     Acinetobacter calcoaceticus/baumannii complex Not Detected     Bacteroides fragilis Not Detected     Enterobacterales Not Detected     Enterobacter cloacae complex Not Detected     Escherichia coli Not Detected     Klebsiella aerogenes Not Detected     Klebsiella oxytoca Not Detected     Klebsiella pneumoniae group Not Detected     Proteus Not Detected     Salmonella sp Not Detected     Serratia marcescens Not Detected     Haemophilus influenzae Not Detected     Neisseria meningtidis Not Detected     Pseudomonas aeruginosa Not Detected     Stenotrophomonas maltophilia Not Detected     Candida albicans Not Detected     Candida auris Not Detected     Candida glabrata Not Detected     Candida krusei Not Detected     Candida parapsilosis Not Detected     Candida tropicalis Not Detected     Cryptococcus neoformans/gattii Not Detected     CTX-M (ESBL ) Test Not Applicable     IMP (Carbapenem resistant) Test Not Applicable     KPC resistance gene (Carbapenem resistant) Test Not Applicable     mcr-1  Test Not Applicable     mec A/C  Test Not Applicable     mec A/C and MREJ (MRSA) gene Test Not Applicable     NDM (Carbapenem resistant) Test Not Applicable     OXA-48-like (Carbapenem resistant) Test Not Applicable     van A/B (VRE gene) Test Not Applicable     VIM (Carbapenem resistant) Test Not Applicable    Narrative:      Aerobic and anaerobic    Respiratory Infection Panel (PCR), Nasopharyngeal [[id number]] Collected: 08/03/24 1849    Order Status: Completed Specimen: Nasopharyngeal Swab Updated: 08/03/24 1956     Respiratory Infection Panel Source NP Swab     Adenovirus Not Detected     Coronavirus 229E, Common Cold Virus Not Detected     Coronavirus HKU1, Common Cold Virus Not Detected     Coronavirus NL63, Common Cold Virus Not Detected     Coronavirus OC43, Common  Cold Virus Not Detected     Comment: The Coronavirus strains detected in this test cause the common cold.  These strains are not the COVID-19 (novel Coronavirus)strain   associated with the respiratory disease outbreak.          SARS-CoV2 (COVID-19) Qualitative PCR Not Detected     Human Metapneumovirus Not Detected     Human Rhinovirus/Enterovirus Not Detected     Influenza A (subtypes H1, H1-2009,H3) Not Detected     Influenza B Not Detected     Parainfluenza Virus 1 Not Detected     Parainfluenza Virus 2 Not Detected     Parainfluenza Virus 3 Not Detected     Parainfluenza Virus 4 Not Detected     Respiratory Syncytial Virus Not Detected     Bordetella Parapertussis (YV3117) Not Detected     Bordetella pertussis (ptxP) Not Detected     Chlamydia pneumoniae Not Detected     Mycoplasma pneumoniae Not Detected    Narrative:      Assay not valid for lower respiratory specimens, alternate  testing required.    Influenza A & B by Molecular [[id number]] Collected: 08/03/24 0942    Order Status: Completed Specimen: Nasopharyngeal Swab Updated: 08/03/24 1037     Influenza A, Molecular Negative     Influenza B, Molecular Negative     Flu A & B Source Nasal swab          All pertinent labs within the past 24 hours have been reviewed.    Significant Imaging: I have reviewed all pertinent imaging results/findings within the past 24 hours.    I have personally reviewed records / hospital notes from   service and other specialty providers. I have also reviewed CBC, CMP/BMP,  cultures and imaging with my interpretation as documented in my assessment / plan.    Patient is high risk for infectious complications given pt's age, multiple co-morbidities, and case complexity.      Time: 50 minutes   50% of time spent on face-to-face counseling and coordination of care. Counseling included review of test results, diagnosis, and treatment plan with patient and/or family.

## 2024-08-07 NOTE — PROGRESS NOTES
[facility] - Transplant Stepdown  Infectious Disease  Progress Note    Patient Name: Kristin Maier  MRN: [id number]  Admission Date: 8/3/2024  Length of Stay: 2 days  Attending Physician: Tracey Dutta MD  Primary Care Provider: Jorge A Stack MD    Isolation Status: No active isolations  Assessment/Plan:      ID  Bacteremia  I have reviewed hospital notes from  HM service and other specialty providers. I have also reviewed CBC, CMP/BMP,  cultures and imaging with my interpretation as documented.       48F with pulm HTN on Remodulin infused viai tunneled chest Rt sub clavian central line (placed 03/2022) - who presented 08/03 for general weakness, with abd bloating. Pt presented febrile 100.7F, with some mild hypotension 76/51, otherwise VSS, HDS. Wbc nml, , procalc 0.19.  Index bld cxs 08/01 positive GPRs, +Diphtheroids on one; while ID and susc pending on the other set.  Repeat bld cxs 08/05 NGTD. TTE neg for IE or veg. Suspected source picc line vs. GI translation in setting of abd bloating and new non-specific liver lesion.     Ct-chest-a/p revealed 2cm heterogenous hypodensity Rt lobe liver, new compared to prior 12/2021; ddx to include abscess or neoplasm. ID consulted for positive bld cxs with GPR.  Pt started 08/03 on vanc / zosyn; then 08/04 zosyn monotherapy; 08/05 switched to vanc monotherapy. Pt improving clinically, remains AF, VSS, HDS, wbc nml.  Repeat bld cxs 08/05 NGTD.    Recommendations / Plan:  Continue Iv-vancomycin.  Inpatient / (infusion if outpt) pharmD to dose vanc with target trough level of 15-20.   To complete abx duration of 2wks s/p removal of Rt chest tunneled line 08/06, MARGARITA 08/20.  RUE Midline placed to continue remodulin pending tentative removal of RT chest tunneled central line.       -- ID will schedule pt for ID clinic f/u appt   -- Discussed with ID staff and primary team.  -- ID will sign off at this time. Please see OPAT note below for outpt abx plans.        Outpatient Antibiotic Therapy Plan:    Please send referral to [facility].    1) Infection :   -- corynebacterium bacteremia 2/2 tunnel chest central line     2) Discharge Antibiotics:     Intravenous antibiotics:  IV - Vancomycin.  Infusion pharmD to dose vanc with target trough level of 15-20.     3) Therapy Duration:   2wks s/p 08/06    Estimated end date of IV antibiotics:   08/20    4) Outpatient Weekly Labs:    Order the following labs to be drawn on Mondays:   CBC  CMP   CRP  Vancomycin trough. Target 15-20    If discharged on vancomycin IV, order the following additional labs to be drawn on Thursdays:  CMP   Vancomycin trough. Target 15-20    If vancomycin trough is not at target (15-20) prior to discharge, schedule vancomycin trough to be drawn before their fourth outpatient dose.    5) Fax Lab Results to Infectious Diseases Provider:  Nicholas Brar PA-C    [facility] Fax Number: [phone]    6) Outpatient Infectious Diseases Follow-up    Follow-up appointment will be arranged by the ID clinic and will be found in the patient's appointments tab.    Prior to discharge, please ensure the patient's follow-up has been scheduled.    If there is still no follow-up scheduled prior to discharge, please send an [employer] message to [facility] Clinical Pool or Call Infectious Diseases Dept.                  Thank you for your consult. I will sign off. Please contact us if you have any additional questions.    Nicholas Brar PA-C  Infectious Disease  [facility] - Transplant Stepdown    Subjective:     Principal Problem:Gram-positive bacteremia    HPI: 48F with pulm HTN on Remodulin infused viai tunneled chest Rt sub clavian central line (placed 03/2022) - who presented 08/03 for general weakness, with abd bloating. Pt presented febrile 100.7F, with some mild hypotension 76/51, otherwise VSS, HDS. Wbc nml, , procalc 0.19.  Index bld cxs 08/01 +corynebacterium coyleae, same  species on both sets. Repeat bld cxs 08/05 NGTD. TTE neg for IE or veg. Suspected source picc line vs. GI translation in setting of abd bloating and new non-specific liver lesion.  ID consulted for positive bld cxs with GPR.     No new subjective & objective note has been filed under this hospital service since the last note was generated.

## 2024-08-07 NOTE — PLAN OF CARE
Pt AAOx4. Independent. PRN percocet given for pain. VS stable. Tele NS. Remodulin running at 3.69cc/hr - 89cc/24hr. Vanc cont.

## 2024-08-07 NOTE — SUBJECTIVE & OBJECTIVE
Interval History: Remains AF, VSS, HDS, wbc nml. Repeat bld cxs 08/05 remain cleared. Tunneled line susp source, removed 08/06.     Review of Systems   Constitutional:  Positive for fatigue.   Gastrointestinal:  Negative for abdominal pain.   Musculoskeletal:  Positive for myalgias (chronic).   Skin:  Negative for color change and wound.   All other systems reviewed and are negative.      Objective:     Vital Signs (Most Recent):  Temp: 98 °F (36.7 °C) (08/07/24 1525)  Pulse: 76 (08/07/24 1525)  Resp: 16 (08/07/24 1525)  BP: 117/82 (08/07/24 1525)  SpO2: 95 % (08/07/24 1525) Vital Signs (24h Range):  Temp:  [97.3 °F (36.3 °C)-98.7 °F (37.1 °C)] 98 °F (36.7 °C)  Pulse:  [64-98] 76  Resp:  [15-18] 16  SpO2:  [95 %-98 %] 95 %  BP: ()/(61-82) 117/82     Weight: 73.5 kg (162 lb 2.4 oz)  Body mass index is 25.4 kg/m².    Estimated Creatinine Clearance: 81.8 mL/min (based on SCr of 0.8 mg/dL).     Physical Exam  Vitals and nursing note reviewed.   Constitutional:       General: She is not in acute distress.     Appearance: She is not ill-appearing, toxic-appearing or diaphoretic.   HENT:      Mouth/Throat:      Pharynx: No oropharyngeal exudate.   Eyes:      General: No scleral icterus.     Conjunctiva/sclera: Conjunctivae normal.   Cardiovascular:      Rate and Rhythm: Normal rate.      Heart sounds: Murmur heard.   Pulmonary:      Effort: No respiratory distress.      Breath sounds: Normal breath sounds.   Abdominal:      General: Bowel sounds are normal. There is no distension.      Palpations: Abdomen is soft.      Tenderness: There is no abdominal tenderness. There is no right CVA tenderness or left CVA tenderness.   Musculoskeletal:         General: No swelling or tenderness.      Cervical back: Neck supple. No tenderness.      Right lower leg: No edema.      Left lower leg: No edema.   Skin:     General: Skin is warm and dry.      Findings: No erythema or rash.      Comments: Midline RUE, c/d/I.    Neurological:      Mental Status: She is alert and oriented to person, place, and time. Mental status is at baseline.   Psychiatric:         Behavior: Behavior normal.         Thought Content: Thought content normal.          Significant Labs: Blood Culture:   Recent Labs   Lab 08/03/24  0937 08/03/24  0938 08/05/24  0804 08/05/24  0807   LABBLOO Gram stain aer bottle: Gram positive rods  Results called to and read back by:Lee Olivarez RN 08/04/2024  16:37  CORYNEBACTERIUM SPECIES  further identified as Corynebacterium coyleae  For susceptibility see order #[id number]  * Gram stain aer bottle: Gram positive rods  Results called to and read back by:Lee Olivarez RN 08/04/2024  16:33  CORYNEBACTERIUM SPECIES  further identified as Corynebacterium coyleae  * No Growth to date  No Growth to date  No Growth to date No Growth to date  No Growth to date  No Growth to date     CBC:   Recent Labs   Lab 08/06/24  0641 08/07/24  0642   WBC 4.24 4.37   HGB 11.5* 11.5*   HCT 35.2* 35.9*   * 141*     CMP:   Recent Labs   Lab 08/06/24  0641 08/07/24  0642    138   K 3.9 3.9    109   CO2 24 21*   GLU 88 95   BUN 10 11   CREATININE 0.8 0.8   CALCIUM 9.0 9.5   PROT 6.7 6.9   ALBUMIN 3.2* 3.3*   BILITOT 1.2* 0.7   ALKPHOS 70 69   AST 10 12   ALT 9* 7*   ANIONGAP 6* 8     Microbiology Results (last 7 days)       Procedure Component Value Units Date/Time    Culture, Anaerobe [[id number]] Collected: 08/04/24 1147    Order Status: Completed Specimen: Skin from Chest, Right Updated: 08/07/24 1038     Anaerobic Culture Culture in progress    Blood culture [[id number]] Collected: 08/05/24 0804    Order Status: Completed Specimen: Blood Updated: 08/07/24 1012     Blood Culture, Routine No Growth to date      No Growth to date      No Growth to date    Blood culture [[id number]] Collected: 08/05/24 0807    Order Status: Completed Specimen: Blood Updated: 08/07/24 1012     Blood Culture, Routine No Growth to date       No Growth to date      No Growth to date    Aerobic culture [[id number]] Collected: 08/04/24 1147    Order Status: Completed Specimen: Skin from Chest, Right Updated: 08/06/24 1036     Aerobic Bacterial Culture Skin isha,  no predominant organism    Blood culture x two cultures. Draw prior to antibiotics. [[id number]]  (Abnormal) Collected: 08/03/24 0937    Order Status: Completed Specimen: Blood from Peripheral, Forearm, Right Updated: 08/06/24 0754     Blood Culture, Routine Gram stain aer bottle: Gram positive rods      Results called to and read back by:Lee Olivarez RN 08/04/2024  16:37      CORYNEBACTERIUM SPECIES  further identified as Corynebacterium coyleae  For susceptibility see order #[id number]      Narrative:      Aerobic and anaerobic    Blood culture x two cultures. Draw prior to antibiotics. [[id number]]  (Abnormal)  (Susceptibility) Collected: 08/03/24 0938    Order Status: Completed Specimen: Blood from Peripheral, Forearm, Left Updated: 08/06/24 0753     Blood Culture, Routine Gram stain aer bottle: Gram positive rods      Results called to and read back by:Lee Olivarez RN 08/04/2024  16:33      CORYNEBACTERIUM SPECIES  further identified as Corynebacterium coyleae      Narrative:      Aerobic and anaerobic    Rapid Organism ID by PCR (from Blood culture) [[id number]] Collected: 08/03/24 0938    Order Status: Completed Updated: 08/04/24 1805     Enterococcus faecalis Not Detected     Enterococcus faecium Not Detected     Listeria monocytogenes Not Detected     Staphylococcus spp. Not Detected     Staphylococcus aureus Not Detected     Staphylococcus epidermidis Not Detected     Staphylococcus lugdunensis Not Detected     Streptococcus species Not Detected     Streptococcus agalactiae Not Detected     Streptococcus pneumoniae Not Detected     Streptococcus pyogenes Not Detected     Acinetobacter calcoaceticus/baumannii complex Not Detected     Bacteroides fragilis Not Detected     Enterobacterales  Not Detected     Enterobacter cloacae complex Not Detected     Escherichia coli Not Detected     Klebsiella aerogenes Not Detected     Klebsiella oxytoca Not Detected     Klebsiella pneumoniae group Not Detected     Proteus Not Detected     Salmonella sp Not Detected     Serratia marcescens Not Detected     Haemophilus influenzae Not Detected     Neisseria meningtidis Not Detected     Pseudomonas aeruginosa Not Detected     Stenotrophomonas maltophilia Not Detected     Candida albicans Not Detected     Candida auris Not Detected     Candida glabrata Not Detected     Candida krusei Not Detected     Candida parapsilosis Not Detected     Candida tropicalis Not Detected     Cryptococcus neoformans/gattii Not Detected     CTX-M (ESBL ) Test Not Applicable     IMP (Carbapenem resistant) Test Not Applicable     KPC resistance gene (Carbapenem resistant) Test Not Applicable     mcr-1  Test Not Applicable     mec A/C  Test Not Applicable     mec A/C and MREJ (MRSA) gene Test Not Applicable     NDM (Carbapenem resistant) Test Not Applicable     OXA-48-like (Carbapenem resistant) Test Not Applicable     van A/B (VRE gene) Test Not Applicable     VIM (Carbapenem resistant) Test Not Applicable    Narrative:      Aerobic and anaerobic    Respiratory Infection Panel (PCR), Nasopharyngeal [[id number]] Collected: 08/03/24 1849    Order Status: Completed Specimen: Nasopharyngeal Swab Updated: 08/03/24 1956     Respiratory Infection Panel Source NP Swab     Adenovirus Not Detected     Coronavirus 229E, Common Cold Virus Not Detected     Coronavirus HKU1, Common Cold Virus Not Detected     Coronavirus NL63, Common Cold Virus Not Detected     Coronavirus OC43, Common Cold Virus Not Detected     Comment: The Coronavirus strains detected in this test cause the common cold.  These strains are not the COVID-19 (novel Coronavirus)strain   associated with the respiratory disease outbreak.          SARS-CoV2 (COVID-19) Qualitative PCR  Not Detected     Human Metapneumovirus Not Detected     Human Rhinovirus/Enterovirus Not Detected     Influenza A (subtypes H1, H1-2009,H3) Not Detected     Influenza B Not Detected     Parainfluenza Virus 1 Not Detected     Parainfluenza Virus 2 Not Detected     Parainfluenza Virus 3 Not Detected     Parainfluenza Virus 4 Not Detected     Respiratory Syncytial Virus Not Detected     Bordetella Parapertussis (JA1137) Not Detected     Bordetella pertussis (ptxP) Not Detected     Chlamydia pneumoniae Not Detected     Mycoplasma pneumoniae Not Detected    Narrative:      Assay not valid for lower respiratory specimens, alternate  testing required.    Influenza A & B by Molecular [[id number]] Collected: 08/03/24 0942    Order Status: Completed Specimen: Nasopharyngeal Swab Updated: 08/03/24 1037     Influenza A, Molecular Negative     Influenza B, Molecular Negative     Flu A & B Source Nasal swab          All pertinent labs within the past 24 hours have been reviewed.    Significant Imaging: I have reviewed all pertinent imaging results/findings within the past 24 hours.    I have personally reviewed records / hospital notes from   service and other specialty providers. I have also reviewed CBC, CMP/BMP,  cultures and imaging with my interpretation as documented in my assessment / plan.    Patient is high risk for infectious complications given pt's age, multiple co-morbidities, and case complexity.      Time: 50 minutes   50% of time spent on face-to-face counseling and coordination of care. Counseling included review of test results, diagnosis, and treatment plan with patient and/or family.

## 2024-08-07 NOTE — ASSESSMENT & PLAN NOTE
- Negative PCT  - No Leukocytosis. COVID negative.   - Viral panel negative  - Blood cultures frmo 8/3 positive for gram + rods corneybacterum.  Repeat cultures done 8/5 are NGTD   - Started on Zosyn but changed to Vanc after cultures resulted.   - CT of C/A/P done: 2.1 cm heterogeneous hypodensity right lobe of the liver new compared to prior. Both abscess and neoplasm need to considered. Unable to obtain MRI due to remodulin.    -Triple phase CT ordered: Nodular enhancing right hepatic lesion, favoring benign hemangioma. Additional flash filling hepatic hemangiomas or altered perfusion. MRI may be helpful if clinically indicated.   -Per interventional radiology: liver lesions do not look to be infectious  -unable to get MRI due to remodulin pump.  We don't feel comfortable stopping remodulin for testing.   -Cultures at line site sent and negative   -ID consulted and following

## 2024-08-07 NOTE — PLAN OF CARE
AAOx4, afebrile, with c/o pain. Prn pain meds given. Tele monitor in place (NSR). ID following. ID recommendations ordered. IV vanc continued. Blood cxs from 8/5 are NGTD. Right upper Arm midline with IV remodulin infusing @110 ng/kg/min, DW is 66 kg, 89 cc/24 hr. Pt able to position self independently. Pt in lowest position, side rails up x2, non-skid foot wear in place, call light within reach, pt verbalized understanding to call RN when needed. Hand hygiene practiced per protocol. Will continue to monitor.

## 2024-08-07 NOTE — SUBJECTIVE & OBJECTIVE
Interval History: No overnight acute events.  She has been afebrile over the last 24 hours.  Blood cultures from 8/3 positive for gram + rods: corynebacterium.  Zosyn stopped and changed to Vanc. Repeat cultures from 8/5 NGTD. Tunneled line removed by IR yesterday. Plan to replace line 48 hour after old line removal and if cultures from 8/5 remain negative.  She will need 2 week of Vanc from date of line removal with EOT: 8/20.  She looks euvolemic to dry on exam so will not start her home dose of Lasix back just yet.  Will get PT/OT evaluation    Continuous Infusions:   veletri/remodulin cassette   Intravenous Continuous        veletri/remodulin tubing   Intravenous Continuous         Scheduled Meds:   macitentan-tadalafil  1 tablet Oral Daily    pregabalin  75 mg Oral QHS    spironolactone  25 mg Oral Daily    treprostinil (REMODULIN) 12,000,000 ng in 0.9% NaCl 100 mL infusion  110.2 ng/kg/min (Order-Specific) Intravenous Q24H    vancomycin (VANCOCIN) IV (PEDS and ADULTS)  15 mg/kg Intravenous Q12H     PRN Meds:  Current Facility-Administered Medications:     acetaminophen, 650 mg, Oral, Q6H PRN    loperamide, 2 mg, Oral, QID PRN    ondansetron, 4 mg, Intravenous, Q8H PRN    oxyCODONE-acetaminophen, 1 tablet, Oral, Q6H PRN    sodium chloride 0.9%, 10 mL, Intravenous, PRN    Pharmacy to dose Vancomycin consult, , , Once **AND** vancomycin - pharmacy to dose, , Intravenous, pharmacy to manage frequency    Review of patient's allergies indicates:  No Known Allergies  Objective:     Vital Signs (Most Recent):  Temp: 97.3 °F (36.3 °C) (08/07/24 1120)  Pulse: 70 (08/07/24 1120)  Resp: 16 (08/07/24 1120)  BP: 93/61 (08/07/24 1120)  SpO2: 98 % (08/07/24 1120) Vital Signs (24h Range):  Temp:  [97.3 °F (36.3 °C)-98.7 °F (37.1 °C)] 97.3 °F (36.3 °C)  Pulse:  [64-98] 70  Resp:  [14-18] 16  SpO2:  [95 %-98 %] 98 %  BP: ()/(61-83) 93/61     Patient Vitals for the past 72 hrs (Last 3 readings):   Weight   08/07/24 0302  "73.5 kg (162 lb 2.4 oz)   08/06/24 0230 73.2 kg (161 lb 6 oz)   08/04/24 1733 66.2 kg (146 lb)     Body mass index is 25.4 kg/m².      Intake/Output Summary (Last 24 hours) at 8/7/2024 1148  Last data filed at 8/7/2024 0303  Gross per 24 hour   Intake 840 ml   Output 480 ml   Net 360 ml          Physical Exam  Constitutional:       Appearance: Normal appearance.   HENT:      Head: Atraumatic.   Eyes:      Conjunctiva/sclera: Conjunctivae normal.   Cardiovascular:      Rate and Rhythm: Normal rate and regular rhythm.      Heart sounds: Murmur heard.   Pulmonary:      Effort: Pulmonary effort is normal.      Breath sounds: Normal breath sounds. No wheezing or rhonchi.   Abdominal:      General: There is no distension.      Palpations: Abdomen is soft.      Tenderness: There is no abdominal tenderness.   Musculoskeletal:      Right lower leg: No edema.      Left lower leg: No edema.   Skin:     General: Skin is warm.   Neurological:      General: No focal deficit present.      Mental Status: She is alert.   Psychiatric:         Mood and Affect: Mood normal.            Significant Labs:  CBC:  Recent Labs   Lab 08/05/24  0538 08/06/24  0641 08/07/24  0642   WBC 4.53 4.24 4.37   RBC 4.17 3.99* 3.97*   HGB 12.1 11.5* 11.5*   HCT 36.6* 35.2* 35.9*   * 129* 141*   MCV 88 88 90   MCH 29.0 28.8 29.0   MCHC 33.1 32.7 32.0     BNP:  Recent Labs   Lab 08/03/24  0937   *     CMP:  Recent Labs   Lab 08/05/24  0539 08/06/24  0641 08/07/24  0642    88 95   CALCIUM 9.4 9.0 9.5   ALBUMIN 3.2* 3.2* 3.3*   PROT 6.7 6.7 6.9    139 138   K 3.4* 3.9 3.9   CO2 23 24 21*    109 109   BUN 9 10 11   CREATININE 0.8 0.8 0.8   ALKPHOS 77 70 69   ALT 10 9* 7*   AST 11 10 12   BILITOT 1.1* 1.2* 0.7      Coagulation:   Recent Labs   Lab 08/03/24  0937   INR 1.1   APTT 32.2*     LDH:  No results for input(s): "LDH" in the last 72 hours.  Microbiology:  Microbiology Results (last 7 days)       Procedure Component Value " Units Date/Time    Culture, Anaerobe [[id number]] Collected: 08/04/24 1147    Order Status: Completed Specimen: Skin from Chest, Right Updated: 08/07/24 1038     Anaerobic Culture Culture in progress    Blood culture [[id number]] Collected: 08/05/24 0804    Order Status: Completed Specimen: Blood Updated: 08/07/24 1012     Blood Culture, Routine No Growth to date      No Growth to date      No Growth to date    Blood culture [[id number]] Collected: 08/05/24 0807    Order Status: Completed Specimen: Blood Updated: 08/07/24 1012     Blood Culture, Routine No Growth to date      No Growth to date      No Growth to date    Aerobic culture [[id number]] Collected: 08/04/24 1147    Order Status: Completed Specimen: Skin from Chest, Right Updated: 08/06/24 1036     Aerobic Bacterial Culture Skin isha,  no predominant organism    Blood culture x two cultures. Draw prior to antibiotics. [[id number]]  (Abnormal) Collected: 08/03/24 0937    Order Status: Completed Specimen: Blood from Peripheral, Forearm, Right Updated: 08/06/24 0754     Blood Culture, Routine Gram stain aer bottle: Gram positive rods      Results called to and read back by:Lee Olivarez RN 08/04/2024  16:37      CORYNEBACTERIUM SPECIES  further identified as Corynebacterium coyleae  For susceptibility see order #[id number]      Narrative:      Aerobic and anaerobic    Blood culture x two cultures. Draw prior to antibiotics. [[id number]]  (Abnormal)  (Susceptibility) Collected: 08/03/24 0938    Order Status: Completed Specimen: Blood from Peripheral, Forearm, Left Updated: 08/06/24 0753     Blood Culture, Routine Gram stain aer bottle: Gram positive rods      Results called to and read back by:Lee Olivarez RN 08/04/2024  16:33      CORYNEBACTERIUM SPECIES  further identified as Corynebacterium coyleae      Narrative:      Aerobic and anaerobic    Rapid Organism ID by PCR (from Blood culture) [[id number]] Collected: 08/03/24 0938    Order Status: Completed  Updated: 08/04/24 180     Enterococcus faecalis Not Detected     Enterococcus faecium Not Detected     Listeria monocytogenes Not Detected     Staphylococcus spp. Not Detected     Staphylococcus aureus Not Detected     Staphylococcus epidermidis Not Detected     Staphylococcus lugdunensis Not Detected     Streptococcus species Not Detected     Streptococcus agalactiae Not Detected     Streptococcus pneumoniae Not Detected     Streptococcus pyogenes Not Detected     Acinetobacter calcoaceticus/baumannii complex Not Detected     Bacteroides fragilis Not Detected     Enterobacterales Not Detected     Enterobacter cloacae complex Not Detected     Escherichia coli Not Detected     Klebsiella aerogenes Not Detected     Klebsiella oxytoca Not Detected     Klebsiella pneumoniae group Not Detected     Proteus Not Detected     Salmonella sp Not Detected     Serratia marcescens Not Detected     Haemophilus influenzae Not Detected     Neisseria meningtidis Not Detected     Pseudomonas aeruginosa Not Detected     Stenotrophomonas maltophilia Not Detected     Candida albicans Not Detected     Candida auris Not Detected     Candida glabrata Not Detected     Candida krusei Not Detected     Candida parapsilosis Not Detected     Candida tropicalis Not Detected     Cryptococcus neoformans/gattii Not Detected     CTX-M (ESBL ) Test Not Applicable     IMP (Carbapenem resistant) Test Not Applicable     KPC resistance gene (Carbapenem resistant) Test Not Applicable     mcr-1  Test Not Applicable     mec A/C  Test Not Applicable     mec A/C and MREJ (MRSA) gene Test Not Applicable     NDM (Carbapenem resistant) Test Not Applicable     OXA-48-like (Carbapenem resistant) Test Not Applicable     van A/B (VRE gene) Test Not Applicable     VIM (Carbapenem resistant) Test Not Applicable    Narrative:      Aerobic and anaerobic    Respiratory Infection Panel (PCR), Nasopharyngeal [[id number]] Collected: 08/03/24 2059    Order Status:  Completed Specimen: Nasopharyngeal Swab Updated: 08/03/24 1956     Respiratory Infection Panel Source NP Swab     Adenovirus Not Detected     Coronavirus 229E, Common Cold Virus Not Detected     Coronavirus HKU1, Common Cold Virus Not Detected     Coronavirus NL63, Common Cold Virus Not Detected     Coronavirus OC43, Common Cold Virus Not Detected     Comment: The Coronavirus strains detected in this test cause the common cold.  These strains are not the COVID-19 (novel Coronavirus)strain   associated with the respiratory disease outbreak.          SARS-CoV2 (COVID-19) Qualitative PCR Not Detected     Human Metapneumovirus Not Detected     Human Rhinovirus/Enterovirus Not Detected     Influenza A (subtypes H1, H1-2009,H3) Not Detected     Influenza B Not Detected     Parainfluenza Virus 1 Not Detected     Parainfluenza Virus 2 Not Detected     Parainfluenza Virus 3 Not Detected     Parainfluenza Virus 4 Not Detected     Respiratory Syncytial Virus Not Detected     Bordetella Parapertussis (NU8436) Not Detected     Bordetella pertussis (ptxP) Not Detected     Chlamydia pneumoniae Not Detected     Mycoplasma pneumoniae Not Detected    Narrative:      Assay not valid for lower respiratory specimens, alternate  testing required.    Influenza A & B by Molecular [[id number]] Collected: 08/03/24 0942    Order Status: Completed Specimen: Nasopharyngeal Swab Updated: 08/03/24 1037     Influenza A, Molecular Negative     Influenza B, Molecular Negative     Flu A & B Source Nasal swab            I have reviewed all pertinent labs within the past 24 hours.    Estimated Creatinine Clearance: 81.8 mL/min (based on SCr of 0.8 mg/dL).    Diagnostic Results:  I have reviewed and interpreted all pertinent imaging results/findings within the past 24 hours.

## 2024-08-07 NOTE — PROGRESS NOTES
[facility] - Transplant Stepdown  Heart Transplant  Progress Note    Patient Name: Kristin Maier  MRN: [id number]  Admission Date: 8/3/2024  Hospital Length of Stay: 2 days  Attending Physician: Tracey Dutta MD  Primary Care Provider: Jorge A Stack MD  Principal Problem:Gram-positive bacteremia    Subjective:   Interval History: No overnight acute events.  She has been afebrile over the last 24 hours.  Blood cultures from 8/3 positive for gram + rods: corynebacterium.  Zosyn stopped and changed to Vanc. Repeat cultures from 8/5 NGTD. Tunneled line removed by IR yesterday. Plan to replace line 48 hour after old line removal and if cultures from 8/5 remain negative.  She will need 2 week of Vanc from date of line removal with EOT: 8/20.  She looks euvolemic to dry on exam so will not start her home dose of Lasix back just yet.  Will get PT/OT evaluation    Continuous Infusions:   veletri/remodulin cassette   Intravenous Continuous        veletri/remodulin tubing   Intravenous Continuous         Scheduled Meds:   macitentan-tadalafil  1 tablet Oral Daily    pregabalin  75 mg Oral QHS    spironolactone  25 mg Oral Daily    treprostinil (REMODULIN) 12,000,000 ng in 0.9% NaCl 100 mL infusion  110.2 ng/kg/min (Order-Specific) Intravenous Q24H    vancomycin (VANCOCIN) IV (PEDS and ADULTS)  15 mg/kg Intravenous Q12H     PRN Meds:  Current Facility-Administered Medications:     acetaminophen, 650 mg, Oral, Q6H PRN    loperamide, 2 mg, Oral, QID PRN    ondansetron, 4 mg, Intravenous, Q8H PRN    oxyCODONE-acetaminophen, 1 tablet, Oral, Q6H PRN    sodium chloride 0.9%, 10 mL, Intravenous, PRN    Pharmacy to dose Vancomycin consult, , , Once **AND** vancomycin - pharmacy to dose, , Intravenous, pharmacy to manage frequency    Review of patient's allergies indicates:  No Known Allergies  Objective:     Vital Signs (Most Recent):  Temp: 97.3 °F (36.3 °C) (08/07/24 1120)  Pulse: 70 (08/07/24 1120)  Resp: 16 (08/07/24  1120)  BP: 93/61 (08/07/24 1120)  SpO2: 98 % (08/07/24 1120) Vital Signs (24h Range):  Temp:  [97.3 °F (36.3 °C)-98.7 °F (37.1 °C)] 97.3 °F (36.3 °C)  Pulse:  [64-98] 70  Resp:  [14-18] 16  SpO2:  [95 %-98 %] 98 %  BP: ()/(61-83) 93/61     Patient Vitals for the past 72 hrs (Last 3 readings):   Weight   08/07/24 0302 73.5 kg (162 lb 2.4 oz)   08/06/24 0230 73.2 kg (161 lb 6 oz)   08/04/24 1733 66.2 kg (146 lb)     Body mass index is 25.4 kg/m².      Intake/Output Summary (Last 24 hours) at 8/7/2024 1148  Last data filed at 8/7/2024 0303  Gross per 24 hour   Intake 840 ml   Output 480 ml   Net 360 ml          Physical Exam  Constitutional:       Appearance: Normal appearance.   HENT:      Head: Atraumatic.   Eyes:      Conjunctiva/sclera: Conjunctivae normal.   Cardiovascular:      Rate and Rhythm: Normal rate and regular rhythm.      Heart sounds: Murmur heard.   Pulmonary:      Effort: Pulmonary effort is normal.      Breath sounds: Normal breath sounds. No wheezing or rhonchi.   Abdominal:      General: There is no distension.      Palpations: Abdomen is soft.      Tenderness: There is no abdominal tenderness.   Musculoskeletal:      Right lower leg: No edema.      Left lower leg: No edema.   Skin:     General: Skin is warm.   Neurological:      General: No focal deficit present.      Mental Status: She is alert.   Psychiatric:         Mood and Affect: Mood normal.            Significant Labs:  CBC:  Recent Labs   Lab 08/05/24  0538 08/06/24  0641 08/07/24  0642   WBC 4.53 4.24 4.37   RBC 4.17 3.99* 3.97*   HGB 12.1 11.5* 11.5*   HCT 36.6* 35.2* 35.9*   * 129* 141*   MCV 88 88 90   MCH 29.0 28.8 29.0   MCHC 33.1 32.7 32.0     BNP:  Recent Labs   Lab 08/03/24  0937   *     CMP:  Recent Labs   Lab 08/05/24  0539 08/06/24  0641 08/07/24  0642    88 95   CALCIUM 9.4 9.0 9.5   ALBUMIN 3.2* 3.2* 3.3*   PROT 6.7 6.7 6.9    139 138   K 3.4* 3.9 3.9   CO2 23 24 21*    109 109   BUN 9  "10 11   CREATININE 0.8 0.8 0.8   ALKPHOS 77 70 69   ALT 10 9* 7*   AST 11 10 12   BILITOT 1.1* 1.2* 0.7      Coagulation:   Recent Labs   Lab 08/03/24 0937   INR 1.1   APTT 32.2*     LDH:  No results for input(s): "LDH" in the last 72 hours.  Microbiology:  Microbiology Results (last 7 days)       Procedure Component Value Units Date/Time    Culture, Anaerobe [[id number]] Collected: 08/04/24 1147    Order Status: Completed Specimen: Skin from Chest, Right Updated: 08/07/24 1038     Anaerobic Culture Culture in progress    Blood culture [[id number]] Collected: 08/05/24 0804    Order Status: Completed Specimen: Blood Updated: 08/07/24 1012     Blood Culture, Routine No Growth to date      No Growth to date      No Growth to date    Blood culture [[id number]] Collected: 08/05/24 0807    Order Status: Completed Specimen: Blood Updated: 08/07/24 1012     Blood Culture, Routine No Growth to date      No Growth to date      No Growth to date    Aerobic culture [[id number]] Collected: 08/04/24 1147    Order Status: Completed Specimen: Skin from Chest, Right Updated: 08/06/24 1036     Aerobic Bacterial Culture Skin isha,  no predominant organism    Blood culture x two cultures. Draw prior to antibiotics. [[id number]]  (Abnormal) Collected: 08/03/24 0937    Order Status: Completed Specimen: Blood from Peripheral, Forearm, Right Updated: 08/06/24 0754     Blood Culture, Routine Gram stain aer bottle: Gram positive rods      Results called to and read back by:Lee Olivarez RN 08/04/2024  16:37      CORYNEBACTERIUM SPECIES  further identified as Corynebacterium coyleae  For susceptibility see order #[id number]      Narrative:      Aerobic and anaerobic    Blood culture x two cultures. Draw prior to antibiotics. [[id number]]  (Abnormal)  (Susceptibility) Collected: 08/03/24 0938    Order Status: Completed Specimen: Blood from Peripheral, Forearm, Left Updated: 08/06/24 0753     Blood Culture, Routine Gram stain aer bottle: Gram " positive rods      Results called to and read back by:Lee Olivarez RN 08/04/2024  16:33      CORYNEBACTERIUM SPECIES  further identified as Corynebacterium coyleae      Narrative:      Aerobic and anaerobic    Rapid Organism ID by PCR (from Blood culture) [[id number]] Collected: 08/03/24 0938    Order Status: Completed Updated: 08/04/24 1807     Enterococcus faecalis Not Detected     Enterococcus faecium Not Detected     Listeria monocytogenes Not Detected     Staphylococcus spp. Not Detected     Staphylococcus aureus Not Detected     Staphylococcus epidermidis Not Detected     Staphylococcus lugdunensis Not Detected     Streptococcus species Not Detected     Streptococcus agalactiae Not Detected     Streptococcus pneumoniae Not Detected     Streptococcus pyogenes Not Detected     Acinetobacter calcoaceticus/baumannii complex Not Detected     Bacteroides fragilis Not Detected     Enterobacterales Not Detected     Enterobacter cloacae complex Not Detected     Escherichia coli Not Detected     Klebsiella aerogenes Not Detected     Klebsiella oxytoca Not Detected     Klebsiella pneumoniae group Not Detected     Proteus Not Detected     Salmonella sp Not Detected     Serratia marcescens Not Detected     Haemophilus influenzae Not Detected     Neisseria meningtidis Not Detected     Pseudomonas aeruginosa Not Detected     Stenotrophomonas maltophilia Not Detected     Candida albicans Not Detected     Candida auris Not Detected     Candida glabrata Not Detected     Candida krusei Not Detected     Candida parapsilosis Not Detected     Candida tropicalis Not Detected     Cryptococcus neoformans/gattii Not Detected     CTX-M (ESBL ) Test Not Applicable     IMP (Carbapenem resistant) Test Not Applicable     KPC resistance gene (Carbapenem resistant) Test Not Applicable     mcr-1  Test Not Applicable     mec A/C  Test Not Applicable     mec A/C and MREJ (MRSA) gene Test Not Applicable     NDM (Carbapenem resistant) Test  Not Applicable     OXA-48-like (Carbapenem resistant) Test Not Applicable     van A/B (VRE gene) Test Not Applicable     VIM (Carbapenem resistant) Test Not Applicable    Narrative:      Aerobic and anaerobic    Respiratory Infection Panel (PCR), Nasopharyngeal [[id number]] Collected: 08/03/24 1849    Order Status: Completed Specimen: Nasopharyngeal Swab Updated: 08/03/24 1956     Respiratory Infection Panel Source NP Swab     Adenovirus Not Detected     Coronavirus 229E, Common Cold Virus Not Detected     Coronavirus HKU1, Common Cold Virus Not Detected     Coronavirus NL63, Common Cold Virus Not Detected     Coronavirus OC43, Common Cold Virus Not Detected     Comment: The Coronavirus strains detected in this test cause the common cold.  These strains are not the COVID-19 (novel Coronavirus)strain   associated with the respiratory disease outbreak.          SARS-CoV2 (COVID-19) Qualitative PCR Not Detected     Human Metapneumovirus Not Detected     Human Rhinovirus/Enterovirus Not Detected     Influenza A (subtypes H1, H1-2009,H3) Not Detected     Influenza B Not Detected     Parainfluenza Virus 1 Not Detected     Parainfluenza Virus 2 Not Detected     Parainfluenza Virus 3 Not Detected     Parainfluenza Virus 4 Not Detected     Respiratory Syncytial Virus Not Detected     Bordetella Parapertussis (DT4932) Not Detected     Bordetella pertussis (ptxP) Not Detected     Chlamydia pneumoniae Not Detected     Mycoplasma pneumoniae Not Detected    Narrative:      Assay not valid for lower respiratory specimens, alternate  testing required.    Influenza A & B by Molecular [[id number]] Collected: 08/03/24 0942    Order Status: Completed Specimen: Nasopharyngeal Swab Updated: 08/03/24 1037     Influenza A, Molecular Negative     Influenza B, Molecular Negative     Flu A & B Source Nasal swab            I have reviewed all pertinent labs within the past 24 hours.    Estimated Creatinine Clearance: 81.8 mL/min (based on SCr  of 0.8 mg/dL).    Diagnostic Results:  I have reviewed and interpreted all pertinent imaging results/findings within the past 24 hours.  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 Patient states that for the past two weeks she started to initially just feel more bloated and weak. She noticed her weight was going up and so she started to take two lasix pills instead of her usual one for an entire week. However, despite this she didn't feel any better. She denies any fever, cough, chills, chest pain, palpitations, shortness of breath, orthopnea, PND or lower extremity edema. Reports compliance with all her medication. Patient is currently on Remodulin 110 ng/kg/min, Opsumit 10 mg daily, and adempas 2 mg, TID.    * Gram-positive bacteremia  - Negative PCT  - No Leukocytosis. COVID negative.   - Viral panel negative  - Blood cultures frmo 8/3 positive for gram + rods corneybacterum.  Repeat cultures done 8/5 are NGTD   - Started on Zosyn but changed to Vanc after cultures resulted.   - CT of C/A/P done: 2.1 cm heterogeneous hypodensity right lobe of the liver new compared to prior. Both abscess and neoplasm need to considered. Unable to obtain MRI due to remodulin.    -Triple phase CT ordered: Nodular enhancing right hepatic lesion, favoring benign hemangioma. Additional flash filling hepatic hemangiomas or altered perfusion. MRI may be helpful if clinically indicated.   -Per interventional radiology: liver lesions do not look to be infectious  -unable to get MRI due to remodulin pump.  We don't feel comfortable stopping remodulin for testing.   -Cultures at line site sent and negative   -ID consulted and following    Abnormal liver CT  -2.1 cm heterogeneous hypodensity right lobe of the liver new compared to prior. Both abscess and neoplasm need to considered.   -Triple  phase CT ordered Nodular enhancing right hepatic lesion, favoring benign hemangioma. Additional flash filling hepatic hemangiomas or altered perfusion. MRI may be helpful if clinically indicated.     Hypertension  - Hold any home anti-hypertensives    Right-sided heart failure  - IVC collapsible, appears euvolemic on exam, unable to assess JVP  - Echo 8/4/24 EF: 55-60%, LVEDD: 4.36cm, Moderate RVE with function mod reduced with moderate TR. IVC 3mm   - Hold diuretics at this time    WHO group 1 pulmonary arterial hypertension  - Resume Remodulin dosing per pharmacy  - Resume OPSYNVI 10-40 mg, daily        YANIV Johnson  Heart Transplant  [facility] - [facility] Step[facility]

## 2024-08-08 LAB
ALBUMIN SERPL BCP-MCNC: 3.5 G/DL (ref 3.5–5.2)
ALP SERPL-CCNC: 68 U/L (ref 55–135)
ALT SERPL W/O P-5'-P-CCNC: 8 U/L (ref 10–44)
ANION GAP SERPL CALC-SCNC: 9 MMOL/L (ref 8–16)
AST SERPL-CCNC: 12 U/L (ref 10–40)
BACTERIA SPEC ANAEROBE CULT: NORMAL
BASOPHILS # BLD AUTO: 0.04 K/UL (ref 0–0.2)
BASOPHILS NFR BLD: 0.9 % (ref 0–1.9)
BILIRUB SERPL-MCNC: 0.6 MG/DL (ref 0.1–1)
BUN SERPL-MCNC: 8 MG/DL (ref 6–20)
CALCIUM SERPL-MCNC: 9.5 MG/DL (ref 8.7–10.5)
CHLORIDE SERPL-SCNC: 109 MMOL/L (ref 95–110)
CO2 SERPL-SCNC: 20 MMOL/L (ref 23–29)
CREAT SERPL-MCNC: 0.8 MG/DL (ref 0.5–1.4)
DIFFERENTIAL METHOD BLD: ABNORMAL
EOSINOPHIL # BLD AUTO: 0.1 K/UL (ref 0–0.5)
EOSINOPHIL NFR BLD: 2.6 % (ref 0–8)
ERYTHROCYTE [DISTWIDTH] IN BLOOD BY AUTOMATED COUNT: 14.8 % (ref 11.5–14.5)
EST. GFR  (NO RACE VARIABLE): >60 ML/MIN/1.73 M^2
GLUCOSE SERPL-MCNC: 93 MG/DL (ref 70–110)
HCT VFR BLD AUTO: 36.6 % (ref 37–48.5)
HGB BLD-MCNC: 11.4 G/DL (ref 12–16)
IMM GRANULOCYTES # BLD AUTO: 0.02 K/UL (ref 0–0.04)
IMM GRANULOCYTES NFR BLD AUTO: 0.4 % (ref 0–0.5)
LYMPHOCYTES # BLD AUTO: 1.8 K/UL (ref 1–4.8)
LYMPHOCYTES NFR BLD: 38.9 % (ref 18–48)
MAGNESIUM SERPL-MCNC: 2.1 MG/DL (ref 1.6–2.6)
MCH RBC QN AUTO: 28.9 PG (ref 27–31)
MCHC RBC AUTO-ENTMCNC: 31.1 G/DL (ref 32–36)
MCV RBC AUTO: 93 FL (ref 82–98)
MONOCYTES # BLD AUTO: 0.4 K/UL (ref 0.3–1)
MONOCYTES NFR BLD: 8.6 % (ref 4–15)
NEUTROPHILS # BLD AUTO: 2.2 K/UL (ref 1.8–7.7)
NEUTROPHILS NFR BLD: 48.6 % (ref 38–73)
NRBC BLD-RTO: 0 /100 WBC
PLATELET # BLD AUTO: 154 K/UL (ref 150–450)
PMV BLD AUTO: 11.2 FL (ref 9.2–12.9)
POTASSIUM SERPL-SCNC: 4.1 MMOL/L (ref 3.5–5.1)
PROT SERPL-MCNC: 7.1 G/DL (ref 6–8.4)
RBC # BLD AUTO: 3.94 M/UL (ref 4–5.4)
SODIUM SERPL-SCNC: 138 MMOL/L (ref 136–145)
WBC # BLD AUTO: 4.53 K/UL (ref 3.9–12.7)

## 2024-08-08 PROCEDURE — 63600175 PHARM REV CODE 636 W HCPCS: Performed by: STUDENT IN AN ORGANIZED HEALTH CARE EDUCATION/TRAINING PROGRAM

## 2024-08-08 PROCEDURE — 25000003 PHARM REV CODE 250: Performed by: INTERNAL MEDICINE

## 2024-08-08 PROCEDURE — 20600001 HC STEP DOWN PRIVATE ROOM

## 2024-08-08 PROCEDURE — 63600175 PHARM REV CODE 636 W HCPCS: Performed by: INTERNAL MEDICINE

## 2024-08-08 PROCEDURE — 97165 OT EVAL LOW COMPLEX 30 MIN: CPT

## 2024-08-08 PROCEDURE — 02HV33Z INSERTION OF INFUSION DEVICE INTO SUPERIOR VENA CAVA, PERCUTANEOUS APPROACH: ICD-10-PCS | Performed by: INTERNAL MEDICINE

## 2024-08-08 PROCEDURE — 87040 BLOOD CULTURE FOR BACTERIA: CPT | Performed by: STUDENT IN AN ORGANIZED HEALTH CARE EDUCATION/TRAINING PROGRAM

## 2024-08-08 PROCEDURE — 25000003 PHARM REV CODE 250: Performed by: STUDENT IN AN ORGANIZED HEALTH CARE EDUCATION/TRAINING PROGRAM

## 2024-08-08 PROCEDURE — 97535 SELF CARE MNGMENT TRAINING: CPT

## 2024-08-08 PROCEDURE — 97161 PT EVAL LOW COMPLEX 20 MIN: CPT

## 2024-08-08 PROCEDURE — 36415 COLL VENOUS BLD VENIPUNCTURE: CPT | Performed by: STUDENT IN AN ORGANIZED HEALTH CARE EDUCATION/TRAINING PROGRAM

## 2024-08-08 PROCEDURE — 63600175 PHARM REV CODE 636 W HCPCS: Mod: JG | Performed by: INTERNAL MEDICINE

## 2024-08-08 PROCEDURE — 36415 COLL VENOUS BLD VENIPUNCTURE: CPT | Mod: XB | Performed by: STUDENT IN AN ORGANIZED HEALTH CARE EDUCATION/TRAINING PROGRAM

## 2024-08-08 PROCEDURE — 80053 COMPREHEN METABOLIC PANEL: CPT | Performed by: STUDENT IN AN ORGANIZED HEALTH CARE EDUCATION/TRAINING PROGRAM

## 2024-08-08 PROCEDURE — 83735 ASSAY OF MAGNESIUM: CPT | Performed by: STUDENT IN AN ORGANIZED HEALTH CARE EDUCATION/TRAINING PROGRAM

## 2024-08-08 PROCEDURE — 0JH63XZ INSERTION OF TUNNELED VASCULAR ACCESS DEVICE INTO CHEST SUBCUTANEOUS TISSUE AND FASCIA, PERCUTANEOUS APPROACH: ICD-10-PCS | Performed by: INTERNAL MEDICINE

## 2024-08-08 PROCEDURE — 85025 COMPLETE CBC W/AUTO DIFF WBC: CPT | Performed by: STUDENT IN AN ORGANIZED HEALTH CARE EDUCATION/TRAINING PROGRAM

## 2024-08-08 RX ORDER — FENTANYL CITRATE 50 UG/ML
INJECTION, SOLUTION INTRAMUSCULAR; INTRAVENOUS
Status: COMPLETED | OUTPATIENT
Start: 2024-08-08 | End: 2024-08-08

## 2024-08-08 RX ORDER — MIDAZOLAM HYDROCHLORIDE 1 MG/ML
INJECTION, SOLUTION INTRAMUSCULAR; INTRAVENOUS
Status: COMPLETED | OUTPATIENT
Start: 2024-08-08 | End: 2024-08-08

## 2024-08-08 RX ADMIN — PREGABALIN 75 MG: 75 CAPSULE ORAL at 09:08

## 2024-08-08 RX ADMIN — OXYCODONE AND ACETAMINOPHEN 1 TABLET: 10; 325 TABLET ORAL at 10:08

## 2024-08-08 RX ADMIN — OXYCODONE AND ACETAMINOPHEN 1 TABLET: 10; 325 TABLET ORAL at 05:08

## 2024-08-08 RX ADMIN — SPIRONOLACTONE 25 MG: 25 TABLET ORAL at 09:08

## 2024-08-08 RX ADMIN — LOPERAMIDE HYDROCHLORIDE 2 MG: 2 CAPSULE ORAL at 09:08

## 2024-08-08 RX ADMIN — MIDAZOLAM HYDROCHLORIDE 0.5 MG: 2 INJECTION, SOLUTION INTRAMUSCULAR; INTRAVENOUS at 12:08

## 2024-08-08 RX ADMIN — OXYCODONE AND ACETAMINOPHEN 1 TABLET: 10; 325 TABLET ORAL at 11:08

## 2024-08-08 RX ADMIN — TREPROSTINIL 7383.4 NG/MIN: 100 INJECTION, SOLUTION INTRAVENOUS; SUBCUTANEOUS at 05:08

## 2024-08-08 RX ADMIN — FENTANYL CITRATE 25 MCG: 50 INJECTION, SOLUTION INTRAMUSCULAR; INTRAVENOUS at 12:08

## 2024-08-08 RX ADMIN — OXYCODONE AND ACETAMINOPHEN 1 TABLET: 10; 325 TABLET ORAL at 01:08

## 2024-08-08 RX ADMIN — ONDANSETRON 4 MG: 2 INJECTION INTRAMUSCULAR; INTRAVENOUS at 10:08

## 2024-08-08 RX ADMIN — VANCOMYCIN HYDROCHLORIDE 1000 MG: 1 INJECTION, POWDER, LYOPHILIZED, FOR SOLUTION INTRAVENOUS at 12:08

## 2024-08-08 NOTE — PLAN OF CARE
AAOx4, afebrile, with c/o pain in legs and right arm. Prn pain meds given. Tele monitor in place (NSR).  IV vanc continued. Blood cxs from 8/5 are NGTD. Right upper arm midline infiltrated. IV remodulin infusing through 20g peripheral @110 ng/kg/min, DW is 66 kg, 89 cc/24 hr. Backup peripheral in place. Midline team reconsulted. Rapid team not able to place midline at this time.   aware. Pt is NPO after midnight for tunnel cath placement. Pt able to position self independently. Pt in lowest position, side rails up x2, non-skid foot wear in place, call light within reach, pt verbalized understanding to call RN when needed. Hand hygiene practiced per protocol. Will continue to monitor.

## 2024-08-08 NOTE — PROGRESS NOTES
[facility] - Transplant Stepdown  Heart Transplant  Progress Note    Patient Name: Kristin Maier  MRN: [id number]  Admission Date: 8/3/2024  Hospital Length of Stay: 3 days  Attending Physician: Tracey Dutta MD  Primary Care Provider: Jorge A Stack MD  Principal Problem:Gram-positive bacteremia    Subjective:   Interval History: No overnight acute events.  She has been afebrile over the last 24 hours. Repeat cultures from 8/5 NGTD. Going for tunneled line placement today.  She will need 2 week of Vanc from date of line removal with EOT: 8/20.      Continuous Infusions:   vancomycin (VANCOCIN) IV (PEDS and ADULTS)   Intravenous Continuous PRN   1,000 mg at 08/08/24 1237    veletri/remodulin cassette   Intravenous Continuous        veletri/remodulin tubing   Intravenous Continuous         Scheduled Meds:   macitentan-tadalafil  1 tablet Oral Daily    pregabalin  75 mg Oral QHS    spironolactone  25 mg Oral Daily    treprostinil (REMODULIN) 12,000,000 ng in 0.9% NaCl 100 mL infusion  110.2 ng/kg/min (Order-Specific) Intravenous Q24H    vancomycin (VANCOCIN) IV (PEDS and ADULTS)  15 mg/kg Intravenous Q12H     PRN Meds:  Current Facility-Administered Medications:     acetaminophen, 650 mg, Oral, Q6H PRN    fentaNYL, , Intravenous, PRN    loperamide, 2 mg, Oral, QID PRN    midazolam, , , PRN    ondansetron, 4 mg, Intravenous, Q8H PRN    oxyCODONE-acetaminophen, 1 tablet, Oral, Q6H PRN    sodium chloride 0.9%, 10 mL, Intravenous, PRN    vancomycin (VANCOCIN) IV (PEDS and ADULTS), , Intravenous, Continuous PRN    Pharmacy to dose Vancomycin consult, , , Once **AND** vancomycin - pharmacy to dose, , Intravenous, pharmacy to manage frequency    Review of patient's allergies indicates:  No Known Allergies  Objective:     Vital Signs (Most Recent):  Temp: 98.3 °F (36.8 °C) (08/08/24 0800)  Pulse: 70 (08/08/24 1300)  Resp: 16 (08/08/24 1300)  BP: 98/74 (08/08/24 1300)  SpO2: 97 % (08/08/24 1300) Vital Signs (24h  Range):  Temp:  [98 °F (36.7 °C)-98.7 °F (37.1 °C)] 98.3 °F (36.8 °C)  Pulse:  [69-95] 70  Resp:  [16-20] 16  SpO2:  [94 %-99 %] 97 %  BP: ()/(56-89) 98/74     Patient Vitals for the past 72 hrs (Last 3 readings):   Weight   08/08/24 0314 72.3 kg (159 lb 6.3 oz)   08/07/24 0302 73.5 kg (162 lb 2.4 oz)   08/06/24 0230 73.2 kg (161 lb 6 oz)     Body mass index is 24.96 kg/m².      Intake/Output Summary (Last 24 hours) at 8/8/2024 1306  Last data filed at 8/8/2024 0315  Gross per 24 hour   Intake 1270 ml   Output 450 ml   Net 820 ml          Physical Exam  Constitutional:       Appearance: Normal appearance.   HENT:      Head: Atraumatic.   Eyes:      Conjunctiva/sclera: Conjunctivae normal.   Cardiovascular:      Rate and Rhythm: Normal rate and regular rhythm.      Heart sounds: Murmur heard.   Pulmonary:      Effort: Pulmonary effort is normal.      Breath sounds: Normal breath sounds. No wheezing or rhonchi.   Abdominal:      General: There is no distension.      Palpations: Abdomen is soft.      Tenderness: There is no abdominal tenderness.   Musculoskeletal:      Right lower leg: No edema.      Left lower leg: No edema.   Skin:     General: Skin is warm.   Neurological:      General: No focal deficit present.      Mental Status: She is alert.   Psychiatric:         Mood and Affect: Mood normal.            Significant Labs:  CBC:  Recent Labs   Lab 08/06/24  0641 08/07/24  0642 08/08/24  0711   WBC 4.24 4.37 4.53   RBC 3.99* 3.97* 3.94*   HGB 11.5* 11.5* 11.4*   HCT 35.2* 35.9* 36.6*   * 141* 154   MCV 88 90 93   MCH 28.8 29.0 28.9   MCHC 32.7 32.0 31.1*     BNP:  Recent Labs   Lab 08/03/24  0937   *     CMP:  Recent Labs   Lab 08/06/24  0641 08/07/24  0642 08/08/24  0711   GLU 88 95 93   CALCIUM 9.0 9.5 9.5   ALBUMIN 3.2* 3.3* 3.5   PROT 6.7 6.9 7.1    138 138   K 3.9 3.9 4.1   CO2 24 21* 20*    109 109   BUN 10 11 8   CREATININE 0.8 0.8 0.8   ALKPHOS 70 69 68   ALT 9* 7* 8*   AST  "10 12 12   BILITOT 1.2* 0.7 0.6      Coagulation:   Recent Labs   Lab 08/03/24 0937   INR 1.1   APTT 32.2*     LDH:  No results for input(s): "LDH" in the last 72 hours.  Microbiology:  Microbiology Results (last 7 days)       Procedure Component Value Units Date/Time    Blood culture [[id number]] Collected: 08/05/24 0807    Order Status: Completed Specimen: Blood Updated: 08/08/24 1108     Blood Culture, Routine Gram stain aer bottle: Gram positive rods      Results called to and read back by: Neeta Charles RN 08/08/2024      11:08    Culture, Anaerobe [[id number]] Collected: 08/04/24 1147    Order Status: Completed Specimen: Skin from Chest, Right Updated: 08/08/24 1101     Anaerobic Culture No anaerobes isolated    Blood culture [[id number]] Collected: 08/05/24 0804    Order Status: Completed Specimen: Blood Updated: 08/08/24 1013     Blood Culture, Routine No Growth to date      No Growth to date      No Growth to date      No Growth to date    Aerobic culture [[id number]] Collected: 08/04/24 1147    Order Status: Completed Specimen: Skin from Chest, Right Updated: 08/06/24 1036     Aerobic Bacterial Culture Skin isha,  no predominant organism    Blood culture x two cultures. Draw prior to antibiotics. [[id number]]  (Abnormal) Collected: 08/03/24 0937    Order Status: Completed Specimen: Blood from Peripheral, Forearm, Right Updated: 08/06/24 0754     Blood Culture, Routine Gram stain aer bottle: Gram positive rods      Results called to and read back by:Lee Olivarez RN 08/04/2024  16:37      CORYNEBACTERIUM SPECIES  further identified as Corynebacterium coyleae  For susceptibility see order #[id number]      Narrative:      Aerobic and anaerobic    Blood culture x two cultures. Draw prior to antibiotics. [[id number]]  (Abnormal)  (Susceptibility) Collected: 08/03/24 0938    Order Status: Completed Specimen: Blood from Peripheral, Forearm, Left Updated: 08/06/24 0753     Blood Culture, Routine Gram stain " aer bottle: Gram positive rods      Results called to and read back by:Lee Olivarez RN 08/04/2024  16:33      CORYNEBACTERIUM SPECIES  further identified as Corynebacterium coyleae      Narrative:      Aerobic and anaerobic    Rapid Organism ID by PCR (from Blood culture) [[id number]] Collected: 08/03/24 0938    Order Status: Completed Updated: 08/04/24 1801     Enterococcus faecalis Not Detected     Enterococcus faecium Not Detected     Listeria monocytogenes Not Detected     Staphylococcus spp. Not Detected     Staphylococcus aureus Not Detected     Staphylococcus epidermidis Not Detected     Staphylococcus lugdunensis Not Detected     Streptococcus species Not Detected     Streptococcus agalactiae Not Detected     Streptococcus pneumoniae Not Detected     Streptococcus pyogenes Not Detected     Acinetobacter calcoaceticus/baumannii complex Not Detected     Bacteroides fragilis Not Detected     Enterobacterales Not Detected     Enterobacter cloacae complex Not Detected     Escherichia coli Not Detected     Klebsiella aerogenes Not Detected     Klebsiella oxytoca Not Detected     Klebsiella pneumoniae group Not Detected     Proteus Not Detected     Salmonella sp Not Detected     Serratia marcescens Not Detected     Haemophilus influenzae Not Detected     Neisseria meningtidis Not Detected     Pseudomonas aeruginosa Not Detected     Stenotrophomonas maltophilia Not Detected     Candida albicans Not Detected     Candida auris Not Detected     Candida glabrata Not Detected     Candida krusei Not Detected     Candida parapsilosis Not Detected     Candida tropicalis Not Detected     Cryptococcus neoformans/gattii Not Detected     CTX-M (ESBL ) Test Not Applicable     IMP (Carbapenem resistant) Test Not Applicable     KPC resistance gene (Carbapenem resistant) Test Not Applicable     mcr-1  Test Not Applicable     mec A/C  Test Not Applicable     mec A/C and MREJ (MRSA) gene Test Not Applicable     NDM (Carbapenem  resistant) Test Not Applicable     OXA-48-like (Carbapenem resistant) Test Not Applicable     van A/B (VRE gene) Test Not Applicable     VIM (Carbapenem resistant) Test Not Applicable    Narrative:      Aerobic and anaerobic    Respiratory Infection Panel (PCR), Nasopharyngeal [[id number]] Collected: 08/03/24 1849    Order Status: Completed Specimen: Nasopharyngeal Swab Updated: 08/03/24 1956     Respiratory Infection Panel Source NP Swab     Adenovirus Not Detected     Coronavirus 229E, Common Cold Virus Not Detected     Coronavirus HKU1, Common Cold Virus Not Detected     Coronavirus NL63, Common Cold Virus Not Detected     Coronavirus OC43, Common Cold Virus Not Detected     Comment: The Coronavirus strains detected in this test cause the common cold.  These strains are not the COVID-19 (novel Coronavirus)strain   associated with the respiratory disease outbreak.          SARS-CoV2 (COVID-19) Qualitative PCR Not Detected     Human Metapneumovirus Not Detected     Human Rhinovirus/Enterovirus Not Detected     Influenza A (subtypes H1, H1-2009,H3) Not Detected     Influenza B Not Detected     Parainfluenza Virus 1 Not Detected     Parainfluenza Virus 2 Not Detected     Parainfluenza Virus 3 Not Detected     Parainfluenza Virus 4 Not Detected     Respiratory Syncytial Virus Not Detected     Bordetella Parapertussis (KS4717) Not Detected     Bordetella pertussis (ptxP) Not Detected     Chlamydia pneumoniae Not Detected     Mycoplasma pneumoniae Not Detected    Narrative:      Assay not valid for lower respiratory specimens, alternate  testing required.    Influenza A & B by Molecular [[id number]] Collected: 08/03/24 0942    Order Status: Completed Specimen: Nasopharyngeal Swab Updated: 08/03/24 1037     Influenza A, Molecular Negative     Influenza B, Molecular Negative     Flu A & B Source Nasal swab            I have reviewed all pertinent labs within the past 24 hours.    Estimated Creatinine Clearance: 81.8  mL/min (based on SCr of 0.8 mg/dL).    Diagnostic Results:  I have reviewed and interpreted all pertinent imaging results/findings within the past 24 hours.  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 Patient states that for the past two weeks she started to initially just feel more bloated and weak. She noticed her weight was going up and so she started to take two lasix pills instead of her usual one for an entire week. However, despite this she didn't feel any better. She denies any fever, cough, chills, chest pain, palpitations, shortness of breath, orthopnea, PND or lower extremity edema. Reports compliance with all her medication. Patient is currently on Remodulin 110 ng/kg/min, Opsumit 10 mg daily, and adempas 2 mg, TID.    * Gram-positive bacteremia  - Negative PCT  - No Leukocytosis. COVID negative.   - Viral panel negative  - Blood cultures frmo 8/3 positive for gram + rods corneybacterum.  Repeat cultures done 8/5 are NGTD   - Started on Zosyn but changed to Vanc after cultures resulted.   - CT of C/A/P done: 2.1 cm heterogeneous hypodensity right lobe of the liver new compared to prior. Both abscess and neoplasm need to considered. Unable to obtain MRI due to remodulin.    -Triple phase CT ordered: Nodular enhancing right hepatic lesion, favoring benign hemangioma. Additional flash filling hepatic hemangiomas or altered perfusion. MRI may be helpful if clinically indicated.   -Per interventional radiology: liver lesions do not look to be infectious  -unable to get MRI due to remodulin pump.  We don't feel comfortable stopping remodulin for testing.   -Cultures at line site sent and negative   -ID consulted and following    Abnormal liver CT  -2.1 cm heterogeneous hypodensity right lobe of the liver new compared to prior. Both abscess and neoplasm need to  considered.   -Triple phase CT ordered Nodular enhancing right hepatic lesion, favoring benign hemangioma. Additional flash filling hepatic hemangiomas or altered perfusion. MRI may be helpful if clinically indicated.     Hypertension  - Hold any home anti-hypertensives    Right-sided heart failure  - IVC collapsible, appears euvolemic on exam, unable to assess JVP  - Echo 8/4/24 EF: 55-60%, LVEDD: 4.36cm, Moderate RVE with function mod reduced with moderate TR. IVC 3mm   - Hold diuretics at this time    WHO group 1 pulmonary arterial hypertension  - Resume Remodulin dosing per pharmacy  - Resume OPSYNVI 10-40 mg, daily        Aldo Starr, ANA M  Heart Transplant  [facility] - [facility]

## 2024-08-08 NOTE — PLAN OF CARE
Goals to be met by: 09/08/24     Patient will increase functional independence with ADLs by performing:    Grooming while standing at sink with [place].  Dressing from EOB with IND.

## 2024-08-08 NOTE — PLAN OF CARE
Post procedure report given to primary nurse, Neeta. Pt will transfer to MPU for 1 hr recovery prior to transfer back to inpt room.

## 2024-08-08 NOTE — PT/OT/SLP EVAL
Physical Therapy Evaluation and Discharge Note    Patient Name:  Kristin Maier   MRN:  [id number]    Recommendations:     Discharge Recommendations: No Therapy Indicated  Discharge Equipment Recommendations: none   Barriers to discharge: None    Assessment:     Kristin Maier is a 48 y.o. female admitted with a medical diagnosis of Gram-positive bacteremia. .  At this time, patient is functioning at their prior level of function and does not require further acute PT services.     Recent Surgery: * No surgery found *      Plan:     During this hospitalization, patient does not require further acute PT services.  Please re-consult if situation changes.      Subjective     Chief Complaint: (B) hand and foot pain  Patient/Family Comments/goals: pt. Agreeable to PT  Pain/Comfort:  Pain Rating 1: 7/10  Location - Side 1: Bilateral  Location - Orientation 1: generalized  Location 1:  (hands and feet)  Pain Addressed 1: Reposition, Distraction, Nurse notified  Pain Rating Post-Intervention 1: 7/10    Patients cultural, spiritual, Buddhist conflicts given the current situation: no    Living Environment:  Pt. Lives with family in [facility] with 3 NIKKI and no handrail  Prior to admission, patients level of function was indep.  Equipment used at home: walker, rolling, bedside commode.  Upon discharge, patient will have assistance from family.    Objective:     Communicated with nursing prior to session.  Patient found supine with peripheral IV, central line upon PT entry to room.    General Precautions: Standard, fall    Orthopedic Precautions:N/A   Braces: N/A  Respiratory Status: Room air    Exams:  RLE ROM: WFL  RLE Strength: WFL  LLE ROM: WFL  LLE Strength: WFL    Functional Mobility:  Bed Mobility:     Rolling Right: supervision  Scooting: supervision  Supine to Sit: supervision  Transfers:     Sit to Stand:  supervision with no AD  Gait: >120' with Supervision without AD or LOB. Pt. amb. with steady with no  significant deficits.  Balance: good    AM-PAC 6 CLICK MOBILITY  Total Score:24       Treatment and Education:  Discussed therapy needs, goals, and POC.    AM-PAC 6 CLICK MOBILITY  Total Score:24     Patient left up in chair with all lines intact and call button in reach.    GOALS:   Multidisciplinary Problems       Physical Therapy Goals       Not on file              Multidisciplinary Problems (Resolved)          Problem: Physical Therapy    Goal Priority Disciplines Outcome Goal Variances Interventions   Physical Therapy Goal   (Resolved)     PT, PT/OT Met                         History:     Past Medical History:   Diagnosis Date    Elevated liver enzymes     Essential (primary) hypertension     Heart failure, unspecified     Hypokalemia     Mixed hyperlipidemia     Neuropathic pain     Tx w/ tramadol & Lyrica    Pulmonary hypertension     Receiving Remodulin continuously through tunneled central line       Past Surgical History:   Procedure Laterality Date    APPENDECTOMY       SECTION      Transverse cut    HYSTERECTOMY  2017    TLH- bleeding    OOPHORECTOMY      RIGHT HEART CATHETERIZATION Right 2021    Procedure: INSERTION, CATHETER, RIGHT HEART;  Surgeon: Chloe Gregory MD;  Location: [facility] CATH LAB;  Service: Cardiology;  Laterality: Right;    RIGHT HEART CATHETERIZATION Right 3/16/2022    Procedure: INSERTION, CATHETER, RIGHT HEART;  Surgeon: Hu Silverman MD;  Location: [facility] CATH LAB;  Service: Cardiology;  Laterality: Right;    RIGHT HEART CATHETERIZATION Right 2022    Procedure: INSERTION, CATHETER, RIGHT HEART;  Surgeon: Chloe Gregory MD;  Location: [facility] CATH LAB;  Service: Cardiology;  Laterality: Right;    RIGHT HEART CATHETERIZATION Right 3/21/2023    Procedure: INSERTION, CATHETER, RIGHT HEART;  Surgeon: Kahlil Cisneros MD;  Location: [facility] CATH LAB;  Service: Cardiology;  Laterality: Right;    RIGHT HEART CATHETERIZATION Right 10/9/2023    Procedure:  INSERTION, CATHETER, RIGHT HEART;  Surgeon: Chloe Gregory MD;  Location: [facility] CATH LAB;  Service: Cardiology;  Laterality: Right;    TUBAL LIGATION  1996    VAGINAL DELIVERY  1991; 1993; 1994; 1996       Time Tracking:     PT Received On: 08/08/24  PT Start Time: 0958     PT Stop Time: 1011  PT Total Time (min): 13 min     Billable Minutes: Evaluation 13      08/08/2024

## 2024-08-08 NOTE — H&P
Interventional Radiology  Consult/History & Physical Note    Consult Requested By: Tracey Dutta MD  Reason for Consult: need for tunneled line placement. IV Remodulin---SINGLE LUMEN ONLY     SUBJECTIVE:     Chief Complaint:  Shortness of breath    History of Present Illness:  Kristin Maier is a 48 y.o. female with a PMHx of pulmonary hypertension on continuous infusion of Remodulin who was admitted on 8/3/24 for shortness of breath. Hospital course notable for positive blood cultures growing corynebacterium. Interventional Radiology has been consulted for tunneled line placement for long term IV access. WBC is 4.53, last set of blood cultures on 8/5/24 revealed NGTD. Pt is afebrile and hemodynamically stable. She denies a history of JASWINDER requiring nightly CPAP and difficulty breathing when lying flat. She does not take any anticoagulants. She has been NPO since midnight.    Review of Systems   Constitutional:  Positive for malaise/fatigue. Negative for chills and fever.   Cardiovascular:  Negative for chest pain and leg swelling.   Gastrointestinal:  Negative for abdominal pain, nausea and vomiting.   Neurological:  Negative for weakness.       Scheduled Meds:   macitentan-tadalafil  1 tablet Oral Daily    pregabalin  75 mg Oral QHS    spironolactone  25 mg Oral Daily    treprostinil (REMODULIN) 12,000,000 ng in 0.9% NaCl 100 mL infusion  110.2 ng/kg/min (Order-Specific) Intravenous Q24H    vancomycin (VANCOCIN) IV (PEDS and ADULTS)  15 mg/kg Intravenous Q12H     Continuous Infusions:   veletri/remodulin cassette   Intravenous Continuous        veletri/remodulin tubing   Intravenous Continuous         PRN Meds:  Current Facility-Administered Medications:     acetaminophen, 650 mg, Oral, Q6H PRN    loperamide, 2 mg, Oral, QID PRN    ondansetron, 4 mg, Intravenous, Q8H PRN    oxyCODONE-acetaminophen, 1 tablet, Oral, Q6H PRN    sodium chloride 0.9%, 10 mL, Intravenous, PRN    Pharmacy to dose Vancomycin consult, ,  , Once **AND** vancomycin - pharmacy to dose, , Intravenous, pharmacy to manage frequency    Review of patient's allergies indicates:  No Known Allergies    Past Medical History:   Diagnosis Date    Elevated liver enzymes     Essential (primary) hypertension     Heart failure, unspecified     Hypokalemia     Mixed hyperlipidemia     Neuropathic pain     Tx w/ tramadol & Lyrica    Pulmonary hypertension     Receiving Remodulin continuously through tunneled central line     Past Surgical History:   Procedure Laterality Date    APPENDECTOMY       SECTION      Transverse cut    HYSTERECTOMY  2017    TLH- bleeding    OOPHORECTOMY      RIGHT HEART CATHETERIZATION Right 2021    Procedure: INSERTION, CATHETER, RIGHT HEART;  Surgeon: Chloe Gregory MD;  Location: [facility] CATH LAB;  Service: Cardiology;  Laterality: Right;    RIGHT HEART CATHETERIZATION Right 3/16/2022    Procedure: INSERTION, CATHETER, RIGHT HEART;  Surgeon: Hu Silverman MD;  Location: [facility] CATH LAB;  Service: Cardiology;  Laterality: Right;    RIGHT HEART CATHETERIZATION Right 2022    Procedure: INSERTION, CATHETER, RIGHT HEART;  Surgeon: Chloe Gregory MD;  Location: [facility] CATH LAB;  Service: Cardiology;  Laterality: Right;    RIGHT HEART CATHETERIZATION Right 3/21/2023    Procedure: INSERTION, CATHETER, RIGHT HEART;  Surgeon: Kahlil Cisneros MD;  Location: [facility] CATH LAB;  Service: Cardiology;  Laterality: Right;    RIGHT HEART CATHETERIZATION Right 10/9/2023    Procedure: INSERTION, CATHETER, RIGHT HEART;  Surgeon: Chloe Gregory MD;  Location: [facility] CATH LAB;  Service: Cardiology;  Laterality: Right;    TUBAL LIGATION      VAGINAL DELIVERY  1993; 1996     Family History   Problem Relation Name Age of Onset    Cancer Father  69        stomach    No Known Problems Sister      No Known Problems Brother      No Known Problems Brother      Breast cancer Neg Hx      Colon cancer Neg Hx      Ovarian cancer  Neg Hx       Social History     Tobacco Use    Smoking status: Former     Types: Cigars     Quit date: 12/1/2020     Years since quitting: 3.6     Passive exposure: Past    Smokeless tobacco: Never    Tobacco comments:     social smoker-light - quit 2001   Substance Use Topics    Alcohol use: Not Currently     Alcohol/week: 2.0 standard drinks of alcohol     Types: 2 Glasses of wine per week     Comment: None now -  previously: socially- 2 or 3 times a week-2 glasses of wine    Drug use: Not Currently     Types: Marijuana     Comment: 2021 last use       OBJECTIVE:     Vital Signs (Most Recent)  Temp: 98.3 °F (36.8 °C) (08/08/24 0800)  Pulse: 69 (08/08/24 0800)  Resp: 16 (08/08/24 0800)  BP: (!) 102/56 (08/08/24 0800)  SpO2: 95 % (08/08/24 0800)    Physical Exam:  Physical Exam  Constitutional:       General: She is not in acute distress.  Pulmonary:      Effort: Pulmonary effort is normal. No respiratory distress.   Abdominal:      General: There is no distension.      Tenderness: There is no abdominal tenderness.   Musculoskeletal:      Right lower leg: No edema.      Left lower leg: No edema.   Neurological:      General: No focal deficit present.      Mental Status: She is alert and oriented to person, place, and time.   Psychiatric:         Mood and Affect: Mood normal.         Laboratory  I have reviewed all pertinent lab results within the past 24 hours.    ASA/Mallampati  ASA: 2  Mallampati: 3    Imaging:  Recent imaging studies reviewed.     ASSESSMENT/PLAN:     Assessment:  48 y.o. female with a PMHx of pulmonary hypertension requiring continuous infusion of Remodulin who has been referred to IR for tunneled line placement for long term IV access. The procedure was discussed in great detail with the patient including thorough explanations of the potential risks and benefits of tunneled PICC placement. Risks include bleeding at the puncture site, infection, catheter related thrombus, catheter dysfunction,  central vein stenosis and need for additional procedures. The patient is a candidate for tunneled PICC placement under moderate sedation. Plan discussed with ordering physician and pt who verbalized understanding of the plan and would like to proceed.    Plan:  Will proceed with tunneled PICC placement under moderate sedation on 8/8/24.   Anticoagulation history reviewed. Patient is not currently on anticoagulation.   Coagulation labs reviewed. INR 1.1 and plt count 154.  Thank you for the consult. Please contact with questions via [employer] secure chat or spectra.    Lauren Dunn MD  Radiology, PGY III  [facility]

## 2024-08-08 NOTE — NURSING
Pt stated her arm with the midline (remodulin infusing through the midline) was hurting. Upon assessment, Pt's arm looked red and tender to touch. 2 new peripheral IVs started. Remodulin moved from the midline to the new 20 g peripheral IV (directly to the hub). Notified Dr. Tyson. Midline removed, and midline team was re consulted. Pt is made NPO after midnight in case of discussion to place new tunnel cath.

## 2024-08-08 NOTE — CARE UPDATE
Patient with infiltrated midline.  Have moved Remodulin to peripheral IV.  Will reach out to rapid team to replace midline.  If unable to obtain midline tonight we will continue Remodulin infusion through her peripheral IV as long as it is working well and she has no pain.  Hold off on central line for now.  NPO at midnight for potential tunneled cath tomorrow Given be 48 hours of negative cultures.

## 2024-08-08 NOTE — DISCHARGE INSTRUCTIONS
"Please call with any questions or concerns.    Monday through Friday 8:00 am - 4:30 pm  [facility]  [phone]    After hours/weekends  Ask the  for the "Interventional Radiology Resident on call"  [phone]    "

## 2024-08-08 NOTE — SUBJECTIVE & OBJECTIVE
Interval History: No overnight acute events.  She has been afebrile over the last 24 hours. Repeat cultures from 8/5 NGTD. Going for tunneled line placement today.  She will need 2 week of Vanc from date of line removal with EOT: 8/20.      Continuous Infusions:   vancomycin (VANCOCIN) IV (PEDS and ADULTS)   Intravenous Continuous PRN   1,000 mg at 08/08/24 1237    veletri/remodulin cassette   Intravenous Continuous        veletri/remodulin tubing   Intravenous Continuous         Scheduled Meds:   macitentan-tadalafil  1 tablet Oral Daily    pregabalin  75 mg Oral QHS    spironolactone  25 mg Oral Daily    treprostinil (REMODULIN) 12,000,000 ng in 0.9% NaCl 100 mL infusion  110.2 ng/kg/min (Order-Specific) Intravenous Q24H    vancomycin (VANCOCIN) IV (PEDS and ADULTS)  15 mg/kg Intravenous Q12H     PRN Meds:  Current Facility-Administered Medications:     acetaminophen, 650 mg, Oral, Q6H PRN    fentaNYL, , Intravenous, PRN    loperamide, 2 mg, Oral, QID PRN    midazolam, , , PRN    ondansetron, 4 mg, Intravenous, Q8H PRN    oxyCODONE-acetaminophen, 1 tablet, Oral, Q6H PRN    sodium chloride 0.9%, 10 mL, Intravenous, PRN    vancomycin (VANCOCIN) IV (PEDS and ADULTS), , Intravenous, Continuous PRN    Pharmacy to dose Vancomycin consult, , , Once **AND** vancomycin - pharmacy to dose, , Intravenous, pharmacy to manage frequency    Review of patient's allergies indicates:  No Known Allergies  Objective:     Vital Signs (Most Recent):  Temp: 98.3 °F (36.8 °C) (08/08/24 0800)  Pulse: 70 (08/08/24 1300)  Resp: 16 (08/08/24 1300)  BP: 98/74 (08/08/24 1300)  SpO2: 97 % (08/08/24 1300) Vital Signs (24h Range):  Temp:  [98 °F (36.7 °C)-98.7 °F (37.1 °C)] 98.3 °F (36.8 °C)  Pulse:  [69-95] 70  Resp:  [16-20] 16  SpO2:  [94 %-99 %] 97 %  BP: ()/(56-89) 98/74     Patient Vitals for the past 72 hrs (Last 3 readings):   Weight   08/08/24 0314 72.3 kg (159 lb 6.3 oz)   08/07/24 0302 73.5 kg (162 lb 2.4 oz)   08/06/24 0230 73.2  "kg (161 lb 6 oz)     Body mass index is 24.96 kg/m².      Intake/Output Summary (Last 24 hours) at 8/8/2024 1306  Last data filed at 8/8/2024 0315  Gross per 24 hour   Intake 1270 ml   Output 450 ml   Net 820 ml          Physical Exam  Constitutional:       Appearance: Normal appearance.   HENT:      Head: Atraumatic.   Eyes:      Conjunctiva/sclera: Conjunctivae normal.   Cardiovascular:      Rate and Rhythm: Normal rate and regular rhythm.      Heart sounds: Murmur heard.   Pulmonary:      Effort: Pulmonary effort is normal.      Breath sounds: Normal breath sounds. No wheezing or rhonchi.   Abdominal:      General: There is no distension.      Palpations: Abdomen is soft.      Tenderness: There is no abdominal tenderness.   Musculoskeletal:      Right lower leg: No edema.      Left lower leg: No edema.   Skin:     General: Skin is warm.   Neurological:      General: No focal deficit present.      Mental Status: She is alert.   Psychiatric:         Mood and Affect: Mood normal.            Significant Labs:  CBC:  Recent Labs   Lab 08/06/24  0641 08/07/24  0642 08/08/24  0711   WBC 4.24 4.37 4.53   RBC 3.99* 3.97* 3.94*   HGB 11.5* 11.5* 11.4*   HCT 35.2* 35.9* 36.6*   * 141* 154   MCV 88 90 93   MCH 28.8 29.0 28.9   MCHC 32.7 32.0 31.1*     BNP:  Recent Labs   Lab 08/03/24  0937   *     CMP:  Recent Labs   Lab 08/06/24  0641 08/07/24  0642 08/08/24  0711   GLU 88 95 93   CALCIUM 9.0 9.5 9.5   ALBUMIN 3.2* 3.3* 3.5   PROT 6.7 6.9 7.1    138 138   K 3.9 3.9 4.1   CO2 24 21* 20*    109 109   BUN 10 11 8   CREATININE 0.8 0.8 0.8   ALKPHOS 70 69 68   ALT 9* 7* 8*   AST 10 12 12   BILITOT 1.2* 0.7 0.6      Coagulation:   Recent Labs   Lab 08/03/24  0937   INR 1.1   APTT 32.2*     LDH:  No results for input(s): "LDH" in the last 72 hours.  Microbiology:  Microbiology Results (last 7 days)       Procedure Component Value Units Date/Time    Blood culture [[id number]] Collected: 08/05/24 0807    " Order Status: Completed Specimen: Blood Updated: 08/08/24 1108     Blood Culture, Routine Gram stain aer bottle: Gram positive rods      Results called to and read back by: Neeta Charles RN 08/08/2024      11:08    Culture, Anaerobe [[id number]] Collected: 08/04/24 1147    Order Status: Completed Specimen: Skin from Chest, Right Updated: 08/08/24 1101     Anaerobic Culture No anaerobes isolated    Blood culture [[id number]] Collected: 08/05/24 0804    Order Status: Completed Specimen: Blood Updated: 08/08/24 1013     Blood Culture, Routine No Growth to date      No Growth to date      No Growth to date      No Growth to date    Aerobic culture [[id number]] Collected: 08/04/24 1147    Order Status: Completed Specimen: Skin from Chest, Right Updated: 08/06/24 1036     Aerobic Bacterial Culture Skin isha,  no predominant organism    Blood culture x two cultures. Draw prior to antibiotics. [[id number]]  (Abnormal) Collected: 08/03/24 0937    Order Status: Completed Specimen: Blood from Peripheral, Forearm, Right Updated: 08/06/24 0754     Blood Culture, Routine Gram stain aer bottle: Gram positive rods      Results called to and read back by:Lee Olivarez RN 08/04/2024  16:37      CORYNEBACTERIUM SPECIES  further identified as Corynebacterium coyleae  For susceptibility see order #[id number]      Narrative:      Aerobic and anaerobic    Blood culture x two cultures. Draw prior to antibiotics. [[id number]]  (Abnormal)  (Susceptibility) Collected: 08/03/24 0938    Order Status: Completed Specimen: Blood from Peripheral, Forearm, Left Updated: 08/06/24 0753     Blood Culture, Routine Gram stain aer bottle: Gram positive rods      Results called to and read back by:Lee Olivarez RN 08/04/2024  16:33      CORYNEBACTERIUM SPECIES  further identified as Corynebacterium coyleae      Narrative:      Aerobic and anaerobic    Rapid Organism ID by PCR (from Blood culture) [[id number]] Collected: 08/03/24 0938    Order Status:  Completed Updated: 08/04/24 1805     Enterococcus faecalis Not Detected     Enterococcus faecium Not Detected     Listeria monocytogenes Not Detected     Staphylococcus spp. Not Detected     Staphylococcus aureus Not Detected     Staphylococcus epidermidis Not Detected     Staphylococcus lugdunensis Not Detected     Streptococcus species Not Detected     Streptococcus agalactiae Not Detected     Streptococcus pneumoniae Not Detected     Streptococcus pyogenes Not Detected     Acinetobacter calcoaceticus/baumannii complex Not Detected     Bacteroides fragilis Not Detected     Enterobacterales Not Detected     Enterobacter cloacae complex Not Detected     Escherichia coli Not Detected     Klebsiella aerogenes Not Detected     Klebsiella oxytoca Not Detected     Klebsiella pneumoniae group Not Detected     Proteus Not Detected     Salmonella sp Not Detected     Serratia marcescens Not Detected     Haemophilus influenzae Not Detected     Neisseria meningtidis Not Detected     Pseudomonas aeruginosa Not Detected     Stenotrophomonas maltophilia Not Detected     Candida albicans Not Detected     Candida auris Not Detected     Candida glabrata Not Detected     Candida krusei Not Detected     Candida parapsilosis Not Detected     Candida tropicalis Not Detected     Cryptococcus neoformans/gattii Not Detected     CTX-M (ESBL ) Test Not Applicable     IMP (Carbapenem resistant) Test Not Applicable     KPC resistance gene (Carbapenem resistant) Test Not Applicable     mcr-1  Test Not Applicable     mec A/C  Test Not Applicable     mec A/C and MREJ (MRSA) gene Test Not Applicable     NDM (Carbapenem resistant) Test Not Applicable     OXA-48-like (Carbapenem resistant) Test Not Applicable     van A/B (VRE gene) Test Not Applicable     VIM (Carbapenem resistant) Test Not Applicable    Narrative:      Aerobic and anaerobic    Respiratory Infection Panel (PCR), Nasopharyngeal [[id number]] Collected: 08/03/24 8327    Order  Status: Completed Specimen: Nasopharyngeal Swab Updated: 08/03/24 1956     Respiratory Infection Panel Source NP Swab     Adenovirus Not Detected     Coronavirus 229E, Common Cold Virus Not Detected     Coronavirus HKU1, Common Cold Virus Not Detected     Coronavirus NL63, Common Cold Virus Not Detected     Coronavirus OC43, Common Cold Virus Not Detected     Comment: The Coronavirus strains detected in this test cause the common cold.  These strains are not the COVID-19 (novel Coronavirus)strain   associated with the respiratory disease outbreak.          SARS-CoV2 (COVID-19) Qualitative PCR Not Detected     Human Metapneumovirus Not Detected     Human Rhinovirus/Enterovirus Not Detected     Influenza A (subtypes H1, H1-2009,H3) Not Detected     Influenza B Not Detected     Parainfluenza Virus 1 Not Detected     Parainfluenza Virus 2 Not Detected     Parainfluenza Virus 3 Not Detected     Parainfluenza Virus 4 Not Detected     Respiratory Syncytial Virus Not Detected     Bordetella Parapertussis (VW9435) Not Detected     Bordetella pertussis (ptxP) Not Detected     Chlamydia pneumoniae Not Detected     Mycoplasma pneumoniae Not Detected    Narrative:      Assay not valid for lower respiratory specimens, alternate  testing required.    Influenza A & B by Molecular [[id number]] Collected: 08/03/24 0942    Order Status: Completed Specimen: Nasopharyngeal Swab Updated: 08/03/24 1037     Influenza A, Molecular Negative     Influenza B, Molecular Negative     Flu A & B Source Nasal swab            I have reviewed all pertinent labs within the past 24 hours.    Estimated Creatinine Clearance: 81.8 mL/min (based on SCr of 0.8 mg/dL).    Diagnostic Results:  I have reviewed and interpreted all pertinent imaging results/findings within the past 24 hours.

## 2024-08-08 NOTE — CARE UPDATE
Pt fully recovered from procedure and report called to ROSIE [facility] for Crystal. Pt to transport shotly back to room.

## 2024-08-08 NOTE — TREATMENT PLAN
Discharge Planning:    Patient will need   SINGLE LUMEN POWER PORT SANDERS to be placed by IR today for continuous IV Remdoulin infusion       Patient will also need a PICC on opposite side for Short term IV Vanc dosing in OP setting    IR informed SINGLE LUMEN ONLY    Potential dc later today vs ivonne pending line placements and Home infusion set up for Vanc

## 2024-08-08 NOTE — PLAN OF CARE
Pt arrived to IR 90 for single lumen tunnel line. Pt oriented to unit and staff. Plan of care reviewed with patient, patient verbalizes understanding. Comfort measures utilized. Pt safely transferred from stretcher to procedural table. Fall risk reviewed with patient, fall risk interventions maintained. Positioner pillows utilized to minimize pressure points. Blankets applied. Pt prepped and draped utilizing standard sterile technique. Patient placed on continuous monitoring, as required by sedation policy. Timeouts to be completed utilizing standard universal time-out, per department and facility policy. RN to remain at bedside, continuous monitoring maintained. Pt resting comfortably. Denies pain/discomfort. Will continue to monitor. See flow sheets for monitoring, medication administration, and updates.

## 2024-08-08 NOTE — PROCEDURES
VIR procedure note        Pre Op Diagnosis:  Pulmonary hypertension (need Remodulin)  Post Op Diagnosis: Same     Procedure:  Tunneled central catheter placement     Procedure performed by:  Leia Cortes MD      Written Informed Consent Obtained: Yes  Specimen Removed: No  Estimated Blood Loss: Minimal     Findings:     Successful placement of 8 Fr single lumen tunneled catheter in the right IJ    Plan:    Catheter can be used immediately.         Leia Cortes MD  VIR

## 2024-08-08 NOTE — PT/OT/SLP EVAL
Occupational Therapy  Co- Evaluation and Discharge Note  Co-treatment performed due to patient's multiple deficits requiring two skilled therapists to appropriately and safely assess patient's strength and endurance while facilitating functional tasks in addition to accommodating for patient's activity tolerance.    Name: Kristin Maier  MRN: [id number]  Admitting Diagnosis: Gram-positive bacteremia  Recent Surgery: * No surgery found *      Recommendations:     Discharge Recommendations: No Therapy Indicated  Discharge Equipment Recommendations: none  Barriers to discharge:  None    Assessment:     Kristin Maier is a 48 y.o. female with a medical diagnosis of Gram-positive bacteremia. At this time, patient is functioning at their prior level of function and does not require further acute OT services.     Plan:     During this hospitalization, patient does not require further acute OT services.  Please re-consult if situation changes.    Plan of Care Reviewed with: patient    Subjective     Chief Complaint: pain in hands and feet  Patient/Family Comments/goals: To go home    Occupational Profile:  Living Environment: [facility] with 3 NIKKI, tub or WIS  Previous level of function: IND, A of dtrs if anything is needed  Roles and Routines: Gather with her 9 grandchildren and dtrs every sunday  Equipment Used at home: none  Assistance upon Discharge: Dtrs    Pain/Comfort:  Pain Rating 1: 7/10  Location - Side 1: Bilateral  Location - Orientation 1: generalized  Location 1: hand  Pain Addressed 1: Nurse notified  Pain Rating Post-Intervention 1: 7/10  Pain Rating 2: 7/10  Location - Side 2: Bilateral  Location - Orientation 2: generalized  Location 2: foot  Pain Rating Post-Intervention 2: 7/10    Patients cultural, spiritual, Islam conflicts given the current situation: no    Objective:     Communicated with: NSfide prior to session.  Patient found HOB elevated with   upon OT entry to room.    General Precautions:  Standard,    Orthopedic Precautions: N/A  Braces: N/A  Respiratory Status: Room air     Occupational Performance:    Bed Mobility:    Patient completed Scooting/Bridging with modified independence  Patient completed Supine to Sit with modified independence  Patient completed Sit to Supine with modified independence    Functional Mobility/Transfers:  Patient completed Sit <> Stand Transfer with supervision  with  no assistive device   Functional Mobility: Ambulates >80ft with no AD and SBA for safety , no LOB.     Activities of Daily Living:  Grooming: supervision standing sinkside  Lower Body Dressing: supervision to manage shoes EOB  Toileting: modified independence at toilet    Cognitive/Visual Perceptual:  Cognitive/Psychosocial Skills:     -       Oriented to: Person, Place, Time, and Situation   -       Follows Commands/attention:Follows multistep  commands  -       Safety awareness/insight to disability: intact     Physical Exam:  Upper Extremity Range of Motion:     -       Right Upper Extremity: WFL  -       Left Upper Extremity: WFL  Upper Extremity Strength:    -       Right Upper Extremity: WFL  -       Left Upper Extremity: WFL  Fine Motor Coordination:    -       Intact    AMPAC 6 Click ADL:  AMPAC Total Score: 24    Treatment & Education:  -Education on energy conservation and task modification to maximize safety and (I) during ADLs and mobility  -Education on importance of OOB activity to improve overall activity tolerance and promote recovery  -Pt educated to call for assistance and to transfer with hospital staff only  -Provided education regarding role of OT, POC, & discharge recommendations with pt verbalizing understanding.  Pt had no further questions & when asked whether there were any concerns pt reported none.     Patient left HOB elevated with all lines intact, call button in reach, and nsg notified    GOALS:   Multidisciplinary Problems       Occupational Therapy Goals       Not on file               Multidisciplinary Problems (Resolved)          Problem: Occupational Therapy    Goal Priority Disciplines Outcome Interventions   Occupational Therapy Goal   (Resolved)     OT, PT/OT Met    Description: Goals to be met by: 24     Patient will increase functional independence with ADLs by performing:    Grooming while standing at sink with [place].  Dressing from EOB with IND.                         History:     Past Medical History:   Diagnosis Date    Elevated liver enzymes     Essential (primary) hypertension     Heart failure, unspecified     Hypokalemia     Mixed hyperlipidemia     Neuropathic pain     Tx w/ tramadol & Lyrica    Pulmonary hypertension     Receiving Remodulin continuously through tunneled central line         Past Surgical History:   Procedure Laterality Date    APPENDECTOMY       SECTION      Transverse cut    HYSTERECTOMY  2017    TLH- bleeding    OOPHORECTOMY      RIGHT HEART CATHETERIZATION Right 2021    Procedure: INSERTION, CATHETER, RIGHT HEART;  Surgeon: Chloe Gregory MD;  Location: [facility] CATH LAB;  Service: Cardiology;  Laterality: Right;    RIGHT HEART CATHETERIZATION Right 3/16/2022    Procedure: INSERTION, CATHETER, RIGHT HEART;  Surgeon: Hu Silverman MD;  Location: [facility] CATH LAB;  Service: Cardiology;  Laterality: Right;    RIGHT HEART CATHETERIZATION Right 2022    Procedure: INSERTION, CATHETER, RIGHT HEART;  Surgeon: Chloe Gregory MD;  Location: [facility] CATH LAB;  Service: Cardiology;  Laterality: Right;    RIGHT HEART CATHETERIZATION Right 3/21/2023    Procedure: INSERTION, CATHETER, RIGHT HEART;  Surgeon: Kahlil Cisneros MD;  Location: [facility] CATH LAB;  Service: Cardiology;  Laterality: Right;    RIGHT HEART CATHETERIZATION Right 10/9/2023    Procedure: INSERTION, CATHETER, RIGHT HEART;  Surgeon: Chloe Gregory MD;  Location: [facility] CATH LAB;  Service: Cardiology;  Laterality: Right;    TUBAL LIGATION       VAGINAL DELIVERY  1991; 1993; 1994; 1996       Time Tracking:     OT Date of Treatment: 08/08/24  OT Start Time: 0958  OT Stop Time: 1011  OT Total Time (min): 13 min    Billable Minutes:Evaluation 5  Self Care/Home Management 8    8/8/2024

## 2024-08-08 NOTE — CARE UPDATE
Pt arrived to MPU 4 for recovery of a line placement. Pt to recover for 1hr then return to floor. See vs and assessment in the computer.

## 2024-08-09 LAB
ALBUMIN SERPL BCP-MCNC: 3.4 G/DL (ref 3.5–5.2)
ALP SERPL-CCNC: 71 U/L (ref 55–135)
ALT SERPL W/O P-5'-P-CCNC: 8 U/L (ref 10–44)
ANION GAP SERPL CALC-SCNC: 9 MMOL/L (ref 8–16)
AST SERPL-CCNC: 13 U/L (ref 10–40)
BASOPHILS # BLD AUTO: 0.01 K/UL (ref 0–0.2)
BASOPHILS NFR BLD: 0.2 % (ref 0–1.9)
BILIRUB SERPL-MCNC: 0.6 MG/DL (ref 0.1–1)
BUN SERPL-MCNC: 13 MG/DL (ref 6–20)
CALCIUM SERPL-MCNC: 9.1 MG/DL (ref 8.7–10.5)
CHLORIDE SERPL-SCNC: 108 MMOL/L (ref 95–110)
CO2 SERPL-SCNC: 20 MMOL/L (ref 23–29)
CREAT SERPL-MCNC: 0.9 MG/DL (ref 0.5–1.4)
DIFFERENTIAL METHOD BLD: ABNORMAL
EOSINOPHIL # BLD AUTO: 0.1 K/UL (ref 0–0.5)
EOSINOPHIL NFR BLD: 1.6 % (ref 0–8)
ERYTHROCYTE [DISTWIDTH] IN BLOOD BY AUTOMATED COUNT: 14.6 % (ref 11.5–14.5)
EST. GFR  (NO RACE VARIABLE): >60 ML/MIN/1.73 M^2
GLUCOSE SERPL-MCNC: 92 MG/DL (ref 70–110)
HCT VFR BLD AUTO: 35.8 % (ref 37–48.5)
HGB BLD-MCNC: 11.8 G/DL (ref 12–16)
IMM GRANULOCYTES # BLD AUTO: 0.01 K/UL (ref 0–0.04)
IMM GRANULOCYTES NFR BLD AUTO: 0.2 % (ref 0–0.5)
LYMPHOCYTES # BLD AUTO: 2.2 K/UL (ref 1–4.8)
LYMPHOCYTES NFR BLD: 37.6 % (ref 18–48)
MAGNESIUM SERPL-MCNC: 2.1 MG/DL (ref 1.6–2.6)
MCH RBC QN AUTO: 29.5 PG (ref 27–31)
MCHC RBC AUTO-ENTMCNC: 33 G/DL (ref 32–36)
MCV RBC AUTO: 90 FL (ref 82–98)
MONOCYTES # BLD AUTO: 0.5 K/UL (ref 0.3–1)
MONOCYTES NFR BLD: 8.6 % (ref 4–15)
NEUTROPHILS # BLD AUTO: 3 K/UL (ref 1.8–7.7)
NEUTROPHILS NFR BLD: 51.8 % (ref 38–73)
NRBC BLD-RTO: 0 /100 WBC
PLATELET # BLD AUTO: 172 K/UL (ref 150–450)
PMV BLD AUTO: 11.1 FL (ref 9.2–12.9)
POTASSIUM SERPL-SCNC: 3.9 MMOL/L (ref 3.5–5.1)
PROT SERPL-MCNC: 7 G/DL (ref 6–8.4)
RBC # BLD AUTO: 4 M/UL (ref 4–5.4)
SODIUM SERPL-SCNC: 137 MMOL/L (ref 136–145)
VANCOMYCIN TROUGH SERPL-MCNC: 16.9 UG/ML (ref 10–22)
WBC # BLD AUTO: 5.8 K/UL (ref 3.9–12.7)

## 2024-08-09 PROCEDURE — 25000003 PHARM REV CODE 250: Performed by: REGISTERED NURSE

## 2024-08-09 PROCEDURE — 36415 COLL VENOUS BLD VENIPUNCTURE: CPT | Performed by: STUDENT IN AN ORGANIZED HEALTH CARE EDUCATION/TRAINING PROGRAM

## 2024-08-09 PROCEDURE — 83735 ASSAY OF MAGNESIUM: CPT | Performed by: STUDENT IN AN ORGANIZED HEALTH CARE EDUCATION/TRAINING PROGRAM

## 2024-08-09 PROCEDURE — 25000003 PHARM REV CODE 250: Performed by: STUDENT IN AN ORGANIZED HEALTH CARE EDUCATION/TRAINING PROGRAM

## 2024-08-09 PROCEDURE — 80053 COMPREHEN METABOLIC PANEL: CPT | Performed by: STUDENT IN AN ORGANIZED HEALTH CARE EDUCATION/TRAINING PROGRAM

## 2024-08-09 PROCEDURE — 63600175 PHARM REV CODE 636 W HCPCS: Performed by: INTERNAL MEDICINE

## 2024-08-09 PROCEDURE — 99233 SBSQ HOSP IP/OBS HIGH 50: CPT | Mod: ,,, | Performed by: REGISTERED NURSE

## 2024-08-09 PROCEDURE — 36415 COLL VENOUS BLD VENIPUNCTURE: CPT | Performed by: INTERNAL MEDICINE

## 2024-08-09 PROCEDURE — 25000003 PHARM REV CODE 250: Performed by: INTERNAL MEDICINE

## 2024-08-09 PROCEDURE — 80202 ASSAY OF VANCOMYCIN: CPT | Performed by: INTERNAL MEDICINE

## 2024-08-09 PROCEDURE — 99232 SBSQ HOSP IP/OBS MODERATE 35: CPT | Mod: ,,, | Performed by: STUDENT IN AN ORGANIZED HEALTH CARE EDUCATION/TRAINING PROGRAM

## 2024-08-09 PROCEDURE — 20600001 HC STEP DOWN PRIVATE ROOM

## 2024-08-09 PROCEDURE — 85025 COMPLETE CBC W/AUTO DIFF WBC: CPT | Performed by: STUDENT IN AN ORGANIZED HEALTH CARE EDUCATION/TRAINING PROGRAM

## 2024-08-09 PROCEDURE — 63600175 PHARM REV CODE 636 W HCPCS: Performed by: STUDENT IN AN ORGANIZED HEALTH CARE EDUCATION/TRAINING PROGRAM

## 2024-08-09 PROCEDURE — 63600175 PHARM REV CODE 636 W HCPCS: Mod: JG | Performed by: INTERNAL MEDICINE

## 2024-08-09 RX ORDER — MORPHINE SULFATE 2 MG/ML
1 INJECTION, SOLUTION INTRAMUSCULAR; INTRAVENOUS ONCE
Status: COMPLETED | OUTPATIENT
Start: 2024-08-09 | End: 2024-08-10

## 2024-08-09 RX ORDER — OXYCODONE AND ACETAMINOPHEN 10; 325 MG/1; MG/1
1 TABLET ORAL EVERY 4 HOURS PRN
Status: DISCONTINUED | OUTPATIENT
Start: 2024-08-09 | End: 2024-08-10

## 2024-08-09 RX ADMIN — VANCOMYCIN HYDROCHLORIDE 1000 MG: 1 INJECTION, POWDER, LYOPHILIZED, FOR SOLUTION INTRAVENOUS at 01:08

## 2024-08-09 RX ADMIN — OXYCODONE AND ACETAMINOPHEN 1 TABLET: 10; 325 TABLET ORAL at 01:08

## 2024-08-09 RX ADMIN — PREGABALIN 75 MG: 75 CAPSULE ORAL at 08:08

## 2024-08-09 RX ADMIN — OXYCODONE AND ACETAMINOPHEN 1 TABLET: 10; 325 TABLET ORAL at 05:08

## 2024-08-09 RX ADMIN — VANCOMYCIN HYDROCHLORIDE 1000 MG: 1 INJECTION, POWDER, LYOPHILIZED, FOR SOLUTION INTRAVENOUS at 12:08

## 2024-08-09 RX ADMIN — SPIRONOLACTONE 25 MG: 25 TABLET ORAL at 08:08

## 2024-08-09 RX ADMIN — TREPROSTINIL 7383.4 NG/MIN: 100 INJECTION, SOLUTION INTRAVENOUS; SUBCUTANEOUS at 05:08

## 2024-08-09 RX ADMIN — OXYCODONE HYDROCHLORIDE AND ACETAMINOPHEN 1 TABLET: 10; 325 TABLET ORAL at 06:08

## 2024-08-09 NOTE — PROGRESS NOTES
[facility]  Infectious Disease  Progress Note    Patient Name: Kristin Maier  MRN: [id number]  Admission Date: 8/3/2024  Length of Stay: 4 days  Attending Physician: Tracey Dutta MD  Primary Care Provider: Jorge A Stack MD    Isolation Status: No active isolations  Assessment/Plan:      ID  Bacteremia  I have reviewed hospital notes from  HM service and other specialty providers. I have also reviewed CBC, CMP/BMP,  cultures and imaging with my interpretation as documented.      48F with pulm HTN on Remodulin infused viai tunneled chest Rt sub clavian central line (placed 03/2022) - who presented 08/03 for general weakness, with abd bloating. Pt presented febrile 100.7F, with some mild hypotension 76/51, otherwise VSS, HDS. Wbc nml, , procalc 0.19.  Index bld cxs 08/03 +corynebacterium. TTE neg for IE or veg. Suspected source tunneled chest central line used for remodulin. Removed tunneled line 08/06.    Repeat bld cxs 08/05  returned after >48hrs positive GPR on 1 of 2 sets. (GPR, further ID pending). New tunneled line was placed 08/08, just prior to repeat bld cx 08/05 set resulting new GPR. Repeat bld cxs 08/08 NGTD. TTE neg for IE or veg.     Recommendations / Plan:  Continue Iv-vancomycin. Vanc trough 16, therapeutic.   Inpatient pharmD to dose vanc with target trough level of 15-20.   To complete abx duration of 2wks s/p removal of Rt chest tunneled line 08/06, MARGARITA 08/20.  Pt will need picc line for vanc outpt; will hold off on placing new picc line until 48-72hrs s/p repeat bld cxs 08/08.   Will f/u repeat bld cxs 08/08. If remain clear, and pt afebrile w/o s/sxs of systemic infection; then no need to remove newly placed tunneled line.   Will plan to complete serial bld cxs Q2wks for 1-2mo, starting at end of Iv abx.   Of note; Rt forearm midline infiltrated 08.07; removed 08/08, residual mild tissue swelling and TTP. Continue to monitor for now.       -- Discussed  with ID staff and primary team   -- ID will continue to follow w/ further recs.          Thank you for your consult. I will follow-up with patient. Please contact us if you have any additional questions.    Nicholas Brar PA-C  Infectious Disease  [facility]    Subjective:     Principal Problem:Gram-positive bacteremia    HPI: 48F with pulm HTN on Remodulin infused viai tunneled chest Rt sub clavian central line (placed 03/2022) - who presented 08/03 for general weakness, with abd bloating. Pt presented febrile 100.7F, with some mild hypotension 76/51, otherwise VSS, HDS. Wbc nml, , procalc 0.19.  Index bld cxs 08/01 +corynebacterium coyleae, same species on both sets. Repeat bld cxs 08/05 NGTD. TTE neg for IE or veg. Suspected source picc line vs. GI translation in setting of abd bloating and new non-specific liver lesion.  ID consulted for positive bld cxs with GPR.     Interval History: Remains AF, VSS, HDS, wbc nml. Bld cxs 08/03 +corynebacterium. Repeat 08/05 1 of 2 returned positive for GPRs; but after new tunneled line placed 08/08 (which was >48hrs s/p clrd cxs 08/05).  Repeat bld cxs 08/08 NGTD. Pt continuing on Iv-vanc, trough 16 therapeutic. Midline Rt forearm infiltrated 08/07; some mild-mod swelling and TTP; but no obvious signs of thrombophlebitis or DVT.  Will continue to monitor.     Review of Systems   Constitutional:  Positive for fatigue.   Gastrointestinal:  Negative for abdominal pain.   Musculoskeletal:  Positive for myalgias (Rt forearm).   Skin:  Negative for color change and wound.   All other systems reviewed and are negative.        Objective:     Vital Signs (Most Recent):  Temp: 98.6 °F (37 °C) (08/09/24 1601)  Pulse: 68 (08/09/24 1601)  Resp: 18 (08/09/24 1601)  BP: 98/67 (08/09/24 1601)  SpO2: 97 % (08/09/24 1601) Vital Signs (24h Range):  Temp:  [98.1 °F (36.7 °C)-98.8 °F (37.1 °C)] 98.6 °F (37 °C)  Pulse:  [66-84] 68  Resp:  [18-20] 18  SpO2:  [95 %-99 %]  97 %  BP: ()/(55-85) 98/67     Weight: 72.3 kg (159 lb 6.3 oz)  Body mass index is 24.96 kg/m².    Estimated Creatinine Clearance: 72.7 mL/min (based on SCr of 0.9 mg/dL).     Physical Exam  Vitals and nursing note reviewed.   Constitutional:       General: She is not in acute distress.     Appearance: She is not ill-appearing, toxic-appearing or diaphoretic.   HENT:      Mouth/Throat:      Pharynx: No oropharyngeal exudate.   Eyes:      General: No scleral icterus.     Conjunctiva/sclera: Conjunctivae normal.   Cardiovascular:      Rate and Rhythm: Normal rate.      Heart sounds: Murmur heard.   Pulmonary:      Effort: No respiratory distress.      Breath sounds: Normal breath sounds.   Abdominal:      General: Bowel sounds are normal. There is no distension.      Palpations: Abdomen is soft.      Tenderness: There is no abdominal tenderness. There is no right CVA tenderness or left CVA tenderness.   Musculoskeletal:         General: Swelling and tenderness present.      Cervical back: Neck supple. No tenderness.      Right lower leg: No edema.      Left lower leg: No edema.   Skin:     General: Skin is warm and dry.      Findings: No erythema or rash.      Comments: Midline RUE, c/d/I.   Neurological:      Mental Status: She is alert and oriented to person, place, and time. Mental status is at baseline.   Psychiatric:         Behavior: Behavior normal.         Thought Content: Thought content normal.          Significant Labs: Blood Culture:   Recent Labs   Lab 08/03/24  0938 08/05/24  0804 08/05/24  0807 08/08/24  1620 08/08/24  1654   LABBLOO Gram stain aer bottle: Gram positive rods  Results called to and read back by:Lee Olivarez RN 08/04/2024  16:33  CORYNEBACTERIUM SPECIES  further identified as Corynebacterium coyleae  * No Growth to date  No Growth to date  No Growth to date  No Growth to date  No Growth to date Gram stain aer bottle: Gram positive rods  Results called to and read back by:  Neeta Charles RN 08/08/2024  11:08 No Growth to date No Growth to date     CBC:   Recent Labs   Lab 08/08/24  0711 08/09/24  0655   WBC 4.53 5.80   HGB 11.4* 11.8*   HCT 36.6* 35.8*    172     CMP:   Recent Labs   Lab 08/08/24  0711 08/09/24  0655    137   K 4.1 3.9    108   CO2 20* 20*   GLU 93 92   BUN 8 13   CREATININE 0.8 0.9   CALCIUM 9.5 9.1   PROT 7.1 7.0   ALBUMIN 3.5 3.4*   BILITOT 0.6 0.6   ALKPHOS 68 71   AST 12 13   ALT 8* 8*   ANIONGAP 9 9     Microbiology Results (last 7 days)       Procedure Component Value Units Date/Time    Blood culture [[id number]] Collected: 08/05/24 0807    Order Status: Completed Specimen: Blood Updated: 08/09/24 1046     Blood Culture, Routine Gram stain aer bottle: Gram positive rods      Results called to and read back by: Neeta Charles RN 08/08/2024      11:08    Blood culture [[id number]] Collected: 08/05/24 0804    Order Status: Completed Specimen: Blood Updated: 08/09/24 1012     Blood Culture, Routine No Growth to date      No Growth to date      No Growth to date      No Growth to date      No Growth to date    Blood culture [[id number]] Collected: 08/08/24 1620    Order Status: Completed Specimen: Blood Updated: 08/09/24 0115     Blood Culture, Routine No Growth to date    Narrative:      Please obtain blood cultures x2 from two different sites, and  at-least 30mins apart. Pls and thank you.    Blood culture [[id number]] Collected: 08/08/24 1654    Order Status: Completed Specimen: Blood Updated: 08/09/24 0115     Blood Culture, Routine No Growth to date    Narrative:      Please obtain blood cultures x2 from two different sites, and  at-least 30mins apart. Pls and thank you.    Culture, Anaerobe [[id number]] Collected: 08/04/24 1147    Order Status: Completed Specimen: Skin from Chest, Right Updated: 08/08/24 1101     Anaerobic Culture No anaerobes isolated    Aerobic culture [[id number]] Collected: 08/04/24 1147    Order Status:  Completed Specimen: Skin from Chest, Right Updated: 08/06/24 1036     Aerobic Bacterial Culture Skin isha,  no predominant organism    Blood culture x two cultures. Draw prior to antibiotics. [[id number]]  (Abnormal) Collected: 08/03/24 0937    Order Status: Completed Specimen: Blood from Peripheral, Forearm, Right Updated: 08/06/24 0754     Blood Culture, Routine Gram stain aer bottle: Gram positive rods      Results called to and read back by:Lee Olivarez RN 08/04/2024  16:37      CORYNEBACTERIUM SPECIES  further identified as Corynebacterium coyleae  For susceptibility see order #[id number]      Narrative:      Aerobic and anaerobic    Blood culture x two cultures. Draw prior to antibiotics. [[id number]]  (Abnormal)  (Susceptibility) Collected: 08/03/24 0938    Order Status: Completed Specimen: Blood from Peripheral, Forearm, Left Updated: 08/06/24 0753     Blood Culture, Routine Gram stain aer bottle: Gram positive rods      Results called to and read back by:Lee Olivarez RN 08/04/2024  16:33      CORYNEBACTERIUM SPECIES  further identified as Corynebacterium coyleae      Narrative:      Aerobic and anaerobic    Rapid Organism ID by PCR (from Blood culture) [[id number]] Collected: 08/03/24 0938    Order Status: Completed Updated: 08/04/24 1805     Enterococcus faecalis Not Detected     Enterococcus faecium Not Detected     Listeria monocytogenes Not Detected     Staphylococcus spp. Not Detected     Staphylococcus aureus Not Detected     Staphylococcus epidermidis Not Detected     Staphylococcus lugdunensis Not Detected     Streptococcus species Not Detected     Streptococcus agalactiae Not Detected     Streptococcus pneumoniae Not Detected     Streptococcus pyogenes Not Detected     Acinetobacter calcoaceticus/baumannii complex Not Detected     Bacteroides fragilis Not Detected     Enterobacterales Not Detected     Enterobacter cloacae complex Not Detected     Escherichia coli Not Detected     Klebsiella  aerogenes Not Detected     Klebsiella oxytoca Not Detected     Klebsiella pneumoniae group Not Detected     Proteus Not Detected     Salmonella sp Not Detected     Serratia marcescens Not Detected     Haemophilus influenzae Not Detected     Neisseria meningtidis Not Detected     Pseudomonas aeruginosa Not Detected     Stenotrophomonas maltophilia Not Detected     Candida albicans Not Detected     Candida auris Not Detected     Candida glabrata Not Detected     Candida krusei Not Detected     Candida parapsilosis Not Detected     Candida tropicalis Not Detected     Cryptococcus neoformans/gattii Not Detected     CTX-M (ESBL ) Test Not Applicable     IMP (Carbapenem resistant) Test Not Applicable     KPC resistance gene (Carbapenem resistant) Test Not Applicable     mcr-1  Test Not Applicable     mec A/C  Test Not Applicable     mec A/C and MREJ (MRSA) gene Test Not Applicable     NDM (Carbapenem resistant) Test Not Applicable     OXA-48-like (Carbapenem resistant) Test Not Applicable     van A/B (VRE gene) Test Not Applicable     VIM (Carbapenem resistant) Test Not Applicable    Narrative:      Aerobic and anaerobic    Respiratory Infection Panel (PCR), Nasopharyngeal [[id number]] Collected: 08/03/24 1849    Order Status: Completed Specimen: Nasopharyngeal Swab Updated: 08/03/24 1956     Respiratory Infection Panel Source NP Swab     Adenovirus Not Detected     Coronavirus 229E, Common Cold Virus Not Detected     Coronavirus HKU1, Common Cold Virus Not Detected     Coronavirus NL63, Common Cold Virus Not Detected     Coronavirus OC43, Common Cold Virus Not Detected     Comment: The Coronavirus strains detected in this test cause the common cold.  These strains are not the COVID-19 (novel Coronavirus)strain   associated with the respiratory disease outbreak.          SARS-CoV2 (COVID-19) Qualitative PCR Not Detected     Human Metapneumovirus Not Detected     Human Rhinovirus/Enterovirus Not Detected      Influenza A (subtypes H1, H1-2009,H3) Not Detected     Influenza B Not Detected     Parainfluenza Virus 1 Not Detected     Parainfluenza Virus 2 Not Detected     Parainfluenza Virus 3 Not Detected     Parainfluenza Virus 4 Not Detected     Respiratory Syncytial Virus Not Detected     Bordetella Parapertussis (SV9471) Not Detected     Bordetella pertussis (ptxP) Not Detected     Chlamydia pneumoniae Not Detected     Mycoplasma pneumoniae Not Detected    Narrative:      Assay not valid for lower respiratory specimens, alternate  testing required.    Influenza A & B by Molecular [[id number]] Collected: 08/03/24 0942    Order Status: Completed Specimen: Nasopharyngeal Swab Updated: 08/03/24 1037     Influenza A, Molecular Negative     Influenza B, Molecular Negative     Flu A & B Source Nasal swab          All pertinent labs within the past 24 hours have been reviewed.    Significant Imaging: I have reviewed all pertinent imaging results/findings within the past 24 hours.    I have personally reviewed records / hospital notes from   service and other specialty providers. I have also reviewed CBC, CMP/BMP,  cultures and imaging with my interpretation as documented in my assessment / plan.    Patient is high risk for infectious complications given pt's age, multiple co-morbidities, and case complexity.      Time: 50 minutes   50% of time spent on face-to-face counseling and coordination of care. Counseling included review of test results, diagnosis, and treatment plan with patient and/or family.

## 2024-08-09 NOTE — PROGRESS NOTES
Pharmacokinetic Assessment Follow Up: IV Vancomycin    Vancomycin serum concentration assessment(s):    ~10 hour level of 16.9 is acceptable for bacteremia goal of 15-20. Continue current dose and redraw vancomycin trough on 8/14 or earlier if THEO occurs.     Drug levels (last 3 results):  Recent Labs   Lab Result Units 08/07/24  1123 08/09/24  1131   Vancomycin-Trough ug/mL 12.4 16.9       Pharmacy will continue to follow and monitor vancomycin.    Please contact pharmacy at extension [phone] for questions regarding this assessment.    Thank you for the consult,   Keven Schmidt

## 2024-08-09 NOTE — SUBJECTIVE & OBJECTIVE
Interval History: Remains AF, VSS, HDS, wbc nml. Bld cxs 08/03 +corynebacterium. Repeat 08/05 1 of 2 returned positive for GPRs; but after new tunneled line placed 08/08 (which was >48hrs s/p clrd cxs 08/05).  Repeat bld cxs 08/08 NGTD. Pt continuing on Iv-vanc, trough 16 therapeutic. Midline Rt forearm infiltrated 08/07; some mild-mod swelling and TTP; but no obvious signs of thrombophlebitis or DVT.  Will continue to monitor.     Review of Systems   Constitutional:  Positive for fatigue.   Gastrointestinal:  Negative for abdominal pain.   Musculoskeletal:  Positive for myalgias (Rt forearm).   Skin:  Negative for color change and wound.   All other systems reviewed and are negative.        Objective:     Vital Signs (Most Recent):  Temp: 98.6 °F (37 °C) (08/09/24 1601)  Pulse: 68 (08/09/24 1601)  Resp: 18 (08/09/24 1601)  BP: 98/67 (08/09/24 1601)  SpO2: 97 % (08/09/24 1601) Vital Signs (24h Range):  Temp:  [98.1 °F (36.7 °C)-98.8 °F (37.1 °C)] 98.6 °F (37 °C)  Pulse:  [66-84] 68  Resp:  [18-20] 18  SpO2:  [95 %-99 %] 97 %  BP: ()/(55-85) 98/67     Weight: 72.3 kg (159 lb 6.3 oz)  Body mass index is 24.96 kg/m².    Estimated Creatinine Clearance: 72.7 mL/min (based on SCr of 0.9 mg/dL).     Physical Exam  Vitals and nursing note reviewed.   Constitutional:       General: She is not in acute distress.     Appearance: She is not ill-appearing, toxic-appearing or diaphoretic.   HENT:      Mouth/Throat:      Pharynx: No oropharyngeal exudate.   Eyes:      General: No scleral icterus.     Conjunctiva/sclera: Conjunctivae normal.   Cardiovascular:      Rate and Rhythm: Normal rate.      Heart sounds: Murmur heard.   Pulmonary:      Effort: No respiratory distress.      Breath sounds: Normal breath sounds.   Abdominal:      General: Bowel sounds are normal. There is no distension.      Palpations: Abdomen is soft.      Tenderness: There is no abdominal tenderness. There is no right CVA tenderness or left CVA  tenderness.   Musculoskeletal:         General: Swelling and tenderness present.      Cervical back: Neck supple. No tenderness.      Right lower leg: No edema.      Left lower leg: No edema.   Skin:     General: Skin is warm and dry.      Findings: No erythema or rash.      Comments: Midline RUE, c/d/I.   Neurological:      Mental Status: She is alert and oriented to person, place, and time. Mental status is at baseline.   Psychiatric:         Behavior: Behavior normal.         Thought Content: Thought content normal.          Significant Labs: Blood Culture:   Recent Labs   Lab 08/03/24  0938 08/05/24  0804 08/05/24  0807 08/08/24  1620 08/08/24  1654   LABBLOO Gram stain aer bottle: Gram positive rods  Results called to and read back by:Lee Olivarez RN 08/04/2024  16:33  CORYNEBACTERIUM SPECIES  further identified as Corynebacterium coyleae  * No Growth to date  No Growth to date  No Growth to date  No Growth to date  No Growth to date Gram stain aer bottle: Gram positive rods  Results called to and read back by: Neeta Charles RN 08/08/2024  11:08 No Growth to date No Growth to date     CBC:   Recent Labs   Lab 08/08/24  0711 08/09/24  0655   WBC 4.53 5.80   HGB 11.4* 11.8*   HCT 36.6* 35.8*    172     CMP:   Recent Labs   Lab 08/08/24  0711 08/09/24  0655    137   K 4.1 3.9    108   CO2 20* 20*   GLU 93 92   BUN 8 13   CREATININE 0.8 0.9   CALCIUM 9.5 9.1   PROT 7.1 7.0   ALBUMIN 3.5 3.4*   BILITOT 0.6 0.6   ALKPHOS 68 71   AST 12 13   ALT 8* 8*   ANIONGAP 9 9     Microbiology Results (last 7 days)       Procedure Component Value Units Date/Time    Blood culture [[id number]] Collected: 08/05/24 0807    Order Status: Completed Specimen: Blood Updated: 08/09/24 1046     Blood Culture, Routine Gram stain aer bottle: Gram positive rods      Results called to and read back by: Neeta Charles RN 08/08/2024      11:08    Blood culture [[id number]] Collected: 08/05/24 0804    Order  Status: Completed Specimen: Blood Updated: 08/09/24 1012     Blood Culture, Routine No Growth to date      No Growth to date      No Growth to date      No Growth to date      No Growth to date    Blood culture [[id number]] Collected: 08/08/24 1620    Order Status: Completed Specimen: Blood Updated: 08/09/24 0115     Blood Culture, Routine No Growth to date    Narrative:      Please obtain blood cultures x2 from two different sites, and  at-least 30mins apart. Pls and thank you.    Blood culture [[id number]] Collected: 08/08/24 1654    Order Status: Completed Specimen: Blood Updated: 08/09/24 0115     Blood Culture, Routine No Growth to date    Narrative:      Please obtain blood cultures x2 from two different sites, and  at-least 30mins apart. Pls and thank you.    Culture, Anaerobe [[id number]] Collected: 08/04/24 1147    Order Status: Completed Specimen: Skin from Chest, Right Updated: 08/08/24 1101     Anaerobic Culture No anaerobes isolated    Aerobic culture [[id number]] Collected: 08/04/24 1147    Order Status: Completed Specimen: Skin from Chest, Right Updated: 08/06/24 1036     Aerobic Bacterial Culture Skin isha,  no predominant organism    Blood culture x two cultures. Draw prior to antibiotics. [[id number]]  (Abnormal) Collected: 08/03/24 0937    Order Status: Completed Specimen: Blood from Peripheral, Forearm, Right Updated: 08/06/24 0754     Blood Culture, Routine Gram stain aer bottle: Gram positive rods      Results called to and read back by:Lee Olivarez RN 08/04/2024  16:37      CORYNEBACTERIUM SPECIES  further identified as Corynebacterium coyleae  For susceptibility see order #[id number]      Narrative:      Aerobic and anaerobic    Blood culture x two cultures. Draw prior to antibiotics. [[id number]]  (Abnormal)  (Susceptibility) Collected: 08/03/24 0938    Order Status: Completed Specimen: Blood from Peripheral, Forearm, Left Updated: 08/06/24 0753     Blood Culture, Routine Gram stain aer  bottle: Gram positive rods      Results called to and read back by:Lee Olivarez RN 08/04/2024  16:33      CORYNEBACTERIUM SPECIES  further identified as Corynebacterium coyleae      Narrative:      Aerobic and anaerobic    Rapid Organism ID by PCR (from Blood culture) [[id number]] Collected: 08/03/24 0938    Order Status: Completed Updated: 08/04/24 1806     Enterococcus faecalis Not Detected     Enterococcus faecium Not Detected     Listeria monocytogenes Not Detected     Staphylococcus spp. Not Detected     Staphylococcus aureus Not Detected     Staphylococcus epidermidis Not Detected     Staphylococcus lugdunensis Not Detected     Streptococcus species Not Detected     Streptococcus agalactiae Not Detected     Streptococcus pneumoniae Not Detected     Streptococcus pyogenes Not Detected     Acinetobacter calcoaceticus/baumannii complex Not Detected     Bacteroides fragilis Not Detected     Enterobacterales Not Detected     Enterobacter cloacae complex Not Detected     Escherichia coli Not Detected     Klebsiella aerogenes Not Detected     Klebsiella oxytoca Not Detected     Klebsiella pneumoniae group Not Detected     Proteus Not Detected     Salmonella sp Not Detected     Serratia marcescens Not Detected     Haemophilus influenzae Not Detected     Neisseria meningtidis Not Detected     Pseudomonas aeruginosa Not Detected     Stenotrophomonas maltophilia Not Detected     Candida albicans Not Detected     Candida auris Not Detected     Candida glabrata Not Detected     Candida krusei Not Detected     Candida parapsilosis Not Detected     Candida tropicalis Not Detected     Cryptococcus neoformans/gattii Not Detected     CTX-M (ESBL ) Test Not Applicable     IMP (Carbapenem resistant) Test Not Applicable     KPC resistance gene (Carbapenem resistant) Test Not Applicable     mcr-1  Test Not Applicable     mec A/C  Test Not Applicable     mec A/C and MREJ (MRSA) gene Test Not Applicable     NDM (Carbapenem  resistant) Test Not Applicable     OXA-48-like (Carbapenem resistant) Test Not Applicable     van A/B (VRE gene) Test Not Applicable     VIM (Carbapenem resistant) Test Not Applicable    Narrative:      Aerobic and anaerobic    Respiratory Infection Panel (PCR), Nasopharyngeal [[id number]] Collected: 08/03/24 1849    Order Status: Completed Specimen: Nasopharyngeal Swab Updated: 08/03/24 1956     Respiratory Infection Panel Source NP Swab     Adenovirus Not Detected     Coronavirus 229E, Common Cold Virus Not Detected     Coronavirus HKU1, Common Cold Virus Not Detected     Coronavirus NL63, Common Cold Virus Not Detected     Coronavirus OC43, Common Cold Virus Not Detected     Comment: The Coronavirus strains detected in this test cause the common cold.  These strains are not the COVID-19 (novel Coronavirus)strain   associated with the respiratory disease outbreak.          SARS-CoV2 (COVID-19) Qualitative PCR Not Detected     Human Metapneumovirus Not Detected     Human Rhinovirus/Enterovirus Not Detected     Influenza A (subtypes H1, H1-2009,H3) Not Detected     Influenza B Not Detected     Parainfluenza Virus 1 Not Detected     Parainfluenza Virus 2 Not Detected     Parainfluenza Virus 3 Not Detected     Parainfluenza Virus 4 Not Detected     Respiratory Syncytial Virus Not Detected     Bordetella Parapertussis (OL1626) Not Detected     Bordetella pertussis (ptxP) Not Detected     Chlamydia pneumoniae Not Detected     Mycoplasma pneumoniae Not Detected    Narrative:      Assay not valid for lower respiratory specimens, alternate  testing required.    Influenza A & B by Molecular [[id number]] Collected: 08/03/24 0942    Order Status: Completed Specimen: Nasopharyngeal Swab Updated: 08/03/24 1037     Influenza A, Molecular Negative     Influenza B, Molecular Negative     Flu A & B Source Nasal swab          All pertinent labs within the past 24 hours have been reviewed.    Significant Imaging: I have reviewed all  pertinent imaging results/findings within the past 24 hours.    I have personally reviewed records / hospital notes from   service and other specialty providers. I have also reviewed CBC, CMP/BMP,  cultures and imaging with my interpretation as documented in my assessment / plan.    Patient is high risk for infectious complications given pt's age, multiple co-morbidities, and case complexity.      Time: 50 minutes   50% of time spent on face-to-face counseling and coordination of care. Counseling included review of test results, diagnosis, and treatment plan with patient and/or family.

## 2024-08-09 NOTE — ASSESSMENT & PLAN NOTE
I have reviewed hospital notes from   service and other specialty providers. I have also reviewed CBC, CMP/BMP,  cultures and imaging with my interpretation as documented.      48F with pulm HTN on Remodulin infused viai tunneled chest Rt sub clavian central line (placed 03/2022) - who presented 08/03 for general weakness, with abd bloating. Pt presented febrile 100.7F, with some mild hypotension 76/51, otherwise VSS, HDS. Wbc nml, , procalc 0.19.  Index bld cxs 08/03 +corynebacterium. TTE neg for IE or veg. Suspected source tunneled chest central line used for remodulin. Removed tunneled line 08/06.    Repeat bld cxs 08/05  returned after >48hrs positive GPR on 1 of 2 sets. (GPR, further ID pending). New tunneled line was placed 08/08, just prior to repeat bld cx 08/05 set resulting new GPR. Repeat bld cxs 08/08 NGTD. TTE neg for IE or veg.     Recommendations / Plan:  Continue Iv-vancomycin. Vanc trough 16, therapeutic.   Inpatient pharmD to dose vanc with target trough level of 15-20.   To complete abx duration of 2wks s/p removal of Rt chest tunneled line 08/06, MARGARITA 08/20.  Will f/u repeat bld cxs 08/08. If remain clear, and pt afebrile w/o s/sxs of systemic infection; then no need to remove newly placed tunneled line.   Will plan to complete serial bld cxs Q2wks for 1-2mo, starting at end of Iv abx.   Of note; Rt forearm midline infiltrated 08.07; removed 08/08, residual mild tissue swelling and TTP. Continue to monitor for now.       -- Discussed with ID staff and primary team   -- ID will continue to follow w/ further recs.

## 2024-08-09 NOTE — ASSESSMENT & PLAN NOTE
- Negative PCT  - No Leukocytosis. COVID negative.   - Viral panel negative  - Blood cultures frmo 8/3 positive for gram + rods corneybacterum.  Repeat cultures done 8/5 with GPRs. New cultures drawn on 8/8. Will wait until negative before placing PICC line.   - Continues on Vancomycin  - CT of C/A/P done: 2.1 cm heterogeneous hypodensity right lobe of the liver new compared to prior. Both abscess and neoplasm need to considered. Unable to obtain MRI due to remodulin.    -Triple phase CT ordered: Nodular enhancing right hepatic lesion, favoring benign hemangioma. Additional flash filling hepatic hemangiomas or altered perfusion. MRI may be helpful if clinically indicated.   -Per interventional radiology: liver lesions do not look to be infectious  -unable to get MRI due to remodulin pump.  We don't feel comfortable stopping remodulin for testing.   -Cultures at line site sent and negative   -ID consulted and following

## 2024-08-09 NOTE — SUBJECTIVE & OBJECTIVE
Interval History: No overnight acute events. Had tunneled line placed yesterday. Unfortunately, blood cxs from 8/5 popped positive after line was placed. No fevers, WBC wnl. Blood cx repeated yesterday. Will continue Vancomycin.       Continuous Infusions:   veletri/remodulin cassette   Intravenous Continuous        veletri/remodulin tubing   Intravenous Continuous         Scheduled Meds:   macitentan-tadalafil  1 tablet Oral Daily    pregabalin  75 mg Oral QHS    spironolactone  25 mg Oral Daily    treprostinil (REMODULIN) 12,000,000 ng in 0.9% NaCl 100 mL infusion  110.2 ng/kg/min (Order-Specific) Intravenous Q24H    vancomycin (VANCOCIN) IV (PEDS and ADULTS)  15 mg/kg Intravenous Q12H     PRN Meds:  Current Facility-Administered Medications:     acetaminophen, 650 mg, Oral, Q6H PRN    loperamide, 2 mg, Oral, QID PRN    ondansetron, 4 mg, Intravenous, Q8H PRN    oxyCODONE-acetaminophen, 1 tablet, Oral, Q6H PRN    sodium chloride 0.9%, 10 mL, Intravenous, PRN    Pharmacy to dose Vancomycin consult, , , Once **AND** vancomycin - pharmacy to dose, , Intravenous, pharmacy to manage frequency    Review of patient's allergies indicates:  No Known Allergies  Objective:     Vital Signs (Most Recent):  Temp: 98.3 °F (36.8 °C) (08/09/24 0744)  Pulse: 76 (08/09/24 0744)  Resp: 20 (08/09/24 0744)  BP: 103/72 (08/09/24 0744)  SpO2: 99 % (08/09/24 0744) Vital Signs (24h Range):  Temp:  [97.5 °F (36.4 °C)-98.8 °F (37.1 °C)] 98.3 °F (36.8 °C)  Pulse:  [63-84] 76  Resp:  [14-20] 20  SpO2:  [93 %-99 %] 99 %  BP: ()/(55-97) 103/72     Patient Vitals for the past 72 hrs (Last 3 readings):   Weight   08/08/24 0314 72.3 kg (159 lb 6.3 oz)   08/07/24 0302 73.5 kg (162 lb 2.4 oz)     Body mass index is 24.96 kg/m².      Intake/Output Summary (Last 24 hours) at 8/9/2024 1107  Last data filed at 8/9/2024 0339  Gross per 24 hour   Intake 480 ml   Output 1500 ml   Net -1020 ml          Physical Exam  Constitutional:       Appearance:  "Normal appearance.   HENT:      Head: Atraumatic.   Eyes:      Conjunctiva/sclera: Conjunctivae normal.   Cardiovascular:      Rate and Rhythm: Normal rate and regular rhythm.      Heart sounds: Murmur heard.   Pulmonary:      Effort: Pulmonary effort is normal.      Breath sounds: Normal breath sounds. No wheezing or rhonchi.   Abdominal:      General: There is no distension.      Palpations: Abdomen is soft.      Tenderness: There is no abdominal tenderness.   Musculoskeletal:      Right lower leg: No edema.      Left lower leg: No edema.   Skin:     General: Skin is warm.   Neurological:      General: No focal deficit present.      Mental Status: She is alert.   Psychiatric:         Mood and Affect: Mood normal.            Significant Labs:  CBC:  Recent Labs   Lab 08/07/24  0642 08/08/24  0711 08/09/24  0655   WBC 4.37 4.53 5.80   RBC 3.97* 3.94* 4.00   HGB 11.5* 11.4* 11.8*   HCT 35.9* 36.6* 35.8*   * 154 172   MCV 90 93 90   MCH 29.0 28.9 29.5   MCHC 32.0 31.1* 33.0     BNP:  Recent Labs   Lab 08/03/24  0937   *     CMP:  Recent Labs   Lab 08/07/24  0642 08/08/24  0711 08/09/24  0655   GLU 95 93 92   CALCIUM 9.5 9.5 9.1   ALBUMIN 3.3* 3.5 3.4*   PROT 6.9 7.1 7.0    138 137   K 3.9 4.1 3.9   CO2 21* 20* 20*    109 108   BUN 11 8 13   CREATININE 0.8 0.8 0.9   ALKPHOS 69 68 71   ALT 7* 8* 8*   AST 12 12 13   BILITOT 0.7 0.6 0.6      Coagulation:   Recent Labs   Lab 08/03/24  0937   INR 1.1   APTT 32.2*     LDH:  No results for input(s): "LDH" in the last 72 hours.  Microbiology:  Microbiology Results (last 7 days)       Procedure Component Value Units Date/Time    Blood culture [[id number]] Collected: 08/05/24 0807    Order Status: Completed Specimen: Blood Updated: 08/09/24 1046     Blood Culture, Routine Gram stain aer bottle: Gram positive rods      Results called to and read back by: Neeta Charles RN 08/08/2024      11:08    Blood culture [[id number]] Collected: 08/05/24 0804 "    Order Status: Completed Specimen: Blood Updated: 08/09/24 1012     Blood Culture, Routine No Growth to date      No Growth to date      No Growth to date      No Growth to date      No Growth to date    Blood culture [[id number]] Collected: 08/08/24 1620    Order Status: Completed Specimen: Blood Updated: 08/09/24 0115     Blood Culture, Routine No Growth to date    Narrative:      Please obtain blood cultures x2 from two different sites, and  at-least 30mins apart. Pls and thank you.    Blood culture [[id number]] Collected: 08/08/24 1654    Order Status: Completed Specimen: Blood Updated: 08/09/24 0115     Blood Culture, Routine No Growth to date    Narrative:      Please obtain blood cultures x2 from two different sites, and  at-least 30mins apart. Pls and thank you.    Culture, Anaerobe [[id number]] Collected: 08/04/24 1147    Order Status: Completed Specimen: Skin from Chest, Right Updated: 08/08/24 1101     Anaerobic Culture No anaerobes isolated    Aerobic culture [[id number]] Collected: 08/04/24 1147    Order Status: Completed Specimen: Skin from Chest, Right Updated: 08/06/24 1036     Aerobic Bacterial Culture Skin isha,  no predominant organism    Blood culture x two cultures. Draw prior to antibiotics. [[id number]]  (Abnormal) Collected: 08/03/24 0937    Order Status: Completed Specimen: Blood from Peripheral, Forearm, Right Updated: 08/06/24 0754     Blood Culture, Routine Gram stain aer bottle: Gram positive rods      Results called to and read back by:Lee Olivarez RN 08/04/2024  16:37      CORYNEBACTERIUM SPECIES  further identified as Corynebacterium coyleae  For susceptibility see order #[id number]      Narrative:      Aerobic and anaerobic    Blood culture x two cultures. Draw prior to antibiotics. [[id number]]  (Abnormal)  (Susceptibility) Collected: 08/03/24 0938    Order Status: Completed Specimen: Blood from Peripheral, Forearm, Left Updated: 08/06/24 0753     Blood Culture, Routine Gram stain  aer bottle: Gram positive rods      Results called to and read back by:Lee Olivarez RN 08/04/2024  16:33      CORYNEBACTERIUM SPECIES  further identified as Corynebacterium coyleae      Narrative:      Aerobic and anaerobic    Rapid Organism ID by PCR (from Blood culture) [[id number]] Collected: 08/03/24 0938    Order Status: Completed Updated: 08/04/24 1804     Enterococcus faecalis Not Detected     Enterococcus faecium Not Detected     Listeria monocytogenes Not Detected     Staphylococcus spp. Not Detected     Staphylococcus aureus Not Detected     Staphylococcus epidermidis Not Detected     Staphylococcus lugdunensis Not Detected     Streptococcus species Not Detected     Streptococcus agalactiae Not Detected     Streptococcus pneumoniae Not Detected     Streptococcus pyogenes Not Detected     Acinetobacter calcoaceticus/baumannii complex Not Detected     Bacteroides fragilis Not Detected     Enterobacterales Not Detected     Enterobacter cloacae complex Not Detected     Escherichia coli Not Detected     Klebsiella aerogenes Not Detected     Klebsiella oxytoca Not Detected     Klebsiella pneumoniae group Not Detected     Proteus Not Detected     Salmonella sp Not Detected     Serratia marcescens Not Detected     Haemophilus influenzae Not Detected     Neisseria meningtidis Not Detected     Pseudomonas aeruginosa Not Detected     Stenotrophomonas maltophilia Not Detected     Candida albicans Not Detected     Candida auris Not Detected     Candida glabrata Not Detected     Candida krusei Not Detected     Candida parapsilosis Not Detected     Candida tropicalis Not Detected     Cryptococcus neoformans/gattii Not Detected     CTX-M (ESBL ) Test Not Applicable     IMP (Carbapenem resistant) Test Not Applicable     KPC resistance gene (Carbapenem resistant) Test Not Applicable     mcr-1  Test Not Applicable     mec A/C  Test Not Applicable     mec A/C and MREJ (MRSA) gene Test Not Applicable     NDM (Carbapenem  resistant) Test Not Applicable     OXA-48-like (Carbapenem resistant) Test Not Applicable     van A/B (VRE gene) Test Not Applicable     VIM (Carbapenem resistant) Test Not Applicable    Narrative:      Aerobic and anaerobic    Respiratory Infection Panel (PCR), Nasopharyngeal [[id number]] Collected: 08/03/24 1849    Order Status: Completed Specimen: Nasopharyngeal Swab Updated: 08/03/24 1956     Respiratory Infection Panel Source NP Swab     Adenovirus Not Detected     Coronavirus 229E, Common Cold Virus Not Detected     Coronavirus HKU1, Common Cold Virus Not Detected     Coronavirus NL63, Common Cold Virus Not Detected     Coronavirus OC43, Common Cold Virus Not Detected     Comment: The Coronavirus strains detected in this test cause the common cold.  These strains are not the COVID-19 (novel Coronavirus)strain   associated with the respiratory disease outbreak.          SARS-CoV2 (COVID-19) Qualitative PCR Not Detected     Human Metapneumovirus Not Detected     Human Rhinovirus/Enterovirus Not Detected     Influenza A (subtypes H1, H1-2009,H3) Not Detected     Influenza B Not Detected     Parainfluenza Virus 1 Not Detected     Parainfluenza Virus 2 Not Detected     Parainfluenza Virus 3 Not Detected     Parainfluenza Virus 4 Not Detected     Respiratory Syncytial Virus Not Detected     Bordetella Parapertussis ([id number]) Not Detected     Bordetella pertussis (ptxP) Not Detected     Chlamydia pneumoniae Not Detected     Mycoplasma pneumoniae Not Detected    Narrative:      Assay not valid for lower respiratory specimens, alternate  testing required.    Influenza A & B by Molecular [[id number]] Collected: 08/03/24 0942    Order Status: Completed Specimen: Nasopharyngeal Swab Updated: 08/03/24 1037     Influenza A, Molecular Negative     Influenza B, Molecular Negative     Flu A & B Source Nasal swab            I have reviewed all pertinent labs within the past 24 hours.    Estimated Creatinine Clearance: 72.7  mL/min (based on SCr of 0.9 mg/dL).    Diagnostic Results:  I have reviewed and interpreted all pertinent imaging results/findings within the past 24 hours.

## 2024-08-09 NOTE — PROGRESS NOTES
[facility] - Transplant Stepdown  Heart Transplant  Progress Note    Patient Name: Kristin Maier  MRN: [id number]  Admission Date: 8/3/2024  Hospital Length of Stay: 4 days  Attending Physician: Tracey Dutta MD  Primary Care Provider: Jorge A Stack MD  Principal Problem:Gram-positive bacteremia    Subjective:   Interval History: No overnight acute events. Had tunneled line placed yesterday. Unfortunately, blood cxs from 8/5 popped positive after line was placed. No fevers, WBC wnl. Blood cx repeated yesterday. Will continue Vancomycin.       Continuous Infusions:   veletri/remodulin cassette   Intravenous Continuous        veletri/remodulin tubing   Intravenous Continuous         Scheduled Meds:   macitentan-tadalafil  1 tablet Oral Daily    pregabalin  75 mg Oral QHS    spironolactone  25 mg Oral Daily    treprostinil (REMODULIN) 12,000,000 ng in 0.9% NaCl 100 mL infusion  110.2 ng/kg/min (Order-Specific) Intravenous Q24H    vancomycin (VANCOCIN) IV (PEDS and ADULTS)  15 mg/kg Intravenous Q12H     PRN Meds:  Current Facility-Administered Medications:     acetaminophen, 650 mg, Oral, Q6H PRN    loperamide, 2 mg, Oral, QID PRN    ondansetron, 4 mg, Intravenous, Q8H PRN    oxyCODONE-acetaminophen, 1 tablet, Oral, Q6H PRN    sodium chloride 0.9%, 10 mL, Intravenous, PRN    Pharmacy to dose Vancomycin consult, , , Once **AND** vancomycin - pharmacy to dose, , Intravenous, pharmacy to manage frequency    Review of patient's allergies indicates:  No Known Allergies  Objective:     Vital Signs (Most Recent):  Temp: 98.3 °F (36.8 °C) (08/09/24 0744)  Pulse: 76 (08/09/24 0744)  Resp: 20 (08/09/24 0744)  BP: 103/72 (08/09/24 0744)  SpO2: 99 % (08/09/24 0744) Vital Signs (24h Range):  Temp:  [97.5 °F (36.4 °C)-98.8 °F (37.1 °C)] 98.3 °F (36.8 °C)  Pulse:  [63-84] 76  Resp:  [14-20] 20  SpO2:  [93 %-99 %] 99 %  BP: ()/(55-97) 103/72     Patient Vitals for the past 72 hrs (Last 3 readings):   Weight  "  08/08/24 0314 72.3 kg (159 lb 6.3 oz)   08/07/24 0302 73.5 kg (162 lb 2.4 oz)     Body mass index is 24.96 kg/m².      Intake/Output Summary (Last 24 hours) at 8/9/2024 1107  Last data filed at 8/9/2024 0339  Gross per 24 hour   Intake 480 ml   Output 1500 ml   Net -1020 ml          Physical Exam  Constitutional:       Appearance: Normal appearance.   HENT:      Head: Atraumatic.   Eyes:      Conjunctiva/sclera: Conjunctivae normal.   Cardiovascular:      Rate and Rhythm: Normal rate and regular rhythm.      Heart sounds: Murmur heard.   Pulmonary:      Effort: Pulmonary effort is normal.      Breath sounds: Normal breath sounds. No wheezing or rhonchi.   Abdominal:      General: There is no distension.      Palpations: Abdomen is soft.      Tenderness: There is no abdominal tenderness.   Musculoskeletal:      Right lower leg: No edema.      Left lower leg: No edema.   Skin:     General: Skin is warm.   Neurological:      General: No focal deficit present.      Mental Status: She is alert.   Psychiatric:         Mood and Affect: Mood normal.            Significant Labs:  CBC:  Recent Labs   Lab 08/07/24  0642 08/08/24  0711 08/09/24  0655   WBC 4.37 4.53 5.80   RBC 3.97* 3.94* 4.00   HGB 11.5* 11.4* 11.8*   HCT 35.9* 36.6* 35.8*   * 154 172   MCV 90 93 90   MCH 29.0 28.9 29.5   MCHC 32.0 31.1* 33.0     BNP:  Recent Labs   Lab 08/03/24  0937   *     CMP:  Recent Labs   Lab 08/07/24  0642 08/08/24  0711 08/09/24  0655   GLU 95 93 92   CALCIUM 9.5 9.5 9.1   ALBUMIN 3.3* 3.5 3.4*   PROT 6.9 7.1 7.0    138 137   K 3.9 4.1 3.9   CO2 21* 20* 20*    109 108   BUN 11 8 13   CREATININE 0.8 0.8 0.9   ALKPHOS 69 68 71   ALT 7* 8* 8*   AST 12 12 13   BILITOT 0.7 0.6 0.6      Coagulation:   Recent Labs   Lab 08/03/24  0937   INR 1.1   APTT 32.2*     LDH:  No results for input(s): "LDH" in the last 72 hours.  Microbiology:  Microbiology Results (last 7 days)       Procedure Component Value Units " Date/Time    Blood culture [[id number]] Collected: 08/05/24 0807    Order Status: Completed Specimen: Blood Updated: 08/09/24 1046     Blood Culture, Routine Gram stain aer bottle: Gram positive rods      Results called to and read back by: Neeta Charles RN 08/08/2024      11:08    Blood culture [[id number]] Collected: 08/05/24 0804    Order Status: Completed Specimen: Blood Updated: 08/09/24 1012     Blood Culture, Routine No Growth to date      No Growth to date      No Growth to date      No Growth to date      No Growth to date    Blood culture [[id number]] Collected: 08/08/24 1620    Order Status: Completed Specimen: Blood Updated: 08/09/24 0115     Blood Culture, Routine No Growth to date    Narrative:      Please obtain blood cultures x2 from two different sites, and  at-least 30mins apart. Pls and thank you.    Blood culture [[id number]] Collected: 08/08/24 1654    Order Status: Completed Specimen: Blood Updated: 08/09/24 0115     Blood Culture, Routine No Growth to date    Narrative:      Please obtain blood cultures x2 from two different sites, and  at-least 30mins apart. Pls and thank you.    Culture, Anaerobe [[id number]] Collected: 08/04/24 1147    Order Status: Completed Specimen: Skin from Chest, Right Updated: 08/08/24 1101     Anaerobic Culture No anaerobes isolated    Aerobic culture [[id number]] Collected: 08/04/24 1147    Order Status: Completed Specimen: Skin from Chest, Right Updated: 08/06/24 1036     Aerobic Bacterial Culture Skin isha,  no predominant organism    Blood culture x two cultures. Draw prior to antibiotics. [[id number]]  (Abnormal) Collected: 08/03/24 0937    Order Status: Completed Specimen: Blood from Peripheral, Forearm, Right Updated: 08/06/24 0754     Blood Culture, Routine Gram stain aer bottle: Gram positive rods      Results called to and read back by:Lee Olivarez RN 08/04/2024  16:37      CORYNEBACTERIUM SPECIES  further identified as Corynebacterium coyleae  For  susceptibility see order #[id number]      Narrative:      Aerobic and anaerobic    Blood culture x two cultures. Draw prior to antibiotics. [[id number]]  (Abnormal)  (Susceptibility) Collected: 08/03/24 0938    Order Status: Completed Specimen: Blood from Peripheral, Forearm, Left Updated: 08/06/24 0753     Blood Culture, Routine Gram stain aer bottle: Gram positive rods      Results called to and read back by:Lee Olivarez RN 08/04/2024  16:33      CORYNEBACTERIUM SPECIES  further identified as Corynebacterium coyleae      Narrative:      Aerobic and anaerobic    Rapid Organism ID by PCR (from Blood culture) [[id number]] Collected: 08/03/24 0938    Order Status: Completed Updated: 08/04/24 1805     Enterococcus faecalis Not Detected     Enterococcus faecium Not Detected     Listeria monocytogenes Not Detected     Staphylococcus spp. Not Detected     Staphylococcus aureus Not Detected     Staphylococcus epidermidis Not Detected     Staphylococcus lugdunensis Not Detected     Streptococcus species Not Detected     Streptococcus agalactiae Not Detected     Streptococcus pneumoniae Not Detected     Streptococcus pyogenes Not Detected     Acinetobacter calcoaceticus/baumannii complex Not Detected     Bacteroides fragilis Not Detected     Enterobacterales Not Detected     Enterobacter cloacae complex Not Detected     Escherichia coli Not Detected     Klebsiella aerogenes Not Detected     Klebsiella oxytoca Not Detected     Klebsiella pneumoniae group Not Detected     Proteus Not Detected     Salmonella sp Not Detected     Serratia marcescens Not Detected     Haemophilus influenzae Not Detected     Neisseria meningtidis Not Detected     Pseudomonas aeruginosa Not Detected     Stenotrophomonas maltophilia Not Detected     Candida albicans Not Detected     Candida auris Not Detected     Candida glabrata Not Detected     Candida krusei Not Detected     Candida parapsilosis Not Detected     Candida tropicalis Not Detected      Cryptococcus neoformans/gattii Not Detected     CTX-M (ESBL ) Test Not Applicable     IMP (Carbapenem resistant) Test Not Applicable     KPC resistance gene (Carbapenem resistant) Test Not Applicable     mcr-1  Test Not Applicable     mec A/C  Test Not Applicable     mec A/C and MREJ (MRSA) gene Test Not Applicable     NDM (Carbapenem resistant) Test Not Applicable     OXA-48-like (Carbapenem resistant) Test Not Applicable     van A/B (VRE gene) Test Not Applicable     VIM (Carbapenem resistant) Test Not Applicable    Narrative:      Aerobic and anaerobic    Respiratory Infection Panel (PCR), Nasopharyngeal [[id number]] Collected: 08/03/24 1849    Order Status: Completed Specimen: Nasopharyngeal Swab Updated: 08/03/24 1956     Respiratory Infection Panel Source NP Swab     Adenovirus Not Detected     Coronavirus 229E, Common Cold Virus Not Detected     Coronavirus HKU1, Common Cold Virus Not Detected     Coronavirus NL63, Common Cold Virus Not Detected     Coronavirus OC43, Common Cold Virus Not Detected     Comment: The Coronavirus strains detected in this test cause the common cold.  These strains are not the COVID-19 (novel Coronavirus)strain   associated with the respiratory disease outbreak.          SARS-CoV2 (COVID-19) Qualitative PCR Not Detected     Human Metapneumovirus Not Detected     Human Rhinovirus/Enterovirus Not Detected     Influenza A (subtypes H1, H1-2009,H3) Not Detected     Influenza B Not Detected     Parainfluenza Virus 1 Not Detected     Parainfluenza Virus 2 Not Detected     Parainfluenza Virus 3 Not Detected     Parainfluenza Virus 4 Not Detected     Respiratory Syncytial Virus Not Detected     Bordetella Parapertussis (LH2855) Not Detected     Bordetella pertussis (ptxP) Not Detected     Chlamydia pneumoniae Not Detected     Mycoplasma pneumoniae Not Detected    Narrative:      Assay not valid for lower respiratory specimens, alternate  testing required.    Influenza A & B by  Molecular [[id number]] Collected: 08/03/24 0942    Order Status: Completed Specimen: Nasopharyngeal Swab Updated: 08/03/24 1037     Influenza A, Molecular Negative     Influenza B, Molecular Negative     Flu A & B Source Nasal swab            I have reviewed all pertinent labs within the past 24 hours.    Estimated Creatinine Clearance: 72.7 mL/min (based on SCr of 0.9 mg/dL).    Diagnostic Results:  I have reviewed and interpreted all pertinent imaging results/findings within the past 24 hours.  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 Patient states that for the past two weeks she started to initially just feel more bloated and weak. She noticed her weight was going up and so she started to take two lasix pills instead of her usual one for an entire week. However, despite this she didn't feel any better. She denies any fever, cough, chills, chest pain, palpitations, shortness of breath, orthopnea, PND or lower extremity edema. Reports compliance with all her medication. Patient is currently on Remodulin 110 ng/kg/min, Opsumit 10 mg daily, and adempas 2 mg, TID.    * Gram-positive bacteremia  - Negative PCT  - No Leukocytosis. COVID negative.   - Viral panel negative  - Blood cultures frmo 8/3 positive for gram + rods corneybacterum.  Repeat cultures done 8/5 with GPRs. New cultures drawn on 8/8. Will wait until negative before placing PICC line.   - Continues on Vancomycin  - CT of C/A/P done: 2.1 cm heterogeneous hypodensity right lobe of the liver new compared to prior. Both abscess and neoplasm need to considered. Unable to obtain MRI due to remodulin.    -Triple phase CT ordered: Nodular enhancing right hepatic lesion, favoring benign hemangioma. Additional flash filling hepatic hemangiomas or altered perfusion. MRI may be helpful if clinically indicated.   -Per  interventional radiology: liver lesions do not look to be infectious  -unable to get MRI due to remodulin pump.  We don't feel comfortable stopping remodulin for testing.   -Cultures at line site sent and negative   -ID consulted and following    Abnormal liver CT  -2.1 cm heterogeneous hypodensity right lobe of the liver new compared to prior. Both abscess and neoplasm need to considered.   -Triple phase CT ordered Nodular enhancing right hepatic lesion, favoring benign hemangioma. Additional flash filling hepatic hemangiomas or altered perfusion. MRI may be helpful if clinically indicated.     Hypertension  - Hold any home anti-hypertensives    Right-sided heart failure  - IVC collapsible, appears euvolemic on exam, unable to assess JVP  - Echo 8/4/24 EF: 55-60%, LVEDD: 4.36cm, Moderate RVE with function mod reduced with moderate TR. IVC 3mm   - Hold diuretics at this time    WHO group 1 pulmonary arterial hypertension  - Resume Remodulin dosing per pharmacy  - Resume OPSYNVI 10-40 mg, daily        Aldo Starr NP  Heart Transplant  [facility]

## 2024-08-10 LAB
ALBUMIN SERPL BCP-MCNC: 3.4 G/DL (ref 3.5–5.2)
ALP SERPL-CCNC: 70 U/L (ref 55–135)
ALT SERPL W/O P-5'-P-CCNC: 7 U/L (ref 10–44)
ANION GAP SERPL CALC-SCNC: 11 MMOL/L (ref 8–16)
AST SERPL-CCNC: 11 U/L (ref 10–40)
BACTERIA BLD CULT: ABNORMAL
BACTERIA BLD CULT: NORMAL
BASOPHILS # BLD AUTO: 0.02 K/UL (ref 0–0.2)
BASOPHILS NFR BLD: 0.4 % (ref 0–1.9)
BILIRUB SERPL-MCNC: 0.4 MG/DL (ref 0.1–1)
BUN SERPL-MCNC: 10 MG/DL (ref 6–20)
CALCIUM SERPL-MCNC: 9.2 MG/DL (ref 8.7–10.5)
CHLORIDE SERPL-SCNC: 109 MMOL/L (ref 95–110)
CK SERPL-CCNC: 44 U/L (ref 20–180)
CO2 SERPL-SCNC: 19 MMOL/L (ref 23–29)
CREAT SERPL-MCNC: 0.8 MG/DL (ref 0.5–1.4)
DIFFERENTIAL METHOD BLD: ABNORMAL
EOSINOPHIL # BLD AUTO: 0.1 K/UL (ref 0–0.5)
EOSINOPHIL NFR BLD: 2.1 % (ref 0–8)
ERYTHROCYTE [DISTWIDTH] IN BLOOD BY AUTOMATED COUNT: 14.4 % (ref 11.5–14.5)
EST. GFR  (NO RACE VARIABLE): >60 ML/MIN/1.73 M^2
GLUCOSE SERPL-MCNC: 101 MG/DL (ref 70–110)
HCT VFR BLD AUTO: 35.8 % (ref 37–48.5)
HGB BLD-MCNC: 11.4 G/DL (ref 12–16)
IMM GRANULOCYTES # BLD AUTO: 0.02 K/UL (ref 0–0.04)
IMM GRANULOCYTES NFR BLD AUTO: 0.4 % (ref 0–0.5)
LYMPHOCYTES # BLD AUTO: 2.2 K/UL (ref 1–4.8)
LYMPHOCYTES NFR BLD: 39.9 % (ref 18–48)
MAGNESIUM SERPL-MCNC: 2 MG/DL (ref 1.6–2.6)
MCH RBC QN AUTO: 28.5 PG (ref 27–31)
MCHC RBC AUTO-ENTMCNC: 31.8 G/DL (ref 32–36)
MCV RBC AUTO: 90 FL (ref 82–98)
MONOCYTES # BLD AUTO: 0.4 K/UL (ref 0.3–1)
MONOCYTES NFR BLD: 7.8 % (ref 4–15)
NEUTROPHILS # BLD AUTO: 2.8 K/UL (ref 1.8–7.7)
NEUTROPHILS NFR BLD: 49.4 % (ref 38–73)
NRBC BLD-RTO: 0 /100 WBC
PLATELET # BLD AUTO: 184 K/UL (ref 150–450)
PMV BLD AUTO: 10.3 FL (ref 9.2–12.9)
POTASSIUM SERPL-SCNC: 3.8 MMOL/L (ref 3.5–5.1)
PROT SERPL-MCNC: 7 G/DL (ref 6–8.4)
RBC # BLD AUTO: 4 M/UL (ref 4–5.4)
SODIUM SERPL-SCNC: 139 MMOL/L (ref 136–145)
WBC # BLD AUTO: 5.62 K/UL (ref 3.9–12.7)

## 2024-08-10 PROCEDURE — 25000003 PHARM REV CODE 250: Performed by: INTERNAL MEDICINE

## 2024-08-10 PROCEDURE — 63600175 PHARM REV CODE 636 W HCPCS: Performed by: INTERNAL MEDICINE

## 2024-08-10 PROCEDURE — 63600175 PHARM REV CODE 636 W HCPCS: Performed by: STUDENT IN AN ORGANIZED HEALTH CARE EDUCATION/TRAINING PROGRAM

## 2024-08-10 PROCEDURE — 80053 COMPREHEN METABOLIC PANEL: CPT | Performed by: STUDENT IN AN ORGANIZED HEALTH CARE EDUCATION/TRAINING PROGRAM

## 2024-08-10 PROCEDURE — 20600001 HC STEP DOWN PRIVATE ROOM

## 2024-08-10 PROCEDURE — 25000003 PHARM REV CODE 250: Performed by: PHYSICIAN ASSISTANT

## 2024-08-10 PROCEDURE — 99233 SBSQ HOSP IP/OBS HIGH 50: CPT | Mod: ,,, | Performed by: PHYSICIAN ASSISTANT

## 2024-08-10 PROCEDURE — 25000003 PHARM REV CODE 250: Performed by: STUDENT IN AN ORGANIZED HEALTH CARE EDUCATION/TRAINING PROGRAM

## 2024-08-10 PROCEDURE — 82550 ASSAY OF CK (CPK): CPT | Performed by: PHYSICIAN ASSISTANT

## 2024-08-10 PROCEDURE — 85025 COMPLETE CBC W/AUTO DIFF WBC: CPT | Performed by: STUDENT IN AN ORGANIZED HEALTH CARE EDUCATION/TRAINING PROGRAM

## 2024-08-10 PROCEDURE — 36415 COLL VENOUS BLD VENIPUNCTURE: CPT | Performed by: STUDENT IN AN ORGANIZED HEALTH CARE EDUCATION/TRAINING PROGRAM

## 2024-08-10 PROCEDURE — 63600175 PHARM REV CODE 636 W HCPCS: Mod: JG | Performed by: INTERNAL MEDICINE

## 2024-08-10 PROCEDURE — 36415 COLL VENOUS BLD VENIPUNCTURE: CPT | Performed by: PHYSICIAN ASSISTANT

## 2024-08-10 PROCEDURE — 83735 ASSAY OF MAGNESIUM: CPT | Performed by: STUDENT IN AN ORGANIZED HEALTH CARE EDUCATION/TRAINING PROGRAM

## 2024-08-10 RX ORDER — OXYCODONE HYDROCHLORIDE 5 MG/1
5 TABLET ORAL EVERY 4 HOURS PRN
Status: DISCONTINUED | OUTPATIENT
Start: 2024-08-10 | End: 2024-08-11

## 2024-08-10 RX ORDER — CYCLOBENZAPRINE HCL 5 MG
5 TABLET ORAL ONCE
Status: COMPLETED | OUTPATIENT
Start: 2024-08-10 | End: 2024-08-10

## 2024-08-10 RX ADMIN — PREGABALIN 75 MG: 75 CAPSULE ORAL at 08:08

## 2024-08-10 RX ADMIN — ONDANSETRON 4 MG: 2 INJECTION INTRAMUSCULAR; INTRAVENOUS at 12:08

## 2024-08-10 RX ADMIN — MORPHINE SULFATE 1 MG: 2 INJECTION, SOLUTION INTRAMUSCULAR; INTRAVENOUS at 02:08

## 2024-08-10 RX ADMIN — OXYCODONE 5 MG: 5 TABLET ORAL at 08:08

## 2024-08-10 RX ADMIN — CYCLOBENZAPRINE HYDROCHLORIDE 5 MG: 5 TABLET, FILM COATED ORAL at 08:08

## 2024-08-10 RX ADMIN — ACETAMINOPHEN 650 MG: 325 TABLET ORAL at 08:08

## 2024-08-10 RX ADMIN — VANCOMYCIN HYDROCHLORIDE 1000 MG: 1 INJECTION, POWDER, LYOPHILIZED, FOR SOLUTION INTRAVENOUS at 01:08

## 2024-08-10 RX ADMIN — TREPROSTINIL 7383.4 NG/MIN: 100 INJECTION, SOLUTION INTRAVENOUS; SUBCUTANEOUS at 05:08

## 2024-08-10 RX ADMIN — SPIRONOLACTONE 25 MG: 25 TABLET ORAL at 08:08

## 2024-08-10 RX ADMIN — OXYCODONE 5 MG: 5 TABLET ORAL at 03:08

## 2024-08-10 RX ADMIN — VANCOMYCIN HYDROCHLORIDE 1000 MG: 1 INJECTION, POWDER, LYOPHILIZED, FOR SOLUTION INTRAVENOUS at 12:08

## 2024-08-10 NOTE — SUBJECTIVE & OBJECTIVE
Interval History: No overnight acute events. Pt reports lower extremities pain, which she occasionally experiences at home. Will continue home dose of Lyrica, change Percocet to oxy IR, and add single dose of Flexeril.  Blood cultures from 8/8 remain NGTD.       Continuous Infusions:   veletri/remodulin cassette   Intravenous Continuous        veletri/remodulin tubing   Intravenous Continuous         Scheduled Meds:   cyclobenzaprine  5 mg Oral Once    macitentan-tadalafil  1 tablet Oral Daily    pregabalin  75 mg Oral QHS    spironolactone  25 mg Oral Daily    treprostinil (REMODULIN) 12,000,000 ng in 0.9% NaCl 100 mL infusion  110.2 ng/kg/min (Order-Specific) Intravenous Q24H    vancomycin (VANCOCIN) IV (PEDS and ADULTS)  15 mg/kg Intravenous Q12H     PRN Meds:  Current Facility-Administered Medications:     acetaminophen, 650 mg, Oral, Q6H PRN    loperamide, 2 mg, Oral, QID PRN    ondansetron, 4 mg, Intravenous, Q8H PRN    oxyCODONE, 5 mg, Oral, Q4H PRN    sodium chloride 0.9%, 10 mL, Intravenous, PRN    Pharmacy to dose Vancomycin consult, , , Once **AND** vancomycin - pharmacy to dose, , Intravenous, pharmacy to manage frequency    Review of patient's allergies indicates:  No Known Allergies  Objective:     Vital Signs (Most Recent):  Temp: 98.1 °F (36.7 °C) (08/10/24 0350)  Pulse: 70 (08/10/24 0350)  Resp: 18 (08/10/24 0239)  BP: 100/66 (08/10/24 0350)  SpO2: 95 % (08/10/24 0350) Vital Signs (24h Range):  Temp:  [98.1 °F (36.7 °C)-98.6 °F (37 °C)] 98.1 °F (36.7 °C)  Pulse:  [] 70  Resp:  [18-20] 18  SpO2:  [95 %-99 %] 95 %  BP: ()/(58-74) 100/66     Patient Vitals for the past 72 hrs (Last 3 readings):   Weight   08/08/24 0314 72.3 kg (159 lb 6.3 oz)     Body mass index is 24.96 kg/m².      Intake/Output Summary (Last 24 hours) at 8/10/2024 0809  Last data filed at 8/10/2024 0439  Gross per 24 hour   Intake 1189.94 ml   Output 1500 ml   Net -310.06 ml          Physical Exam  Constitutional:        "Appearance: Normal appearance.   HENT:      Head: Atraumatic.   Eyes:      Conjunctiva/sclera: Conjunctivae normal.   Cardiovascular:      Rate and Rhythm: Normal rate and regular rhythm.      Heart sounds: Murmur heard.   Pulmonary:      Effort: Pulmonary effort is normal.      Breath sounds: Normal breath sounds. No wheezing or rhonchi.   Abdominal:      General: There is no distension.      Palpations: Abdomen is soft.      Tenderness: There is no abdominal tenderness.   Musculoskeletal:      Right lower leg: No edema.      Left lower leg: No edema.   Skin:     General: Skin is warm.   Neurological:      General: No focal deficit present.      Mental Status: She is alert.   Psychiatric:         Mood and Affect: Mood normal.            Significant Labs:  CBC:  Recent Labs   Lab 08/08/24  0711 08/09/24  0655 08/10/24  0618   WBC 4.53 5.80 5.62   RBC 3.94* 4.00 4.00   HGB 11.4* 11.8* 11.4*   HCT 36.6* 35.8* 35.8*    172 184   MCV 93 90 90   MCH 28.9 29.5 28.5   MCHC 31.1* 33.0 31.8*     BNP:  Recent Labs   Lab 08/03/24  0937   *     CMP:  Recent Labs   Lab 08/08/24  0711 08/09/24  0655 08/10/24  0618   GLU 93 92 101   CALCIUM 9.5 9.1 9.2   ALBUMIN 3.5 3.4* 3.4*   PROT 7.1 7.0 7.0    137 139   K 4.1 3.9 3.8   CO2 20* 20* 19*    108 109   BUN 8 13 10   CREATININE 0.8 0.9 0.8   ALKPHOS 68 71 70   ALT 8* 8* 7*   AST 12 13 11   BILITOT 0.6 0.6 0.4      Coagulation:   Recent Labs   Lab 08/03/24  0937   INR 1.1   APTT 32.2*     LDH:  No results for input(s): "LDH" in the last 72 hours.  Microbiology:  Microbiology Results (last 7 days)       Procedure Component Value Units Date/Time    Blood culture [[id number]] Collected: 08/08/24 6609    Order Status: Completed Specimen: Blood Updated: 08/09/24 2012     Blood Culture, Routine No Growth to date      No Growth to date    Narrative:      Please obtain blood cultures x2 from two different sites, and  at-least 30mins apart. Pls and thank you.    " Blood culture [[id number]] Collected: 08/08/24 1620    Order Status: Completed Specimen: Blood Updated: 08/09/24 1812     Blood Culture, Routine No Growth to date      No Growth to date    Narrative:      Please obtain blood cultures x2 from two different sites, and  at-least 30mins apart. Pls and thank you.    Blood culture [[id number]] Collected: 08/05/24 0807    Order Status: Completed Specimen: Blood Updated: 08/09/24 1046     Blood Culture, Routine Gram stain aer bottle: Gram positive rods      Results called to and read back by: Neeta Charles RN 08/08/2024      11:08    Blood culture [[id number]] Collected: 08/05/24 0804    Order Status: Completed Specimen: Blood Updated: 08/09/24 1012     Blood Culture, Routine No Growth to date      No Growth to date      No Growth to date      No Growth to date      No Growth to date    Culture, Anaerobe [[id number]] Collected: 08/04/24 1147    Order Status: Completed Specimen: Skin from Chest, Right Updated: 08/08/24 1101     Anaerobic Culture No anaerobes isolated    Aerobic culture [[id number]] Collected: 08/04/24 1147    Order Status: Completed Specimen: Skin from Chest, Right Updated: 08/06/24 1036     Aerobic Bacterial Culture Skin isha,  no predominant organism    Blood culture x two cultures. Draw prior to antibiotics. [[id number]]  (Abnormal) Collected: 08/03/24 0937    Order Status: Completed Specimen: Blood from Peripheral, Forearm, Right Updated: 08/06/24 0754     Blood Culture, Routine Gram stain aer bottle: Gram positive rods      Results called to and read back by:Lee Olivarez RN 08/04/2024  16:37      CORYNEBACTERIUM SPECIES  further identified as Corynebacterium coyleae  For susceptibility see order #[id number]      Narrative:      Aerobic and anaerobic    Blood culture x two cultures. Draw prior to antibiotics. [[id number]]  (Abnormal)  (Susceptibility) Collected: 08/03/24 0938    Order Status: Completed Specimen: Blood from Peripheral, Forearm,  Left Updated: 08/06/24 0753     Blood Culture, Routine Gram stain aer bottle: Gram positive rods      Results called to and read back by:Lee Olivarez RN 08/04/2024  16:33      CORYNEBACTERIUM SPECIES  further identified as Corynebacterium coyleae      Narrative:      Aerobic and anaerobic    Rapid Organism ID by PCR (from Blood culture) [[id number]] Collected: 08/03/24 0938    Order Status: Completed Updated: 08/04/24 1805     Enterococcus faecalis Not Detected     Enterococcus faecium Not Detected     Listeria monocytogenes Not Detected     Staphylococcus spp. Not Detected     Staphylococcus aureus Not Detected     Staphylococcus epidermidis Not Detected     Staphylococcus lugdunensis Not Detected     Streptococcus species Not Detected     Streptococcus agalactiae Not Detected     Streptococcus pneumoniae Not Detected     Streptococcus pyogenes Not Detected     Acinetobacter calcoaceticus/baumannii complex Not Detected     Bacteroides fragilis Not Detected     Enterobacterales Not Detected     Enterobacter cloacae complex Not Detected     Escherichia coli Not Detected     Klebsiella aerogenes Not Detected     Klebsiella oxytoca Not Detected     Klebsiella pneumoniae group Not Detected     Proteus Not Detected     Salmonella sp Not Detected     Serratia marcescens Not Detected     Haemophilus influenzae Not Detected     Neisseria meningtidis Not Detected     Pseudomonas aeruginosa Not Detected     Stenotrophomonas maltophilia Not Detected     Candida albicans Not Detected     Candida auris Not Detected     Candida glabrata Not Detected     Candida krusei Not Detected     Candida parapsilosis Not Detected     Candida tropicalis Not Detected     Cryptococcus neoformans/gattii Not Detected     CTX-M (ESBL ) Test Not Applicable     IMP (Carbapenem resistant) Test Not Applicable     KPC resistance gene (Carbapenem resistant) Test Not Applicable     mcr-1  Test Not Applicable     mec A/C  Test Not Applicable      mec A/C and MREJ (MRSA) gene Test Not Applicable     NDM (Carbapenem resistant) Test Not Applicable     OXA-48-like (Carbapenem resistant) Test Not Applicable     van A/B (VRE gene) Test Not Applicable     VIM (Carbapenem resistant) Test Not Applicable    Narrative:      Aerobic and anaerobic    Respiratory Infection Panel (PCR), Nasopharyngeal [[id number]] Collected: 08/03/24 1849    Order Status: Completed Specimen: Nasopharyngeal Swab Updated: 08/03/24 1956     Respiratory Infection Panel Source NP Swab     Adenovirus Not Detected     Coronavirus 229E, Common Cold Virus Not Detected     Coronavirus HKU1, Common Cold Virus Not Detected     Coronavirus NL63, Common Cold Virus Not Detected     Coronavirus OC43, Common Cold Virus Not Detected     Comment: The Coronavirus strains detected in this test cause the common cold.  These strains are not the COVID-19 (novel Coronavirus)strain   associated with the respiratory disease outbreak.          SARS-CoV2 (COVID-19) Qualitative PCR Not Detected     Human Metapneumovirus Not Detected     Human Rhinovirus/Enterovirus Not Detected     Influenza A (subtypes H1, H1-2009,H3) Not Detected     Influenza B Not Detected     Parainfluenza Virus 1 Not Detected     Parainfluenza Virus 2 Not Detected     Parainfluenza Virus 3 Not Detected     Parainfluenza Virus 4 Not Detected     Respiratory Syncytial Virus Not Detected     Bordetella Parapertussis ([id number]) Not Detected     Bordetella pertussis (ptxP) Not Detected     Chlamydia pneumoniae Not Detected     Mycoplasma pneumoniae Not Detected    Narrative:      Assay not valid for lower respiratory specimens, alternate  testing required.    Influenza A & B by Molecular [[id number]] Collected: 08/03/24 0942    Order Status: Completed Specimen: Nasopharyngeal Swab Updated: 08/03/24 1037     Influenza A, Molecular Negative     Influenza B, Molecular Negative     Flu A & B Source Nasal swab            I have reviewed all pertinent labs  within the past 24 hours.    Estimated Creatinine Clearance: 81.8 mL/min (based on SCr of 0.8 mg/dL).    Diagnostic Results:  I have reviewed and interpreted all pertinent imaging results/findings within the past 24 hours.

## 2024-08-10 NOTE — PLAN OF CARE
Patient AAOx4. VSS on RA. Afebrile. NSR on Tele. Pt c/o pain, moderately controlled by PRN medications. RCW port in place- remodulin infusing w/o issues o/n. Voids per, no BM reported this shift- see I&O for details. Up independently. Bed locked and lowered, call bell in reach, nonskid socks on when OOB. Reviewed plan of care and fall precautions w/ pt- pt verbalized understanding. Reminded to call for assistance. Standard precautions maintained. Abx continued per orders.

## 2024-08-10 NOTE — PROGRESS NOTES
[facility]  Heart Transplant  Progress Note    Patient Name: Krisitn Maier  MRN: [id number]  Admission Date: 8/3/2024  Hospital Length of Stay: 5 days  Attending Physician: Tracey Dutta MD  Primary Care Provider: Jorge A Stack MD  Principal Problem:Gram-positive bacteremia    Subjective:   Interval History: No overnight acute events. Pt reports lower extremities pain, which she occasionally experiences at home. Will continue home dose of Lyrica, change Percocet to oxy IR, and add single dose of Flexeril.  Blood cultures from 8/8 remain NGTD.       Continuous Infusions:   veletri/remodulin cassette   Intravenous Continuous        veletri/remodulin tubing   Intravenous Continuous         Scheduled Meds:   cyclobenzaprine  5 mg Oral Once    macitentan-tadalafil  1 tablet Oral Daily    pregabalin  75 mg Oral QHS    spironolactone  25 mg Oral Daily    treprostinil (REMODULIN) 12,000,000 ng in 0.9% NaCl 100 mL infusion  110.2 ng/kg/min (Order-Specific) Intravenous Q24H    vancomycin (VANCOCIN) IV (PEDS and ADULTS)  15 mg/kg Intravenous Q12H     PRN Meds:  Current Facility-Administered Medications:     acetaminophen, 650 mg, Oral, Q6H PRN    loperamide, 2 mg, Oral, QID PRN    ondansetron, 4 mg, Intravenous, Q8H PRN    oxyCODONE, 5 mg, Oral, Q4H PRN    sodium chloride 0.9%, 10 mL, Intravenous, PRN    Pharmacy to dose Vancomycin consult, , , Once **AND** vancomycin - pharmacy to dose, , Intravenous, pharmacy to manage frequency    Review of patient's allergies indicates:  No Known Allergies  Objective:     Vital Signs (Most Recent):  Temp: 98.1 °F (36.7 °C) (08/10/24 0350)  Pulse: 70 (08/10/24 0350)  Resp: 18 (08/10/24 0239)  BP: 100/66 (08/10/24 0350)  SpO2: 95 % (08/10/24 0350) Vital Signs (24h Range):  Temp:  [98.1 °F (36.7 °C)-98.6 °F (37 °C)] 98.1 °F (36.7 °C)  Pulse:  [] 70  Resp:  [18-20] 18  SpO2:  [95 %-99 %] 95 %  BP: ()/(58-74) 100/66     Patient Vitals for the  "past 72 hrs (Last 3 readings):   Weight   08/08/24 0314 72.3 kg (159 lb 6.3 oz)     Body mass index is 24.96 kg/m².      Intake/Output Summary (Last 24 hours) at 8/10/2024 0809  Last data filed at 8/10/2024 0439  Gross per 24 hour   Intake 1189.94 ml   Output 1500 ml   Net -310.06 ml          Physical Exam  Constitutional:       Appearance: Normal appearance.   HENT:      Head: Atraumatic.   Eyes:      Conjunctiva/sclera: Conjunctivae normal.   Cardiovascular:      Rate and Rhythm: Normal rate and regular rhythm.      Heart sounds: Murmur heard.   Pulmonary:      Effort: Pulmonary effort is normal.      Breath sounds: Normal breath sounds. No wheezing or rhonchi.   Abdominal:      General: There is no distension.      Palpations: Abdomen is soft.      Tenderness: There is no abdominal tenderness.   Musculoskeletal:      Right lower leg: No edema.      Left lower leg: No edema.   Skin:     General: Skin is warm.   Neurological:      General: No focal deficit present.      Mental Status: She is alert.   Psychiatric:         Mood and Affect: Mood normal.            Significant Labs:  CBC:  Recent Labs   Lab 08/08/24  0711 08/09/24  0655 08/10/24  0618   WBC 4.53 5.80 5.62   RBC 3.94* 4.00 4.00   HGB 11.4* 11.8* 11.4*   HCT 36.6* 35.8* 35.8*    172 184   MCV 93 90 90   MCH 28.9 29.5 28.5   MCHC 31.1* 33.0 31.8*     BNP:  Recent Labs   Lab 08/03/24  0937   *     CMP:  Recent Labs   Lab 08/08/24  0711 08/09/24  0655 08/10/24  0618   GLU 93 92 101   CALCIUM 9.5 9.1 9.2   ALBUMIN 3.5 3.4* 3.4*   PROT 7.1 7.0 7.0    137 139   K 4.1 3.9 3.8   CO2 20* 20* 19*    108 109   BUN 8 13 10   CREATININE 0.8 0.9 0.8   ALKPHOS 68 71 70   ALT 8* 8* 7*   AST 12 13 11   BILITOT 0.6 0.6 0.4      Coagulation:   Recent Labs   Lab 08/03/24  0937   INR 1.1   APTT 32.2*     LDH:  No results for input(s): "LDH" in the last 72 hours.  Microbiology:  Microbiology Results (last 7 days)       Procedure Component Value Units " Date/Time    Blood culture [[id number]] Collected: 08/08/24 1654    Order Status: Completed Specimen: Blood Updated: 08/09/24 2012     Blood Culture, Routine No Growth to date      No Growth to date    Narrative:      Please obtain blood cultures x2 from two different sites, and  at-least 30mins apart. Pls and thank you.    Blood culture [[id number]] Collected: 08/08/24 1620    Order Status: Completed Specimen: Blood Updated: 08/09/24 1812     Blood Culture, Routine No Growth to date      No Growth to date    Narrative:      Please obtain blood cultures x2 from two different sites, and  at-least 30mins apart. Pls and thank you.    Blood culture [[id number]] Collected: 08/05/24 0807    Order Status: Completed Specimen: Blood Updated: 08/09/24 1046     Blood Culture, Routine Gram stain aer bottle: Gram positive rods      Results called to and read back by: Neeta Charles RN 08/08/2024      11:08    Blood culture [[id number]] Collected: 08/05/24 0804    Order Status: Completed Specimen: Blood Updated: 08/09/24 1012     Blood Culture, Routine No Growth to date      No Growth to date      No Growth to date      No Growth to date      No Growth to date    Culture, Anaerobe [[id number]] Collected: 08/04/24 1147    Order Status: Completed Specimen: Skin from Chest, Right Updated: 08/08/24 1101     Anaerobic Culture No anaerobes isolated    Aerobic culture [[id number]] Collected: 08/04/24 1147    Order Status: Completed Specimen: Skin from Chest, Right Updated: 08/06/24 1036     Aerobic Bacterial Culture Skin isha,  no predominant organism    Blood culture x two cultures. Draw prior to antibiotics. [[id number]]  (Abnormal) Collected: 08/03/24 0937    Order Status: Completed Specimen: Blood from Peripheral, Forearm, Right Updated: 08/06/24 0754     Blood Culture, Routine Gram stain aer bottle: Gram positive rods      Results called to and read back by:Lee Olivarez RN 08/04/2024  16:37      CORYNEBACTERIUM  SPECIES  further identified as Corynebacterium coyleae  For susceptibility see order #[id number]      Narrative:      Aerobic and anaerobic    Blood culture x two cultures. Draw prior to antibiotics. [[id number]]  (Abnormal)  (Susceptibility) Collected: 08/03/24 0938    Order Status: Completed Specimen: Blood from Peripheral, Forearm, Left Updated: 08/06/24 0753     Blood Culture, Routine Gram stain aer bottle: Gram positive rods      Results called to and read back by:Lee Olivarez RN 08/04/2024  16:33      CORYNEBACTERIUM SPECIES  further identified as Corynebacterium coyleae      Narrative:      Aerobic and anaerobic    Rapid Organism ID by PCR (from Blood culture) [[id number]] Collected: 08/03/24 0938    Order Status: Completed Updated: 08/04/24 1805     Enterococcus faecalis Not Detected     Enterococcus faecium Not Detected     Listeria monocytogenes Not Detected     Staphylococcus spp. Not Detected     Staphylococcus aureus Not Detected     Staphylococcus epidermidis Not Detected     Staphylococcus lugdunensis Not Detected     Streptococcus species Not Detected     Streptococcus agalactiae Not Detected     Streptococcus pneumoniae Not Detected     Streptococcus pyogenes Not Detected     Acinetobacter calcoaceticus/baumannii complex Not Detected     Bacteroides fragilis Not Detected     Enterobacterales Not Detected     Enterobacter cloacae complex Not Detected     Escherichia coli Not Detected     Klebsiella aerogenes Not Detected     Klebsiella oxytoca Not Detected     Klebsiella pneumoniae group Not Detected     Proteus Not Detected     Salmonella sp Not Detected     Serratia marcescens Not Detected     Haemophilus influenzae Not Detected     Neisseria meningtidis Not Detected     Pseudomonas aeruginosa Not Detected     Stenotrophomonas maltophilia Not Detected     Candida albicans Not Detected     Candida auris Not Detected     Candida glabrata Not Detected     Candida krusei Not Detected     Candida  parapsilosis Not Detected     Candida tropicalis Not Detected     Cryptococcus neoformans/gattii Not Detected     CTX-M (ESBL ) Test Not Applicable     IMP (Carbapenem resistant) Test Not Applicable     KPC resistance gene (Carbapenem resistant) Test Not Applicable     mcr-1  Test Not Applicable     mec A/C  Test Not Applicable     mec A/C and MREJ (MRSA) gene Test Not Applicable     NDM (Carbapenem resistant) Test Not Applicable     OXA-48-like (Carbapenem resistant) Test Not Applicable     van A/B (VRE gene) Test Not Applicable     VIM (Carbapenem resistant) Test Not Applicable    Narrative:      Aerobic and anaerobic    Respiratory Infection Panel (PCR), Nasopharyngeal [[id number]] Collected: 08/03/24 1849    Order Status: Completed Specimen: Nasopharyngeal Swab Updated: 08/03/24 1956     Respiratory Infection Panel Source NP Swab     Adenovirus Not Detected     Coronavirus 229E, Common Cold Virus Not Detected     Coronavirus HKU1, Common Cold Virus Not Detected     Coronavirus NL63, Common Cold Virus Not Detected     Coronavirus OC43, Common Cold Virus Not Detected     Comment: The Coronavirus strains detected in this test cause the common cold.  These strains are not the COVID-19 (novel Coronavirus)strain   associated with the respiratory disease outbreak.          SARS-CoV2 (COVID-19) Qualitative PCR Not Detected     Human Metapneumovirus Not Detected     Human Rhinovirus/Enterovirus Not Detected     Influenza A (subtypes H1, H1-2009,H3) Not Detected     Influenza B Not Detected     Parainfluenza Virus 1 Not Detected     Parainfluenza Virus 2 Not Detected     Parainfluenza Virus 3 Not Detected     Parainfluenza Virus 4 Not Detected     Respiratory Syncytial Virus Not Detected     Bordetella Parapertussis (VK3244) Not Detected     Bordetella pertussis (ptxP) Not Detected     Chlamydia pneumoniae Not Detected     Mycoplasma pneumoniae Not Detected    Narrative:      Assay not valid for lower respiratory  specimens, alternate  testing required.    Influenza A & B by Molecular [[id number]] Collected: 08/03/24 0942    Order Status: Completed Specimen: Nasopharyngeal Swab Updated: 08/03/24 1037     Influenza A, Molecular Negative     Influenza B, Molecular Negative     Flu A & B Source Nasal swab            I have reviewed all pertinent labs within the past 24 hours.    Estimated Creatinine Clearance: 81.8 mL/min (based on SCr of 0.8 mg/dL).    Diagnostic Results:  I have reviewed and interpreted all pertinent imaging results/findings within the past 24 hours.  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 Patient states that for the past two weeks she started to initially just feel more bloated and weak. She noticed her weight was going up and so she started to take two lasix pills instead of her usual one for an entire week. However, despite this she didn't feel any better. She denies any fever, cough, chills, chest pain, palpitations, shortness of breath, orthopnea, PND or lower extremity edema. Reports compliance with all her medication. Patient is currently on Remodulin 110 ng/kg/min, Opsumit 10 mg daily, and adempas 2 mg, TID.    * Gram-positive bacteremia  - Negative PCT  - No Leukocytosis. COVID negative.   - Viral panel negative  - Blood cultures frmo 8/3 positive for gram + rods corneybacterum.  Repeat cultures done 8/5 with GPRs. New cultures drawn on 8/8. Will wait until negative before placing PICC line.   - Continues on Vancomycin  - CT of C/A/P done: 2.1 cm heterogeneous hypodensity right lobe of the liver new compared to prior. Both abscess and neoplasm need to considered. Unable to obtain MRI due to remodulin.    -Triple phase CT ordered: Nodular enhancing right hepatic lesion, favoring benign hemangioma. Additional flash filling hepatic hemangiomas or altered  perfusion. MRI may be helpful if clinically indicated.   -Per interventional radiology: liver lesions do not look to be infectious  -unable to get MRI due to remodulin pump.  We don't feel comfortable stopping remodulin for testing.   -Cultures at line site sent and negative   -ID consulted and following    Abnormal liver CT  -2.1 cm heterogeneous hypodensity right lobe of the liver new compared to prior. Both abscess and neoplasm need to considered.   -Triple phase CT ordered Nodular enhancing right hepatic lesion, favoring benign hemangioma. Additional flash filling hepatic hemangiomas or altered perfusion. MRI may be helpful if clinically indicated.     Hypertension  - Hold any home anti-hypertensives    Right-sided heart failure  - IVC collapsible, appears euvolemic on exam, unable to assess JVP  - Echo 8/4/24 EF: 55-60%, LVEDD: 4.36cm, Moderate RVE with function mod reduced with moderate TR. IVC 3mm   - Hold diuretics at this time    WHO group 1 pulmonary arterial hypertension  - Resume Remodulin dosing per pharmacy  - Resume OPSYNVI 10-40 mg, daily        Haven Lopez PA-C  Heart Transplant  [facility]

## 2024-08-11 LAB
ALBUMIN SERPL BCP-MCNC: 3.5 G/DL (ref 3.5–5.2)
ALP SERPL-CCNC: 75 U/L (ref 55–135)
ALT SERPL W/O P-5'-P-CCNC: 5 U/L (ref 10–44)
ANION GAP SERPL CALC-SCNC: 11 MMOL/L (ref 8–16)
AST SERPL-CCNC: 9 U/L (ref 10–40)
BASOPHILS # BLD AUTO: 0.02 K/UL (ref 0–0.2)
BASOPHILS NFR BLD: 0.4 % (ref 0–1.9)
BILIRUB SERPL-MCNC: 0.4 MG/DL (ref 0.1–1)
BUN SERPL-MCNC: 11 MG/DL (ref 6–20)
CALCIUM SERPL-MCNC: 9.3 MG/DL (ref 8.7–10.5)
CHLORIDE SERPL-SCNC: 107 MMOL/L (ref 95–110)
CO2 SERPL-SCNC: 21 MMOL/L (ref 23–29)
CREAT SERPL-MCNC: 1.1 MG/DL (ref 0.5–1.4)
DIFFERENTIAL METHOD BLD: ABNORMAL
EOSINOPHIL # BLD AUTO: 0.1 K/UL (ref 0–0.5)
EOSINOPHIL NFR BLD: 2.3 % (ref 0–8)
ERYTHROCYTE [DISTWIDTH] IN BLOOD BY AUTOMATED COUNT: 14.5 % (ref 11.5–14.5)
EST. GFR  (NO RACE VARIABLE): >60 ML/MIN/1.73 M^2
GLUCOSE SERPL-MCNC: 121 MG/DL (ref 70–110)
HCT VFR BLD AUTO: 36.2 % (ref 37–48.5)
HGB BLD-MCNC: 11.7 G/DL (ref 12–16)
IMM GRANULOCYTES # BLD AUTO: 0.03 K/UL (ref 0–0.04)
IMM GRANULOCYTES NFR BLD AUTO: 0.6 % (ref 0–0.5)
LYMPHOCYTES # BLD AUTO: 2 K/UL (ref 1–4.8)
LYMPHOCYTES NFR BLD: 40.1 % (ref 18–48)
MAGNESIUM SERPL-MCNC: 2 MG/DL (ref 1.6–2.6)
MCH RBC QN AUTO: 29.2 PG (ref 27–31)
MCHC RBC AUTO-ENTMCNC: 32.3 G/DL (ref 32–36)
MCV RBC AUTO: 90 FL (ref 82–98)
MONOCYTES # BLD AUTO: 0.5 K/UL (ref 0.3–1)
MONOCYTES NFR BLD: 9.7 % (ref 4–15)
NEUTROPHILS # BLD AUTO: 2.3 K/UL (ref 1.8–7.7)
NEUTROPHILS NFR BLD: 46.9 % (ref 38–73)
NRBC BLD-RTO: 0 /100 WBC
PLATELET # BLD AUTO: 214 K/UL (ref 150–450)
PMV BLD AUTO: 10.6 FL (ref 9.2–12.9)
POTASSIUM SERPL-SCNC: 3.7 MMOL/L (ref 3.5–5.1)
PROT SERPL-MCNC: 7.2 G/DL (ref 6–8.4)
RBC # BLD AUTO: 4.01 M/UL (ref 4–5.4)
SODIUM SERPL-SCNC: 139 MMOL/L (ref 136–145)
VANCOMYCIN TROUGH SERPL-MCNC: 14.3 UG/ML (ref 10–22)
WBC # BLD AUTO: 4.86 K/UL (ref 3.9–12.7)

## 2024-08-11 PROCEDURE — 25000003 PHARM REV CODE 250: Performed by: PHYSICIAN ASSISTANT

## 2024-08-11 PROCEDURE — 36415 COLL VENOUS BLD VENIPUNCTURE: CPT | Performed by: STUDENT IN AN ORGANIZED HEALTH CARE EDUCATION/TRAINING PROGRAM

## 2024-08-11 PROCEDURE — 36415 COLL VENOUS BLD VENIPUNCTURE: CPT | Performed by: INTERNAL MEDICINE

## 2024-08-11 PROCEDURE — 85025 COMPLETE CBC W/AUTO DIFF WBC: CPT | Performed by: STUDENT IN AN ORGANIZED HEALTH CARE EDUCATION/TRAINING PROGRAM

## 2024-08-11 PROCEDURE — 20600001 HC STEP DOWN PRIVATE ROOM

## 2024-08-11 PROCEDURE — 99233 SBSQ HOSP IP/OBS HIGH 50: CPT | Mod: ,,, | Performed by: STUDENT IN AN ORGANIZED HEALTH CARE EDUCATION/TRAINING PROGRAM

## 2024-08-11 PROCEDURE — 25000003 PHARM REV CODE 250: Performed by: STUDENT IN AN ORGANIZED HEALTH CARE EDUCATION/TRAINING PROGRAM

## 2024-08-11 PROCEDURE — 63600175 PHARM REV CODE 636 W HCPCS: Mod: JG | Performed by: INTERNAL MEDICINE

## 2024-08-11 PROCEDURE — 83735 ASSAY OF MAGNESIUM: CPT | Performed by: STUDENT IN AN ORGANIZED HEALTH CARE EDUCATION/TRAINING PROGRAM

## 2024-08-11 PROCEDURE — 80202 ASSAY OF VANCOMYCIN: CPT | Performed by: INTERNAL MEDICINE

## 2024-08-11 PROCEDURE — 25000003 PHARM REV CODE 250: Performed by: INTERNAL MEDICINE

## 2024-08-11 PROCEDURE — 80053 COMPREHEN METABOLIC PANEL: CPT | Performed by: STUDENT IN AN ORGANIZED HEALTH CARE EDUCATION/TRAINING PROGRAM

## 2024-08-11 PROCEDURE — 99233 SBSQ HOSP IP/OBS HIGH 50: CPT | Mod: ,,, | Performed by: PHYSICIAN ASSISTANT

## 2024-08-11 PROCEDURE — 63600175 PHARM REV CODE 636 W HCPCS: Performed by: INTERNAL MEDICINE

## 2024-08-11 RX ORDER — OXYCODONE AND ACETAMINOPHEN 5; 325 MG/1; MG/1
1 TABLET ORAL EVERY 4 HOURS PRN
Status: DISCONTINUED | OUTPATIENT
Start: 2024-08-11 | End: 2024-08-11

## 2024-08-11 RX ORDER — OXYCODONE AND ACETAMINOPHEN 10; 325 MG/1; MG/1
1 TABLET ORAL EVERY 4 HOURS PRN
Status: DISCONTINUED | OUTPATIENT
Start: 2024-08-11 | End: 2024-08-12 | Stop reason: HOSPADM

## 2024-08-11 RX ADMIN — TREPROSTINIL 7383.4 NG/MIN: 100 INJECTION, SOLUTION INTRAVENOUS; SUBCUTANEOUS at 05:08

## 2024-08-11 RX ADMIN — OXYCODONE HYDROCHLORIDE AND ACETAMINOPHEN 1 TABLET: 5; 325 TABLET ORAL at 08:08

## 2024-08-11 RX ADMIN — ACETAMINOPHEN 650 MG: 325 TABLET ORAL at 01:08

## 2024-08-11 RX ADMIN — PREGABALIN 75 MG: 75 CAPSULE ORAL at 08:08

## 2024-08-11 RX ADMIN — VANCOMYCIN HYDROCHLORIDE 1000 MG: 1 INJECTION, POWDER, LYOPHILIZED, FOR SOLUTION INTRAVENOUS at 12:08

## 2024-08-11 RX ADMIN — OXYCODONE 5 MG: 5 TABLET ORAL at 01:08

## 2024-08-11 RX ADMIN — OXYCODONE HYDROCHLORIDE AND ACETAMINOPHEN 1 TABLET: 10; 325 TABLET ORAL at 05:08

## 2024-08-11 RX ADMIN — OXYCODONE HYDROCHLORIDE AND ACETAMINOPHEN 1 TABLET: 5; 325 TABLET ORAL at 12:08

## 2024-08-11 RX ADMIN — SPIRONOLACTONE 25 MG: 25 TABLET ORAL at 08:08

## 2024-08-11 RX ADMIN — VANCOMYCIN HYDROCHLORIDE 1000 MG: 1 INJECTION, POWDER, LYOPHILIZED, FOR SOLUTION INTRAVENOUS at 11:08

## 2024-08-11 RX ADMIN — OXYCODONE HYDROCHLORIDE AND ACETAMINOPHEN 1 TABLET: 10; 325 TABLET ORAL at 09:08

## 2024-08-11 NOTE — ASSESSMENT & PLAN NOTE
I have reviewed hospital notes from   service and other specialty providers. I have also reviewed CBC, CMP/BMP,  cultures and imaging with my interpretation as documented.      48F with pulm HTN on Remodulin infused viai tunneled chest Rt sub clavian central line (placed 03/2022) - who presented 08/03 for general weakness, with abd bloating. Pt presented febrile 100.7F, with some mild hypotension 76/51, otherwise VSS, HDS. Wbc nml, , procalc 0.19.  Index bld cxs 08/03 +corynebacterium. TTE neg for IE or veg. Suspected source tunneled chest central line used for remodulin. Removed tunneled line 08/06.    Repeat bld cxs 08/05  returned after >48hrs positive GPR on 1 of 2 sets. (GPR, further ID pending). New tunneled line was placed 08/08, just prior to repeat bld cx 08/05 set resulting new GPR. Repeat bld cxs 08/08 NGTD. TTE neg for IE or veg.     Recommendations / Plan:  Continue Iv-vancomycin. Vanc trough 16, therapeutic.   Inpatient pharmD to dose vanc with target trough level of 15-20.   To complete abx duration of 2wks s/p clrd bld cxs 08/08, MARGARITA 08/22.  Follow-up repeat bld cxs 08/08. If remain clear, and pt afebrile w/o s/sxs of systemic infection; then no need to remove newly placed tunneled line (being used for remodulin), and may place additional picc line to continue concurrent Iv-vanc course.  Plan for d/c to home with HH.    -- ID will schedule pt for ID clinic f/u appt   -- Discussed with ID staff and primary team.  -- ID will sign off at this time. Please see OPAT note below for outpt abx plans.       Outpatient Antibiotic Therapy Plan:    Please send referral to [facility].    1) Infection :   -- corynebacterium bacteremia     2) Discharge Antibiotics:     Intravenous antibiotics:  IV - Vancomycin.      3) Therapy Duration:   2wks s/p 08/08    Estimated end date of IV antibiotics:  08/22    4) Outpatient Weekly Labs:    Order the following labs to be drawn on  Mondays:   CBC  CMP   CRP  Vancomycin trough. Target 15-20    If discharged on vancomycin IV, order the following additional labs to be drawn on Thursdays:  CMP   Vancomycin trough. Target 15-20    If vancomycin trough is not at target (15-20) prior to discharge, schedule vancomycin trough to be drawn before their fourth outpatient dose.    5) Fax Lab Results to Infectious Diseases Provider:  Nicholas Brar PA-C    [facility] Fax Number: [phone]    6) Outpatient Infectious Diseases Follow-up    Follow-up appointment will be arranged by the ID clinic and will be found in the patient's appointments tab.    Prior to discharge, please ensure the patient's follow-up has been scheduled.    If there is still no follow-up scheduled prior to discharge, please send an [employer] message to [facility] [place] or Call Infectious Diseases Dept.

## 2024-08-11 NOTE — PLAN OF CARE
Pt has call bell in reach, non slip socks on, and bedrails up x2. Pt encouraged to wash hands. Pt is on remodulin gtt changed daily to a dedicated RSC line. Pt is on iv vancomycin for blood culture infection. She has her  at the bedside. Pt has zofran and phenergan for nausea. She takes percocet for pain. She is on tele monitoring.

## 2024-08-11 NOTE — PROGRESS NOTES
Pharmacokinetic Assessment Follow Up: IV Vancomycin    Scr up from 0.8 to 1.1 today, plan to check trough tonight to ensure no accumulation.    Vancomycin Regimen Plan:    Check a trough prior to 0030 dose tonight; goal is 15-20 for GPC bacteremia/line infection    Drug levels (last 3 results):  Recent Labs   Lab Result Units 08/09/24  1131   Vancomycin-Trough ug/mL 16.9       Pharmacy will continue to follow and monitor vancomycin.    Please contact pharmacy at [email or web address] [phone] for questions regarding this assessment.    Thank you for the consult,   Vanessa Bonilla       Patient brief summary:  Kristin Maier is a 48 y.o. female initiated on antimicrobial therapy with IV Vancomycin for treatment of bacteremia    The patient's current regimen is 1 g q12h    Drug Allergies:   Review of patient's allergies indicates:  No Known Allergies    Actual Body Weight:   72 kg    Renal Function:   Estimated Creatinine Clearance: 59.5 mL/min (based on SCr of 1.1 mg/dL).,     Dialysis Method (if applicable):  N/A    CBC (last 72 hours):  Recent Labs   Lab Result Units 08/09/24  0655 08/10/24  0618 08/11/24  0555   WBC K/uL 5.80 5.62 4.86   Hemoglobin g/dL 11.8* 11.4* 11.7*   Hematocrit % 35.8* 35.8* 36.2*   Platelets K/uL 172 184 214   Gran % % 51.8 49.4 46.9   Lymph % % 37.6 39.9 40.1   Mono % % 8.6 7.8 9.7   Eosinophil % % 1.6 2.1 2.3   Basophil % % 0.2 0.4 0.4   Differential Method  Automated Automated Automated       Metabolic Panel (last 72 hours):  Recent Labs   Lab Result Units 08/09/24  0655 08/10/24  0618 08/11/24  0555   Sodium mmol/L 137 139 139   Potassium mmol/L 3.9 3.8 3.7   Chloride mmol/L 108 109 107   CO2 mmol/L 20* 19* 21*   Glucose mg/dL 92 101 121*   BUN mg/dL 13 10 11   Creatinine mg/dL 0.9 0.8 1.1   Albumin g/dL 3.4* 3.4* 3.5   Total Bilirubin mg/dL 0.6 0.4 0.4   Alkaline Phosphatase U/L 71 70 75   AST U/L 13 11 9*   ALT U/L 8* 7* 5*   Magnesium mg/dL 2.1 2.0 2.0       Vancomycin  Administrations:  vancomycin given in the last 96 hours                     vancomycin (VANCOCIN) 1,000 mg in D5W 250 mL IVPB (Vial-Mate) (mg) 1,000 mg New Bag 08/11/24 1218     1,000 mg New Bag  0014     1,000 mg New Bag 08/10/24 1248      Restarted  0242     1,000 mg New Bag  0113     1,000 mg New Bag 08/09/24 1208     1,000 mg New Bag  0128     1,000 mg New Bag 08/07/24 2340    vancomycin (VANCOCIN) 1,000 mg in D5W 250 mL IVPB (Vial-Mate) (mg) 1,000 mg New Bag 08/08/24 1237                    Microbiologic Results:  Microbiology Results (last 7 days)       Procedure Component Value Units Date/Time    Blood culture [[id number]] Collected: 08/08/24 1654    Order Status: Completed Specimen: Blood Updated: 08/10/24 2012     Blood Culture, Routine No Growth to date      No Growth to date      No Growth to date    Narrative:      Please obtain blood cultures x2 from two different sites, and  at-least 30mins apart. Pls and thank you.    Blood culture [[id number]] Collected: 08/08/24 1620    Order Status: Completed Specimen: Blood Updated: 08/10/24 1812     Blood Culture, Routine No Growth to date      No Growth to date      No Growth to date    Narrative:      Please obtain blood cultures x2 from two different sites, and  at-least 30mins apart. Pls and thank you.    Blood culture [[id number]]  (Abnormal) Collected: 08/05/24 0807    Order Status: Completed Specimen: Blood Updated: 08/10/24 1243     Blood Culture, Routine Gram stain aer bottle: Gram positive rods      Results called to and read back by: Neeta Charles RN 08/08/2024      11:08      DIPHTHEROIDS  Organism is a probable contaminant      Blood culture [[id number]] Collected: 08/05/24 0804    Order Status: Completed Specimen: Blood Updated: 08/10/24 1012     Blood Culture, Routine No growth after 5 days.    Culture, Anaerobe [[id number]] Collected: 08/04/24 1147    Order Status: Completed Specimen: Skin from Chest, Right Updated: 08/08/24 1101      Anaerobic Culture No anaerobes isolated    Aerobic culture [[id number]] Collected: 08/04/24 1147    Order Status: Completed Specimen: Skin from Chest, Right Updated: 08/06/24 1036     Aerobic Bacterial Culture Skin isha,  no predominant organism    Blood culture x two cultures. Draw prior to antibiotics. [[id number]]  (Abnormal) Collected: 08/03/24 0937    Order Status: Completed Specimen: Blood from Peripheral, Forearm, Right Updated: 08/06/24 0754     Blood Culture, Routine Gram stain aer bottle: Gram positive rods      Results called to and read back by:Lee Olivarez RN 08/04/2024  16:37      CORYNEBACTERIUM SPECIES  further identified as Corynebacterium coyleae  For susceptibility see order #[id number]      Narrative:      Aerobic and anaerobic    Blood culture x two cultures. Draw prior to antibiotics. [[id number]]  (Abnormal)  (Susceptibility) Collected: 08/03/24 0938    Order Status: Completed Specimen: Blood from Peripheral, Forearm, Left Updated: 08/06/24 0753     Blood Culture, Routine Gram stain aer bottle: Gram positive rods      Results called to and read back by:Lee Olivarez RN 08/04/2024  16:33      CORYNEBACTERIUM SPECIES  further identified as Corynebacterium coyleae      Narrative:      Aerobic and anaerobic    Rapid Organism ID by PCR (from Blood culture) [[id number]] Collected: 08/03/24 0938    Order Status: Completed Updated: 08/04/24 1805     Enterococcus faecalis Not Detected     Enterococcus faecium Not Detected     Listeria monocytogenes Not Detected     Staphylococcus spp. Not Detected     Staphylococcus aureus Not Detected     Staphylococcus epidermidis Not Detected     Staphylococcus lugdunensis Not Detected     Streptococcus species Not Detected     Streptococcus agalactiae Not Detected     Streptococcus pneumoniae Not Detected     Streptococcus pyogenes Not Detected     Acinetobacter calcoaceticus/baumannii complex Not Detected     Bacteroides fragilis Not Detected     Enterobacterales  Not Detected     Enterobacter cloacae complex Not Detected     Escherichia coli Not Detected     Klebsiella aerogenes Not Detected     Klebsiella oxytoca Not Detected     Klebsiella pneumoniae group Not Detected     Proteus Not Detected     Salmonella sp Not Detected     Serratia marcescens Not Detected     Haemophilus influenzae Not Detected     Neisseria meningtidis Not Detected     Pseudomonas aeruginosa Not Detected     Stenotrophomonas maltophilia Not Detected     Candida albicans Not Detected     Candida auris Not Detected     Candida glabrata Not Detected     Candida krusei Not Detected     Candida parapsilosis Not Detected     Candida tropicalis Not Detected     Cryptococcus neoformans/gattii Not Detected     CTX-M (ESBL ) Test Not Applicable     IMP (Carbapenem resistant) Test Not Applicable     KPC resistance gene (Carbapenem resistant) Test Not Applicable     mcr-1  Test Not Applicable     mec A/C  Test Not Applicable     mec A/C and MREJ (MRSA) gene Test Not Applicable     NDM (Carbapenem resistant) Test Not Applicable     OXA-48-like (Carbapenem resistant) Test Not Applicable     van A/B (VRE gene) Test Not Applicable     VIM (Carbapenem resistant) Test Not Applicable    Narrative:      Aerobic and anaerobic

## 2024-08-11 NOTE — PLAN OF CARE
-AAOx4 and independent throughout room and halls  -VSS.  Afebrile.  NSR on tele.  On RA  -pain controlled w/ PRN oxycodone + tylenol  -no complaints of N/V  -Remodulin infusing to R chest wall port @ 110.2 ng/kg/min continuously  -Vancomycin continued q12 for bacteremia, infused through PIV  -blood cx from 8/8 NGTD  -1.5 L FR, pt compliant  -voiding per hat without difficulty  -s/o at bedside  -bed kept in lowest locked position.  Nonskids on when oob.  Call bell in reach.

## 2024-08-11 NOTE — SUBJECTIVE & OBJECTIVE
Interval History: No overnight acute events. Blood cultures remains NGTD. If remain negative, will plan for PICC tomorrow.         Continuous Infusions:   veletri/remodulin cassette   Intravenous Continuous        veletri/remodulin tubing   Intravenous Continuous         Scheduled Meds:   macitentan-tadalafil  1 tablet Oral Daily    pregabalin  75 mg Oral QHS    promethazine (PHENERGAN) 6.25 mg in 0.9% NaCl 50 mL IVPB  6.25 mg Intravenous Once    spironolactone  25 mg Oral Daily    treprostinil (REMODULIN) 12,000,000 ng in 0.9% NaCl 100 mL infusion  110.2 ng/kg/min (Order-Specific) Intravenous Q24H    vancomycin (VANCOCIN) IV (PEDS and ADULTS)  15 mg/kg Intravenous Q12H     PRN Meds:  Current Facility-Administered Medications:     acetaminophen, 650 mg, Oral, Q6H PRN    loperamide, 2 mg, Oral, QID PRN    ondansetron, 4 mg, Intravenous, Q8H PRN    oxyCODONE-acetaminophen, 1 tablet, Oral, Q4H PRN    sodium chloride 0.9%, 10 mL, Intravenous, PRN    Pharmacy to dose Vancomycin consult, , , Once **AND** vancomycin - pharmacy to dose, , Intravenous, pharmacy to manage frequency    Review of patient's allergies indicates:  No Known Allergies  Objective:     Vital Signs (Most Recent):  Temp: 98.2 °F (36.8 °C) (08/11/24 0808)  Pulse: 75 (08/11/24 0808)  Resp: 16 (08/11/24 0828)  BP: 104/62 (08/11/24 0808)  SpO2: (!) 94 % (08/11/24 0808) Vital Signs (24h Range):  Temp:  [97.8 °F (36.6 °C)-98.4 °F (36.9 °C)] 98.2 °F (36.8 °C)  Pulse:  [70-88] 75  Resp:  [16-20] 16  SpO2:  [93 %-97 %] 94 %  BP: ()/(62-74) 104/62     Patient Vitals for the past 72 hrs (Last 3 readings):   Weight   08/11/24 0400 72.5 kg (159 lb 13.3 oz)     Body mass index is 25.03 kg/m².      Intake/Output Summary (Last 24 hours) at 8/11/2024 0942  Last data filed at 8/11/2024 0612  Gross per 24 hour   Intake 1390.91 ml   Output 1800 ml   Net -409.09 ml          Physical Exam  Constitutional:       Appearance: Normal appearance.   HENT:      Head:  "Atraumatic.   Eyes:      Conjunctiva/sclera: Conjunctivae normal.   Cardiovascular:      Rate and Rhythm: Normal rate and regular rhythm.      Heart sounds: Murmur heard.   Pulmonary:      Effort: Pulmonary effort is normal.      Breath sounds: Normal breath sounds. No wheezing or rhonchi.   Abdominal:      General: There is no distension.      Palpations: Abdomen is soft.      Tenderness: There is no abdominal tenderness.   Musculoskeletal:      Right lower leg: No edema.      Left lower leg: No edema.   Skin:     General: Skin is warm.   Neurological:      General: No focal deficit present.      Mental Status: She is alert.   Psychiatric:         Mood and Affect: Mood normal.            Significant Labs:  CBC:  Recent Labs   Lab 08/09/24  0655 08/10/24  0618 08/11/24  0555   WBC 5.80 5.62 4.86   RBC 4.00 4.00 4.01   HGB 11.8* 11.4* 11.7*   HCT 35.8* 35.8* 36.2*    184 214   MCV 90 90 90   MCH 29.5 28.5 29.2   MCHC 33.0 31.8* 32.3     BNP:  No results for input(s): "BNP" in the last 168 hours.    Invalid input(s): "BNPTRIAGELBLO"    CMP:  Recent Labs   Lab 08/09/24  0655 08/10/24  0618 08/11/24  0555   GLU 92 101 121*   CALCIUM 9.1 9.2 9.3   ALBUMIN 3.4* 3.4* 3.5   PROT 7.0 7.0 7.2    139 139   K 3.9 3.8 3.7   CO2 20* 19* 21*    109 107   BUN 13 10 11   CREATININE 0.9 0.8 1.1   ALKPHOS 71 70 75   ALT 8* 7* 5*   AST 13 11 9*   BILITOT 0.6 0.4 0.4      Coagulation:   No results for input(s): "PT", "INR", "APTT" in the last 168 hours.    LDH:  No results for input(s): "LDH" in the last 72 hours.  Microbiology:  Microbiology Results (last 7 days)       Procedure Component Value Units Date/Time    Blood culture [[id number]] Collected: 08/08/24 1654    Order Status: Completed Specimen: Blood Updated: 08/10/24 2012     Blood Culture, Routine No Growth to date      No Growth to date      No Growth to date    Narrative:      Please obtain blood cultures x2 from two different sites, and  at-least 30mins " apart. Pls and thank you.    Blood culture [[id number]] Collected: 08/08/24 1620    Order Status: Completed Specimen: Blood Updated: 08/10/24 1812     Blood Culture, Routine No Growth to date      No Growth to date      No Growth to date    Narrative:      Please obtain blood cultures x2 from two different sites, and  at-least 30mins apart. Pls and thank you.    Blood culture [[id number]]  (Abnormal) Collected: 08/05/24 0807    Order Status: Completed Specimen: Blood Updated: 08/10/24 1243     Blood Culture, Routine Gram stain aer bottle: Gram positive rods      Results called to and read back by: Neeta Charles RN 08/08/2024      11:08      DIPHTHEROIDS  Organism is a probable contaminant      Blood culture [[id number]] Collected: 08/05/24 0804    Order Status: Completed Specimen: Blood Updated: 08/10/24 1012     Blood Culture, Routine No growth after 5 days.    Culture, Anaerobe [[id number]] Collected: 08/04/24 1147    Order Status: Completed Specimen: Skin from Chest, Right Updated: 08/08/24 1101     Anaerobic Culture No anaerobes isolated    Aerobic culture [[id number]] Collected: 08/04/24 1147    Order Status: Completed Specimen: Skin from Chest, Right Updated: 08/06/24 1036     Aerobic Bacterial Culture Skin isha,  no predominant organism    Blood culture x two cultures. Draw prior to antibiotics. [[id number]]  (Abnormal) Collected: 08/03/24 0937    Order Status: Completed Specimen: Blood from Peripheral, Forearm, Right Updated: 08/06/24 0754     Blood Culture, Routine Gram stain aer bottle: Gram positive rods      Results called to and read back by:Lee Olivarez RN 08/04/2024  16:37      CORYNEBACTERIUM SPECIES  further identified as Corynebacterium coyleae  For susceptibility see order #[id number]      Narrative:      Aerobic and anaerobic    Blood culture x two cultures. Draw prior to antibiotics. [[id number]]  (Abnormal)  (Susceptibility) Collected: 08/03/24 0938    Order Status: Completed Specimen:  Blood from Peripheral, Forearm, Left Updated: 08/06/24 0753     Blood Culture, Routine Gram stain aer bottle: Gram positive rods      Results called to and read back by:Lee Olivarez RN 08/04/2024  16:33      CORYNEBACTERIUM SPECIES  further identified as Corynebacterium coyleae      Narrative:      Aerobic and anaerobic    Rapid Organism ID by PCR (from Blood culture) [[id number]] Collected: 08/03/24 0938    Order Status: Completed Updated: 08/04/24 1805     Enterococcus faecalis Not Detected     Enterococcus faecium Not Detected     Listeria monocytogenes Not Detected     Staphylococcus spp. Not Detected     Staphylococcus aureus Not Detected     Staphylococcus epidermidis Not Detected     Staphylococcus lugdunensis Not Detected     Streptococcus species Not Detected     Streptococcus agalactiae Not Detected     Streptococcus pneumoniae Not Detected     Streptococcus pyogenes Not Detected     Acinetobacter calcoaceticus/baumannii complex Not Detected     Bacteroides fragilis Not Detected     Enterobacterales Not Detected     Enterobacter cloacae complex Not Detected     Escherichia coli Not Detected     Klebsiella aerogenes Not Detected     Klebsiella oxytoca Not Detected     Klebsiella pneumoniae group Not Detected     Proteus Not Detected     Salmonella sp Not Detected     Serratia marcescens Not Detected     Haemophilus influenzae Not Detected     Neisseria meningtidis Not Detected     Pseudomonas aeruginosa Not Detected     Stenotrophomonas maltophilia Not Detected     Candida albicans Not Detected     Candida auris Not Detected     Candida glabrata Not Detected     Candida krusei Not Detected     Candida parapsilosis Not Detected     Candida tropicalis Not Detected     Cryptococcus neoformans/gattii Not Detected     CTX-M (ESBL ) Test Not Applicable     IMP (Carbapenem resistant) Test Not Applicable     KPC resistance gene (Carbapenem resistant) Test Not Applicable     mcr-1  Test Not Applicable     [facility] [place]  A/C  Test Not Applicable     mec A/C and MREJ (MRSA) gene Test Not Applicable     NDM (Carbapenem resistant) Test Not Applicable     OXA-48-like (Carbapenem resistant) Test Not Applicable     van A/B (VRE gene) Test Not Applicable     VIM (Carbapenem resistant) Test Not Applicable    Narrative:      Aerobic and anaerobic            I have reviewed all pertinent labs within the past 24 hours.    Estimated Creatinine Clearance: 59.5 mL/min (based on SCr of 1.1 mg/dL).    Diagnostic Results:  I have reviewed and interpreted all pertinent imaging results/findings within the past 24 hours.

## 2024-08-11 NOTE — PROGRESS NOTES
[facility] - Transplant Stepdown  Heart Transplant  Progress Note    Patient Name: Kristin Maier  MRN: [id number]  Admission Date: 8/3/2024  Hospital Length of Stay: 6 days  Attending Physician: Tracey Dutta MD  Primary Care Provider: Jorge A Stack MD  Principal Problem:Gram-positive bacteremia    Subjective:   Interval History: No overnight acute events. Blood cultures remains NGTD. If remain negative, will plan for PICC tomorrow.         Continuous Infusions:   veletri/remodulin cassette   Intravenous Continuous        veletri/remodulin tubing   Intravenous Continuous         Scheduled Meds:   macitentan-tadalafil  1 tablet Oral Daily    pregabalin  75 mg Oral QHS    promethazine (PHENERGAN) 6.25 mg in 0.9% NaCl 50 mL IVPB  6.25 mg Intravenous Once    spironolactone  25 mg Oral Daily    treprostinil (REMODULIN) 12,000,000 ng in 0.9% NaCl 100 mL infusion  110.2 ng/kg/min (Order-Specific) Intravenous Q24H    vancomycin (VANCOCIN) IV (PEDS and ADULTS)  15 mg/kg Intravenous Q12H     PRN Meds:  Current Facility-Administered Medications:     acetaminophen, 650 mg, Oral, Q6H PRN    loperamide, 2 mg, Oral, QID PRN    ondansetron, 4 mg, Intravenous, Q8H PRN    oxyCODONE-acetaminophen, 1 tablet, Oral, Q4H PRN    sodium chloride 0.9%, 10 mL, Intravenous, PRN    Pharmacy to dose Vancomycin consult, , , Once **AND** vancomycin - pharmacy to dose, , Intravenous, pharmacy to manage frequency    Review of patient's allergies indicates:  No Known Allergies  Objective:     Vital Signs (Most Recent):  Temp: 98.2 °F (36.8 °C) (08/11/24 0808)  Pulse: 75 (08/11/24 0808)  Resp: 16 (08/11/24 0828)  BP: 104/62 (08/11/24 0808)  SpO2: (!) 94 % (08/11/24 0808) Vital Signs (24h Range):  Temp:  [97.8 °F (36.6 °C)-98.4 °F (36.9 °C)] 98.2 °F (36.8 °C)  Pulse:  [70-88] 75  Resp:  [16-20] 16  SpO2:  [93 %-97 %] 94 %  BP: ()/(62-74) 104/62     Patient Vitals for the past 72 hrs (Last 3 readings):   Weight   08/11/24 0400 72.5  "kg (159 lb 13.3 oz)     Body mass index is 25.03 kg/m².      Intake/Output Summary (Last 24 hours) at 8/11/2024 0942  Last data filed at 8/11/2024 0612  Gross per 24 hour   Intake 1390.91 ml   Output 1800 ml   Net -409.09 ml          Physical Exam  Constitutional:       Appearance: Normal appearance.   HENT:      Head: Atraumatic.   Eyes:      Conjunctiva/sclera: Conjunctivae normal.   Cardiovascular:      Rate and Rhythm: Normal rate and regular rhythm.      Heart sounds: Murmur heard.   Pulmonary:      Effort: Pulmonary effort is normal.      Breath sounds: Normal breath sounds. No wheezing or rhonchi.   Abdominal:      General: There is no distension.      Palpations: Abdomen is soft.      Tenderness: There is no abdominal tenderness.   Musculoskeletal:      Right lower leg: No edema.      Left lower leg: No edema.   Skin:     General: Skin is warm.   Neurological:      General: No focal deficit present.      Mental Status: She is alert.   Psychiatric:         Mood and Affect: Mood normal.            Significant Labs:  CBC:  Recent Labs   Lab 08/09/24  0655 08/10/24  0618 08/11/24  0555   WBC 5.80 5.62 4.86   RBC 4.00 4.00 4.01   HGB 11.8* 11.4* 11.7*   HCT 35.8* 35.8* 36.2*    184 214   MCV 90 90 90   MCH 29.5 28.5 29.2   MCHC 33.0 31.8* 32.3     BNP:  No results for input(s): "BNP" in the last 168 hours.    Invalid input(s): "BNPTRIAGELBLO"    CMP:  Recent Labs   Lab 08/09/24  0655 08/10/24  0618 08/11/24  0555   GLU 92 101 121*   CALCIUM 9.1 9.2 9.3   ALBUMIN 3.4* 3.4* 3.5   PROT 7.0 7.0 7.2    139 139   K 3.9 3.8 3.7   CO2 20* 19* 21*    109 107   BUN 13 10 11   CREATININE 0.9 0.8 1.1   ALKPHOS 71 70 75   ALT 8* 7* 5*   AST 13 11 9*   BILITOT 0.6 0.4 0.4      Coagulation:   No results for input(s): "PT", "INR", "APTT" in the last 168 hours.    LDH:  No results for input(s): "LDH" in the last 72 hours.  Microbiology:  Microbiology Results (last 7 days)       Procedure Component Value Units " Date/Time    Blood culture [[id number]] Collected: 08/08/24 1654    Order Status: Completed Specimen: Blood Updated: 08/10/24 2012     Blood Culture, Routine No Growth to date      No Growth to date      No Growth to date    Narrative:      Please obtain blood cultures x2 from two different sites, and  at-least 30mins apart. Pls and thank you.    Blood culture [[id number]] Collected: 08/08/24 1620    Order Status: Completed Specimen: Blood Updated: 08/10/24 1812     Blood Culture, Routine No Growth to date      No Growth to date      No Growth to date    Narrative:      Please obtain blood cultures x2 from two different sites, and  at-least 30mins apart. Pls and thank you.    Blood culture [[id number]]  (Abnormal) Collected: 08/05/24 0807    Order Status: Completed Specimen: Blood Updated: 08/10/24 1243     Blood Culture, Routine Gram stain aer bottle: Gram positive rods      Results called to and read back by: Neeta Charles RN 08/08/2024      11:08      DIPHTHEROIDS  Organism is a probable contaminant      Blood culture [[id number]] Collected: 08/05/24 0804    Order Status: Completed Specimen: Blood Updated: 08/10/24 1012     Blood Culture, Routine No growth after 5 days.    Culture, Anaerobe [[id number]] Collected: 08/04/24 1147    Order Status: Completed Specimen: Skin from Chest, Right Updated: 08/08/24 1101     Anaerobic Culture No anaerobes isolated    Aerobic culture [[id number]] Collected: 08/04/24 1147    Order Status: Completed Specimen: Skin from Chest, Right Updated: 08/06/24 1036     Aerobic Bacterial Culture Skin isha,  no predominant organism    Blood culture x two cultures. Draw prior to antibiotics. [[id number]]  (Abnormal) Collected: 08/03/24 0937    Order Status: Completed Specimen: Blood from Peripheral, Forearm, Right Updated: 08/06/24 0754     Blood Culture, Routine Gram stain aer bottle: Gram positive rods      Results called to and read back by:Lee Olivarez RN 08/04/2024  16:37       CORYNEBACTERIUM SPECIES  further identified as Corynebacterium coyleae  For susceptibility see order #[id number]      Narrative:      Aerobic and anaerobic    Blood culture x two cultures. Draw prior to antibiotics. [[id number]]  (Abnormal)  (Susceptibility) Collected: 08/03/24 0938    Order Status: Completed Specimen: Blood from Peripheral, Forearm, Left Updated: 08/06/24 0753     Blood Culture, Routine Gram stain aer bottle: Gram positive rods      Results called to and read back by:Lee Olivarez RN 08/04/2024  16:33      CORYNEBACTERIUM SPECIES  further identified as Corynebacterium coyleae      Narrative:      Aerobic and anaerobic    Rapid Organism ID by PCR (from Blood culture) [[id number]] Collected: 08/03/24 0938    Order Status: Completed Updated: 08/04/24 1805     Enterococcus faecalis Not Detected     Enterococcus faecium Not Detected     Listeria monocytogenes Not Detected     Staphylococcus spp. Not Detected     Staphylococcus aureus Not Detected     Staphylococcus epidermidis Not Detected     Staphylococcus lugdunensis Not Detected     Streptococcus species Not Detected     Streptococcus agalactiae Not Detected     Streptococcus pneumoniae Not Detected     Streptococcus pyogenes Not Detected     Acinetobacter calcoaceticus/baumannii complex Not Detected     Bacteroides fragilis Not Detected     Enterobacterales Not Detected     Enterobacter cloacae complex Not Detected     Escherichia coli Not Detected     Klebsiella aerogenes Not Detected     Klebsiella oxytoca Not Detected     Klebsiella pneumoniae group Not Detected     Proteus Not Detected     Salmonella sp Not Detected     Serratia marcescens Not Detected     Haemophilus influenzae Not Detected     Neisseria meningtidis Not Detected     Pseudomonas aeruginosa Not Detected     Stenotrophomonas maltophilia Not Detected     Candida albicans Not Detected     Candida auris Not Detected     Candida glabrata Not Detected     Candida krusei Not Detected      Luz parapsilosis Not Detected     Candida tropicalis Not Detected     Cryptococcus neoformans/gattii Not Detected     CTX-M (ESBL ) Test Not Applicable     IMP (Carbapenem resistant) Test Not Applicable     KPC resistance gene (Carbapenem resistant) Test Not Applicable     mcr-1  Test Not Applicable     mec A/C  Test Not Applicable     mec A/C and MREJ (MRSA) gene Test Not Applicable     NDM (Carbapenem resistant) Test Not Applicable     OXA-48-like (Carbapenem resistant) Test Not Applicable     van A/B (VRE gene) Test Not Applicable     VIM (Carbapenem resistant) Test Not Applicable    Narrative:      Aerobic and anaerobic            I have reviewed all pertinent labs within the past 24 hours.    Estimated Creatinine Clearance: 59.5 mL/min (based on SCr of 1.1 mg/dL).    Diagnostic Results:  I have reviewed and interpreted all pertinent imaging results/findings within the past 24 hours.  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 Patient states that for the past two weeks she started to initially just feel more bloated and weak. She noticed her weight was going up and so she started to take two lasix pills instead of her usual one for an entire week. However, despite this she didn't feel any better. She denies any fever, cough, chills, chest pain, palpitations, shortness of breath, orthopnea, PND or lower extremity edema. Reports compliance with all her medication. Patient is currently on Remodulin 110 ng/kg/min, Opsumit 10 mg daily, and adempas 2 mg, TID.    * Gram-positive bacteremia  - Negative PCT  - No Leukocytosis. COVID negative.   - Viral panel negative  - Blood cultures frmo 8/3 positive for gram + rods corneybacterum.  Repeat cultures done 8/5 with GPRs. New cultures drawn on 8/8. Will wait until negative before placing PICC line.   - Continues on  Vancomycin  - CT of C/A/P done: 2.1 cm heterogeneous hypodensity right lobe of the liver new compared to prior. Both abscess and neoplasm need to considered. Unable to obtain MRI due to remodulin.    -Triple phase CT ordered: Nodular enhancing right hepatic lesion, favoring benign hemangioma. Additional flash filling hepatic hemangiomas or altered perfusion. MRI may be helpful if clinically indicated.   -Per interventional radiology: liver lesions do not look to be infectious  -unable to get MRI due to remodulin pump.  We don't feel comfortable stopping remodulin for testing.   -Cultures at line site sent and negative   -ID consulted and following    Abnormal liver CT  -2.1 cm heterogeneous hypodensity right lobe of the liver new compared to prior. Both abscess and neoplasm need to considered.   -Triple phase CT ordered Nodular enhancing right hepatic lesion, favoring benign hemangioma. Additional flash filling hepatic hemangiomas or altered perfusion. MRI may be helpful if clinically indicated.     Hypertension  - Hold any home anti-hypertensives    Right-sided heart failure  - IVC collapsible, appears euvolemic on exam, unable to assess JVP  - Echo 8/4/24 EF: 55-60%, LVEDD: 4.36cm, Moderate RVE with function mod reduced with moderate TR. IVC 3mm   - Hold diuretics at this time    WHO group 1 pulmonary arterial hypertension  - Resume Remodulin dosing per pharmacy  - Resume OPSYNVI 10-40 mg, daily        Haven Lopez PA-C  Heart Transplant  [facility] - Transplant Stepdown

## 2024-08-11 NOTE — SUBJECTIVE & OBJECTIVE
Interval History: Remains AF, VSS, HDS, wbc nml. Bld cxs 08/03 +corynebacterium. Repeat 08/05 1 of 2 +diptheroids; but after new tunneled line placed 08/08 (which was >48hrs s/p clrd cxs 08/05).  Repeat bld cxs 08/08 NGTD. Pt continuing on Iv-vanc, trough 16 therapeutic. Midline Rt forearm infiltrated 08/07; Rt forearm pain / swelling improved.     Review of Systems   Constitutional:  Positive for fatigue.   Gastrointestinal:  Negative for abdominal pain.   Musculoskeletal:  Positive for myalgias (Rt forearm).   Skin:  Negative for color change and wound.   All other systems reviewed and are negative.        Objective:     Vital Signs (Most Recent):  Temp: 98.3 °F (36.8 °C) (08/11/24 1552)  Pulse: 84 (08/11/24 1603)  Resp: 16 (08/11/24 1715)  BP: 99/66 (08/11/24 1552)  SpO2: 98 % (08/11/24 1552) Vital Signs (24h Range):  Temp:  [97.8 °F (36.6 °C)-98.5 °F (36.9 °C)] 98.3 °F (36.8 °C)  Pulse:  [70-84] 84  Resp:  [16-20] 16  SpO2:  [93 %-98 %] 98 %  BP: ()/(54-70) 99/66     Weight: 72.5 kg (159 lb 13.3 oz)  Body mass index is 25.03 kg/m².    Estimated Creatinine Clearance: 59.5 mL/min (based on SCr of 1.1 mg/dL).     Physical Exam  Vitals and nursing note reviewed.   Constitutional:       General: She is not in acute distress.     Appearance: She is not ill-appearing, toxic-appearing or diaphoretic.   HENT:      Mouth/Throat:      Pharynx: No oropharyngeal exudate.   Eyes:      General: No scleral icterus.     Conjunctiva/sclera: Conjunctivae normal.   Cardiovascular:      Rate and Rhythm: Normal rate.      Heart sounds: Murmur heard.   Pulmonary:      Effort: No respiratory distress.      Breath sounds: Normal breath sounds.   Abdominal:      General: Bowel sounds are normal. There is no distension.      Palpations: Abdomen is soft.      Tenderness: There is no abdominal tenderness. There is no right CVA tenderness or left CVA tenderness.   Musculoskeletal:         General: No swelling or tenderness.       Cervical back: Neck supple. No tenderness.      Right lower leg: No edema.      Left lower leg: No edema.   Skin:     General: Skin is warm and dry.      Findings: No erythema or rash.   Neurological:      Mental Status: She is alert and oriented to person, place, and time. Mental status is at baseline.   Psychiatric:         Behavior: Behavior normal.         Thought Content: Thought content normal.          Significant Labs: Blood Culture:   Recent Labs   Lab 08/03/24  0938 08/05/24  0804 08/05/24  0807 08/08/24  1620 08/08/24  1654   LABBLOO Gram stain aer bottle: Gram positive rods  Results called to and read back by:Lee Olivarez RN 08/04/2024  16:33  CORYNEBACTERIUM SPECIES  further identified as Corynebacterium coyleae  * No growth after 5 days. Gram stain aer bottle: Gram positive rods  Results called to and read back by: Neeta Charles RN 08/08/2024  11:08  DIPHTHEROIDS  Organism is a probable contaminant  * No Growth to date  No Growth to date  No Growth to date No Growth to date  No Growth to date  No Growth to date     CBC:   Recent Labs   Lab 08/10/24  0618 08/11/24  0555   WBC 5.62 4.86   HGB 11.4* 11.7*   HCT 35.8* 36.2*    214     CMP:   Recent Labs   Lab 08/10/24  0618 08/11/24  0555    139   K 3.8 3.7    107   CO2 19* 21*    121*   BUN 10 11   CREATININE 0.8 1.1   CALCIUM 9.2 9.3   PROT 7.0 7.2   ALBUMIN 3.4* 3.5   BILITOT 0.4 0.4   ALKPHOS 70 75   AST 11 9*   ALT 7* 5*   ANIONGAP 11 11     Microbiology Results (last 7 days)       Procedure Component Value Units Date/Time    Blood culture [[id number]] Collected: 08/08/24 1654    Order Status: Completed Specimen: Blood Updated: 08/10/24 2012     Blood Culture, Routine No Growth to date      No Growth to date      No Growth to date    Narrative:      Please obtain blood cultures x2 from two different sites, and  at-least 30mins apart. Pls and thank you.    Blood culture [[id number]] Collected: 08/08/24 1620     Order Status: Completed Specimen: Blood Updated: 08/10/24 1812     Blood Culture, Routine No Growth to date      No Growth to date      No Growth to date    Narrative:      Please obtain blood cultures x2 from two different sites, and  at-least 30mins apart. Pls and thank you.    Blood culture [[id number]]  (Abnormal) Collected: 08/05/24 0807    Order Status: Completed Specimen: Blood Updated: 08/10/24 1243     Blood Culture, Routine Gram stain aer bottle: Gram positive rods      Results called to and read back by: Neeta Charles RN 08/08/2024      11:08      DIPHTHEROIDS  Organism is a probable contaminant      Blood culture [[id number]] Collected: 08/05/24 0804    Order Status: Completed Specimen: Blood Updated: 08/10/24 1012     Blood Culture, Routine No growth after 5 days.    Culture, Anaerobe [[id number]] Collected: 08/04/24 1147    Order Status: Completed Specimen: Skin from Chest, Right Updated: 08/08/24 1101     Anaerobic Culture No anaerobes isolated    Aerobic culture [[id number]] Collected: 08/04/24 1147    Order Status: Completed Specimen: Skin from Chest, Right Updated: 08/06/24 1036     Aerobic Bacterial Culture Skin isha,  no predominant organism    Blood culture x two cultures. Draw prior to antibiotics. [[id number]]  (Abnormal) Collected: 08/03/24 0937    Order Status: Completed Specimen: Blood from Peripheral, Forearm, Right Updated: 08/06/24 0754     Blood Culture, Routine Gram stain aer bottle: Gram positive rods      Results called to and read back by:Lee Olivarez RN 08/04/2024  16:37      CORYNEBACTERIUM SPECIES  further identified as Corynebacterium coyleae  For susceptibility see order #[id number]      Narrative:      Aerobic and anaerobic    Blood culture x two cultures. Draw prior to antibiotics. [[id number]]  (Abnormal)  (Susceptibility) Collected: 08/03/24 0938    Order Status: Completed Specimen: Blood from Peripheral, Forearm, Left Updated: 08/06/24 0753     Blood Culture,  Routine Gram stain aer bottle: Gram positive rods      Results called to and read back by:Lee Olivarez RN 08/04/2024  16:33      CORYNEBACTERIUM SPECIES  further identified as Corynebacterium coyleae      Narrative:      Aerobic and anaerobic    Rapid Organism ID by PCR (from Blood culture) [[id number]] Collected: 08/03/24 0965    Order Status: Completed Updated: 08/04/24 1805     Enterococcus faecalis Not Detected     Enterococcus faecium Not Detected     Listeria monocytogenes Not Detected     Staphylococcus spp. Not Detected     Staphylococcus aureus Not Detected     Staphylococcus epidermidis Not Detected     Staphylococcus lugdunensis Not Detected     Streptococcus species Not Detected     Streptococcus agalactiae Not Detected     Streptococcus pneumoniae Not Detected     Streptococcus pyogenes Not Detected     Acinetobacter calcoaceticus/baumannii complex Not Detected     Bacteroides fragilis Not Detected     Enterobacterales Not Detected     Enterobacter cloacae complex Not Detected     Escherichia coli Not Detected     Klebsiella aerogenes Not Detected     Klebsiella oxytoca Not Detected     Klebsiella pneumoniae group Not Detected     Proteus Not Detected     Salmonella sp Not Detected     Serratia marcescens Not Detected     Haemophilus influenzae Not Detected     Neisseria meningtidis Not Detected     Pseudomonas aeruginosa Not Detected     Stenotrophomonas maltophilia Not Detected     Candida albicans Not Detected     Candida auris Not Detected     Candida glabrata Not Detected     Candida krusei Not Detected     Candida parapsilosis Not Detected     Candida tropicalis Not Detected     Cryptococcus neoformans/gattii Not Detected     CTX-M (ESBL ) Test Not Applicable     IMP (Carbapenem resistant) Test Not Applicable     KPC resistance gene (Carbapenem resistant) Test Not Applicable     mcr-1  Test Not Applicable     mec A/C  Test Not Applicable     mec A/C and MREJ (MRSA) gene Test Not Applicable      NDM (Carbapenem resistant) Test Not Applicable     OXA-48-like (Carbapenem resistant) Test Not Applicable     van A/B (VRE gene) Test Not Applicable     VIM (Carbapenem resistant) Test Not Applicable    Narrative:      Aerobic and anaerobic          All pertinent labs within the past 24 hours have been reviewed.    Significant Imaging: I have reviewed all pertinent imaging results/findings within the past 24 hours.    I have personally reviewed records / hospital notes from   service and other specialty providers. I have also reviewed CBC, CMP/BMP,  cultures and imaging with my interpretation as documented in my assessment / plan.    Patient is high risk for infectious complications given pt's age, multiple co-morbidities, and case complexity.      Time: 50 minutes   50% of time spent on face-to-face counseling and coordination of care. Counseling included review of test results, diagnosis, and treatment plan with patient and/or family.

## 2024-08-11 NOTE — PROGRESS NOTES
[facility]down  Infectious Disease  Progress Note    Patient Name: Kristin Maier  MRN: [id number]  Admission Date: 8/3/2024  Length of Stay: 6 days  Attending Physician: Tracey Dutta MD  Primary Care Provider: Jorge A Stack MD    Isolation Status: No active isolations  Assessment/Plan:      ID  Bacteremia  I have reviewed hospital notes from  HM service and other specialty providers. I have also reviewed CBC, CMP/BMP,  cultures and imaging with my interpretation as documented.      48F with pulm HTN on Remodulin infused viai tunneled chest Rt sub clavian central line (placed 03/2022) - who presented 08/03 for general weakness, with abd bloating. Pt presented febrile 100.7F, with some mild hypotension 76/51, otherwise VSS, HDS. Wbc nml, , procalc 0.19.  Index bld cxs 08/03 +corynebacterium. TTE neg for IE or veg. Suspected source tunneled chest central line used for remodulin. Removed tunneled line 08/06.    New tunneled line was placed in morning of 08/08; however, repeat bld cxs 08/05 that were previously cleared >48hrs, returned +diphtheroids later that afternoon 08/08.  Repeat bld cxs 08/08 NGTD. TTE neg for IE or veg.     Recommendations / Plan:  Continue Iv-vancomycin. Vanc trough 16, therapeutic.   Inpatient pharmD to dose vanc with target trough level of 15-20.   To complete abx duration of 2wks s/p clrd bld cxs 08/08, MARGARITA 08/22.  Follow-up repeat bld cxs 08/08. If remain clear, and pt afebrile w/o s/sxs of systemic infection; then no need to remove newly placed tunneled line (being used for remodulin), and may place additional picc line to continue concurrent Iv-vanc course.  Plan for d/c to home with HH.    -- ID will schedule pt for ID clinic f/u appt   -- Discussed with ID staff and primary team.  -- ID will sign off at this time. Please see OPAT note below for outpt abx plans.       Outpatient Antibiotic Therapy Plan:    Please send referral to [facility].    1) Infection :   -- corynebacterium bacteremia     2) Discharge Antibiotics:     Intravenous antibiotics:  IV - Vancomycin.      3) Therapy Duration:   2wks s/p 08/08    Estimated end date of IV antibiotics:  08/22    4) Outpatient Weekly Labs:    Order the following labs to be drawn on Mondays:   CBC  CMP   CRP  Vancomycin trough. Target 15-20    If discharged on vancomycin IV, order the following additional labs to be drawn on Thursdays:  CMP   Vancomycin trough. Target 15-20    If vancomycin trough is not at target (15-20) prior to discharge, schedule vancomycin trough to be drawn before their fourth outpatient dose.    5) Fax Lab Results to Infectious Diseases Provider:  Nicholas Brar PA-C    [facility] Fax Number: [phone]    6) Outpatient Infectious Diseases Follow-up    Follow-up appointment will be arranged by the ID clinic and will be found in the patient's appointments tab.    Prior to discharge, please ensure the patient's follow-up has been scheduled.    If there is still no follow-up scheduled prior to discharge, please send an [employer] message to [facility] Clinical Pool or Call Infectious Diseases Dept.                      Thank you for your consult. I will sign off. Please contact us if you have any additional questions.    Nicholas Brar PA-C  Infectious Disease  [facility] - Transplant Stepdown    Subjective:     Principal Problem:Gram-positive bacteremia    HPI: 48F with pulm HTN on Remodulin infused viai tunneled chest Rt sub clavian central line (placed 03/2022) - who presented 08/03 for general weakness, with abd bloating. Pt presented febrile 100.7F, with some mild hypotension 76/51, otherwise VSS, HDS. Wbc nml, , procalc 0.19.  Index bld cxs 08/01 +corynebacterium coyleae, same species on both sets. Repeat bld cxs 08/05 NGTD. TTE neg for IE or veg. Suspected source picc line vs. GI translation in setting of abd bloating and new non-specific liver lesion.   ID consulted for positive bld cxs with GPR.     Interval History: Remains AF, VSS, HDS, wbc nml. Bld cxs 08/03 +corynebacterium. Repeat 08/05 1 of 2 +diptheroids; but after new tunneled line placed 08/08 (which was >48hrs s/p clrd cxs 08/05).  Repeat bld cxs 08/08 NGTD. Pt continuing on Iv-vanc, trough 16 therapeutic. Midline Rt forearm infiltrated 08/07; Rt forearm pain / swelling improved.     Review of Systems   Constitutional:  Positive for fatigue.   Gastrointestinal:  Negative for abdominal pain.   Musculoskeletal:  Positive for myalgias (Rt forearm).   Skin:  Negative for color change and wound.   All other systems reviewed and are negative.        Objective:     Vital Signs (Most Recent):  Temp: 98.3 °F (36.8 °C) (08/11/24 1552)  Pulse: 84 (08/11/24 1603)  Resp: 16 (08/11/24 1715)  BP: 99/66 (08/11/24 1552)  SpO2: 98 % (08/11/24 1552) Vital Signs (24h Range):  Temp:  [97.8 °F (36.6 °C)-98.5 °F (36.9 °C)] 98.3 °F (36.8 °C)  Pulse:  [70-84] 84  Resp:  [16-20] 16  SpO2:  [93 %-98 %] 98 %  BP: ()/(54-70) 99/66     Weight: 72.5 kg (159 lb 13.3 oz)  Body mass index is 25.03 kg/m².    Estimated Creatinine Clearance: 59.5 mL/min (based on SCr of 1.1 mg/dL).     Physical Exam  Vitals and nursing note reviewed.   Constitutional:       General: She is not in acute distress.     Appearance: She is not ill-appearing, toxic-appearing or diaphoretic.   HENT:      Mouth/Throat:      Pharynx: No oropharyngeal exudate.   Eyes:      General: No scleral icterus.     Conjunctiva/sclera: Conjunctivae normal.   Cardiovascular:      Rate and Rhythm: Normal rate.      Heart sounds: Murmur heard.   Pulmonary:      Effort: No respiratory distress.      Breath sounds: Normal breath sounds.   Abdominal:      General: Bowel sounds are normal. There is no distension.      Palpations: Abdomen is soft.      Tenderness: There is no abdominal tenderness. There is no right CVA tenderness or left CVA tenderness.   Musculoskeletal:          General: No swelling or tenderness.      Cervical back: Neck supple. No tenderness.      Right lower leg: No edema.      Left lower leg: No edema.   Skin:     General: Skin is warm and dry.      Findings: No erythema or rash.   Neurological:      Mental Status: She is alert and oriented to person, place, and time. Mental status is at baseline.   Psychiatric:         Behavior: Behavior normal.         Thought Content: Thought content normal.          Significant Labs: Blood Culture:   Recent Labs   Lab 08/03/24  0938 08/05/24  0804 08/05/24  0807 08/08/24  1620 08/08/24  1654   LABBLOO Gram stain aer bottle: Gram positive rods  Results called to and read back by:Lee Olivarez RN 08/04/2024  16:33  CORYNEBACTERIUM SPECIES  further identified as Corynebacterium coyleae  * No growth after 5 days. Gram stain aer bottle: Gram positive rods  Results called to and read back by: Neeta Charles RN 08/08/2024  11:08  DIPHTHEROIDS  Organism is a probable contaminant  * No Growth to date  No Growth to date  No Growth to date No Growth to date  No Growth to date  No Growth to date     CBC:   Recent Labs   Lab 08/10/24  0618 08/11/24  0555   WBC 5.62 4.86   HGB 11.4* 11.7*   HCT 35.8* 36.2*    214     CMP:   Recent Labs   Lab 08/10/24  0618 08/11/24  0555    139   K 3.8 3.7    107   CO2 19* 21*    121*   BUN 10 11   CREATININE 0.8 1.1   CALCIUM 9.2 9.3   PROT 7.0 7.2   ALBUMIN 3.4* 3.5   BILITOT 0.4 0.4   ALKPHOS 70 75   AST 11 9*   ALT 7* 5*   ANIONGAP 11 11     Microbiology Results (last 7 days)       Procedure Component Value Units Date/Time    Blood culture [[id number]] Collected: 08/08/24 1654    Order Status: Completed Specimen: Blood Updated: 08/10/24 2012     Blood Culture, Routine No Growth to date      No Growth to date      No Growth to date    Narrative:      Please obtain blood cultures x2 from two different sites, and  at-least 30mins apart. Pls and thank you.    Blood culture  [[id number]] Collected: 08/08/24 1620    Order Status: Completed Specimen: Blood Updated: 08/10/24 1812     Blood Culture, Routine No Growth to date      No Growth to date      No Growth to date    Narrative:      Please obtain blood cultures x2 from two different sites, and  at-least 30mins apart. Pls and thank you.    Blood culture [[id number]]  (Abnormal) Collected: 08/05/24 0807    Order Status: Completed Specimen: Blood Updated: 08/10/24 1243     Blood Culture, Routine Gram stain aer bottle: Gram positive rods      Results called to and read back by: Neeta Charles RN 08/08/2024      11:08      DIPHTHEROIDS  Organism is a probable contaminant      Blood culture [[id number]] Collected: 08/05/24 0804    Order Status: Completed Specimen: Blood Updated: 08/10/24 1012     Blood Culture, Routine No growth after 5 days.    Culture, Anaerobe [[id number]] Collected: 08/04/24 1147    Order Status: Completed Specimen: Skin from Chest, Right Updated: 08/08/24 1101     Anaerobic Culture No anaerobes isolated    Aerobic culture [[id number]] Collected: 08/04/24 1147    Order Status: Completed Specimen: Skin from Chest, Right Updated: 08/06/24 1036     Aerobic Bacterial Culture Skin isha,  no predominant organism    Blood culture x two cultures. Draw prior to antibiotics. [[id number]]  (Abnormal) Collected: 08/03/24 0937    Order Status: Completed Specimen: Blood from Peripheral, Forearm, Right Updated: 08/06/24 0754     Blood Culture, Routine Gram stain aer bottle: Gram positive rods      Results called to and read back by:Lee Olivarez RN 08/04/2024  16:37      CORYNEBACTERIUM SPECIES  further identified as Corynebacterium coyleae  For susceptibility see order #[id number]      Narrative:      Aerobic and anaerobic    Blood culture x two cultures. Draw prior to antibiotics. [[id number]]  (Abnormal)  (Susceptibility) Collected: 08/03/24 0938    Order Status: Completed Specimen: Blood from Peripheral, Forearm, Left  Updated: 08/06/24 0753     Blood Culture, Routine Gram stain aer bottle: Gram positive rods      Results called to and read back by:Lee Olivarez RN 08/04/2024  16:33      CORYNEBACTERIUM SPECIES  further identified as Corynebacterium coyleae      Narrative:      Aerobic and anaerobic    Rapid Organism ID by PCR (from Blood culture) [[id number]] Collected: 08/03/24 0938    Order Status: Completed Updated: 08/04/24 1805     Enterococcus faecalis Not Detected     Enterococcus faecium Not Detected     Listeria monocytogenes Not Detected     Staphylococcus spp. Not Detected     Staphylococcus aureus Not Detected     Staphylococcus epidermidis Not Detected     Staphylococcus lugdunensis Not Detected     Streptococcus species Not Detected     Streptococcus agalactiae Not Detected     Streptococcus pneumoniae Not Detected     Streptococcus pyogenes Not Detected     Acinetobacter calcoaceticus/baumannii complex Not Detected     Bacteroides fragilis Not Detected     Enterobacterales Not Detected     Enterobacter cloacae complex Not Detected     Escherichia coli Not Detected     Klebsiella aerogenes Not Detected     Klebsiella oxytoca Not Detected     Klebsiella pneumoniae group Not Detected     Proteus Not Detected     Salmonella sp Not Detected     Serratia marcescens Not Detected     Haemophilus influenzae Not Detected     Neisseria meningtidis Not Detected     Pseudomonas aeruginosa Not Detected     Stenotrophomonas maltophilia Not Detected     Candida albicans Not Detected     Candida auris Not Detected     Candida glabrata Not Detected     Candida krusei Not Detected     Candida parapsilosis Not Detected     Candida tropicalis Not Detected     Cryptococcus neoformans/gattii Not Detected     CTX-M (ESBL ) Test Not Applicable     IMP (Carbapenem resistant) Test Not Applicable     KPC resistance gene (Carbapenem resistant) Test Not Applicable     mcr-1  Test Not Applicable     mec A/C  Test Not Applicable     mec A/C  and MREJ (MRSA) gene Test Not Applicable     NDM (Carbapenem resistant) Test Not Applicable     OXA-48-like (Carbapenem resistant) Test Not Applicable     van A/B (VRE gene) Test Not Applicable     VIM (Carbapenem resistant) Test Not Applicable    Narrative:      Aerobic and anaerobic          All pertinent labs within the past 24 hours have been reviewed.    Significant Imaging: I have reviewed all pertinent imaging results/findings within the past 24 hours.    I have personally reviewed records / hospital notes from   service and other specialty providers. I have also reviewed CBC, CMP/BMP,  cultures and imaging with my interpretation as documented in my assessment / plan.    Patient is high risk for infectious complications given pt's age, multiple co-morbidities, and case complexity.      Time: 50 minutes   50% of time spent on face-to-face counseling and coordination of care. Counseling included review of test results, diagnosis, and treatment plan with patient and/or family.

## 2024-08-12 VITALS
HEIGHT: 67 IN | SYSTOLIC BLOOD PRESSURE: 114 MMHG | RESPIRATION RATE: 18 BRPM | DIASTOLIC BLOOD PRESSURE: 77 MMHG | BODY MASS INDEX: 25.51 KG/M2 | OXYGEN SATURATION: 97 % | HEART RATE: 72 BPM | TEMPERATURE: 98 F | WEIGHT: 162.5 LBS

## 2024-08-12 LAB
ALBUMIN SERPL BCP-MCNC: 3.4 G/DL (ref 3.5–5.2)
ALP SERPL-CCNC: 69 U/L (ref 55–135)
ALT SERPL W/O P-5'-P-CCNC: 6 U/L (ref 10–44)
ANION GAP SERPL CALC-SCNC: 9 MMOL/L (ref 8–16)
AST SERPL-CCNC: 9 U/L (ref 10–40)
BASOPHILS # BLD AUTO: 0.02 K/UL (ref 0–0.2)
BASOPHILS NFR BLD: 0.4 % (ref 0–1.9)
BILIRUB SERPL-MCNC: 0.5 MG/DL (ref 0.1–1)
BUN SERPL-MCNC: 13 MG/DL (ref 6–20)
CALCIUM SERPL-MCNC: 9.3 MG/DL (ref 8.7–10.5)
CHLORIDE SERPL-SCNC: 106 MMOL/L (ref 95–110)
CO2 SERPL-SCNC: 23 MMOL/L (ref 23–29)
CREAT SERPL-MCNC: 1 MG/DL (ref 0.5–1.4)
DIFFERENTIAL METHOD BLD: ABNORMAL
EOSINOPHIL # BLD AUTO: 0.2 K/UL (ref 0–0.5)
EOSINOPHIL NFR BLD: 3.1 % (ref 0–8)
ERYTHROCYTE [DISTWIDTH] IN BLOOD BY AUTOMATED COUNT: 14.3 % (ref 11.5–14.5)
EST. GFR  (NO RACE VARIABLE): >60 ML/MIN/1.73 M^2
GLUCOSE SERPL-MCNC: 103 MG/DL (ref 70–110)
HCT VFR BLD AUTO: 34.3 % (ref 37–48.5)
HGB BLD-MCNC: 11.1 G/DL (ref 12–16)
IMM GRANULOCYTES # BLD AUTO: 0.02 K/UL (ref 0–0.04)
IMM GRANULOCYTES NFR BLD AUTO: 0.4 % (ref 0–0.5)
LYMPHOCYTES # BLD AUTO: 1.4 K/UL (ref 1–4.8)
LYMPHOCYTES NFR BLD: 29.4 % (ref 18–48)
MAGNESIUM SERPL-MCNC: 1.9 MG/DL (ref 1.6–2.6)
MCH RBC QN AUTO: 28.5 PG (ref 27–31)
MCHC RBC AUTO-ENTMCNC: 32.4 G/DL (ref 32–36)
MCV RBC AUTO: 88 FL (ref 82–98)
MONOCYTES # BLD AUTO: 0.6 K/UL (ref 0.3–1)
MONOCYTES NFR BLD: 11.7 % (ref 4–15)
NEUTROPHILS # BLD AUTO: 2.6 K/UL (ref 1.8–7.7)
NEUTROPHILS NFR BLD: 55 % (ref 38–73)
NRBC BLD-RTO: 0 /100 WBC
PLATELET # BLD AUTO: 211 K/UL (ref 150–450)
PMV BLD AUTO: 10.7 FL (ref 9.2–12.9)
POTASSIUM SERPL-SCNC: 3.8 MMOL/L (ref 3.5–5.1)
PROT SERPL-MCNC: 6.8 G/DL (ref 6–8.4)
RBC # BLD AUTO: 3.89 M/UL (ref 4–5.4)
SODIUM SERPL-SCNC: 138 MMOL/L (ref 136–145)
WBC # BLD AUTO: 4.79 K/UL (ref 3.9–12.7)

## 2024-08-12 PROCEDURE — C1751 CATH, INF, PER/CENT/MIDLINE: HCPCS

## 2024-08-12 PROCEDURE — 85025 COMPLETE CBC W/AUTO DIFF WBC: CPT | Performed by: STUDENT IN AN ORGANIZED HEALTH CARE EDUCATION/TRAINING PROGRAM

## 2024-08-12 PROCEDURE — 25000003 PHARM REV CODE 250: Performed by: STUDENT IN AN ORGANIZED HEALTH CARE EDUCATION/TRAINING PROGRAM

## 2024-08-12 PROCEDURE — 80053 COMPREHEN METABOLIC PANEL: CPT | Performed by: STUDENT IN AN ORGANIZED HEALTH CARE EDUCATION/TRAINING PROGRAM

## 2024-08-12 PROCEDURE — 25000003 PHARM REV CODE 250: Performed by: INTERNAL MEDICINE

## 2024-08-12 PROCEDURE — 36415 COLL VENOUS BLD VENIPUNCTURE: CPT | Performed by: STUDENT IN AN ORGANIZED HEALTH CARE EDUCATION/TRAINING PROGRAM

## 2024-08-12 PROCEDURE — 83735 ASSAY OF MAGNESIUM: CPT | Performed by: STUDENT IN AN ORGANIZED HEALTH CARE EDUCATION/TRAINING PROGRAM

## 2024-08-12 PROCEDURE — 36573 INSJ PICC RS&I 5 YR+: CPT

## 2024-08-12 PROCEDURE — 76937 US GUIDE VASCULAR ACCESS: CPT

## 2024-08-12 PROCEDURE — 63600175 PHARM REV CODE 636 W HCPCS: Performed by: STUDENT IN AN ORGANIZED HEALTH CARE EDUCATION/TRAINING PROGRAM

## 2024-08-12 PROCEDURE — 63600175 PHARM REV CODE 636 W HCPCS: Performed by: INTERNAL MEDICINE

## 2024-08-12 RX ORDER — SODIUM CHLORIDE 0.9 % (FLUSH) 0.9 %
10 SYRINGE (ML) INJECTION
Status: DISCONTINUED | OUTPATIENT
Start: 2024-08-12 | End: 2024-08-12 | Stop reason: HOSPADM

## 2024-08-12 RX ORDER — SODIUM CHLORIDE 0.9 % (FLUSH) 0.9 %
10 SYRINGE (ML) INJECTION EVERY 6 HOURS
Status: DISCONTINUED | OUTPATIENT
Start: 2024-08-12 | End: 2024-08-12 | Stop reason: HOSPADM

## 2024-08-12 RX ADMIN — OXYCODONE HYDROCHLORIDE AND ACETAMINOPHEN 1 TABLET: 10; 325 TABLET ORAL at 07:08

## 2024-08-12 RX ADMIN — SPIRONOLACTONE 25 MG: 25 TABLET ORAL at 09:08

## 2024-08-12 RX ADMIN — OXYCODONE HYDROCHLORIDE AND ACETAMINOPHEN 1 TABLET: 10; 325 TABLET ORAL at 01:08

## 2024-08-12 RX ADMIN — VANCOMYCIN HYDROCHLORIDE 1000 MG: 1 INJECTION, POWDER, LYOPHILIZED, FOR SOLUTION INTRAVENOUS at 12:08

## 2024-08-12 RX ADMIN — OXYCODONE HYDROCHLORIDE AND ACETAMINOPHEN 1 TABLET: 10; 325 TABLET ORAL at 12:08

## 2024-08-12 RX ADMIN — ONDANSETRON 4 MG: 2 INJECTION INTRAMUSCULAR; INTRAVENOUS at 02:08

## 2024-08-12 NOTE — ASSESSMENT & PLAN NOTE
- Negative PCT  - No Leukocytosis. COVID negative.   - Viral panel negative  - Blood cultures frmo 8/3 positive for gram + rods corneybacterum.  Repeat cultures done 8/5 with GPRs. New cultures drawn on 8/8 NGTD   - Continues on Vancomycin  - CT of C/A/P done: 2.1 cm heterogeneous hypodensity right lobe of the liver new compared to prior. Both abscess and neoplasm need to considered. Unable to obtain MRI due to remodulin.    -Triple phase CT ordered: Nodular enhancing right hepatic lesion, favoring benign hemangioma. Additional flash filling hepatic hemangiomas or altered perfusion. MRI may be helpful if clinically indicated.   -Per interventional radiology: liver lesions do not look to be infectious  -unable to get MRI due to remodulin pump.  We don't feel comfortable stopping remodulin for testing.   -Cultures at line site sent and negative   -ID consulted and following

## 2024-08-12 NOTE — PROGRESS NOTES
Pharmacokinetic Assessment Follow Up: IV Vancomycin    Vancomycin serum concentration assessment(s):    ~11h level = 14.3. Renal function essentially unchanged. Keep current dose given clearance of corynebacterium bacteremia and right at cusp of goal (15-20). Repeat trough ~8/16 or sooner in the presence of THEO.     Drug levels (last 3 results):  Recent Labs   Lab Result Units 08/09/24  1131 08/11/24  2310   Vancomycin-Trough ug/mL 16.9 14.3       Pharmacy will continue to follow and monitor vancomycin.    Please contact pharmacy at extension [phone] for questions regarding this assessment.    Thank you for the consult,   Keven Schmidt

## 2024-08-12 NOTE — PROGRESS NOTES
Discharge:    Per rounds by medical team, SW was notified of patient's discharge today.  SW met with patient to assess discharge needs. Patient presents alone in room, pleasant affect AAOx4 calm and cooperative. Patient verbalized agreement with discharge date and plan.  Patient in need of HH and IV abx.  SW contacted [facility] to [facility] for medications to be delivered bedside.  SAUL contacted the following home health agencies  [facility]  All stated they are out of network.  SAUL sent [facility] referral and waiting to hear back.  SAUL contacted patient SLIM and asked if patient could be discharged pending HH referrals. Patient SLIM stated that she could.  SAUL will continue to follow up on  refferals.  SW remains available.

## 2024-08-12 NOTE — SUBJECTIVE & OBJECTIVE
Interval History: No overnight acute events. Blood cultures remains NGTD. Placing PICC line today in preparation for discharge.       Continuous Infusions:   veletri/remodulin cassette   Intravenous Continuous        veletri/remodulin tubing   Intravenous Continuous         Scheduled Meds:   macitentan-tadalafil  1 tablet Oral Daily    pregabalin  75 mg Oral QHS    promethazine (PHENERGAN) 6.25 mg in 0.9% NaCl 50 mL IVPB  6.25 mg Intravenous Once    sodium chloride 0.9%  10 mL Intravenous Q6H    spironolactone  25 mg Oral Daily    treprostinil (REMODULIN) 12,000,000 ng in 0.9% NaCl 100 mL infusion  110.2 ng/kg/min (Order-Specific) Intravenous Q24H    vancomycin (VANCOCIN) IV (PEDS and ADULTS)  15 mg/kg Intravenous Q12H     PRN Meds:  Current Facility-Administered Medications:     acetaminophen, 650 mg, Oral, Q6H PRN    loperamide, 2 mg, Oral, QID PRN    ondansetron, 4 mg, Intravenous, Q8H PRN    oxyCODONE-acetaminophen, 1 tablet, Oral, Q4H PRN    sodium chloride 0.9%, 10 mL, Intravenous, PRN    Flushing PICC/Midline Protocol, , , Until Discontinued **AND** sodium chloride 0.9%, 10 mL, Intravenous, Q6H **AND** sodium chloride 0.9%, 10 mL, Intravenous, PRN    Pharmacy to dose Vancomycin consult, , , Once **AND** vancomycin - pharmacy to dose, , Intravenous, pharmacy to manage frequency    Review of patient's allergies indicates:  No Known Allergies  Objective:     Vital Signs (Most Recent):  Temp: 98.1 °F (36.7 °C) (08/12/24 1154)  Pulse: 82 (08/12/24 1154)  Resp: 16 (08/12/24 1213)  BP: 97/64 (08/12/24 1154)  SpO2: 96 % (08/12/24 1154) Vital Signs (24h Range):  Temp:  [98 °F (36.7 °C)-98.3 °F (36.8 °C)] 98.1 °F (36.7 °C)  Pulse:  [73-90] 82  Resp:  [15-20] 16  SpO2:  [95 %-99 %] 96 %  BP: ()/(57-71) 97/64     Patient Vitals for the past 72 hrs (Last 3 readings):   Weight   08/12/24 0249 73.7 kg (162 lb 7.7 oz)   08/11/24 0400 72.5 kg (159 lb 13.3 oz)     Body mass index is 25.45 kg/m².      Intake/Output  "Summary (Last 24 hours) at 8/12/2024 1230  Last data filed at 8/12/2024 1216  Gross per 24 hour   Intake 1394.47 ml   Output 1125 ml   Net 269.47 ml          Physical Exam  Constitutional:       Appearance: Normal appearance.   HENT:      Head: Atraumatic.   Eyes:      Conjunctiva/sclera: Conjunctivae normal.   Cardiovascular:      Rate and Rhythm: Normal rate and regular rhythm.      Heart sounds: Murmur heard.   Pulmonary:      Effort: Pulmonary effort is normal.      Breath sounds: Normal breath sounds. No wheezing or rhonchi.   Abdominal:      General: There is no distension.      Palpations: Abdomen is soft.      Tenderness: There is no abdominal tenderness.   Musculoskeletal:      Right lower leg: No edema.      Left lower leg: No edema.   Skin:     General: Skin is warm.   Neurological:      General: No focal deficit present.      Mental Status: She is alert.   Psychiatric:         Mood and Affect: Mood normal.            Significant Labs:  CBC:  Recent Labs   Lab 08/10/24  0618 08/11/24  0555 08/12/24  0620   WBC 5.62 4.86 4.79   RBC 4.00 4.01 3.89*   HGB 11.4* 11.7* 11.1*   HCT 35.8* 36.2* 34.3*    214 211   MCV 90 90 88   MCH 28.5 29.2 28.5   MCHC 31.8* 32.3 32.4     BNP:  No results for input(s): "BNP" in the last 168 hours.    Invalid input(s): "BNPTRIAGELBLO"    CMP:  Recent Labs   Lab 08/10/24  0618 08/11/24  0555 08/12/24  0620    121* 103   CALCIUM 9.2 9.3 9.3   ALBUMIN 3.4* 3.5 3.4*   PROT 7.0 7.2 6.8    139 138   K 3.8 3.7 3.8   CO2 19* 21* 23    107 106   BUN 10 11 13   CREATININE 0.8 1.1 1.0   ALKPHOS 70 75 69   ALT 7* 5* 6*   AST 11 9* 9*   BILITOT 0.4 0.4 0.5      Coagulation:   No results for input(s): "PT", "INR", "APTT" in the last 168 hours.    LDH:  No results for input(s): "LDH" in the last 72 hours.  Microbiology:  Microbiology Results (last 7 days)       Procedure Component Value Units Date/Time    Blood culture [[id number]] Collected: 08/08/24 1654    Order " Status: Completed Specimen: Blood Updated: 08/11/24 2012     Blood Culture, Routine No Growth to date      No Growth to date      No Growth to date      No Growth to date    Narrative:      Please obtain blood cultures x2 from two different sites, and  at-least 30mins apart. Pls and thank you.    Blood culture [[id number]] Collected: 08/08/24 1620    Order Status: Completed Specimen: Blood Updated: 08/11/24 1812     Blood Culture, Routine No Growth to date      No Growth to date      No Growth to date      No Growth to date    Narrative:      Please obtain blood cultures x2 from two different sites, and  at-least 30mins apart. Pls and thank you.    Blood culture [[id number]]  (Abnormal) Collected: 08/05/24 0807    Order Status: Completed Specimen: Blood Updated: 08/10/24 1243     Blood Culture, Routine Gram stain aer bottle: Gram positive rods      Results called to and read back by: Neeta Charles RN 08/08/2024      11:08      DIPHTHEROIDS  Organism is a probable contaminant      Blood culture [[id number]] Collected: 08/05/24 0804    Order Status: Completed Specimen: Blood Updated: 08/10/24 1012     Blood Culture, Routine No growth after 5 days.    Culture, Anaerobe [[id number]] Collected: 08/04/24 1147    Order Status: Completed Specimen: Skin from Chest, Right Updated: 08/08/24 1101     Anaerobic Culture No anaerobes isolated    Aerobic culture [[id number]] Collected: 08/04/24 1147    Order Status: Completed Specimen: Skin from Chest, Right Updated: 08/06/24 1036     Aerobic Bacterial Culture Skin isha,  no predominant organism    Blood culture x two cultures. Draw prior to antibiotics. [[id number]]  (Abnormal) Collected: 08/03/24 0937    Order Status: Completed Specimen: Blood from Peripheral, Forearm, Right Updated: 08/06/24 0754     Blood Culture, Routine Gram stain aer bottle: Gram positive rods      Results called to and read back by:Lee Olivarez RN 08/04/2024  16:37      CORYNEBACTERIUM  SPECIES  further identified as Corynebacterium coyleae  For susceptibility see order #[id number]      Narrative:      Aerobic and anaerobic    Blood culture x two cultures. Draw prior to antibiotics. [[id number]]  (Abnormal)  (Susceptibility) Collected: 08/03/24 0938    Order Status: Completed Specimen: Blood from Peripheral, Forearm, Left Updated: 08/06/24 0753     Blood Culture, Routine Gram stain aer bottle: Gram positive rods      Results called to and read back by:Lee Olivarez RN 08/04/2024  16:33      CORYNEBACTERIUM SPECIES  further identified as Corynebacterium coyleae      Narrative:      Aerobic and anaerobic            I have reviewed all pertinent labs within the past 24 hours.    Estimated Creatinine Clearance: 65.5 mL/min (based on SCr of 1 mg/dL).    Diagnostic Results:  I have reviewed and interpreted all pertinent imaging results/findings within the past 24 hours.

## 2024-08-12 NOTE — CONSULTS
YENIFERS consulted for PICC insertion in real time using ultrasound guidance.    Indication: Vancomycin, End Date 8/22/24  Number of lumens: Single  Location: Right Basilic  Length (cm): 35  Exposed (cm): 0  Arm circumference (cm): 31  Lot #: [id number]    Image saved and uploaded to EMR.

## 2024-08-12 NOTE — PROCEDURES
"Kristin Maier is a 48 y.o. female patient.    Temp: 98.3 °F (36.8 °C) (08/12/24 0852)  Pulse: 81 (08/12/24 0852)  Resp: 18 (08/12/24 0852)  BP: 107/71 (08/12/24 0852)  SpO2: 97 % (08/12/24 0852)  Weight: 73.7 kg (162 lb 7.7 oz) (08/12/24 0249)  Height: 5' 7" (170.2 cm) (08/04/24 1733)    PICC  Date/Time: 8/12/2024 10:45 AM  Performed by: Keven Bustamante RN  Assisting provider: Tiki Washington RN  Consent Done: Yes  Time out: Immediately prior to procedure a time out was called to verify the correct patient, procedure, equipment, support staff and site/side marked as required  Indications: med administration and vascular access  Anesthesia: local infiltration  Local anesthetic: lidocaine 1% without epinephrine  Anesthetic Total (mL): 4  Description of findings: PICC line placement  Preparation: skin prepped with ChloraPrep  Skin prep agent dried: skin prep agent completely dried prior to procedure  Sterile barriers: all five maximum sterile barriers used - cap, mask, sterile gown, sterile gloves, and large sterile sheet  Hand hygiene: hand hygiene performed prior to central venous catheter insertion  Location details: right basilic  Catheter type: single lumen  Catheter size: 4 Fr  Catheter Length: 35cm    Ultrasound guidance: yes  Vessel Caliber: medium and patent, compressibility normal  Needle advanced into vessel with real time Ultrasound guidance.  Guidewire confirmed in vessel.  Image recorded and saved.  Sterile sheath used.  Number of attempts: 1  Post-procedure: blood return through all ports, sterile dressing applied and chlorhexidine patch  Technical procedures used: 3CG  Specimens: No  Implants: No  Assessment: placement verified by x-ray  Complications: none          Name SOFIE Washington RN  8/12/2024    "

## 2024-08-12 NOTE — PLAN OF CARE
[facility]   Heart Transplant/PHTN Clinic   [address] [place] [address]   [phone] after hours [phone] fax   HOME HEALTH ORDERS     Admit to Home Health   Diagnosis:  Patient Active Problem List   Diagnosis    Right acute serous otitis media    Abnormal chest x-ray    Moderate protein-calorie malnutrition (Chronic)    Chronic pulmonary heart disease    UTI (urinary tract infection)    Nausea & vomiting    Diarrhea    THEO (acute kidney injury)    Pulmonary hypertension    Essential hypertension    WHO group 1 pulmonary arterial hypertension    High risk medication use    Decreased cardiac output    Acute left-sided weakness    Left-sided weakness    Shortness of breath    Leg pain    Palliative care encounter    Gram-positive bacteremia    Decreased strength of lower extremity    Impaired flexibility of lower extremity    Gait abnormality    Secondary tricuspid regurgitation    Dizziness    Right-sided heart failure    Hypertension    Abnormal liver CT    Bacteremia       Send follow up questions to [phone] or [phone].     Patient is homebound due to: Pulmonary Hypertension     Diet: Cardiac Diet     Acitivities: As tolerated     Nursing:   SN to complete comprehensive assessment including routine vital signs. Instruct on disease process and s/s of complications to report to MD. Review/verify medication list sent home with the patient at time of discharge and instruct patient/caregiver as needed. Frequency may be adjusted depending on start of care date.     Notify MD if SBP > 160 or < 90; DBP > 90 or < 50; HR > 120 or < 50; Temp > 101; Weight gain >3lbs in 1 day or 5lbs in 1 week.  Other:       LABS: SN to perform labs: CBC, CMP, CRP, Vancomycin trough on Mondays. CMP and Vanc trough on Thursdays.     HOME INFUSION THERAPY: Remodulin .2 ng/kg/min Every 24hrs continuous       ** Do not flush/draw back or manipulate line at any time.  Epoprostenol/Treprostinil infusion should not be interrupted for any reason**    SN to perform Infusion Therapy/Central Line Care. Patient also has PICC line that will need maintenance. DO NOT FLUSH Tunneled Catheter LINE for Remodulin     Review Central Line Care    Administer (drug and dose):   IV Vancomycin 1,000 mg Every 12 hours  Estimated end date of IV antibiotics:  08/22     **For questions or concerns, please call [phone] and ask for Pulmonary Hypertension clinic, M-F 8-5. After hours, weekends, call [phone] and ask for the Heart Transplant Cardiologist on call.**     Central Line Care and Maintenance:   - Sterile dressing changes are done weekly and as needed.   - Use chlor-hexadine scrub to cleanse site, apply Biopatch to insertion site,   apply securement device dressing   - Posi-flow caps are changed weekly.  - If sterile gauze is under dressing to control oozing,   dressing change must be performed every 24 hours until gauze is not needed.       Send follow up questions to [phone] or [phone].

## 2024-08-12 NOTE — NURSING
Patient discharged to home. PICC placed for home administration of Vancomycin in D5W IV for diagnosis of  bacteremia.  Home health will reach out to patient to teach administration and care of the PICC line. AVS has been reviewed with patient. Patient verbalizes understanding of new medication and what medication is discontinued. Patient is mixing up cassette of home prescription of Remodulin and self administering it.  Patient understands when follow up appointments are, and when to follow up with primary care provider or to reach out with the 24 hr nurse hotline.  Patient to be taken home by daughter via wheelchair.

## 2024-08-12 NOTE — PROGRESS NOTES
[facility] - Transplant Stepdown  Heart Transplant  Progress Note    Patient Name: Kristin Maier  MRN: [id number]  Admission Date: 8/3/2024  Hospital Length of Stay: 7 days  Attending Physician: Chloe Gregory MD  Primary Care Provider: Jorge A Stack MD  Principal Problem:Gram-positive bacteremia    Subjective:   Interval History: No overnight acute events. Blood cultures remains NGTD. Placing PICC line today in preparation for discharge.       Continuous Infusions:   veletri/remodulin cassette   Intravenous Continuous        veletri/remodulin tubing   Intravenous Continuous         Scheduled Meds:   macitentan-tadalafil  1 tablet Oral Daily    pregabalin  75 mg Oral QHS    promethazine (PHENERGAN) 6.25 mg in 0.9% NaCl 50 mL IVPB  6.25 mg Intravenous Once    sodium chloride 0.9%  10 mL Intravenous Q6H    spironolactone  25 mg Oral Daily    treprostinil (REMODULIN) 12,000,000 ng in 0.9% NaCl 100 mL infusion  110.2 ng/kg/min (Order-Specific) Intravenous Q24H    vancomycin (VANCOCIN) IV (PEDS and ADULTS)  15 mg/kg Intravenous Q12H     PRN Meds:  Current Facility-Administered Medications:     acetaminophen, 650 mg, Oral, Q6H PRN    loperamide, 2 mg, Oral, QID PRN    ondansetron, 4 mg, Intravenous, Q8H PRN    oxyCODONE-acetaminophen, 1 tablet, Oral, Q4H PRN    sodium chloride 0.9%, 10 mL, Intravenous, PRN    Flushing PICC/Midline Protocol, , , Until Discontinued **AND** sodium chloride 0.9%, 10 mL, Intravenous, Q6H **AND** sodium chloride 0.9%, 10 mL, Intravenous, PRN    Pharmacy to dose Vancomycin consult, , , Once **AND** vancomycin - pharmacy to dose, , Intravenous, pharmacy to manage frequency    Review of patient's allergies indicates:  No Known Allergies  Objective:     Vital Signs (Most Recent):  Temp: 98.1 °F (36.7 °C) (08/12/24 1154)  Pulse: 82 (08/12/24 1154)  Resp: 16 (08/12/24 1213)  BP: 97/64 (08/12/24 1154)  SpO2: 96 % (08/12/24 1154) Vital Signs (24h Range):  Temp:  [98 °F (36.7 °C)-98.3 °F  "(36.8 °C)] 98.1 °F (36.7 °C)  Pulse:  [73-90] 82  Resp:  [15-20] 16  SpO2:  [95 %-99 %] 96 %  BP: ()/(57-71) 97/64     Patient Vitals for the past 72 hrs (Last 3 readings):   Weight   08/12/24 0249 73.7 kg (162 lb 7.7 oz)   08/11/24 0400 72.5 kg (159 lb 13.3 oz)     Body mass index is 25.45 kg/m².      Intake/Output Summary (Last 24 hours) at 8/12/2024 1230  Last data filed at 8/12/2024 1216  Gross per 24 hour   Intake 1394.47 ml   Output 1125 ml   Net 269.47 ml          Physical Exam  Constitutional:       Appearance: Normal appearance.   HENT:      Head: Atraumatic.   Eyes:      Conjunctiva/sclera: Conjunctivae normal.   Cardiovascular:      Rate and Rhythm: Normal rate and regular rhythm.      Heart sounds: Murmur heard.   Pulmonary:      Effort: Pulmonary effort is normal.      Breath sounds: Normal breath sounds. No wheezing or rhonchi.   Abdominal:      General: There is no distension.      Palpations: Abdomen is soft.      Tenderness: There is no abdominal tenderness.   Musculoskeletal:      Right lower leg: No edema.      Left lower leg: No edema.   Skin:     General: Skin is warm.   Neurological:      General: No focal deficit present.      Mental Status: She is alert.   Psychiatric:         Mood and Affect: Mood normal.            Significant Labs:  CBC:  Recent Labs   Lab 08/10/24  0618 08/11/24  0555 08/12/24  0620   WBC 5.62 4.86 4.79   RBC 4.00 4.01 3.89*   HGB 11.4* 11.7* 11.1*   HCT 35.8* 36.2* 34.3*    214 211   MCV 90 90 88   MCH 28.5 29.2 28.5   MCHC 31.8* 32.3 32.4     BNP:  No results for input(s): "BNP" in the last 168 hours.    Invalid input(s): "BNPTRIAGELBLO"    CMP:  Recent Labs   Lab 08/10/24  0618 08/11/24  0555 08/12/24  0620    121* 103   CALCIUM 9.2 9.3 9.3   ALBUMIN 3.4* 3.5 3.4*   PROT 7.0 7.2 6.8    139 138   K 3.8 3.7 3.8   CO2 19* 21* 23    107 106   BUN 10 11 13   CREATININE 0.8 1.1 1.0   ALKPHOS 70 75 69   ALT 7* 5* 6*   AST 11 9* 9*   BILITOT 0.4 " "0.4 0.5      Coagulation:   No results for input(s): "PT", "INR", "APTT" in the last 168 hours.    LDH:  No results for input(s): "LDH" in the last 72 hours.  Microbiology:  Microbiology Results (last 7 days)       Procedure Component Value Units Date/Time    Blood culture [[id number]] Collected: 08/08/24 1654    Order Status: Completed Specimen: Blood Updated: 08/11/24 2012     Blood Culture, Routine No Growth to date      No Growth to date      No Growth to date      No Growth to date    Narrative:      Please obtain blood cultures x2 from two different sites, and  at-least 30mins apart. Pls and thank you.    Blood culture [[id number]] Collected: 08/08/24 1620    Order Status: Completed Specimen: Blood Updated: 08/11/24 1812     Blood Culture, Routine No Growth to date      No Growth to date      No Growth to date      No Growth to date    Narrative:      Please obtain blood cultures x2 from two different sites, and  at-least 30mins apart. Pls and thank you.    Blood culture [[id number]]  (Abnormal) Collected: 08/05/24 0807    Order Status: Completed Specimen: Blood Updated: 08/10/24 1243     Blood Culture, Routine Gram stain aer bottle: Gram positive rods      Results called to and read back by: Neeta Charles RN 08/08/2024      11:08      DIPHTHEROIDS  Organism is a probable contaminant      Blood culture [[id number]] Collected: 08/05/24 0804    Order Status: Completed Specimen: Blood Updated: 08/10/24 1012     Blood Culture, Routine No growth after 5 days.    Culture, Anaerobe [[id number]] Collected: 08/04/24 1147    Order Status: Completed Specimen: Skin from Chest, Right Updated: 08/08/24 1101     Anaerobic Culture No anaerobes isolated    Aerobic culture [[id number]] Collected: 08/04/24 1147    Order Status: Completed Specimen: Skin from Chest, Right Updated: 08/06/24 1036     Aerobic Bacterial Culture Skin isha,  no predominant organism    Blood culture x two cultures. Draw prior to antibiotics. " [[id number]]  (Abnormal) Collected: 08/03/24 0937    Order Status: Completed Specimen: Blood from Peripheral, Forearm, Right Updated: 08/06/24 0754     Blood Culture, Routine Gram stain aer bottle: Gram positive rods      Results called to and read back by:Lee Olivarez RN 08/04/2024  16:37      CORYNEBACTERIUM SPECIES  further identified as Corynebacterium coyleae  For susceptibility see order #[id number]      Narrative:      Aerobic and anaerobic    Blood culture x two cultures. Draw prior to antibiotics. [[id number]]  (Abnormal)  (Susceptibility) Collected: 08/03/24 0938    Order Status: Completed Specimen: Blood from Peripheral, Forearm, Left Updated: 08/06/24 0753     Blood Culture, Routine Gram stain aer bottle: Gram positive rods      Results called to and read back by:Lee Olivarez RN 08/04/2024  16:33      CORYNEBACTERIUM SPECIES  further identified as Corynebacterium coyleae      Narrative:      Aerobic and anaerobic            I have reviewed all pertinent labs within the past 24 hours.    Estimated Creatinine Clearance: 65.5 mL/min (based on SCr of 1 mg/dL).    Diagnostic Results:  I have reviewed and interpreted all pertinent imaging results/findings within the past 24 hours.  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 Patient states that for the past two weeks she started to initially just feel more bloated and weak. She noticed her weight was going up and so she started to take two lasix pills instead of her usual one for an entire week. However, despite this she didn't feel any better. She denies any fever, cough, chills, chest pain, palpitations, shortness of breath, orthopnea, PND or lower extremity edema. Reports compliance with all her medication. Patient is currently on Remodulin 110 ng/kg/min, Opsumit 10 mg daily, and adempas 2 mg, TID.    *  Gram-positive bacteremia  - Negative PCT  - No Leukocytosis. COVID negative.   - Viral panel negative  - Blood cultures frmo 8/3 positive for gram + rods corneybacterum.  Repeat cultures done 8/5 with GPRs. New cultures drawn on 8/8 NGTD   - Continues on Vancomycin  - CT of C/A/P done: 2.1 cm heterogeneous hypodensity right lobe of the liver new compared to prior. Both abscess and neoplasm need to considered. Unable to obtain MRI due to remodulin.    -Triple phase CT ordered: Nodular enhancing right hepatic lesion, favoring benign hemangioma. Additional flash filling hepatic hemangiomas or altered perfusion. MRI may be helpful if clinically indicated.   -Per interventional radiology: liver lesions do not look to be infectious  -unable to get MRI due to remodulin pump.  We don't feel comfortable stopping remodulin for testing.   -Cultures at line site sent and negative   -ID consulted and following    Abnormal liver CT  -2.1 cm heterogeneous hypodensity right lobe of the liver new compared to prior. Both abscess and neoplasm need to considered.   -Triple phase CT ordered Nodular enhancing right hepatic lesion, favoring benign hemangioma. Additional flash filling hepatic hemangiomas or altered perfusion. MRI may be helpful if clinically indicated.     Hypertension  - Hold any home anti-hypertensives    Right-sided heart failure  - IVC collapsible, appears euvolemic on exam, unable to assess JVP  - Echo 8/4/24 EF: 55-60%, LVEDD: 4.36cm, Moderate RVE with function mod reduced with moderate TR. IVC 3mm   - Hold diuretics at this time    WHO group 1 pulmonary arterial hypertension  - Resume Remodulin dosing per pharmacy  - Resume OPSYNVI 10-40 mg, daily        Aldo Starr NP  Heart Transplant  [facility] - Trans[facility] Step[facility]

## 2024-08-12 NOTE — PLAN OF CARE
-AAOx4 and independent throughout room and halls  -VSS.  Afebrile.  NSR on tele.  On RA  -pain controlled w/ PRN percocet  -zofran IVP x1 for nausea  -Remodulin infusing to R chest wall port @ 110.2 ng/kg/min continuously  -Vancomycin continued q12 for bacteremia, infused through R FA PIV  -blood cx from 8/8 NGTD, plan for PICC placement today if bcx remain NGTD  -1.5 L FR, pt compliant  -voiding per hat without difficulty  -s/o at bedside  -bed kept in lowest locked position.  Nonskids on when oob.  Call bell in reach.

## 2024-08-13 ENCOUNTER — SOCIAL WORK (OUTPATIENT)
Dept: TRANSPLANT | Facility: HOSPITAL | Age: 49
End: 2024-08-13

## 2024-08-13 LAB
BACTERIA BLD CULT: NORMAL
BACTERIA BLD CULT: NORMAL

## 2024-08-13 NOTE — PROGRESS NOTES
Update:    2:45 pm.  BALWINDER contacted patient     3:00 pm- BALWINDER contacted [facility] in [place] to set up outpatient PICC line care and labs.  BALWINDER was informed that [facility] will take patient's insurance and asked BALWINDER for referral packet.  BALWINDER sent patient face sheet, home health orders and H&P via fax.      Balwinder contacted patient and informed her that outpatient care was set up.  Patient verbalized understanding.     [facility]- outpatient infusion suite  Phone [phone]  Fax- [phone]    BALWINDER remains available.

## 2024-08-14 NOTE — DISCHARGE SUMMARY
[facility] - Transplant Stepdown  Heart Transplant  Discharge Summary      Patient Name: Kristin Maier  MRN: [id number]  Admission Date: 8/3/2024  Hospital Length of Stay: 7 days  Discharge Date and Time: 08/14/2024 3:51 PM  Attending Physician: No att. providers found   Discharging Provider: Aldo Starr NP  Primary Care Provider: Jorge A Stack MD     HPI: 48 year old female with Hx of  WHO Group 1 PAH on Remodulin, HFpEF, Hyperlipidemia and Hypertesnion who presented to the the ED tod with weakness and feeling unwell. Patient states that for the past two weeks she started to initially just feel more bloated and weak. She noticed her weight was going up and so she started to take two lasix pills instead of her usual one for an entire week. However, despite this she didn't feel any better. She denies any fever, cough, chills, chest pain, palpitations, shortness of breath, orthopnea, PND or lower extremity edema. Reports compliance with all her medication. Patient is currently on Remodulin 110 ng/kg/min, Opsumit 10 mg daily, and adempas 2 mg, TID.    * No surgery found *     Hospital Course: .Was found to have Bacteremia with gram + rods corynebacterum.  Initially thought gram negative and started on Zosyn but Zosyn changed to Vancomycin.  Tunneled line removed 8/6. Plan for vancomycin for 2 weeks from date of line removal with EOT: 8/20.  She had CT on admit that showed some liver lesions and ID wanted them aspirated but IR said they didn't look infectious.  No MRI because of remodulin pump and we didn't feel comfortable stopping Remodulin for MRI. Her 8/5 cx's were negative for almost 72hrs so we placed a new tunneled line and on the same day they popped + again. ID said keep line for now and draw new cx's. Those cultures from 8/8 came back negative for >72hrs so PICC line was placed and discharged home with IV Vancomycin.      Goals of Care Treatment Preferences:  Code Status: Full Code    Health  care agent: Replaced by [facility] agent number: [phone]                   Consults (From admission, onward)          Status Ordering Provider     Inpatient consult to PICC team (NIAS)  Once        Provider:  (Not yet assigned)    Completed KARLA HINTON     Inpatient consult to Interventional Radiology  Once        Provider:  (Not yet assigned)    Completed EDEN PRETTY     Inpatient consult to Interventional Radiology  Once        Provider:  (Not yet assigned)    Completed PARRIS GROSSMAN     Inpatient consult to Midline team  Once        Provider:  (Not yet assigned)    Completed KHARI SALAZAR     Inpatient consult to Infectious Diseases  Once        Provider:  (Not yet assigned)    Completed ZABRINA FELDER     Inpatient consult to Cardiology  Once        Provider:  (Not yet assigned)    Completed MAEGAN CARLTON            Significant Diagnostic Studies: N/A    Pending Diagnostic Studies:       None          Final Active Diagnoses:    Diagnosis Date Noted POA    PRINCIPAL PROBLEM:  Gram-positive bacteremia [R78.81] 09/12/2023 Yes    Abnormal liver CT [R93.2] 08/05/2024 Yes    Bacteremia [R78.81] 08/05/2024 Yes    Right-sided heart failure [I50.810] 08/03/2024 Yes    Hypertension [I10] 08/03/2024 Yes    WHO group 1 pulmonary arterial hypertension [I27.21] 02/11/2022 Yes      Problems Resolved During this Admission:      Discharged Condition: stable    Disposition: Home or Self Care    Follow Up:    Patient Instructions:   No discharge procedures on file.  Medications:  Reconciled Home Medications:      Medication List        START taking these medications      D5W SolP 250 mL with vancomycin 1,000 mg SolR 1,000 mg  Inject 1,000 mg into the vein every 12 (twelve) hours.            CONTINUE taking these medications      furosemide 20 MG tablet  Commonly known as: LASIX  Take 1 tablet (20 mg total) by mouth once daily.     magnesium oxide 400 mg (241.3 mg magnesium) tablet  Commonly known as:  MAG-OX  Take 1 tablet (400 mg total) by mouth once daily.     multivitamin with minerals tablet  Take 1 tablet by mouth once daily.     naloxone 4 mg/actuation Spry  Commonly known as: NARCAN  4mg by nasal route as needed for opioid overdose; may repeat every 2-3 minutes in alternating nostrils until medical help arrives. Call 911     ondansetron 4 MG tablet  Commonly known as: ZOFRAN  Take 1 tablet (4 mg total) by mouth every 8 (eight) hours as needed for Nausea.     OPSYNVI 10-40 mg Tab  Generic drug: macitentan-tadalafil  Take 10-40 mg by mouth once daily.     oxyCODONE-acetaminophen  mg per tablet  Commonly known as: PERCOCET  Take 1 tablet by mouth every 4 (four) hours as needed for Pain.     potassium chloride SA 10 MEQ tablet  Commonly known as: K-DUR,KLOR-CON M  Take 2 tablets (20 mEq total) by mouth once daily.     pregabalin 75 MG capsule  Commonly known as: LYRICA  Take 1 capsule (75 mg total) by mouth once daily AND 2 capsules (150 mg total) every evening.     REMODULIN 5 mg/mL Soln  Generic drug: treprostinil sodium     sertraline 100 MG tablet  Commonly known as: ZOLOFT  Take 1 tablet (100 mg total) by mouth once daily.     sodium chloride 0.9% SolP 100 mL with treprostinil 1 mg/mL Soln 6,000,000 ng  Inject 2,145 ng/min into the vein continuous.     spironolactone 25 MG tablet  Commonly known as: ALDACTONE  Take 1 tablet (25 mg total) by mouth once daily.     XTAMPZA ER 18 mg Cspt  Generic drug: oxyCODONE myristate  Take 1 capsule (18 mg total) by mouth every 12 (twelve) hours.            STOP taking these medications      riociguat 2 mg Tab tablet  Commonly known as: IRENE Starr NP  Heart Transplant  [facility] - Transplant Stepdown

## 2024-08-14 NOTE — HOSPITAL COURSE
. Bacteremia with gram + rods corynebacterum.  Initially thought gram negative and started on Zosyn but Zosyn changed to Vancomycin.  Tunneled line removed 8/6. Plan for vancomycin for 2 weeks from date of line removal with EOT: 8/20.  She had CT on admit that showed some liver lesions and ID wanted them aspirated but IR said they didn't look infectious.  No MRI because of remodulin pump and we didn't feel comfortable stopping Remodulin for MRI. Her 8/5 cx's were negative for almost 72hrs so we placed a new tunneled line and on the same day they popped + again. ID said keep line for now and draw new cx's. Those cultures from 8/8 came back negative for >72hrs so PICC line was placed and discharged home with IV Vancomycin.

## 2024-08-15 ENCOUNTER — TELEPHONE (OUTPATIENT)
Dept: TRANSPLANT | Facility: CLINIC | Age: 49
End: 2024-08-15
Payer: MEDICAID

## 2024-08-15 NOTE — TELEPHONE ENCOUNTER
NN called and spoke about post hospital follow up appointment on 8/23.Patient confirmed that she will be coming in but she had questions about her PICC line/removal/antibiotics/labs. VICKIE Bartholomew spoke with patient on ALMAZ/Franny prior to discharge so NN requested that VICKIE Bartholomew reach back out to patient. NN also sent message to Dr. Brar's office because patient has more questions and needs follow up with ID but does not have one.

## 2024-08-16 ENCOUNTER — TELEPHONE (OUTPATIENT)
Dept: INFECTIOUS DISEASES | Facility: CLINIC | Age: 49
End: 2024-08-16
Payer: MEDICAID

## 2024-08-19 NOTE — PROGRESS NOTES
Infectious Disease Clinic Visit    Reason:    Hosp f/u    HPI:     48F with pulm HTN on Remodulin infused viai tunneled chest Rt sub clavian central line (placed 03/2022) - who presented 08/03 for general weakness, with abd bloating. Pt presented febrile 100.7F, with some mild hypotension 76/51, otherwise VSS, HDS. Wbc nml, , procalc 0.19.  Index bld cxs 08/03 +corynebacterium. TTE neg for IE or veg. Suspected source tunneled chest central line used for remodulin. Removed tunneled line 08/06.      New tunneled line was placed in morning of 08/08; however, repeat bld cxs 08/05 that were previously cleared >48hrs, returned +diphtheroids later that afternoon 08/08.  Repeat bld cxs 08/08 NGTD. TTE neg for IE or veg. Pt discharged on Iv-vanc, to complete 2wks, MARGARITA 08/22.       Review of Systems   All other systems reviewed and are negative.        Physical Exam  Vitals and nursing note reviewed.   Constitutional:       General: She is not in acute distress.     Appearance: Normal appearance. She is not toxic-appearing or diaphoretic.   HENT:      Nose: No congestion.      Mouth/Throat:      Pharynx: No oropharyngeal exudate.   Eyes:      General: No scleral icterus.     Conjunctiva/sclera: Conjunctivae normal.   Cardiovascular:      Rate and Rhythm: Normal rate.      Heart sounds: Normal heart sounds. No murmur heard.  Pulmonary:      Effort: No respiratory distress.      Breath sounds: Normal breath sounds.   Abdominal:      General: Bowel sounds are normal. There is no distension.      Palpations: Abdomen is soft.      Tenderness: There is no abdominal tenderness.   Musculoskeletal:         General: No swelling or tenderness.      Cervical back: Neck supple. No tenderness.   Skin:     General: Skin is warm and dry.      Findings: No erythema or rash.      Comments: RUE picc line for Iv abx. Tunneled central line Rt side chest for remodulin CI. Sites c/d/I.    Neurological:      Mental Status: She is alert and  oriented to person, place, and time. Mental status is at baseline.   Psychiatric:         Behavior: Behavior normal.         Thought Content: Thought content normal.           Review of patient's allergies indicates:  No Known Allergies      Current Outpatient Medications:     D5W SolP 250 mL with vancomycin 1,000 mg SolR 1,000 mg, Inject 1,000 mg into the vein every 12 (twelve) hours., Disp: , Rfl:     furosemide (LASIX) 20 MG tablet, Take 1 tablet (20 mg total) by mouth once daily., Disp: 90 tablet, Rfl: 3    macitentan-tadalafil (OPSYNVI) 10-40 mg Tab, Take 10-40 mg by mouth once daily., Disp: , Rfl:     magnesium oxide (MAG-OX) 400 mg (241.3 mg magnesium) tablet, Take 1 tablet (400 mg total) by mouth once daily., Disp: 90 tablet, Rfl: 3    multivitamin with minerals tablet, Take 1 tablet by mouth once daily., Disp: , Rfl:     naloxone (NARCAN) 4 mg/actuation Spry, 4mg by nasal route as needed for opioid overdose; may repeat every 2-3 minutes in alternating nostrils until medical help arrives. Call 911, Disp: 1 each, Rfl: 11    ondansetron (ZOFRAN) 4 MG tablet, Take 1 tablet (4 mg total) by mouth every 8 (eight) hours as needed for Nausea., Disp: 30 tablet, Rfl: 6    oxyCODONE myristate (XTAMPZA ER) 18 mg CSpT, Take 1 capsule (18 mg total) by mouth every 12 (twelve) hours., Disp: 60 capsule, Rfl: 0    oxyCODONE-acetaminophen (PERCOCET)  mg per tablet, Take 1 tablet by mouth every 4 (four) hours as needed for Pain., Disp: 180 tablet, Rfl: 0    potassium chloride SA (K-DUR,KLOR-CON M) 10 MEQ tablet, Take 2 tablets (20 mEq total) by mouth once daily., Disp: 60 tablet, Rfl: 11    pregabalin (LYRICA) 75 MG capsule, Take 1 capsule (75 mg total) by mouth once daily AND 2 capsules (150 mg total) every evening., Disp: 90 capsule, Rfl: 6    REMODULIN 5 mg/mL Soln, , Disp: , Rfl:     sertraline (ZOLOFT) 100 MG tablet, Take 1 tablet (100 mg total) by mouth once daily., Disp: 30 tablet, Rfl: 11    sodium chloride 0.9%  SolP 100 mL with treprostinil 1 mg/mL Soln 6,000,000 ng, Inject 2,145 ng/min into the vein continuous., Disp: , Rfl:     spironolactone (ALDACTONE) 25 MG tablet, Take 1 tablet (25 mg total) by mouth once daily., Disp: 90 tablet, Rfl: 3    Past Medical History:   Diagnosis Date    Elevated liver enzymes     Essential (primary) hypertension     Heart failure, unspecified     Hypokalemia     Mixed hyperlipidemia     Neuropathic pain     Tx w/ tramadol & Lyrica    Pulmonary hypertension     Receiving Remodulin continuously through tunneled central line       Lab Results   Component Value Date    WBC 4.79 08/12/2024    CRP 42.9 (H) 08/05/2024    SEDRATE 7 12/02/2021    CREATININE 1.0 08/12/2024    AST 9 (L) 08/12/2024    ALT 6 (L) 08/12/2024    ALKPHOS 69 08/12/2024       Immunization History   Administered Date(s) Administered    COVID-19, MRNA, LN-S, PF (MODERNA FULL 0.5 ML DOSE) 01/05/2022    Tdap 10/24/2020         Reviewed available labs and imaging.     There were no vitals filed for this visit.      Assessment:  Encounter Diagnoses   Name Primary?    Bacteremia Yes     -- improved and doing well s/p 2wks Iv-vanc for bacteroides CLABSI related to central line used for remodulin continuous IV infusion for pulm HTN.       Plan:     Removed picc line, obtained labs cbc and cmp  No further abx indicated.      Follow-up: PRN      No orders of the defined types were placed in this encounter.

## 2024-08-22 ENCOUNTER — TELEPHONE (OUTPATIENT)
Dept: INFECTIOUS DISEASES | Facility: CLINIC | Age: 49
End: 2024-08-22
Payer: MEDICAID

## 2024-08-22 ENCOUNTER — OFFICE VISIT (OUTPATIENT)
Dept: INFECTIOUS DISEASES | Facility: CLINIC | Age: 49
End: 2024-08-22
Payer: MEDICAID

## 2024-08-22 ENCOUNTER — INFUSION (OUTPATIENT)
Dept: INFECTIOUS DISEASES | Facility: HOSPITAL | Age: 49
End: 2024-08-22
Payer: MEDICAID

## 2024-08-22 VITALS
DIASTOLIC BLOOD PRESSURE: 87 MMHG | RESPIRATION RATE: 18 BRPM | HEART RATE: 77 BPM | HEIGHT: 67 IN | WEIGHT: 165.56 LBS | BODY MASS INDEX: 25.99 KG/M2 | OXYGEN SATURATION: 96 % | TEMPERATURE: 99 F | SYSTOLIC BLOOD PRESSURE: 127 MMHG

## 2024-08-22 VITALS
HEART RATE: 73 BPM | HEIGHT: 67 IN | DIASTOLIC BLOOD PRESSURE: 82 MMHG | BODY MASS INDEX: 26.3 KG/M2 | TEMPERATURE: 98 F | WEIGHT: 167.56 LBS | SYSTOLIC BLOOD PRESSURE: 120 MMHG

## 2024-08-22 DIAGNOSIS — R78.81 BACTEREMIA: Primary | ICD-10-CM

## 2024-08-22 DIAGNOSIS — T80.211D BLOODSTREAM INFECTION ASSOCIATED WITH CENTRAL VENOUS CATHETER, SUBSEQUENT ENCOUNTER: ICD-10-CM

## 2024-08-22 DIAGNOSIS — R78.81 BACTEREMIA: ICD-10-CM

## 2024-08-22 PROBLEM — T80.211A BLOODSTREAM INFECTION DUE TO CENTRAL VENOUS CATHETER: Status: ACTIVE | Noted: 2024-08-22

## 2024-08-22 LAB
ALBUMIN SERPL BCP-MCNC: 3.8 G/DL (ref 3.5–5.2)
ALP SERPL-CCNC: 72 U/L (ref 55–135)
ALT SERPL W/O P-5'-P-CCNC: 9 U/L (ref 10–44)
ANION GAP SERPL CALC-SCNC: 8 MMOL/L (ref 8–16)
AST SERPL-CCNC: 17 U/L (ref 10–40)
BASOPHILS # BLD AUTO: 0.02 K/UL (ref 0–0.2)
BASOPHILS NFR BLD: 0.4 % (ref 0–1.9)
BILIRUB SERPL-MCNC: 0.6 MG/DL (ref 0.1–1)
BUN SERPL-MCNC: 9 MG/DL (ref 6–20)
CALCIUM SERPL-MCNC: 9 MG/DL (ref 8.7–10.5)
CHLORIDE SERPL-SCNC: 108 MMOL/L (ref 95–110)
CO2 SERPL-SCNC: 26 MMOL/L (ref 23–29)
CREAT SERPL-MCNC: 0.9 MG/DL (ref 0.5–1.4)
DIFFERENTIAL METHOD BLD: ABNORMAL
EOSINOPHIL # BLD AUTO: 0.1 K/UL (ref 0–0.5)
EOSINOPHIL NFR BLD: 1.2 % (ref 0–8)
ERYTHROCYTE [DISTWIDTH] IN BLOOD BY AUTOMATED COUNT: 14.2 % (ref 11.5–14.5)
EST. GFR  (NO RACE VARIABLE): >60 ML/MIN/1.73 M^2
GLUCOSE SERPL-MCNC: 104 MG/DL (ref 70–110)
HCT VFR BLD AUTO: 33.3 % (ref 37–48.5)
HGB BLD-MCNC: 10.7 G/DL (ref 12–16)
IMM GRANULOCYTES # BLD AUTO: 0.03 K/UL (ref 0–0.04)
IMM GRANULOCYTES NFR BLD AUTO: 0.6 % (ref 0–0.5)
LYMPHOCYTES # BLD AUTO: 1.7 K/UL (ref 1–4.8)
LYMPHOCYTES NFR BLD: 32.8 % (ref 18–48)
MCH RBC QN AUTO: 28.5 PG (ref 27–31)
MCHC RBC AUTO-ENTMCNC: 32.1 G/DL (ref 32–36)
MCV RBC AUTO: 89 FL (ref 82–98)
MONOCYTES # BLD AUTO: 0.4 K/UL (ref 0.3–1)
MONOCYTES NFR BLD: 6.7 % (ref 4–15)
NEUTROPHILS # BLD AUTO: 3 K/UL (ref 1.8–7.7)
NEUTROPHILS NFR BLD: 58.3 % (ref 38–73)
NRBC BLD-RTO: 0 /100 WBC
PLATELET # BLD AUTO: 190 K/UL (ref 150–450)
PMV BLD AUTO: 10.7 FL (ref 9.2–12.9)
POTASSIUM SERPL-SCNC: 3.5 MMOL/L (ref 3.5–5.1)
PROT SERPL-MCNC: 7.1 G/DL (ref 6–8.4)
RBC # BLD AUTO: 3.75 M/UL (ref 4–5.4)
SODIUM SERPL-SCNC: 142 MMOL/L (ref 136–145)
WBC # BLD AUTO: 5.19 K/UL (ref 3.9–12.7)

## 2024-08-22 PROCEDURE — 99214 OFFICE O/P EST MOD 30 MIN: CPT | Mod: PBBFAC | Performed by: STUDENT IN AN ORGANIZED HEALTH CARE EDUCATION/TRAINING PROGRAM

## 2024-08-22 PROCEDURE — 80053 COMPREHEN METABOLIC PANEL: CPT | Performed by: STUDENT IN AN ORGANIZED HEALTH CARE EDUCATION/TRAINING PROGRAM

## 2024-08-22 PROCEDURE — 99999 PR PBB SHADOW E&M-EST. PATIENT-LVL IV: CPT | Mod: PBBFAC,,, | Performed by: STUDENT IN AN ORGANIZED HEALTH CARE EDUCATION/TRAINING PROGRAM

## 2024-08-22 PROCEDURE — 85025 COMPLETE CBC W/AUTO DIFF WBC: CPT | Performed by: STUDENT IN AN ORGANIZED HEALTH CARE EDUCATION/TRAINING PROGRAM

## 2024-08-22 NOTE — TELEPHONE ENCOUNTER
Called pt and let her know she can still come to appt.   ----- Message from Rhiannon Washington sent at 8/22/2024  9:39 AM CDT -----  Regarding: Appt Access  Contact: [phone]  Patient calling to advise she will be 30-40mins late. Advised patient she will probably have to reschedule. Pls call to confirm , if patient can still come to appt       Patient is having car trouble

## 2024-08-23 ENCOUNTER — TELEPHONE (OUTPATIENT)
Dept: TRANSPLANT | Facility: CLINIC | Age: 49
End: 2024-08-23
Payer: MEDICAID

## 2024-08-23 NOTE — TELEPHONE ENCOUNTER
----- Message from Jeanette Haas RN sent at 8/23/2024 12:47 PM CDT -----  Regarding: Reschedule PH appt  Good Afternoon,     Patient cancelled her hospital discharge appt that was scheduled for today. Can you please call her to reschedule her appts within the next 1-2 weeks (sooner the better).     Thanks!  Jeanette

## 2024-08-27 ENCOUNTER — OFFICE VISIT (OUTPATIENT)
Dept: PALLIATIVE MEDICINE | Facility: CLINIC | Age: 49
End: 2024-08-27
Payer: MEDICAID

## 2024-08-27 DIAGNOSIS — I27.20 PULMONARY HYPERTENSION: Primary | ICD-10-CM

## 2024-08-27 DIAGNOSIS — M79.605 PAIN IN BOTH LOWER EXTREMITIES: ICD-10-CM

## 2024-08-27 DIAGNOSIS — Z51.5 PALLIATIVE CARE ENCOUNTER: ICD-10-CM

## 2024-08-27 DIAGNOSIS — M79.604 PAIN IN BOTH LOWER EXTREMITIES: ICD-10-CM

## 2024-08-27 PROCEDURE — 1111F DSCHRG MED/CURRENT MED MERGE: CPT | Mod: CPTII,95,, | Performed by: STUDENT IN AN ORGANIZED HEALTH CARE EDUCATION/TRAINING PROGRAM

## 2024-08-27 PROCEDURE — 99214 OFFICE O/P EST MOD 30 MIN: CPT | Mod: 95,,, | Performed by: STUDENT IN AN ORGANIZED HEALTH CARE EDUCATION/TRAINING PROGRAM

## 2024-08-27 PROCEDURE — 1159F MED LIST DOCD IN RCRD: CPT | Mod: CPTII,95,, | Performed by: STUDENT IN AN ORGANIZED HEALTH CARE EDUCATION/TRAINING PROGRAM

## 2024-08-27 RX ORDER — PREGABALIN 75 MG/1
CAPSULE ORAL
Qty: 90 CAPSULE | Refills: 6 | Status: SHIPPED | OUTPATIENT
Start: 2024-08-27 | End: 2025-03-25

## 2024-08-27 RX ORDER — FUROSEMIDE 20 MG/1
20 TABLET ORAL DAILY
Qty: 90 TABLET | Refills: 3 | Status: SHIPPED | OUTPATIENT
Start: 2024-08-27 | End: 2025-08-27

## 2024-08-27 NOTE — PROGRESS NOTES
Palliative Medicine Clinic Note      Consult Requested By: No ref. provider found    Primary Care Physician:   Jorge A Stack MD    Reason for Consult: Advance care planning and symptom management in the setting of Pulmonary Hypertension     The patient location is: [place]   The chief complaint leading to consultation is: pain    Visit type: audiovisual    Face to Face time with patient: 20min  30minutes of total time spent on the encounter, which includes face to face time and non-face to face time preparing to see the patient (eg, review of tests), Obtaining and/or reviewing separately obtained history, Documenting clinical information in the electronic or other health record, Independently interpreting results (not separately reported) and communicating results to the patient/family/caregiver, or Care coordination (not separately reported).       Each patient provided with medical services by telemedicine is:  (1) informed of the relationship between the physician and patient and the respective role of any other health care provider with respect to management of the patient; and (2) notified that he or she may decline to receive medical services by telemedicine and may withdraw from such care at any time.      ASSESSMENT/PLAN:     Plan/Recommendations:  Diagnoses and all orders for this visit:    Pulmonary hypertension  -Pulmonary HTN, WHO group 1, on remodulin.   - following with Pulmonary hypertension Clinic  -currently on Opsumit, Adempas and Remodulin   -Not requiring oxygen       Anxiety /Other insomnia  -continues to have anxiety  -continue sertraline to 100mg daily   - working with  for support  - working with [facility]       Pain in both lower extremities  Arthralgia in both hands  -pain in lower extremities is related to her medication, it is worse when her Remodulin is uptitrated- feels like her feet are on fire   -On Lyrica 75mg in am and 150mg qhs   -Percocet 10-325mg  taking q.4 to 6 hours now (refill sent)   -Will increase Xtampza 27mg PO q 12h   -not reporting any significant side effects from opioids like sedation, respiratory depression or constipation        Palliative care encounter  Medicolegal: Has decision making capacity.   is surrogate decision maker.   She shared that for HCPOA she would named her  as 1st agent and her daughter Nicky as her 2nd agent.     Psychosocial:  support system consists of  and daughters     Spiritual: [employer]    Understanding of disease and Illness Trajectory: Patient  has  adequate understanding of her illness, they can benefit from continued education on what to expect in the future.      Advance Care Planning   Advance Directives:   Living Will: No    [place]: No    Do Not Resuscitate Status: No    Medical Power of : Yes    Agent's Name:  Juju Maier   Agent's Contact Number:  [phone]    Decision Making:  Patient answered questions  Goals of Care: What is most important right now is to focus on spending time at home, remaining as independent as possible, improvement in condition but with limits to invasive therapies. Accordingly, we have decided that the best plan to meet the patient's goals includes continuing with treatment and obtaining a lung transplant.  Advance Care Planning  Date: 08/27/2024  Voluntary advance care planning discussion had today with patient. Previously completed HCPOA in electronic medical record is current, no changes made.               Date: 06/13/2023  Power of   I introduced the concept of advance directives to the patient, as well. Then the patient received detailed information about the importance of designating a Health Care Power of  (HCPOA). She was also instructed to communicate with this person about their wishes for future healthcare, should she become sick and lose decision-making capacity. The patient has not previously appointed a HCPOA. After our  discussion, the patient has decided to complete a HCPOA and has appointed her significant other, health care agent: Juju Maier & her daughter Navya Dowell as second agent. Form completed and will be scanned today.       min time was spent on advance care planning, goals of care discussion, emotional support, formulating and communicating prognosis and goals of care, exploring burden/benefit of various approaches of treatment.     Follow up: 2 month      SUBJECTIVE:     History obtained from: Patient      complaint:   Chief Complaint   Patient presents with    Follow-up    Pain    Nausea    Anorexia    Fatigue    Constipation    Diarrhea       Disease History:  Pulmonary HTN, WHO group 1, on remodulin. [facility] 3/21/22 indicative on worsening disease      History of Present Illness / Interval History:  Kristin Maier is 48 y.o. year old female presenting with PAH.  Referred to Palliative Care for evaluation and management of physical symptoms, advance care planning,, and additional support.. female attended the appointment alone     8/27    Patient reports pain in bl LE, current dose of Xtampza was helping however she feels that's he could benefit from a higher dose, her legs and lower back continue to hurt especially with PH med dose changes   No anxiety or SOB reported   Has anorexia, likely because of the abx that she was taking , she has not been eating well lately   Bounces between diarrhea and constipation   Taking oxy 1 4h and Xtampza BID         ------  4/9  The patient reports experiencing pain, anxiety, nausea, and fatigue. The primary pain is located in the back and leg. She states that the medication helps with the pain, but it only works for a short period of time. She is currently on Percocet (oxycodone with Tylenol) for pain management.  She has been trying to move to [place] for a lung transplant but is currently still in her current location. She is giving herself six months to make  this move.    She recently went on a vacation with her grandchildren and participated in various activities, which may have contributed to her current fatigue and pain. She has been mostly bedridden since returning from the vacation. She also experiences shortness of breath and has been taking Lasix to manage fluid retention. She is on sertraline for anxiety and has been experiencing diarrhea as a side effect of her PH meds. Experiences leg pain and joint swelling Requires 's assistance for getting in and out of vehicles Struggles with nighttime sleep Maintains daily routine of watering flowers and picking up grandchildren from school      --------    Has right leg pain.  Especially when she has extra fluid is throbbing she is unable to stand she is unable to walk around.  Her current medication doses or controlling her pain.  However she is wondering if there is anything else that can be done to improve her mobility.  We will refer to pain management  Her oxycodone Q 4 to q.6 hours.  She is taking her Lyrica as prescribed.  She is walking with a Rollator in the kitchen however she can not stand for a long period of time. She is reporting significant anxiety, she has more episodes of shortness of breath or anxiety attacks.  --------    Continues to have anxiety  Nausea every other day  Continues to have LE pain, feet on fire, legs like hammer  Trying to get back to nromal       -------    Patient continues to report pain she rates her pain as a 7 today.  She is able to walk around.  She states that Percocet helps however it depends on the day she sometimes needs to take 2.  Today which is about they because she was walking around she will need medication at 4:00 p.m. then before she goes to sleep.  Day she might only need 1 dose.  She felt very depressed 2 weeks ago  She has been more sedentary lately mostly spending her days the bed or recliner or sofa  Has decreased appetite however no weight loss             Review of Symptoms      Symptom Assessment (ESAS 0-10 Scale)  Pain:  8  Dyspnea:  0  Anxiety:  0  Nausea:  8  Depression:  0  Anorexia:  8  Fatigue:  5  Insomnia:  0  Restlessness:  0  Agitation:  0     CAM / Delirium:  Negative  Constipation:  Positive  Diarrhea:  Positive      Pain Assessment:    Location(s): leg (joint)    Leg       Location: bilateral        Quality: Throbbing        Quantity: 8/10 in intensity        Chronicity: Onset 10 month(s) ago, unchanged        Aggravating Factors: Standing        Alleviating Factors: Opiates        Associated Symptoms: None    Modified Jessica Scale:  3    Performance Status:  60    Living Arrangements:  Lives with family    Psychosocial/Cultural:   See Palliative Psychosocial Note: Yes  .  is a . Has 3 living daughters youngest is 25 oldest is 33 and a  son. Has grandchildren who she enjoys spending time with. Likes teaching them to play different sports. Enjoys gardening   **Primary  to Follow**  Palliative Care  Consult: Yes    Spiritual:  F - Leticia and Belief:  Latter-day        Previous experience or exposure to a serious illness: Yes      Medications:    Current Outpatient Medications:     D5W SolP 250 mL with vancomycin 1,000 mg SolR 1,000 mg, Inject 1,000 mg into the vein every 12 (twelve) hours., Disp: , Rfl:     furosemide (LASIX) 20 MG tablet, Take 1 tablet (20 mg total) by mouth once daily., Disp: 90 tablet, Rfl: 3    macitentan-tadalafil (OPSYNVI) 10-40 mg Tab, Take 10-40 mg by mouth once daily., Disp: , Rfl:     magnesium oxide (MAG-OX) 400 mg (241.3 mg magnesium) tablet, Take 1 tablet (400 mg total) by mouth once daily., Disp: 90 tablet, Rfl: 3    multivitamin with minerals tablet, Take 1 tablet by mouth once daily., Disp: , Rfl:     naloxone (NARCAN) 4 mg/actuation Spry, 4mg by nasal route as needed for opioid overdose; may repeat every 2-3 minutes in alternating nostrils until medical help  arrives. Call 911, Disp: 1 each, Rfl: 11    ondansetron (ZOFRAN) 4 MG tablet, Take 1 tablet (4 mg total) by mouth every 8 (eight) hours as needed for Nausea., Disp: 30 tablet, Rfl: 6    oxyCODONE myristate (XTAMPZA ER) 18 mg CSpT, Take 1 capsule (18 mg total) by mouth every 12 (twelve) hours., Disp: 60 capsule, Rfl: 0    oxyCODONE-acetaminophen (PERCOCET)  mg per tablet, Take 1 tablet by mouth every 4 (four) hours as needed for Pain., Disp: 180 tablet, Rfl: 0    potassium chloride SA (K-DUR,KLOR-CON M) 10 MEQ tablet, Take 2 tablets (20 mEq total) by mouth once daily., Disp: 60 tablet, Rfl: 11    pregabalin (LYRICA) 75 MG capsule, Take 1 capsule (75 mg total) by mouth once daily AND 2 capsules (150 mg total) every evening., Disp: 90 capsule, Rfl: 6    REMODULIN 5 mg/mL Soln, , Disp: , Rfl:     sertraline (ZOLOFT) 100 MG tablet, Take 1 tablet (100 mg total) by mouth once daily., Disp: 30 tablet, Rfl: 11    sodium chloride 0.9% SolP 100 mL with treprostinil 1 mg/mL Soln 6,000,000 ng, Inject 2,145 ng/min into the vein continuous., Disp: , Rfl:     spironolactone (ALDACTONE) 25 MG tablet, Take 1 tablet (25 mg total) by mouth once daily., Disp: 90 tablet, Rfl: 3      External  database queried on 08/27/2024  by Sonja Mcgill .   The results reviewed and considered with the clinical data in the decision whether or not to prescribe a controlled substance.  09/03/2024 08/28/2024 2 Oxycodone-Acetaminophen  180.00 30 Er Lori [id number] Peo ([id number]) 0 90.00 MME Other [place]   08/28/2024 08/28/2024 2 Xtampza Er 27 Mg Capsule 60.00 30 Er Lori [id number] Pe[place] ([id number]) 0 90.00 MME Medicaid [place]   08/27/2024 08/27/2024 2 Pregabalin 75 Mg Capsule 90.00 30 Er Lori [id number] Peo ([id number]) 0 1.51 LME Medicaid [place]     Review of patient's allergies indicates:  No Known Allergies    OBJECTIVE:       Physical Exam:  Vitals: BP:  (virtual) (08/27/24 1534)  Physical Exam  Constitutional:       General: She is not in acute distress.  HENT:       Head: Normocephalic and atraumatic.   Pulmonary:      Effort: Pulmonary effort is normal. No respiratory distress.   Neurological:      Mental Status: She is alert and oriented to person, place, and time.   Psychiatric:         Mood and Affect: Mood and affect normal.         I spent a total of 30minutes on the day of the visit. This includes face to face time in discussion of goals of care, symptom assessment, coordination of care and emotional support.  This also includes non-face to face time preparing to see the patient (eg, review of tests/imaging), obtaining and/or reviewing separately obtained history, documenting clinical information in the electronic or other health record, independently interpreting results and communicating results to the patient/family/caregiver, or care coordinator.       This note was generated with the assistance of ambient listening technology. Verbal consent was obtained by the patient and accompanying visitor(s) for the recording of patient appointment to facilitate this note. I attest to having reviewed and edited the generated note for accuracy, though some syntax or spelling errors may persist. Please contact the author of this note for any clarification.        Signature: Sonja Mcgill MD

## 2024-08-28 DIAGNOSIS — M79.605 PAIN IN BOTH LOWER EXTREMITIES: ICD-10-CM

## 2024-08-28 DIAGNOSIS — M79.604 PAIN IN BOTH LOWER EXTREMITIES: ICD-10-CM

## 2024-08-28 DIAGNOSIS — I27.20 PULMONARY HYPERTENSION: ICD-10-CM

## 2024-08-28 DIAGNOSIS — M25.50 ARTHRALGIA, UNSPECIFIED JOINT: ICD-10-CM

## 2024-08-28 DIAGNOSIS — M25.541 ARTHRALGIA OF BOTH HANDS: ICD-10-CM

## 2024-08-28 DIAGNOSIS — M25.542 ARTHRALGIA OF BOTH HANDS: ICD-10-CM

## 2024-08-28 RX ORDER — OXYCODONE 18 MG/1
18 CAPSULE, EXTENDED RELEASE ORAL EVERY 12 HOURS
Qty: 60 CAPSULE | Refills: 0 | Status: CANCELLED | OUTPATIENT
Start: 2024-08-28

## 2024-08-28 RX ORDER — OXYCODONE AND ACETAMINOPHEN 10; 325 MG/1; MG/1
1 TABLET ORAL EVERY 4 HOURS PRN
Qty: 180 TABLET | Refills: 0 | Status: SHIPPED | OUTPATIENT
Start: 2024-08-28

## 2024-09-16 ENCOUNTER — TELEPHONE (OUTPATIENT)
Dept: PALLIATIVE MEDICINE | Facility: CLINIC | Age: 49
End: 2024-09-16
Payer: MEDICAID

## 2024-09-16 NOTE — TELEPHONE ENCOUNTER
Called to offer sooner appt pt can not come in person because she's already scheduled to come here tomorrow, per Dr Lee pt needs to be seen In person.. pt kept original appt

## 2024-09-17 ENCOUNTER — HOSPITAL ENCOUNTER (OUTPATIENT)
Dept: PULMONOLOGY | Facility: CLINIC | Age: 49
Discharge: HOME | End: 2024-09-17
Payer: MEDICAID

## 2024-09-17 ENCOUNTER — LAB VISIT (OUTPATIENT)
Dept: LAB | Facility: HOSPITAL | Age: 49
End: 2024-09-17
Payer: MEDICAID

## 2024-09-17 ENCOUNTER — TELEPHONE (OUTPATIENT)
Dept: TRANSPLANT | Facility: CLINIC | Age: 49
End: 2024-09-17
Payer: MEDICAID

## 2024-09-17 ENCOUNTER — OFFICE VISIT (OUTPATIENT)
Dept: TRANSPLANT | Facility: CLINIC | Age: 49
End: 2024-09-17
Payer: MEDICAID

## 2024-09-17 VITALS
SYSTOLIC BLOOD PRESSURE: 122 MMHG | HEIGHT: 64 IN | HEIGHT: 67 IN | BODY MASS INDEX: 24.96 KG/M2 | HEART RATE: 90 BPM | WEIGHT: 159 LBS | WEIGHT: 163.81 LBS | DIASTOLIC BLOOD PRESSURE: 86 MMHG | BODY MASS INDEX: 27.96 KG/M2

## 2024-09-17 DIAGNOSIS — M79.605 PAIN IN BOTH LOWER EXTREMITIES: ICD-10-CM

## 2024-09-17 DIAGNOSIS — I27.9 CHRONIC PULMONARY HEART DISEASE: ICD-10-CM

## 2024-09-17 DIAGNOSIS — Z79.899 POLYPHARMACY: ICD-10-CM

## 2024-09-17 DIAGNOSIS — M79.604 PAIN IN BOTH LOWER EXTREMITIES: ICD-10-CM

## 2024-09-17 DIAGNOSIS — I50.812 CHRONIC RIGHT-SIDED HEART FAILURE: ICD-10-CM

## 2024-09-17 DIAGNOSIS — I27.21 WHO GROUP 1 PULMONARY ARTERIAL HYPERTENSION: Primary | ICD-10-CM

## 2024-09-17 DIAGNOSIS — Z79.899 HIGH RISK MEDICATION USE: ICD-10-CM

## 2024-09-17 DIAGNOSIS — R06.82 TACHYPNEA: ICD-10-CM

## 2024-09-17 LAB
BASOPHILS # BLD AUTO: 0.02 K/UL (ref 0–0.2)
BASOPHILS NFR BLD: 0.4 % (ref 0–1.9)
BNP SERPL-MCNC: 14 PG/ML (ref 0–99)
DIFFERENTIAL METHOD BLD: ABNORMAL
EOSINOPHIL # BLD AUTO: 0.1 K/UL (ref 0–0.5)
EOSINOPHIL NFR BLD: 2.6 % (ref 0–8)
ERYTHROCYTE [DISTWIDTH] IN BLOOD BY AUTOMATED COUNT: 14.3 % (ref 11.5–14.5)
HCT VFR BLD AUTO: 37.9 % (ref 37–48.5)
HGB BLD-MCNC: 12 G/DL (ref 12–16)
IMM GRANULOCYTES # BLD AUTO: 0.05 K/UL (ref 0–0.04)
IMM GRANULOCYTES NFR BLD AUTO: 1 % (ref 0–0.5)
LYMPHOCYTES # BLD AUTO: 1.7 K/UL (ref 1–4.8)
LYMPHOCYTES NFR BLD: 34.8 % (ref 18–48)
MAGNESIUM SERPL-MCNC: 1.8 MG/DL (ref 1.6–2.6)
MCH RBC QN AUTO: 28.5 PG (ref 27–31)
MCHC RBC AUTO-ENTMCNC: 31.7 G/DL (ref 32–36)
MCV RBC AUTO: 90 FL (ref 82–98)
MONOCYTES # BLD AUTO: 0.2 K/UL (ref 0.3–1)
MONOCYTES NFR BLD: 4.9 % (ref 4–15)
NEUTROPHILS # BLD AUTO: 2.8 K/UL (ref 1.8–7.7)
NEUTROPHILS NFR BLD: 56.3 % (ref 38–73)
NRBC BLD-RTO: 0 /100 WBC
PLATELET # BLD AUTO: 160 K/UL (ref 150–450)
PMV BLD AUTO: 9.9 FL (ref 9.2–12.9)
RBC # BLD AUTO: 4.21 M/UL (ref 4–5.4)
WBC # BLD AUTO: 4.91 K/UL (ref 3.9–12.7)

## 2024-09-17 PROCEDURE — 94618 PULMONARY STRESS TESTING: CPT | Mod: 26,S$PBB,, | Performed by: INTERNAL MEDICINE

## 2024-09-17 PROCEDURE — 3008F BODY MASS INDEX DOCD: CPT | Mod: CPTII,,,

## 2024-09-17 PROCEDURE — 1160F RVW MEDS BY RX/DR IN RCRD: CPT | Mod: CPTII,,,

## 2024-09-17 PROCEDURE — 83735 ASSAY OF MAGNESIUM: CPT

## 2024-09-17 PROCEDURE — 99214 OFFICE O/P EST MOD 30 MIN: CPT | Mod: PBBFAC,25

## 2024-09-17 PROCEDURE — 85025 COMPLETE CBC W/AUTO DIFF WBC: CPT

## 2024-09-17 PROCEDURE — 3079F DIAST BP 80-89 MM HG: CPT | Mod: CPTII,,,

## 2024-09-17 PROCEDURE — 36415 COLL VENOUS BLD VENIPUNCTURE: CPT

## 2024-09-17 PROCEDURE — 99999 PR PBB SHADOW E&M-EST. PATIENT-LVL IV: CPT | Mod: PBBFAC,,,

## 2024-09-17 PROCEDURE — 3074F SYST BP LT 130 MM HG: CPT | Mod: CPTII,,,

## 2024-09-17 PROCEDURE — 83880 ASSAY OF NATRIURETIC PEPTIDE: CPT

## 2024-09-17 PROCEDURE — 1159F MED LIST DOCD IN RCRD: CPT | Mod: CPTII,,,

## 2024-09-17 PROCEDURE — 94618 PULMONARY STRESS TESTING: CPT | Mod: PBBFAC | Performed by: INTERNAL MEDICINE

## 2024-09-17 PROCEDURE — 99214 OFFICE O/P EST MOD 30 MIN: CPT | Mod: S$PBB,,,

## 2024-09-17 NOTE — PROCEDURES
Kristin Maier is a 48 y.o.   female patient, who presents for a 6 minute walk test ordered by ANA M Guy.  The diagnosis is Pulmonary Hypertension.  The patient's BMI is 24.9 kg/m2.  Predicted distance (lower limit of normal) is 431.12 meters.      Test Results:    The test was not completed due to leg pain and shortness of breath.  The patient stopped 1 time for a total of 59 seconds.  The total time walked was 301 seconds.  During walking, the patient reported:  Dyspnea, Leg/hip pain.  The patient used no assistive devices during testing.  Oxygen saturation was measured with forehead pulse oximetry probe.     09/17/2024---------Distance: 243.84 meters (800 feet)     O2 Sat % Supplemental Oxygen Heart Rate Blood Pressure Jessica Scale   Pre-exercise  (Resting) 100 % Room Air 93 bpm 118/89 mmHg 7-8   During Exercise 99 % Room Air 104 bpm 134/89 mmHg 10   Post-exercise  (Recovery) 98 % Room Air  98 bpm       Recovery Time: 48 seconds    Performing nurse/tech: Betty MERRILL      PREVIOUS STUDY:   04/11/2024---------Distance: 396.24 meters (1300 feet)       O2 Sat % Supplemental Oxygen Heart Rate Blood Pressure Jessica Scale   Pre-exercise  (Resting) 96 % Room Air 92 bpm 101/63 mmHg 1   During Exercise 98 % Room Air 89 bpm 106/67 mmHg 2   Post-exercise  (Recovery) 98 % Room Air  78 bpm           CLINICAL INTERPRETATION:  Six minute walk distance is 243.84 meters (800 feet) with maximal severe dyspnea.  During exercise, there was no significant desaturation while breathing room air.  Both blood pressure and heart rate remained stable with walking.  The patient reported non-pulmonary symptoms during exercise.  Significant exercise impairment is likely due to subjective symptoms.  The patient did not complete the study, walking 301 seconds of the 360 second test.  Since the previous study in April 2024, exercise capacity is significantly worse.  Based upon age and body mass index, exercise capacity is less than  predicted.

## 2024-09-17 NOTE — TELEPHONE ENCOUNTER
"NN met with patient in clinic. Patient presented today for a follow up visit. She confirmed that she is still on 110.2 ng/kg/min of remodulin but she has been feeling more short of breath and dealing with leg pain. Patient constantly complains of leg pain from remodulin and is currently on several medications prescribed by palliative to help with the pain but she still has "bad days." Patient states that she did not walk as far today because she is having leg pain. She is interested in increasing her remodulin because she was told that it would help with her shortness of breath. NN informed patient that increasing remodulin can make leg pain worse and it is also not a guarantee that her shortness of breath would get better; PH is chronic and progressive so the expectation is that symptoms will progress and can get worse but side effect management has also been difficult for patient. NN will inform Ynes but she will make a treatment plan with patient. Patient also states that she is tolerating Opsynvi well and her BP is pretty stable, every now and then her BP may drop to 80's/60's but she just feels tired. Patient than also told that she has been having issues getting Remodulin from [facility] and she was suppose to get an emergency shipment but has not received anything. Patient is on her last cassette and will run out tomorrow. NN reiterated to patient the importance of letting NN/PH office know when she is having issues with [facility] so that NN can contact liaison. Patient verbalized understanding. Patient was also told that if she runs out of medication, she needs to come to [facility].     -UPDATE_  9/17/24 5772  [facility] responded to NN email that Remodulin was delivered to patients home address this morning at 0926. NN called and informed patient who states that she was not at home and will check as soon as she gets home.   "

## 2024-09-17 NOTE — PATIENT INSTRUCTIONS
No medication changes today.    Schedule right heart cath with labs, and appointment afterwards.    Potential medication to add - Winrevair (Sotatercept)    Recommend 2 gram sodium restriction and 1500cc fluid restriction.  Encourage physical activity with graded exercise program.  Requested patient to weigh themselves daily, and to notify us if their weight increases by more than 3 lbs in 1 day or 5 lbs in 1 week.

## 2024-09-17 NOTE — PROGRESS NOTES
"Subjective:    Patient ID:  Kristin Maier is a 48 y.o. female who presents for follow-up of Pulmonary Hypertension.    HPI:   Mrs. Maier is a 47 y/o AA female diagnosed with WHO Group 1 PAH, referred by Dr. MAINE Stack. She was initially seen in clinic by Dr. Dutta on 11/29/2021, presenting with symptoms of CHU and severe PAH by ECHO. Scheduled for RHC the next day. Same day of her appt she went to an OSH for a UTI.  Given her pulmonary hypertension history a decision was made to transfer her to [facility] [place] for further inpatient workup.   12.2.21 RHC revealed: RA: 25/ 26/ 22 RV: 90/ 1/ 21 PA: 86/ 42/ 57 PWP: 9/ 8/ 7. TPG 50, PVR 25 (severely elevated R sided filling pressure. CI 1.2, CO 1.97. Patient was classified as WHO group I PH and started on Veletri while inpatient, patient required  at rate of 5mg/kg/min. Patient had PICC line placed on 12/8/21 for long term administerationof vasodilator therapy. Switched to Remodulin at discharge.     2023 admissions: 3/2023 - c/o weakness, dizziness, lethargy; 9/21/2023 - chest pain, headache; 10/5 - remodulin interruption, SOB, syncope.    2024 admissions:   1/2-1/4//2024 - sudden onset dizziness described as room-spinning that started around 1-2am. CT scan -donnie, neuro unremarkable, euvolemic on exam. Given meclizine for BPPV and improved. Normotensive   3/5/2024 - ED in [place] with c/o "SOB, dizziness, fatigue, and lower extremity pain." She had hypotension on admission. Adempas was decreased to 1mg, TID and diuretics held for the stay.     8/3/2024-8/14/2024 - Hospitalized in August for weakness and found to have bacteremia.Pedro exchanged once negative cultures returned.    PH Medications: Remodulin 110 ng/kg/min, Opsynvi 10/40mg.    Mrs. Maier is here for follow-up. She says today is a "bad" day. Feels tired and has a lot of leg pain and some increased SOB. Walk was shorter 2/2 leg pain. Has good and bad days. Switched from Adempas to Opsumit/tadalafil and " thinks she is tolerating the medication. Patient denies chest pain, chest pressure, syncope, pre-syncope, lightheadedness, dizziness, PND, orthopnea, LE edema, abdominal pain, abdominal pressure, or N/V/F/C.      6MWT:   4/11/2024 9/17/2024   6MW     6MWT Status completed without stopping  not completed    Patient Reported Leg pain;Calf pain  Dyspnea;Leg/hip pain    Was O2 used? No  No    6MW Distance walked (feet) 1300 feet  800 feet    Distance walked (meters) 396.24 meters  243.84 meters    Did patient stop? No  Yes    Oxygen Saturation 96 %  100 %    Supplemental Oxygen Room Air  Room Air    Heart Rate 92 bpm  93 bpm    Blood Pressure 101/63  118/89    Jessica Dyspnea Rating  very light  very heavy    Oxygen Saturation 98 %  99 %    Supplemental Oxygen Room Air  Room Air    Heart Rate 89 bpm  104 bpm    Blood Pressure 106/67  134/89    Jessica Dyspnea Rating  light  severe/maximal    Recovery Time (seconds) 120 seconds  48 seconds    Oxygen Saturation 98 %  98 %    Supplemental Oxygen Room Air  Room Air    Heart Rate 78 bpm  98 bpm        ECHO: 8/4/2024    Left Ventricle: The left ventricle is normal in size. Ventricular mass is normal. Normal wall thickness. Septal flattening in systole consistent with right ventricular pressure overload. There is normal systolic function with a visually estimated ejection fraction of 55 - 60%. There is normal diastolic function.    Right Ventricle: Moderate right ventricular enlargement. Systolic function is moderately reduced. Right ventricular global longitudinal strain is - 16.6%. Free wall strain 18%    Aortic Valve: There is mild annular calcification present.    Tricuspid Valve: There is moderate regurgitation.    Pulmonary Artery: The estimated pulmonary artery systolic pressure is 60 mmHg.    IVC/SVC: Normal venous pressure at 3 mmHg.    Pericardium: There is a small effusion.    Free wall strain on prior echo11%, and Global strain 12.7%, so it does look like there has been  slight improvement in RV function from prior, and size is also less.    ECHO: 1/3/2024    Left Ventricle: The left ventricle is normal in size. Normal wall thickness. There is concentric remodeling. Normal wall motion. There is normal systolic function with a visually estimated ejection fraction of 55 - 60%. There is normal diastolic function.    Right Ventricle: Severe right ventricular enlargement with hypertrophy. Systolic function is moderately reduced.    The right atrium is severely dilated.    Tricuspid Valve: There is at least moderate to severe regurgitation.    Pulmonary Artery: The estimated pulmonary artery systolic pressure is 93 mmHg.    IVC/SVC: Intermediate venous pressure at 8 mmHg.    Pericardium: There is a small effusion.    RHC: 10/9/2023  RA: 7/ 6/ 5 RV: 71/ 3/ 4 PA: 71/ 32/ 43 PWP: 12/ 10/ 10 .   Cardiac output was 3.8 by Kristel. Cardiac index is 2.26 L/min/m2.   Low normal CO/CI on PH therapy (remodulin, macitentan, riociguat).   Normal right and left sided filling pressures.  Severe pulmonary hypertension in setting of normal left sided filling pressures.  TPG 33 PVR 8.68     ECHO: 10/6/2023    Left Ventricle: The left ventricle is normal in size. Normal wall thickness. Septal flattening in systole consistent with right ventricular pressure overload. There is normal systolic function with a visually estimated ejection fraction of 65%.    Right Ventricle: Severe right ventricular enlargement. Systolic function is moderately reduced. The free wall strain is -12.1%.    Right Atrium: Right atrium is severely dilated.    Tricuspid Valve: Mildly thickened leaflets. There is mild annular dilation present. There is moderate to severe regurgitation with a centrally directed jet.    Pulmonary Artery: The estimated pulmonary artery systolic pressure is 77 mmHg.    IVC/SVC: Normal venous pressure at 3 mmHg.    Pericardium: There is a small circumferential effusion. There is no tamponade  physiology.    ECHO: 12/13/2021 (prior to initiation of remodulin)  The left ventricle is normal in size with concentric remodeling and normal systolic function.  The estimated ejection fraction is 63%.  There are segmental left ventricular wall motion abnormalities.  There is abnormal septal wall motion.  Normal left ventricular diastolic function.  Moderate right ventricular enlargement with mildly reduced right ventricular systolic function.  Moderate right atrial enlargement.  Severe tricuspid regurgitation.  Mild pulmonic regurgitation.  Elevated central venous pressure (15 mmHg).  The estimated PA systolic pressure is 122 mmHg.  There is pulmonary hypertension.  Small pericardial effusion. Apically and trivial under the RA.    RHC: 7/19/2022  RA: 15/ 11/ 10 RV: 70/ 9/ 10 PA: 72/ 29/ 43 PWP: 12/ 12/ 11 .   Cardiac output was 4.29  by Kristel. Cardiac index is 2.61 L/min/m2.   O2 Sat: PA 69%.   Normal CO/CI on remodulin 80 ng/kg/min, riociguat 2mg po TID, macitentan 10mg.  Mildly increased right and normal left sided filling pressures.  Severe pulmonary hypertension.   TPG  32   PVR  7.45  MAP 75  SVR 1212    RHC: 3/16/2022  RA: 25/ 26/ 22 RV: 90/ 1/ 21 PA: 86/ 42/ 57 PWP: 9/ 8/ 7 .   Cardiac output was 1.97  by Kristel. Cardiac index is 1.2 L/min/m2.   O2 Sat: PA 38%.     TPG   50    PVR   25    PFTs: 6/27/2023 6/22/2023   Pulmonary Function Tests    FVC 2.95 liters    FEV1 2.34 liters    TLC (liters) 4.01 liters    DLCO (ml/mmHg sec) 14.58 ml/mmHg sec    FVC% 89.6    FEV1% 88.8    FEF 25-75 2.25    FEF 25-75% 86.7    TLC% 73.6    RV 1.06    RV% 56.7    DLCO% 55.6      V/Q: 12/3/2021  FINDINGS:  Perfusion: No observable filling defects or perfusion defects on perfusion imaging.  Ventilation: No observable defects on ventilation imaging.  Small amount of ingested radiotracer is visualized within the aerodigestive tract.  Impression:  This represents a low probability of pulmonary embolism.    CT Chest:  9/12/2022  Impression:     1. Constellation of findings suggestive of pulmonary arterial hypertension.  No significant pulmonary edema or pleural effusion.  No evidence of pulmonary thromboembolism.  2. Cardiomegaly with markedly enlarged right atrium and the right ventricle.  3. Multiple small pulmonary nodules, with the largest measuring 0.4 cm.  For multiple solid nodules all <6 mm, Fleischner Society 2017 guidelines recommend no routine follow up for a low risk patient, or follow up with non-contrast chest CT at 12 months after discovery in a high risk patient.  4. 3.9 cm borderline aneurysmal ascending aorta.    IV/SC:   9/17/2024   Pulmonary Hypertension Review Flowsheet    Pump Type CADD    Medication type Remodulin    Vial size 5mg/ml    Dose (ng/kg/min) 110.2 ng/kg/min    DW (kg) 67 kg    Amt of Med used 5 ml    Amt of Diluent Used NS 95ml    Final Concentration (ng/ml) 0.25mg/ml    Pump Rate ml/24 hr 43 ml/hr        Review of Systems   Constitutional: Negative.   HENT: Negative.     Eyes: Negative.    Cardiovascular:  Positive for dyspnea on exertion. Negative for chest pain, irregular heartbeat, leg swelling, near-syncope, orthopnea, paroxysmal nocturnal dyspnea and syncope.        Occasional leg swelling, resolves w/o diuretics   Respiratory: Negative.     Endocrine: Negative.    Hematologic/Lymphatic: Negative.    Skin: Negative.    Musculoskeletal:  Positive for myalgias. Negative for falls and joint swelling.        Foot and leg pain related to medication SE   Gastrointestinal:  Positive for diarrhea. Negative for abdominal pain, dysphagia, heartburn, nausea and vomiting.        Occasional nausea and diarrhea related to medication   Genitourinary: Negative.    Neurological:  Positive for excessive daytime sleepiness and dizziness. Negative for headaches and loss of balance.   Psychiatric/Behavioral: Negative.          Objective: /86 (BP Location: Left arm, Patient Position: Sitting, BP Method:  "Medium (Automatic))   Pulse 90   Ht 5' 4" (1.626 m)   Wt 74.3 kg (163 lb 12.8 oz)   LMP 06/19/2017 (Approximate)   BMI 28.12 kg/m²       Physical Exam  Constitutional:       General: She is not in acute distress.     Appearance: Normal appearance. She is normal weight. She is not ill-appearing.   HENT:      Head: Normocephalic and atraumatic.      Nose: Nose normal.   Eyes:      Extraocular Movements: Extraocular movements intact.      Pupils: Pupils are equal, round, and reactive to light.   Neck:      Vascular: No JVD.   Cardiovascular:      Rate and Rhythm: Normal rate and regular rhythm.   Chest:      Comments: R chest wall Pedro Catheter infusing Remodulin. Dressing is C/D/I  Musculoskeletal:      Cervical back: Normal range of motion and neck supple.   Neurological:      Mental Status: She is alert.       Lab Results   Component Value Date    BNP 14 09/17/2024     08/22/2024    K 3.5 08/22/2024    MG 1.8 09/17/2024     08/22/2024    CO2 26 08/22/2024    BUN 9 08/22/2024    CREATININE 0.9 08/22/2024     08/22/2024    HGBA1C 5.4 10/07/2023    AST 17 08/22/2024    ALT 9 (L) 08/22/2024    ALBUMIN 3.8 08/22/2024    PROT 7.1 08/22/2024    BILITOT 0.6 08/22/2024    CHOL 183 10/07/2023    HDL 46 10/07/2023    LDLCALC 113.2 10/07/2023    TRIG 119 10/07/2023       Magnesium   Date Value Ref Range Status   09/17/2024 1.8 1.6 - 2.6 mg/dL Final       Lab Results   Component Value Date    WBC 4.91 09/17/2024    HGB 12.0 09/17/2024    HCT 37.9 09/17/2024    MCV 90 09/17/2024     09/17/2024         BNP   Date Value Ref Range Status   09/17/2024 14 0 - 99 pg/mL Final     Comment:     Values of less than 100 pg/ml are consistent with non-CHF populations.   08/03/2024 220 (H) 0 - 99 pg/mL Final     Comment:     Values of less than 100 pg/ml are consistent with non-CHF populations.   04/11/2024 94 0 - 99 pg/mL Final     Comment:     Values of less than 100 pg/ml are consistent with non-CHF " "populations.       No results found for: "LDH"      WHO Group: 1 Functional Class: II   REVEAL Score: 7  (Intermediate Risk)  Assessment:       1. WHO group 1 pulmonary arterial hypertension    2. Chronic right-sided heart failure    3. Pain in both lower extremities    4. High risk medication use         Plan:       Mrs. Maier has severe PAH on triple therapy, to include IV remodulin and Opsynvi. REVEAL score remains Intermediate. Ambulation is hampered by leg pain. Adjustments to current regimen are limited, but may benefit from Winrevair. Mrs. Maier does have issues with communication and being reachable by pharmacy. Discussed this potential new medicine but reinforced need for patient to be engaged and take as directed.    Will plan for right heart cath as it has been a year, then make decision on adding Winrevair.    No medication changes today.    Schedule right heart cath with labs, and appointment afterwards.    Potential medication to add - Winrevair (Sotatercept)    Recommend 2 gram sodium restriction and 1500cc fluid restriction.  Encourage physical activity with graded exercise program.  Requested patient to weigh themselves daily, and to notify us if their weight increases by more than 3 lbs in 1 day or 5 lbs in 1 week.  "

## 2024-09-19 ENCOUNTER — TELEPHONE (OUTPATIENT)
Dept: PALLIATIVE MEDICINE | Facility: CLINIC | Age: 49
End: 2024-09-19
Payer: MEDICAID

## 2024-09-23 ENCOUNTER — TELEPHONE (OUTPATIENT)
Dept: PALLIATIVE MEDICINE | Facility: CLINIC | Age: 49
End: 2024-09-23
Payer: MEDICAID

## 2024-09-25 ENCOUNTER — TELEPHONE (OUTPATIENT)
Dept: TRANSPLANT | Facility: CLINIC | Age: 49
End: 2024-09-25
Payer: MEDICAID

## 2024-09-26 ENCOUNTER — TELEPHONE (OUTPATIENT)
Dept: PALLIATIVE MEDICINE | Facility: CLINIC | Age: 49
End: 2024-09-26
Payer: MEDICAID

## 2024-09-26 DIAGNOSIS — M79.604 PAIN IN BOTH LOWER EXTREMITIES: ICD-10-CM

## 2024-09-26 DIAGNOSIS — M25.541 ARTHRALGIA OF BOTH HANDS: ICD-10-CM

## 2024-09-26 DIAGNOSIS — M25.542 ARTHRALGIA OF BOTH HANDS: ICD-10-CM

## 2024-09-26 DIAGNOSIS — M79.605 PAIN IN BOTH LOWER EXTREMITIES: ICD-10-CM

## 2024-09-26 DIAGNOSIS — I27.20 PULMONARY HYPERTENSION: ICD-10-CM

## 2024-09-28 DIAGNOSIS — M25.542 ARTHRALGIA OF BOTH HANDS: ICD-10-CM

## 2024-09-28 DIAGNOSIS — M25.541 ARTHRALGIA OF BOTH HANDS: ICD-10-CM

## 2024-09-28 DIAGNOSIS — M79.605 PAIN IN BOTH LOWER EXTREMITIES: ICD-10-CM

## 2024-09-28 DIAGNOSIS — I27.20 PULMONARY HYPERTENSION: ICD-10-CM

## 2024-09-28 DIAGNOSIS — M79.604 PAIN IN BOTH LOWER EXTREMITIES: ICD-10-CM

## 2024-09-30 DIAGNOSIS — M25.541 ARTHRALGIA OF BOTH HANDS: ICD-10-CM

## 2024-09-30 DIAGNOSIS — M79.604 PAIN IN BOTH LOWER EXTREMITIES: ICD-10-CM

## 2024-09-30 DIAGNOSIS — M25.542 ARTHRALGIA OF BOTH HANDS: ICD-10-CM

## 2024-09-30 DIAGNOSIS — M79.605 PAIN IN BOTH LOWER EXTREMITIES: ICD-10-CM

## 2024-09-30 DIAGNOSIS — I27.20 PULMONARY HYPERTENSION: ICD-10-CM

## 2024-09-30 RX ORDER — OXYCODONE AND ACETAMINOPHEN 10; 325 MG/1; MG/1
1 TABLET ORAL EVERY 4 HOURS PRN
Qty: 180 TABLET | Refills: 0 | OUTPATIENT
Start: 2024-09-30

## 2024-09-30 RX ORDER — FUROSEMIDE 20 MG/1
20 TABLET ORAL DAILY
Qty: 90 TABLET | Refills: 3 | Status: SHIPPED | OUTPATIENT
Start: 2024-09-30 | End: 2025-09-30

## 2024-09-30 RX ORDER — OXYCODONE AND ACETAMINOPHEN 10; 325 MG/1; MG/1
1 TABLET ORAL EVERY 4 HOURS PRN
Refills: 0 | OUTPATIENT
Start: 2024-09-30

## 2024-10-01 ENCOUNTER — PATIENT MESSAGE (OUTPATIENT)
Dept: PALLIATIVE MEDICINE | Facility: CLINIC | Age: 49
End: 2024-10-01
Payer: MEDICAID

## 2024-10-01 NOTE — TELEPHONE ENCOUNTER
Patient called inquiring as to why medication refill was denied. Informed that by law Provider needs to see her in person before sending in further refills.

## 2024-10-04 ENCOUNTER — PATIENT MESSAGE (OUTPATIENT)
Dept: PALLIATIVE MEDICINE | Facility: CLINIC | Age: 49
End: 2024-10-04
Payer: MEDICAID

## 2024-10-04 DIAGNOSIS — I27.20 PULMONARY HYPERTENSION: ICD-10-CM

## 2024-10-04 DIAGNOSIS — M79.604 PAIN IN BOTH LOWER EXTREMITIES: ICD-10-CM

## 2024-10-04 DIAGNOSIS — M79.605 PAIN IN BOTH LOWER EXTREMITIES: ICD-10-CM

## 2024-10-04 DIAGNOSIS — M25.542 ARTHRALGIA OF BOTH HANDS: ICD-10-CM

## 2024-10-04 DIAGNOSIS — M25.541 ARTHRALGIA OF BOTH HANDS: ICD-10-CM

## 2024-10-04 RX ORDER — OXYCODONE AND ACETAMINOPHEN 10; 325 MG/1; MG/1
1 TABLET ORAL EVERY 4 HOURS PRN
Qty: 20 TABLET | Refills: 0 | Status: SHIPPED | OUTPATIENT
Start: 2024-10-04 | End: 2024-10-08

## 2024-10-04 NOTE — TELEPHONE ENCOUNTER
Called patient as requested in her portal message. She is requesting refill on Percocet. Refill. Informed her that she needs to be seen in person by Dr. Lee. She states she is in so much pain that she can't get out of bed.   Informed her that I'd ask Dr. Lee has sent in eonough pain medication to get her to [facility] Tuesday with her.

## 2024-10-08 ENCOUNTER — OFFICE VISIT (OUTPATIENT)
Dept: PALLIATIVE MEDICINE | Facility: CLINIC | Age: 49
End: 2024-10-08
Payer: MEDICAID

## 2024-10-08 VITALS
BODY MASS INDEX: 26.14 KG/M2 | SYSTOLIC BLOOD PRESSURE: 123 MMHG | WEIGHT: 166.88 LBS | OXYGEN SATURATION: 97 % | DIASTOLIC BLOOD PRESSURE: 88 MMHG

## 2024-10-08 DIAGNOSIS — I27.20 PULMONARY HYPERTENSION: ICD-10-CM

## 2024-10-08 DIAGNOSIS — M25.542 ARTHRALGIA OF BOTH HANDS: ICD-10-CM

## 2024-10-08 DIAGNOSIS — M79.604 PAIN IN BOTH LOWER EXTREMITIES: ICD-10-CM

## 2024-10-08 DIAGNOSIS — M79.605 PAIN IN BOTH LOWER EXTREMITIES: ICD-10-CM

## 2024-10-08 DIAGNOSIS — Z79.891 OPIOID USE AGREEMENT EXISTS: ICD-10-CM

## 2024-10-08 DIAGNOSIS — M25.541 ARTHRALGIA OF BOTH HANDS: ICD-10-CM

## 2024-10-08 DIAGNOSIS — R11.0 NAUSEA: ICD-10-CM

## 2024-10-08 DIAGNOSIS — F41.9 ANXIETY: Primary | ICD-10-CM

## 2024-10-08 PROCEDURE — 99497 ADVNCD CARE PLAN 30 MIN: CPT | Mod: S$PBB,,, | Performed by: STUDENT IN AN ORGANIZED HEALTH CARE EDUCATION/TRAINING PROGRAM

## 2024-10-08 PROCEDURE — 99999 PR PBB SHADOW E&M-EST. PATIENT-LVL III: CPT | Mod: PBBFAC,,, | Performed by: STUDENT IN AN ORGANIZED HEALTH CARE EDUCATION/TRAINING PROGRAM

## 2024-10-08 PROCEDURE — 1159F MED LIST DOCD IN RCRD: CPT | Mod: CPTII,,, | Performed by: STUDENT IN AN ORGANIZED HEALTH CARE EDUCATION/TRAINING PROGRAM

## 2024-10-08 PROCEDURE — 99214 OFFICE O/P EST MOD 30 MIN: CPT | Mod: S$PBB,,, | Performed by: STUDENT IN AN ORGANIZED HEALTH CARE EDUCATION/TRAINING PROGRAM

## 2024-10-08 PROCEDURE — 3008F BODY MASS INDEX DOCD: CPT | Mod: CPTII,,, | Performed by: STUDENT IN AN ORGANIZED HEALTH CARE EDUCATION/TRAINING PROGRAM

## 2024-10-08 PROCEDURE — 3079F DIAST BP 80-89 MM HG: CPT | Mod: CPTII,,, | Performed by: STUDENT IN AN ORGANIZED HEALTH CARE EDUCATION/TRAINING PROGRAM

## 2024-10-08 PROCEDURE — 99213 OFFICE O/P EST LOW 20 MIN: CPT | Mod: PBBFAC | Performed by: STUDENT IN AN ORGANIZED HEALTH CARE EDUCATION/TRAINING PROGRAM

## 2024-10-08 PROCEDURE — 3074F SYST BP LT 130 MM HG: CPT | Mod: CPTII,,, | Performed by: STUDENT IN AN ORGANIZED HEALTH CARE EDUCATION/TRAINING PROGRAM

## 2024-10-08 PROCEDURE — 99497 ADVNCD CARE PLAN 30 MIN: CPT | Mod: PBBFAC | Performed by: STUDENT IN AN ORGANIZED HEALTH CARE EDUCATION/TRAINING PROGRAM

## 2024-10-08 RX ORDER — LOPERAMIDE HYDROCHLORIDE 2 MG/1
2 CAPSULE ORAL 4 TIMES DAILY PRN
Qty: 30 CAPSULE | Refills: 3 | Status: SHIPPED | OUTPATIENT
Start: 2024-10-08

## 2024-10-08 RX ORDER — OXYCODONE AND ACETAMINOPHEN 10; 325 MG/1; MG/1
1 TABLET ORAL EVERY 4 HOURS PRN
Qty: 20 TABLET | Refills: 0 | Status: SHIPPED | OUTPATIENT
Start: 2024-10-08 | End: 2024-10-08 | Stop reason: SDUPTHER

## 2024-10-08 RX ORDER — VENLAFAXINE HYDROCHLORIDE 37.5 MG/1
37.5 CAPSULE, EXTENDED RELEASE ORAL DAILY
Qty: 30 CAPSULE | Refills: 11 | Status: SHIPPED | OUTPATIENT
Start: 2024-10-08 | End: 2025-10-08

## 2024-10-08 RX ORDER — OXYCODONE AND ACETAMINOPHEN 10; 325 MG/1; MG/1
1 TABLET ORAL EVERY 4 HOURS PRN
Qty: 20 TABLET | Refills: 0 | Status: CANCELLED | OUTPATIENT
Start: 2024-10-08 | End: 2024-10-12

## 2024-10-08 RX ORDER — OXYCODONE AND ACETAMINOPHEN 10; 325 MG/1; MG/1
1 TABLET ORAL EVERY 4 HOURS PRN
Qty: 180 TABLET | Refills: 0 | Status: SHIPPED | OUTPATIENT
Start: 2024-10-08

## 2024-10-08 RX ORDER — ONDANSETRON 4 MG/1
4 TABLET, FILM COATED ORAL EVERY 8 HOURS PRN
Qty: 30 TABLET | Refills: 6 | Status: SHIPPED | OUTPATIENT
Start: 2024-10-08

## 2024-10-08 NOTE — Clinical Note
Good afternoon,   I saw her yesterday and we had 2 questions for you: 1. Would she be eligible for pulm rehab? She is trying to work on improving her functional status 2. She has mouth pain again, she states that that was a side effect when she first started remodulin, are there any specific meds that you use for that? Otherwise we can just manage her pain  Thanks  Sonja

## 2024-10-08 NOTE — PROGRESS NOTES
Palliative Medicine Clinic Note      Consult Requested By: Luis Eduardo Dumont    Primary Care Physician:   Jorge A Stack MD    Reason for Consult: Advance care planning and symptom management in the setting of Pulmonary Hypertension     In-Person     ASSESSMENT/PLAN:     Plan/Recommendations:  Diagnoses and all orders for this visit:    Pulmonary hypertension  -Pulmonary HTN, WHO group 1, on remodulin.   - following with Pulmonary hypertension Clinic  -currently on Opsynvi and Remodulin   -Not requiring oxygen   - Considered pulmonary rehabilitation to improve endurance and overall functioning.  - Referred for evaluation for pulmonary rehabilitation program.  - Patient to continue daily activities and household chores as tolerated.      Anxiety /Other insomnia  -continues to have anxiety  -OFF sertraline   - working with  for support  -Trial of Effexor XR 37.5mg daily  - Evaluated anxiety symptoms, noting ineffectiveness of previous treatments including Wellbutrin and Zoloft.  - Initiated Effexor XR for anxiety, pain, and potential energy boost.  - Discussed the gradual onset of Effexor XR effects, noting it may take up to 4 weeks to notice improvements in mood and energy.  - Recognized grief as a significant factor in patient's current emotional state.  - Recommend considering participation in grief support groups.  - Referred to [facility] (therapist) [facility]  for grief counseling and emotional support.    Pain in both lower extremities  Arthralgia in both hands  -pain in lower extremities is related to her medication, it is worse when her Remodulin is uptitrated- feels like her feet are on fire   -On Lyrica 75mg in am and 150mg qhs   -Percocet 10-325mg taking q.4 to 6 hours now (refill sent)   -Will increase Xtampza 27mg PO q 12h   -not reporting any significant side effects from opioids like sedation, respiratory depression or constipation  -Trial of Effexor XR 37.5mg daily  - Assessed  pain management regimen, noting effectiveness of Xtampza but continued reliance on short-acting opioids for energy and motivation.  - Considered increasing Xtampza dosage, but patient preferred current regimen.  - Continued Xtampza at current dose of 2 tablets in the morning.  - Continued Percocet as needed for breakthrough pain and energy.  - Plan to consult with  regarding mouth pain a possible SE from remodulin  - Ordered urine toxicology screen.      Fatigue   - Educated on the connection between sleep patterns and daytime functioning.  - Patient to implement a consistent sleep schedule, aiming to be asleep by midnight.      Nausea:   - Patient to anticipate and preemptively treat nausea on days when changing medication cassette.  - Refilled Zofran for nausea management.      Palliative care encounter  Medicolegal: Has decision making capacity.   is surrogate decision maker.   She shared that for HCPOA she would named her  as 1st agent and her daughter Nicky as her 2nd agent.     Psychosocial:  support system consists of  and daughters     Spiritual: Zoroastrianism    Understanding of disease and Illness Trajectory: Patient  has  adequate understanding of her illness, they can benefit from continued education on what to expect in the future.      Advance Care Planning   Advance Directives:   Living Will: No    [place]POST: No    Do Not Resuscitate Status: No    Medical Power of : Yes    Agent's Name:  Juju Maier   Agent's Contact Number:  [phone]    Decision Making:  Patient answered questions  Goals of Care: Advance Care Planning    Date: 10/09/2024  I engaged the patient in a voluntary conversation about advance care planning and we specifically addressed what the goals of care would be moving forward.  We explored the patient's values and preferences for future care.  The patient endorses that what is most important right now is to focus on remaining as independent as  possible and symptom/pain control. She expresses strong desire to maintain independence and continue daily activities. For her quality of life involves being able to engage in daily activities and care for grandchildren.  Accordingly, we have decided that the best plan to meet the patient's goals includes continuing with treatment    Date: 08/27/2024  Voluntary advance care planning discussion had today with patient. Previously completed HCPOA in electronic medical record is current, no changes made.     Date: 06/13/2023  I introduced the concept of advance directives to the patient, as well. Then the patient received detailed information about the importance of designating a Health Care Power of  (HCPOA). She was also instructed to communicate with this person about their wishes for future healthcare, should she become sick and lose decision-making capacity. The patient has not previously appointed a HCPOA. After our discussion, the patient has decided to complete a HCPOA and has appointed her significant other, health care agent: Juju Maier & her daughter Navya Dowell as second agent. Form completed and will be scanned today.          16 min time was spent on advance care planning, goals of care discussion, emotional support, formulating and communicating prognosis and goals of care, exploring burden/benefit of various approaches of treatment.     Follow up: 2 month      SUBJECTIVE:     History obtained from: Patient      complaint:   Chief Complaint   Patient presents with    Depression       Disease History:  Pulmonary HTN, WHO group 1, on remodulin. [facility] 3/21/22 indicative on worsening disease      History of Present Illness / Interval History:  Kristin Maier is 48 y.o. year old female presenting with PAH.  Referred to Palliative Care for evaluation and management of physical symptoms, advance care planning,, and additional support.. female attended the appointment  "alone    10/9/24:    The patient reports experiencing ongoing pain, fatigue, and difficulty with daily activities. She describes having good energy in the mornings but needing to rest and "recharge" throughout the day. By nighttime, she feels unable to continue activities. Despite limitations, she continues to engage in household tasks like cooking and cleaning with assistance from her daughter.     The patient experiences joint pain, particularly in her arms, and reports being unable to stand for more than 20 minutes due to pain in her feet and calves. She has been taking her dog for short walks around the block without significant shortness of breath but requires rest upon returning home.     Her current pain management regimen, including Xtampza and Percocet, provides some relief but still struggles with motivation and energy levels. She reports nausea, particularly when having diarrhea, which alternates with constipation every few days. The patient also mentions heightened anxiety and emotional distress due to the anniversary of her son's passing. Her sleep pattern is disrupted, often going to bed late and having difficulty falling asleep.    -----    8/27/24  Patient reports pain in bl LE, current dose of Xtampza was helping however she feels that's he could benefit from a higher dose, her legs and lower back continue to hurt especially with PH med dose changes   No anxiety or SOB reported   Has anorexia, likely because of the abx that she was taking , she has not been eating well lately   Bounces between diarrhea and constipation   Taking oxy 1 4h and Xtampza BID     ------    4/9/24  The patient reports experiencing pain, anxiety, nausea, and fatigue. The primary pain is located in the back and leg. She states that the medication helps with the pain, but it only works for a short period of time. She is currently on Percocet (oxycodone with Tylenol) for pain management.  She has been trying to move to [place] " for a lung transplant but is currently still in her current location. She is giving herself six months to make this move.    She recently went on a vacation with her grandchildren and participated in various activities, which may have contributed to her current fatigue and pain. She has been mostly bedridden since returning from the vacation. She also experiences shortness of breath and has been taking Lasix to manage fluid retention. She is on sertraline for anxiety and has been experiencing diarrhea as a side effect of her PH meds. Experiences leg pain and joint swelling Requires 's assistance for getting in and out of vehicles Struggles with nighttime sleep Maintains daily routine of watering flowers and picking up grandchildren from school        Review of Symptoms      Symptom Assessment (ESAS 0-10 Scale)  Pain:  6  Dyspnea:  0  Anxiety:  5  Nausea:  8  Depression:  10  Anorexia:  3  Fatigue:  6  Insomnia:  8  Restlessness:  0  Agitation:  0     CAM / Delirium:  Negative  Constipation:  Positive  Diarrhea:  Positive      Pain Assessment:    Location(s): leg (joint)    Leg       Location: bilateral        Quality: Throbbing        Quantity: 6/10 in intensity        Chronicity: Onset 1 year(s) ago, unchanged        Aggravating Factors: Standing        Alleviating Factors: Opiates        Associated Symptoms: None    Modified Jessica Scale:  3    Performance Status:  60    Living Arrangements:  Lives with family    Psychosocial/Cultural:   See Palliative Psychosocial Note: Yes  .  is a . Has 3 living daughters youngest is 25 oldest is 33 and a  son. Has grandchildren who she enjoys spending time with. Likes teaching them to play different sports. Enjoys gardening   **Primary  to Follow**  Palliative Care  Consult: Yes    Spiritual:  F - Leticia and Belief:  Nondenominational        Previous experience or exposure to a serious illness:  Yes      Medications:    Current Outpatient Medications:     D5W SolP 250 mL with vancomycin 1,000 mg SolR 1,000 mg, Inject 1,000 mg into the vein every 12 (twelve) hours., Disp: , Rfl:     furosemide (LASIX) 20 MG tablet, Take 1 tablet (20 mg total) by mouth once daily., Disp: 90 tablet, Rfl: 3    macitentan-tadalafil (OPSYNVI) 10-40 mg Tab, Take 10-40 mg by mouth once daily., Disp: , Rfl:     magnesium oxide (MAG-OX) 400 mg (241.3 mg magnesium) tablet, Take 1 tablet (400 mg total) by mouth once daily., Disp: 90 tablet, Rfl: 3    multivitamin with minerals tablet, Take 1 tablet by mouth once daily., Disp: , Rfl:     naloxone (NARCAN) 4 mg/actuation Spry, 4mg by nasal route as needed for opioid overdose; may repeat every 2-3 minutes in alternating nostrils until medical help arrives. Call 911, Disp: 1 each, Rfl: 11    oxyCODONE myristate (XTAMPZA ER) 27 mg CSpT, Take 27 mg by mouth every 12 (twelve) hours. for 14 days, Disp: 30 each, Rfl: 0    potassium chloride SA (K-DUR,KLOR-CON M) 10 MEQ tablet, Take 2 tablets (20 mEq total) by mouth once daily., Disp: 60 tablet, Rfl: 11    pregabalin (LYRICA) 75 MG capsule, Take 1 capsule (75 mg total) by mouth once daily AND 2 capsules (150 mg total) every evening., Disp: 90 capsule, Rfl: 6    REMODULIN 5 mg/mL Soln, , Disp: , Rfl:     sodium chloride 0.9% SolP 100 mL with treprostinil 1 mg/mL Soln 6,000,000 ng, Inject 2,145 ng/min into the vein continuous., Disp: , Rfl:     spironolactone (ALDACTONE) 25 MG tablet, Take 1 tablet (25 mg total) by mouth once daily., Disp: 90 tablet, Rfl: 3    loperamide (IMODIUM) 2 mg capsule, Take 1 capsule (2 mg total) by mouth 4 (four) times daily as needed for Diarrhea., Disp: 30 capsule, Rfl: 3    ondansetron (ZOFRAN) 4 MG tablet, Take 1 tablet (4 mg total) by mouth every 8 (eight) hours as needed for Nausea., Disp: 30 tablet, Rfl: 6    oxyCODONE-acetaminophen (PERCOCET)  mg per tablet, Take 1 tablet by mouth every 4 (four) hours as  needed for Pain., Disp: 180 tablet, Rfl: 0    venlafaxine (EFFEXOR-XR) 37.5 MG 24 hr capsule, Take 1 capsule (37.5 mg total) by mouth once daily., Disp: 30 capsule, Rfl: 11      External  database queried on 10/09/2024  by Sonja Mcgill .   The results reviewed and considered with the clinical data in the decision whether or not to prescribe a controlled substance.  0/04/2024 10/04/2024 2 Oxycodone-Acetaminophen  20.00 4 Er Lori [id number] Peo ([id number]) 0 75.00 MME Other [place]   09/27/2024 09/27/2024 2 Xtampza Er 27 Mg Capsule 30.00 15 Er Lori [id number] Peo ([id number]) 0 90.00 MME Medicaid [place]   09/27/2024 08/27/2024 2 Pregabalin 75 Mg Capsule 90.00 30 Er Lori [id number] Peo ([id number]) 1 1.51 LME Medicaid [place]   09/03/2024 08/28/2024 2 Oxycodone-Acetaminophen  180.00 30 Er Lori [id number] Peo ([id number]) 0 90.00 MME Other [place]         Review of patient's allergies indicates:  No Known Allergies    OBJECTIVE:       Physical Exam:  Vitals: Pulse: (P) 88 (10/08/24 0936)  BP: 123/88 (10/08/24 0936)  SpO2: 97 % (10/08/24 0936)  Physical Exam  Constitutional:       General: She is not in acute distress.  HENT:      Head: Normocephalic and atraumatic.      Mouth/Throat:      Mouth: Mucous membranes are moist.   Eyes:      Extraocular Movements: Extraocular movements intact.   Pulmonary:      Effort: Pulmonary effort is normal. No respiratory distress.   Abdominal:      General: There is no distension.   Musculoskeletal:      Right lower leg: No edema.      Left lower leg: No edema.   Neurological:      Mental Status: She is alert and oriented to person, place, and time.   Psychiatric:         Mood and Affect: Mood and affect normal.             This note was generated with the assistance of ambient listening technology. Verbal consent was obtained by the patient and accompanying visitor(s) for the recording of patient appointment to facilitate this note. I attest to having reviewed and edited the generated note for accuracy, though some  syntax or spelling errors may persist. Please contact the author of this note for any clarification.        Signature: Sonja Mcgill MD

## 2024-10-08 NOTE — TELEPHONE ENCOUNTER
Pt called. Oxycodone rx that was sent was only 4 days. Requests medication for diarrhea also. Requests sent to Dr. Lee.

## 2024-10-23 ENCOUNTER — PATIENT MESSAGE (OUTPATIENT)
Dept: TRANSPLANT | Facility: CLINIC | Age: 49
End: 2024-10-23
Payer: MEDICAID

## 2024-10-24 ENCOUNTER — PATIENT MESSAGE (OUTPATIENT)
Dept: PALLIATIVE MEDICINE | Facility: CLINIC | Age: 49
End: 2024-10-24

## 2024-10-24 DIAGNOSIS — M79.605 PAIN IN BOTH LOWER EXTREMITIES: ICD-10-CM

## 2024-10-24 DIAGNOSIS — M79.604 PAIN IN BOTH LOWER EXTREMITIES: ICD-10-CM

## 2024-10-24 DIAGNOSIS — I27.20 PULMONARY HYPERTENSION: ICD-10-CM

## 2024-10-24 DIAGNOSIS — M25.542 ARTHRALGIA OF BOTH HANDS: ICD-10-CM

## 2024-10-24 DIAGNOSIS — M25.541 ARTHRALGIA OF BOTH HANDS: ICD-10-CM

## 2024-11-04 ENCOUNTER — TELEPHONE (OUTPATIENT)
Dept: TRANSPLANT | Facility: CLINIC | Age: 49
End: 2024-11-04
Payer: MEDICAID

## 2024-11-04 NOTE — TELEPHONE ENCOUNTER
NN received an email communication regarding the patient's PA for Remodulin prescription. NN was told by Sharron ROMERO At [facility] that the patient's PA is good through 11.11.2024 with the PA number of [id number]. NN send an email giving this information to the specialty pharmacy [facility]. NN asked [facility] if a new PA could be submitted awaiting a reply.    @ [phone]   [facility] notified NN royal tthe patient's PA will need to be submitted.    2:20 NN faxed and submitted the PA.

## 2024-11-06 DIAGNOSIS — I27.20 PULMONARY HYPERTENSION: ICD-10-CM

## 2024-11-06 DIAGNOSIS — M79.605 PAIN IN BOTH LOWER EXTREMITIES: ICD-10-CM

## 2024-11-06 DIAGNOSIS — M25.542 ARTHRALGIA OF BOTH HANDS: ICD-10-CM

## 2024-11-06 DIAGNOSIS — M25.541 ARTHRALGIA OF BOTH HANDS: ICD-10-CM

## 2024-11-06 DIAGNOSIS — M79.604 PAIN IN BOTH LOWER EXTREMITIES: ICD-10-CM

## 2024-11-06 RX ORDER — OXYCODONE AND ACETAMINOPHEN 10; 325 MG/1; MG/1
1 TABLET ORAL EVERY 4 HOURS PRN
Qty: 180 TABLET | Refills: 0 | Status: SHIPPED | OUTPATIENT
Start: 2024-11-06

## 2024-11-19 DIAGNOSIS — M25.542 ARTHRALGIA OF BOTH HANDS: ICD-10-CM

## 2024-11-19 DIAGNOSIS — M79.605 PAIN IN BOTH LOWER EXTREMITIES: ICD-10-CM

## 2024-11-19 DIAGNOSIS — I27.20 PULMONARY HYPERTENSION: ICD-10-CM

## 2024-11-19 DIAGNOSIS — M79.604 PAIN IN BOTH LOWER EXTREMITIES: ICD-10-CM

## 2024-11-19 DIAGNOSIS — M25.541 ARTHRALGIA OF BOTH HANDS: ICD-10-CM

## 2024-11-27 ENCOUNTER — TELEPHONE (OUTPATIENT)
Dept: TRANSPLANT | Facility: CLINIC | Age: 49
End: 2024-11-27
Payer: MEDICAID

## 2024-11-27 NOTE — TELEPHONE ENCOUNTER
NN called and spoke with patient. Confirmed that she will be arriving for [facility] on Friday. Patient was informed to check in for clinic appointment once she is done with [facility]. ANA M Quick has agreed to see the patient early. Patient verbalized understanding.

## 2024-11-29 ENCOUNTER — HOSPITAL ENCOUNTER (OUTPATIENT)
Facility: HOSPITAL | Age: 49
Discharge: HOME | End: 2024-11-29
Attending: INTERNAL MEDICINE | Admitting: INTERNAL MEDICINE
Payer: MEDICAID

## 2024-11-29 ENCOUNTER — OFFICE VISIT (OUTPATIENT)
Dept: TRANSPLANT | Facility: CLINIC | Age: 49
End: 2024-11-29
Payer: MEDICAID

## 2024-11-29 VITALS
DIASTOLIC BLOOD PRESSURE: 64 MMHG | WEIGHT: 172.06 LBS | HEIGHT: 66 IN | TEMPERATURE: 97 F | SYSTOLIC BLOOD PRESSURE: 106 MMHG | OXYGEN SATURATION: 92 % | BODY MASS INDEX: 27.65 KG/M2 | HEART RATE: 102 BPM | RESPIRATION RATE: 20 BRPM

## 2024-11-29 VITALS
OXYGEN SATURATION: 93 % | SYSTOLIC BLOOD PRESSURE: 102 MMHG | HEART RATE: 91 BPM | BODY MASS INDEX: 27.44 KG/M2 | DIASTOLIC BLOOD PRESSURE: 65 MMHG | WEIGHT: 174.81 LBS | HEIGHT: 67 IN

## 2024-11-29 DIAGNOSIS — M79.605 PAIN IN BOTH LOWER EXTREMITIES: ICD-10-CM

## 2024-11-29 DIAGNOSIS — M79.604 PAIN IN BOTH LOWER EXTREMITIES: ICD-10-CM

## 2024-11-29 DIAGNOSIS — M25.541 ARTHRALGIA OF BOTH HANDS: ICD-10-CM

## 2024-11-29 DIAGNOSIS — I27.21 WHO GROUP 1 PULMONARY ARTERIAL HYPERTENSION: Primary | ICD-10-CM

## 2024-11-29 DIAGNOSIS — G89.29 OTHER CHRONIC PAIN: ICD-10-CM

## 2024-11-29 DIAGNOSIS — I27.20 PULMONARY HYPERTENSION: ICD-10-CM

## 2024-11-29 DIAGNOSIS — M25.542 ARTHRALGIA OF BOTH HANDS: ICD-10-CM

## 2024-11-29 DIAGNOSIS — Z79.899 HIGH RISK MEDICATION USE: ICD-10-CM

## 2024-11-29 PROCEDURE — 3078F DIAST BP <80 MM HG: CPT | Mod: CPTII,,,

## 2024-11-29 PROCEDURE — 99213 OFFICE O/P EST LOW 20 MIN: CPT | Mod: PBBFAC

## 2024-11-29 PROCEDURE — 93451 RIGHT HEART CATH: CPT | Performed by: INTERNAL MEDICINE

## 2024-11-29 PROCEDURE — 3008F BODY MASS INDEX DOCD: CPT | Mod: CPTII,,,

## 2024-11-29 PROCEDURE — 99999 PR PBB SHADOW E&M-EST. PATIENT-LVL III: CPT | Mod: PBBFAC,,,

## 2024-11-29 PROCEDURE — C1751 CATH, INF, PER/CENT/MIDLINE: HCPCS | Performed by: INTERNAL MEDICINE

## 2024-11-29 PROCEDURE — 3074F SYST BP LT 130 MM HG: CPT | Mod: CPTII,,,

## 2024-11-29 PROCEDURE — 99214 OFFICE O/P EST MOD 30 MIN: CPT | Mod: S$PBB,,,

## 2024-11-29 PROCEDURE — 63600175 PHARM REV CODE 636 W HCPCS: Performed by: INTERNAL MEDICINE

## 2024-11-29 PROCEDURE — 93451 RIGHT HEART CATH: CPT | Mod: 26,,, | Performed by: INTERNAL MEDICINE

## 2024-11-29 PROCEDURE — C1894 INTRO/SHEATH, NON-LASER: HCPCS | Performed by: INTERNAL MEDICINE

## 2024-11-29 PROCEDURE — 1159F MED LIST DOCD IN RCRD: CPT | Mod: CPTII,,,

## 2024-11-29 RX ORDER — LIDOCAINE HYDROCHLORIDE 20 MG/ML
INJECTION, SOLUTION INFILTRATION; PERINEURAL
Status: DISCONTINUED | OUTPATIENT
Start: 2024-11-29 | End: 2024-11-29 | Stop reason: HOSPADM

## 2024-11-29 RX ORDER — OXYCODONE AND ACETAMINOPHEN 10; 325 MG/1; MG/1
1 TABLET ORAL EVERY 4 HOURS PRN
Qty: 180 TABLET | Refills: 0 | Status: SHIPPED | OUTPATIENT
Start: 2024-11-29

## 2024-11-29 NOTE — PROGRESS NOTES
"Subjective:    Patient ID:  Kristin Maier is a 48 y.o. female who presents for follow-up of Pulmonary Hypertension.    HPI:   Mrs. Maier is a 45 y/o AA female diagnosed with WHO Group 1 PAH, referred by Dr. MAINE Stack. She was initially seen in clinic by Dr. Dutta on 11/29/2021, presenting with symptoms of CHU and severe PAH by ECHO. Scheduled for RHC the next day. Same day of her appt she went to an OSH for a UTI.  Given her pulmonary hypertension history a [place] [facility] [place].     Negative HENNA, RA.  Negative V/Q scan (12/2021)  Normal PFTs (11/2022)    12.2.21 RHC revealed: RA: 25/ 26/ 22 RV: 90/ 1/ 21 PA: 86/ 42/ 57 PWP: 9/ 8/ 7. TPG 50, PVR 25 (severely elevated R sided filling pressure. CI 1.2, CO 1.97. Patient was classified as WHO group I PH and started on Veletri while inpatient, patient required  at rate of 5mg/kg/min. Patient had PICC line placed on 12/8/21 for long term administerationof vasodilator therapy. Switched to Remodulin at discharge.     2023 admissions: 3/2023 - c/o weakness, dizziness, lethargy; 9/21/2023 - chest pain, headache; 10/5 - remodulin interruption, SOB, syncope.    2024 admissions:   1/2-1/4//2024 - sudden onset dizziness described as room-spinning that started around 1-2am. CT scan -donnie, neuro unremarkable, euvolemic on exam. Given meclizine for BPPV and improved. Normotensive   3/5/2024 - ED in [place] with c/o "SOB, dizziness, fatigue, and lower extremity pain." She had hypotension on admission. Adempas was decreased to 1mg, TID and diuretics held for the stay.     8/3/2024-8/14/2024 - Hospitalized in August for weakness and found to have bacteremia.Pedro exchanged once negative cultures returned.    PH Medications: Remodulin 110 ng/kg/min, Opsynvi 10/40mg.    11/29/2024: Mrs. Maier is here for follow-up after RHC. She is very drowsy. Says she had a busy day yesterday and still recovering. She denies increased " SOB, CP, pre-syncope, syncope. Has been tolerating her PH medication regimen, but now says she does not think the lyrica is working as well and wants to switch back to gabapentin. She also takes percocet every 4 hours.  Discussed the RHC results with her.    RHC: 11/29/2024  On IV remodulin at 110 ng/kg/min and Opsynvi 10-40   RA: 6 PA: 71/ 29/ 43 PWP: 11 .   Cardiac output was 6.9  by thermodilution. Cardiac index is 3.7 L/min/m2.   O2 Sat: PA 65%.   Pulmonary vascular resistance: 4.6.       6MWT:   4/11/2024 9/17/2024   6MW     6MWT Status completed without stopping  not completed    Patient Reported Leg pain;Calf pain  Dyspnea;Leg/hip pain    Was O2 used? No  No    6MW Distance walked (feet) 1300 feet  800 feet    Distance walked (meters) 396.24 meters  243.84 meters    Did patient stop? No  Yes    Oxygen Saturation 96 %  100 %    Supplemental Oxygen Room Air  Room Air    Heart Rate 92 bpm  93 bpm    Blood Pressure 101/63  118/89    Jessica Dyspnea Rating  very light  very heavy    Oxygen Saturation 98 %  99 %    Supplemental Oxygen Room Air  Room Air    Heart Rate 89 bpm  104 bpm    Blood Pressure 106/67  134/89    Jessica Dyspnea Rating  light  severe/maximal    Recovery Time (seconds) 120 seconds  48 seconds    Oxygen Saturation 98 %  98 %    Supplemental Oxygen Room Air  Room Air    Heart Rate 78 bpm  98 bpm        ECHO: 8/4/2024    Left Ventricle: The left ventricle is normal in size. Ventricular mass is normal. Normal wall thickness. Septal flattening in systole consistent with right ventricular pressure overload. There is normal systolic function with a visually estimated ejection fraction of 55 - 60%. There is normal diastolic function.    Right Ventricle: Moderate right ventricular enlargement. Systolic function is moderately reduced. Right ventricular global longitudinal strain is - 16.6%. Free wall strain 18%    Aortic Valve: There is mild annular calcification present.    Tricuspid Valve: There is moderate  regurgitation.    Pulmonary Artery: The estimated pulmonary artery systolic pressure is 60 mmHg.    IVC/SVC: Normal venous pressure at 3 mmHg.    Pericardium: There is a small effusion.    Free wall strain on prior echo11%, and Global strain 12.7%, so it does look like there has been slight improvement in RV function from prior, and size is also less.    ECHO: 1/3/2024    Left Ventricle: The left ventricle is normal in size. Normal wall thickness. There is concentric remodeling. Normal wall motion. There is normal systolic function with a visually estimated ejection fraction of 55 - 60%. There is normal diastolic function.    Right Ventricle: Severe right ventricular enlargement with hypertrophy. Systolic function is moderately reduced.    The right atrium is severely dilated.    Tricuspid Valve: There is at least moderate to severe regurgitation.    Pulmonary Artery: The estimated pulmonary artery systolic pressure is 93 mmHg.    IVC/SVC: Intermediate venous pressure at 8 mmHg.    Pericardium: There is a small effusion.    RHC: 10/9/2023  RA: 7/ 6/ 5 RV: 71/ 3/ 4 PA: 71/ 32/ 43 PWP: 12/ 10/ 10 .   Cardiac output was 3.8 by Kristel. Cardiac index is 2.26 L/min/m2.   Low normal CO/CI on PH therapy (remodulin, macitentan, riociguat).   Normal right and left sided filling pressures.  Severe pulmonary hypertension in setting of normal left sided filling pressures.  TPG 33 PVR 8.68     ECHO: 10/6/2023    Left Ventricle: The left ventricle is normal in size. Normal wall thickness. Septal flattening in systole consistent with right ventricular pressure overload. There is normal systolic function with a visually estimated ejection fraction of 65%.    Right Ventricle: Severe right ventricular enlargement. Systolic function is moderately reduced. The free wall strain is -12.1%.    Right Atrium: Right atrium is severely dilated.    Tricuspid Valve: Mildly thickened leaflets. There is mild annular dilation present. There is  moderate to severe regurgitation with a centrally directed jet.    Pulmonary Artery: The estimated pulmonary artery systolic pressure is 77 mmHg.    IVC/SVC: Normal venous pressure at 3 mmHg.    Pericardium: There is a small circumferential effusion. There is no tamponade physiology.    ECHO: 12/13/2021 (prior to initiation of remodulin)  The left ventricle is normal in size with concentric remodeling and normal systolic function.  The estimated ejection fraction is 63%.  There are segmental left ventricular wall motion abnormalities.  There is abnormal septal wall motion.  Normal left ventricular diastolic function.  Moderate right ventricular enlargement with mildly reduced right ventricular systolic function.  Moderate right atrial enlargement.  Severe tricuspid regurgitation.  Mild pulmonic regurgitation.  Elevated central venous pressure (15 mmHg).  The estimated PA systolic pressure is 122 mmHg.  There is pulmonary hypertension.  Small pericardial effusion. Apically and trivial under the RA.    RHC: 7/19/2022  RA: 15/ 11/ 10 RV: 70/ 9/ 10 PA: 72/ 29/ 43 PWP: 12/ 12/ 11 .   Cardiac output was 4.29  by Kristel. Cardiac index is 2.61 L/min/m2.   O2 Sat: PA 69%.   Normal CO/CI on remodulin 80 ng/kg/min, riociguat 2mg po TID, macitentan 10mg.  Mildly increased right and normal left sided filling pressures.  Severe pulmonary hypertension.   TPG  32   PVR  7.45  MAP 75  SVR 1212    RHC: 3/16/2022  RA: 25/ 26/ 22 RV: 90/ 1/ 21 PA: 86/ 42/ 57 PWP: 9/ 8/ 7 .   Cardiac output was 1.97  by Kristel. Cardiac index is 1.2 L/min/m2.   O2 Sat: PA 38%.     TPG   50    PVR   25    PFTs: 6/27/2023 6/22/2023   Pulmonary Function Tests    FVC 2.95 liters    FEV1 2.34 liters    TLC (liters) 4.01 liters    DLCO (ml/mmHg sec) 14.58 ml/mmHg sec    FVC% 89.6    FEV1% 88.8    FEF 25-75 2.25    FEF 25-75% 86.7    TLC% 73.6    RV 1.06    RV% 56.7    DLCO% 55.6      V/Q: 12/3/2021  FINDINGS:  This represents a low probability of pulmonary  embolism.    CT Chest: 9/12/2022  Impression:     1. Constellation of findings suggestive of pulmonary arterial hypertension.  No significant pulmonary edema or pleural effusion.  No evidence of pulmonary thromboembolism.  2. Cardiomegaly with markedly enlarged right atrium and the right ventricle.  3. Multiple small pulmonary nodules, with the largest measuring 0.4 cm.  For multiple solid nodules all <6 mm, Fleischner Society 2017 guidelines recommend no routine follow up for a low risk patient, or follow up with non-contrast chest CT at 12 months after discovery in a high risk patient.  4. 3.9 cm borderline aneurysmal ascending aorta.    IV/SC:   11/29/2024   Pulmonary Hypertension Review Flowsheet    Pump Type CADD    Medication type Remodulin    Vial size 5mg/ml    Dose (ng/kg/min) 110.2 ng/kg/min    DW (kg) 67 kg    Amt of Med used 5 ml    Amt of Diluent Used NS 95ml    Final Concentration (ng/ml) 0.25mg/ml    Pump Rate ml/24 hr 43 ml/hr        Review of Systems   Constitutional: Negative.   HENT: Negative.     Eyes: Negative.    Cardiovascular:  Positive for dyspnea on exertion. Negative for chest pain, irregular heartbeat, leg swelling, near-syncope, orthopnea, paroxysmal nocturnal dyspnea and syncope.        Occasional leg swelling, resolves w/o diuretics   Respiratory: Negative.     Endocrine: Negative.    Hematologic/Lymphatic: Negative.    Skin: Negative.    Musculoskeletal:  Positive for myalgias. Negative for falls and joint swelling.        Foot and leg pain related to medication SE   Gastrointestinal:  Positive for diarrhea. Negative for abdominal pain, dysphagia, heartburn, nausea and vomiting.        Occasional nausea and diarrhea related to medication   Genitourinary: Negative.    Neurological:  Positive for excessive daytime sleepiness and dizziness. Negative for headaches and loss of balance.   Psychiatric/Behavioral: Negative.          Objective:/65 (BP Location: Left arm, Patient  "Position: Sitting)   Pulse 91   Ht 5' 7" (1.702 m)   Wt 79.3 kg (174 lb 13.2 oz)   LMP 06/19/2017 (Approximate)   SpO2 (!) 93%   BMI 27.38 kg/m²     Physical Exam  Constitutional:       General: She is not in acute distress.     Appearance: Normal appearance. She is normal weight. She is not ill-appearing.   HENT:      Head: Normocephalic and atraumatic.      Nose: Nose normal.   Eyes:      Extraocular Movements: Extraocular movements intact.      Pupils: Pupils are equal, round, and reactive to light.   Neck:      Vascular: No JVD.   Cardiovascular:      Rate and Rhythm: Normal rate and regular rhythm.   Chest:      Comments: R chest wall Pedro Catheter infusing Remodulin. Dressing is C/D/I  Musculoskeletal:      Cervical back: Normal range of motion and neck supple.   Neurological:      Mental Status: She is alert.       Lab Results   Component Value Date    BNP <10 11/29/2024     12/03/2024    K 3.3 (L) 12/03/2024    MG 1.8 12/03/2024     12/03/2024    CO2 23 12/03/2024    BUN 10 12/03/2024    CREATININE 0.9 12/03/2024     12/03/2024    HGBA1C 5.4 10/07/2023    AST 16 12/03/2024    ALT 14 12/03/2024    ALBUMIN 3.5 12/03/2024    PROT 6.8 12/03/2024    BILITOT 0.6 12/03/2024    CHOL 183 10/07/2023    HDL 46 10/07/2023    LDLCALC 113.2 10/07/2023    TRIG 119 10/07/2023       Magnesium   Date Value Ref Range Status   12/03/2024 1.8 1.6 - 2.6 mg/dL Final       Lab Results   Component Value Date    WBC 4.23 12/03/2024    HGB 11.8 (L) 12/03/2024    HCT 35.7 (L) 12/03/2024    MCV 88 12/03/2024     12/03/2024         BNP   Date Value Ref Range Status   11/29/2024 <10 0 - 99 pg/mL Final     Comment:     Values of less than 100 pg/ml are consistent with non-CHF populations.   09/17/2024 14 0 - 99 pg/mL Final     Comment:     Values of less than 100 pg/ml are consistent with non-CHF populations.   08/03/2024 220 (H) 0 - 99 pg/mL Final     Comment:     Values of less than 100 pg/ml are " consistent with non-CHF populations.         WHO Group: 1 Functional Class: II   REVEAL Score: 3  (Low Risk)  Assessment/Plan:     Kristin Maier was seen today for pulmonary hypertension.    Diagnoses and all orders for this visit:    WHO group 1 pulmonary arterial hypertension  Mrs. Maier had a RHC today that showed improved PVR and CI/CO but remains severe PAH on triple therapy. She is euvolemic on exam and REVEAL score is low risk.   Noted that she is doing well on current therapy and with lowered PVR and low risk status, we should hold off on winrevair.  Ultimately, Mrs. Maier wants to be off of remodulin infusion. Reinforced that PAH is progressive and does not have a cure. The medication she is on, currently, is working to reduce symptoms, increase functional status and keep her out of the hospital. Mrs. Maier asks if she should go up on the remodulin. Noted that she has significant pain with remodulin and an increase would exacerbate those symptoms.    No medication changes today.    Return to clinic in 3 months with labs, walk, ECHO    Other chronic pain  Concerned about patients pain medication regimen. She is somnolent in clinic, nodding off. She takes percocet every 4 hours, rather than PRN. She also takes lyrica but thinks she should switch back to gabapentin.  Pain medications are managed by palliative care, so will reach out to them. Maybe some alternatives are available for pain control with less sedation.    High risk medication use  Remdoulin infusing without issue. Dressing is C/D/I    Sho Guy DNP, APRN [facility]

## 2024-11-29 NOTE — PLAN OF CARE
Patient discharged per MD orders. Instructions given on medications, wound care, activity, signs of infection, when to call MD, and follow up appointments. Pt verbalized understanding. Telemetry and PIV removed. Patient and family taken to clinic via wc.

## 2024-11-29 NOTE — DISCHARGE SUMMARY
[facility] Lab  Discharge Note  Short Stay    Procedure(s) (LRB):  INSERTION, CATHETER, RIGHT HEART (Right)      OUTCOME: Patient tolerated treatment/procedure well without complication and is now ready for discharge.    DISPOSITION: Home or Self Care    FINAL DIAGNOSIS:  <principal problem not specified>    FOLLOWUP: In clinic    DISCHARGE INSTRUCTIONS:  No discharge procedures on file.     TIME SPENT ON DISCHARGE: 30 minutes

## 2024-11-29 NOTE — PLAN OF CARE
Pt arrived to unit accompanied by her spouse. Pre op orders and assessment initiated.   Remodulin infusing. Call bell within reach.

## 2024-11-29 NOTE — H&P
[facility] Stay Cardiac Unit  Interventional Cardiology  H&P    Patient Name: Kristin Maier  MRN: [id number]  Admission Date: 2024  Code Status: Prior   Attending Provider: Kahlil Cisneros MD  Primary Care Physician: Jorge A Stack MD  Principal Problem:<principal problem not specified>    Patient information was obtained from patient and past medical records.     Subjective:     Chief Complaint:  PH     HPI: 47 YO F w/ WHO Group 1 PAH, referred by Dr. MAINE Stack. She was initially seen in clinic by Dr. Dutta on 2021, presenting with symptoms of CHU and severe PAH by ECHO. She is followed in our PH clinic. Currently on PH therapy with IV Remodulin 110 ng/kg/min, Opsynvi 10/40mg. Here for routine RHC.    Past Medical History:   Diagnosis Date    Elevated liver enzymes     Essential (primary) hypertension     Heart failure, unspecified     Hypokalemia     Mixed hyperlipidemia     Neuropathic pain     Tx w/ tramadol & Lyrica    Pulmonary hypertension     Receiving Remodulin continuously through tunneled central line       Past Surgical History:   Procedure Laterality Date    APPENDECTOMY       SECTION      Transverse cut    HYSTERECTOMY  2017    TLH- bleeding    OOPHORECTOMY      RIGHT HEART CATHETERIZATION Right 2021    Procedure: INSERTION, CATHETER, RIGHT HEART;  Surgeon: Chloe Gregory MD;  Location: [facility] CATH LAB;  Service: Cardiology;  Laterality: Right;    RIGHT HEART CATHETERIZATION Right 3/16/2022    Procedure: INSERTION, CATHETER, RIGHT HEART;  Surgeon: Hu Silverman MD;  Location: [facility] CATH LAB;  Service: Cardiology;  Laterality: Right;    RIGHT HEART CATHETERIZATION Right 2022    Procedure: INSERTION, CATHETER, RIGHT HEART;  Surgeon: Chloe Gregory MD;  Location: [facility] CATH LAB;  Service: Cardiology;  Laterality: Right;    RIGHT HEART CATHETERIZATION Right 3/21/2023    Procedure: INSERTION, CATHETER, RIGHT HEART;  Surgeon:  Kahlil Cisneros MD;  Location: [facility] CATH LAB;  Service: Cardiology;  Laterality: Right;    RIGHT HEART CATHETERIZATION Right 10/9/2023    Procedure: INSERTION, CATHETER, RIGHT HEART;  Surgeon: Chloe Gregory MD;  Location: [facility] CATH LAB;  Service: Cardiology;  Laterality: Right;    TUBAL LIGATION  1996    VAGINAL DELIVERY  1991; 1993; 1994; 1996       Review of patient's allergies indicates:  No Known Allergies    PTA Medications   Medication Sig    D5W SolP 250 mL with vancomycin 1,000 mg SolR 1,000 mg Inject 1,000 mg into the vein every 12 (twelve) hours.    furosemide (LASIX) 20 MG tablet Take 1 tablet (20 mg total) by mouth once daily.    loperamide (IMODIUM) 2 mg capsule Take 1 capsule (2 mg total) by mouth 4 (four) times daily as needed for Diarrhea.    macitentan-tadalafil (OPSYNVI) 10-40 mg Tab Take 10-40 mg by mouth once daily.    magnesium oxide (MAG-OX) 400 mg (241.3 mg magnesium) tablet Take 1 tablet (400 mg total) by mouth once daily.    multivitamin with minerals tablet Take 1 tablet by mouth once daily.    naloxone (NARCAN) 4 mg/actuation Spry 4mg by nasal route as needed for opioid overdose; may repeat every 2-3 minutes in alternating nostrils until medical help arrives. Call 911    ondansetron (ZOFRAN) 4 MG tablet Take 1 tablet (4 mg total) by mouth every 8 (eight) hours as needed for Nausea.    oxyCODONE myristate (XTAMPZA ER) 27 mg CSpT Take 27 mg by mouth every 12 (twelve) hours.    oxyCODONE-acetaminophen (PERCOCET)  mg per tablet Take 1 tablet by mouth every 4 (four) hours as needed for Pain.    potassium chloride SA (K-DUR,KLOR-CON M) 10 MEQ tablet Take 2 tablets (20 mEq total) by mouth once daily.    pregabalin (LYRICA) 75 MG capsule Take 1 capsule (75 mg total) by mouth once daily AND 2 capsules (150 mg total) every evening.    REMODULIN 5 mg/mL Soln     sodium chloride 0.9% SolP 100 mL with treprostinil 1 mg/mL Soln 6,000,000 ng Inject 2,145 ng/min into the vein continuous.     spironolactone (ALDACTONE) 25 MG tablet Take 1 tablet (25 mg total) by mouth once daily.    venlafaxine (EFFEXOR-XR) 37.5 MG 24 hr capsule Take 1 capsule (37.5 mg total) by mouth once daily.     Family History       Problem Relation (Age of Onset)    Cancer Father (69)    No Known Problems Sister, Brother, Brother          Tobacco Use    Smoking status: Former     Types: Cigars     Quit date: 12/1/2020     Years since quitting: 3.9     Passive exposure: Past    Smokeless tobacco: Never    Tobacco comments:     social smoker-light - quit 2001   Substance and Sexual Activity    Alcohol use: Not Currently     Alcohol/week: 2.0 standard drinks of alcohol     Types: 2 Glasses of wine per week     Comment: None now -  previously: socially- 2 or 3 times a week-2 glasses of wine    Drug use: Not Currently     Types: Marijuana     Comment: 2021 last use    Sexual activity: Yes     Partners: Male     Birth control/protection: See Surgical Hx     Comment:      ROS  Objective:     Vital Signs (Most Recent):    Vital Signs (24h Range):           There is no height or weight on file to calculate BMI.            No intake or output data in the 24 hours ending 11/29/24 0848    Lines/Drains/Airways       Central Venous Catheter Line  Duration             Tunneled Central Line - Single Lumen  08/08/24 1246 Internal Jugular Right 112 days                    Physical Exam  Constitutional:       General: She is not in acute distress.     Appearance: Normal appearance. She is normal weight. She is not ill-appearing.   HENT:      Head: Normocephalic and atraumatic.      Nose: Nose normal.   Eyes:      Extraocular Movements: Extraocular movements intact.      Pupils: Pupils are equal, round, and reactive to light.   Neck:      Vascular: No JVD.   Cardiovascular:      Rate and Rhythm: Normal rate and regular rhythm.   Chest:      Comments: R chest wall Pedro Catheter infusing Remodulin. Dressing is C/D/I  Musculoskeletal:       "Cervical back: Normal range of motion and neck supple.   Neurological:      Mental Status: She is alert.         Significant Labs: BMP: No results for input(s): "GLU", "NA", "K", "CL", "CO2", "BUN", "CREATININE", "CALCIUM", "MG" in the last 48 hours., CMP No results for input(s): "NA", "K", "CL", "CO2", "GLU", "BUN", "CREATININE", "CALCIUM", "PROT", "ALBUMIN", "BILITOT", "ALKPHOS", "AST", "ALT", "ANIONGAP", "ESTGFRAFRICA", "EGFRNONAA" in the last 48 hours., CBC No results for input(s): "WBC", "HGB", "HCT", "PLT" in the last 48 hours., and INR No results for input(s): "INR", "PROTIME" in the last 48 hours.    Significant Imaging: Echocardiogram: 2D echo with color flow doppler:   Results for orders placed or performed during the hospital encounter of 11/17/21   2D echo with color flow doppler    Narrative           Transthoracic Echocardiogram Report    Patient Name  : [place] [address]    Phone: [phone]    Interpreting Physician: WHITLEY HICKEY MD  Demographics:  Name:  ELVIS HUTTON   YOB: 1975  Age/Sex:  45, Female   Height: 5 ft 2 in  Weight:   132 pounds    BSA: 1.63 cc/m?  BMI:      24.1   Date of Study: 11/18/2021  Medical Record No:   [id number]   Location: 343  Referring Physician:   SATISH ALCANTARA   Technologist: Denise Oseguera RDCS  Study:   Trans Thoracic Echocardiogram  Study Quality:   Good  Procedure  Type: Adult TTE (Date Study Ordered : 11/18/2021)  Components: 2D,Color Flow Doppler,Doppler,M-mode,TDI(Tissue Doppler   Imaging)   Referral diagnosis  Abnormal CXR  Hemodynamics  Heart rate: 88 beats/min  Blood pressure:  130/99 mmHg  ECG:     Final Impressions  1. The study quality is good.   2. Global left ventricular systolic function is mildly reduced.  Left   ventricular ejection fraction is 40-45%.   3. Severely dilated right ventricle with severely reduced function.  4. The right atrium is severly enlarged. Right " atrial diameter is 5.7   cms.    5. Mild (1+) aortic regurgitation. Severe (4+) tricuspid   regurgitation. Trace pulmonic regurgitation.    6. Flattening of the interventricular septum consistent with right   ventricular pressure overload.     7. Severe pulmonary hypertension.  The pulmonary artery systolic   pressure is 121 mmHg.    8. Stage 1 diastolic dysfunction.  9. A small pericardial effusion is noted.     Findings    Left Atrium  The left atrium is decreased in size. Left atrial diameter is 2.5 cms.     Right Atrium  The right atrium is severly enlarged. Right atrial diameter is 5.7   cms.      Left Ventricle  The left ventricle is decreased in size. Left ventricular diastolic   dimension is 3.64 cms. Left ventricular systolic dimension is 2.6 cms.   Left ventricular diastolic septal thickness is 1.3 cms. Left   ventricular diastolic postero basal free wall thickness is 1.4 cms.   Global left ventricular systolic function is mildly decreased. The   left ventricular ejection fraction is 40%. Left ventricular outflow   tract VTI is 8.8 cms. Left ventricular outflow tract mean velocity is   0.5 m/s. Left ventricular mean gradient is 1 mmHg.      Right Ventricle  The right ventricle is severely enlarged. Right ventricular systolic   function is severely decreased. Right ventricular diastolic dimension   is 5.3 cms. Right ventricular systolic pressure is 121 mmHg.      Atrial Septum  The atrial septum is normal.     Ventricular Septum  The ventricular septum is normal.     Pulmonary Artery  The pulmonary artery systolic pressure is 121 mmHg.     Pulmonary Vein  The pulmonary vein appears normal.     IVC  The inferior vena cava is moderately increased in size.     Pulmonic Valve  Evidence of pulmonic regurgitation is noted. Trace pulmonic   regurgitation. The peak velocity is 0.4 m/s. The mean velocity is 0.3   m/s. The peak trans pulmonic gradient is 1 mmHg.      Tricuspid Valve  Evidence of tricuspid  regurgitation is present. Severe (4+) tricuspid   regurgitation. The peak tricuspid regurgitant velocity is 5.3 m/s. The   peak trans tricuspid gradient is 100 mmHg. TR ERO is 0.3 cm^2.     Mitral Valve  The pressure half time is 44 ms. The deceleration time is 149 ms. The   E velocity is 0.3 m/s. The A velocity is 0.7 m/s.      Aortic Valve  The aortic valve is tricuspid. Noted evidence of aortic regurgitation.   Mild (1+) aortic regurgitation. The trans-aortic peak velocity is 0.8   m/s. The trans-aortic peak gradient is 3 mmHg. The trans-aortic mean   velocity is 0.6 m/s. The trans-aortic mean gradient is 2 mmHg. Aortic   valve VTI measures 12.8 cms.      Aorta  Aortic root diameter is 3.2 cms. Aortic arch is normal.    Pericardium  A small pericardial effusion is noted.     Measurements  Left Atrium  Diameter   2.5cm(s)    Right Atrium  Diameter   5.7cm(s)    Left Ventricle  End Diastolic Dimension   3.64cm(s)  End Systolic Dimension   2.6cm(s)  Posterior Basal Free Wall Thickness   1.4cm(s)  End Diastolic Septal Thickness   1.3cm(s)  Ejection Fraction   40%    Right Ventricle  Diastolic Diameter   5.3cm(s)    Pulmonic Valve  Peak Pulmonic Velocity   0.4m/s  Mean Pulmonic Velocity   0.3m/s  Peak Trans Pulmonic Gradient   1mmHg    Tricuspid Valve  Peak Tricuspid Regurgitant Velocity   5.3m/s  Peak Tricuspid Regurgitant Gradient   100mmHg  Pulmonary Artery Systolic Pressure  121mmHg    Mitral Valve  Pressure Half Time   44ms  Deceleration Time   149ms  A Velocity   0.7m/s  E Velocity   0.3m/s  Area By Pressure Half Time   5cm?    Aortic Valve  Trans Aortic Peak Velocity   0.8m/s  Trans Aortic Mean Velocity   0.6m/s  Trans Aortic Peak Gradient   3mmHg  Trans Aortic Mean Gradient   2mmHg  Aortic Valve VTI   12.8cm(s)        Electronically Authenticated by  WHITLEY HICKEY MD  11/18/2021 , 09:14      and Transthoracic echo (TTE) complete (Cupid Only):   Results for orders placed or performed during the hospital  encounter of 08/03/24   Echo   Result Value Ref Range    RA Width 3.56 cm    LA Vol (MOD) 27.79 cm3    Left Atrium Major Axis 5.46 cm    Left Atrium Minor Axis 5.46 cm    RA Major Axis 4.66 cm    LV Diastolic Volume 85.62 mL    LV Systolic Volume 20.24 mL    MV Peak A Josiah 0.97 m/s    MV stenosis pressure 1/2 time 43.63 ms    TR Max Josiah 3.77 m/s    MV Peak E Josiah 0.58 m/s    Ao VTI 25.33 cm    Ao peak josiah 1.31 m/s    LVOT peak VTI 18.09 cm    LVOT peak josiah 1.08 m/s    LVOT diameter 1.92 cm    IVRT 94.20 msec    E wave deceleration time 150.44 msec    AV mean gradient 4 mmHg    RV- bah basal diam 4.6 cm    TAPSE 1.52 cm    LA size 3.10 cm    Ascending aorta 3.55 cm    STJ 3.01 cm    Sinus 3.29 cm    LVIDs 2.40 2.1 - 4.0 cm    PW 0.88 0.6 - 1.1 cm    IVS 1.00 0.6 - 1.1 cm    LVIDd 4.36 3.5 - 6.0 cm    TDI LATERAL 0.13 m/s    LA WIDTH 2.87 cm    TDI SEPTAL 0.10 m/s    LV LATERAL E/E' RATIO 4.46 m/s    LV SEPTAL E/E' RATIO 5.80 m/s    FS 45 (A) 28 - 44 %    LA Vol 41.29 cm3    LV mass 133.79 g    ZLVIDD -1.15     ZLVIDS -1.83     Left Ventricle Relative Wall Thickness 0.40 cm    AV valve area 2.07 cm²    AV Velocity Ratio 0.82     AV index (prosthetic) 0.71     MV valve area p 1/2 method 5.04 cm2    E/A ratio 0.60     Mean e' 0.12 m/s    LVOT area 2.9 cm2    LVOT stroke volume 52.35 cm3    AV peak gradient 7 mmHg    E/E' ratio 5.04 m/s    LV Systolic Volume Index 11.4 mL/m2    LV Diastolic Volume Index 48.37 mL/m2    CHRISTY 23.3 mL/m2    LV Mass Index 76 g/m2    Triscuspid Valve Regurgitation Peak Gradient 57 mmHg    CHRISTY (MOD) 15.7 mL/m2    ALLEN by Velocity Ratio 2.39 cm²    BSA 1.77 m2    RV GLS 16.6 %    RV free wall strain 18.0 %    TV resting pulmonary artery pressure 60 mmHg    RV TB RVSP 7 mmHg    Est. RA pres 3 mmHg    Narrative      Left Ventricle: The left ventricle is normal in size. Ventricular mass   is normal. Normal wall thickness. Septal flattening in systole consistent   with right ventricular pressure  overload. There is normal systolic   function with a visually estimated ejection fraction of 55 - 60%. There is   normal diastolic function.    Right Ventricle: Moderate right ventricular enlargement. Systolic   function is moderately reduced. Right ventricular global longitudinal   strain is - 16.6%. Free wall strain 18%    Aortic Valve: There is mild annular calcification present.    Tricuspid Valve: There is moderate regurgitation.    Pulmonary Artery: The estimated pulmonary artery systolic pressure is   60 mmHg.    IVC/SVC: Normal venous pressure at 3 mmHg.    Pericardium: There is a small effusion.    Free wall strain on prior echo11%, and Global strain 12.7%, so it does   look like there has been slight improvement in RV function from prior, and   size is also less.       Assessment and Plan:     There are no hospital problems to display for this patient.      PH    VTE Risk Mitigation (From admission, onward)      None            Kahlil Cisneros MD  Interventional Cardiology   [facility] - Short Stay Cardiac Unit

## 2024-11-29 NOTE — Clinical Note
Angela Walker is a 62 year old female is presenting for Pre-op appointment.    Date of Surgery:  2017  Type of Surgery:  SHOULDER ARTHROSCOPY - Right  Location:  [facility]  Surgeon:  Dr. Sameer Bautista    Medication verified and med list updated  Denies known Latex allergy or symptoms of Latex sensitivity  Tobacco use verified.  Pt is not a smoker.    Patient would like communication of their results via:  Send letter with results.  If need to speak with pt, call Cell Phone:   Telephone Information:   Mobile [phone]   Okay to leave a message containing results? Yes    Preventative Screening:  Last Pap: Hysterectomy  Last Mammogram: 2017  Last Bone Density: Never had one  Last Colonoscopy: 2013  Last EC2017  Last Tdap:   Last Flu: 10/26/2016  Last Pneumovax: Never had one      Health Maintenance Due   Topic Date Due   • Zoster Vaccine  2012   • Medicare Wellness 65+  2017       Patient is due for topics as listed above, she wishes to discuss with provider.     The PA catheter was repositioned to the main pulmonary artery. Hemodynamics were performed. O2 saturation was measured at 65%. CO= 6.67  CI= 3.56

## 2024-11-29 NOTE — PLAN OF CARE
Received report from ROSIE Lang. Patient s/p [facility], AAOx3. VSS, no c/o pain or discomfort at this time, resp even and unlabored. Gauze/tegaderm dressing to LIJ is CDI. No active bleeding. No hematoma noted. Post procedure protocol reviewed with patient and patient's family. Understanding verbalized. Family members at bedside. Nurse call bell within reach. Will continue to monitor per post procedure protocol.

## 2024-11-29 NOTE — DISCHARGE INSTRUCTIONS

## 2024-11-29 NOTE — OP NOTE
Patient was brought to cath lab, draped, and prepped in the usual sterile fashion. Left IJ was accessed via modified seldinger technique with ultrasound guidance and guidewire was introduced into the IJ. Sequential dilation with micropuncture kit and sheath was performed. [place] Patricio catheter was introduced via the sheath. Measurements were obtained, and sheath was removed. Patient tolerated the procedure well and discharged in stable condition.

## 2024-12-02 ENCOUNTER — HOSPITAL ENCOUNTER (OUTPATIENT)
Facility: HOSPITAL | Age: 49
LOS: 1 days | Discharge: HOME | End: 2024-12-03
Attending: INTERNAL MEDICINE | Admitting: INTERNAL MEDICINE
Payer: MEDICAID

## 2024-12-02 ENCOUNTER — PATIENT MESSAGE (OUTPATIENT)
Dept: TRANSPLANT | Facility: CLINIC | Age: 49
End: 2024-12-02
Payer: MEDICAID

## 2024-12-02 DIAGNOSIS — I27.20 PULMONARY HYPERTENSION: Primary | ICD-10-CM

## 2024-12-02 DIAGNOSIS — I27.0 PRIMARY PULMONARY HYPERTENSION: ICD-10-CM

## 2024-12-02 DIAGNOSIS — I27.9 CHRONIC PULMONARY HEART DISEASE: ICD-10-CM

## 2024-12-02 LAB
ALBUMIN SERPL BCP-MCNC: 3.7 G/DL (ref 3.5–5.2)
ALP SERPL-CCNC: 87 U/L (ref 40–150)
ALT SERPL W/O P-5'-P-CCNC: 13 U/L (ref 10–44)
ANION GAP SERPL CALC-SCNC: 10 MMOL/L (ref 8–16)
APTT PPP: 29.9 SEC (ref 21–32)
AST SERPL-CCNC: 18 U/L (ref 10–40)
BASOPHILS # BLD AUTO: 0.02 K/UL (ref 0–0.2)
BASOPHILS NFR BLD: 0.5 % (ref 0–1.9)
BILIRUB SERPL-MCNC: 0.6 MG/DL (ref 0.1–1)
BUN SERPL-MCNC: 10 MG/DL (ref 6–20)
CALCIUM SERPL-MCNC: 9.2 MG/DL (ref 8.7–10.5)
CHLORIDE SERPL-SCNC: 108 MMOL/L (ref 95–110)
CO2 SERPL-SCNC: 19 MMOL/L (ref 23–29)
CREAT SERPL-MCNC: 0.8 MG/DL (ref 0.5–1.4)
DIFFERENTIAL METHOD BLD: ABNORMAL
EOSINOPHIL # BLD AUTO: 0.1 K/UL (ref 0–0.5)
EOSINOPHIL NFR BLD: 3.1 % (ref 0–8)
ERYTHROCYTE [DISTWIDTH] IN BLOOD BY AUTOMATED COUNT: 14.1 % (ref 11.5–14.5)
EST. GFR  (NO RACE VARIABLE): >60 ML/MIN/1.73 M^2
GLUCOSE SERPL-MCNC: 124 MG/DL (ref 70–110)
GLUCOSE SERPL-MCNC: 90 MG/DL (ref 70–110)
HCO3 UR-SCNC: 28.6 MMOL/L (ref 24–28)
HCT VFR BLD AUTO: 35.9 % (ref 37–48.5)
HCT VFR BLD CALC: 31 %PCV (ref 36–54)
HGB BLD-MCNC: 11.9 G/DL (ref 12–16)
IMM GRANULOCYTES # BLD AUTO: 0.02 K/UL (ref 0–0.04)
IMM GRANULOCYTES NFR BLD AUTO: 0.5 % (ref 0–0.5)
INR PPP: 1 (ref 0.8–1.2)
LYMPHOCYTES # BLD AUTO: 1.2 K/UL (ref 1–4.8)
LYMPHOCYTES NFR BLD: 28.6 % (ref 18–48)
MAGNESIUM SERPL-MCNC: 1.8 MG/DL (ref 1.6–2.6)
MCH RBC QN AUTO: 29.6 PG (ref 27–31)
MCHC RBC AUTO-ENTMCNC: 33.1 G/DL (ref 32–36)
MCV RBC AUTO: 89 FL (ref 82–98)
MONOCYTES # BLD AUTO: 0.4 K/UL (ref 0.3–1)
MONOCYTES NFR BLD: 9.5 % (ref 4–15)
NEUTROPHILS # BLD AUTO: 2.5 K/UL (ref 1.8–7.7)
NEUTROPHILS NFR BLD: 57.8 % (ref 38–73)
NRBC BLD-RTO: 0 /100 WBC
PCO2 BLDA: 47.5 MMHG (ref 35–45)
PH SMN: 7.39 [PH] (ref 7.35–7.45)
PLATELET # BLD AUTO: 150 K/UL (ref 150–450)
PMV BLD AUTO: 11 FL (ref 9.2–12.9)
PO2 BLDA: 38 MMHG (ref 40–60)
POC BE: 4 MMOL/L
POC IONIZED CALCIUM: 1.22 MMOL/L (ref 1.06–1.42)
POC SATURATED O2: 71 % (ref 95–100)
POC TCO2: 30 MMOL/L (ref 24–29)
POTASSIUM BLD-SCNC: 5.3 MMOL/L (ref 3.5–5.1)
POTASSIUM SERPL-SCNC: 3.7 MMOL/L (ref 3.5–5.1)
PROT SERPL-MCNC: 7 G/DL (ref 6–8.4)
PROTHROMBIN TIME: 10.7 SEC (ref 9–12.5)
RBC # BLD AUTO: 4.02 M/UL (ref 4–5.4)
SAMPLE: ABNORMAL
SODIUM BLD-SCNC: 138 MMOL/L (ref 136–145)
SODIUM SERPL-SCNC: 137 MMOL/L (ref 136–145)
WBC # BLD AUTO: 4.23 K/UL (ref 3.9–12.7)

## 2024-12-02 PROCEDURE — 80053 COMPREHEN METABOLIC PANEL: CPT | Performed by: NURSE PRACTITIONER

## 2024-12-02 PROCEDURE — G0379 DIRECT REFER HOSPITAL OBSERV: HCPCS

## 2024-12-02 PROCEDURE — 94760 N-INVAS EAR/PLS OXIMETRY 1: CPT

## 2024-12-02 PROCEDURE — 25000003 PHARM REV CODE 250: Performed by: INTERNAL MEDICINE

## 2024-12-02 PROCEDURE — 85610 PROTHROMBIN TIME: CPT | Performed by: NURSE PRACTITIONER

## 2024-12-02 PROCEDURE — 63600175 PHARM REV CODE 636 W HCPCS: Mod: JG | Performed by: INTERNAL MEDICINE

## 2024-12-02 PROCEDURE — 36415 COLL VENOUS BLD VENIPUNCTURE: CPT | Performed by: NURSE PRACTITIONER

## 2024-12-02 PROCEDURE — 99900035 HC TECH TIME PER 15 MIN (STAT)

## 2024-12-02 PROCEDURE — G0378 HOSPITAL OBSERVATION PER HR: HCPCS

## 2024-12-02 PROCEDURE — 25000003 PHARM REV CODE 250: Performed by: NURSE PRACTITIONER

## 2024-12-02 PROCEDURE — 94799 UNLISTED PULMONARY SVC/PX: CPT

## 2024-12-02 PROCEDURE — 85730 THROMBOPLASTIN TIME PARTIAL: CPT | Performed by: NURSE PRACTITIONER

## 2024-12-02 PROCEDURE — 85025 COMPLETE CBC W/AUTO DIFF WBC: CPT | Performed by: NURSE PRACTITIONER

## 2024-12-02 PROCEDURE — 83735 ASSAY OF MAGNESIUM: CPT | Performed by: NURSE PRACTITIONER

## 2024-12-02 RX ORDER — FUROSEMIDE 20 MG/1
20 TABLET ORAL DAILY
Status: DISCONTINUED | OUTPATIENT
Start: 2024-12-02 | End: 2024-12-04 | Stop reason: HOSPADM

## 2024-12-02 RX ORDER — LOPERAMIDE HYDROCHLORIDE 2 MG/1
2 CAPSULE ORAL 4 TIMES DAILY PRN
Status: DISCONTINUED | OUTPATIENT
Start: 2024-12-02 | End: 2024-12-04 | Stop reason: HOSPADM

## 2024-12-02 RX ORDER — PREGABALIN 75 MG/1
150 CAPSULE ORAL 2 TIMES DAILY
Status: DISCONTINUED | OUTPATIENT
Start: 2024-12-02 | End: 2024-12-02

## 2024-12-02 RX ORDER — SPIRONOLACTONE 25 MG/1
25 TABLET ORAL DAILY
Status: DISCONTINUED | OUTPATIENT
Start: 2024-12-02 | End: 2024-12-04 | Stop reason: HOSPADM

## 2024-12-02 RX ORDER — PREGABALIN 75 MG/1
75 CAPSULE ORAL DAILY
Status: DISCONTINUED | OUTPATIENT
Start: 2024-12-03 | End: 2024-12-04 | Stop reason: HOSPADM

## 2024-12-02 RX ORDER — OXYCODONE AND ACETAMINOPHEN 10; 325 MG/1; MG/1
1 TABLET ORAL EVERY 4 HOURS PRN
Status: DISCONTINUED | OUTPATIENT
Start: 2024-12-02 | End: 2024-12-04 | Stop reason: HOSPADM

## 2024-12-02 RX ORDER — VENLAFAXINE HYDROCHLORIDE 37.5 MG/1
37.5 CAPSULE, EXTENDED RELEASE ORAL DAILY
Status: DISCONTINUED | OUTPATIENT
Start: 2024-12-02 | End: 2024-12-04 | Stop reason: HOSPADM

## 2024-12-02 RX ORDER — LANOLIN ALCOHOL/MO/W.PET/CERES
400 CREAM (GRAM) TOPICAL DAILY
Status: DISCONTINUED | OUTPATIENT
Start: 2024-12-02 | End: 2024-12-04 | Stop reason: HOSPADM

## 2024-12-02 RX ORDER — OXYCODONE HCL 10 MG/1
10 TABLET, FILM COATED, EXTENDED RELEASE ORAL EVERY 12 HOURS
Status: DISCONTINUED | OUTPATIENT
Start: 2024-12-02 | End: 2024-12-04 | Stop reason: HOSPADM

## 2024-12-02 RX ORDER — SODIUM CHLORIDE 0.9 % (FLUSH) 0.9 %
10 SYRINGE (ML) INJECTION
Status: DISCONTINUED | OUTPATIENT
Start: 2024-12-02 | End: 2024-12-04 | Stop reason: HOSPADM

## 2024-12-02 RX ORDER — TADALAFIL 20 MG/1
40 TABLET ORAL DAILY
Status: DISCONTINUED | OUTPATIENT
Start: 2024-12-02 | End: 2024-12-04 | Stop reason: HOSPADM

## 2024-12-02 RX ORDER — POTASSIUM CHLORIDE 20 MEQ/1
20 TABLET, EXTENDED RELEASE ORAL DAILY
Status: DISCONTINUED | OUTPATIENT
Start: 2024-12-02 | End: 2024-12-04 | Stop reason: HOSPADM

## 2024-12-02 RX ORDER — ONDANSETRON 4 MG/1
4 TABLET, FILM COATED ORAL EVERY 8 HOURS PRN
Status: DISCONTINUED | OUTPATIENT
Start: 2024-12-02 | End: 2024-12-04 | Stop reason: HOSPADM

## 2024-12-02 RX ORDER — PREGABALIN 75 MG/1
150 CAPSULE ORAL NIGHTLY
Status: DISCONTINUED | OUTPATIENT
Start: 2024-12-02 | End: 2024-12-04 | Stop reason: HOSPADM

## 2024-12-02 RX ADMIN — OXYCODONE HYDROCHLORIDE 10 MG: 10 TABLET, FILM COATED, EXTENDED RELEASE ORAL at 08:12

## 2024-12-02 RX ADMIN — OXYCODONE AND ACETAMINOPHEN 1 TABLET: 10; 325 TABLET ORAL at 05:12

## 2024-12-02 RX ADMIN — PREGABALIN 150 MG: 75 CAPSULE ORAL at 08:12

## 2024-12-02 RX ADMIN — TREPROSTINIL 110.2 NG/KG/MIN: 200 INJECTION, SOLUTION INTRAVENOUS; SUBCUTANEOUS at 05:12

## 2024-12-02 RX ADMIN — OXYCODONE AND ACETAMINOPHEN 1 TABLET: 10; 325 TABLET ORAL at 11:12

## 2024-12-02 NOTE — PROGRESS NOTES
Patient arrived to unit as direct admit - plan to admit for Remodulin as patient's home medication will not be delivered in time for next cassette change. [place] volume 16cc at this time per CADD pump. VSS. Dressing to [facility] CDI - patient reports  changed dressing yesterday. DANNA Evans NP notified of patinet's arrival. Awaiting admit orders.

## 2024-12-02 NOTE — PROGRESS NOTES
Home Parenteral Prostacyclin Continuation: Pharmacist Verification Note  Home medication variables  Specialty Pharmacy Contacted: [facility]: [phone]  Date of latest prescription (mm/dd/yyyy): 10/10/2023   Type of ambulatory infusion pump: CADD  Ambulatory pump rate (mL/day): 43   Drug: Treprostinil (Remodulin)  Route: Intravenous  Dosing weight (kg): 67 kg  How supplied: Patient mixed cassettes  Home vial concentration:  5 mg/ml  Patient reports mixing 5 of concentrated drug in 100 ml of diluent  Final concentration:  0.25 mg/ml  Verified current dose: 110.2 ng/kg/min  Patient is titrating therapy (yes or no) no  Volume of drug solution remaining in the patient's pump (mL): 16  Time until depletion of drug solution in the patient's pump (hours): 8.9    Hospital medication variables  Drug : Treprostinil (Remodulin)  Route: Intravenous  Dosing weight (kg): 67        Hospital concentration (mg/mL or ng/mL): 12,000,000 ng/ 100 ml         Dose to be administered in hospital (ng/kg/min): 110.2         Hospital infusion initiation time (stat vs standard administration time): 1500    Contacted [facility] [place] attending: (yes/no): no  Physician contacted: no  Discussion: n/a    Pharmacist: Vanessa Bonilla, PharmD  Extension: [phone]

## 2024-12-02 NOTE — ASSESSMENT & PLAN NOTE
-Obs  -Cont home PO Opsumit/Tadalafil and IV remoduliin.  -Transition to hospital pump until home supply arrives.   -Likely DC in am.

## 2024-12-02 NOTE — PROGRESS NOTES
PH Admit Assessment for Continuation of Treprostinil (Remodulin)    Drug Infusing: Treprostinil (Remodulin)  Route: Intravenous  Pump Type: CADD  Current Pump Rate = 43 ml / 24 hours  Current Reservoir Volume = 16 (mls remaining to be infused)    Patient reports utilizing: Patient mixed cassettes    Home mixing instructions:   Patient reports mixing 5 of concentrated drug in 95 ml of diluent

## 2024-12-02 NOTE — H&P
[facility]  Heart Transplant  H&P    Patient Name: Kristin Maier  MRN: [id number]  Admission Date: 2024  Attending Physician: Tracey Dutta MD  Primary Care Provider: Jorge A Stack MD  Principal Problem:Pulmonary hypertension    Subjective:     History of Present Illness:  49 y/o AA female with WHO Group 1 PAH(on PO Opsumit/tadalafil, IV remodulin) who comes in due to running out of IV medication at home. Patient admitting to be placed on Hospital Remodulin cassette till shipment arrives at home and family can deliver shipment to the hospital for patient to mix her HOME concentration for discharge. Currently voices no complaints/concerns.     Past Medical History:   Diagnosis Date    Elevated liver enzymes     Essential (primary) hypertension     Heart failure, unspecified     Hypokalemia     Mixed hyperlipidemia     Neuropathic pain     Tx w/ tramadol & Lyrica    Pulmonary hypertension     Receiving Remodulin continuously through tunneled central line       Past Surgical History:   Procedure Laterality Date    APPENDECTOMY       SECTION      Transverse cut    HYSTERECTOMY  2017    TLH- bleeding    OOPHORECTOMY      RIGHT HEART CATHETERIZATION Right 2021    Procedure: INSERTION, CATHETER, RIGHT HEART;  Surgeon: Chloe Gregory MD;  Location: [facility] CATH LAB;  Service: Cardiology;  Laterality: Right;    RIGHT HEART CATHETERIZATION Right 3/16/2022    Procedure: INSERTION, CATHETER, RIGHT HEART;  Surgeon: Hu Silverman MD;  Location: [facility] CATH LAB;  Service: Cardiology;  Laterality: Right;    RIGHT HEART CATHETERIZATION Right 2022    Procedure: INSERTION, CATHETER, RIGHT HEART;  Surgeon: Chloe Gregory MD;  Location: [facility] CATH LAB;  Service: Cardiology;  Laterality: Right;    RIGHT HEART CATHETERIZATION Right 3/21/2023    Procedure: INSERTION, CATHETER, RIGHT HEART;  Surgeon: Kahlil Cisneros MD;  Location: [facility] CATH LAB;  Service:  Cardiology;  Laterality: Right;    RIGHT HEART CATHETERIZATION Right 10/9/2023    Procedure: INSERTION, CATHETER, RIGHT HEART;  Surgeon: Chloe Gregory MD;  Location: [facility] CATH LAB;  Service: Cardiology;  Laterality: Right;    RIGHT HEART CATHETERIZATION Right 2024    Procedure: INSERTION, CATHETER, RIGHT HEART;  Surgeon: Kahlil Cisneros MD;  Location: [facility] CATH LAB;  Service: Cardiology;  Laterality: Right;    TUBAL LIGATION      VAGINAL DELIVERY  ; ; 1996       Review of patient's allergies indicates:  No Known Allergies    Current Facility-Administered Medications   Medication    furosemide tablet 20 mg    loperamide capsule 2 mg    macitentan tablet 10 mg    magnesium oxide tablet 400 mg    multivitamin tablet    ondansetron tablet 4 mg    oxyCODONE 12 hr tablet 10 mg    oxyCODONE-acetaminophen  mg per tablet 1 tablet    potassium chloride SA CR tablet 20 mEq    pregabalin capsule 150 mg    sodium chloride 0.9% flush 10 mL    spironolactone tablet 25 mg    tadalafil tablet 40 mg    treprostinil (REMODULIN) 12,000,000 ng in 0.9% NaCl 100 mL infusion    VELETRI/REMODULIN CASSETTE    VELETRI/REMODULIN TUBING    venlafaxine 24 hr capsule 37.5 mg     Family History       Problem Relation (Age of Onset)    Cancer Father (69)    No Known Problems Sister, Brother, Brother          Tobacco Use    Smoking status: Former     Types: Cigars     Quit date: 2020     Years since quittin.0     Passive exposure: Past    Smokeless tobacco: Never    Tobacco comments:     social smoker-light - quit    Substance and Sexual Activity    Alcohol use: Not Currently     Alcohol/week: 2.0 standard drinks of alcohol     Types: 2 Glasses of wine per week     Comment: None now -  previously: socially- 2 or 3 times a week-2 glasses of wine    Drug use: Not Currently     Types: Marijuana     Comment:  last use    Sexual activity: Yes     Partners: Male     Birth control/protection: See Surgical  Hx     Comment:      Review of Systems   Constitutional: Negative.    HENT: Negative.     Eyes: Negative.    Respiratory: Negative.     Cardiovascular: Negative.    Gastrointestinal: Negative.    Endocrine: Negative.    Genitourinary: Negative.    Musculoskeletal: Negative.    Skin: Negative.    Allergic/Immunologic: Negative.    Neurological: Negative.    Hematological: Negative.    Psychiatric/Behavioral: Negative.       Objective:     Vital Signs (Most Recent):  Temp: 98 °F (36.7 °C) (12/02/24 1030)  Pulse: 98 (12/02/24 1030)  Resp: (!) 21 (12/02/24 1030)  BP: (!) 142/108 (12/02/24 1030)  SpO2: 100 % (12/02/24 1030) Vital Signs (24h Range):  Temp:  [98 °F (36.7 °C)] 98 °F (36.7 °C)  Pulse:  [98] 98  Resp:  [21] 21  SpO2:  [100 %] 100 %  BP: (142)/(108) 142/108     Patient Vitals for the past 72 hrs (Last 3 readings):   Weight   12/02/24 1142 78.4 kg (172 lb 11.7 oz)     Body mass index is 27.05 kg/m².    No intake or output data in the 24 hours ending 12/02/24 1257       Physical Exam  Vitals and nursing note reviewed.   Constitutional:       Appearance: She is well-developed.   HENT:      Head: Normocephalic.   Eyes:      Pupils: Pupils are equal, round, and reactive to light.   Cardiovascular:      Rate and Rhythm: Normal rate and regular rhythm.   Pulmonary:      Effort: Pulmonary effort is normal.      Breath sounds: Normal breath sounds.   Abdominal:      General: Bowel sounds are normal.      Palpations: Abdomen is soft.   Musculoskeletal:         General: Normal range of motion.      Cervical back: Normal range of motion and neck supple.   Skin:     General: Skin is warm and dry.   Neurological:      Mental Status: She is alert and oriented to person, place, and time.   Psychiatric:         Behavior: Behavior normal.            Significant Labs:  CBC:  Recent Labs   Lab 11/29/24  0835 11/29/24  0942   WBC 5.91  5.91  --    RBC 3.98*  3.98*  --    HGB 11.6*  11.6*  --    HCT 36.9*  36.9* 31*  "    150  --    MCV 93  93  --    MCH 29.1  29.1  --    MCHC 31.4*  31.4*  --      BNP:  Recent Labs   Lab 11/29/24  0835   BNP <10     CMP:  Recent Labs   Lab 11/29/24  0835   GLU 64*   CALCIUM 9.7   ALBUMIN 3.7   PROT 7.4      K 3.8   CO2 23      BUN 17   CREATININE 1.0   ALKPHOS 95   ALT 13   AST 18   BILITOT 0.5      Coagulation:   No results for input(s): "PT", "INR", "APTT" in the last 168 hours.  LDH:  No results for input(s): "LDH" in the last 72 hours.  Microbiology:  Microbiology Results (last 7 days)       ** No results found for the last 168 hours. **            I have reviewed all pertinent labs within the past 24 hours.    Diagnostic Results:  I have reviewed all pertinent imaging results/findings within the past 24 hours.    Assessment/Plan:     * Pulmonary hypertension  -Obs  -Cont home PO Opsumit/Tadalafil and IV remoduliin.  -Transition to hospital pump until home supply arrives.   -Likely DC in am.         Stanton Evans, NP  Heart Transplant  [facility]  "

## 2024-12-02 NOTE — UM SECONDARY REVIEW
Admission appropriate for OBSERVATION STATUS         [facility] tomorrow 12/3; however patient is set to change cassette /run out of Remodulin today 12/2     Patient admitting to be placed on Hospital Remodulin cassette till shipment arrives at home and family can deliver shipment to the hospital for patient to mix her HOME concentration for discharge

## 2024-12-02 NOTE — PROGRESS NOTES
PH Admit Assessment for Continuation of Treprostinil (Remodulin)    Drug Infusing: Treprostinil (Remodulin)  Route: Intravenous  Pump Type: CADD  Current Pump Rate = 43 ml / 24 hours  Current Reservoir Volume = 16 (mls remaining to be infused)    Patient reports utilizing: Prefilled Cassettes    Home mixing instructions:   Patient reports mixing na of concentrated drug in na ml of diluent

## 2024-12-02 NOTE — PROGRESS NOTES
Remodulin cassette changed over to hospital concentration (new CADD pump rate 89cc/24hours). Home concentration aspirated from [facility] and line reprimed with hospital concentration per this RN and MAMADOU Mari RN.     Discharge tentatively planned for tomorrow pending delivery of home Remodulin from [facility].

## 2024-12-02 NOTE — HPI
47 y/o AA female with WHO Group 1 PAH(on PO Opsumit/tadalafil, IV remodulin) who comes in due to running out of IV medication at home. Patient admitting to be placed on Hospital Remodulin cassette till shipment arrives at home and family can deliver shipment to the hospital for patient to mix her HOME concentration for discharge. Currently voices no complaints/concerns.

## 2024-12-02 NOTE — SUBJECTIVE & OBJECTIVE
Past Medical History:   Diagnosis Date    Elevated liver enzymes     Essential (primary) hypertension     Heart failure, unspecified     Hypokalemia     Mixed hyperlipidemia     Neuropathic pain     Tx w/ tramadol & Lyrica    Pulmonary hypertension     Receiving Remodulin continuously through tunneled central line       Past Surgical History:   Procedure Laterality Date    APPENDECTOMY       SECTION      Transverse cut    HYSTERECTOMY  2017    TLH- bleeding    OOPHORECTOMY      RIGHT HEART CATHETERIZATION Right 2021    Procedure: INSERTION, CATHETER, RIGHT HEART;  Surgeon: Chloe Gregory MD;  Location: [facility] CATH LAB;  Service: Cardiology;  Laterality: Right;    RIGHT HEART CATHETERIZATION Right 3/16/2022    Procedure: INSERTION, CATHETER, RIGHT HEART;  Surgeon: Hu Silverman MD;  Location: [facility] CATH LAB;  Service: Cardiology;  Laterality: Right;    RIGHT HEART CATHETERIZATION Right 2022    Procedure: INSERTION, CATHETER, RIGHT HEART;  Surgeon: Chloe Gregory MD;  Location: [facility] CATH LAB;  Service: Cardiology;  Laterality: Right;    RIGHT HEART CATHETERIZATION Right 3/21/2023    Procedure: INSERTION, CATHETER, RIGHT HEART;  Surgeon: Kahlil Cisneros MD;  Location: [facility] CATH LAB;  Service: Cardiology;  Laterality: Right;    RIGHT HEART CATHETERIZATION Right 10/9/2023    Procedure: INSERTION, CATHETER, RIGHT HEART;  Surgeon: Chloe Gregory MD;  Location: [facility] CATH LAB;  Service: Cardiology;  Laterality: Right;    RIGHT HEART CATHETERIZATION Right 2024    Procedure: INSERTION, CATHETER, RIGHT HEART;  Surgeon: Kahlil Cisneros MD;  Location: [facility] CATH LAB;  Service: Cardiology;  Laterality: Right;    TUBAL LIGATION      VAGINAL DELIVERY  1993; 1996       Review of patient's allergies indicates:  No Known Allergies    Current Facility-Administered Medications   Medication    furosemide tablet 20 mg    loperamide capsule 2 mg    macitentan tablet 10 mg     magnesium oxide tablet 400 mg    multivitamin tablet    ondansetron tablet 4 mg    oxyCODONE 12 hr tablet 10 mg    oxyCODONE-acetaminophen  mg per tablet 1 tablet    potassium chloride SA CR tablet 20 mEq    pregabalin capsule 150 mg    sodium chloride 0.9% flush 10 mL    spironolactone tablet 25 mg    tadalafil tablet 40 mg    treprostinil (REMODULIN) 12,000,000 ng in 0.9% NaCl 100 mL infusion    VELETRI/REMODULIN CASSETTE    VELETRI/REMODULIN TUBING    venlafaxine 24 hr capsule 37.5 mg     Family History       Problem Relation (Age of Onset)    Cancer Father (69)    No Known Problems Sister, Brother, Brother          Tobacco Use    Smoking status: Former     Types: Cigars     Quit date: 2020     Years since quittin.0     Passive exposure: Past    Smokeless tobacco: Never    Tobacco comments:     social smoker-light - quit    Substance and Sexual Activity    Alcohol use: Not Currently     Alcohol/week: 2.0 standard drinks of alcohol     Types: 2 Glasses of wine per week     Comment: None now -  previously: socially- 2 or 3 times a week-2 glasses of wine    Drug use: Not Currently     Types: Marijuana     Comment:  last use    Sexual activity: Yes     Partners: Male     Birth control/protection: See Surgical Hx     Comment:      Review of Systems   Constitutional: Negative.    HENT: Negative.     Eyes: Negative.    Respiratory: Negative.     Cardiovascular: Negative.    Gastrointestinal: Negative.    Endocrine: Negative.    Genitourinary: Negative.    Musculoskeletal: Negative.    Skin: Negative.    Allergic/Immunologic: Negative.    Neurological: Negative.    Hematological: Negative.    Psychiatric/Behavioral: Negative.       Objective:     Vital Signs (Most Recent):  Temp: 98 °F (36.7 °C) (24 1030)  Pulse: 98 (24 1030)  Resp: (!) 21 (24 1030)  BP: (!) 142/108 (24 1030)  SpO2: 100 % (24 1030) Vital Signs (24h Range):  Temp:  [98 °F (36.7 °C)] 98 °F (36.7  "°C)  Pulse:  [98] 98  Resp:  [21] 21  SpO2:  [100 %] 100 %  BP: (142)/(108) 142/108     Patient Vitals for the past 72 hrs (Last 3 readings):   Weight   12/02/24 1142 78.4 kg (172 lb 11.7 oz)     Body mass index is 27.05 kg/m².    No intake or output data in the 24 hours ending 12/02/24 1257       Physical Exam  Vitals and nursing note reviewed.   Constitutional:       Appearance: She is well-developed.   HENT:      Head: Normocephalic.   Eyes:      Pupils: Pupils are equal, round, and reactive to light.   Cardiovascular:      Rate and Rhythm: Normal rate and regular rhythm.   Pulmonary:      Effort: Pulmonary effort is normal.      Breath sounds: Normal breath sounds.   Abdominal:      General: Bowel sounds are normal.      Palpations: Abdomen is soft.   Musculoskeletal:         General: Normal range of motion.      Cervical back: Normal range of motion and neck supple.   Skin:     General: Skin is warm and dry.   Neurological:      Mental Status: She is alert and oriented to person, place, and time.   Psychiatric:         Behavior: Behavior normal.            Significant Labs:  CBC:  Recent Labs   Lab 11/29/24  0835 11/29/24  0942   WBC 5.91  5.91  --    RBC 3.98*  3.98*  --    HGB 11.6*  11.6*  --    HCT 36.9*  36.9* 31*     150  --    MCV 93  93  --    MCH 29.1  29.1  --    MCHC 31.4*  31.4*  --      BNP:  Recent Labs   Lab 11/29/24  0835   BNP <10     CMP:  Recent Labs   Lab 11/29/24  0835   GLU 64*   CALCIUM 9.7   ALBUMIN 3.7   PROT 7.4      K 3.8   CO2 23      BUN 17   CREATININE 1.0   ALKPHOS 95   ALT 13   AST 18   BILITOT 0.5      Coagulation:   No results for input(s): "PT", "INR", "APTT" in the last 168 hours.  LDH:  No results for input(s): "LDH" in the last 72 hours.  Microbiology:  Microbiology Results (last 7 days)       ** No results found for the last 168 hours. **            I have reviewed all pertinent labs within the past 24 hours.    Diagnostic Results:  I have " reviewed all pertinent imaging results/findings within the past 24 hours.

## 2024-12-02 NOTE — TREATMENT PLAN
[facility] from [facility] tomorrow 12/3; however patient is set to change cassette /run out of Remodulin today 12/2    Patient admitting to be placed on Hospital Remodulin cassette till shipment arrives at home and family can deliver shipment to the hospital for patient to mix her HOME concentration for discharge

## 2024-12-03 ENCOUNTER — TELEPHONE (OUTPATIENT)
Dept: TRANSPLANT | Facility: CLINIC | Age: 49
End: 2024-12-03
Payer: MEDICAID

## 2024-12-03 VITALS
RESPIRATION RATE: 20 BRPM | DIASTOLIC BLOOD PRESSURE: 85 MMHG | WEIGHT: 177.94 LBS | TEMPERATURE: 98 F | HEART RATE: 84 BPM | BODY MASS INDEX: 27.86 KG/M2 | OXYGEN SATURATION: 94 % | SYSTOLIC BLOOD PRESSURE: 134 MMHG

## 2024-12-03 LAB
ALBUMIN SERPL BCP-MCNC: 3.5 G/DL (ref 3.5–5.2)
ALP SERPL-CCNC: 81 U/L (ref 40–150)
ALT SERPL W/O P-5'-P-CCNC: 14 U/L (ref 10–44)
ANION GAP SERPL CALC-SCNC: 6 MMOL/L (ref 8–16)
AST SERPL-CCNC: 16 U/L (ref 10–40)
BASOPHILS # BLD AUTO: 0.02 K/UL (ref 0–0.2)
BASOPHILS NFR BLD: 0.5 % (ref 0–1.9)
BILIRUB SERPL-MCNC: 0.6 MG/DL (ref 0.1–1)
BUN SERPL-MCNC: 10 MG/DL (ref 6–20)
CALCIUM SERPL-MCNC: 8.8 MG/DL (ref 8.7–10.5)
CHLORIDE SERPL-SCNC: 109 MMOL/L (ref 95–110)
CO2 SERPL-SCNC: 23 MMOL/L (ref 23–29)
CREAT SERPL-MCNC: 0.9 MG/DL (ref 0.5–1.4)
DIFFERENTIAL METHOD BLD: ABNORMAL
EOSINOPHIL # BLD AUTO: 0.1 K/UL (ref 0–0.5)
EOSINOPHIL NFR BLD: 3.1 % (ref 0–8)
ERYTHROCYTE [DISTWIDTH] IN BLOOD BY AUTOMATED COUNT: 13.9 % (ref 11.5–14.5)
EST. GFR  (NO RACE VARIABLE): >60 ML/MIN/1.73 M^2
GLUCOSE SERPL-MCNC: 100 MG/DL (ref 70–110)
HCT VFR BLD AUTO: 35.7 % (ref 37–48.5)
HGB BLD-MCNC: 11.8 G/DL (ref 12–16)
IMM GRANULOCYTES # BLD AUTO: 0.01 K/UL (ref 0–0.04)
IMM GRANULOCYTES NFR BLD AUTO: 0.2 % (ref 0–0.5)
LYMPHOCYTES # BLD AUTO: 1.4 K/UL (ref 1–4.8)
LYMPHOCYTES NFR BLD: 33.1 % (ref 18–48)
MAGNESIUM SERPL-MCNC: 1.8 MG/DL (ref 1.6–2.6)
MCH RBC QN AUTO: 29.2 PG (ref 27–31)
MCHC RBC AUTO-ENTMCNC: 33.1 G/DL (ref 32–36)
MCV RBC AUTO: 88 FL (ref 82–98)
MONOCYTES # BLD AUTO: 0.4 K/UL (ref 0.3–1)
MONOCYTES NFR BLD: 8.7 % (ref 4–15)
NEUTROPHILS # BLD AUTO: 2.3 K/UL (ref 1.8–7.7)
NEUTROPHILS NFR BLD: 54.4 % (ref 38–73)
NRBC BLD-RTO: 0 /100 WBC
PLATELET # BLD AUTO: 161 K/UL (ref 150–450)
PMV BLD AUTO: 10.6 FL (ref 9.2–12.9)
POTASSIUM SERPL-SCNC: 3.3 MMOL/L (ref 3.5–5.1)
PROT SERPL-MCNC: 6.8 G/DL (ref 6–8.4)
RBC # BLD AUTO: 4.04 M/UL (ref 4–5.4)
SODIUM SERPL-SCNC: 138 MMOL/L (ref 136–145)
WBC # BLD AUTO: 4.23 K/UL (ref 3.9–12.7)

## 2024-12-03 PROCEDURE — 83735 ASSAY OF MAGNESIUM: CPT | Performed by: NURSE PRACTITIONER

## 2024-12-03 PROCEDURE — 25000003 PHARM REV CODE 250: Performed by: NURSE PRACTITIONER

## 2024-12-03 PROCEDURE — 25000003 PHARM REV CODE 250: Performed by: INTERNAL MEDICINE

## 2024-12-03 PROCEDURE — 36415 COLL VENOUS BLD VENIPUNCTURE: CPT | Performed by: NURSE PRACTITIONER

## 2024-12-03 PROCEDURE — 63600175 PHARM REV CODE 636 W HCPCS: Mod: JG | Performed by: INTERNAL MEDICINE

## 2024-12-03 PROCEDURE — 85025 COMPLETE CBC W/AUTO DIFF WBC: CPT | Performed by: NURSE PRACTITIONER

## 2024-12-03 PROCEDURE — 80053 COMPREHEN METABOLIC PANEL: CPT | Performed by: NURSE PRACTITIONER

## 2024-12-03 PROCEDURE — G0378 HOSPITAL OBSERVATION PER HR: HCPCS

## 2024-12-03 RX ADMIN — OXYCODONE AND ACETAMINOPHEN 1 TABLET: 10; 325 TABLET ORAL at 06:12

## 2024-12-03 RX ADMIN — TREPROSTINIL 110.2 NG/KG/MIN: 200 INJECTION, SOLUTION INTRAVENOUS; SUBCUTANEOUS at 07:12

## 2024-12-03 RX ADMIN — VENLAFAXINE HYDROCHLORIDE 37.5 MG: 37.5 CAPSULE, EXTENDED RELEASE ORAL at 09:12

## 2024-12-03 RX ADMIN — FUROSEMIDE 20 MG: 20 TABLET ORAL at 09:12

## 2024-12-03 RX ADMIN — OXYCODONE AND ACETAMINOPHEN 1 TABLET: 10; 325 TABLET ORAL at 05:12

## 2024-12-03 RX ADMIN — OXYCODONE HYDROCHLORIDE 10 MG: 10 TABLET, FILM COATED, EXTENDED RELEASE ORAL at 09:12

## 2024-12-03 RX ADMIN — POTASSIUM CHLORIDE 20 MEQ: 1500 TABLET, EXTENDED RELEASE ORAL at 09:12

## 2024-12-03 RX ADMIN — SPIRONOLACTONE 25 MG: 25 TABLET, FILM COATED ORAL at 09:12

## 2024-12-03 RX ADMIN — OXYCODONE AND ACETAMINOPHEN 1 TABLET: 10; 325 TABLET ORAL at 12:12

## 2024-12-03 RX ADMIN — TADALAFIL 40 MG: 20 TABLET, FILM COATED ORAL at 05:12

## 2024-12-03 RX ADMIN — THERA TABS 1 TABLET: TAB at 09:12

## 2024-12-03 RX ADMIN — MACITENTAN 10 MG: 10 TABLET, FILM COATED ORAL at 05:12

## 2024-12-03 RX ADMIN — Medication 400 MG: at 09:12

## 2024-12-03 RX ADMIN — PREGABALIN 75 MG: 75 CAPSULE ORAL at 09:12

## 2024-12-03 NOTE — PLAN OF CARE
Pt. AAOx4, VSS, spo2> 94% on room air.   Pt. Ambulating independently  PRN pain medication given   Bed in low locked position, call light within reach, pt instructed to call for assistance needed, pt verbalized understanding, remains free from falls this shift. WCTM.

## 2024-12-03 NOTE — TREATMENT PLAN
HOME PUMP RATE 43mls/24hrs    ONce family arrives  Please have patient mix her home cassette (see dosing sheet below for guidance)  Switch to her home mixed cassette and home pump rate of 43mls/24hrs    Ok for patient to discharge after switching if family feels comfortable driving home at that time   Expected discharge time 1030-1130pm        Jenny Martinez RN    Utilization Management     Progress Notes     Addendum     Creation Time: 12/2/2024 11:12 AM       PH Admit Assessment for Continuation of Treprostinil (Remodulin)     Drug Infusing: Treprostinil (Remodulin)  Route: Intravenous  Pump Type: CADD  Current Pump Rate = 43 ml / 24 hours  Current Reservoir Volume = 16 (mls remaining to be infused)     Patient reports utilizing: Patient mixed cassettes     Home mixing instructions:   Patient reports mixing 5 of concentrated drug in 95 ml of diluent                         Revision History

## 2024-12-03 NOTE — ASSESSMENT & PLAN NOTE
-Obs  -Cont home PO Opsumit/Tadalafil and IV remoduliin.  -Transition to hospital pump until home supply arrives.   -Will d/c once remodulin supply arrives

## 2024-12-03 NOTE — PROGRESS NOTES
[facility]  Heart Transplant  Progress Note    Patient Name: Kristin Maier  MRN: [id number]  Admission Date: 12/2/2024  Hospital Length of Stay: 1 days  Attending Physician: Tracey Dutta MD  Primary Care Provider: Jorge A Stack MD  Principal Problem:Pulmonary hypertension    Subjective:   Interval History: No events overnight or complaints this AM. Waiting for remodulin delivery.     Continuous Infusions:   treprostinil (REMODULIN) 12,000,000 ng in 0.9% NaCl 100 mL infusion  110.2 ng/kg/min (Order-Specific) Intravenous Continuous 3.69 mL/hr at 12/02/24 1710 110.2 ng/kg/min at 12/02/24 1710    veletri/remodulin cassette   Intravenous Continuous        veletri/remodulin tubing   Intravenous Continuous         Scheduled Meds:   furosemide  20 mg Oral Daily    macitentan  10 mg Oral Daily    magnesium oxide  400 mg Oral Daily    multivitamin  1 tablet Oral Daily    oxyCODONE  10 mg Oral Q12H    potassium chloride SA  20 mEq Oral Daily    pregabalin  75 mg Oral Daily    And    pregabalin  150 mg Oral QHS    spironolactone  25 mg Oral Daily    tadalafil  40 mg Oral Daily    venlafaxine  37.5 mg Oral Daily     PRN Meds:  Current Facility-Administered Medications:     loperamide, 2 mg, Oral, QID PRN    ondansetron, 4 mg, Oral, Q8H PRN    oxyCODONE-acetaminophen, 1 tablet, Oral, Q4H PRN    sodium chloride 0.9%, 10 mL, Intravenous, PRN    Review of patient's allergies indicates:  No Known Allergies  Objective:     Vital Signs (Most Recent):  Temp: 98.4 °F (36.9 °C) (12/03/24 0954)  Pulse: 90 (12/03/24 1126)  Resp: 18 (12/03/24 1213)  BP: 109/74 (12/03/24 0954)  SpO2: (!) 94 % (12/03/24 0954) Vital Signs (24h Range):  Temp:  [98.4 °F (36.9 °C)-99.1 °F (37.3 °C)] 98.4 °F (36.9 °C)  Pulse:  [] 90  Resp:  [18-20] 18  SpO2:  [91 %-98 %] 94 %  BP: (105-149)/(61-98) 109/74     Patient Vitals for the past 72 hrs (Last 3 readings):   Weight   12/03/24 0530 80.7 kg (177 lb 14.6 oz)   12/02/24  "1142 78.4 kg (172 lb 11.7 oz)     Body mass index is 27.86 kg/m².      Intake/Output Summary (Last 24 hours) at 12/3/2024 1228  Last data filed at 12/3/2024 1215  Gross per 24 hour   Intake 210 ml   Output 530 ml   Net -320 ml          Physical Exam  Constitutional:       General: She is not in acute distress.     Appearance: Normal appearance. She is not ill-appearing.   HENT:      Head: Normocephalic and atraumatic.   Cardiovascular:      Rate and Rhythm: Normal rate and regular rhythm.   Pulmonary:      Effort: Pulmonary effort is normal.      Breath sounds: Normal breath sounds.   Abdominal:      General: Abdomen is flat.      Palpations: Abdomen is soft.   Musculoskeletal:      Cervical back: Normal range of motion and neck supple.      Right lower leg: No edema.      Left lower leg: No edema.   Skin:     General: Skin is warm and dry.   Neurological:      General: No focal deficit present.      Mental Status: She is alert and oriented to person, place, and time.   Psychiatric:         Mood and Affect: Mood normal.         Behavior: Behavior normal.            Significant Labs:  CBC:  Recent Labs   Lab 11/29/24  0835 11/29/24  0942 12/02/24  1201 12/03/24  0825   WBC 5.91  5.91  --  4.23 4.23   RBC 3.98*  3.98*  --  4.02 4.04   HGB 11.6*  11.6*  --  11.9* 11.8*   HCT 36.9*  36.9* 31* 35.9* 35.7*     150  --  150 161   MCV 93  93  --  89 88   MCH 29.1  29.1  --  29.6 29.2   MCHC 31.4*  31.4*  --  33.1 33.1     BNP:  Recent Labs   Lab 11/29/24  0835   BNP <10     CMP:  Recent Labs   Lab 11/29/24  0835 12/02/24  1201 12/03/24  0825   GLU 64* 90 100   CALCIUM 9.7 9.2 8.8   ALBUMIN 3.7 3.7 3.5   PROT 7.4 7.0 6.8    137 138   K 3.8 3.7 3.3*   CO2 23 19* 23    108 109   BUN 17 10 10   CREATININE 1.0 0.8 0.9   ALKPHOS 95 87 81   ALT 13 13 14   AST 18 18 16   BILITOT 0.5 0.6 0.6      Coagulation:   Recent Labs   Lab 12/02/24  1201   INR 1.0   APTT 29.9     LDH:  No results for input(s): "LDH" " in the last 72 hours.  Microbiology:  Microbiology Results (last 7 days)       ** No results found for the last 168 hours. **            I have reviewed all pertinent labs within the past 24 hours.    CrCl cannot be calculated (Unknown ideal weight.).    Diagnostic Results:  I have reviewed all pertinent imaging results/findings within the past 24 hours.  Assessment and Plan:     47 y/o AA female with WHO Group 1 PAH(on PO Opsumit/tadalafil, IV remodulin) who comes in due to running out of IV medication at home. Patient admitting to be placed on Hospital Remodulin cassette till shipment arrives at home and family can deliver shipment to the hospital for patient to mix her HOME concentration for discharge. Currently voices no complaints/concerns.     * Pulmonary hypertension  -Obs  -Cont home PO Opsumit/Tadalafil and IV remoduliin.  -Transition to hospital pump until home supply arrives.   -Will d/c once remodulin supply arrives        Silas Alexis PA-C  Heart Transplant  [facility]

## 2024-12-03 NOTE — TELEPHONE ENCOUNTER
NN spoke to the patient in her hospital room. NN made sure that the patient knew what time and location that her Remodulin medication will be delivered. Patient was aware [facility] but not the time frame for delivery of between 1:30 pm and 3:30 pm today. NN asked the patient what happened to the patient's back up of her Remodulin. Patient stated she has never had and emergency back up of Remodulin. NN will contact [facility] pharmacy to find out if the patient can have an emergency back up of her Remodulin to prevent hospitalizations in the future for medication error by the patient.

## 2024-12-03 NOTE — PLAN OF CARE
Pt waiting for remodulin to be delivered to home [facility] pharmacy. Daughter/ to  medicine from pharmacy and bring to pt once received from pharmacy. Pt to mix home concentration prior to dc. Team updated on late dc.

## 2024-12-03 NOTE — SUBJECTIVE & OBJECTIVE
Interval History: No events overnight or complaints this AM. Waiting for remodulin delivery.     Continuous Infusions:   treprostinil (REMODULIN) 12,000,000 ng in 0.9% NaCl 100 mL infusion  110.2 ng/kg/min (Order-Specific) Intravenous Continuous 3.69 mL/hr at 12/02/24 1710 110.2 ng/kg/min at 12/02/24 1710    veletri/remodulin cassette   Intravenous Continuous        veletri/remodulin tubing   Intravenous Continuous         Scheduled Meds:   furosemide  20 mg Oral Daily    macitentan  10 mg Oral Daily    magnesium oxide  400 mg Oral Daily    multivitamin  1 tablet Oral Daily    oxyCODONE  10 mg Oral Q12H    potassium chloride SA  20 mEq Oral Daily    pregabalin  75 mg Oral Daily    And    pregabalin  150 mg Oral QHS    spironolactone  25 mg Oral Daily    tadalafil  40 mg Oral Daily    venlafaxine  37.5 mg Oral Daily     PRN Meds:  Current Facility-Administered Medications:     loperamide, 2 mg, Oral, QID PRN    ondansetron, 4 mg, Oral, Q8H PRN    oxyCODONE-acetaminophen, 1 tablet, Oral, Q4H PRN    sodium chloride 0.9%, 10 mL, Intravenous, PRN    Review of patient's allergies indicates:  No Known Allergies  Objective:     Vital Signs (Most Recent):  Temp: 98.4 °F (36.9 °C) (12/03/24 0954)  Pulse: 90 (12/03/24 1126)  Resp: 18 (12/03/24 1213)  BP: 109/74 (12/03/24 0954)  SpO2: (!) 94 % (12/03/24 0954) Vital Signs (24h Range):  Temp:  [98.4 °F (36.9 °C)-99.1 °F (37.3 °C)] 98.4 °F (36.9 °C)  Pulse:  [] 90  Resp:  [18-20] 18  SpO2:  [91 %-98 %] 94 %  BP: (105-149)/(61-98) 109/74     Patient Vitals for the past 72 hrs (Last 3 readings):   Weight   12/03/24 0530 80.7 kg (177 lb 14.6 oz)   12/02/24 1142 78.4 kg (172 lb 11.7 oz)     Body mass index is 27.86 kg/m².      Intake/Output Summary (Last 24 hours) at 12/3/2024 1228  Last data filed at 12/3/2024 1215  Gross per 24 hour   Intake 210 ml   Output 530 ml   Net -320 ml          Physical Exam  Constitutional:       General: She is not in acute distress.     Appearance:  "Normal appearance. She is not ill-appearing.   HENT:      Head: Normocephalic and atraumatic.   Cardiovascular:      Rate and Rhythm: Normal rate and regular rhythm.   Pulmonary:      Effort: Pulmonary effort is normal.      Breath sounds: Normal breath sounds.   Abdominal:      General: Abdomen is flat.      Palpations: Abdomen is soft.   Musculoskeletal:      Cervical back: Normal range of motion and neck supple.      Right lower leg: No edema.      Left lower leg: No edema.   Skin:     General: Skin is warm and dry.   Neurological:      General: No focal deficit present.      Mental Status: She is alert and oriented to person, place, and time.   Psychiatric:         Mood and Affect: Mood normal.         Behavior: Behavior normal.            Significant Labs:  CBC:  Recent Labs   Lab 11/29/24  0835 11/29/24  0942 12/02/24  1201 12/03/24  0825   WBC 5.91  5.91  --  4.23 4.23   RBC 3.98*  3.98*  --  4.02 4.04   HGB 11.6*  11.6*  --  11.9* 11.8*   HCT 36.9*  36.9* 31* 35.9* 35.7*     150  --  150 161   MCV 93  93  --  89 88   MCH 29.1  29.1  --  29.6 29.2   MCHC 31.4*  31.4*  --  33.1 33.1     BNP:  Recent Labs   Lab 11/29/24  0835   BNP <10     CMP:  Recent Labs   Lab 11/29/24  0835 12/02/24  1201 12/03/24  0825   GLU 64* 90 100   CALCIUM 9.7 9.2 8.8   ALBUMIN 3.7 3.7 3.5   PROT 7.4 7.0 6.8    137 138   K 3.8 3.7 3.3*   CO2 23 19* 23    108 109   BUN 17 10 10   CREATININE 1.0 0.8 0.9   ALKPHOS 95 87 81   ALT 13 13 14   AST 18 18 16   BILITOT 0.5 0.6 0.6      Coagulation:   Recent Labs   Lab 12/02/24  1201   INR 1.0   APTT 29.9     LDH:  No results for input(s): "LDH" in the last 72 hours.  Microbiology:  Microbiology Results (last 7 days)       ** No results found for the last 168 hours. **            I have reviewed all pertinent labs within the past 24 hours.    CrCl cannot be calculated (Unknown ideal weight.).    Diagnostic Results:  I have reviewed all pertinent imaging " results/findings within the past 24 hours.

## 2024-12-04 ENCOUNTER — PATIENT MESSAGE (OUTPATIENT)
Dept: PALLIATIVE MEDICINE | Facility: CLINIC | Age: 49
End: 2024-12-04
Payer: MEDICAID

## 2024-12-04 DIAGNOSIS — M79.605 PAIN IN BOTH LOWER EXTREMITIES: ICD-10-CM

## 2024-12-04 DIAGNOSIS — M25.542 ARTHRALGIA OF BOTH HANDS: ICD-10-CM

## 2024-12-04 DIAGNOSIS — M25.541 ARTHRALGIA OF BOTH HANDS: ICD-10-CM

## 2024-12-04 DIAGNOSIS — I27.20 PULMONARY HYPERTENSION: ICD-10-CM

## 2024-12-04 DIAGNOSIS — M79.604 PAIN IN BOTH LOWER EXTREMITIES: ICD-10-CM

## 2024-12-04 NOTE — HOSPITAL COURSE
Ms Aguayo was admitted for overnight observation due to running out of IV remodulin. Once IV remodulin delivery was secured from pharmacy she was switched back to her home pump at same previous dose and discharged home in stable condition

## 2024-12-04 NOTE — DISCHARGE SUMMARY
[facility] - Transplant Stepdown  Heart Transplant  Discharge Summary      Patient Name: Kristin Maier  MRN: [id number]  Admission Date: 12/2/2024  Hospital Length of Stay: 1 days  Discharge Date and Time: 12/04/2024 2:44 PM  Attending Physician: No att. providers found   Discharging Provider: Silas Alexis PA-C  Primary Care Provider: Jorge A Stack MD     HPI: 47 y/o AA female with WHO Group 1 PAH(on PO Opsumit/tadalafil, IV remodulin) who comes in due to running out of IV medication at home. Patient admitting to be placed on Hospital Remodulin cassette till shipment arrives at home and family can deliver shipment to the hospital for patient to mix her HOME concentration for discharge. Currently voices no complaints/concerns.     * No surgery found *     Hospital Course: Ms Maier was admitted for overnight observation due to running out of IV remodulin. Once IV remodulin delivery was secured from pharmacy she was switched back to her home pump at same previous dose and discharged home in stable condition    Goals of Care Treatment Preferences:  Code Status: Full Code    [facility] [phone]          What is most important right now is to focus on remaining as independent as possible, symptom/pain control.  Accordingly, we have decided that the best plan to meet the patient's goals includes continuing with treatment.          Significant Diagnostic Studies: Labs: All labs within the past 24 hours have been reviewed    Pending Diagnostic Studies:       None          Final Active Diagnoses:    Diagnosis Date Noted POA    PRINCIPAL PROBLEM:  Pulmonary hypertension [I27.20] 11/30/2021 Yes      Problems Resolved During this Admission:      Discharged Condition: stable    Disposition: Home or Self Care    Follow Up:    Patient Instructions:   No discharge procedures on file.  Medications:  Reconciled Home Medications:      Medication List        CONTINUE  taking these medications      furosemide 20 MG tablet  Commonly known as: LASIX  Take 1 tablet (20 mg total) by mouth once daily.     loperamide 2 mg capsule  Commonly known as: IMODIUM  Take 1 capsule (2 mg total) by mouth 4 (four) times daily as needed for Diarrhea.     magnesium oxide 400 mg (241.3 mg magnesium) tablet  Commonly known as: MAG-OX  Take 1 tablet (400 mg total) by mouth once daily.     multivitamin with minerals tablet  Take 1 tablet by mouth once daily.     naloxone 4 mg/actuation Spry  Commonly known as: NARCAN  4mg by nasal route as needed for opioid overdose; may repeat every 2-3 minutes in alternating nostrils until medical help arrives. Call 911     ondansetron 4 MG tablet  Commonly known as: ZOFRAN  Take 1 tablet (4 mg total) by mouth every 8 (eight) hours as needed for Nausea.     OPSYNVI 10-40 mg Tab  Generic drug: macitentan-tadalafil  Take 10-40 mg by mouth once daily.     potassium chloride SA 10 MEQ tablet  Commonly known as: K-DUR,KLOR-CON M  Take 2 tablets (20 mEq total) by mouth once daily.     pregabalin 75 MG capsule  Commonly known as: LYRICA  Take 1 capsule (75 mg total) by mouth once daily AND 2 capsules (150 mg total) every evening.     REMODULIN 5 mg/mL Soln  Generic drug: treprostinil sodium     sodium chloride 0.9% SolP 100 mL with treprostinil 1 mg/mL Soln 6,000,000 ng  Inject 2,145 ng/min into the vein continuous.     spironolactone 25 MG tablet  Commonly known as: ALDACTONE  Take 1 tablet (25 mg total) by mouth once daily.     venlafaxine 37.5 MG 24 hr capsule  Commonly known as: EFFEXOR-XR  Take 1 capsule (37.5 mg total) by mouth once daily.            STOP taking these medications      D5W SolP 250 mL with vancomycin 1,000 mg SolR 1,000 mg     oxyCODONE myristate 27 mg Cspt  Commonly known as: XTAMPZA ER     oxyCODONE-acetaminophen  mg per tablet  Commonly known as: PERCOCET              Silas Alexis PA-C  Heart Transplant  [facility] - Transplant Stepdown

## 2024-12-05 RX ORDER — OXYCODONE AND ACETAMINOPHEN 10; 325 MG/1; MG/1
1 TABLET ORAL EVERY 4 HOURS PRN
Qty: 180 TABLET | Refills: 0 | Status: SHIPPED | OUTPATIENT
Start: 2024-12-05

## 2024-12-11 DIAGNOSIS — F41.9 ANXIETY: ICD-10-CM

## 2024-12-11 PROBLEM — G89.29 OTHER CHRONIC PAIN: Status: ACTIVE | Noted: 2024-12-11

## 2024-12-11 RX ORDER — VENLAFAXINE HYDROCHLORIDE 75 MG/1
75 CAPSULE, EXTENDED RELEASE ORAL DAILY
Qty: 30 CAPSULE | Refills: 11 | Status: SHIPPED | OUTPATIENT
Start: 2024-12-11 | End: 2025-12-11

## 2024-12-11 RX ORDER — GABAPENTIN 100 MG/1
200 CAPSULE ORAL 2 TIMES DAILY
Qty: 120 CAPSULE | Refills: 11 | Status: SHIPPED | OUTPATIENT
Start: 2024-12-11 | End: 2025-12-11

## 2024-12-12 DIAGNOSIS — R06.82 TACHYPNEA: ICD-10-CM

## 2024-12-12 DIAGNOSIS — R06.02 SHORTNESS OF BREATH: ICD-10-CM

## 2024-12-12 DIAGNOSIS — I27.9 CHRONIC PULMONARY HEART DISEASE: Primary | ICD-10-CM

## 2024-12-12 DIAGNOSIS — Z79.899 POLYPHARMACY: ICD-10-CM

## 2024-12-17 DIAGNOSIS — M25.542 ARTHRALGIA OF BOTH HANDS: ICD-10-CM

## 2024-12-17 DIAGNOSIS — M25.541 ARTHRALGIA OF BOTH HANDS: ICD-10-CM

## 2024-12-17 DIAGNOSIS — M79.605 PAIN IN BOTH LOWER EXTREMITIES: ICD-10-CM

## 2024-12-17 DIAGNOSIS — I27.20 PULMONARY HYPERTENSION: ICD-10-CM

## 2024-12-17 DIAGNOSIS — M79.604 PAIN IN BOTH LOWER EXTREMITIES: ICD-10-CM

## 2024-12-26 DIAGNOSIS — M79.605 PAIN IN BOTH LOWER EXTREMITIES: ICD-10-CM

## 2024-12-26 DIAGNOSIS — I27.20 PULMONARY HYPERTENSION: ICD-10-CM

## 2024-12-26 DIAGNOSIS — M25.541 ARTHRALGIA OF BOTH HANDS: ICD-10-CM

## 2024-12-26 DIAGNOSIS — M79.604 PAIN IN BOTH LOWER EXTREMITIES: ICD-10-CM

## 2024-12-26 DIAGNOSIS — M25.542 ARTHRALGIA OF BOTH HANDS: ICD-10-CM

## 2024-12-27 DIAGNOSIS — R11.0 NAUSEA: ICD-10-CM

## 2024-12-27 RX ORDER — OXYCODONE AND ACETAMINOPHEN 10; 325 MG/1; MG/1
1 TABLET ORAL EVERY 4 HOURS PRN
Qty: 180 TABLET | Refills: 0 | Status: SHIPPED | OUTPATIENT
Start: 2025-01-03

## 2024-12-30 RX ORDER — ONDANSETRON 4 MG/1
4 TABLET, FILM COATED ORAL EVERY 8 HOURS PRN
Qty: 30 TABLET | Refills: 6 | Status: SHIPPED | OUTPATIENT
Start: 2024-12-30

## 2025-01-16 DIAGNOSIS — M25.542 ARTHRALGIA OF BOTH HANDS: ICD-10-CM

## 2025-01-16 DIAGNOSIS — I27.20 PULMONARY HYPERTENSION: ICD-10-CM

## 2025-01-16 DIAGNOSIS — M25.541 ARTHRALGIA OF BOTH HANDS: ICD-10-CM

## 2025-01-16 DIAGNOSIS — M79.605 PAIN IN BOTH LOWER EXTREMITIES: ICD-10-CM

## 2025-01-16 DIAGNOSIS — M79.604 PAIN IN BOTH LOWER EXTREMITIES: ICD-10-CM

## 2025-01-23 ENCOUNTER — PATIENT MESSAGE (OUTPATIENT)
Dept: PALLIATIVE MEDICINE | Facility: CLINIC | Age: 50
End: 2025-01-23
Payer: MEDICAID

## 2025-01-24 ENCOUNTER — TELEPHONE (OUTPATIENT)
Dept: PALLIATIVE MEDICINE | Facility: CLINIC | Age: 50
End: 2025-01-24
Payer: MEDICAID

## 2025-01-24 ENCOUNTER — PATIENT MESSAGE (OUTPATIENT)
Dept: PALLIATIVE MEDICINE | Facility: CLINIC | Age: 50
End: 2025-01-24
Payer: MEDICAID

## 2025-01-24 ENCOUNTER — TELEPHONE (OUTPATIENT)
Dept: TRANSPLANT | Facility: CLINIC | Age: 50
End: 2025-01-24
Payer: MEDICAID

## 2025-01-24 DIAGNOSIS — M79.604 PAIN IN BOTH LOWER EXTREMITIES: Primary | ICD-10-CM

## 2025-01-24 DIAGNOSIS — M79.605 PAIN IN BOTH LOWER EXTREMITIES: ICD-10-CM

## 2025-01-24 DIAGNOSIS — M79.605 PAIN IN BOTH LOWER EXTREMITIES: Primary | ICD-10-CM

## 2025-01-24 DIAGNOSIS — I27.20 PULMONARY HYPERTENSION: ICD-10-CM

## 2025-01-24 DIAGNOSIS — M79.604 PAIN IN BOTH LOWER EXTREMITIES: ICD-10-CM

## 2025-01-24 NOTE — TELEPHONE ENCOUNTER
Patient called stating unable to get Xtampza refill due to insurance no longer covering this medication.. Informed patient that I will relay message to Dr. Lee.Dr. Lee
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
## 2025-01-25 ENCOUNTER — HOSPITAL ENCOUNTER (OUTPATIENT)
Facility: HOSPITAL | Age: 50
LOS: 1 days | Discharge: HOME | End: 2025-01-28
Attending: EMERGENCY MEDICINE | Admitting: EMERGENCY MEDICINE
Payer: MEDICAID

## 2025-01-25 DIAGNOSIS — R07.9 CHEST PAIN: ICD-10-CM

## 2025-01-25 DIAGNOSIS — I27.20 PULMONARY HYPERTENSION: ICD-10-CM

## 2025-01-25 DIAGNOSIS — I27.0 PRIMARY PULMONARY HYPERTENSION: ICD-10-CM

## 2025-01-25 DIAGNOSIS — R06.02 SOB (SHORTNESS OF BREATH): ICD-10-CM

## 2025-01-25 DIAGNOSIS — R06.02 SHORTNESS OF BREATH: ICD-10-CM

## 2025-01-25 LAB
ALBUMIN SERPL BCP-MCNC: 4.4 G/DL (ref 3.5–5.2)
ALP SERPL-CCNC: 102 U/L (ref 40–150)
ALT SERPL W/O P-5'-P-CCNC: 8 U/L (ref 10–44)
ANION GAP SERPL CALC-SCNC: 11 MMOL/L (ref 8–16)
AST SERPL-CCNC: 14 U/L (ref 10–40)
BASOPHILS # BLD AUTO: 0.04 K/UL (ref 0–0.2)
BASOPHILS NFR BLD: 0.6 % (ref 0–1.9)
BILIRUB SERPL-MCNC: 1 MG/DL (ref 0.1–1)
BNP SERPL-MCNC: 15 PG/ML (ref 0–99)
BUN SERPL-MCNC: 13 MG/DL (ref 6–20)
CALCIUM SERPL-MCNC: 9.7 MG/DL (ref 8.7–10.5)
CHLORIDE SERPL-SCNC: 107 MMOL/L (ref 95–110)
CO2 SERPL-SCNC: 20 MMOL/L (ref 23–29)
CREAT SERPL-MCNC: 0.9 MG/DL (ref 0.5–1.4)
DIFFERENTIAL METHOD BLD: ABNORMAL
EOSINOPHIL # BLD AUTO: 0.1 K/UL (ref 0–0.5)
EOSINOPHIL NFR BLD: 1.7 % (ref 0–8)
ERYTHROCYTE [DISTWIDTH] IN BLOOD BY AUTOMATED COUNT: 13.5 % (ref 11.5–14.5)
EST. GFR  (NO RACE VARIABLE): >60 ML/MIN/1.73 M^2
GLUCOSE SERPL-MCNC: 114 MG/DL (ref 70–110)
HCT VFR BLD AUTO: 40.9 % (ref 37–48.5)
HCV AB SERPL QL IA: NORMAL
HGB BLD-MCNC: 13.4 G/DL (ref 12–16)
HIV 1+2 AB+HIV1 P24 AG SERPL QL IA: NORMAL
IMM GRANULOCYTES # BLD AUTO: 0.05 K/UL (ref 0–0.04)
IMM GRANULOCYTES NFR BLD AUTO: 0.7 % (ref 0–0.5)
INFLUENZA A, MOLECULAR: NEGATIVE
INFLUENZA B, MOLECULAR: NEGATIVE
LYMPHOCYTES # BLD AUTO: 2.1 K/UL (ref 1–4.8)
LYMPHOCYTES NFR BLD: 30.1 % (ref 18–48)
MCH RBC QN AUTO: 28.9 PG (ref 27–31)
MCHC RBC AUTO-ENTMCNC: 32.8 G/DL (ref 32–36)
MCV RBC AUTO: 88 FL (ref 82–98)
MONOCYTES # BLD AUTO: 0.3 K/UL (ref 0.3–1)
MONOCYTES NFR BLD: 4.5 % (ref 4–15)
NEUTROPHILS # BLD AUTO: 4.4 K/UL (ref 1.8–7.7)
NEUTROPHILS NFR BLD: 62.4 % (ref 38–73)
NRBC BLD-RTO: 0 /100 WBC
PLATELET # BLD AUTO: 197 K/UL (ref 150–450)
PMV BLD AUTO: 10.9 FL (ref 9.2–12.9)
POTASSIUM SERPL-SCNC: 4.3 MMOL/L (ref 3.5–5.1)
PROT SERPL-MCNC: 8.6 G/DL (ref 6–8.4)
RBC # BLD AUTO: 4.64 M/UL (ref 4–5.4)
SARS-COV-2 RDRP RESP QL NAA+PROBE: NEGATIVE
SODIUM SERPL-SCNC: 138 MMOL/L (ref 136–145)
SPECIMEN SOURCE: NORMAL
TROPONIN I SERPL DL<=0.01 NG/ML-MCNC: <3 NG/L (ref 0–14)
WBC # BLD AUTO: 7.05 K/UL (ref 3.9–12.7)

## 2025-01-25 PROCEDURE — 25000003 PHARM REV CODE 250: Performed by: STUDENT IN AN ORGANIZED HEALTH CARE EDUCATION/TRAINING PROGRAM

## 2025-01-25 PROCEDURE — 85025 COMPLETE CBC W/AUTO DIFF WBC: CPT | Performed by: NURSE PRACTITIONER

## 2025-01-25 PROCEDURE — 87635 SARS-COV-2 COVID-19 AMP PRB: CPT | Performed by: NURSE PRACTITIONER

## 2025-01-25 PROCEDURE — 80053 COMPREHEN METABOLIC PANEL: CPT | Performed by: NURSE PRACTITIONER

## 2025-01-25 PROCEDURE — 87502 INFLUENZA DNA AMP PROBE: CPT | Performed by: NURSE PRACTITIONER

## 2025-01-25 PROCEDURE — 99223 1ST HOSP IP/OBS HIGH 75: CPT | Mod: ,,, | Performed by: INTERNAL MEDICINE

## 2025-01-25 PROCEDURE — 96375 TX/PRO/DX INJ NEW DRUG ADDON: CPT

## 2025-01-25 PROCEDURE — 99285 EMERGENCY DEPT VISIT HI MDM: CPT | Mod: 25

## 2025-01-25 PROCEDURE — 83880 ASSAY OF NATRIURETIC PEPTIDE: CPT | Performed by: NURSE PRACTITIONER

## 2025-01-25 PROCEDURE — 93010 ELECTROCARDIOGRAM REPORT: CPT | Mod: ,,, | Performed by: INTERNAL MEDICINE

## 2025-01-25 PROCEDURE — 84484 ASSAY OF TROPONIN QUANT: CPT | Performed by: PHYSICIAN ASSISTANT

## 2025-01-25 PROCEDURE — 25000003 PHARM REV CODE 250: Performed by: EMERGENCY MEDICINE

## 2025-01-25 PROCEDURE — 96374 THER/PROPH/DIAG INJ IV PUSH: CPT

## 2025-01-25 PROCEDURE — G0378 HOSPITAL OBSERVATION PER HR: HCPCS

## 2025-01-25 PROCEDURE — 87389 HIV-1 AG W/HIV-1&-2 AB AG IA: CPT | Performed by: PHYSICIAN ASSISTANT

## 2025-01-25 PROCEDURE — 86803 HEPATITIS C AB TEST: CPT | Performed by: PHYSICIAN ASSISTANT

## 2025-01-25 PROCEDURE — 93005 ELECTROCARDIOGRAM TRACING: CPT

## 2025-01-25 PROCEDURE — 63600175 PHARM REV CODE 636 W HCPCS: Mod: TB | Performed by: EMERGENCY MEDICINE

## 2025-01-25 RX ORDER — FUROSEMIDE 20 MG/1
20 TABLET ORAL DAILY
Status: DISCONTINUED | OUTPATIENT
Start: 2025-01-26 | End: 2025-01-28 | Stop reason: HOSPADM

## 2025-01-25 RX ORDER — LANOLIN ALCOHOL/MO/W.PET/CERES
400 CREAM (GRAM) TOPICAL DAILY
Status: DISCONTINUED | OUTPATIENT
Start: 2025-01-26 | End: 2025-01-28 | Stop reason: HOSPADM

## 2025-01-25 RX ORDER — ONDANSETRON HYDROCHLORIDE 2 MG/ML
4 INJECTION, SOLUTION INTRAVENOUS
Status: COMPLETED | OUTPATIENT
Start: 2025-01-25 | End: 2025-01-25

## 2025-01-25 RX ORDER — SODIUM CHLORIDE 0.9 % (FLUSH) 0.9 %
10 SYRINGE (ML) INJECTION EVERY 12 HOURS PRN
Status: DISCONTINUED | OUTPATIENT
Start: 2025-01-25 | End: 2025-01-28 | Stop reason: HOSPADM

## 2025-01-25 RX ORDER — POTASSIUM CHLORIDE 20 MEQ/1
20 TABLET, EXTENDED RELEASE ORAL DAILY
Status: DISCONTINUED | OUTPATIENT
Start: 2025-01-26 | End: 2025-01-28 | Stop reason: HOSPADM

## 2025-01-25 RX ORDER — NALOXONE HCL 0.4 MG/ML
0.02 VIAL (ML) INJECTION
Status: DISCONTINUED | OUTPATIENT
Start: 2025-01-25 | End: 2025-01-28 | Stop reason: HOSPADM

## 2025-01-25 RX ORDER — OXYCODONE AND ACETAMINOPHEN 7.5; 325 MG/1; MG/1
1 TABLET ORAL ONCE
Status: COMPLETED | OUTPATIENT
Start: 2025-01-25 | End: 2025-01-25

## 2025-01-25 RX ORDER — SPIRONOLACTONE 25 MG/1
25 TABLET ORAL DAILY
Status: DISCONTINUED | OUTPATIENT
Start: 2025-01-26 | End: 2025-01-28 | Stop reason: HOSPADM

## 2025-01-25 RX ORDER — ONDANSETRON 4 MG/1
4 TABLET, FILM COATED ORAL EVERY 8 HOURS PRN
Status: DISCONTINUED | OUTPATIENT
Start: 2025-01-25 | End: 2025-01-28 | Stop reason: HOSPADM

## 2025-01-25 RX ORDER — VENLAFAXINE HYDROCHLORIDE 37.5 MG/1
75 CAPSULE, EXTENDED RELEASE ORAL DAILY
Status: DISCONTINUED | OUTPATIENT
Start: 2025-01-26 | End: 2025-01-28 | Stop reason: HOSPADM

## 2025-01-25 RX ORDER — GLUCAGON 1 MG
1 KIT INJECTION
Status: DISCONTINUED | OUTPATIENT
Start: 2025-01-25 | End: 2025-01-28 | Stop reason: HOSPADM

## 2025-01-25 RX ORDER — MORPHINE SULFATE 4 MG/ML
4 INJECTION, SOLUTION INTRAMUSCULAR; INTRAVENOUS
Status: COMPLETED | OUTPATIENT
Start: 2025-01-25 | End: 2025-01-25

## 2025-01-25 RX ORDER — IBUPROFEN 200 MG
24 TABLET ORAL
Status: DISCONTINUED | OUTPATIENT
Start: 2025-01-25 | End: 2025-01-28 | Stop reason: HOSPADM

## 2025-01-25 RX ORDER — IBUPROFEN 200 MG
16 TABLET ORAL
Status: DISCONTINUED | OUTPATIENT
Start: 2025-01-25 | End: 2025-01-28 | Stop reason: HOSPADM

## 2025-01-25 RX ADMIN — TREPROSTINIL 110.2 NG/KG/MIN: 100 INJECTION, SOLUTION INTRAVENOUS; SUBCUTANEOUS at 06:01

## 2025-01-25 RX ADMIN — TREPROSTINIL: 100 INJECTION, SOLUTION INTRAVENOUS; SUBCUTANEOUS at 07:01

## 2025-01-25 RX ADMIN — OXYCODONE AND ACETAMINOPHEN 1 TABLET: 7.5; 325 TABLET ORAL at 08:01

## 2025-01-25 RX ADMIN — MORPHINE SULFATE 4 MG: 4 INJECTION INTRAVENOUS at 06:01

## 2025-01-25 RX ADMIN — ONDANSETRON 4 MG: 2 INJECTION INTRAMUSCULAR; INTRAVENOUS at 06:01
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
## 2025-01-25 NOTE — Clinical Note
Diagnosis: Shortness of breath [786.05.ICD-9-CM]   Bed request comments: TSU   Reason for IP Medical Treatment  (Clinical interventions that can only be accomplished in the IP setting? ) :: Pulmonary hypertension

## 2025-01-26 LAB
ALBUMIN SERPL BCP-MCNC: 3.9 G/DL (ref 3.5–5.2)
ALP SERPL-CCNC: 90 U/L (ref 40–150)
ALT SERPL W/O P-5'-P-CCNC: 7 U/L (ref 10–44)
ANION GAP SERPL CALC-SCNC: 11 MMOL/L (ref 8–16)
AST SERPL-CCNC: 12 U/L (ref 10–40)
BASOPHILS # BLD AUTO: 0.02 K/UL (ref 0–0.2)
BASOPHILS NFR BLD: 0.4 % (ref 0–1.9)
BILIRUB SERPL-MCNC: 1 MG/DL (ref 0.1–1)
BUN SERPL-MCNC: 13 MG/DL (ref 6–20)
CALCIUM SERPL-MCNC: 9.4 MG/DL (ref 8.7–10.5)
CHLORIDE SERPL-SCNC: 106 MMOL/L (ref 95–110)
CO2 SERPL-SCNC: 20 MMOL/L (ref 23–29)
CREAT SERPL-MCNC: 0.9 MG/DL (ref 0.5–1.4)
DIFFERENTIAL METHOD BLD: ABNORMAL
EOSINOPHIL # BLD AUTO: 0.2 K/UL (ref 0–0.5)
EOSINOPHIL NFR BLD: 3 % (ref 0–8)
ERYTHROCYTE [DISTWIDTH] IN BLOOD BY AUTOMATED COUNT: 13.6 % (ref 11.5–14.5)
EST. GFR  (NO RACE VARIABLE): >60 ML/MIN/1.73 M^2
GLUCOSE SERPL-MCNC: 117 MG/DL (ref 70–110)
HCT VFR BLD AUTO: 39.3 % (ref 37–48.5)
HGB BLD-MCNC: 13 G/DL (ref 12–16)
IMM GRANULOCYTES # BLD AUTO: 0.01 K/UL (ref 0–0.04)
IMM GRANULOCYTES NFR BLD AUTO: 0.2 % (ref 0–0.5)
LYMPHOCYTES # BLD AUTO: 2.9 K/UL (ref 1–4.8)
LYMPHOCYTES NFR BLD: 53.7 % (ref 18–48)
MAGNESIUM SERPL-MCNC: 2 MG/DL (ref 1.6–2.6)
MCH RBC QN AUTO: 29.2 PG (ref 27–31)
MCHC RBC AUTO-ENTMCNC: 33.1 G/DL (ref 32–36)
MCV RBC AUTO: 88 FL (ref 82–98)
MONOCYTES # BLD AUTO: 0.3 K/UL (ref 0.3–1)
MONOCYTES NFR BLD: 6.2 % (ref 4–15)
NEUTROPHILS # BLD AUTO: 2 K/UL (ref 1.8–7.7)
NEUTROPHILS NFR BLD: 36.5 % (ref 38–73)
NRBC BLD-RTO: 0 /100 WBC
OHS QRS DURATION: 76 MS
OHS QTC CALCULATION: 485 MS
PHOSPHATE SERPL-MCNC: 4.2 MG/DL (ref 2.7–4.5)
PLATELET # BLD AUTO: 180 K/UL (ref 150–450)
PMV BLD AUTO: 10.8 FL (ref 9.2–12.9)
POTASSIUM SERPL-SCNC: 3.6 MMOL/L (ref 3.5–5.1)
PROT SERPL-MCNC: 7.6 G/DL (ref 6–8.4)
RBC # BLD AUTO: 4.45 M/UL (ref 4–5.4)
SODIUM SERPL-SCNC: 137 MMOL/L (ref 136–145)
WBC # BLD AUTO: 5.36 K/UL (ref 3.9–12.7)

## 2025-01-26 PROCEDURE — 84100 ASSAY OF PHOSPHORUS: CPT | Performed by: STUDENT IN AN ORGANIZED HEALTH CARE EDUCATION/TRAINING PROGRAM

## 2025-01-26 PROCEDURE — 83735 ASSAY OF MAGNESIUM: CPT | Performed by: STUDENT IN AN ORGANIZED HEALTH CARE EDUCATION/TRAINING PROGRAM

## 2025-01-26 PROCEDURE — 63600175 PHARM REV CODE 636 W HCPCS: Mod: TB | Performed by: EMERGENCY MEDICINE

## 2025-01-26 PROCEDURE — 25000003 PHARM REV CODE 250: Performed by: INTERNAL MEDICINE

## 2025-01-26 PROCEDURE — 25000003 PHARM REV CODE 250: Performed by: STUDENT IN AN ORGANIZED HEALTH CARE EDUCATION/TRAINING PROGRAM

## 2025-01-26 PROCEDURE — 85025 COMPLETE CBC W/AUTO DIFF WBC: CPT | Performed by: STUDENT IN AN ORGANIZED HEALTH CARE EDUCATION/TRAINING PROGRAM

## 2025-01-26 PROCEDURE — 63600175 PHARM REV CODE 636 W HCPCS

## 2025-01-26 PROCEDURE — 36415 COLL VENOUS BLD VENIPUNCTURE: CPT | Performed by: STUDENT IN AN ORGANIZED HEALTH CARE EDUCATION/TRAINING PROGRAM

## 2025-01-26 PROCEDURE — 99233 SBSQ HOSP IP/OBS HIGH 50: CPT | Mod: ,,, | Performed by: INTERNAL MEDICINE

## 2025-01-26 PROCEDURE — G0378 HOSPITAL OBSERVATION PER HR: HCPCS

## 2025-01-26 PROCEDURE — 80053 COMPREHEN METABOLIC PANEL: CPT | Performed by: STUDENT IN AN ORGANIZED HEALTH CARE EDUCATION/TRAINING PROGRAM

## 2025-01-26 PROCEDURE — 25000003 PHARM REV CODE 250: Performed by: EMERGENCY MEDICINE

## 2025-01-26 RX ORDER — OXYCODONE AND ACETAMINOPHEN 5; 325 MG/1; MG/1
1 TABLET ORAL EVERY 8 HOURS PRN
Status: DISCONTINUED | OUTPATIENT
Start: 2025-01-26 | End: 2025-01-26

## 2025-01-26 RX ORDER — MORPHINE SULFATE 2 MG/ML
2 INJECTION, SOLUTION INTRAMUSCULAR; INTRAVENOUS EVERY 6 HOURS PRN
Status: DISPENSED | OUTPATIENT
Start: 2025-01-26 | End: 2025-01-27

## 2025-01-26 RX ORDER — OXYCODONE AND ACETAMINOPHEN 7.5; 325 MG/1; MG/1
1 TABLET ORAL EVERY 8 HOURS PRN
Status: DISCONTINUED | OUTPATIENT
Start: 2025-01-26 | End: 2025-01-27

## 2025-01-26 RX ORDER — OXYCODONE AND ACETAMINOPHEN 7.5; 325 MG/1; MG/1
1 TABLET ORAL EVERY 8 HOURS PRN
Status: DISCONTINUED | OUTPATIENT
Start: 2025-01-26 | End: 2025-01-26

## 2025-01-26 RX ORDER — LOPERAMIDE HYDROCHLORIDE 2 MG/1
2 CAPSULE ORAL 4 TIMES DAILY PRN
Status: DISCONTINUED | OUTPATIENT
Start: 2025-01-26 | End: 2025-01-28 | Stop reason: HOSPADM

## 2025-01-26 RX ADMIN — OXYCODONE AND ACETAMINOPHEN 1 TABLET: 7.5; 325 TABLET ORAL at 08:01

## 2025-01-26 RX ADMIN — FUROSEMIDE 20 MG: 20 TABLET ORAL at 08:01

## 2025-01-26 RX ADMIN — LOPERAMIDE HYDROCHLORIDE 2 MG: 2 CAPSULE ORAL at 08:01

## 2025-01-26 RX ADMIN — OXYCODONE AND ACETAMINOPHEN 1 TABLET: 7.5; 325 TABLET ORAL at 01:01

## 2025-01-26 RX ADMIN — OXYCODONE AND ACETAMINOPHEN 1 TABLET: 7.5; 325 TABLET ORAL at 07:01

## 2025-01-26 RX ADMIN — MORPHINE SULFATE 2 MG: 2 INJECTION, SOLUTION INTRAMUSCULAR; INTRAVENOUS at 08:01

## 2025-01-26 RX ADMIN — TREPROSTINIL 110.2 NG/KG/MIN: 100 INJECTION, SOLUTION INTRAVENOUS; SUBCUTANEOUS at 02:01

## 2025-01-26 RX ADMIN — Medication 400 MG: at 08:01

## 2025-01-26 RX ADMIN — SPIRONOLACTONE 25 MG: 25 TABLET, FILM COATED ORAL at 08:01

## 2025-01-26 RX ADMIN — MORPHINE SULFATE 2 MG: 2 INJECTION, SOLUTION INTRAMUSCULAR; INTRAVENOUS at 02:01

## 2025-01-26 RX ADMIN — POTASSIUM CHLORIDE 20 MEQ: 1500 TABLET, EXTENDED RELEASE ORAL at 08:01

## 2025-01-26 RX ADMIN — VENLAFAXINE HYDROCHLORIDE 75 MG: 37.5 CAPSULE, EXTENDED RELEASE ORAL at 08:01

## 2025-01-26 NOTE — ASSESSMENT & PLAN NOTE
-Admit to TSU  -Cont home PO Opsumit/Tadalafil and  restart IV remodulin.  - Pharmacy to assist with dosing   -Transition to hospital pump until home supply arrives.   -Likely DC on Monday.

## 2025-01-26 NOTE — ASSESSMENT & PLAN NOTE
-Continue treatment onTSU  -Cont home PO Opsumit/Tadalafil and  restart IV remodulin.  - Pharmacy assisting with dosing   -Transition to hospital pump until home supply arrives.   -Likely DC on Monday.
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
## 2025-01-26 NOTE — PLAN OF CARE
VSS. Remodulin infusing. PRN Percocet given for chronic pain. Bed locked and in lowest setting. Call bell in reach. NSR on telemetry.

## 2025-01-26 NOTE — CONSULTS
Patient admitted to [facility] service. Please refer to JOAN&P.    Gisel Chowdary MD  Cardiovascular Disease PGY6  [facility]
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
## 2025-01-26 NOTE — SUBJECTIVE & OBJECTIVE
Past Medical History:   Diagnosis Date    Elevated liver enzymes     Essential (primary) hypertension     Heart failure, unspecified     Hypokalemia     Mixed hyperlipidemia     Neuropathic pain     Tx w/ tramadol & Lyrica    Pulmonary hypertension     Receiving Remodulin continuously through tunneled central line       Past Surgical History:   Procedure Laterality Date    APPENDECTOMY       SECTION      Transverse cut    HYSTERECTOMY  2017    TLH- bleeding    OOPHORECTOMY      RIGHT HEART CATHETERIZATION Right 2021    Procedure: INSERTION, CATHETER, RIGHT HEART;  Surgeon: Chloe Gregory MD;  Location: [facility] CATH LAB;  Service: Cardiology;  Laterality: Right;    RIGHT HEART CATHETERIZATION Right 3/16/2022    Procedure: INSERTION, CATHETER, RIGHT HEART;  Surgeon: Hu Silverman MD;  Location: [facility] CATH LAB;  Service: Cardiology;  Laterality: Right;    RIGHT HEART CATHETERIZATION Right 2022    Procedure: INSERTION, CATHETER, RIGHT HEART;  Surgeon: Chloe Gregory MD;  Location: [facility] CATH LAB;  Service: Cardiology;  Laterality: Right;    RIGHT HEART CATHETERIZATION Right 3/21/2023    Procedure: INSERTION, CATHETER, RIGHT HEART;  Surgeon: Kahlil Cisneros MD;  Location: [facility] CATH LAB;  Service: Cardiology;  Laterality: Right;    RIGHT HEART CATHETERIZATION Right 10/9/2023    Procedure: INSERTION, CATHETER, RIGHT HEART;  Surgeon: Chloe Gregory MD;  Location: [facility] CATH LAB;  Service: Cardiology;  Laterality: Right;    RIGHT HEART CATHETERIZATION Right 2024    Procedure: INSERTION, CATHETER, RIGHT HEART;  Surgeon: Kahlil Cisneros MD;  Location: [facility] CATH LAB;  Service: Cardiology;  Laterality: Right;    TUBAL LIGATION      VAGINAL DELIVERY  1993; 1996       Review of patient's allergies indicates:  No Known Allergies    Current Facility-Administered Medications   Medication    dextrose 50% injection 12.5 g    dextrose 50% injection 25 g    [START ON 2025]  furosemide tablet 20 mg    glucagon (human recombinant) injection 1 mg    glucose chewable tablet 16 g    glucose chewable tablet 24 g    [START ON 1/26/2025] magnesium oxide tablet 400 mg    naloxone 0.4 mg/mL injection 0.02 mg    [START ON 1/26/2025] NON FORMULARY MEDICATION    ondansetron tablet 4 mg    oxyCODONE-acetaminophen 7.5-325 mg per tablet 1 tablet    PHARMACY TO CONFIRM TREPROSTINIL (REMODULIN) DOSING infusion    [START ON 1/26/2025] potassium chloride SA CR tablet 20 mEq    sodium chloride 0.9% flush 10 mL    [START ON 1/26/2025] spironolactone tablet 25 mg    treprostinil (REMODULIN) 12,000,000 ng in 0.9% NaCl 100 mL infusion    VELETRI/REMODULIN CASSETTE    VELETRI/REMODULIN TUBING    [START ON 1/26/2025] venlafaxine 24 hr capsule 75 mg     Current Outpatient Medications   Medication Sig    buprenorphine HCL 75 mcg Film Place 1 each (75 mcg total) inside cheek once daily.    furosemide (LASIX) 20 MG tablet Take 1 tablet (20 mg total) by mouth once daily.    gabapentin (NEURONTIN) 100 MG capsule Take 2 capsules (200 mg total) by mouth 2 (two) times daily. Take 200 mg in AM and 200 mg in afternoon. Continue taking 300 mg (other Rx) at night.    loperamide (IMODIUM) 2 mg capsule Take 1 capsule (2 mg total) by mouth 4 (four) times daily as needed for Diarrhea.    macitentan-tadalafil (OPSYNVI) 10-40 mg Tab Take 10-40 mg by mouth once daily.    magnesium oxide (MAG-OX) 400 mg (241.3 mg magnesium) tablet Take 1 tablet (400 mg total) by mouth once daily.    multivitamin with minerals tablet Take 1 tablet by mouth once daily.    naloxone (NARCAN) 4 mg/actuation Spry 4mg by nasal route as needed for opioid overdose; may repeat every 2-3 minutes in alternating nostrils until medical help arrives. Call 911    ondansetron (ZOFRAN) 4 MG tablet Take 1 tablet (4 mg total) by mouth every 8 (eight) hours as needed for Nausea.    oxyCODONE-acetaminophen (PERCOCET)  mg per tablet Take 1 tablet by mouth every 4  (four) hours as needed for Pain.    potassium chloride SA (K-DUR,KLOR-CON M) 10 MEQ tablet Take 2 tablets (20 mEq total) by mouth once daily.    REMODULIN 5 mg/mL Soln     sodium chloride 0.9% SolP 100 mL with treprostinil 1 mg/mL Soln 6,000,000 ng Inject 2,145 ng/min into the vein continuous.    spironolactone (ALDACTONE) 25 MG tablet Take 1 tablet (25 mg total) by mouth once daily.    venlafaxine (EFFEXOR-XR) 75 MG 24 hr capsule Take 1 capsule (75 mg total) by mouth once daily.     Family History       Problem Relation (Age of Onset)    Cancer Father (69)    No Known Problems Sister, Brother, Brother          Tobacco Use    Smoking status: Former     Types: Cigars     Quit date: 2020     Years since quittin.1     Passive exposure: Past    Smokeless tobacco: Never    Tobacco comments:     social smoker-light - quit    Substance and Sexual Activity    Alcohol use: Not Currently     Alcohol/week: 2.0 standard drinks of alcohol     Types: 2 Glasses of wine per week     Comment: None now -  previously: socially- 2 or 3 times a week-2 glasses of wine    Drug use: Not Currently     Types: Marijuana     Comment:  last use    Sexual activity: Yes     Partners: Male     Birth control/protection: See Surgical Hx     Comment:      Review of Systems   Constitutional:  Negative for activity change, chills and fever.   Respiratory:  Negative for shortness of breath and wheezing.    Cardiovascular:  Negative for chest pain, palpitations and leg swelling.   Gastrointestinal:  Positive for nausea. Negative for vomiting.   Genitourinary: Negative.    Musculoskeletal:  Positive for back pain.   Neurological: Negative.      Objective:     Vital Signs (Most Recent):  Temp: 98.5 °F (36.9 °C) (25)  Pulse: 100 (25 164)  Resp: 18 (25 1819)  BP: 117/87 (25)  SpO2: 98 % (25) Vital Signs (24h Range):  Temp:  [98.5 °F (36.9 °C)] 98.5 °F (36.9 °C)  Pulse:  [100] 100  Resp:   [18-20] 18  SpO2:  [98 %] 98 %  BP: (117)/(87) 117/87     Patient Vitals for the past 72 hrs (Last 3 readings):   Weight   01/25/25 1642 69.4 kg (153 lb)     Body mass index is 21.95 kg/m².    No intake or output data in the 24 hours ending 01/25/25 1844       Physical Exam  Constitutional:       General: She is not in acute distress.     Appearance: She is normal weight. She is not ill-appearing, toxic-appearing or diaphoretic.   HENT:      Head: Normocephalic.      Nose: Nose normal.      Mouth/Throat:      Mouth: Mucous membranes are moist.   Eyes:      Extraocular Movements: Extraocular movements intact.   Cardiovascular:      Rate and Rhythm: Normal rate and regular rhythm.      Pulses: Normal pulses.      Heart sounds: Normal heart sounds. No murmur heard.  Pulmonary:      Effort: Pulmonary effort is normal. No respiratory distress.      Breath sounds: No wheezing or rales.   Abdominal:      General: Abdomen is flat. Bowel sounds are normal. There is no distension.      Tenderness: There is no abdominal tenderness. There is no guarding.   Musculoskeletal:         General: No swelling or tenderness. Normal range of motion.      Cervical back: Normal range of motion.   Skin:     General: Skin is warm.      Coloration: Skin is not jaundiced.      Findings: No bruising or lesion.   Neurological:      General: No focal deficit present.      Mental Status: She is alert and oriented to person, place, and time.   Psychiatric:         Mood and Affect: Mood normal.         Behavior: Behavior normal.         Thought Content: Thought content normal.            Significant Labs:  CBC:  Recent Labs   Lab 01/25/25  1704   WBC 7.05   RBC 4.64   HGB 13.4   HCT 40.9      MCV 88   MCH 28.9   MCHC 32.8     BNP:  Recent Labs   Lab 01/25/25  1704   BNP 15     CMP:  Recent Labs   Lab 01/25/25  1704   *   CALCIUM 9.7   ALBUMIN 4.4   PROT 8.6*      K 4.3   CO2 20*      BUN 13   CREATININE 0.9   ALKPHOS 102  "  ALT 8*   AST 14   BILITOT 1.0      Coagulation:   No results for input(s): "PT", "INR", "APTT" in the last 168 hours.  LDH:  No results for input(s): "LDH" in the last 72 hours.  Microbiology:  Microbiology Results (last 7 days)       Procedure Component Value Units Date/Time    Influenza A & B by Molecular [[id number]] Collected: 01/25/25 1705    Order Status: Completed Specimen: Nasopharyngeal Swab Updated: 01/25/25 1809     Influenza A, Molecular Negative     Influenza B, Molecular Negative     Flu A & B Source nasal swab            BMP:   Recent Labs   Lab 01/25/25 1704   *      K 4.3      CO2 20*   BUN 13   CREATININE 0.9   CALCIUM 9.7     Cardiac Markers: No results for input(s): "CKMB", "TROPONINT", "MYOGLOBIN" in the last 72 hours.  Coagulation: No results for input(s): "PT", "INR", "APTT" in the last 72 hours.  I have reviewed all pertinent labs within the past 24 hours.    Diagnostic Results:  I have reviewed and interpreted all pertinent imaging results/findings within the past 24 hours.  Imaging Results              X-Ray Chest AP Portable (Final result)  Result time 01/25/25 18:04:53      Final result by Rainer Parks MD (01/25/25 18:04:53)                   Impression:      No radiographic evidence of pneumonia or other source of cough/shortness of breath, noting that early/mild viral pneumonia or nonspecific bronchitis may be radiographically occult.      Electronically signed by: Rainer Parks MD  Date:    01/25/2025  Time:    18:04               Narrative:    EXAMINATION:  XR CHEST AP PORTABLE    CLINICAL HISTORY:  Shortness of breath    TECHNIQUE:  Single frontal view of the chest was performed.    COMPARISON:  Chest radiograph 08/12/2024, CT abdomen and pelvis 08/05/2024, chest CT 08/04/2024    FINDINGS:  Monitoring leads overlie the chest.  Patient is slightly rotated.    Right IJ CVC tip overlies the mid SVC.  Previous right-sided PICC has been " removed.    Cardiomediastinal silhouette is midline and stable without evidence of failure.  Hilar contours are within normal limits.  Bibasilar minimal platelike scarring versus atelectasis.  The lungs are otherwise symmetrically well expanded and clear noting improved aeration from prior.  No sizable pleural effusion or pneumothorax.  No acute osseous process seen.  PA and lateral views can be obtained.

## 2025-01-26 NOTE — PLAN OF CARE
Pt arrived to TSU via stretcher. VSS. Pt ambulatory. Remodulin infusing. Telemetry on. Bed locked and in lowest setting. Call bell in reach.

## 2025-01-26 NOTE — SUBJECTIVE & OBJECTIVE
Interval History: NAEON    Continuous Infusions:   treprostinil (REMODULIN) 12,000,000 ng in 0.9% NaCl 100 mL infusion  110.2 ng/kg/min (Order-Specific) Intravenous Continuous 3.69 mL/hr at 01/25/25 1849 110.2 ng/kg/min at 01/25/25 1849    veletri/remodulin cassette   Intravenous Continuous   New Bag at 01/25/25 1900    veletri/remodulin tubing   Intravenous Continuous   New Bag at 01/25/25 1900     Scheduled Meds:   furosemide  20 mg Oral Daily    macitentan-tadalafil   Oral Daily    magnesium oxide  400 mg Oral Daily    potassium chloride SA  20 mEq Oral Daily    spironolactone  25 mg Oral Daily    venlafaxine  75 mg Oral Daily     PRN Meds:  Current Facility-Administered Medications:     dextrose 50%, 12.5 g, Intravenous, PRN    dextrose 50%, 25 g, Intravenous, PRN    glucagon (human recombinant), 1 mg, Intramuscular, PRN    glucose, 16 g, Oral, PRN    glucose, 24 g, Oral, PRN    morphine, 2 mg, Intravenous, Q6H PRN    naloxone, 0.02 mg, Intravenous, PRN    ondansetron, 4 mg, Oral, Q8H PRN    oxyCODONE-acetaminophen, 1 tablet, Oral, Q8H PRN    sodium chloride 0.9%, 10 mL, Intravenous, Q12H PRN    Review of patient's allergies indicates:  No Known Allergies  Objective:     Vital Signs (Most Recent):  Temp: 98.9 °F (37.2 °C) (01/26/25 1115)  Pulse: 90 (01/26/25 1115)  Resp: 16 (01/26/25 1115)  BP: 102/68 (01/26/25 1115)  SpO2: (!) 92 % (01/26/25 1115) Vital Signs (24h Range):  Temp:  [98.5 °F (36.9 °C)-98.9 °F (37.2 °C)] 98.9 °F (37.2 °C)  Pulse:  [] 90  Resp:  [16-20] 16  SpO2:  [92 %-98 %] 92 %  BP: ()/(62-90) 102/68     Patient Vitals for the past 72 hrs (Last 3 readings):   Weight   01/25/25 2100 73.8 kg (162 lb 11.2 oz)   01/25/25 1642 69.4 kg (153 lb)     Body mass index is 23.34 kg/m².      Intake/Output Summary (Last 24 hours) at 1/26/2025 1336  Last data filed at 1/26/2025 0454  Gross per 24 hour   Intake --   Output 400 ml   Net -400 ml       Hemodynamic Parameters:       Telemetry: NSR      "  Physical Exam  Constitutional:       Appearance: Normal appearance.   HENT:      Head: Normocephalic.      Mouth/Throat:      Mouth: Mucous membranes are moist.   Eyes:      Pupils: Pupils are equal, round, and reactive to light.   Cardiovascular:      Rate and Rhythm: Normal rate and regular rhythm.      Heart sounds: No murmur heard.  Pulmonary:      Effort: Pulmonary effort is normal.   Abdominal:      General: Abdomen is flat.      Palpations: Abdomen is soft.   Musculoskeletal:      Right lower leg: No edema.      Left lower leg: No edema.   Neurological:      Mental Status: She is alert.            Significant Labs:  CBC:  Recent Labs   Lab 01/25/25  1704 01/26/25  0630   WBC 7.05 5.36   RBC 4.64 4.45   HGB 13.4 13.0   HCT 40.9 39.3    180   MCV 88 88   MCH 28.9 29.2   MCHC 32.8 33.1     BNP:  Recent Labs   Lab 01/25/25  1704   BNP 15     CMP:  Recent Labs   Lab 01/25/25 1704 01/26/25  0630   * 117*   CALCIUM 9.7 9.4   ALBUMIN 4.4 3.9   PROT 8.6* 7.6    137   K 4.3 3.6   CO2 20* 20*    106   BUN 13 13   CREATININE 0.9 0.9   ALKPHOS 102 90   ALT 8* 7*   AST 14 12   BILITOT 1.0 1.0      Coagulation:   No results for input(s): "PT", "INR", "APTT" in the last 168 hours.  LDH:  No results for input(s): "LDH" in the last 72 hours.  Microbiology:  Microbiology Results (last 7 days)       Procedure Component Value Units Date/Time    Influenza A & B by Molecular [[id number]] Collected: 01/25/25 1705    Order Status: Completed Specimen: Nasopharyngeal Swab Updated: 01/25/25 1809     Influenza A, Molecular Negative     Influenza B, Molecular Negative     Flu A & B Source nasal swab            I have reviewed all pertinent labs within the past 24 hours.    Estimated Creatinine Clearance: 80 mL/min (based on SCr of 0.9 mg/dL).    Diagnostic Results:  I have reviewed and interpreted all pertinent imaging results/findings within the past 24 hours.  "
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
## 2025-01-27 LAB
ALBUMIN SERPL BCP-MCNC: 3.9 G/DL (ref 3.5–5.2)
ALP SERPL-CCNC: 89 U/L (ref 40–150)
ALT SERPL W/O P-5'-P-CCNC: 8 U/L (ref 10–44)
ANION GAP SERPL CALC-SCNC: 11 MMOL/L (ref 8–16)
AST SERPL-CCNC: 10 U/L (ref 10–40)
BASOPHILS # BLD AUTO: 0.03 K/UL (ref 0–0.2)
BASOPHILS NFR BLD: 0.6 % (ref 0–1.9)
BILIRUB SERPL-MCNC: 0.9 MG/DL (ref 0.1–1)
BUN SERPL-MCNC: 16 MG/DL (ref 6–20)
CALCIUM SERPL-MCNC: 9.6 MG/DL (ref 8.7–10.5)
CHLORIDE SERPL-SCNC: 106 MMOL/L (ref 95–110)
CO2 SERPL-SCNC: 20 MMOL/L (ref 23–29)
CREAT SERPL-MCNC: 0.9 MG/DL (ref 0.5–1.4)
DIFFERENTIAL METHOD BLD: ABNORMAL
EOSINOPHIL # BLD AUTO: 0.2 K/UL (ref 0–0.5)
EOSINOPHIL NFR BLD: 3.3 % (ref 0–8)
ERYTHROCYTE [DISTWIDTH] IN BLOOD BY AUTOMATED COUNT: 13.4 % (ref 11.5–14.5)
EST. GFR  (NO RACE VARIABLE): >60 ML/MIN/1.73 M^2
GLUCOSE SERPL-MCNC: 143 MG/DL (ref 70–110)
HCT VFR BLD AUTO: 42.2 % (ref 37–48.5)
HGB BLD-MCNC: 13.5 G/DL (ref 12–16)
IMM GRANULOCYTES # BLD AUTO: 0.01 K/UL (ref 0–0.04)
IMM GRANULOCYTES NFR BLD AUTO: 0.2 % (ref 0–0.5)
LYMPHOCYTES # BLD AUTO: 2.8 K/UL (ref 1–4.8)
LYMPHOCYTES NFR BLD: 51.3 % (ref 18–48)
MAGNESIUM SERPL-MCNC: 1.9 MG/DL (ref 1.6–2.6)
MCH RBC QN AUTO: 28.1 PG (ref 27–31)
MCHC RBC AUTO-ENTMCNC: 32 G/DL (ref 32–36)
MCV RBC AUTO: 88 FL (ref 82–98)
MONOCYTES # BLD AUTO: 0.4 K/UL (ref 0.3–1)
MONOCYTES NFR BLD: 6.5 % (ref 4–15)
NEUTROPHILS # BLD AUTO: 2.1 K/UL (ref 1.8–7.7)
NEUTROPHILS NFR BLD: 38.1 % (ref 38–73)
NRBC BLD-RTO: 0 /100 WBC
PHOSPHATE SERPL-MCNC: 4.4 MG/DL (ref 2.7–4.5)
PLATELET # BLD AUTO: 177 K/UL (ref 150–450)
PMV BLD AUTO: 10.8 FL (ref 9.2–12.9)
POTASSIUM SERPL-SCNC: 3.7 MMOL/L (ref 3.5–5.1)
PROT SERPL-MCNC: 7.7 G/DL (ref 6–8.4)
RBC # BLD AUTO: 4.81 M/UL (ref 4–5.4)
SODIUM SERPL-SCNC: 137 MMOL/L (ref 136–145)
WBC # BLD AUTO: 5.38 K/UL (ref 3.9–12.7)

## 2025-01-27 PROCEDURE — 85025 COMPLETE CBC W/AUTO DIFF WBC: CPT | Performed by: STUDENT IN AN ORGANIZED HEALTH CARE EDUCATION/TRAINING PROGRAM

## 2025-01-27 PROCEDURE — 63600175 PHARM REV CODE 636 W HCPCS

## 2025-01-27 PROCEDURE — 99233 SBSQ HOSP IP/OBS HIGH 50: CPT | Mod: ,,, | Performed by: INTERNAL MEDICINE

## 2025-01-27 PROCEDURE — 80053 COMPREHEN METABOLIC PANEL: CPT | Performed by: STUDENT IN AN ORGANIZED HEALTH CARE EDUCATION/TRAINING PROGRAM

## 2025-01-27 PROCEDURE — 25000003 PHARM REV CODE 250: Performed by: INTERNAL MEDICINE

## 2025-01-27 PROCEDURE — 63600175 PHARM REV CODE 636 W HCPCS: Mod: TB | Performed by: EMERGENCY MEDICINE

## 2025-01-27 PROCEDURE — 83735 ASSAY OF MAGNESIUM: CPT | Performed by: STUDENT IN AN ORGANIZED HEALTH CARE EDUCATION/TRAINING PROGRAM

## 2025-01-27 PROCEDURE — 25000003 PHARM REV CODE 250: Performed by: STUDENT IN AN ORGANIZED HEALTH CARE EDUCATION/TRAINING PROGRAM

## 2025-01-27 PROCEDURE — G0378 HOSPITAL OBSERVATION PER HR: HCPCS

## 2025-01-27 PROCEDURE — 25000003 PHARM REV CODE 250: Performed by: EMERGENCY MEDICINE

## 2025-01-27 PROCEDURE — 36415 COLL VENOUS BLD VENIPUNCTURE: CPT | Performed by: STUDENT IN AN ORGANIZED HEALTH CARE EDUCATION/TRAINING PROGRAM

## 2025-01-27 PROCEDURE — 25000003 PHARM REV CODE 250

## 2025-01-27 PROCEDURE — 84100 ASSAY OF PHOSPHORUS: CPT | Performed by: STUDENT IN AN ORGANIZED HEALTH CARE EDUCATION/TRAINING PROGRAM

## 2025-01-27 RX ORDER — OXYCODONE AND ACETAMINOPHEN 7.5; 325 MG/1; MG/1
1 TABLET ORAL EVERY 4 HOURS PRN
Status: DISCONTINUED | OUTPATIENT
Start: 2025-01-27 | End: 2025-01-28 | Stop reason: HOSPADM

## 2025-01-27 RX ORDER — MUPIROCIN 20 MG/G
OINTMENT TOPICAL 2 TIMES DAILY
Status: DISCONTINUED | OUTPATIENT
Start: 2025-01-27 | End: 2025-01-28 | Stop reason: HOSPADM

## 2025-01-27 RX ORDER — TALC
9 POWDER (GRAM) TOPICAL NIGHTLY PRN
Status: DISCONTINUED | OUTPATIENT
Start: 2025-01-27 | End: 2025-01-28 | Stop reason: HOSPADM

## 2025-01-27 RX ADMIN — ONDANSETRON HYDROCHLORIDE 4 MG: 4 TABLET, FILM COATED ORAL at 08:01

## 2025-01-27 RX ADMIN — SPIRONOLACTONE 25 MG: 25 TABLET, FILM COATED ORAL at 08:01

## 2025-01-27 RX ADMIN — Medication 9 MG: at 01:01

## 2025-01-27 RX ADMIN — OXYCODONE AND ACETAMINOPHEN 1 TABLET: 7.5; 325 TABLET ORAL at 06:01

## 2025-01-27 RX ADMIN — MORPHINE SULFATE 2 MG: 2 INJECTION, SOLUTION INTRAMUSCULAR; INTRAVENOUS at 01:01

## 2025-01-27 RX ADMIN — MUPIROCIN: 20 OINTMENT TOPICAL at 01:01

## 2025-01-27 RX ADMIN — LOPERAMIDE HYDROCHLORIDE 2 MG: 2 CAPSULE ORAL at 08:01

## 2025-01-27 RX ADMIN — OXYCODONE AND ACETAMINOPHEN 1 TABLET: 7.5; 325 TABLET ORAL at 11:01

## 2025-01-27 RX ADMIN — FUROSEMIDE 20 MG: 20 TABLET ORAL at 08:01

## 2025-01-27 RX ADMIN — MUPIROCIN: 20 OINTMENT TOPICAL at 11:01

## 2025-01-27 RX ADMIN — OXYCODONE AND ACETAMINOPHEN 1 TABLET: 7.5; 325 TABLET ORAL at 12:01

## 2025-01-27 RX ADMIN — VENLAFAXINE HYDROCHLORIDE 75 MG: 37.5 CAPSULE, EXTENDED RELEASE ORAL at 08:01

## 2025-01-27 RX ADMIN — POTASSIUM CHLORIDE 20 MEQ: 1500 TABLET, EXTENDED RELEASE ORAL at 08:01

## 2025-01-27 RX ADMIN — Medication 9 MG: at 11:01

## 2025-01-27 RX ADMIN — Medication 400 MG: at 08:01

## 2025-01-27 RX ADMIN — TREPROSTINIL 110.2 NG/KG/MIN: 100 INJECTION, SOLUTION INTRAVENOUS; SUBCUTANEOUS at 05:01

## 2025-01-27 NOTE — ASSESSMENT & PLAN NOTE
-Continue treatment onTSU  -Cont home PO Opsumit/Tadalafil and  restart IV remodulin.  - Pharmacy assisting with dosing   -Transition to hospital pump until home supply arrives.   -Likely DC today  -Coordinating IV medication delivery with [facility] and family

## 2025-01-27 NOTE — SUBJECTIVE & OBJECTIVE
Interval History: [facility] will deliver IV remodulin today to her home and daughter is going to bring it to the hospital. Then patient will go home likely today if medication is delivered     Continuous Infusions:   treprostinil (REMODULIN) 12,000,000 ng in 0.9% NaCl 100 mL infusion  110.2 ng/kg/min (Order-Specific) Intravenous Continuous 3.69 mL/hr at 01/26/25 1437 110.2 ng/kg/min at 01/26/25 1437    veletri/remodulin cassette   Intravenous Continuous   New Bag at 01/25/25 1900    veletri/remodulin tubing   Intravenous Continuous   New Bag at 01/25/25 1900     Scheduled Meds:   furosemide  20 mg Oral Daily    macitentan-tadalafil   Oral Daily    magnesium oxide  400 mg Oral Daily    mupirocin   Nasal BID    potassium chloride SA  20 mEq Oral Daily    spironolactone  25 mg Oral Daily    venlafaxine  75 mg Oral Daily     PRN Meds:  Current Facility-Administered Medications:     dextrose 50%, 12.5 g, Intravenous, PRN    dextrose 50%, 25 g, Intravenous, PRN    glucagon (human recombinant), 1 mg, Intramuscular, PRN    glucose, 16 g, Oral, PRN    glucose, 24 g, Oral, PRN    loperamide, 2 mg, Oral, QID PRN    melatonin, 9 mg, Oral, Nightly PRN    naloxone, 0.02 mg, Intravenous, PRN    ondansetron, 4 mg, Oral, Q8H PRN    oxyCODONE-acetaminophen, 1 tablet, Oral, Q4H PRN    sodium chloride 0.9%, 10 mL, Intravenous, Q12H PRN    Review of patient's allergies indicates:  No Known Allergies  Objective:     Vital Signs (Most Recent):  Temp: 98.7 °F (37.1 °C) (01/27/25 0709)  Pulse: 78 (01/27/25 1039)  Resp: 18 (01/27/25 0709)  BP: 96/64 (01/27/25 0709)  SpO2: 95 % (01/27/25 0709) Vital Signs (24h Range):  Temp:  [97.8 °F (36.6 °C)-98.8 °F (37.1 °C)] 98.7 °F (37.1 °C)  Pulse:  [] 78  Resp:  [16-20] 18  SpO2:  [92 %-96 %] 95 %  BP: ()/(64-82) 96/64     Patient Vitals for the past 72 hrs (Last 3 readings):   Weight   01/25/25 2100 73.8 kg (162 lb 11.2 oz)   01/25/25 1642 69.4 kg (153 lb)     Body mass index is 23.34  "kg/m².      Intake/Output Summary (Last 24 hours) at 1/27/2025 1139  Last data filed at 1/27/2025 0857  Gross per 24 hour   Intake --   Output 210 ml   Net -210 ml       Hemodynamic Parameters:       Telemetry: NSR       Physical Exam  Constitutional:       Appearance: Normal appearance.   HENT:      Head: Normocephalic.      Mouth/Throat:      Mouth: Mucous membranes are moist.   Eyes:      Pupils: Pupils are equal, round, and reactive to light.   Cardiovascular:      Rate and Rhythm: Normal rate and regular rhythm.      Heart sounds: No murmur heard.  Pulmonary:      Effort: Pulmonary effort is normal.   Abdominal:      General: Abdomen is flat.      Palpations: Abdomen is soft.   Musculoskeletal:      Right lower leg: No edema.      Left lower leg: No edema.   Neurological:      Mental Status: She is alert.            Significant Labs:  CBC:  Recent Labs   Lab 01/25/25 1704 01/26/25 0630 01/27/25  0620   WBC 7.05 5.36 5.38   RBC 4.64 4.45 4.81   HGB 13.4 13.0 13.5   HCT 40.9 39.3 42.2    180 177   MCV 88 88 88   MCH 28.9 29.2 28.1   MCHC 32.8 33.1 32.0     BNP:  Recent Labs   Lab 01/25/25 1704   BNP 15     CMP:  Recent Labs   Lab 01/25/25 1704 01/26/25  0630 01/27/25  0620   * 117* 143*   CALCIUM 9.7 9.4 9.6   ALBUMIN 4.4 3.9 3.9   PROT 8.6* 7.6 7.7    137 137   K 4.3 3.6 3.7   CO2 20* 20* 20*    106 106   BUN 13 13 16   CREATININE 0.9 0.9 0.9   ALKPHOS 102 90 89   ALT 8* 7* 8*   AST 14 12 10   BILITOT 1.0 1.0 0.9      Coagulation:   No results for input(s): "PT", "INR", "APTT" in the last 168 hours.  LDH:  No results for input(s): "LDH" in the last 72 hours.  Microbiology:  Microbiology Results (last 7 days)       Procedure Component Value Units Date/Time    Influenza A & B by Molecular [[id number]] Collected: 01/25/25 1705    Order Status: Completed Specimen: Nasopharyngeal Swab Updated: 01/25/25 1804     Influenza A, Molecular Negative     Influenza B, Molecular Negative     Flu A " & B Source nasal swab            I have reviewed all pertinent labs within the past 24 hours.    Estimated Creatinine Clearance: 80 mL/min (based on SCr of 0.9 mg/dL).    Diagnostic Results:  I have reviewed and interpreted all pertinent imaging results/findings within the past 24 hours.
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
## 2025-01-27 NOTE — TREATMENT PLAN
Per [facility] rep  And portal medication to be delivered to local [employer] [facility]  Pending timing, dc late today vs ivonne

## 2025-01-27 NOTE — CARE UPDATE
"RAPID RESPONSE NURSE CHART REVIEW        Chart Reviewed: 01/27/2025, 0929    MRN: [id number]  Bed: 82305/29255 A    Dx: Pulmonary hypertension    Kristin Maier has a past medical history of Elevated liver enzymes, Essential (primary) hypertension, Heart failure, unspecified, Hypokalemia, Mixed hyperlipidemia, Neuropathic pain, and Pulmonary hypertension.    Last VS: BP 96/64   Pulse 79 Comment: per airstrip  Temp 98.7 °F (37.1 °C) (Oral)   Resp 16   Ht 5' 10" (1.778 m)   Wt 73.8 kg (162 lb 11.2 oz)   LMP 06/19/2017 (Approximate)   SpO2 95%   Breastfeeding No   BMI 23.34 kg/m²     24H Vital Sign Range:  Temp:  [97.8 °F (36.6 °C)-98.9 °F (37.2 °C)]   Pulse:  []   Resp:  [16-20]   BP: ()/(64-82)   SpO2:  [92 %-96 %]     Level of Consciousness (AVPU): alert    Recent Labs     01/25/25  1704 01/26/25  0630 01/27/25  0620   WBC 7.05 5.36 5.38   HGB 13.4 13.0 13.5   HCT 40.9 39.3 42.2    180 177       Recent Labs     01/25/25  1704 01/26/25  0630 01/27/25  0620    137 137   K 4.3 3.6 3.7    106 106   CO2 20* 20* 20*   BUN 13 13 16   CREATININE 0.9 0.9 0.9   * 117* 143*   PHOS  --  4.2 4.4   MG  --  2.0 1.9          MEWS score: 4    Rounding completed with charge ROSIE Reid reports no acute needs at this time, HR has improved to NSR.. No additional concerns verbalized at this time. Instructed to call [facility] for further concerns or assistance.    Derick Cristobal RN        "

## 2025-01-27 NOTE — PLAN OF CARE
Pt AAOx4, VSS on RA. NSR on tele. Pt with remodulin infusing @110ng/kg.min. Pt generalized chronic pain managed with percocet PRN. Melatonin administered for sleep. Pt up independently, remains free from falls/injury. Imodium administered for diarrhea (pt states diarrhea is chronic 2/ remodulin). Pending home delivery of medication, Bed in lowest locked position, call light within reach, POC ongoing.
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
## 2025-01-28 VITALS
SYSTOLIC BLOOD PRESSURE: 126 MMHG | WEIGHT: 162.69 LBS | DIASTOLIC BLOOD PRESSURE: 83 MMHG | RESPIRATION RATE: 16 BRPM | TEMPERATURE: 99 F | HEART RATE: 85 BPM | HEIGHT: 70 IN | BODY MASS INDEX: 23.29 KG/M2 | OXYGEN SATURATION: 94 %

## 2025-01-28 LAB
ALBUMIN SERPL BCP-MCNC: 4.1 G/DL (ref 3.5–5.2)
ALP SERPL-CCNC: 89 U/L (ref 40–150)
ALT SERPL W/O P-5'-P-CCNC: 9 U/L (ref 10–44)
ANION GAP SERPL CALC-SCNC: 10 MMOL/L (ref 8–16)
AST SERPL-CCNC: 12 U/L (ref 10–40)
BASOPHILS # BLD AUTO: 0.03 K/UL (ref 0–0.2)
BASOPHILS NFR BLD: 0.6 % (ref 0–1.9)
BILIRUB SERPL-MCNC: 0.7 MG/DL (ref 0.1–1)
BUN SERPL-MCNC: 15 MG/DL (ref 6–20)
CALCIUM SERPL-MCNC: 9.5 MG/DL (ref 8.7–10.5)
CHLORIDE SERPL-SCNC: 106 MMOL/L (ref 95–110)
CO2 SERPL-SCNC: 19 MMOL/L (ref 23–29)
CREAT SERPL-MCNC: 0.8 MG/DL (ref 0.5–1.4)
DIFFERENTIAL METHOD BLD: ABNORMAL
EOSINOPHIL # BLD AUTO: 0.2 K/UL (ref 0–0.5)
EOSINOPHIL NFR BLD: 3.5 % (ref 0–8)
ERYTHROCYTE [DISTWIDTH] IN BLOOD BY AUTOMATED COUNT: 13.4 % (ref 11.5–14.5)
EST. GFR  (NO RACE VARIABLE): >60 ML/MIN/1.73 M^2
GLUCOSE SERPL-MCNC: 97 MG/DL (ref 70–110)
HCT VFR BLD AUTO: 42 % (ref 37–48.5)
HGB BLD-MCNC: 14 G/DL (ref 12–16)
IMM GRANULOCYTES # BLD AUTO: 0.01 K/UL (ref 0–0.04)
IMM GRANULOCYTES NFR BLD AUTO: 0.2 % (ref 0–0.5)
LYMPHOCYTES # BLD AUTO: 2.7 K/UL (ref 1–4.8)
LYMPHOCYTES NFR BLD: 52.1 % (ref 18–48)
MAGNESIUM SERPL-MCNC: 2 MG/DL (ref 1.6–2.6)
MCH RBC QN AUTO: 29.1 PG (ref 27–31)
MCHC RBC AUTO-ENTMCNC: 33.3 G/DL (ref 32–36)
MCV RBC AUTO: 87 FL (ref 82–98)
MONOCYTES # BLD AUTO: 0.4 K/UL (ref 0.3–1)
MONOCYTES NFR BLD: 6.8 % (ref 4–15)
NEUTROPHILS # BLD AUTO: 1.9 K/UL (ref 1.8–7.7)
NEUTROPHILS NFR BLD: 36.8 % (ref 38–73)
NRBC BLD-RTO: 0 /100 WBC
PHOSPHATE SERPL-MCNC: 4.5 MG/DL (ref 2.7–4.5)
PLATELET # BLD AUTO: 189 K/UL (ref 150–450)
PMV BLD AUTO: 10.7 FL (ref 9.2–12.9)
POTASSIUM SERPL-SCNC: 4.2 MMOL/L (ref 3.5–5.1)
PROT SERPL-MCNC: 8 G/DL (ref 6–8.4)
RBC # BLD AUTO: 4.81 M/UL (ref 4–5.4)
SODIUM SERPL-SCNC: 135 MMOL/L (ref 136–145)
WBC # BLD AUTO: 5.11 K/UL (ref 3.9–12.7)

## 2025-01-28 PROCEDURE — 36415 COLL VENOUS BLD VENIPUNCTURE: CPT | Performed by: STUDENT IN AN ORGANIZED HEALTH CARE EDUCATION/TRAINING PROGRAM

## 2025-01-28 PROCEDURE — G0378 HOSPITAL OBSERVATION PER HR: HCPCS

## 2025-01-28 PROCEDURE — 25000003 PHARM REV CODE 250

## 2025-01-28 PROCEDURE — 25000003 PHARM REV CODE 250: Performed by: STUDENT IN AN ORGANIZED HEALTH CARE EDUCATION/TRAINING PROGRAM

## 2025-01-28 PROCEDURE — 83735 ASSAY OF MAGNESIUM: CPT | Performed by: STUDENT IN AN ORGANIZED HEALTH CARE EDUCATION/TRAINING PROGRAM

## 2025-01-28 PROCEDURE — 80053 COMPREHEN METABOLIC PANEL: CPT | Performed by: STUDENT IN AN ORGANIZED HEALTH CARE EDUCATION/TRAINING PROGRAM

## 2025-01-28 PROCEDURE — 85025 COMPLETE CBC W/AUTO DIFF WBC: CPT | Performed by: STUDENT IN AN ORGANIZED HEALTH CARE EDUCATION/TRAINING PROGRAM

## 2025-01-28 PROCEDURE — 84100 ASSAY OF PHOSPHORUS: CPT | Performed by: STUDENT IN AN ORGANIZED HEALTH CARE EDUCATION/TRAINING PROGRAM

## 2025-01-28 RX ADMIN — OXYCODONE AND ACETAMINOPHEN 1 TABLET: 7.5; 325 TABLET ORAL at 06:01

## 2025-01-28 RX ADMIN — MUPIROCIN: 20 OINTMENT TOPICAL at 07:01

## 2025-01-28 RX ADMIN — Medication 400 MG: at 07:01

## 2025-01-28 RX ADMIN — POTASSIUM CHLORIDE 20 MEQ: 1500 TABLET, EXTENDED RELEASE ORAL at 07:01

## 2025-01-28 RX ADMIN — OXYCODONE AND ACETAMINOPHEN 1 TABLET: 7.5; 325 TABLET ORAL at 10:01

## 2025-01-28 RX ADMIN — FUROSEMIDE 20 MG: 20 TABLET ORAL at 07:01

## 2025-01-28 RX ADMIN — SPIRONOLACTONE 25 MG: 25 TABLET, FILM COATED ORAL at 07:01

## 2025-01-28 RX ADMIN — VENLAFAXINE HYDROCHLORIDE 75 MG: 37.5 CAPSULE, EXTENDED RELEASE ORAL at 07:01

## 2025-01-28 NOTE — PLAN OF CARE
Pt is AAOx4; afebrile; vital signs stable. Remodulin at 88ml/24 hrs; cartridge changed this afternoon. She is awaiting delivery of her remodulin at [facility], at which time her daughter will pick it up and bring it here for her to mix and hook up prior to d/c. D/c orders in if this can be done this evening. She is up independently.

## 2025-01-28 NOTE — NURSING
Pt discharged, ambulated off unit independently. Pt left with personal belongings & discharge instructions. Discharge instructions were reviewed with pt, pt verbalized understanding to all. Pt is connected to home remodulin infusion. Pt denies pain or other need at time of discharge.
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
## 2025-01-28 NOTE — NURSING
Pt received email that medication has been delivered to [facility] but pharmacy closes at 9pm. Pt daughter will call and  medication in am if they have it.

## 2025-01-28 NOTE — NURSING
Pt states [facility]. [facility] and after [facility] (estimated time arrival) and was told the medication still hasn't been delivered. At this time pt has Remodulin infusing with new cartridge, no complaints at this time.

## 2025-01-28 NOTE — PLAN OF CARE
Pt AAOx4, VSS on RA. NSR on tele. Pt with remodulin infusing @110ng/kg/min--88mL/24hrs. Pt generalized chronic pain managed with percocet PRN. Melatonin administered for sleep. Pt up independently, remains free from falls/injury. Pending home delivery of medication (see previous note), Bed in lowest locked position, call light within reach, POC ongoing. Possible d/c today.

## 2025-01-30 DIAGNOSIS — I27.20 PULMONARY HYPERTENSION: ICD-10-CM

## 2025-01-30 DIAGNOSIS — M25.541 ARTHRALGIA OF BOTH HANDS: ICD-10-CM

## 2025-01-30 DIAGNOSIS — M79.604 PAIN IN BOTH LOWER EXTREMITIES: ICD-10-CM

## 2025-01-30 DIAGNOSIS — M79.605 PAIN IN BOTH LOWER EXTREMITIES: ICD-10-CM

## 2025-01-30 DIAGNOSIS — M25.542 ARTHRALGIA OF BOTH HANDS: ICD-10-CM

## 2025-01-30 RX ORDER — LOPERAMIDE HYDROCHLORIDE 2 MG/1
2 CAPSULE ORAL 4 TIMES DAILY PRN
Qty: 30 CAPSULE | Refills: 3 | Status: SHIPPED | OUTPATIENT
Start: 2025-01-30

## 2025-01-30 RX ORDER — OXYCODONE AND ACETAMINOPHEN 10; 325 MG/1; MG/1
1 TABLET ORAL EVERY 4 HOURS PRN
Qty: 180 TABLET | Refills: 0 | Status: SHIPPED | OUTPATIENT
Start: 2025-01-30

## 2025-02-07 ENCOUNTER — TELEPHONE (OUTPATIENT)
Dept: PALLIATIVE MEDICINE | Facility: CLINIC | Age: 50
End: 2025-02-07
Payer: MEDICAID

## 2025-02-07 DIAGNOSIS — M79.604 PAIN IN BOTH LOWER EXTREMITIES: Primary | ICD-10-CM

## 2025-02-07 DIAGNOSIS — M79.605 PAIN IN BOTH LOWER EXTREMITIES: Primary | ICD-10-CM

## 2025-02-07 RX ORDER — BUPRENORPHINE 5 UG/H
1 PATCH TRANSDERMAL WEEKLY
Qty: 5 PATCH | Refills: 0 | Status: SHIPPED | OUTPATIENT
Start: 2025-02-07 | End: 2025-02-21

## 2025-02-18 ENCOUNTER — PATIENT MESSAGE (OUTPATIENT)
Dept: TRANSPLANT | Facility: CLINIC | Age: 50
End: 2025-02-18
Payer: MEDICAID

## 2025-02-18 ENCOUNTER — TELEPHONE (OUTPATIENT)
Dept: PALLIATIVE MEDICINE | Facility: CLINIC | Age: 50
End: 2025-02-18
Payer: MEDICAID

## 2025-02-20 ENCOUNTER — TELEPHONE (OUTPATIENT)
Dept: PALLIATIVE MEDICINE | Facility: CLINIC | Age: 50
End: 2025-02-20
Payer: MEDICAID

## 2025-02-21 ENCOUNTER — OFFICE VISIT (OUTPATIENT)
Dept: PALLIATIVE MEDICINE | Facility: CLINIC | Age: 50
End: 2025-02-21
Payer: MEDICAID

## 2025-02-21 DIAGNOSIS — M79.605 PAIN IN BOTH LOWER EXTREMITIES: Primary | ICD-10-CM

## 2025-02-21 DIAGNOSIS — F41.9 ANXIETY: ICD-10-CM

## 2025-02-21 DIAGNOSIS — I27.20 PULMONARY HYPERTENSION: ICD-10-CM

## 2025-02-21 DIAGNOSIS — M79.604 PAIN IN BOTH LOWER EXTREMITIES: Primary | ICD-10-CM

## 2025-02-21 RX ORDER — VENLAFAXINE HYDROCHLORIDE 150 MG/1
150 CAPSULE, EXTENDED RELEASE ORAL DAILY
Qty: 30 CAPSULE | Refills: 11 | Status: SHIPPED | OUTPATIENT
Start: 2025-02-21 | End: 2026-02-21

## 2025-02-21 RX ORDER — OXYCODONE 27 MG/1
1 CAPSULE, EXTENDED RELEASE ORAL EVERY 12 HOURS
Qty: 60 EACH | Refills: 0 | Status: SHIPPED | OUTPATIENT
Start: 2025-02-21

## 2025-02-21 NOTE — PROGRESS NOTES
Palliative Medicine Clinic Note      Consult Requested By: No ref. provider found    Primary Care Physician:   Jorge A Stack MD    Reason for Consult: Advance care planning and symptom management in the setting of Pulmonary Hypertension     The patient location is: [place]  The chief complaint leading to consultation is: pain    Visit type: audiovisual    Face to Face time with patient: 10min  25 minutes of total time spent on the encounter, which includes face to face time and non-face to face time preparing to see the patient (eg, review of tests), Obtaining and/or reviewing separately obtained history, Documenting clinical information in the electronic or other health record, Independently interpreting results (not separately reported) and communicating results to the patient/family/caregiver, or Care coordination (not separately reported).       Each patient provided with medical services by telemedicine is:  (1) informed of the relationship between the physician and patient and the respective role of any other health care provider with respect to management of the patient; and (2) notified that he or she may decline to receive medical services by telemedicine and may withdraw from such care at any time.      ASSESSMENT/PLAN:     Plan/Recommendations:  Diagnoses and all orders for this visit:    Pulmonary hypertension  -Pulmonary HTN, WHO group 1, on remodulin.   - following with Pulmonary hypertension Clinic  -currently on Opsynvi and Remodulin   -Not requiring oxygen     Anxiety /Other insomnia  -Improved  -Increase Effexor XR  [id number] daily  continues to have anxiety  - Referred to [facility] (therapist) [facility]  for grief counseling and emotional support.    Pain in both lower extremities  Arthralgia in both hands  -pain in lower extremities is related to her medication, it is worse when her Remodulin is uptitrated- feels like her feet are on fire   - Evaluated pain management, noting  ineffectiveness of current regimen.   - Discontinued buprenorphine patch due to ineffectiveness.   -Will d/c Lyrica given report of ineffectiveness and restart low dose  Gabapentin 200mg BID  -Percocet 10-325mg taking q.4 to 6 hours now (refill sent)   -Will restart Xtampza 27mg PO q 12h   -Failed buprenorphine SL film and patch   -On Effexor XR 150mg daily      Fatigue   - Educated on the connection between sleep patterns and daytime functioning.  - Patient to implement a consistent sleep schedule, aiming to be asleep by midnight.      Nausea:   - Patient to anticipate and preemptively treat nausea on days when changing medication cassette.  - Zofran PRN for nausea management.      Palliative care encounter  Medicolegal: Has decision making capacity.   is surrogate decision maker.   She shared that for HCPOA she would named her  as 1st agent and her daughter Nicky as her 2nd agent.     Psychosocial:  support system consists of  and daughters     Spiritual: Baptism    Understanding of disease and Illness Trajectory: Patient  has  adequate understanding of her illness, they can benefit from continued education on what to expect in the future.      Advance Care Planning   Advance Directives:   Living Will: No    [place]POST: No    Do Not Resuscitate Status: No    Medical Power of : Yes    Agent's Name:  Juju Maier   Agent's Contact Number:  [phone]    Decision Making:  Patient answered questions  Goals of Care: Advance Care Planning    Date: 10/09/2024  I engaged the patient in a voluntary conversation about advance care planning and we specifically addressed what the goals of care would be moving forward.  We explored the patient's values and preferences for future care.  The patient endorses that what is most important right now is to focus on remaining as independent as possible and symptom/pain control. She expresses strong desire to maintain independence and continue daily  activities. For her quality of life involves being able to engage in daily activities and care for grandchildren.  Accordingly, we have decided that the best plan to meet the patient's goals includes continuing with treatment    Date: 08/27/2024  Voluntary advance care planning discussion had today with patient. Previously completed HCPOA in electronic medical record is current, no changes made.     Date: 06/13/2023  I introduced the concept of advance directives to the patient, as well. Then the patient received detailed information about the importance of designating a Health Care Power of  (HCPOA). She was also instructed to communicate with this person about their wishes for future healthcare, should she become sick and lose decision-making capacity. The patient has not previously appointed a HCPOA. After our discussion, the patient has decided to complete a HCPOA and has appointed her significant other, health care agent: Juju Maier & her daughter Navya Dowell as second agent. Form completed and will be scanned today.           min time was spent on advance care planning, goals of care discussion, emotional support, formulating and communicating prognosis and goals of care, exploring burden/benefit of various approaches of treatment.     Follow up: 2 month      SUBJECTIVE:     History obtained from: Patient      complaint:   Chief Complaint   Patient presents with    Follow-up    Pain    Anorexia    Fatigue    Diarrhea       Disease History:  Pulmonary HTN, WHO group 1, on remodulin. RHC 3/21/22 indicative on worsening disease      History of Present Illness / Interval History:  Kristin Maier is 49 y.o. year old female presenting with PAH.  Referred to Palliative Care for evaluation and management of physical symptoms, advance care planning,, and additional support.. female attended the appointment alone    2/21/25  Patient reports ongoing issues with chronic pain and energy  "levels. Her fluid levels are good with no swelling. She continues to experience chronic diarrhea as a side effect of her medications. Pain management has been challenging, with buprenorphine pain patches being ineffective. She has been taking more Percocet for pain relief in the absence of other options. Patient mentions that Xtampza were previously effective but are no longer being received.    Regarding energy levels, the patient reports persistent fatigue during the day and difficulty sleeping at night. She mentions not having slept the previous night due to not feeling well and has not been achieving a full eight hours of sleep.    Patient is currently taking Effexor at 75 mg with no side effects. She also mentions that a medication Xtampza has been helpful in the past. Patient is not currently taking Lyrica, describing it as "out of hand." She reports shortness of breath.    Patient denies any swelling or side effects from Effexor.      Refer to prior notes/encounters for more details: 24; 24; 10/9/24:      Review of Symptoms      Symptom Assessment (ESAS 0-10 Scale)  Pain:  7  Dyspnea:  0  Anxiety:  0  Nausea:  0  Depression:  0  Anorexia:  5  Fatigue:  8  Insomnia:  0  Restlessness:  0  Agitation:  0     CAM / Delirium:  Negative  Constipation:  Negative  Diarrhea:  Positive      Pain Assessment:    Location(s): leg (joint)    Leg       Location: bilateral        Quality: Throbbing        Quantity: 7/10 in intensity        Chronicity: Onset 2 year(s) ago, unchanged        Aggravating Factors: Standing        Alleviating Factors: Opiates        Associated Symptoms: None    Modified Jessica Scale:  3    Performance Status:  60    Living Arrangements:  Lives with family    Psychosocial/Cultural:   See Palliative Psychosocial Note: Yes  .  is a . Has 3 living daughters youngest is 25 oldest is 33 and a  son. Has grandchildren who she enjoys spending time with. Likes " teaching them to play different sports. Enjoys gardening   **Primary  to Follow**  Palliative Care  Consult: Yes    Spiritual:  F - Leticia and Belief:  Yarsanism        Previous experience or exposure to a serious illness: Yes      Medications:    Current Outpatient Medications:     buprenorphine 5 mcg/hour (BUTRANS) weekly patch, Place 1 patch onto the skin once a week., Disp: 5 patch, Rfl: 0    furosemide (LASIX) 20 MG tablet, Take 1 tablet (20 mg total) by mouth once daily., Disp: 90 tablet, Rfl: 3    gabapentin (NEURONTIN) 100 MG capsule, Take 2 capsules (200 mg total) by mouth 2 (two) times daily. Take 200 mg in AM and 200 mg in afternoon. Continue taking 300 mg (other Rx) at night., Disp: 120 capsule, Rfl: 11    loperamide (IMODIUM) 2 mg capsule, Take 1 capsule (2 mg total) by mouth 4 (four) times daily as needed for Diarrhea., Disp: 30 capsule, Rfl: 3    macitentan-tadalafil (OPSYNVI) 10-40 mg Tab, Take 10-40 mg by mouth once daily., Disp: , Rfl:     magnesium oxide (MAG-OX) 400 mg (241.3 mg magnesium) tablet, Take 1 tablet (400 mg total) by mouth once daily., Disp: 90 tablet, Rfl: 3    multivitamin with minerals tablet, Take 1 tablet by mouth once daily., Disp: , Rfl:     naloxone (NARCAN) 4 mg/actuation Spry, 4mg by nasal route as needed for opioid overdose; may repeat every 2-3 minutes in alternating nostrils until medical help arrives. Call 911, Disp: 1 each, Rfl: 11    ondansetron (ZOFRAN) 4 MG tablet, Take 1 tablet (4 mg total) by mouth every 8 (eight) hours as needed for Nausea., Disp: 30 tablet, Rfl: 6    oxyCODONE-acetaminophen (PERCOCET)  mg per tablet, Take 1 tablet by mouth every 4 (four) hours as needed for Pain., Disp: 180 tablet, Rfl: 0    potassium chloride SA (K-DUR,KLOR-CON M) 10 MEQ tablet, Take 2 tablets (20 mEq total) by mouth once daily., Disp: 60 tablet, Rfl: 11    REMODULIN 5 mg/mL Soln, , Disp: , Rfl:     sodium chloride 0.9% SolP 100 mL with treprostinil  1 mg/mL Soln 6,000,000 ng, Inject 2,145 ng/min into the vein continuous., Disp: , Rfl:     spironolactone (ALDACTONE) 25 MG tablet, Take 1 tablet (25 mg total) by mouth once daily., Disp: 90 tablet, Rfl: 3    venlafaxine (EFFEXOR-XR) 75 MG 24 hr capsule, Take 1 capsule (75 mg total) by mouth once daily., Disp: 30 capsule, Rfl: 11      External  database queried on 02/21/2025  by Sonja Mcgill .   The results reviewed and considered with the clinical data in the decision whether or not to prescribe a controlled substance.  02/10/2025 02/07/2025 1 Buprenorphine 5 Mcg/hr Patch 4.00 28 Er Lori [id number] Tali ([id number]) 0 0.12 mg Private Pay [place]   01/31/2025 01/30/2025 2 Oxycodone-Acetaminophen  180.00 30 De Shaun [id number] Peo ([id number]) 0 90.00 MME Other [place]   01/27/2025 01/24/2025 1 Belbuca 75 Mcg Film 30.00 30 Er Lori [id number] Tali ([id number]) 0 0.07 mg Private Pay [place]   01/27/2025 08/27/2024 2 Pregabalin 75 Mg Capsule 90.00 30 Er Lori [id number] Peo ([id number]) 5 1.51 LME Medicaid [place]   01/03/2025 12/27/2024 2 Oxycodone-Acetaminophen  180.00 30 De Shaun [id number] Peo ([id number]) 0 90.00 MME Other [place]       Review of patient's allergies indicates:  No Known Allergies    OBJECTIVE:       Physical Exam:  Vitals:    Physical Exam  Constitutional:       General: She is not in acute distress.  HENT:      Head: Normocephalic and atraumatic.      Mouth/Throat:      Mouth: Mucous membranes are moist.   Eyes:      Extraocular Movements: Extraocular movements intact.   Pulmonary:      Effort: Pulmonary effort is normal. No respiratory distress.   Abdominal:      General: There is no distension.   Musculoskeletal:      Right lower leg: No edema.      Left lower leg: No edema.   Neurological:      Mental Status: She is alert and oriented to person, place, and time.   Psychiatric:         Mood and Affect: Mood and affect normal.         This note was generated with the assistance of ambient listening technology. Verbal consent was obtained by the patient  and accompanying visitor(s) for the recording of patient appointment to facilitate this note. I attest to having reviewed and edited the generated note for accuracy, though some syntax or spelling errors may persist. Please contact the author of this note for any clarification.        Signature: Sonja Mcgill MD

## 2025-02-27 DIAGNOSIS — M79.604 PAIN IN BOTH LOWER EXTREMITIES: ICD-10-CM

## 2025-02-27 DIAGNOSIS — M79.605 PAIN IN BOTH LOWER EXTREMITIES: ICD-10-CM

## 2025-02-27 DIAGNOSIS — M25.542 ARTHRALGIA OF BOTH HANDS: ICD-10-CM

## 2025-02-27 DIAGNOSIS — I27.20 PULMONARY HYPERTENSION: ICD-10-CM

## 2025-02-27 DIAGNOSIS — M25.541 ARTHRALGIA OF BOTH HANDS: ICD-10-CM

## 2025-02-28 RX ORDER — OXYCODONE AND ACETAMINOPHEN 10; 325 MG/1; MG/1
1 TABLET ORAL EVERY 4 HOURS PRN
Qty: 180 TABLET | Refills: 0 | Status: SHIPPED | OUTPATIENT
Start: 2025-02-28

## 2025-03-10 DIAGNOSIS — M79.605 PAIN IN BOTH LOWER EXTREMITIES: Primary | ICD-10-CM

## 2025-03-10 DIAGNOSIS — I27.20 PULMONARY HYPERTENSION: ICD-10-CM

## 2025-03-10 DIAGNOSIS — M79.604 PAIN IN BOTH LOWER EXTREMITIES: Primary | ICD-10-CM

## 2025-03-10 RX ORDER — MORPHINE SULFATE 15 MG/1
15 TABLET, FILM COATED, EXTENDED RELEASE ORAL 2 TIMES DAILY
Qty: 60 TABLET | Refills: 0 | Status: SHIPPED | OUTPATIENT
Start: 2025-03-10 | End: 2025-03-14 | Stop reason: SDUPTHER

## 2025-03-13 ENCOUNTER — PATIENT MESSAGE (OUTPATIENT)
Dept: TRANSPLANT | Facility: CLINIC | Age: 50
End: 2025-03-13
Payer: MEDICAID

## 2025-03-14 ENCOUNTER — PATIENT MESSAGE (OUTPATIENT)
Dept: PALLIATIVE MEDICINE | Facility: CLINIC | Age: 50
End: 2025-03-14
Payer: MEDICAID

## 2025-03-14 DIAGNOSIS — M79.604 PAIN IN BOTH LOWER EXTREMITIES: ICD-10-CM

## 2025-03-14 DIAGNOSIS — I27.20 PULMONARY HYPERTENSION: ICD-10-CM

## 2025-03-14 DIAGNOSIS — M79.605 PAIN IN BOTH LOWER EXTREMITIES: ICD-10-CM

## 2025-03-14 RX ORDER — MORPHINE SULFATE 15 MG/1
15 TABLET, FILM COATED, EXTENDED RELEASE ORAL 2 TIMES DAILY
Qty: 60 TABLET | Refills: 0 | Status: SHIPPED | OUTPATIENT
Start: 2025-03-14 | End: 2025-04-13

## 2025-03-25 DIAGNOSIS — I27.20 PULMONARY HYPERTENSION: ICD-10-CM

## 2025-03-25 DIAGNOSIS — M79.605 PAIN IN BOTH LOWER EXTREMITIES: ICD-10-CM

## 2025-03-25 DIAGNOSIS — M25.542 ARTHRALGIA OF BOTH HANDS: ICD-10-CM

## 2025-03-25 DIAGNOSIS — M25.541 ARTHRALGIA OF BOTH HANDS: ICD-10-CM

## 2025-03-25 DIAGNOSIS — M79.604 PAIN IN BOTH LOWER EXTREMITIES: ICD-10-CM

## 2025-03-25 RX ORDER — OXYCODONE AND ACETAMINOPHEN 10; 325 MG/1; MG/1
1 TABLET ORAL EVERY 4 HOURS PRN
Qty: 180 TABLET | Refills: 0 | Status: SHIPPED | OUTPATIENT
Start: 2025-03-25

## 2025-04-09 DIAGNOSIS — M79.604 PAIN IN BOTH LOWER EXTREMITIES: ICD-10-CM

## 2025-04-09 DIAGNOSIS — M79.605 PAIN IN BOTH LOWER EXTREMITIES: ICD-10-CM

## 2025-04-09 DIAGNOSIS — I27.20 PULMONARY HYPERTENSION: ICD-10-CM

## 2025-04-09 RX ORDER — MORPHINE SULFATE 15 MG/1
15 TABLET, FILM COATED, EXTENDED RELEASE ORAL 2 TIMES DAILY
Qty: 60 TABLET | Refills: 0 | Status: SHIPPED | OUTPATIENT
Start: 2025-04-09 | End: 2025-05-09

## 2025-04-15 NOTE — PLAN OF CARE
[facility]   Physical Therapy Initial Evaluation     Date: 9/12/2023   Name: Kristin [facility] Number: [id number]    Therapy Diagnosis:   Encounter Diagnoses   Name Primary?    Debility Yes    Pain in both lower extremities     Decreased strength of lower extremity     Impaired flexibility of lower extremity     Gait abnormality      Physician: Sonja Mcgill, *    Physician Orders: PT Eval and Treat   Medical Diagnosis from Referral: M79.604,M79.605 (ICD-10-CM) - Pain in both lower extremities   Evaluation Date: 9/12/2023  Authorization Period Expiration: 8/10/2024  Plan of Care Expiration: 11/30/2023  Progress Note Due: 11/12/2023  Visit # / Visits authorized: 1/ 1   FOTO: 1/3    Precautions: Standard, Fall, and Unspecified Heart Failure      Time In: 13:00   Time Out: 13:45  Total Appointment Time (timed & untimed codes): 45 minutes      SUBJECTIVE     Date of onset: about a year    History of current condition - Kristin reports: her pain is from her knee down to her feet. She reports that the left one hurts the worst when she has extra fluid in her leg. She reports that she was out of her fluid medications for 2 days, but then when she started her pills again, her pain lessened. She was diagnosed with pulmonary artery hypertension - November of 2001. She recently started seeing Pain Management. She had an increase in her medication, and her pain increased to extreme levels.    Falls: 3 months ago, she fell, but she doesn't know exactly what happened, possibly lost consciousness     Imaging, none    Prior Therapy: None  Social History: 1 story; lives with their family  Occupation: Not currently working  Prior Level of Function: Limited for years  Current Level of Function: Standing, walking, getting into and out of car, bending over to pick things up, rolling over in bed, and sitting for long times make her pain worse    Pain:  Current 6/10, worst 10/10, best 6/10  Vaishali Brooks 47 year old  female patient presents today for Establish Care, New Patient, and Medication Refill (Adderall )  .    HPI    ADHD  -Patient is taking Adderall 30 mg BID.   -She has seen Psychiatry in the past.  -Pain management profile done in 5/2024.   -No depression or insomnia.     Asthma   -Using Albuterol once weekly.   -No nighttime awakenings.   -No fevers, chills or SOB.     Chronic back pain  -Hx of a fall down the stairs and fracture of her sacrum.   -She has medical marijuana card.   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    Hyperlipidemia   -No prior treatment.  -Trying to avoid fatty foods in diet.    Overweight  -She has had difficulty with weight loss despite lifestyle modifications.  -No personal or family history of thyroid cancer, MEN or pancreatitis.    Review of Systems   Constitutional:  Negative for chills, fatigue and fever.   HENT:  Negative for congestion, sinus pressure and sore throat.    Eyes:  Negative for pain, redness and visual disturbance.   Respiratory:  Negative for cough, chest tightness and shortness of breath.    Cardiovascular:  Negative for chest pain, palpitations and leg swelling.   Gastrointestinal:  Negative for abdominal pain, constipation, diarrhea, nausea and vomiting.   Genitourinary:  Negative for dysuria, frequency and urgency.   Musculoskeletal:  Positive for back pain. Negative for neck pain.   Skin:  Negative for rash.   Neurological:  Negative for numbness and headaches.   Psychiatric/Behavioral:  The patient is not nervous/anxious.        All other systems reviewed and negative.    Current Outpatient Medications   Medication Sig Dispense Refill    amphetamine-dextroamphetamine (ADDERALL) 30 MG tablet TAKE 1 TABLET BY MOUTH 2 TIMES A DAY 60 tablet 0    famotidine (PEPCID) 40 MG tablet Take 40 mg by mouth daily.      meloxicam (MOBIC) 15 MG tablet Take 15 mg by mouth daily as needed.      ondansetron (Zofran ODT) 4 MG    Location: bilateral feet  and lower legs  Description: Aching, Tight,sharp, and cramping  Aggravating Factors: Sitting, Standing, Touching, Flexing, and Getting out of bed/chair  Easing Factors: massage, pain medication, heating pad, and walking on treadmill    Patients goals: to get pain relief to allow more activities that she used to do     Medical History:   Past Medical History:   Diagnosis Date    Elevated liver enzymes     Essential (primary) hypertension     Heart failure, unspecified     Hypokalemia     Mixed hyperlipidemia     Neuropathic pain     Tx w/ tramadol & Lyrica    Pulmonary hypertension     Receiving Remodulin continuously through tunneled central line       Surgical History:   Kristin Maier  has a past surgical history that includes Tubal ligation ();  section (); Appendectomy (); Vaginal delivery (; ; ; ); Hysterectomy (2017); Oophorectomy; Right heart catheterization (Right, 2021); Right heart catheterization (Right, 3/16/2022); Right heart catheterization (Right, 2022); and Right heart catheterization (Right, 3/21/2023).    Medications:   Kristin has a current medication list which includes the following prescription(s): diphenhydramine-acetaminophen, furosemide, loperamide, mirtazapine, multivitamin with minerals, ondansetron, opsumit, oxycodone-acetaminophen, potassium chloride sa, pregabalin, remodulin, adempas, and sodium chloride 0.9% SolP 100 mL with treprostinil 1 mg/mL Soln 6,000,000 ng.    Allergies:   Review of patient's allergies indicates:  No Known Allergies       OBJECTIVE     Posture: flat feet, increased lumbar lordosis  Palpation: TTP to bilateral calves and L5  Sensation: patient denied numbness or tingling    Range of Motion/Strength:     Range of Motion of Lower Extremities is WNL    L/E MMT Right Left Pain/Dysfunction with Movement   Hip Flexion 4-/5 4/5    Hip Extension 3-/5 3-/5 Pain in low back   Hip Abduction  disintegrating tablet Place 1 tablet onto the tongue every 8 hours as needed for Nausea. 20 tablet 1    cetirizine (ZyrTEC) 10 MG tablet Take 10 mg by mouth every morning.      Multiple Vitamins-Minerals (MULTIVITAL PO) Take by mouth every morning. (Patient not taking: Reported on 2025)      albuterol 108 (90 Base) MCG/ACT inhaler Inhale 2 puffs into the lungs every 4 hours as needed for Shortness of Breath or Wheezing. 1 each 11     No current facility-administered medications for this visit.       ALLERGIES:   Allergen Reactions    Morphine HIVES       Past Medical History:   Diagnosis Date    ADHD (attention deficit hyperactivity disorder)     Anxiety     Arthritis     Asthma (CMD)     under control    Cervical radiculopathy     PONV (postoperative nausea and vomiting)     Sacral fracture  (CMD)     Vascular disorder of extremity (CMD)     venous malformations both LE s/p sclerosis of vein       Past Surgical History:   Procedure Laterality Date     section, classic      ,     Ir spine injection      Knee surgery Bilateral     KNEE SCOPE x5    Lasik surgery Bilateral     Sleeve gastroplasty  2013    Total abdom hysterectomy  2003    Varicose vein surgery Bilateral        Social History     Socioeconomic History    Marital status:      Spouse name: Not on file    Number of children: Not on file    Years of education: Not on file    Highest education level: Not on file   Occupational History    Not on file   Tobacco Use    Smoking status: Never     Passive exposure: Never    Smokeless tobacco: Never   Vaping Use    Vaping status: never used   Substance and Sexual Activity    Alcohol use: Yes     Alcohol/week: 2.0 standard drinks of alcohol     Types: 2 Glasses of wine per week     Comment: social    Drug use: No    Sexual activity: Yes     Partners: Male     Birth control/protection: None   Other Topics Concern    Not on file   Social History Narrative    Not on file     Social Drivers  3-/5 3-/5 Pain   Knee Flexion 4/5 4/5 Pain with pressure on lower legs   Knee Extension 4/5 4/5 Pain with pressure on lower legs   Ankle DF 4/5 4/5 Pain in posterior calves   Ankle PF 3+/5 3+/5 Pain in posterior/lateral calves       Flexibility: Hamstring, Gastrocnemius, and Quadriceps Tightness    Gait Analysis:Without AD  Deviations: trendelenburg bilaterally      Limitation/Restriction for FOTO Lower Leg (w/o Knee) Survey    Therapist reviewed FOTO scores for Kristin Maier on 9/12/2023.   FOTO documents entered into [email or web address] - see Media section.    Limitation Score: 97%         TREATMENT     Total Treatment time (time-based codes) separate from Evaluation: 15 minutes      Kristin received the treatments listed below:      therapeutic exercises to develop strength, endurance, ROM, flexibility, posture, and core stabilization for 15 minutes including:  LTR  Seated Hamstring Stretch  Seated Gastrocnemius Stretch    PATIENT EDUCATION AND HOME EXERCISES     Education provided:   - PT discussed with patient, her diagnosis, her prognosis, her POC, and her HEP.    Written Home Exercises Provided: yes. Exercises were reviewed and Kristin was able to demonstrate them prior to the end of the session.  Kristin demonstrated good  understanding of the education provided. See EMR under Patient Instructions for exercises provided during therapy sessions.    ASSESSMENT     Kristin is a 47 y.o. female referred to outpatient Physical Therapy with a medical diagnosis of M79.604,M79.605 (ICD-10-CM) - Pain in both lower extremities. Patient presents with debility, pain in lower extremities, decreased strength in lower extremities, decreased flexibility in lower extremities, and gait abnormalities.    Patient prognosis is Good.   Patient will benefit from skilled outpatient Physical Therapy to address the deficits stated above and in the chart below, provide patient /family education, and to maximize patientt's level of independence.  of Health     Financial Resource Strain: Not on file   Food Insecurity: Low Risk  (2/8/2025)    Food Insecurity     Worried about Food: Never true     Food is Gone: Never true   Transportation Needs: Not At Risk (2/8/2025)    Transportation Needs     Lack of Reliable Transportation: No   Physical Activity: Insufficiently Active (1/16/2019)    Exercise Vital Sign     Days of Exercise per Week: 3 days     Minutes of Exercise per Session: 30 min   Stress: Not on file   Social Connections: Not on file   Feeling safe in your relationship: Low Risk  (6/24/2024)    Interpersonal Safety     How often physically hurt: Never     How often insulted or talked down to: Never     How often threatened with harm: Never     How often scream or curse at: Never       Family History   Problem Relation Age of Onset    Patient is unaware of any medical problems Mother     Myocardial Infarction Father 56    Asthma Daughter     Asthma Son     Cancer, Lung Maternal Grandmother     Cancer, Lung Maternal Grandfather     Patient is unaware of any medical problems Paternal Grandmother     Patient is unaware of any medical problems Paternal Grandfather        Visit Vitals  /74   Pulse 90   Temp 98.1 °F (36.7 °C) (Temporal)   Ht 5' 8\" (1.727 m)   Wt 83.9 kg (185 lb)   SpO2 98%   BMI 28.13 kg/m²       Physical Exam  Constitutional:       Appearance: Normal appearance.   HENT:      Head: Normocephalic and atraumatic.      Right Ear: External ear normal.      Left Ear: External ear normal.      Nose: No congestion.      Mouth/Throat:      Mouth: Mucous membranes are moist.   Eyes:      Extraocular Movements: Extraocular movements intact.      Conjunctiva/sclera: Conjunctivae normal.   Cardiovascular:      Rate and Rhythm: Normal rate and regular rhythm.      Pulses: Normal pulses.      Heart sounds: Normal heart sounds.   Pulmonary:      Effort: Pulmonary effort is normal.      Breath sounds: Normal breath sounds.   Abdominal:      General:      Plan of care discussed with patient: Yes  Patient's spiritual, cultural and educational needs considered and patient is agreeable to the plan of care and goals as stated below:     Anticipated Barriers for therapy: chronicity of pain, decreased endurance     Medical Necessity is demonstrated by the following  History  Co-morbidities and personal factors that may impact the plan of care Co-morbidities:   Pulmonary Hypertension, Primary Hypertension, Heart Failure, and Neuropathic Pain    Personal Factors:   coping style  lifestyle     moderate   Examination  Body Structures and Functions, activity limitations and participation restrictions that may impact the plan of care Body Regions:   back  lower extremities    Body Systems:    strength  balance  gait  transfers  motor control    Participation Restrictions:   none    Activity limitations:   Learning and applying knowledge  no deficits    General Tasks and Commands  no deficits    Communication  no deficits    Mobility  lifting and carrying objects  walking  driving (bike, car, motorcycle)    Self care  no deficits    Domestic Life  shopping  cooking  doing house work (cleaning house, washing dishes, laundry)    Interactions/Relationships  no deficits    Life Areas  no deficits    Community and Social Life  community life  recreation and leisure         moderate   Clinical Presentation evolving clinical presentation with changing clinical characteristics moderate   Decision Making/ Complexity Score: moderate     Short Term Goals (4 Weeks):  1. Patient will be compliant with home exercise program to supplement therapy in restoring pain free function.  2. Patient will improve impaired lower extremity manual muscle tests to >/= 4/5 to improve dynamic bilateral hip and knee support for functional tasks.  3. Patient will report increased activity to at least 5000 steps a day, in order to increase endurance.  4. Patient will report minimal (<5) prolonged (greater  Bowel sounds are normal.   Musculoskeletal:         General: Normal range of motion.      Cervical back: Normal range of motion.   Skin:     General: Skin is warm.   Neurological:      General: No focal deficit present.      Mental Status: She is alert.   Psychiatric:         Mood and Affect: Mood normal.           Assessment and plan:    Attention deficit hyperactivity disorder (ADHD), combined type  (primary encounter diagnosis)  Mild intermittent asthma without complication (CMD)  Overweight (BMI 25.0-29.9)  Hyperlipidemia, unspecified hyperlipidemia type   Orders Placed This Encounter    Drug Investigation, Urine      ADHD  Stable.  -Continue current regimen.  -Declined referral to Psychiatry.  -Controlled substance agreement was completed today. UDS pending. Discussed refilling her medication after the UDS is resulted.    Asthma   Stable.  -Continue current regimen.  -Follow-up as needed.    Chronic back pain  Stable.  - Follow-up as needed.    Hyperlipidemia   Uncontrolled.  -Lipid panel from 5/2024 with pure hypercholesterolemia. CMP and TSH was within normal limits.  -Repeat lipid panel pending.    Overweight  Uncontrolled.  -Continue working on lifestyle modifications.  -Patient is interested in trying weight management treatment.  -Agrees to close follow-up.    Return in about 3 weeks (around 5/6/2025) for Weight management f/u .    Cahpo Ann MD   than 1 day) episodes of swelling in lower extremities.  Long Term Goals (8 Weeks):  1. Patient will improve FOTO score to </= 59% limited to decrease perceived limitation with mobility.   2. Patient will improve impaired lower extremity manual muscle tests to >/= 4+/5 to improve dynamic bilateral hip and knee support for functional tasks.  3. Patient will report increased activity to at least 8000 steps a day, in order to increase endurance.  4. Patient will report worst pain decreased from 10/10 to =/< 7/10, in order to improve her Quality of Life.      PLAN   Plan of care Certification: 9/12/2023 to 11/30/2023.    Outpatient Physical Therapy 2 times weekly for 8 weeks to include the following interventions: Manual Therapy, Moist Heat/ Ice, Neuromuscular Re-ed, Patient Education, Therapeutic Activities, and Therapeutic Exercise.     Ashley Manzano, PT      I CERTIFY THE NEED FOR THESE SERVICES FURNISHED UNDER THIS PLAN OF TREATMENT AND WHILE UNDER MY CARE   Physician's comments:     Physician's Signature: ___________________________________________________

## 2025-04-21 DIAGNOSIS — M79.604 PAIN IN BOTH LOWER EXTREMITIES: ICD-10-CM

## 2025-04-21 DIAGNOSIS — M25.541 ARTHRALGIA OF BOTH HANDS: ICD-10-CM

## 2025-04-21 DIAGNOSIS — I27.20 PULMONARY HYPERTENSION: ICD-10-CM

## 2025-04-21 DIAGNOSIS — M25.542 ARTHRALGIA OF BOTH HANDS: ICD-10-CM

## 2025-04-21 DIAGNOSIS — M79.605 PAIN IN BOTH LOWER EXTREMITIES: ICD-10-CM

## 2025-04-21 DIAGNOSIS — R11.0 NAUSEA: ICD-10-CM

## 2025-04-21 RX ORDER — LOPERAMIDE HYDROCHLORIDE 2 MG/1
2 CAPSULE ORAL 4 TIMES DAILY PRN
Qty: 30 CAPSULE | Refills: 3 | Status: SHIPPED | OUTPATIENT
Start: 2025-04-21

## 2025-04-21 RX ORDER — OXYCODONE AND ACETAMINOPHEN 10; 325 MG/1; MG/1
1 TABLET ORAL EVERY 4 HOURS PRN
Qty: 180 TABLET | Refills: 0 | Status: SHIPPED | OUTPATIENT
Start: 2025-04-21

## 2025-04-21 RX ORDER — ONDANSETRON 4 MG/1
4 TABLET, FILM COATED ORAL EVERY 8 HOURS PRN
Qty: 30 TABLET | Refills: 6 | Status: SHIPPED | OUTPATIENT
Start: 2025-04-21

## 2025-04-25 ENCOUNTER — TELEPHONE (OUTPATIENT)
Dept: TRANSPLANT | Facility: CLINIC | Age: 50
End: 2025-04-25
Payer: MEDICAID

## 2025-04-25 NOTE — TELEPHONE ENCOUNTER
Reminder call made to patient regarding appointment(s).  No answer.  Unable to leave a message on voicemail. Voicemail is not set up.

## 2025-04-28 ENCOUNTER — PATIENT MESSAGE (OUTPATIENT)
Dept: TRANSPLANT | Facility: CLINIC | Age: 50
End: 2025-04-28
Payer: MEDICAID

## 2025-04-29 ENCOUNTER — HOSPITAL ENCOUNTER (OUTPATIENT)
Dept: CARDIOLOGY | Facility: HOSPITAL | Age: 50
Discharge: HOME | End: 2025-04-29
Payer: MEDICAID

## 2025-04-29 ENCOUNTER — HOSPITAL ENCOUNTER (OUTPATIENT)
Dept: PULMONOLOGY | Facility: CLINIC | Age: 50
Discharge: HOME | End: 2025-04-29
Payer: MEDICAID

## 2025-04-29 ENCOUNTER — TELEPHONE (OUTPATIENT)
Dept: TRANSPLANT | Facility: CLINIC | Age: 50
End: 2025-04-29
Payer: MEDICAID

## 2025-04-29 ENCOUNTER — OFFICE VISIT (OUTPATIENT)
Dept: TRANSPLANT | Facility: CLINIC | Age: 50
End: 2025-04-29
Payer: MEDICAID

## 2025-04-29 VITALS
HEIGHT: 70 IN | BODY MASS INDEX: 23.19 KG/M2 | OXYGEN SATURATION: 97 % | WEIGHT: 162 LBS | BODY MASS INDEX: 24.11 KG/M2 | HEART RATE: 88 BPM | HEIGHT: 70 IN | DIASTOLIC BLOOD PRESSURE: 66 MMHG | SYSTOLIC BLOOD PRESSURE: 132 MMHG | WEIGHT: 168.44 LBS | HEART RATE: 89 BPM | SYSTOLIC BLOOD PRESSURE: 124 MMHG | DIASTOLIC BLOOD PRESSURE: 82 MMHG

## 2025-04-29 VITALS — BODY MASS INDEX: 23.19 KG/M2 | HEIGHT: 70 IN | WEIGHT: 162 LBS

## 2025-04-29 DIAGNOSIS — I27.21 WHO GROUP 1 PULMONARY ARTERIAL HYPERTENSION: ICD-10-CM

## 2025-04-29 DIAGNOSIS — R06.02 SHORTNESS OF BREATH: ICD-10-CM

## 2025-04-29 DIAGNOSIS — I07.1: ICD-10-CM

## 2025-04-29 DIAGNOSIS — I50.812 CHRONIC RIGHT-SIDED HEART FAILURE: ICD-10-CM

## 2025-04-29 DIAGNOSIS — I27.9 CHRONIC PULMONARY HEART DISEASE: ICD-10-CM

## 2025-04-29 DIAGNOSIS — Z79.899 HIGH RISK MEDICATION USE: ICD-10-CM

## 2025-04-29 DIAGNOSIS — I27.20 PULMONARY HYPERTENSION: Primary | ICD-10-CM

## 2025-04-29 LAB
ASCENDING AORTA: 3.7 CM
AV AREA BY CONTINUOUS VTI: 3.1 CM2
AV INDEX (PROSTH): 0.77
AV LVOT MEAN GRADIENT: 2 MMHG
AV LVOT PEAK GRADIENT: 5 MMHG
AV MEAN GRADIENT: 4 MMHG
AV PEAK GRADIENT: 7 MMHG
AV VALVE AREA BY VELOCITY RATIO: 3.5 CM²
AV VALVE AREA: 3.2 CM2
AV VELOCITY RATIO: 0.85
BSA FOR ECHO PROCEDURE: 1.9 M2
CV ECHO LV RWT: 0.33 CM
DOP CALC AO PEAK VEL: 1.3 M/S
DOP CALC AO VTI: 21.4 CM
DOP CALC LVOT AREA: 4.2 CM2
DOP CALC LVOT DIAMETER: 2.3 CM
DOP CALC LVOT PEAK VEL: 1.1 M/S
DOP CALC LVOT STROKE VOLUME: 68.1 CM3
DOP CALCLVOT PEAK VEL VTI: 16.4 CM
E WAVE DECELERATION TIME: 164 MS
E/A RATIO: 0.86
E/E' RATIO: 7 M/S
ECHO EF ESTIMATED: 71 %
ECHO LV POSTERIOR WALL: 0.8 CM (ref 0.6–1.1)
FRACTIONAL SHORTENING: 45.8 % (ref 28–44)
INTERVENTRICULAR SEPTUM: 1 CM (ref 0.6–1.1)
IVC DIAMETER: 0.89 CM
LA MAJOR: 5 CM
LA MINOR: 4.8 CM
LA WIDTH: 3.6 CM
LEFT ATRIUM SIZE: 2.7 CM
LEFT ATRIUM VOLUME INDEX MOD: 31 ML/M2
LEFT ATRIUM VOLUME INDEX: 21 ML/M2
LEFT ATRIUM VOLUME MOD: 59 ML
LEFT ATRIUM VOLUME: 40 CM3
LEFT INTERNAL DIMENSION IN SYSTOLE: 2.6 CM (ref 2.1–4)
LEFT VENTRICLE DIASTOLIC VOLUME INDEX: 41.88 ML/M2
LEFT VENTRICLE DIASTOLIC VOLUME: 80 ML
LEFT VENTRICLE MASS INDEX: 77.4 G/M2
LEFT VENTRICLE SYSTOLIC VOLUME INDEX: 12 ML/M2
LEFT VENTRICLE SYSTOLIC VOLUME: 23 ML
LEFT VENTRICULAR INTERNAL DIMENSION IN DIASTOLE: 4.8 CM (ref 3.5–6)
LEFT VENTRICULAR MASS: 147.8 G
LV LATERAL E/E' RATIO: 5.3
LV SEPTAL E/E' RATIO: 12.6
MV A" WAVE DURATION": 79.92 MS
MV PEAK A VEL: 0.73 M/S
MV PEAK E VEL: 0.63 M/S
OHS CV RV/LV RATIO: 1 CM
PISA TR MAX VEL: 3.5 M/S
PULM VEIN A" WAVE DURATION": 79.92 MS
PULM VEIN S/D RATIO: 1.64
PULMONIC VEIN PEAK A VELOCITY: 0.3 M/S
PV PEAK D VEL: 0.44 M/S
PV PEAK S VEL: 0.72 M/S
RA MAJOR: 5 CM
RA PRESSURE ESTIMATED: 3 MMHG
RA WIDTH: 3.94 CM
RIGHT ATRIAL AREA: 17.7 CM2
RIGHT ATRIUM VOLUME AREA LENGTH APICAL 4 CHAMBER: 54 ML
RIGHT VENTRICLE DIASTOLIC BASEL DIMENSION: 4.8 CM
RIGHT VENTRICLE GLOBAL SYSTOLIC STRAIN: 14 %
RV FRACTIONAL AREA CHANGE: 18 %
RV TB RVSP: 7 MMHG
RV TISSUE DOPPLER FREE WALL SYSTOLIC VELOCITY 1 (APICAL 4 CHAMBER VIEW): 10.48 CM/S
SINUS: 3.61 CM
STJ: 3.2 CM
TDI LATERAL: 0.12 M/S
TDI SEPTAL: 0.05 M/S
TDI: 0.09 M/S
TRICUSPID ANNULAR PLANE SYSTOLIC EXCURSION: 1.8 CM
TV PEAK GRADIENT: 49 MMHG
TV REST PULMONARY ARTERY PRESSURE: 52 MMHG
Z-SCORE OF LEFT VENTRICULAR DIMENSION IN END DIASTOLE: -1.13
Z-SCORE OF LEFT VENTRICULAR DIMENSION IN END SYSTOLE: -1.91

## 2025-04-29 PROCEDURE — 99214 OFFICE O/P EST MOD 30 MIN: CPT | Mod: S$PBB,,, | Performed by: INTERNAL MEDICINE

## 2025-04-29 PROCEDURE — 99214 OFFICE O/P EST MOD 30 MIN: CPT | Mod: PBBFAC,25 | Performed by: INTERNAL MEDICINE

## 2025-04-29 PROCEDURE — 3079F DIAST BP 80-89 MM HG: CPT | Mod: CPTII,,, | Performed by: INTERNAL MEDICINE

## 2025-04-29 PROCEDURE — 94618 PULMONARY STRESS TESTING: CPT | Mod: PBBFAC | Performed by: INTERNAL MEDICINE

## 2025-04-29 PROCEDURE — 1159F MED LIST DOCD IN RCRD: CPT | Mod: CPTII,,, | Performed by: INTERNAL MEDICINE

## 2025-04-29 PROCEDURE — 93306 TTE W/DOPPLER COMPLETE: CPT

## 2025-04-29 PROCEDURE — 93306 TTE W/DOPPLER COMPLETE: CPT | Mod: 26,,, | Performed by: INTERNAL MEDICINE

## 2025-04-29 PROCEDURE — 99999 PR PBB SHADOW E&M-EST. PATIENT-LVL IV: CPT | Mod: PBBFAC,,, | Performed by: INTERNAL MEDICINE

## 2025-04-29 PROCEDURE — 3008F BODY MASS INDEX DOCD: CPT | Mod: CPTII,,, | Performed by: INTERNAL MEDICINE

## 2025-04-29 PROCEDURE — 3074F SYST BP LT 130 MM HG: CPT | Mod: CPTII,,, | Performed by: INTERNAL MEDICINE

## 2025-04-29 RX ORDER — LANOLIN ALCOHOL/MO/W.PET/CERES
400 CREAM (GRAM) TOPICAL DAILY
Qty: 90 TABLET | Refills: 3 | Status: SHIPPED | OUTPATIENT
Start: 2025-04-29 | End: 2026-04-29

## 2025-04-29 RX ORDER — SPIRONOLACTONE 25 MG/1
25 TABLET ORAL DAILY
Qty: 90 TABLET | Refills: 3 | Status: CANCELLED | OUTPATIENT
Start: 2025-04-29 | End: 2026-04-29

## 2025-04-29 NOTE — PROCEDURES
Kristin Maier is a 49 y.o.   female patient, who presents for a 6 minute walk test ordered by ANA M Guy.  The diagnosis is Pulmonary Hypertension.  The patient's BMI is 23.2 kg/m2.  Predicted distance (lower limit of normal) is 435.9 meters.      Test Results:    The test was completed without stopping.  The total time walked was 360 seconds.  During walking, the patient reported:  Dyspnea, Lightheadedness.  The patient used no assistive devices during testing.     04/29/2025---------Distance: 415.14 meters (1362 feet)     O2 Sat % Supplemental Oxygen Heart Rate Blood Pressure Jessica Scale   Pre-exercise  (Resting) 96 % Room Air 104 bpm Unable to obtain   5-6   During Exercise 97 % Room Air 117 bpm 109/68 mmHg 7-8   Post-exercise  (Recovery) 98 % Room Air  103 bpm       Recovery Time: 105 seconds    Performing nurse/tech: Asif DOMINGUEZ      PREVIOUS STUDY:   09/17/2024---------Distance: 243.84 meters (800 feet)       O2 Sat % Supplemental Oxygen Heart Rate Blood Pressure Jessica Scale   Pre-exercise  (Resting) 100 % Room Air 93 bpm 118/89 mmHg 7-8   During Exercise 99 % Room Air 104 bpm 134/89 mmHg 10   Post-exercise  (Recovery) 98 % Room Air  98 bpm           CLINICAL INTERPRETATION:  Six minute walk distance is 415.14 meters (1362 feet) with very heavy dyspnea.  During exercise, there was no desaturation while breathing room air.  Both blood pressure and heart rate remained stable with walking.  Tachycardia was present prior to exercise.  The patient reported non-pulmonary symptoms during exercise.  Since the previous study in September 2024, exercise capacity is significantly improved.  Based upon age and body mass index, exercise capacity is less than predicted.

## 2025-04-29 NOTE — PROGRESS NOTES
Subjective   Patient ID:  Kristin Maier is a 49 y.o. female who presents for follow-up of PH    48 YO  F w/ WHO group 1 PH who presents for follow up. She is on therapy with Opsynvi and IV remodulin 110 ng/kg/min. T/F adempas.    She was initially diagnosed in 2021 and has followed with HTS since.    Since her last visit overall doing okay.  The only issue that she notices that she has exertional lightheadedness.  Her blood pressure at home is in the systolic 90s over diastolics 70s and she notes that her lightheadedness is more apparent on the days when the blood pressure is lower.  Otherwise denies any cardiovascular complaints such as exertional chest discomfort, shortness of breath, issues with fluid buildup, leg swelling, abdominal swelling, PND, orthopnea.    [facility] 11/29/24  Measurements obtained while on IV remodulin at 110 ng/kg/min and Opsynvi 10-40.     Findings:     RA: 6  PA: 71/29, mean 43  PCWP: 11     Saturation:  Arterial: 90 %  PA: 65 %     Kristel CI/CO: 3.6/6.7  TD CI/CO: 3.7/6.9     PVR: 4.6 Wood Units     Conclusions:  Normal biventricular filling pressures  Severe pre capillary pulmonary hypertension  Normal CI/CO     When compared with prior study, PA pressures are the same but PVR and CI/CO are improved.     4/29/2025   Pulmonary Studies Review    Distance walked (meters) 415.14 meters         9/17/2024   Pulmonary Studies Review    Distance walked (meters) 243.84 meters      TTE 4/29/2025    Left Ventricle: The left ventricle is normal in size. Ventricular mass is normal. Normal wall thickness. Normal wall motion. There is normal systolic function with a visually estimated ejection fraction of 60 - 65%. There is diastolic dysfunction but grade cannot be determined.    Right Ventricle: The right ventricle has mild enlargement. Wall thickness is normal. Systolic function is moderately reduced. TDI S' is  cm/s. Right ventricular systolic function fractional area change is 18%.Right ventricular  global longitudinal strain is - 14.0%.These parameters are reduced    Left Atrium: Normal left atrial size.    Right Atrium: Right atrium is mildly dilated.    Tricuspid Valve: There is mild to moderate regurgitation with a centrally directed jet.    Aorta: The aortic root is normal in size measuring 3.61 cm. The ascending aorta is normal in size measuring 3.7 cm.    Pulmonary Artery: There is moderate left pulmonary artery dilation. There is mild main pulmonary artery dilation. There is mild right pulmonary artery dilation. There is mild to moderate pulmonary hypertension. The estimated pulmonary artery systolic pressure is 52 mmHg.    IVC/SVC: Normal venous pressure at 3 mmHg.    Pericardium: There is no pericardial effusion.    ROS       Objective   Vitals:    04/29/25 1536   BP: 124/82   Pulse: 89       Physical Exam  HENT:      Head: Atraumatic.   Eyes:      Extraocular Movements: Extraocular movements intact.   Cardiovascular:      Rate and Rhythm: Normal rate and regular rhythm.      Pulses: Normal pulses.      Heart sounds: Normal heart sounds.   Pulmonary:      Breath sounds: Normal breath sounds.   Abdominal:      Palpations: Abdomen is soft.      Tenderness: There is no abdominal tenderness.   Musculoskeletal:         General: Normal range of motion.      Right lower leg: No edema.      Left lower leg: No edema.   Neurological:      General: No focal deficit present.      Mental Status: She is alert and oriented to person, place, and time.            Assessment and Plan     1. Pulmonary hypertension    2. Chronic pulmonary heart disease    3. WHO group 1 pulmonary arterial hypertension    4. Secondary tricuspid regurgitation    5. Chronic right-sided heart failure    6. High risk medication use        Plan:    - comes in today for follow-up of her pulmonary hypertension  - Reveal score 2, low risk, functional class 1-2 symptoms.  - continue Remodulin currently at 110 nanograms/kilogram per minute, and  Opsynvi.  We will not add additional therapy at this time given her reveal score and functional class.  - stopped spironolactone as this is likely contributing to her orthostatic lightheadedness and hypotension.  We will check the labs in 1 week and start potassium supplementation if needed.    Return to clinic in 6 months with labs in 6 minute       Advance Care Planning     Date: 04/29/2025    Power of   I initiated the process of voluntary advance care planning today and explained the importance of this process to the patient.  I introduced the concept of advance directives to the patient, as well. Then the patient received detailed information about the importance of designating a Health Care Power of  (HCPOA). She was also instructed to communicate with this person about their wishes for future healthcare, should she become sick and lose decision-making capacity. The patient has previously appointed a HCPOA.

## 2025-05-07 ENCOUNTER — PATIENT MESSAGE (OUTPATIENT)
Dept: TRANSPLANT | Facility: CLINIC | Age: 50
End: 2025-05-07
Payer: MEDICAID

## 2025-05-07 ENCOUNTER — TELEPHONE (OUTPATIENT)
Dept: TRANSPLANT | Facility: CLINIC | Age: 50
End: 2025-05-07
Payer: MEDICAID

## 2025-05-07 NOTE — TELEPHONE ENCOUNTER
NN called the patient but was unable to leave a voicemail. NN sent patient the patient a message in the patient portal/my chart.

## 2025-05-08 ENCOUNTER — PATIENT MESSAGE (OUTPATIENT)
Dept: TRANSPLANT | Facility: CLINIC | Age: 50
End: 2025-05-08
Payer: MEDICAID

## 2025-05-09 DIAGNOSIS — M79.604 PAIN IN BOTH LOWER EXTREMITIES: ICD-10-CM

## 2025-05-09 DIAGNOSIS — I27.20 PULMONARY HYPERTENSION: ICD-10-CM

## 2025-05-09 DIAGNOSIS — M79.605 PAIN IN BOTH LOWER EXTREMITIES: ICD-10-CM

## 2025-05-09 RX ORDER — MORPHINE SULFATE 15 MG/1
15 TABLET, FILM COATED, EXTENDED RELEASE ORAL 2 TIMES DAILY
Qty: 60 TABLET | Refills: 0 | Status: SHIPPED | OUTPATIENT
Start: 2025-05-09 | End: 2025-06-08

## 2025-05-12 ENCOUNTER — PATIENT MESSAGE (OUTPATIENT)
Dept: PALLIATIVE MEDICINE | Facility: CLINIC | Age: 50
End: 2025-05-12
Payer: MEDICAID

## 2025-05-12 ENCOUNTER — TELEPHONE (OUTPATIENT)
Dept: TRANSPLANT | Facility: CLINIC | Age: 50
End: 2025-05-12
Payer: MEDICAID

## 2025-05-12 ENCOUNTER — PATIENT MESSAGE (OUTPATIENT)
Dept: TRANSPLANT | Facility: CLINIC | Age: 50
End: 2025-05-12
Payer: MEDICAID

## 2025-05-12 DIAGNOSIS — M79.605 PAIN IN BOTH LOWER EXTREMITIES: ICD-10-CM

## 2025-05-12 DIAGNOSIS — M79.604 PAIN IN BOTH LOWER EXTREMITIES: ICD-10-CM

## 2025-05-12 DIAGNOSIS — I27.20 PULMONARY HYPERTENSION: ICD-10-CM

## 2025-05-12 RX ORDER — MORPHINE SULFATE 15 MG/1
15 TABLET, FILM COATED, EXTENDED RELEASE ORAL 2 TIMES DAILY
Qty: 60 TABLET | Refills: 0 | Status: SHIPPED | OUTPATIENT
Start: 2025-05-12 | End: 2025-06-11

## 2025-05-12 NOTE — TELEPHONE ENCOUNTER
NN discussed patient's lab results with Dr. Cisneros. Per Dr. Cisneros patient's labs are good no new orders at this time. NN sent message to the patient in My chart/patient portal.

## 2025-05-20 DIAGNOSIS — M79.605 PAIN IN BOTH LOWER EXTREMITIES: ICD-10-CM

## 2025-05-20 DIAGNOSIS — M25.542 ARTHRALGIA OF BOTH HANDS: ICD-10-CM

## 2025-05-20 DIAGNOSIS — M79.604 PAIN IN BOTH LOWER EXTREMITIES: ICD-10-CM

## 2025-05-20 DIAGNOSIS — I27.20 PULMONARY HYPERTENSION: ICD-10-CM

## 2025-05-20 DIAGNOSIS — M25.541 ARTHRALGIA OF BOTH HANDS: ICD-10-CM

## 2025-05-20 RX ORDER — OXYCODONE AND ACETAMINOPHEN 10; 325 MG/1; MG/1
1 TABLET ORAL EVERY 4 HOURS PRN
Qty: 180 TABLET | Refills: 0 | Status: SHIPPED | OUTPATIENT
Start: 2025-05-20

## 2025-06-04 ENCOUNTER — TELEPHONE (OUTPATIENT)
Dept: TRANSPLANT | Facility: CLINIC | Age: 50
End: 2025-06-04
Payer: MEDICAID

## 2025-06-06 DIAGNOSIS — R11.0 NAUSEA: ICD-10-CM

## 2025-06-06 RX ORDER — LOPERAMIDE HYDROCHLORIDE 2 MG/1
2 CAPSULE ORAL 4 TIMES DAILY PRN
Qty: 30 CAPSULE | Refills: 3 | Status: SHIPPED | OUTPATIENT
Start: 2025-06-06

## 2025-06-06 RX ORDER — ONDANSETRON 4 MG/1
4 TABLET, FILM COATED ORAL EVERY 8 HOURS PRN
Qty: 30 TABLET | Refills: 6 | Status: SHIPPED | OUTPATIENT
Start: 2025-06-06

## 2025-06-09 DIAGNOSIS — M79.604 PAIN IN BOTH LOWER EXTREMITIES: ICD-10-CM

## 2025-06-09 DIAGNOSIS — I27.20 PULMONARY HYPERTENSION: ICD-10-CM

## 2025-06-09 DIAGNOSIS — M79.605 PAIN IN BOTH LOWER EXTREMITIES: ICD-10-CM

## 2025-06-09 RX ORDER — MORPHINE SULFATE 15 MG/1
15 TABLET, FILM COATED, EXTENDED RELEASE ORAL 2 TIMES DAILY
Qty: 60 TABLET | Refills: 0 | Status: SHIPPED | OUTPATIENT
Start: 2025-06-09 | End: 2025-07-09

## 2025-06-19 DIAGNOSIS — M79.604 PAIN IN BOTH LOWER EXTREMITIES: ICD-10-CM

## 2025-06-19 DIAGNOSIS — M25.541 ARTHRALGIA OF BOTH HANDS: ICD-10-CM

## 2025-06-19 DIAGNOSIS — M79.605 PAIN IN BOTH LOWER EXTREMITIES: ICD-10-CM

## 2025-06-19 DIAGNOSIS — I27.20 PULMONARY HYPERTENSION: ICD-10-CM

## 2025-06-19 DIAGNOSIS — M25.542 ARTHRALGIA OF BOTH HANDS: ICD-10-CM

## 2025-06-19 RX ORDER — OXYCODONE AND ACETAMINOPHEN 10; 325 MG/1; MG/1
1 TABLET ORAL EVERY 4 HOURS PRN
Qty: 180 TABLET | Refills: 0 | Status: ON HOLD | OUTPATIENT
Start: 2025-06-19

## 2025-06-20 ENCOUNTER — HOSPITAL ENCOUNTER (OUTPATIENT)
Facility: HOSPITAL | Age: 50
Discharge: HOME | End: 2025-06-23
Attending: EMERGENCY MEDICINE | Admitting: INTERNAL MEDICINE
Payer: MEDICAID

## 2025-06-20 DIAGNOSIS — R42 DIZZINESS: ICD-10-CM

## 2025-06-20 DIAGNOSIS — I27.21 WHO GROUP 1 PULMONARY ARTERIAL HYPERTENSION: ICD-10-CM

## 2025-06-20 DIAGNOSIS — R07.9 CHEST PAIN: ICD-10-CM

## 2025-06-20 DIAGNOSIS — R06.00 DYSPNEA, UNSPECIFIED TYPE: ICD-10-CM

## 2025-06-20 DIAGNOSIS — Z76.0 MEDICATION REFILL: Primary | ICD-10-CM

## 2025-06-20 LAB
ABSOLUTE EOSINOPHIL (OHS): 0.11 K/UL
ABSOLUTE MONOCYTE (OHS): 0.34 K/UL (ref 0.3–1)
ABSOLUTE NEUTROPHIL COUNT (OHS): 3.1 K/UL (ref 1.8–7.7)
ALBUMIN SERPL BCP-MCNC: 4.4 G/DL (ref 3.5–5.2)
ALP SERPL-CCNC: 100 UNIT/L (ref 40–150)
ALT SERPL W/O P-5'-P-CCNC: 9 UNIT/L (ref 10–44)
ANION GAP (OHS): 9 MMOL/L (ref 8–16)
AST SERPL-CCNC: 14 UNIT/L (ref 11–45)
BASOPHILS # BLD AUTO: 0.02 K/UL
BASOPHILS NFR BLD AUTO: 0.4 %
BILIRUB SERPL-MCNC: 1.1 MG/DL (ref 0.1–1)
BNP SERPL-MCNC: <10 PG/ML (ref 0–99)
BUN SERPL-MCNC: 15 MG/DL (ref 6–20)
CALCIUM SERPL-MCNC: 9.7 MG/DL (ref 8.7–10.5)
CHLORIDE SERPL-SCNC: 107 MMOL/L (ref 95–110)
CO2 SERPL-SCNC: 20 MMOL/L (ref 23–29)
CREAT SERPL-MCNC: 0.9 MG/DL (ref 0.5–1.4)
ERYTHROCYTE [DISTWIDTH] IN BLOOD BY AUTOMATED COUNT: 13.6 % (ref 11.5–14.5)
GFR SERPLBLD CREATININE-BSD FMLA CKD-EPI: >60 ML/MIN/1.73/M2
GLUCOSE SERPL-MCNC: 93 MG/DL (ref 70–110)
HCT VFR BLD AUTO: 40.1 % (ref 37–48.5)
HGB BLD-MCNC: 13 GM/DL (ref 12–16)
IMM GRANULOCYTES # BLD AUTO: 0.02 K/UL (ref 0–0.04)
IMM GRANULOCYTES NFR BLD AUTO: 0.4 % (ref 0–0.5)
LYMPHOCYTES # BLD AUTO: 1.84 K/UL (ref 1–4.8)
MCH RBC QN AUTO: 29 PG (ref 27–31)
MCHC RBC AUTO-ENTMCNC: 32.4 G/DL (ref 32–36)
MCV RBC AUTO: 90 FL (ref 82–98)
NUCLEATED RBC (/100WBC) (OHS): 0 /100 WBC
PLATELET # BLD AUTO: 153 K/UL (ref 150–450)
PMV BLD AUTO: 11.3 FL (ref 9.2–12.9)
POTASSIUM SERPL-SCNC: 4.4 MMOL/L (ref 3.5–5.1)
PROT SERPL-MCNC: 7.9 GM/DL (ref 6–8.4)
RBC # BLD AUTO: 4.48 M/UL (ref 4–5.4)
RELATIVE EOSINOPHIL (OHS): 2 %
RELATIVE LYMPHOCYTE (OHS): 33.9 % (ref 18–48)
RELATIVE MONOCYTE (OHS): 6.3 % (ref 4–15)
RELATIVE NEUTROPHIL (OHS): 57 % (ref 38–73)
SARS-COV-2 RDRP RESP QL NAA+PROBE: NEGATIVE
SODIUM SERPL-SCNC: 136 MMOL/L (ref 136–145)
TROPONIN I SERPL HS-MCNC: <3 NG/L
WBC # BLD AUTO: 5.43 K/UL (ref 3.9–12.7)

## 2025-06-20 PROCEDURE — 99285 EMERGENCY DEPT VISIT HI MDM: CPT | Mod: 25

## 2025-06-20 PROCEDURE — G0378 HOSPITAL OBSERVATION PER HR: HCPCS

## 2025-06-20 PROCEDURE — 85025 COMPLETE CBC W/AUTO DIFF WBC: CPT | Performed by: EMERGENCY MEDICINE

## 2025-06-20 PROCEDURE — U0002 COVID-19 LAB TEST NON-CDC: HCPCS | Performed by: INTERNAL MEDICINE

## 2025-06-20 PROCEDURE — 93005 ELECTROCARDIOGRAM TRACING: CPT

## 2025-06-20 PROCEDURE — 80053 COMPREHEN METABOLIC PANEL: CPT | Performed by: EMERGENCY MEDICINE

## 2025-06-20 PROCEDURE — 84484 ASSAY OF TROPONIN QUANT: CPT | Performed by: EMERGENCY MEDICINE

## 2025-06-20 PROCEDURE — 83880 ASSAY OF NATRIURETIC PEPTIDE: CPT | Performed by: EMERGENCY MEDICINE

## 2025-06-20 PROCEDURE — 93010 ELECTROCARDIOGRAM REPORT: CPT | Mod: ,,, | Performed by: INTERNAL MEDICINE

## 2025-06-20 RX ORDER — LANOLIN ALCOHOL/MO/W.PET/CERES
400 CREAM (GRAM) TOPICAL DAILY
Status: DISCONTINUED | OUTPATIENT
Start: 2025-06-21 | End: 2025-06-23 | Stop reason: HOSPADM

## 2025-06-20 RX ORDER — GABAPENTIN 100 MG/1
200 CAPSULE ORAL 2 TIMES DAILY
Status: DISCONTINUED | OUTPATIENT
Start: 2025-06-20 | End: 2025-06-23 | Stop reason: HOSPADM

## 2025-06-20 RX ORDER — SODIUM CHLORIDE 0.9 % (FLUSH) 0.9 %
10 SYRINGE (ML) INJECTION EVERY 12 HOURS PRN
Status: DISCONTINUED | OUTPATIENT
Start: 2025-06-20 | End: 2025-06-23 | Stop reason: HOSPADM

## 2025-06-20 RX ORDER — SPIRONOLACTONE 25 MG/1
25 TABLET ORAL DAILY
Status: DISCONTINUED | OUTPATIENT
Start: 2025-06-21 | End: 2025-06-23 | Stop reason: HOSPADM

## 2025-06-20 RX ORDER — NALOXONE HCL 0.4 MG/ML
0.02 VIAL (ML) INJECTION
Status: DISCONTINUED | OUTPATIENT
Start: 2025-06-20 | End: 2025-06-23 | Stop reason: HOSPADM

## 2025-06-20 RX ORDER — FUROSEMIDE 20 MG/1
20 TABLET ORAL DAILY
Status: DISCONTINUED | OUTPATIENT
Start: 2025-06-21 | End: 2025-06-23 | Stop reason: HOSPADM

## 2025-06-20 RX ORDER — POTASSIUM CHLORIDE 20 MEQ/1
20 TABLET, EXTENDED RELEASE ORAL DAILY
Status: DISCONTINUED | OUTPATIENT
Start: 2025-06-21 | End: 2025-06-23 | Stop reason: HOSPADM

## 2025-06-20 RX ORDER — GLUCAGON 1 MG
1 KIT INJECTION
Status: DISCONTINUED | OUTPATIENT
Start: 2025-06-20 | End: 2025-06-23 | Stop reason: HOSPADM

## 2025-06-20 RX ORDER — IBUPROFEN 200 MG
24 TABLET ORAL
Status: DISCONTINUED | OUTPATIENT
Start: 2025-06-20 | End: 2025-06-23 | Stop reason: HOSPADM

## 2025-06-20 RX ORDER — ACETAMINOPHEN 325 MG/1
650 TABLET ORAL EVERY 4 HOURS PRN
Status: DISCONTINUED | OUTPATIENT
Start: 2025-06-20 | End: 2025-06-22

## 2025-06-20 RX ORDER — VENLAFAXINE HYDROCHLORIDE 37.5 MG/1
150 CAPSULE, EXTENDED RELEASE ORAL DAILY
Status: DISCONTINUED | OUTPATIENT
Start: 2025-06-21 | End: 2025-06-23 | Stop reason: HOSPADM

## 2025-06-20 RX ORDER — MORPHINE SULFATE 15 MG/1
15 TABLET, FILM COATED, EXTENDED RELEASE ORAL 2 TIMES DAILY
Refills: 0 | Status: DISCONTINUED | OUTPATIENT
Start: 2025-06-20 | End: 2025-06-23 | Stop reason: HOSPADM

## 2025-06-20 RX ORDER — IBUPROFEN 200 MG
16 TABLET ORAL
Status: DISCONTINUED | OUTPATIENT
Start: 2025-06-20 | End: 2025-06-23 | Stop reason: HOSPADM

## 2025-06-21 LAB
ABSOLUTE EOSINOPHIL (OHS): 0.13 K/UL
ABSOLUTE MONOCYTE (OHS): 0.42 K/UL (ref 0.3–1)
ABSOLUTE NEUTROPHIL COUNT (OHS): 2.18 K/UL (ref 1.8–7.7)
ALBUMIN SERPL BCP-MCNC: 4 G/DL (ref 3.5–5.2)
ALP SERPL-CCNC: 92 UNIT/L (ref 40–150)
ALT SERPL W/O P-5'-P-CCNC: 8 UNIT/L (ref 10–44)
ANION GAP (OHS): 10 MMOL/L (ref 8–16)
AST SERPL-CCNC: 11 UNIT/L (ref 11–45)
BASOPHILS # BLD AUTO: 0.02 K/UL
BASOPHILS NFR BLD AUTO: 0.4 %
BILIRUB SERPL-MCNC: 0.8 MG/DL (ref 0.1–1)
BUN SERPL-MCNC: 15 MG/DL (ref 6–20)
CALCIUM SERPL-MCNC: 8.9 MG/DL (ref 8.7–10.5)
CHLORIDE SERPL-SCNC: 106 MMOL/L (ref 95–110)
CO2 SERPL-SCNC: 23 MMOL/L (ref 23–29)
CREAT SERPL-MCNC: 0.9 MG/DL (ref 0.5–1.4)
ERYTHROCYTE [DISTWIDTH] IN BLOOD BY AUTOMATED COUNT: 13.5 % (ref 11.5–14.5)
GFR SERPLBLD CREATININE-BSD FMLA CKD-EPI: >60 ML/MIN/1.73/M2
GLUCOSE SERPL-MCNC: 152 MG/DL (ref 70–110)
HCT VFR BLD AUTO: 38.7 % (ref 37–48.5)
HGB BLD-MCNC: 12.7 GM/DL (ref 12–16)
IMM GRANULOCYTES # BLD AUTO: 0.02 K/UL (ref 0–0.04)
IMM GRANULOCYTES NFR BLD AUTO: 0.4 % (ref 0–0.5)
LYMPHOCYTES # BLD AUTO: 2.82 K/UL (ref 1–4.8)
MAGNESIUM SERPL-MCNC: 2 MG/DL (ref 1.6–2.6)
MCH RBC QN AUTO: 29.2 PG (ref 27–31)
MCHC RBC AUTO-ENTMCNC: 32.8 G/DL (ref 32–36)
MCV RBC AUTO: 89 FL (ref 82–98)
NUCLEATED RBC (/100WBC) (OHS): 0 /100 WBC
OHS QRS DURATION: 76 MS
OHS QTC CALCULATION: 464 MS
PHOSPHATE SERPL-MCNC: 3.7 MG/DL (ref 2.7–4.5)
PLATELET # BLD AUTO: 144 K/UL (ref 150–450)
PMV BLD AUTO: 11.3 FL (ref 9.2–12.9)
POTASSIUM SERPL-SCNC: 3.6 MMOL/L (ref 3.5–5.1)
PROT SERPL-MCNC: 7.1 GM/DL (ref 6–8.4)
RBC # BLD AUTO: 4.35 M/UL (ref 4–5.4)
RELATIVE EOSINOPHIL (OHS): 2.3 %
RELATIVE LYMPHOCYTE (OHS): 50.4 % (ref 18–48)
RELATIVE MONOCYTE (OHS): 7.5 % (ref 4–15)
RELATIVE NEUTROPHIL (OHS): 39 % (ref 38–73)
SODIUM SERPL-SCNC: 139 MMOL/L (ref 136–145)
WBC # BLD AUTO: 5.59 K/UL (ref 3.9–12.7)

## 2025-06-21 PROCEDURE — 25000003 PHARM REV CODE 250: Performed by: INTERNAL MEDICINE

## 2025-06-21 PROCEDURE — 82040 ASSAY OF SERUM ALBUMIN: CPT | Performed by: STUDENT IN AN ORGANIZED HEALTH CARE EDUCATION/TRAINING PROGRAM

## 2025-06-21 PROCEDURE — G0378 HOSPITAL OBSERVATION PER HR: HCPCS

## 2025-06-21 PROCEDURE — 63600175 PHARM REV CODE 636 W HCPCS: Mod: TB | Performed by: INTERNAL MEDICINE

## 2025-06-21 PROCEDURE — 83735 ASSAY OF MAGNESIUM: CPT | Performed by: STUDENT IN AN ORGANIZED HEALTH CARE EDUCATION/TRAINING PROGRAM

## 2025-06-21 PROCEDURE — 99223 1ST HOSP IP/OBS HIGH 75: CPT | Mod: ,,, | Performed by: INTERNAL MEDICINE

## 2025-06-21 PROCEDURE — 25000003 PHARM REV CODE 250: Performed by: STUDENT IN AN ORGANIZED HEALTH CARE EDUCATION/TRAINING PROGRAM

## 2025-06-21 PROCEDURE — 84100 ASSAY OF PHOSPHORUS: CPT | Performed by: STUDENT IN AN ORGANIZED HEALTH CARE EDUCATION/TRAINING PROGRAM

## 2025-06-21 PROCEDURE — 25000003 PHARM REV CODE 250: Performed by: PHYSICIAN ASSISTANT

## 2025-06-21 PROCEDURE — 36415 COLL VENOUS BLD VENIPUNCTURE: CPT | Performed by: STUDENT IN AN ORGANIZED HEALTH CARE EDUCATION/TRAINING PROGRAM

## 2025-06-21 PROCEDURE — 25000003 PHARM REV CODE 250

## 2025-06-21 PROCEDURE — 85025 COMPLETE CBC W/AUTO DIFF WBC: CPT | Performed by: STUDENT IN AN ORGANIZED HEALTH CARE EDUCATION/TRAINING PROGRAM

## 2025-06-21 RX ORDER — OXYCODONE HYDROCHLORIDE 10 MG/1
10 TABLET ORAL EVERY 4 HOURS PRN
Status: DISCONTINUED | OUTPATIENT
Start: 2025-06-21 | End: 2025-06-22

## 2025-06-21 RX ORDER — TADALAFIL 20 MG/1
40 TABLET ORAL DAILY
Status: DISCONTINUED | OUTPATIENT
Start: 2025-06-21 | End: 2025-06-21 | Stop reason: SDUPTHER

## 2025-06-21 RX ORDER — HYDROCORTISONE 1 %
CREAM (GRAM) TOPICAL 2 TIMES DAILY
Status: DISCONTINUED | OUTPATIENT
Start: 2025-06-21 | End: 2025-06-23 | Stop reason: HOSPADM

## 2025-06-21 RX ORDER — ONDANSETRON 4 MG/1
4 TABLET, ORALLY DISINTEGRATING ORAL EVERY 6 HOURS PRN
Status: DISCONTINUED | OUTPATIENT
Start: 2025-06-21 | End: 2025-06-23 | Stop reason: HOSPADM

## 2025-06-21 RX ORDER — TADALAFIL 20 MG/1
40 TABLET ORAL DAILY
Status: DISCONTINUED | OUTPATIENT
Start: 2025-06-21 | End: 2025-06-23 | Stop reason: HOSPADM

## 2025-06-21 RX ADMIN — GABAPENTIN 200 MG: 100 CAPSULE ORAL at 12:06

## 2025-06-21 RX ADMIN — OXYCODONE HYDROCHLORIDE 10 MG: 10 TABLET ORAL at 06:06

## 2025-06-21 RX ADMIN — FUROSEMIDE 20 MG: 20 TABLET ORAL at 08:06

## 2025-06-21 RX ADMIN — OXYCODONE HYDROCHLORIDE 10 MG: 10 TABLET ORAL at 01:06

## 2025-06-21 RX ADMIN — MORPHINE SULFATE 15 MG: 15 TABLET, FILM COATED, EXTENDED RELEASE ORAL at 12:06

## 2025-06-21 RX ADMIN — ONDANSETRON 4 MG: 4 TABLET, ORALLY DISINTEGRATING ORAL at 08:06

## 2025-06-21 RX ADMIN — ONDANSETRON 4 MG: 4 TABLET, ORALLY DISINTEGRATING ORAL at 12:06

## 2025-06-21 RX ADMIN — MORPHINE SULFATE 15 MG: 15 TABLET, FILM COATED, EXTENDED RELEASE ORAL at 08:06

## 2025-06-21 RX ADMIN — POTASSIUM CHLORIDE 20 MEQ: 1500 TABLET, EXTENDED RELEASE ORAL at 08:06

## 2025-06-21 RX ADMIN — SPIRONOLACTONE 25 MG: 25 TABLET, FILM COATED ORAL at 08:06

## 2025-06-21 RX ADMIN — HYDROCORTISONE: 10 CREAM TOPICAL at 12:06

## 2025-06-21 RX ADMIN — GABAPENTIN 200 MG: 100 CAPSULE ORAL at 08:06

## 2025-06-21 RX ADMIN — HYDROCORTISONE: 10 CREAM TOPICAL at 08:06

## 2025-06-21 RX ADMIN — VENLAFAXINE HYDROCHLORIDE 150 MG: 37.5 CAPSULE, EXTENDED RELEASE ORAL at 08:06

## 2025-06-21 RX ADMIN — TREPROSTINIL 110.2 NG/KG/MIN: 200 INJECTION, SOLUTION INTRAVENOUS; SUBCUTANEOUS at 12:06

## 2025-06-21 RX ADMIN — MACITENTAN 10 MG: 10 TABLET, FILM COATED ORAL at 09:06

## 2025-06-21 RX ADMIN — Medication 400 MG: at 08:06

## 2025-06-21 RX ADMIN — TADALAFIL 40 MG: 20 TABLET, FILM COATED ORAL at 08:06

## 2025-06-21 NOTE — NURSING
PH Admit Assessment for Continuation of Treprostinil (Remodulin)    Drug Infusing: Treprostinil (Remodulin)  Route: Intravenous  Pump Type: new CADD pump   Current Pump Rate = 0 ml / 24 hours  Current Reservoir Volume = 0 (mls remaining to be infused)    Patient reports utilizing: Prefilled Cassettes    Home mixing instructions:   Patient reports mixing 5 ml of concentrated drug in 95 ml of diluentCassette Change        Drug Infusing: remodulin  Route: Intravenous  Pump Type: [facility] CADD  Current Pump Rate = 88 ml / 24 hours 110.2 ng/kg/min DW = 67 kg   Current Reservoir Volume = 98 (mls remaining to be infused) line primed     8 ml aspirated from RSC single lumen PICC, CVC primed with 0.4 ml Remodulin obtained from cassette.     Hospital concentration 12,000,000 ng/100 ml     Dose, rate, & priming verified by this RN & H. Peace VELEZ

## 2025-06-21 NOTE — SUBJECTIVE & OBJECTIVE
Past Medical History:   Diagnosis Date    Elevated liver enzymes     Essential (primary) hypertension     Heart failure, unspecified     Hypokalemia     Mixed hyperlipidemia     Neuropathic pain     Tx w/ tramadol & Lyrica    Pulmonary hypertension     Receiving Remodulin continuously through tunneled central line       Past Surgical History:   Procedure Laterality Date    APPENDECTOMY       SECTION      Transverse cut    HYSTERECTOMY  2017    TLH- bleeding    OOPHORECTOMY      RIGHT HEART CATHETERIZATION Right 2021    Procedure: INSERTION, CATHETER, RIGHT HEART;  Surgeon: Chloe Gregory MD;  Location: [facility] CATH LAB;  Service: Cardiology;  Laterality: Right;    RIGHT HEART CATHETERIZATION Right 3/16/2022    Procedure: INSERTION, CATHETER, RIGHT HEART;  Surgeon: Hu Silverman MD;  Location: [facility] CATH LAB;  Service: Cardiology;  Laterality: Right;    RIGHT HEART CATHETERIZATION Right 2022    Procedure: INSERTION, CATHETER, RIGHT HEART;  Surgeon: Chloe Gregory MD;  Location: [facility] CATH LAB;  Service: Cardiology;  Laterality: Right;    RIGHT HEART CATHETERIZATION Right 3/21/2023    Procedure: INSERTION, CATHETER, RIGHT HEART;  Surgeon: Kahlil Cisneros MD;  Location: [facility] CATH LAB;  Service: Cardiology;  Laterality: Right;    RIGHT HEART CATHETERIZATION Right 10/9/2023    Procedure: INSERTION, CATHETER, RIGHT HEART;  Surgeon: Chloe Gregory MD;  Location: [facility] CATH LAB;  Service: Cardiology;  Laterality: Right;    RIGHT HEART CATHETERIZATION Right 2024    Procedure: INSERTION, CATHETER, RIGHT HEART;  Surgeon: Khalil Cisneros MD;  Location: [facility] CATH LAB;  Service: Cardiology;  Laterality: Right;    TUBAL LIGATION      VAGINAL DELIVERY  1993; 1996       Review of patient's allergies indicates:  No Known Allergies    Current Facility-Administered Medications   Medication    acetaminophen tablet 650 mg    dextrose 50% injection 12.5 g    dextrose 50%  injection 25 g    furosemide tablet 20 mg    gabapentin capsule 200 mg    glucagon (human recombinant) injection 1 mg    glucose chewable tablet 16 g    glucose chewable tablet 24 g    magnesium oxide tablet 400 mg    morphine 12 hr tablet 15 mg    naloxone 0.4 mg/mL injection 0.02 mg    NON FORMULARY MEDICATION 1 tablet    potassium chloride SA CR tablet 20 mEq    sodium chloride 0.9% flush 10 mL    spironolactone tablet 25 mg    treprostinil (REMODULIN) 12,000,000 ng in 0.9% NaCl 100 mL infusion    VELETRI/REMODULIN CASSETTE    VELETRI/REMODULIN TUBING    venlafaxine 24 hr capsule 150 mg     Family History       Problem Relation (Age of Onset)    Cancer Father (69)    No Known Problems Sister, Brother, Brother          Tobacco Use    Smoking status: Former     Types: Cigars     Quit date: 2020     Years since quittin.5     Passive exposure: Past    Smokeless tobacco: Never    Tobacco comments:     social smoker-light - quit    Substance and Sexual Activity    Alcohol use: Not Currently     Alcohol/week: 2.0 standard drinks of alcohol     Types: 2 Glasses of wine per week     Comment: None now -  previously: socially- 2 or 3 times a week-2 glasses of wine    Drug use: Not Currently     Types: Marijuana     Comment:  last use    Sexual activity: Yes     Partners: Male     Birth control/protection: See Surgical Hx     Comment:      Review of Systems   Respiratory:  Negative for cough, chest tightness and shortness of breath.    All other systems reviewed and are negative.    Objective:     Vital Signs (Most Recent):  Temp: 97.9 °F (36.6 °C) (25 0015)  Pulse: 92 (25 0046)  Resp: 18 (252)  BP: (!) 148/100 (25 0015)  SpO2: 100 % (25 0015) Vital Signs (24h Range):  Temp:  [97.9 °F (36.6 °C)-98.1 °F (36.7 °C)] 97.9 °F (36.6 °C)  Pulse:  [76-94] 92  Resp:  [16-20] 18  SpO2:  [89 %-100 %] 100 %  BP: (134-154)/() 148/100     Patient Vitals for the past 72 hrs  "(Last 3 readings):   Weight   06/21/25 0030 73.5 kg (162 lb 0.6 oz)   06/20/25 2105 76.2 kg (168 lb)     Body mass index is 28.7 kg/m².    No intake or output data in the 24 hours ending 06/21/25 0300       Physical Exam  Vitals reviewed.   Constitutional:       Appearance: Normal appearance.   HENT:      Head: Normocephalic.   Cardiovascular:      Rate and Rhythm: Normal rate and regular rhythm.      Pulses: Normal pulses.   Pulmonary:      Effort: Pulmonary effort is normal.      Breath sounds: Normal breath sounds.   Abdominal:      General: Abdomen is flat.      Palpations: Abdomen is soft.   Musculoskeletal:         General: Normal range of motion.      Cervical back: Normal range of motion.      Right lower leg: No edema.      Left lower leg: No edema.   Skin:     General: Skin is warm.   Neurological:      General: No focal deficit present.      Mental Status: She is alert and oriented to person, place, and time.   Psychiatric:         Mood and Affect: Mood normal.         Behavior: Behavior normal.            Significant Labs:  CBC:  Recent Labs   Lab 06/20/25 2134   WBC 5.43   RBC 4.48   HGB 13.0   HCT 40.1      MCV 90   MCH 29.0   MCHC 32.4     BNP:  Recent Labs   Lab 06/20/25 2134   BNP <10     CMP:  Recent Labs   Lab 06/20/25 2134   GLU 93   CALCIUM 9.7   ALBUMIN 4.4   PROT 7.9      K 4.4   CO2 20*      BUN 15   CREATININE 0.9   ALKPHOS 100   ALT 9*   AST 14   BILITOT 1.1*      Coagulation:   No results for input(s): "PT", "INR", "APTT" in the last 168 hours.  LDH:  No results for input(s): "LDH" in the last 72 hours.  Microbiology:  Microbiology Results (last 7 days)       ** No results found for the last 168 hours. **            I have reviewed all pertinent labs within the past 24 hours.    Diagnostic Results:  I have reviewed and interpreted all pertinent imaging results/findings within the past 24 hours.    "

## 2025-06-21 NOTE — ED PROVIDER NOTES
Encounter Date: 2025       History     Chief Complaint   Patient presents with    Medication Refill     Remodulin ran out 45 minutes ago. +SOB     HPI    Patient is a 49-year-old female past medical history group on pulmonary hypertension on IV Remodulin that is presenting for running out of Remodulin.  As per patient, patient states that she thought she had another bottle of Remodulin however realize that she did not.  Patient ran out of her Remodulin and at 6:00 p.m..  Patient only complains of mild dyspnea.  Patient also noticed that for the past several months whenever she bends over she becomes dizzy.  Patient otherwise denies any other symptoms.  Patient denies any weakness, sensation changes.  Patient denies any fevers, chills, chest pains, shortness of breath, nausea, vomiting, diarrhea, abdominal pain.  Patient denies any unilateral weakness upper or lower extremities or any sensation changes.  Patient denies any shortness of breath, respiratory distress    Review of patient's allergies indicates:  No Known Allergies  Past Medical History:   Diagnosis Date    Elevated liver enzymes     Essential (primary) hypertension     Heart failure, unspecified     Hypokalemia     Mixed hyperlipidemia     Neuropathic pain     Tx w/ tramadol & Lyrica    Pulmonary hypertension     Receiving Remodulin continuously through tunneled central line     Past Surgical History:   Procedure Laterality Date    APPENDECTOMY       SECTION      Transverse cut    HYSTERECTOMY  2017    TLH- bleeding    OOPHORECTOMY      RIGHT HEART CATHETERIZATION Right 2021    Procedure: INSERTION, CATHETER, RIGHT HEART;  Surgeon: Chloe Gregory MD;  Location: [facility] CATH LAB;  Service: Cardiology;  Laterality: Right;    RIGHT HEART CATHETERIZATION Right 3/16/2022    Procedure: INSERTION, CATHETER, RIGHT HEART;  Surgeon: Hu Silverman MD;  Location: [facility] CATH LAB;  Service: Cardiology;  Laterality: Right;    RIGHT  HEART CATHETERIZATION Right 7/19/2022    Procedure: INSERTION, CATHETER, RIGHT HEART;  Surgeon: Chloe Gregory MD;  Location: [facility] CATH LAB;  Service: Cardiology;  Laterality: Right;    RIGHT HEART CATHETERIZATION Right 3/21/2023    Procedure: INSERTION, CATHETER, RIGHT HEART;  Surgeon: Kahlil Cisneros MD;  Location: [facility] CATH LAB;  Service: Cardiology;  Laterality: Right;    RIGHT HEART CATHETERIZATION Right 10/9/2023    Procedure: INSERTION, CATHETER, RIGHT HEART;  Surgeon: Chloe Gregory MD;  Location: [facility] CATH LAB;  Service: Cardiology;  Laterality: Right;    RIGHT HEART CATHETERIZATION Right 11/29/2024    Procedure: INSERTION, CATHETER, RIGHT HEART;  Surgeon: Kahlil Cisneros MD;  Location: [facility] CATH LAB;  Service: Cardiology;  Laterality: Right;    TUBAL LIGATION  1996    VAGINAL DELIVERY  1991; 1993; 1994; 1996     Family History   Problem Relation Name Age of Onset    Cancer Father  69        stomach    No Known Problems Sister      No Known Problems Brother      No Known Problems Brother      Breast cancer Neg Hx      Colon cancer Neg Hx      Ovarian cancer Neg Hx       Social History[1]  Review of Systems   Constitutional:         No other system positives other than aforementioned as reported by patient       Physical Exam     Initial Vitals [06/20/25 2105]   BP Pulse Resp Temp SpO2   (!) 154/96 86 16 98.1 °F (36.7 °C) 98 %      MAP       --         Physical Exam    Vitals reviewed.  Constitutional: She appears well-developed and well-nourished. She is not diaphoretic. No distress.   Well-appearing 49-year-old female, no distress, appropriately conversation   HENT:   Head: Normocephalic and atraumatic.   Eyes: EOM are normal. Pupils are equal, round, and reactive to light.   Cardiovascular:  Normal rate, regular rhythm, normal heart sounds and intact distal pulses.     Exam reveals no gallop and no friction rub.       No murmur heard.  Pulmonary/Chest: Breath sounds normal. No respiratory distress. She  has no wheezes. She has no rales.   Breathing comfortably on room air, no hypoxia, no increased work of breathing   Abdominal: Abdomen is soft. Bowel sounds are normal. She exhibits no distension. There is no abdominal tenderness. There is no rebound and no guarding.   Musculoskeletal:         General: No tenderness or edema. Normal range of motion.     Neurological: She is alert and oriented to person, place, and time. She has normal strength. No cranial nerve deficit or sensory deficit. GCS score is 15.   Patient has no acute focal deficits on exam.  NIH 0.  No obvious drift.  No obvious focal cranial nerve deficits.  Patient has 5/5 strength bilateral upper and lower extremities.  Benign neuro exam.     Skin: Skin is warm and dry. No rash noted. No erythema. No pallor.         ED Course   Procedures  Labs Reviewed   COMPREHENSIVE METABOLIC PANEL - Abnormal       Result Value    Sodium 136      Potassium 4.4      Chloride 107      CO2 20 (*)     Glucose 93      BUN 15      Creatinine 0.9      Calcium 9.7      Protein Total 7.9      Albumin 4.4      Bilirubin Total 1.1 (*)           AST 14      ALT 9 (*)     Anion Gap 9      eGFR >60     TROPONIN I HIGH SENSITIVITY - Normal    Troponin High Sensitive <3     B-TYPE NATRIURETIC PEPTIDE - Normal    BNP <10     CBC WITH DIFFERENTIAL - Normal    WBC 5.43      RBC 4.48      HGB 13.0      HCT 40.1      MCV 90      MCH 29.0      MCHC 32.4      RDW 13.6      Platelet Count 153      MPV 11.3      Nucleated RBC 0      Neut % 57.0      Lymph % 33.9      Mono % 6.3      Eos % 2.0      Basophil % 0.4      Imm Grans % 0.4      Neut # 3.10      Lymph # 1.84      Mono # 0.34      Eos # 0.11      Baso # 0.02      Imm Grans # 0.02     CBC W/ AUTO DIFFERENTIAL    Narrative:     The following orders were created for panel order CBC auto differential.  Procedure                               Abnormality         Status                     ---------                                -----------         ------                     CBC with Differential[[id number]]       Normal              Final result                 Please view results for these tests on the individual orders.   SARS-COV-2 RNA AMPLIFICATION, QUAL   TROPONIN I HIGH SENSITIVITY          Imaging Results              CT Head Without Contrast (Final result)  Result time 06/20/25 22:35:22      Final result by Antonino Doe MD (06/20/25 22:35:22)                   Impression:      No evidence of acute intracranial pathology.    Electronically signed by resident: Santos Hutchison  Date:    06/20/2025  Time:    22:28    Electronically signed by: Antonino Doe MD  Date:    06/20/2025  Time:    22:35               Narrative:    EXAMINATION:  CT HEAD WITHOUT CONTRAST    CLINICAL HISTORY:  dizziness    TECHNIQUE:  Low dose axial CT images obtained throughout the head without the use of intravenous contrast.  Axial, sagittal and coronal reconstructions were performed.    COMPARISON:  CT 01/02/2024.    FINDINGS:  Intracranial compartment:    Ventricles and sulci are normal in size for age without evidence of hydrocephalus.    The brain parenchyma appears within normal limits.  No parenchymal  hemorrhage, edema, mass effect or major vascular distribution infarct.    No extra-axial blood or fluid collections.    Skull/extracranial contents (limited evaluation):    No displaced calvarial fracture.    The mastoid air cells and visualized paranasal sinuses are essentially clear.                        Preliminary result by Santos Hutchison MD (06/20/25 22:31:03)                   Impression:      No evidence of acute intracranial pathology.    Electronically signed by resident: Santos Hutchison  Date:    06/20/2025  Time:    22:28                 Narrative:    EXAMINATION:  CT HEAD WITHOUT CONTRAST    CLINICAL HISTORY:  dizziness;    TECHNIQUE:  Low dose axial CT images obtained throughout the head without the use of intravenous contrast.  Axial, sagittal  and coronal reconstructions were performed.    COMPARISON:  CT 01/02/2024    FINDINGS:  Intracranial compartment:    Ventricles and sulci are normal in size for age without evidence of hydrocephalus.    The brain parenchyma appears within normal limits.  No parenchymal  hemorrhage, edema, mass effect or major vascular distribution infarct.    No extra-axial blood or fluid collections.    Skull/extracranial contents (limited evaluation):    No displaced calvarial fracture.    The mastoid air cells and visualized paranasal sinuses are essentially clear.                                       Medications   sodium chloride 0.9% flush 10 mL (has no administration in time range)   naloxone 0.4 mg/mL injection 0.02 mg (has no administration in time range)   glucose chewable tablet 16 g (has no administration in time range)   glucose chewable tablet 24 g (has no administration in time range)   dextrose 50% injection 12.5 g (has no administration in time range)   dextrose 50% injection 25 g (has no administration in time range)   glucagon (human recombinant) injection 1 mg (has no administration in time range)   acetaminophen tablet 650 mg (has no administration in time range)   furosemide tablet 20 mg (has no administration in time range)   gabapentin capsule 200 mg (has no administration in time range)   magnesium oxide tablet 400 mg (has no administration in time range)   morphine 12 hr tablet 15 mg (has no administration in time range)   potassium chloride SA CR tablet 20 mEq (has no administration in time range)   spironolactone tablet 25 mg (has no administration in time range)   venlafaxine 24 hr capsule 150 mg (has no administration in time range)   treprostinil (REMODULIN) 6,000,000 ng in 0.9% NaCl 100 mL infusion (has no administration in time range)   treprostinil (REMODULIN) 12,000,000 ng in 0.9% NaCl 100 mL infusion (has no administration in time range)     Medical Decision Making  1. Differential Diagnosis  includes:  Medication refill      2. Co Morbidities include:  History of pulmonary hypertension   Increased patient risk:  On IV Remodulin      3. External notes reviewed:  Clinic notes, hospital notes      4. History sources independently obtained include: n/a      5. Discussion of management with:  Cardiology      6. Independent intrepretation of tests include: n/a      7. Diagnostic tests or therapies considered but not ordered: n/a      8. Social determinants of health: n/a      9. Shared decision making includes:  Patient is a 49-year-old female presenting for running out of Remodulin.  Patient has history of pulmonary hypertension.  Consulted cardiology.  Patient will be admitted for further evaluation and IV Remodulin.  Patient agrees with treatment and plan.  Otherwise patient has no focal neurological deficits on exam.  CT head reassuring.  Patient also states that dizziness has been ongoing for months whenever she bends over.  Patient states that she had symptoms today, consider possibly peripheral vertigo.      Amount and/or Complexity of Data Reviewed  Labs: ordered.  Radiology: ordered.                                      Clinical Impression:  Final diagnoses:  [Z76.0] Medication refill (Primary)  [R06.00] Dyspnea, unspecified type  [R42] Dizziness          ED Disposition Condition    Observation                       [1]   Social History  Tobacco Use    Smoking status: Former     Types: Cigars     Quit date: 2020     Years since quittin.5     Passive exposure: Past    Smokeless tobacco: Never    Tobacco comments:     social smoker-light - quit    Substance Use Topics    Alcohol use: Not Currently     Alcohol/week: 2.0 standard drinks of alcohol     Types: 2 Glasses of wine per week     Comment: None now -  previously: socially- 2 or 3 times a week-2 glasses of wine    Drug use: Not Currently     Types: Marijuana     Comment:  last use        Sal Jefferson MD  [phone]

## 2025-06-21 NOTE — PLAN OF CARE
[facility]  Initial Discharge Assessment       Primary Care Provider: Jorge A Stack MD    Admission Diagnosis: Dizziness [R42]  Medication refill [Z76.0]  Chest pain [R07.9]  Dyspnea, unspecified type [R06.00]    Admission Date: 6/20/2025  Expected Discharge Date: 6/23/2025    Transition of Care Barriers: (P) None    Payor: MEDICAID / Plan: [facility] ([facility]) / Product Type: Managed Medicaid /     Extended Emergency Contact Information  Primary Emergency Contact: BOB HUTTON  Address: [address], [place] [address] [place] [address]  Home Phone: [phone]  Mobile Phone: [phone]  Relation: Spouse  Preferred language: English   needed? No  Secondary Emergency Contact: Navya Dowell   [facility]  Mobile Phone: [phone]  Relation: Daughter    Discharge Plan A: (P) Home with family  Discharge Plan B: (P) Home      [id number] [place] - [address] AT [facility]  [address] [facility] [place] [address]  Phone: [phone] Fax: [phone]    [facility]  [place] - [address] [place]  [address] [facility] [address]  Phone: [phone] Fax: [phone]    [facility] [address] [facility]  [address] [facility] [address]  Phone: [phone] Fax: [phone]    [facility]  [place] - [address] [facility]  [address] [facility] [place] [address]  Phone: [phone] Fax: [phone]      Initial Assessment (most recent)       Adult Discharge Assessment - 06/21/25 1623          Discharge Assessment    Assessment Type Discharge Planning Assessment     Confirmed/corrected address, phone number and insurance Yes     Confirmed Demographics Correct on Facesheet     Source of Information patient     Communicated TRAE with patient/caregiver Yes     People in Home child(hussein), adult     Name(s) of People in Home  Navya Dowell (daughter) [phone] (P)      Do you expect to return to your current living situation? Yes     Do you have help at home or someone to help you manage your care at home? Yes (P)      Who are your caregiver(s) and their phone number(s)? Navya Dowell (daughter) [phone] (P)      Prior to hospitilization cognitive status: Alert/Oriented (P)      Current cognitive status: Alert/Oriented (P)      Walking or Climbing Stairs Difficulty yes (P)      Walking or Climbing Stairs ambulation difficulty, requires equipment (P)      Mobility Management walker as needed (P)      Dressing/Bathing Difficulty yes (P)      Dressing/Bathing Management Navya Dowell (daughter) [phone] assist as needed (P)      Home Accessibility stairs to enter home (P)      Number of Stairs, Main Entrance five (P)      Equipment Currently Used at Home walker, rolling (P)      Readmission within 30 days? No (P)      Patient currently being followed by outpatient case management? No (P)      Do you currently have service(s) that help you manage your care at home? No (P)      Do you take prescription medications? Yes (P)      Do you have prescription coverage? Yes (P)      Do you have any problems affording any of your prescribed medications? No (P)      Is the patient taking medications as prescribed? yes (P)      Who is going to help you get home at discharge? Navya Dowell (daughter) [phone] (P)      How do you get to doctors appointments? car, drives self;family or friend will provide (P)      Are you on dialysis? No (P)      Discharge Plan A Home with family (P)      Discharge Plan B Home (P)      DME Needed Upon Discharge  none (P)      Discharge Plan discussed with: Patient (P)      Transition of Care Barriers None (P)         Physical Activity    On average, how many days per week do you engage in moderate to strenuous exercise (like a brisk walk)? 0 days (P)      On average, how many minutes do you engage in  exercise at this level? 0 min (P)         Financial Resource Strain    How hard is it for you to pay for the very basics like food, housing, medical care, and heating? Somewhat hard (P)         Housing Stability    In the last 12 months, was there a time when you were not able to pay the mortgage or rent on time? No (P)      At any time in the past 12 months, were you homeless or living in a shelter (including now)? No (P)         Transportation Needs    In the past 12 months, has lack of transportation kept you from medical appointments or from getting medications? No (P)      In the past 12 months, has lack of transportation kept you from meetings, work, or from getting things needed for daily living? No (P)         Food Insecurity    Within the past 12 months, you worried that your food would run out before you got the money to buy more. Never true (P)      Within the past 12 months, the food you bought just didn't last and you didn't have money to get more. Never true (P)         Alcohol Use    Q1: How often do you have a drink containing alcohol? Never (P)      Q2: How many drinks containing alcohol do you have on a typical day when you are drinking? Patient does not drink (P)      Q3: How often do you have six or more drinks on one occasion? Never (P)         [employer]    In the past 12 months has the electric, gas, oil, or water company threatened to shut off services in your home? No (P)         Health Literacy    How often do you need to have someone help you when you read instructions, pamphlets, or other written material from your doctor or pharmacy? Never (P)                  Discharge Plan A and Plan B have been determined by review of patient's clinical status, future medical and therapeutic needs, and coverage/benefits for post-acute care in coordination with multidisciplinary team members.                       KATIA Cabello, LMSW  [facility]  Case Management  Ext. [phone]

## 2025-06-21 NOTE — SUBJECTIVE & OBJECTIVE
Interval History: Admitted due to running out of remodulin, was off remodulin for a couple of hours yesterday. Breathing comfortably on RA and appears euvolemic. Main things bothering her are related to her tunneled line, says skin gets irritated by the dressing adhesive and says her tunnelled line in general hurts. No redness noted and in appropriate position on CXR.     Continuous Infusions:   treprostinil (REMODULIN) 12,000,000 ng in 0.9% NaCl 100 mL infusion  110.2 ng/kg/min (Order-Specific) Intravenous Continuous 3.69 mL/hr at 06/21/25 0021 110.2 ng/kg/min at 06/21/25 0021    veletri/remodulin cassette   Intravenous Continuous        veletri/remodulin tubing   Intravenous Continuous         Scheduled Meds:   furosemide  20 mg Oral Daily    gabapentin  200 mg Oral BID    tadalafiL  40 mg Oral Daily    And    macitentan  10 mg Oral Daily    magnesium oxide  400 mg Oral Daily    morphine  15 mg Oral BID    potassium chloride SA  20 mEq Oral Daily    spironolactone  25 mg Oral Daily    venlafaxine  150 mg Oral Daily     PRN Meds:  Current Facility-Administered Medications:     acetaminophen, 650 mg, Oral, Q4H PRN    dextrose 50%, 12.5 g, Intravenous, PRN    dextrose 50%, 25 g, Intravenous, PRN    glucagon (human recombinant), 1 mg, Intramuscular, PRN    glucose, 16 g, Oral, PRN    glucose, 24 g, Oral, PRN    naloxone, 0.02 mg, Intravenous, PRN    sodium chloride 0.9%, 10 mL, Intravenous, Q12H PRN    Review of patient's allergies indicates:  No Known Allergies  Objective:     Vital Signs (Most Recent):  Temp: 97.9 °F (36.6 °C) (06/21/25 0815)  Pulse: 93 (06/21/25 1036)  Resp: 18 (06/21/25 0849)  BP: 112/76 (06/21/25 0815)  SpO2: 96 % (06/21/25 0815) Vital Signs (24h Range):  Temp:  [97.9 °F (36.6 °C)-98.3 °F (36.8 °C)] 97.9 °F (36.6 °C)  Pulse:  [] 93  Resp:  [16-20] 18  SpO2:  [89 %-100 %] 96 %  BP: (106-154)/() 112/76     Patient Vitals for the past 72 hrs (Last 3 readings):   Weight   06/21/25 0030  "73.5 kg (162 lb 0.6 oz)   06/20/25 2105 76.2 kg (168 lb)     Body mass index is 28.7 kg/m².      Intake/Output Summary (Last 24 hours) at 6/21/2025 1123  Last data filed at 6/21/2025 1030  Gross per 24 hour   Intake 480 ml   Output 200 ml   Net 280 ml            Physical Exam  Constitutional:       Appearance: Normal appearance.   HENT:      Head: Normocephalic and atraumatic.   Neck:      Comments: JVP not elevated  Cardiovascular:      Rate and Rhythm: Normal rate and regular rhythm.      Pulses: Normal pulses.      Heart sounds: Normal heart sounds.   Pulmonary:      Effort: Pulmonary effort is normal. No respiratory distress.      Breath sounds: Normal breath sounds. No wheezing or rales.   Musculoskeletal:      Cervical back: Normal range of motion and neck supple.      Right lower leg: No edema.      Left lower leg: No edema.   Neurological:      General: No focal deficit present.      Mental Status: She is alert and oriented to person, place, and time.   Psychiatric:         Mood and Affect: Mood normal.         Behavior: Behavior normal.            Significant Labs:  CBC:  Recent Labs   Lab 06/20/25 2134 06/21/25  0603   WBC 5.43 5.59   RBC 4.48 4.35   HGB 13.0 12.7   HCT 40.1 38.7    144*   MCV 90 89   MCH 29.0 29.2   MCHC 32.4 32.8     BNP:  Recent Labs   Lab 06/20/25 2134   BNP <10     CMP:  Recent Labs   Lab 06/20/25 2134 06/21/25  0603   GLU 93 152*   CALCIUM 9.7 8.9   ALBUMIN 4.4 4.0   PROT 7.9 7.1    139   K 4.4 3.6   CO2 20* 23    106   BUN 15 15   CREATININE 0.9 0.9   ALKPHOS 100 92   ALT 9* 8*   AST 14 11   BILITOT 1.1* 0.8      Coagulation:   No results for input(s): "PT", "INR", "APTT" in the last 168 hours.  LDH:  No results for input(s): "LDH" in the last 72 hours.  Microbiology:  Microbiology Results (last 7 days)       ** No results found for the last 168 hours. **            I have reviewed all pertinent labs within the past 24 hours.    Estimated Creatinine Clearance: " 71 mL/min (based on SCr of 0.9 mg/dL).    Diagnostic Results:  I have reviewed all pertinent imaging results/findings within the past 24 hours.

## 2025-06-21 NOTE — ED NOTES
Assumed care of the patient. Pt on continuous cardiac monitoring, continuous pulse oximetry, and automatic BP cuff cycling Q30min. Pt in hospital gown, side rails up X2, bed low and locked, and call light is placed within reach. No family/visitors at bedside at this time. Pt denies any complaints or needs.

## 2025-06-21 NOTE — ED TRIAGE NOTES
Pt presents to ED for c/o shortness of breath beginning an hour ago. Pt is currently taking Remodulin for pulmonary arterial hypertension and endorses she ran out of her medication around the same time today. Pt denies any chest pain or other c/o. Hx of cardiac stents, pulmonary HTN, and CHF. Pt arrives with central line in place for continuous infusion of remodulin.

## 2025-06-21 NOTE — NURSING
Pt arrived unit in no acute distress. VS wnl. Started on Remodulin @ 110.2/kg/min. Bed locked an in lowest position, call light within reach. Pt instructed to call for assistance.

## 2025-06-21 NOTE — PLAN OF CARE
Patient AAOx4. OOB independently. VSS. Tele NSR. CXR completed (see results). R IJ in place, Remodulin only, dressing CDI, Remodulin CADD pump infusing continuous @ 3.69 ml/hr, 88 ml/24hr. C/o Nausea, Zofran 4 mg sl administered, C/o pain Morphine 15 mg TIB and Oxycodone 10 mg PO q4 administered. Bed lowest setting, locked, 2x rails up, call light within reach, pt verbalized use.

## 2025-06-21 NOTE — PROGRESS NOTES
[facility]  Heart Transplant  Progress Note    Patient Name: Kristin Maier  MRN: [id number]  Admission Date: 6/20/2025  Hospital Length of Stay: 0 days  Attending Physician: Kahlil Cisneros MD  Primary Care Provider: Jorge A Stack MD  Principal Problem:<principal problem not specified>    Subjective:   Interval History: Admitted due to running out of remodulin, was off remodulin for a couple of hours yesterday. Breathing comfortably on RA and appears euvolemic. Main things bothering her are related to her tunneled line, says skin gets irritated by the dressing adhesive and says her tunnelled line in general hurts. No redness noted and in appropriate position on CXR.     Continuous Infusions:   treprostinil (REMODULIN) 12,000,000 ng in 0.9% NaCl 100 mL infusion  110.2 ng/kg/min (Order-Specific) Intravenous Continuous 3.69 mL/hr at 06/21/25 0021 110.2 ng/kg/min at 06/21/25 0021    veletri/remodulin cassette   Intravenous Continuous        veletri/remodulin tubing   Intravenous Continuous         Scheduled Meds:   furosemide  20 mg Oral Daily    gabapentin  200 mg Oral BID    tadalafiL  40 mg Oral Daily    And    macitentan  10 mg Oral Daily    magnesium oxide  400 mg Oral Daily    morphine  15 mg Oral BID    potassium chloride SA  20 mEq Oral Daily    spironolactone  25 mg Oral Daily    venlafaxine  150 mg Oral Daily     PRN Meds:  Current Facility-Administered Medications:     acetaminophen, 650 mg, Oral, Q4H PRN    dextrose 50%, 12.5 g, Intravenous, PRN    dextrose 50%, 25 g, Intravenous, PRN    glucagon (human recombinant), 1 mg, Intramuscular, PRN    glucose, 16 g, Oral, PRN    glucose, 24 g, Oral, PRN    naloxone, 0.02 mg, Intravenous, PRN    sodium chloride 0.9%, 10 mL, Intravenous, Q12H PRN    Review of patient's allergies indicates:  No Known Allergies  Objective:     Vital Signs (Most Recent):  Temp: 97.9 °F (36.6 °C) (06/21/25 0815)  Pulse: 93 (06/21/25 1036)  Resp: 18  (06/21/25 0849)  BP: 112/76 (06/21/25 0815)  SpO2: 96 % (06/21/25 0815) Vital Signs (24h Range):  Temp:  [97.9 °F (36.6 °C)-98.3 °F (36.8 °C)] 97.9 °F (36.6 °C)  Pulse:  [] 93  Resp:  [16-20] 18  SpO2:  [89 %-100 %] 96 %  BP: (106-154)/() 112/76     Patient Vitals for the past 72 hrs (Last 3 readings):   Weight   06/21/25 0030 73.5 kg (162 lb 0.6 oz)   06/20/25 2105 76.2 kg (168 lb)     Body mass index is 28.7 kg/m².      Intake/Output Summary (Last 24 hours) at 6/21/2025 1123  Last data filed at 6/21/2025 1030  Gross per 24 hour   Intake 480 ml   Output 200 ml   Net 280 ml            Physical Exam  Constitutional:       Appearance: Normal appearance.   HENT:      Head: Normocephalic and atraumatic.   Neck:      Comments: JVP not elevated  Cardiovascular:      Rate and Rhythm: Normal rate and regular rhythm.      Pulses: Normal pulses.      Heart sounds: Normal heart sounds.   Pulmonary:      Effort: Pulmonary effort is normal. No respiratory distress.      Breath sounds: Normal breath sounds. No wheezing or rales.   Musculoskeletal:      Cervical back: Normal range of motion and neck supple.      Right lower leg: No edema.      Left lower leg: No edema.   Neurological:      General: No focal deficit present.      Mental Status: She is alert and oriented to person, place, and time.   Psychiatric:         Mood and Affect: Mood normal.         Behavior: Behavior normal.            Significant Labs:  CBC:  Recent Labs   Lab 06/20/25 2134 06/21/25  0603   WBC 5.43 5.59   RBC 4.48 4.35   HGB 13.0 12.7   HCT 40.1 38.7    144*   MCV 90 89   MCH 29.0 29.2   MCHC 32.4 32.8     BNP:  Recent Labs   Lab 06/20/25 2134   BNP <10     CMP:  Recent Labs   Lab 06/20/25 2134 06/21/25  0603   GLU 93 152*   CALCIUM 9.7 8.9   ALBUMIN 4.4 4.0   PROT 7.9 7.1    139   K 4.4 3.6   CO2 20* 23    106   BUN 15 15   CREATININE 0.9 0.9   ALKPHOS 100 92   ALT 9* 8*   AST 14 11   BILITOT 1.1* 0.8      Coagulation:  "  No results for input(s): "PT", "INR", "APTT" in the last 168 hours.  LDH:  No results for input(s): "LDH" in the last 72 hours.  Microbiology:  Microbiology Results (last 7 days)       ** No results found for the last 168 hours. **            I have reviewed all pertinent labs within the past 24 hours.    Estimated Creatinine Clearance: 71 mL/min (based on SCr of 0.9 mg/dL).    Diagnostic Results:  I have reviewed all pertinent imaging results/findings within the past 24 hours.  Assessment and Plan:     Kristin Maier is a 50 y/o AA female with WHO Group 1 PAH(on PO Opsumit/tadalafil, IV remodulin) who comes in due to running out of IV medication at home on 6/20 at 6 pm.  She denies chest pain, dyspnea, PND, orthopnea. She is here with her  PO Opsumit/tadalafil. On arrival she was hemodynamically stable. Labs appear stable. CXR stable. She is admitted to HTS team for remodulin management.        Pulmonary hypertension  -Admit to TSU  -Cont home PO Opsumit/Tadalafil and  restart IV remodulin.  - Pharmacy to assist with dosing   -Transition to hospital pump until home supply arrives.   -Likely DC on Monday.        LITZY PaigeC  Heart Transplant  [facility]    "

## 2025-06-21 NOTE — HPI
Kristin Maier is a 48 y/o AA female with WHO Group 1 PAH(on PO Opsumit/tadalafil, IV remodulin) who comes in due to running out of IV medication at home on 6/20 at 6 pm.  She denies chest pain, dyspnea, PND, orthopnea. She is here with her  PO Opsumit/tadalafil. On arrival she was hemodynamically stable. Labs appear stable. CXR stable. She is admitted to HTS team for remodulin management.

## 2025-06-21 NOTE — PROGRESS NOTES
Home Parenteral Prostacyclin Continuation: Pharmacist Verification Note        Spoke to pharmacist - Ximena MORA with [facility] ([phone]) and cross-referenced infusion rate and reservoir volume at bedside in ED.     Home medication variables  [facility]: [phone]  Date of latest prescription (mm/dd/yyyy): 1/2/25             Type of ambulatory infusion pump: CADD - Reyes  Ambulatory pump rate (mL/hr): 1.8 mL/hr (confirmed on pump at bedside)  Drug: Treprostinil (Remodulin)  Route: Intravenous  Dosing weight (kg): 67 kg  How supplied: Patient mixed cassettes  Home vial concentration:  5 mg/ml  20 mL vial  Patient reports mixing 5 mL of concentrated drug in 95 mL of diluent (NS)  Final concentration:  0.25 mg/ml  Verified current dose: 110.2 ng/kg/min  Patient is titrating therapy (yes or no): No  Volume of drug solution remaining in the patient's pump (mL): 0 mL (confirmed on pump at bedside @ 2200 on 6/20/25)  Time until depletion of drug solution in the patient's pump (hours): Pt med depleted      Hospital medication variables  Drug : Treprostinil (Remodulin)  Route: Intravenous  Dosing weight (kg): 67 kg        Hospital concentration (mg/mL or ng/mL): 120,000 ng/mL         Dose to be administered in hospital (ng/kg/min): 110.2 ng/kg/min         Hospital infusion initiation time (stat vs standard administration time): standard     Contacted [facility] attending: (yes/no): No  Physician contacted: Eulalia Magallon  Discussion: dose discrepancies with order         Orders placed for inpatient treprostinil IV infusion

## 2025-06-21 NOTE — H&P
[facility]  Heart Transplant  H&P    Patient Name: Kristin Maier  MRN: [id number]  Admission Date: 2025  Attending Physician: Kahlil Cisneros MD  Primary Care Provider: Jorge A Stack MD  Principal Problem:<principal problem not specified>    Subjective:     History of Present Illness:  Kristin Maier is a 49 y/o AA female with WHO Group 1 PAH(on PO Opsumit/tadalafil, IV remodulin) who comes in due to running out of IV medication at home on  at 6 pm.  She denies chest pain, dyspnea, PND, orthopnea. She is here with her  PO Opsumit/tadalafil. On arrival she was hemodynamically stable. Labs appear stable. CXR stable. She is admitted to [facility] team for remodulin management.        Past Medical History:   Diagnosis Date    Elevated liver enzymes     Essential (primary) hypertension     Heart failure, unspecified     Hypokalemia     Mixed hyperlipidemia     Neuropathic pain     Tx w/ tramadol & Lyrica    Pulmonary hypertension     Receiving Remodulin continuously through tunneled central line       Past Surgical History:   Procedure Laterality Date    APPENDECTOMY       SECTION      Transverse cut    HYSTERECTOMY  2017    TLH- bleeding    OOPHORECTOMY      RIGHT HEART CATHETERIZATION Right 2021    Procedure: INSERTION, CATHETER, RIGHT HEART;  Surgeon: Chloe Gregory MD;  Location: [facility] CATH LAB;  Service: Cardiology;  Laterality: Right;    RIGHT HEART CATHETERIZATION Right 3/16/2022    Procedure: INSERTION, CATHETER, RIGHT HEART;  Surgeon: Hu Silverman MD;  Location: [facility] CATH LAB;  Service: Cardiology;  Laterality: Right;    RIGHT HEART CATHETERIZATION Right 2022    Procedure: INSERTION, CATHETER, RIGHT HEART;  Surgeon: Chloe Gregory MD;  Location: [facility] CATH LAB;  Service: Cardiology;  Laterality: Right;    RIGHT HEART CATHETERIZATION Right 3/21/2023    Procedure: INSERTION, CATHETER, RIGHT HEART;  Surgeon: Kahlil Cisneros MD;  Location:  [facility] CATH LAB;  Service: Cardiology;  Laterality: Right;    RIGHT HEART CATHETERIZATION Right 10/9/2023    Procedure: INSERTION, CATHETER, RIGHT HEART;  Surgeon: Chloe Gregory MD;  Location: [facility] CATH LAB;  Service: Cardiology;  Laterality: Right;    RIGHT HEART CATHETERIZATION Right 2024    Procedure: INSERTION, CATHETER, RIGHT HEART;  Surgeon: Kahlil Cisneros MD;  Location: [facility] CATH LAB;  Service: Cardiology;  Laterality: Right;    TUBAL LIGATION      VAGINAL DELIVERY  ; ; 1996       Review of patient's allergies indicates:  No Known Allergies    Current Facility-Administered Medications   Medication    acetaminophen tablet 650 mg    dextrose 50% injection 12.5 g    dextrose 50% injection 25 g    furosemide tablet 20 mg    gabapentin capsule 200 mg    glucagon (human recombinant) injection 1 mg    glucose chewable tablet 16 g    glucose chewable tablet 24 g    magnesium oxide tablet 400 mg    morphine 12 hr tablet 15 mg    naloxone 0.4 mg/mL injection 0.02 mg    NON FORMULARY MEDICATION 1 tablet    potassium chloride SA CR tablet 20 mEq    sodium chloride 0.9% flush 10 mL    spironolactone tablet 25 mg    treprostinil (REMODULIN) 12,000,000 ng in 0.9% NaCl 100 mL infusion    VELETRI/REMODULIN CASSETTE    VELETRI/REMODULIN TUBING    venlafaxine 24 hr capsule 150 mg     Family History       Problem Relation (Age of Onset)    Cancer Father (69)    No Known Problems Sister, Brother, Brother          Tobacco Use    Smoking status: Former     Types: Cigars     Quit date: 2020     Years since quittin.5     Passive exposure: Past    Smokeless tobacco: Never    Tobacco comments:     social smoker-light - quit    Substance and Sexual Activity    Alcohol use: Not Currently     Alcohol/week: 2.0 standard drinks of alcohol     Types: 2 Glasses of wine per week     Comment: None now -  previously: socially- 2 or 3 times a week-2 glasses of wine    Drug use: Not Currently     Types:  Marijuana     Comment: 2021 last use    Sexual activity: Yes     Partners: Male     Birth control/protection: See Surgical Hx     Comment:      Review of Systems   Respiratory:  Negative for cough, chest tightness and shortness of breath.    All other systems reviewed and are negative.    Objective:     Vital Signs (Most Recent):  Temp: 97.9 °F (36.6 °C) (06/21/25 0015)  Pulse: 92 (06/21/25 0046)  Resp: 18 (06/21/25 0042)  BP: (!) 148/100 (06/21/25 0015)  SpO2: 100 % (06/21/25 0015) Vital Signs (24h Range):  Temp:  [97.9 °F (36.6 °C)-98.1 °F (36.7 °C)] 97.9 °F (36.6 °C)  Pulse:  [76-94] 92  Resp:  [16-20] 18  SpO2:  [89 %-100 %] 100 %  BP: (134-154)/() 148/100     Patient Vitals for the past 72 hrs (Last 3 readings):   Weight   06/21/25 0030 73.5 kg (162 lb 0.6 oz)   06/20/25 2105 76.2 kg (168 lb)     Body mass index is 28.7 kg/m².    No intake or output data in the 24 hours ending 06/21/25 0300       Physical Exam  Vitals reviewed.   Constitutional:       Appearance: Normal appearance.   HENT:      Head: Normocephalic.   Cardiovascular:      Rate and Rhythm: Normal rate and regular rhythm.      Pulses: Normal pulses.   Pulmonary:      Effort: Pulmonary effort is normal.      Breath sounds: Normal breath sounds.   Abdominal:      General: Abdomen is flat.      Palpations: Abdomen is soft.   Musculoskeletal:         General: Normal range of motion.      Cervical back: Normal range of motion.      Right lower leg: No edema.      Left lower leg: No edema.   Skin:     General: Skin is warm.   Neurological:      General: No focal deficit present.      Mental Status: She is alert and oriented to person, place, and time.   Psychiatric:         Mood and Affect: Mood normal.         Behavior: Behavior normal.            Significant Labs:  CBC:  Recent Labs   Lab 06/20/25  2134   WBC 5.43   RBC 4.48   HGB 13.0   HCT 40.1      MCV 90   MCH 29.0   MCHC 32.4     BNP:  Recent Labs   Lab 06/20/25  2137   BNP  "<10     CMP:  Recent Labs   Lab 06/20/25  2134   GLU 93   CALCIUM 9.7   ALBUMIN 4.4   PROT 7.9      K 4.4   CO2 20*      BUN 15   CREATININE 0.9   ALKPHOS 100   ALT 9*   AST 14   BILITOT 1.1*      Coagulation:   No results for input(s): "PT", "INR", "APTT" in the last 168 hours.  LDH:  No results for input(s): "LDH" in the last 72 hours.  Microbiology:  Microbiology Results (last 7 days)       ** No results found for the last 168 hours. **            I have reviewed all pertinent labs within the past 24 hours.    Diagnostic Results:  I have reviewed and interpreted all pertinent imaging results/findings within the past 24 hours.    Assessment/Plan:     Pulmonary hypertension  -Admit to TSU  -Cont home PO Opsumit/Tadalafil and  restart IV remodulin.  - Pharmacy to assist with dosing   -Transition to hospital pump until home supply arrives.   -Likely DC on Monday.        Eulalia Magallon MD  Heart Transplant  [facility]  "

## 2025-06-22 LAB
ABSOLUTE EOSINOPHIL (OHS): 0.11 K/UL
ABSOLUTE MONOCYTE (OHS): 0.4 K/UL (ref 0.3–1)
ABSOLUTE NEUTROPHIL COUNT (OHS): 1.64 K/UL (ref 1.8–7.7)
ALBUMIN SERPL BCP-MCNC: 3.8 G/DL (ref 3.5–5.2)
ALP SERPL-CCNC: 88 UNIT/L (ref 40–150)
ALT SERPL W/O P-5'-P-CCNC: 9 UNIT/L (ref 10–44)
ANION GAP (OHS): 9 MMOL/L (ref 8–16)
AST SERPL-CCNC: 14 UNIT/L (ref 11–45)
BASOPHILS # BLD AUTO: 0.03 K/UL
BASOPHILS NFR BLD AUTO: 0.6 %
BILIRUB SERPL-MCNC: 0.5 MG/DL (ref 0.1–1)
BUN SERPL-MCNC: 11 MG/DL (ref 6–20)
CALCIUM SERPL-MCNC: 8.6 MG/DL (ref 8.7–10.5)
CHLORIDE SERPL-SCNC: 106 MMOL/L (ref 95–110)
CO2 SERPL-SCNC: 23 MMOL/L (ref 23–29)
CREAT SERPL-MCNC: 0.9 MG/DL (ref 0.5–1.4)
ERYTHROCYTE [DISTWIDTH] IN BLOOD BY AUTOMATED COUNT: 13.5 % (ref 11.5–14.5)
GFR SERPLBLD CREATININE-BSD FMLA CKD-EPI: >60 ML/MIN/1.73/M2
GLUCOSE SERPL-MCNC: 139 MG/DL (ref 70–110)
HCT VFR BLD AUTO: 37 % (ref 37–48.5)
HGB BLD-MCNC: 12.1 GM/DL (ref 12–16)
IMM GRANULOCYTES # BLD AUTO: 0.01 K/UL (ref 0–0.04)
IMM GRANULOCYTES NFR BLD AUTO: 0.2 % (ref 0–0.5)
LYMPHOCYTES # BLD AUTO: 2.53 K/UL (ref 1–4.8)
MAGNESIUM SERPL-MCNC: 1.9 MG/DL (ref 1.6–2.6)
MCH RBC QN AUTO: 29.4 PG (ref 27–31)
MCHC RBC AUTO-ENTMCNC: 32.7 G/DL (ref 32–36)
MCV RBC AUTO: 90 FL (ref 82–98)
NUCLEATED RBC (/100WBC) (OHS): 0 /100 WBC
PHOSPHATE SERPL-MCNC: 3.9 MG/DL (ref 2.7–4.5)
PLATELET # BLD AUTO: 144 K/UL (ref 150–450)
PMV BLD AUTO: 10.8 FL (ref 9.2–12.9)
POTASSIUM SERPL-SCNC: 4.6 MMOL/L (ref 3.5–5.1)
PROT SERPL-MCNC: 6.8 GM/DL (ref 6–8.4)
RBC # BLD AUTO: 4.12 M/UL (ref 4–5.4)
RELATIVE EOSINOPHIL (OHS): 2.3 %
RELATIVE LYMPHOCYTE (OHS): 53.6 % (ref 18–48)
RELATIVE MONOCYTE (OHS): 8.5 % (ref 4–15)
RELATIVE NEUTROPHIL (OHS): 34.8 % (ref 38–73)
SODIUM SERPL-SCNC: 138 MMOL/L (ref 136–145)
WBC # BLD AUTO: 4.72 K/UL (ref 3.9–12.7)

## 2025-06-22 PROCEDURE — 25000003 PHARM REV CODE 250: Performed by: STUDENT IN AN ORGANIZED HEALTH CARE EDUCATION/TRAINING PROGRAM

## 2025-06-22 PROCEDURE — 25000003 PHARM REV CODE 250

## 2025-06-22 PROCEDURE — 36415 COLL VENOUS BLD VENIPUNCTURE: CPT | Performed by: STUDENT IN AN ORGANIZED HEALTH CARE EDUCATION/TRAINING PROGRAM

## 2025-06-22 PROCEDURE — G0378 HOSPITAL OBSERVATION PER HR: HCPCS

## 2025-06-22 PROCEDURE — 83735 ASSAY OF MAGNESIUM: CPT | Performed by: STUDENT IN AN ORGANIZED HEALTH CARE EDUCATION/TRAINING PROGRAM

## 2025-06-22 PROCEDURE — 84100 ASSAY OF PHOSPHORUS: CPT | Performed by: STUDENT IN AN ORGANIZED HEALTH CARE EDUCATION/TRAINING PROGRAM

## 2025-06-22 PROCEDURE — 63600175 PHARM REV CODE 636 W HCPCS: Mod: TB | Performed by: INTERNAL MEDICINE

## 2025-06-22 PROCEDURE — 25000003 PHARM REV CODE 250: Performed by: INTERNAL MEDICINE

## 2025-06-22 PROCEDURE — 80053 COMPREHEN METABOLIC PANEL: CPT | Performed by: STUDENT IN AN ORGANIZED HEALTH CARE EDUCATION/TRAINING PROGRAM

## 2025-06-22 PROCEDURE — 25000003 PHARM REV CODE 250: Performed by: PHYSICIAN ASSISTANT

## 2025-06-22 PROCEDURE — 85025 COMPLETE CBC W/AUTO DIFF WBC: CPT | Performed by: STUDENT IN AN ORGANIZED HEALTH CARE EDUCATION/TRAINING PROGRAM

## 2025-06-22 RX ORDER — OXYCODONE AND ACETAMINOPHEN 10; 325 MG/1; MG/1
1 TABLET ORAL EVERY 4 HOURS PRN
Refills: 0 | Status: DISCONTINUED | OUTPATIENT
Start: 2025-06-22 | End: 2025-06-23 | Stop reason: HOSPADM

## 2025-06-22 RX ADMIN — ACETAMINOPHEN 650 MG: 325 TABLET ORAL at 04:06

## 2025-06-22 RX ADMIN — SPIRONOLACTONE 25 MG: 25 TABLET, FILM COATED ORAL at 08:06

## 2025-06-22 RX ADMIN — MACITENTAN 10 MG: 10 TABLET, FILM COATED ORAL at 09:06

## 2025-06-22 RX ADMIN — OXYCODONE HYDROCHLORIDE 10 MG: 10 TABLET ORAL at 10:06

## 2025-06-22 RX ADMIN — ONDANSETRON 4 MG: 4 TABLET, ORALLY DISINTEGRATING ORAL at 10:06

## 2025-06-22 RX ADMIN — GABAPENTIN 200 MG: 100 CAPSULE ORAL at 09:06

## 2025-06-22 RX ADMIN — MORPHINE SULFATE 15 MG: 15 TABLET, FILM COATED, EXTENDED RELEASE ORAL at 09:06

## 2025-06-22 RX ADMIN — MORPHINE SULFATE 15 MG: 15 TABLET, FILM COATED, EXTENDED RELEASE ORAL at 08:06

## 2025-06-22 RX ADMIN — OXYCODONE HYDROCHLORIDE 10 MG: 10 TABLET ORAL at 12:06

## 2025-06-22 RX ADMIN — TREPROSTINIL 110.2 NG/KG/MIN: 200 INJECTION, SOLUTION INTRAVENOUS; SUBCUTANEOUS at 01:06

## 2025-06-22 RX ADMIN — ACETAMINOPHEN 650 MG: 325 TABLET ORAL at 12:06

## 2025-06-22 RX ADMIN — GABAPENTIN 200 MG: 100 CAPSULE ORAL at 08:06

## 2025-06-22 RX ADMIN — Medication 400 MG: at 08:06

## 2025-06-22 RX ADMIN — POTASSIUM CHLORIDE 20 MEQ: 1500 TABLET, EXTENDED RELEASE ORAL at 08:06

## 2025-06-22 RX ADMIN — OXYCODONE AND ACETAMINOPHEN 1 TABLET: 325; 10 TABLET ORAL at 05:06

## 2025-06-22 RX ADMIN — OXYCODONE AND ACETAMINOPHEN 1 TABLET: 325; 10 TABLET ORAL at 11:06

## 2025-06-22 RX ADMIN — VENLAFAXINE HYDROCHLORIDE 150 MG: 37.5 CAPSULE, EXTENDED RELEASE ORAL at 08:06

## 2025-06-22 RX ADMIN — FUROSEMIDE 20 MG: 20 TABLET ORAL at 08:06

## 2025-06-22 RX ADMIN — TADALAFIL 40 MG: 20 TABLET, FILM COATED ORAL at 08:06

## 2025-06-22 RX ADMIN — HYDROCORTISONE: 10 CREAM TOPICAL at 08:06

## 2025-06-22 RX ADMIN — HYDROCORTISONE: 10 CREAM TOPICAL at 09:06

## 2025-06-22 RX ADMIN — OXYCODONE HYDROCHLORIDE 10 MG: 10 TABLET ORAL at 04:06

## 2025-06-22 NOTE — PROGRESS NOTES
[facility]  Heart Transplant  Progress Note    Patient Name: Kristin Maier  MRN: [id number]  Admission Date: 6/20/2025  Hospital Length of Stay: 0 days  Attending Physician: Kahlil Cisneros MD  Primary Care Provider: Jorge A Stack MD  Principal Problem:<principal problem not specified>    Subjective:   Interval History: No events overnight. Stable on room air and euvolemic. Waiting for remodulin to be delivered     Continuous Infusions:   treprostinil (REMODULIN) 12,000,000 ng in 0.9% NaCl 100 mL infusion  110.2 ng/kg/min (Order-Specific) Intravenous Continuous 3.69 mL/hr at 06/22/25 0158 110.2 ng/kg/min at 06/22/25 0158    veletri/remodulin cassette   Intravenous Continuous   New Bag at 06/22/25 0158    veletri/remodulin tubing   Intravenous Continuous   New Bag at 06/22/25 0158     Scheduled Meds:   furosemide  20 mg Oral Daily    gabapentin  200 mg Oral BID    hydrocortisone   Topical (Top) BID    tadalafiL  40 mg Oral Daily    And    macitentan  10 mg Oral Daily    magnesium oxide  400 mg Oral Daily    morphine  15 mg Oral BID    potassium chloride SA  20 mEq Oral Daily    spironolactone  25 mg Oral Daily    venlafaxine  150 mg Oral Daily     PRN Meds:  Current Facility-Administered Medications:     dextrose 50%, 12.5 g, Intravenous, PRN    dextrose 50%, 25 g, Intravenous, PRN    glucagon (human recombinant), 1 mg, Intramuscular, PRN    glucose, 16 g, Oral, PRN    glucose, 24 g, Oral, PRN    naloxone, 0.02 mg, Intravenous, PRN    ondansetron, 4 mg, Oral, Q6H PRN    oxyCODONE-acetaminophen, 1 tablet, Oral, Q4H PRN    sodium chloride 0.9%, 10 mL, Intravenous, Q12H PRN    Review of patient's allergies indicates:  No Known Allergies  Objective:     Vital Signs (Most Recent):  Temp: 97.6 °F (36.4 °C) (06/22/25 0853)  Pulse: 92 (06/22/25 1032)  Resp: 17 (06/22/25 1013)  BP: 103/70 (06/22/25 0853)  SpO2: (!) 94 % (06/22/25 0853) Vital Signs (24h Range):  Temp:  [97.6 °F (36.4 °C)-98.7  °F (37.1 °C)] 97.6 °F (36.4 °C)  Pulse:  [] 92  Resp:  [17-19] 17  SpO2:  [93 %-97 %] 94 %  BP: (102-112)/(70-83) 103/70     Patient Vitals for the past 72 hrs (Last 3 readings):   Weight   06/21/25 0030 73.5 kg (162 lb 0.6 oz)   06/20/25 2105 76.2 kg (168 lb)     Body mass index is 28.7 kg/m².      Intake/Output Summary (Last 24 hours) at 6/22/2025 1110  Last data filed at 6/22/2025 0158  Gross per 24 hour   Intake 540 ml   Output 600 ml   Net -60 ml            Physical Exam  Constitutional:       Appearance: Normal appearance.   HENT:      Head: Normocephalic and atraumatic.   Neck:      Comments: JVP not elevated  Cardiovascular:      Rate and Rhythm: Normal rate and regular rhythm.      Pulses: Normal pulses.      Heart sounds: Normal heart sounds.   Pulmonary:      Effort: Pulmonary effort is normal. No respiratory distress.      Breath sounds: Normal breath sounds. No wheezing or rales.   Musculoskeletal:      Cervical back: Normal range of motion and neck supple.      Right lower leg: No edema.      Left lower leg: No edema.   Neurological:      General: No focal deficit present.      Mental Status: She is alert and oriented to person, place, and time.   Psychiatric:         Mood and Affect: Mood normal.         Behavior: Behavior normal.            Significant Labs:  CBC:  Recent Labs   Lab 06/20/25 2134 06/21/25 0603 06/22/25  0558   WBC 5.43 5.59 4.72   RBC 4.48 4.35 4.12   HGB 13.0 12.7 12.1   HCT 40.1 38.7 37.0    144* 144*   MCV 90 89 90   MCH 29.0 29.2 29.4   MCHC 32.4 32.8 32.7     BNP:  Recent Labs   Lab 06/20/25 2134   BNP <10     CMP:  Recent Labs   Lab 06/20/25 2134 06/21/25 0603 06/22/25  0558   GLU 93 152* 139*   CALCIUM 9.7 8.9 8.6*   ALBUMIN 4.4 4.0 3.8   PROT 7.9 7.1 6.8    139 138   K 4.4 3.6 4.6   CO2 20* 23 23    106 106   BUN 15 15 11   CREATININE 0.9 0.9 0.9   ALKPHOS 100 92 88   ALT 9* 8* 9*   AST 14 11 14   BILITOT 1.1* 0.8 0.5      Coagulation:   No  "results for input(s): "PT", "INR", "APTT" in the last 168 hours.  LDH:  No results for input(s): "LDH" in the last 72 hours.  Microbiology:  Microbiology Results (last 7 days)       ** No results found for the last 168 hours. **            I have reviewed all pertinent labs within the past 24 hours.    Estimated Creatinine Clearance: 71 mL/min (based on SCr of 0.9 mg/dL).    Diagnostic Results:  I have reviewed all pertinent imaging results/findings within the past 24 hours.  Assessment and Plan:     Kristin Maier is a 48 y/o AA female with WHO Group 1 PAH(on PO Opsumit/tadalafil, IV remodulin) who comes in due to running out of IV medication at home on 6/20 at 6 pm.  She denies chest pain, dyspnea, PND, orthopnea. She is here with her  PO Opsumit/tadalafil. On arrival she was hemodynamically stable. Labs appear stable. CXR stable. She is admitted to HTS team for remodulin management.        Pulmonary hypertension  -Admit to TSU  -Cont home PO Opsumit/Tadalafil and  restart IV remodulin.  - Pharmacy to assist with dosing   -Transition to hospital pump until home supply arrives.   -Likely DC on Monday.        LITZY PaigeC  Heart Transplant  [facility]    "

## 2025-06-22 NOTE — PLAN OF CARE
AAOx4,VSS,afebrile   Remodulin cassette change   See documentation and assessment for further interventions    Problem: Adult Inpatient Plan of Care  Goal: Plan of Care Review  Outcome: Progressing  Goal: Patient-Specific Goal (Individualized)  Outcome: Progressing  Goal: Absence of Hospital-Acquired Illness or Injury  Outcome: Progressing  Goal: Optimal Comfort and Wellbeing  Outcome: Progressing  Goal: Readiness for Transition of Care  Outcome: Progressing     Problem: Infection  Goal: Absence of Infection Signs and Symptoms  Outcome: Progressing     Problem: Skin Injury Risk Increased  Goal: Skin Health and Integrity  Outcome: Progressing

## 2025-06-22 NOTE — SUBJECTIVE & OBJECTIVE
Interval History: No events overnight. Stable on room air and euvolemic. Waiting for remodulin to be delivered     Continuous Infusions:   treprostinil (REMODULIN) 12,000,000 ng in 0.9% NaCl 100 mL infusion  110.2 ng/kg/min (Order-Specific) Intravenous Continuous 3.69 mL/hr at 06/22/25 0158 110.2 ng/kg/min at 06/22/25 0158    veletri/remodulin cassette   Intravenous Continuous   New Bag at 06/22/25 0158    veletri/remodulin tubing   Intravenous Continuous   New Bag at 06/22/25 0158     Scheduled Meds:   furosemide  20 mg Oral Daily    gabapentin  200 mg Oral BID    hydrocortisone   Topical (Top) BID    tadalafiL  40 mg Oral Daily    And    macitentan  10 mg Oral Daily    magnesium oxide  400 mg Oral Daily    morphine  15 mg Oral BID    potassium chloride SA  20 mEq Oral Daily    spironolactone  25 mg Oral Daily    venlafaxine  150 mg Oral Daily     PRN Meds:  Current Facility-Administered Medications:     dextrose 50%, 12.5 g, Intravenous, PRN    dextrose 50%, 25 g, Intravenous, PRN    glucagon (human recombinant), 1 mg, Intramuscular, PRN    glucose, 16 g, Oral, PRN    glucose, 24 g, Oral, PRN    naloxone, 0.02 mg, Intravenous, PRN    ondansetron, 4 mg, Oral, Q6H PRN    oxyCODONE-acetaminophen, 1 tablet, Oral, Q4H PRN    sodium chloride 0.9%, 10 mL, Intravenous, Q12H PRN    Review of patient's allergies indicates:  No Known Allergies  Objective:     Vital Signs (Most Recent):  Temp: 97.6 °F (36.4 °C) (06/22/25 0853)  Pulse: 92 (06/22/25 1032)  Resp: 17 (06/22/25 1013)  BP: 103/70 (06/22/25 0853)  SpO2: (!) 94 % (06/22/25 0853) Vital Signs (24h Range):  Temp:  [97.6 °F (36.4 °C)-98.7 °F (37.1 °C)] 97.6 °F (36.4 °C)  Pulse:  [] 92  Resp:  [17-19] 17  SpO2:  [93 %-97 %] 94 %  BP: (102-112)/(70-83) 103/70     Patient Vitals for the past 72 hrs (Last 3 readings):   Weight   06/21/25 0030 73.5 kg (162 lb 0.6 oz)   06/20/25 2105 76.2 kg (168 lb)     Body mass index is 28.7 kg/m².      Intake/Output Summary (Last 24  "hours) at 6/22/2025 1110  Last data filed at 6/22/2025 0158  Gross per 24 hour   Intake 540 ml   Output 600 ml   Net -60 ml            Physical Exam  Constitutional:       Appearance: Normal appearance.   HENT:      Head: Normocephalic and atraumatic.   Neck:      Comments: JVP not elevated  Cardiovascular:      Rate and Rhythm: Normal rate and regular rhythm.      Pulses: Normal pulses.      Heart sounds: Normal heart sounds.   Pulmonary:      Effort: Pulmonary effort is normal. No respiratory distress.      Breath sounds: Normal breath sounds. No wheezing or rales.   Musculoskeletal:      Cervical back: Normal range of motion and neck supple.      Right lower leg: No edema.      Left lower leg: No edema.   Neurological:      General: No focal deficit present.      Mental Status: She is alert and oriented to person, place, and time.   Psychiatric:         Mood and Affect: Mood normal.         Behavior: Behavior normal.            Significant Labs:  CBC:  Recent Labs   Lab 06/20/25 2134 06/21/25 0603 06/22/25  0558   WBC 5.43 5.59 4.72   RBC 4.48 4.35 4.12   HGB 13.0 12.7 12.1   HCT 40.1 38.7 37.0    144* 144*   MCV 90 89 90   MCH 29.0 29.2 29.4   MCHC 32.4 32.8 32.7     BNP:  Recent Labs   Lab 06/20/25 2134   BNP <10     CMP:  Recent Labs   Lab 06/20/25 2134 06/21/25 0603 06/22/25  0558   GLU 93 152* 139*   CALCIUM 9.7 8.9 8.6*   ALBUMIN 4.4 4.0 3.8   PROT 7.9 7.1 6.8    139 138   K 4.4 3.6 4.6   CO2 20* 23 23    106 106   BUN 15 15 11   CREATININE 0.9 0.9 0.9   ALKPHOS 100 92 88   ALT 9* 8* 9*   AST 14 11 14   BILITOT 1.1* 0.8 0.5      Coagulation:   No results for input(s): "PT", "INR", "APTT" in the last 168 hours.  LDH:  No results for input(s): "LDH" in the last 72 hours.  Microbiology:  Microbiology Results (last 7 days)       ** No results found for the last 168 hours. **            I have reviewed all pertinent labs within the past 24 hours.    Estimated Creatinine Clearance: 71 " mL/min (based on SCr of 0.9 mg/dL).    Diagnostic Results:  I have reviewed all pertinent imaging results/findings within the past 24 hours.

## 2025-06-22 NOTE — PLAN OF CARE
Patient AAOx4. OOB independently. VSS. Tele NSR. Plan is for discharge after family brings home medications to her, delay in medications being delivered. Remodulin infusing @ 88 ml/24hr continuous. C/o chronic pain to upper extremities, Morphine and oxycodone given for management. Bed lowest setting, locked, 2x rails up, call light within reach, pt verbalized use.

## 2025-06-23 ENCOUNTER — TELEPHONE (OUTPATIENT)
Dept: TRANSPLANT | Facility: CLINIC | Age: 50
End: 2025-06-23
Payer: MEDICAID

## 2025-06-23 VITALS
OXYGEN SATURATION: 95 % | HEIGHT: 63 IN | SYSTOLIC BLOOD PRESSURE: 114 MMHG | TEMPERATURE: 98 F | DIASTOLIC BLOOD PRESSURE: 82 MMHG | WEIGHT: 167.25 LBS | RESPIRATION RATE: 18 BRPM | HEART RATE: 87 BPM | BODY MASS INDEX: 29.63 KG/M2

## 2025-06-23 LAB
ABSOLUTE EOSINOPHIL (OHS): 0.11 K/UL
ABSOLUTE MONOCYTE (OHS): 0.34 K/UL (ref 0.3–1)
ABSOLUTE NEUTROPHIL COUNT (OHS): 1.74 K/UL (ref 1.8–7.7)
ALBUMIN SERPL BCP-MCNC: 3.8 G/DL (ref 3.5–5.2)
ALP SERPL-CCNC: 87 UNIT/L (ref 40–150)
ALT SERPL W/O P-5'-P-CCNC: 11 UNIT/L (ref 10–44)
ANION GAP (OHS): 7 MMOL/L (ref 8–16)
AST SERPL-CCNC: 13 UNIT/L (ref 11–45)
BASOPHILS # BLD AUTO: 0.02 K/UL
BASOPHILS NFR BLD AUTO: 0.5 %
BILIRUB SERPL-MCNC: 0.5 MG/DL (ref 0.1–1)
BUN SERPL-MCNC: 10 MG/DL (ref 6–20)
CALCIUM SERPL-MCNC: 8.9 MG/DL (ref 8.7–10.5)
CHLORIDE SERPL-SCNC: 104 MMOL/L (ref 95–110)
CO2 SERPL-SCNC: 26 MMOL/L (ref 23–29)
CREAT SERPL-MCNC: 0.8 MG/DL (ref 0.5–1.4)
ERYTHROCYTE [DISTWIDTH] IN BLOOD BY AUTOMATED COUNT: 13.4 % (ref 11.5–14.5)
GFR SERPLBLD CREATININE-BSD FMLA CKD-EPI: >60 ML/MIN/1.73/M2
GLUCOSE SERPL-MCNC: 102 MG/DL (ref 70–110)
HCT VFR BLD AUTO: 35.5 % (ref 37–48.5)
HGB BLD-MCNC: 11.7 GM/DL (ref 12–16)
IMM GRANULOCYTES # BLD AUTO: 0.02 K/UL (ref 0–0.04)
IMM GRANULOCYTES NFR BLD AUTO: 0.5 % (ref 0–0.5)
LYMPHOCYTES # BLD AUTO: 2.01 K/UL (ref 1–4.8)
MAGNESIUM SERPL-MCNC: 1.8 MG/DL (ref 1.6–2.6)
MCH RBC QN AUTO: 29.2 PG (ref 27–31)
MCHC RBC AUTO-ENTMCNC: 33 G/DL (ref 32–36)
MCV RBC AUTO: 89 FL (ref 82–98)
NUCLEATED RBC (/100WBC) (OHS): 0 /100 WBC
PHOSPHATE SERPL-MCNC: 3.7 MG/DL (ref 2.7–4.5)
PLATELET # BLD AUTO: 137 K/UL (ref 150–450)
PMV BLD AUTO: 11.2 FL (ref 9.2–12.9)
POTASSIUM SERPL-SCNC: 4.2 MMOL/L (ref 3.5–5.1)
PROT SERPL-MCNC: 6.7 GM/DL (ref 6–8.4)
RBC # BLD AUTO: 4.01 M/UL (ref 4–5.4)
RELATIVE EOSINOPHIL (OHS): 2.6 %
RELATIVE LYMPHOCYTE (OHS): 47.4 % (ref 18–48)
RELATIVE MONOCYTE (OHS): 8 % (ref 4–15)
RELATIVE NEUTROPHIL (OHS): 41 % (ref 38–73)
SODIUM SERPL-SCNC: 137 MMOL/L (ref 136–145)
WBC # BLD AUTO: 4.24 K/UL (ref 3.9–12.7)

## 2025-06-23 PROCEDURE — 84100 ASSAY OF PHOSPHORUS: CPT | Performed by: STUDENT IN AN ORGANIZED HEALTH CARE EDUCATION/TRAINING PROGRAM

## 2025-06-23 PROCEDURE — 25000003 PHARM REV CODE 250: Performed by: STUDENT IN AN ORGANIZED HEALTH CARE EDUCATION/TRAINING PROGRAM

## 2025-06-23 PROCEDURE — 80053 COMPREHEN METABOLIC PANEL: CPT | Performed by: STUDENT IN AN ORGANIZED HEALTH CARE EDUCATION/TRAINING PROGRAM

## 2025-06-23 PROCEDURE — 25000003 PHARM REV CODE 250: Performed by: PHYSICIAN ASSISTANT

## 2025-06-23 PROCEDURE — 63600175 PHARM REV CODE 636 W HCPCS: Mod: TB | Performed by: INTERNAL MEDICINE

## 2025-06-23 PROCEDURE — 85025 COMPLETE CBC W/AUTO DIFF WBC: CPT | Performed by: STUDENT IN AN ORGANIZED HEALTH CARE EDUCATION/TRAINING PROGRAM

## 2025-06-23 PROCEDURE — 36415 COLL VENOUS BLD VENIPUNCTURE: CPT | Performed by: STUDENT IN AN ORGANIZED HEALTH CARE EDUCATION/TRAINING PROGRAM

## 2025-06-23 PROCEDURE — G0378 HOSPITAL OBSERVATION PER HR: HCPCS

## 2025-06-23 PROCEDURE — 83735 ASSAY OF MAGNESIUM: CPT | Performed by: STUDENT IN AN ORGANIZED HEALTH CARE EDUCATION/TRAINING PROGRAM

## 2025-06-23 PROCEDURE — 25000003 PHARM REV CODE 250: Performed by: INTERNAL MEDICINE

## 2025-06-23 RX ADMIN — TREPROSTINIL 110.2 NG/KG/MIN: 200 INJECTION, SOLUTION INTRAVENOUS; SUBCUTANEOUS at 01:06

## 2025-06-23 RX ADMIN — OXYCODONE AND ACETAMINOPHEN 1 TABLET: 325; 10 TABLET ORAL at 05:06

## 2025-06-23 RX ADMIN — Medication 400 MG: at 08:06

## 2025-06-23 RX ADMIN — MACITENTAN 10 MG: 10 TABLET, FILM COATED ORAL at 08:06

## 2025-06-23 RX ADMIN — SPIRONOLACTONE 25 MG: 25 TABLET, FILM COATED ORAL at 08:06

## 2025-06-23 RX ADMIN — FUROSEMIDE 20 MG: 20 TABLET ORAL at 08:06

## 2025-06-23 RX ADMIN — GABAPENTIN 200 MG: 100 CAPSULE ORAL at 08:06

## 2025-06-23 RX ADMIN — MORPHINE SULFATE 15 MG: 15 TABLET, FILM COATED, EXTENDED RELEASE ORAL at 08:06

## 2025-06-23 RX ADMIN — TADALAFIL 40 MG: 20 TABLET, FILM COATED ORAL at 08:06

## 2025-06-23 RX ADMIN — VENLAFAXINE HYDROCHLORIDE 150 MG: 37.5 CAPSULE, EXTENDED RELEASE ORAL at 08:06

## 2025-06-23 RX ADMIN — POTASSIUM CHLORIDE 20 MEQ: 1500 TABLET, EXTENDED RELEASE ORAL at 08:06

## 2025-06-23 NOTE — TELEPHONE ENCOUNTER
NN called the patient. Per patient the Remodulin was delivered to her home this morning. The patient stated her family was on their way already this morning to the hospital with the Remodulin. In patient team notified via secure chat message.

## 2025-06-23 NOTE — PLAN OF CARE
Patient is AAOX4, calm, cooperative and pleasant. VSS. Afebrile. NSR on Tele. BP is in 90s/60s overnight. Scheduled medicines given as per MAR. PO Morphine and PercocetX2 given for the pain. Remodulin contd @ 3.69cc/hr via Rt IJ , new bag and tubing changed @ 0122 am. Patient doesn't have any s/s of acute distress overnight. Ambulating independently without any assistance. On regular diet. Bed in low position, wheels locked, call light within patient's reach. Aware of calling for assistance.

## 2025-06-23 NOTE — NURSING
Printed AVS reviewed along with home med schedule.Awaiting friend to bring home Remodulin supplies to mix home cassette

## 2025-06-23 NOTE — PROGRESS NOTES
Dc note     Met with pt in room to discuss dc today. Pt was AAOx4 with pleasant affect. Pt was amenable to dc and stated her friend, Christine, will transport her home. Pt did not verbalize any further dc needs/concerns. No dc needs indicated by medical team. Pt will have support of her spouse, Juju, post-dc. Pt reports coping well and denies further needs, questions, concerns at this time and none indicated. Providing psychosocial and counseling support, education, resources, assistance and discharge planning as indicated. Notified pt's nurse, Gerardo, of pt's dc today with no needs, and pt's friend was en route to transport pt home. SW remains available.

## 2025-06-23 NOTE — TREATMENT PLAN
Discharge Planning:    Extended OBS admission due to running out of Home Remodulin    Patient ran out of Home Remodulin and presented to the hospital to have continuation with her IV Remodulin infusion until another shipment could be shipped to the home from her [facility] pharmacy [facility]    Patient states her family now has the Remodulin and is bringing it to the hospital for discharge    See Dosing Sheet below

## 2025-06-23 NOTE — NURSING
Discharged from TSU via w/c accompanied by friend. Patient mixed home remodulin cassette and switched over to her home pump.

## 2025-06-25 ENCOUNTER — PATIENT MESSAGE (OUTPATIENT)
Dept: TRANSPLANT | Facility: CLINIC | Age: 50
End: 2025-06-25
Payer: MEDICAID

## 2025-06-26 NOTE — DISCHARGE SUMMARY
[facility]  Heart Transplant  Discharge Summary      Patient Name: Kristin Maier  MRN: [id number]  Admission Date: 6/20/2025  Hospital Length of Stay: 0 days  Discharge Date and Time: 06/23/2025 3:29 PM  Attending Physician: No att. providers found   Discharging Provider: Stanton Evans NP  Primary Care Provider: Jorg eA Stack MD     HPI/Hospital Course: 47 y/o AA female with WHO Group 1 PAH(on PO Opsumit/tadalafil, IV remodulin) who comes in due to running out of IV medication at home on 6/20 at 6 pm. She denies chest pain, dyspnea, PND, orthopnea. She is here with her PO Opsumit/tadalafil. On arrival she was hemodynamically stable. Labs appear stable. CXR stable. She is admitted to HTS team for remodulin management. Her IV remoduln was subsequently delivered and pt discharged home without incident.       Pending Diagnostic Studies:       None          Final Active Diagnoses:    Diagnosis Date Noted POA    PRINCIPAL PROBLEM:  Pulmonary hypertension [I27.20] 11/30/2021 Yes      Problems Resolved During this Admission:      Discharged Condition: good    Disposition: Home or Self Care    Follow Up:    Patient Instructions:   No discharge procedures on file.  Medications:  Reconciled Home Medications:      Medication List        CONTINUE taking these medications      furosemide 20 MG tablet  Commonly known as: LASIX  Take 1 tablet (20 mg total) by mouth once daily.     gabapentin 100 MG capsule  Commonly known as: NEURONTIN  Take 2 capsules (200 mg total) by mouth 2 (two) times daily. Take 200 mg in AM and 200 mg in afternoon. Continue taking 300 mg (other Rx) at night.     loperamide 2 mg capsule  Commonly known as: IMODIUM  Take 1 capsule (2 mg total) by mouth 4 (four) times daily as needed for Diarrhea.     magnesium oxide 400 mg (241.3 mg magnesium) tablet  Commonly known as: MAG-OX  Take 1 tablet (400 mg total) by mouth once daily.     morphine 15 MG 12 hr tablet  Commonly  known as: MS CONTIN  Take 1 tablet (15 mg total) by mouth 2 (two) times daily.     multivitamin with minerals tablet  Take 1 tablet by mouth once daily.     naloxone 4 mg/actuation Spry  Commonly known as: NARCAN  4mg by nasal route as needed for opioid overdose; may repeat every 2-3 minutes in alternating nostrils until medical help arrives. Call 911     ondansetron 4 MG tablet  Commonly known as: ZOFRAN  Take 1 tablet (4 mg total) by mouth every 8 (eight) hours as needed for Nausea.     OPSYNVI 10-40 mg Tab  Generic drug: macitentan-tadalafil  Take 10-40 mg by mouth once daily.     oxyCODONE-acetaminophen  mg per tablet  Commonly known as: PERCOCET  Take 1 tablet by mouth every 4 (four) hours as needed for Pain.     potassium chloride SA 10 MEQ tablet  Commonly known as: K-DUR,KLOR-CON M  Take 2 tablets (20 mEq total) by mouth once daily.     REMODULIN 5 mg/mL Soln  Generic drug: treprostinil sodium     sodium chloride 0.9% SolP 100 mL with treprostinil 1 mg/mL Soln 6,000,000 ng  Inject 2,145 ng/min into the vein continuous.     spironolactone 25 MG tablet  Commonly known as: ALDACTONE  Take 1 tablet (25 mg total) by mouth once daily.     venlafaxine 150 MG Cp24  Commonly known as: EFFEXOR-XR  Take 1 capsule (150 mg total) by mouth once daily.              Stanton Evans NP  Heart Transplant  [facility]

## 2025-07-06 DIAGNOSIS — I27.20 PULMONARY HYPERTENSION: ICD-10-CM

## 2025-07-06 DIAGNOSIS — M79.604 PAIN IN BOTH LOWER EXTREMITIES: ICD-10-CM

## 2025-07-06 DIAGNOSIS — M79.605 PAIN IN BOTH LOWER EXTREMITIES: ICD-10-CM

## 2025-07-08 ENCOUNTER — TELEPHONE (OUTPATIENT)
Dept: PALLIATIVE MEDICINE | Facility: CLINIC | Age: 50
End: 2025-07-08
Payer: MEDICAID

## 2025-07-08 ENCOUNTER — PATIENT MESSAGE (OUTPATIENT)
Dept: PALLIATIVE MEDICINE | Facility: CLINIC | Age: 50
End: 2025-07-08
Payer: MEDICAID

## 2025-07-08 RX ORDER — FUROSEMIDE 20 MG/1
20 TABLET ORAL DAILY
Qty: 90 TABLET | Refills: 3 | Status: SHIPPED | OUTPATIENT
Start: 2025-07-08 | End: 2026-07-08

## 2025-07-08 RX ORDER — GABAPENTIN 100 MG/1
200 CAPSULE ORAL 2 TIMES DAILY
Qty: 120 CAPSULE | Refills: 11 | Status: SHIPPED | OUTPATIENT
Start: 2025-07-08 | End: 2026-07-08

## 2025-07-08 RX ORDER — MORPHINE SULFATE 15 MG/1
15 TABLET, FILM COATED, EXTENDED RELEASE ORAL 2 TIMES DAILY
Qty: 60 TABLET | Refills: 0 | OUTPATIENT
Start: 2025-07-08 | End: 2025-08-07

## 2025-07-09 ENCOUNTER — OFFICE VISIT (OUTPATIENT)
Dept: PALLIATIVE MEDICINE | Facility: CLINIC | Age: 50
End: 2025-07-09
Payer: MEDICAID

## 2025-07-09 DIAGNOSIS — F41.9 ANXIETY: ICD-10-CM

## 2025-07-09 DIAGNOSIS — G47.00 INSOMNIA, UNSPECIFIED TYPE: Primary | ICD-10-CM

## 2025-07-09 RX ORDER — VENLAFAXINE HYDROCHLORIDE 37.5 MG/1
187.5 CAPSULE, EXTENDED RELEASE ORAL DAILY
Qty: 30 CAPSULE | Refills: 11 | Status: SHIPPED | OUTPATIENT
Start: 2025-07-09 | End: 2026-07-09

## 2025-07-09 NOTE — PROGRESS NOTES
Palliative Medicine Clinic Note      Consult Requested By: No ref. provider found    Primary Care Physician:   Jorge A Stack MD    Reason for Consult: Advance care planning and symptom management in the setting of Pulmonary Hypertension     The patient location is: [place]  The chief complaint leading to consultation is: pain    Visit type: audiovisual    Face to Face time with patient: 22 min  42  minutes of total time spent on the encounter, which includes face to face time and non-face to face time preparing to see the patient (eg, review of tests), Obtaining and/or reviewing separately obtained history, Documenting clinical information in the electronic or other health record, Independently interpreting results (not separately reported) and communicating results to the patient/family/caregiver, or Care coordination (not separately reported).       Each patient provided with medical services by telemedicine is:  (1) informed of the relationship between the physician and patient and the respective role of any other health care provider with respect to management of the patient; and (2) notified that he or she may decline to receive medical services by telemedicine and may withdraw from such care at any time.      ASSESSMENT/PLAN:     Plan/Recommendations:  Diagnoses and all orders for this visit:    Pulmonary hypertension  -Pulmonary HTN, WHO group 1, on remodulin.   - following with Pulmonary hypertension Clinic  -currently on Opsynvi and Remodulin   -Not requiring oxygen   -    Anxiety /Other insomnia  -Improved  -Increase Effexor XR  to 187.5 mg daily  continues to have anxiety  -consider adding buspar or changing antianxiety medication if no improvement  - Referred to [facility] (therapist) [facility]  for grief counseling and emotional support.  venlafaxine (EFFEXOR-XR) 37.5 MG 24 hr capsule           187.5 mg, Daily        Pain in both upper extremities  Arthralgia in both hands  -pain in upper  extremities primarily in her joints starting in her shoulders and radiating to her hands  - Evaluated pain management, noting moderate effectivness of current regimen.    -Percocet 10-325mg taking q.4 to 6 hours now (refill sent)   -patient requests restarting Xtampza and stopping morphine due to side effects  -xtampza not covered by insurance  -Failed buprenorphine SL film and patch   -increased Effexor .5 mg daily  - Continued morphine and oxycodone for pain management, encouraged using as prescribed without hesitation.   - Will message Dr. Lee to submit morphine prescription order and discuss potentially switching to xtampza or alternative options due to nausea side effects.   - Patient to plan activities around pain medication timing for optimal symptom control.   - Discontinued Tylenol PM due to acetaminophen content and risks when combined with current pain medications.     ANOREXIA AND NUTRITION:   - Assessed nutritional intake, recommending supplementation due to poor appetite.   - Advised on strategies including meal replacement drinks and easily accessible snacks throughout the day; patient to continue eating fruits as tolerated.     Fatigue/insomnia  - Educated on the connection between sleep patterns and daytime functioning.  - Patient to implement a consistent sleep schedule, aiming to be asleep by midnight.  -- Started melatonin 5 mg at bedtime for sleep as safer alternative, may increase to 10 mg if ineffective, may take additional 5 mg if waking during night and unable to fall back asleep.   -education provided on sleep hygiene such as cold dark environment and having a scheduled sleep time.    Nausea:   - Patient to anticipate and preemptively treat nausea on days when changing medication cassette.  - Zofran PRN for nausea management.    Palliative care encounter  Medicolegal: Has decision making capacity.   is surrogate decision maker.   She shared that for HCPOA she would named her   as 1st agent and her daughter Nicky as her 2nd agent.     Psychosocial:  support system consists of  and daughters     Spiritual: Gnosticist    Understanding of disease and Illness Trajectory: Patient  has  adequate understanding of her illness, they can benefit from continued education on what to expect in the future.      Advance Care Planning   Advance Directives:   Living Will: No    [place]: No    Do Not Resuscitate Status: No    Medical Power of : Yes    Agent's Name:  Juju Maier   Agent's Contact Number:  [phone]    Decision Making:  Patient answered questions  Goals of Care: Advance Care Planning    Date: 07/10/2025    [facility]  I engaged the patient in a voluntary conversation about advance care planning and we specifically addressed what the goals of care would be moving forward, in light of the patient's change in clinical status, specifically PH.  We did not specifically address the patient's likely prognosis.  We explored the patient's values and preferences for future care.  The patient endorses that what is most important right now is to focus on spending time at home, remaining as independent as possible, symptom/pain control, quality of life, even if it means sacrificing a little time, improvement in condition but with limits to invasive therapies, and comfort and QOL     Accordingly, we have decided that the best plan to meet the patient's goals includes continuing with treatment    ACP Reviewed/No Changes  Voluntary advance care planning discussion had today with patient. Previously completed HCPOA in electronic medical record is current, no changes made.    Discussion was had regarding medical preferences in the setting of a critical illness.  Patient reports she has had conversations with her HPOA about the types of medical treatments she would and would not want in the setting of critical illness.  She endorses that she would want a time limited trial of agressive  medical treatment in the setting of a critical illness with a reasonable change of a meaningful recovery.      - Desires aggressive medical treatment only if there is a reasonable chance of recovery to a normal state   - Wishes to avoid being a burden on her family and does not want to be kept alive if there is no chance of meaningful recovery   - Has a healthcare power of , which is her    - Patient and her  have had a discussion about their end-of-life wishes for themselves   - No formal advanced directive paperwork has been completed at this time   - Quality of life seems to involve maintaining independence and avoiding a state where she is completely dependent on others for care           Advance Care Planning    Date: 10/09/2024  I engaged the patient in a voluntary conversation about advance care planning and we specifically addressed what the goals of care would be moving forward.  We explored the patient's values and preferences for future care.  The patient endorses that what is most important right now is to focus on remaining as independent as possible and symptom/pain control. She expresses strong desire to maintain independence and continue daily activities. For her quality of life involves being able to engage in daily activities and care for grandchildren.  Accordingly, we have decided that the best plan to meet the patient's goals includes continuing with treatment    Date: 08/27/2024  Voluntary advance care planning discussion had today with patient. Previously completed HCPOA in electronic medical record is current, no changes made.     Date: 06/13/2023  I introduced the concept of advance directives to the patient, as well. Then the patient received detailed information about the importance of designating a Health Care Power of  (HCPOA). She was also instructed to communicate with this person about their wishes for future healthcare, should she become sick and lose  decision-making capacity. The patient has not previously appointed a HCPOA. After our discussion, the patient has decided to complete a HCPOA and has appointed her significant other, health care agent: Juju Maier & her daughter Navya Dowell as second agent. Form completed and will be scanned today.           min time was spent on advance care planning, goals of care discussion, emotional support, formulating and communicating prognosis and goals of care, exploring burden/benefit of various approaches of treatment.     Follow up: 2 month      SUBJECTIVE:     History obtained from: Patient      complaint:   No chief complaint on file.      Disease History:  Pulmonary HTN, WHO group 1, on remodulin. RHC 3/21/22 indicative on worsening disease      History of Present Illness / Interval History:  Kristin Maier is 49 y.o. year old female presenting with PAH.  Referred to Palliative Care for evaluation and management of physical symptoms, advance care planning,, and additional support.. female attended the appointment alone    7/9/25  Patient is historian  Patient reports ongoing issues with pain, anxiety, depression, pain and sleep disturbances. She experiences shortness of breath that is partially relieved by morphine and oxycodone. Her morphine prescription was recently denied, possibly due to the timing of this appointment. She describes poor sleep patterns, waking up at 4:00 AM and having difficulty falling back asleep. Patient purchased OTC Tylenol PM but has not been using it regularly due to concerns about medication interactions. She recalls melatonin was effective during a previous hospital stay. Regarding GI symptoms, the patient describes a fluctuating pattern of constipation and diarrhea, which she attributes to her medications. PH medications causes diarrhea while her pain medications lead to constipation. She reports this alternating pattern as somewhat self-regulating. Patient notes a  "decreased appetite but has gained weight since becoming ill. She mentions eating small amounts and incorporating fruits into her diet. Patient denies any significant weight loss or unmanageable GI issues.     - Reports difficulty sleeping, waking up at 4:00 AM and unable to fall back asleep   - Has poor appetite, struggling to eat full meals   - Has gained weight despite decreased appetite   - Experiences alternating constipation and diarrhea due to medication side effects   - Is able to eat fruits independently     2/21/25  Patient reports ongoing issues with chronic pain and energy levels. Her fluid levels are good with no swelling. She continues to experience chronic diarrhea as a side effect of her medications. Pain management has been challenging, with buprenorphine pain patches being ineffective. She has been taking more Percocet for pain relief in the absence of other options. Patient mentions that Xtampza were previously effective but are no longer being received.    Regarding energy levels, the patient reports persistent fatigue during the day and difficulty sleeping at night. She mentions not having slept the previous night due to not feeling well and has not been achieving a full eight hours of sleep.    Patient is currently taking Effexor at 75 mg with no side effects. She also mentions that a medication Xtampza has been helpful in the past. Patient is not currently taking Lyrica, describing it as "out of hand." She reports shortness of breath.    Patient denies any swelling or side effects from Effexor.      Refer to prior notes/encounters for more details: 4/9/24; 8/27/24; 10/9/24:      Review of Symptoms      Symptom Assessment (ESAS 0-10 Scale)  Pain:  7  Dyspnea:  6  Anxiety:  6  Nausea:  8  Depression:  6  Anorexia:  7  Fatigue:  8  Insomnia:  5  Restlessness:  0  Agitation:  0     CAM / Delirium:  Negative  Constipation:  Positive  Diarrhea:  Positive      Comments:  Self regulated per " patient    Pain Assessment:    Location(s): arm (joint)    Arm       Location: bilateral        Quality: Aching and cramping        Quantity: 7/10 in intensity        Chronicity: Onset 6 month(s) ago, gradually worsening        Aggravating Factors: Activity        Alleviating Factors: Opiates        Associated Symptoms: None    Modified Jessica Scale:  6    Living Arrangements:  Lives with family    Psychosocial/Cultural:   See Palliative Psychosocial Note: Yes  .  is a . Has 3 living daughters youngest is 25 oldest is 33 and a  son. Has grandchildren who she enjoys spending time with. Likes teaching them to play different sports. Enjoys gardening   **Primary  to Follow**  Palliative Care  Consult: Yes    Spiritual:  F - Leticia and Belief:  Mandaeism      : reviewed 25 by corina jarrett  2025 1 Oxycodone-Acetaminophen  180.00 30 Er Lori [id number] [facility] ([id number]) 0 90.00 MME Other [place]   2025 2 Morphine Sulf Er 15 Mg Tablet 60.00 30 Er Lori [id number] [facility] ([id number]) 0 30.00 MME Private Pay [place]   2025 1 Oxycodone-Acetaminophen  180.00 30 Er Lori [id number] [facility] ([id number]) 0 90.00 MME Other [place]   2025 2 Morphine Sulf Er 15 Mg Tablet 60.00 30 Er Lori [id number] Tali ([id number]) 0 30.00 MME Private Pay [place]     Medications:    Current Outpatient Medications:     furosemide (LASIX) 20 MG tablet, Take 1 tablet (20 mg total) by mouth once daily., Disp: 90 tablet, Rfl: 3    gabapentin (NEURONTIN) 100 MG capsule, Take 2 capsules (200 mg total) by mouth 2 (two) times daily. Take 200 mg in AM and 200 mg in afternoon. Continue taking 300 mg (other Rx) at night., Disp: 120 capsule, Rfl: 11    loperamide (IMODIUM) 2 mg capsule, Take 1 capsule (2 mg total) by mouth 4 (four) times daily as needed for Diarrhea., Disp: 30 capsule, Rfl: 3    macitentan-tadalafil (OPSYNVI) 10-40 mg Tab, Take 10-40 mg by mouth once daily., Disp: , Rfl:      magnesium oxide (MAG-OX) 400 mg (241.3 mg magnesium) tablet, Take 1 tablet (400 mg total) by mouth once daily., Disp: 90 tablet, Rfl: 3    morphine (MS CONTIN) 15 MG 12 hr tablet, Take 1 tablet (15 mg total) by mouth 2 (two) times daily., Disp: 60 tablet, Rfl: 0    multivitamin with minerals tablet, Take 1 tablet by mouth once daily., Disp: , Rfl:     naloxone (NARCAN) 4 mg/actuation Spry, 4mg by nasal route as needed for opioid overdose; may repeat every 2-3 minutes in alternating nostrils until medical help arrives. Call 911, Disp: 1 each, Rfl: 11    ondansetron (ZOFRAN) 4 MG tablet, Take 1 tablet (4 mg total) by mouth every 8 (eight) hours as needed for Nausea., Disp: 30 tablet, Rfl: 6    oxyCODONE-acetaminophen (PERCOCET)  mg per tablet, Take 1 tablet by mouth every 4 (four) hours as needed for Pain., Disp: 180 tablet, Rfl: 0    potassium chloride SA (K-DUR,KLOR-CON M) 10 MEQ tablet, Take 2 tablets (20 mEq total) by mouth once daily., Disp: 60 tablet, Rfl: 11    REMODULIN 5 mg/mL Soln, , Disp: , Rfl:     sodium chloride 0.9% SolP 100 mL with treprostinil 1 mg/mL Soln 6,000,000 ng, Inject 2,145 ng/min into the vein continuous., Disp: , Rfl:     spironolactone (ALDACTONE) 25 MG tablet, Take 1 tablet (25 mg total) by mouth once daily., Disp: 90 tablet, Rfl: 3    venlafaxine (EFFEXOR-XR) 150 MG Cp24, Take 1 capsule (150 mg total) by mouth once daily., Disp: 30 capsule, Rfl: 11      External  database queried on 07/09/2025  by Miquel Painting .   The results reviewed and considered with the clinical data in the decision whether or not to prescribe a controlled substance.  02/10/2025 02/07/2025 1 Buprenorphine 5 Mcg/hr Patch 4.00 28 Er Lori [id number] Tali ([id number]) 0 0.12 mg Private Pay [place]   01/31/2025 01/30/2025 2 Oxycodone-Acetaminophen  180.00 30 De Shaun [id number] [facility] ([id number]) 0 90.00 MME Other [place]   01/27/2025 01/24/2025 1 Belbuca 75 Mcg Film 30.00 30 Er Lori [id number] [facility] ([id number]) 0 0.07 mg Private Pay [place]    01/27/2025 08/27/2024 2 Pregabalin 75 Mg Capsule 90.00 30 Er Lori [id number] [facility] ([id number]) 5 1.51 LME Medicaid [place]   01/03/2025 12/27/2024 2 Oxycodone-Acetaminophen  180.00 30 De Shaun [id number] [facility] ([id number]) 0 90.00 MME Other [place]       Review of patient's allergies indicates:  No Known Allergies    OBJECTIVE:       Physical Exam:  Vitals:    Physical Exam  Vitals reviewed: virtual visit, no vital signs or physical assessment.         This note was generated with the assistance of ambient listening technology. Verbal consent was obtained by the patient and accompanying visitor(s) for the recording of patient appointment to facilitate this note. I attest to having reviewed and edited the generated note for accuracy, though some syntax or spelling errors may persist. Please contact the author of this note for any clarification.        Signature: Miquel Painting NP

## 2025-07-10 ENCOUNTER — PATIENT MESSAGE (OUTPATIENT)
Dept: PALLIATIVE MEDICINE | Facility: CLINIC | Age: 50
End: 2025-07-10
Payer: MEDICAID

## 2025-07-10 DIAGNOSIS — M79.604 PAIN IN BOTH LOWER EXTREMITIES: ICD-10-CM

## 2025-07-10 DIAGNOSIS — I27.20 PULMONARY HYPERTENSION: ICD-10-CM

## 2025-07-10 DIAGNOSIS — M79.605 PAIN IN BOTH LOWER EXTREMITIES: ICD-10-CM

## 2025-07-10 RX ORDER — MELATONIN 5 MG
5 CAPSULE ORAL NIGHTLY PRN
Qty: 30 CAPSULE | Refills: 3 | Status: SHIPPED | OUTPATIENT
Start: 2025-07-10

## 2025-07-10 RX ORDER — MORPHINE SULFATE 15 MG/1
15 TABLET, FILM COATED, EXTENDED RELEASE ORAL 2 TIMES DAILY
Qty: 60 TABLET | Refills: 0 | Status: SHIPPED | OUTPATIENT
Start: 2025-07-10 | End: 2025-07-11 | Stop reason: SDUPTHER

## 2025-07-11 DIAGNOSIS — M79.604 PAIN IN BOTH LOWER EXTREMITIES: ICD-10-CM

## 2025-07-11 DIAGNOSIS — I27.20 PULMONARY HYPERTENSION: ICD-10-CM

## 2025-07-11 DIAGNOSIS — M79.605 PAIN IN BOTH LOWER EXTREMITIES: ICD-10-CM

## 2025-07-11 RX ORDER — MORPHINE SULFATE 15 MG/1
15 TABLET, FILM COATED, EXTENDED RELEASE ORAL 2 TIMES DAILY
Qty: 60 TABLET | Refills: 0 | Status: SHIPPED | OUTPATIENT
Start: 2025-07-11 | End: 2025-08-10

## 2025-07-15 ENCOUNTER — TELEPHONE (OUTPATIENT)
Dept: PALLIATIVE MEDICINE | Facility: CLINIC | Age: 50
End: 2025-07-15
Payer: MEDICAID

## 2025-07-15 ENCOUNTER — TELEPHONE (OUTPATIENT)
Dept: PALLIATIVE MEDICINE | Facility: OTHER | Age: 50
End: 2025-07-15
Payer: MEDICAID

## 2025-07-15 DIAGNOSIS — M79.605 PAIN IN BOTH LOWER EXTREMITIES: ICD-10-CM

## 2025-07-15 DIAGNOSIS — I27.20 PULMONARY HYPERTENSION: ICD-10-CM

## 2025-07-15 DIAGNOSIS — M25.541 ARTHRALGIA OF BOTH HANDS: ICD-10-CM

## 2025-07-15 DIAGNOSIS — M79.604 PAIN IN BOTH LOWER EXTREMITIES: ICD-10-CM

## 2025-07-15 DIAGNOSIS — M25.542 ARTHRALGIA OF BOTH HANDS: ICD-10-CM

## 2025-07-15 RX ORDER — OXYCODONE 13.5 MG/1
13.5 CAPSULE, EXTENDED RELEASE ORAL 2 TIMES DAILY
Qty: 60 EACH | Refills: 0 | Status: SHIPPED | OUTPATIENT
Start: 2025-07-15 | End: 2025-07-15 | Stop reason: CLARIF

## 2025-07-15 RX ORDER — OXYCODONE AND ACETAMINOPHEN 10; 325 MG/1; MG/1
1 TABLET ORAL EVERY 4 HOURS PRN
Qty: 180 TABLET | Refills: 0 | Status: SHIPPED | OUTPATIENT
Start: 2025-07-15

## 2025-07-15 RX ORDER — OXYCODONE HCL 10 MG/1
10 TABLET, FILM COATED, EXTENDED RELEASE ORAL EVERY 12 HOURS
Qty: 60 TABLET | Refills: 0 | Status: SHIPPED | OUTPATIENT
Start: 2025-07-15 | End: 2025-07-16

## 2025-07-15 NOTE — TELEPHONE ENCOUNTER
Pt requesting xtampa instead of MS Contin 15mg q 12 hours. She says the long acting morphine makes her nauseous. She has been out of the long acting since last week. She said that the morphine has always made her nauseous but her insurance wouldn't cover xtampa in the past. The pharmacist told her that xtampa could be covered. However, after ordering it was found that Xtampza is a plan exclusion medication.    Will order Oxycontin 10mg BID

## 2025-07-15 NOTE — TELEPHONE ENCOUNTER
"Called for PA for xtamza. Xtamza is a "plan exclusion medication." Will let patient know, and update MD for any other alternatives.   "

## 2025-07-16 ENCOUNTER — TELEPHONE (OUTPATIENT)
Dept: PALLIATIVE MEDICINE | Facility: OTHER | Age: 50
End: 2025-07-16
Payer: MEDICAID

## 2025-07-16 NOTE — TELEPHONE ENCOUNTER
Oxycontin prescription sent to [facility] in [place]. Called [facility] and they do not have the medication. Called [facility] in [place] to see if they have the medication. They need the prescription sent over to verify. If they do not have it, they will order and it will be here by Friday (July 18). Will send prescription over to patients preferred  [facility] pharmacy. Called patient to let her know.

## 2025-07-22 ENCOUNTER — TELEPHONE (OUTPATIENT)
Dept: PALLIATIVE MEDICINE | Facility: CLINIC | Age: 50
End: 2025-07-22
Payer: MEDICAID

## 2025-07-22 ENCOUNTER — PATIENT MESSAGE (OUTPATIENT)
Dept: PALLIATIVE MEDICINE | Facility: CLINIC | Age: 50
End: 2025-07-22
Payer: MEDICAID

## 2025-08-11 DIAGNOSIS — I27.20 PULMONARY HYPERTENSION: ICD-10-CM

## 2025-08-11 DIAGNOSIS — M79.605 PAIN IN BOTH LOWER EXTREMITIES: ICD-10-CM

## 2025-08-11 DIAGNOSIS — M79.604 PAIN IN BOTH LOWER EXTREMITIES: ICD-10-CM

## 2025-08-11 DIAGNOSIS — M25.541 ARTHRALGIA OF BOTH HANDS: ICD-10-CM

## 2025-08-11 DIAGNOSIS — M25.542 ARTHRALGIA OF BOTH HANDS: ICD-10-CM

## 2025-08-11 RX ORDER — OXYCODONE AND ACETAMINOPHEN 10; 325 MG/1; MG/1
1 TABLET ORAL EVERY 4 HOURS PRN
Qty: 180 TABLET | Refills: 0 | Status: SHIPPED | OUTPATIENT
Start: 2025-08-11

## 2025-08-19 ENCOUNTER — PATIENT MESSAGE (OUTPATIENT)
Dept: PALLIATIVE MEDICINE | Facility: CLINIC | Age: 50
End: 2025-08-19
Payer: MEDICAID

## 2025-08-19 DIAGNOSIS — M25.541 ARTHRALGIA OF BOTH HANDS: ICD-10-CM

## 2025-08-19 DIAGNOSIS — M79.604 PAIN IN BOTH LOWER EXTREMITIES: ICD-10-CM

## 2025-08-19 DIAGNOSIS — M25.542 ARTHRALGIA OF BOTH HANDS: ICD-10-CM

## 2025-08-19 DIAGNOSIS — I27.20 PULMONARY HYPERTENSION: ICD-10-CM

## 2025-08-19 DIAGNOSIS — M79.605 PAIN IN BOTH LOWER EXTREMITIES: ICD-10-CM

## 2025-08-20 RX ORDER — OXYCODONE HCL 10 MG/1
10 TABLET, FILM COATED, EXTENDED RELEASE ORAL EVERY 12 HOURS
Qty: 60 TABLET | Refills: 0 | Status: SHIPPED | OUTPATIENT
Start: 2025-08-20 | End: 2025-08-22 | Stop reason: SDUPTHER

## 2025-08-22 ENCOUNTER — OFFICE VISIT (OUTPATIENT)
Dept: PALLIATIVE MEDICINE | Facility: CLINIC | Age: 50
End: 2025-08-22
Payer: MEDICAID

## 2025-08-22 DIAGNOSIS — I27.20 PULMONARY HYPERTENSION: ICD-10-CM

## 2025-08-22 DIAGNOSIS — G47.00 INSOMNIA, UNSPECIFIED TYPE: Primary | ICD-10-CM

## 2025-08-22 DIAGNOSIS — M79.605 PAIN IN BOTH LOWER EXTREMITIES: ICD-10-CM

## 2025-08-22 DIAGNOSIS — M25.541 ARTHRALGIA OF BOTH HANDS: ICD-10-CM

## 2025-08-22 DIAGNOSIS — M25.542 ARTHRALGIA OF BOTH HANDS: ICD-10-CM

## 2025-08-22 DIAGNOSIS — M79.604 PAIN IN BOTH LOWER EXTREMITIES: ICD-10-CM

## 2025-08-22 RX ORDER — OXYCODONE HCL 20 MG/1
20 TABLET, FILM COATED, EXTENDED RELEASE ORAL EVERY 12 HOURS
Qty: 60 TABLET | Refills: 0 | Status: CANCELLED | OUTPATIENT
Start: 2025-08-22

## 2025-08-22 RX ORDER — OXYCODONE HCL 10 MG/1
10 TABLET, FILM COATED, EXTENDED RELEASE ORAL EVERY 12 HOURS
Qty: 60 TABLET | Refills: 0 | Status: SHIPPED | OUTPATIENT
Start: 2025-08-22

## 2025-08-22 RX ORDER — VENLAFAXINE HYDROCHLORIDE 150 MG/1
150 CAPSULE, EXTENDED RELEASE ORAL DAILY
COMMUNITY
Start: 2025-08-11

## 2025-08-24 RX ORDER — MIRTAZAPINE 7.5 MG/1
7.5 TABLET, FILM COATED ORAL NIGHTLY
Qty: 30 TABLET | Refills: 11 | Status: SHIPPED | OUTPATIENT
Start: 2025-08-24 | End: 2026-08-24

## 2025-09-04 DIAGNOSIS — I27.20 CHRONIC PULMONARY HYPERTENSION: Primary | ICD-10-CM

## 2025-09-04 DIAGNOSIS — R06.82 TACHYPNEA: ICD-10-CM

## 2025-09-04 DIAGNOSIS — Z79.899 POLYPHARMACY: ICD-10-CM

## 2025-09-04 DIAGNOSIS — R06.02 SHORTNESS OF BREATH: ICD-10-CM

## (undated) DEVICE — GOWN SMART IMP BREATHABLE XXLG

## (undated) DEVICE — PACK LAPAROSCOPY

## (undated) DEVICE — EVACUATOR KIT SMOKE PLUME AWAY

## (undated) DEVICE — COVER PROBE US 5.5X58L NON LTX

## (undated) DEVICE — ELECTRODE REM PLYHSV RETURN 9

## (undated) DEVICE — TUBING HPCIL ROT M/F ADPT 10IN

## (undated) DEVICE — GLOVE BIOGEL SKINSENSE PI 6.5

## (undated) DEVICE — KIT PROBE COVER WITH GEL

## (undated) DEVICE — TRAY CATH LAB OMC

## (undated) DEVICE — LINE INJECTION 30IN 25/BX

## (undated) DEVICE — SHEATH INTRODUCER 7FR 11CM

## (undated) DEVICE — KIT COPILOT VALVE HEMO TOOL

## (undated) DEVICE — SET CO-SET CLOSED INJ

## (undated) DEVICE — DRAPE ANGIO BRACH 38X44IN

## (undated) DEVICE — CATH SWAN GANZ STND 7FR

## (undated) DEVICE — KIT PREP E-Z WET W/2-6

## (undated) DEVICE — SCISSOR 5MMX35CM DIRECT DRIVE

## (undated) DEVICE — ADHESIVE DERMABOND ADVANCED

## (undated) DEVICE — SET MICROPUNCTURE 5FR 501NT

## (undated) DEVICE — TROCAR ENDOPATH XCEL 11MM 10CM

## (undated) DEVICE — PAD SANITARY OB STERILE

## (undated) DEVICE — KIT MICROINTRODUCE MINI 5X10CM

## (undated) DEVICE — ENDOSTITCH POLYSORB 0 ES-9

## (undated) DEVICE — INTRODUCER RX PSI KIT 8.5 FR

## (undated) DEVICE — SEE MEDLINE ITEM 156894

## (undated) DEVICE — SUTURE VICRYL PLUS 3-0 PS1 27

## (undated) DEVICE — SOL CLEARIFY VISUALIZATION LAP

## (undated) DEVICE — TRANSDUCER ADULT DISP

## (undated) DEVICE — GLOVE BIOGEL SKINSENSE PI 7.5

## (undated) DEVICE — KITTNER ENDOSCOPIC SINGLE TIP

## (undated) DEVICE — DISSECTOR SONICISION CRV 39CM

## (undated) DEVICE — IRRIGATOR ENDOSCOPY DISP.

## (undated) DEVICE — SEE MEDLINE ITEM 157117

## (undated) DEVICE — CHLORAPREP W TINT 26ML APPL

## (undated) DEVICE — APPLICATOR CHLORAPREP ORN 26ML

## (undated) DEVICE — FORCEP DISSECTING LYONS PKS

## (undated) DEVICE — GLOVE BIOGEL SKINSENSE PI 8.5

## (undated) DEVICE — CATH SWAN GANZ 7FR 110CM

## (undated) DEVICE — KIT MICROINTRO 4F .018X40X7CM

## (undated) DEVICE — NDL INSUF ULTRA VERESS 120MM

## (undated) DEVICE — SOL NS 1000CC

## (undated) DEVICE — ENDOSTITCH INSTRUMENT